# Patient Record
Sex: FEMALE | Race: WHITE | NOT HISPANIC OR LATINO | Employment: OTHER | URBAN - METROPOLITAN AREA
[De-identification: names, ages, dates, MRNs, and addresses within clinical notes are randomized per-mention and may not be internally consistent; named-entity substitution may affect disease eponyms.]

---

## 2017-01-05 ENCOUNTER — PATIENT MESSAGE (OUTPATIENT)
Dept: ENDOCRINOLOGY | Facility: CLINIC | Age: 52
End: 2017-01-05

## 2017-01-05 ENCOUNTER — PATIENT MESSAGE (OUTPATIENT)
Dept: OBSTETRICS AND GYNECOLOGY | Facility: CLINIC | Age: 52
End: 2017-01-05

## 2017-01-23 ENCOUNTER — PATIENT MESSAGE (OUTPATIENT)
Dept: BARIATRICS | Facility: CLINIC | Age: 52
End: 2017-01-23

## 2017-01-23 ENCOUNTER — TELEPHONE (OUTPATIENT)
Dept: BARIATRICS | Facility: CLINIC | Age: 52
End: 2017-01-23

## 2017-01-23 NOTE — TELEPHONE ENCOUNTER
SPOKE to pt and advised her that she will not need ekg tomorrow and it has been cx.     Pt needs repeat vit d and tsh.  She requested for pcp, who she will see this Thursday to draw her labs.

## 2017-01-23 NOTE — TELEPHONE ENCOUNTER
Called patient to begin liquid diet and discussed vitamins.    Protein shake: need  gms of protein per day, less than 4gm sugar per shake  Pt using Premier shakes x 3/day. Needs 4/day.  600-800 calories per day from protein shakes  SF Decaf, non-carbonated Fluids  NO Fruits, juices and yogurt for 2 weeks before and after surgery  No vitamins or minerals for 1 week prior to surgery  Reminded pt of pre-op and surgery date.  Pt to call back with any questions.

## 2017-01-23 NOTE — TELEPHONE ENCOUNTER
----- Message from Whit Clemons RN sent at 12/5/2016  5:13 PM CST -----  Regarding: surgery Cancellation  Ranulfo Desai,  This patient is canceling her surgery and moving it back to 2/6/17.  Her PLD will begin on 1/23/17 now.  Thanks,  Jane

## 2017-01-29 ENCOUNTER — PATIENT MESSAGE (OUTPATIENT)
Dept: ENDOCRINOLOGY | Facility: CLINIC | Age: 52
End: 2017-01-29

## 2017-01-31 ENCOUNTER — TELEPHONE (OUTPATIENT)
Dept: ENDOCRINOLOGY | Facility: CLINIC | Age: 52
End: 2017-01-31

## 2017-01-31 ENCOUNTER — OFFICE VISIT (OUTPATIENT)
Dept: BARIATRICS | Facility: CLINIC | Age: 52
End: 2017-01-31
Payer: MEDICARE

## 2017-01-31 VITALS
BODY MASS INDEX: 39.77 KG/M2 | WEIGHT: 224.44 LBS | SYSTOLIC BLOOD PRESSURE: 139 MMHG | HEART RATE: 96 BPM | DIASTOLIC BLOOD PRESSURE: 81 MMHG | HEIGHT: 63 IN

## 2017-01-31 DIAGNOSIS — I10 ESSENTIAL HYPERTENSION: Chronic | ICD-10-CM

## 2017-01-31 DIAGNOSIS — E66.01 MORBID OBESITY: ICD-10-CM

## 2017-01-31 DIAGNOSIS — E78.5 HYPERLIPIDEMIA, UNSPECIFIED HYPERLIPIDEMIA TYPE: Chronic | ICD-10-CM

## 2017-01-31 PROCEDURE — 3075F SYST BP GE 130 - 139MM HG: CPT | Mod: S$GLB,,, | Performed by: SURGERY

## 2017-01-31 PROCEDURE — 99999 PR PBB SHADOW E&M-EST. PATIENT-LVL IV: CPT | Mod: PBBFAC,,, | Performed by: SURGERY

## 2017-01-31 PROCEDURE — 3079F DIAST BP 80-89 MM HG: CPT | Mod: S$GLB,,, | Performed by: SURGERY

## 2017-01-31 PROCEDURE — 1159F MED LIST DOCD IN RCRD: CPT | Mod: S$GLB,,, | Performed by: SURGERY

## 2017-01-31 PROCEDURE — 99213 OFFICE O/P EST LOW 20 MIN: CPT | Mod: S$GLB,,, | Performed by: SURGERY

## 2017-01-31 RX ORDER — SODIUM CITRATE AND CITRIC ACID MONOHYDRATE 334; 500 MG/5ML; MG/5ML
15 SOLUTION ORAL ONCE
Status: CANCELLED | OUTPATIENT
Start: 2017-01-31 | End: 2017-01-31

## 2017-01-31 RX ORDER — FAMOTIDINE 10 MG/ML
20 INJECTION INTRAVENOUS ONCE
Status: CANCELLED | OUTPATIENT
Start: 2017-01-31 | End: 2017-01-31

## 2017-01-31 RX ORDER — OXYCODONE HYDROCHLORIDE 15 MG/1
TABLET ORAL
Refills: 0 | COMMUNITY
Start: 2016-11-17 | End: 2017-01-31 | Stop reason: ALTCHOICE

## 2017-01-31 RX ORDER — HEPARIN SODIUM 5000 [USP'U]/ML
5000 INJECTION, SOLUTION INTRAVENOUS; SUBCUTANEOUS ONCE
Status: CANCELLED | OUTPATIENT
Start: 2017-01-31 | End: 2017-01-31

## 2017-01-31 RX ORDER — PENICILLIN V POTASSIUM 500 MG/1
TABLET, FILM COATED ORAL
Refills: 0 | COMMUNITY
Start: 2016-12-21 | End: 2017-01-31 | Stop reason: ALTCHOICE

## 2017-01-31 RX ORDER — METOCLOPRAMIDE HYDROCHLORIDE 5 MG/ML
10 INJECTION INTRAMUSCULAR; INTRAVENOUS ONCE
Status: CANCELLED | OUTPATIENT
Start: 2017-01-31 | End: 2017-01-31

## 2017-01-31 NOTE — LETTER
[facility]  [address]  [place] [address]  Phone: [phone]  Fax: [phone] January 31, 2017      Rangel Floyd MD  [address]  [address]  [place] [address]    Patient: Angie Mccarthy   MR Number: [id number]   YOB: 1965   Date of Visit: 1/31/2017     Dear Dr. Swenson:    Thank you for referring Angie Mccarthy to me for evaluation. Below are the relevant portions of my assessment and plan of care.    Angie Mccarthy is a 51-year-old female who presents withMorbid obesity with failure of medical conservative therapy.     Co Morbid Conditions:   Patient Active Problem List   Diagnosis    Syringomyelia and syringobulbia    Lumbago    Spondylosis without myelopathy    Degeneration of lumbar or lumbosacral intervertebral disc    Thoracic or lumbosacral neuritis or radiculitis, unspecified    Flat back    Sagittal plane imbalance    Hardware complicating wound infection    Hypertension    Hyperlipidemia    Open wound of knee, leg (except thigh), and ankle, without mention of complication    Scoliosis    Gait abnormality    Contact dermatitis due to adhesives    Postoperative anemia due to acute blood loss    Thoracic spondylosis without myelopathy    Rash and nonspecific skin eruption    Pain in limb    Back pain with radiation    Spinal stenosis, lumbar region, without neurogenic claudication    Pseudoarthrosis    Kyphoscoliosis    Flat back syndrome    Failed back syndrome    Osteoporosis, idiopathic    Menopause    Thyroid nodule    Palpitations    Morbid obesity with BMI of 40.0-44.9, adult    GERD (gastroesophageal reflux disease)    Fatty liver disease, nonalcoholic     PLAN:  Patient wishes to undergo sleeve gastrectomy with wedge biopsy of liver. She is on narcotics by pain management and we will see whether we will be responsible for 2 weeks worth of pain medication. She is concerned about post-op sciatica  (leg shaking) as she has had it before and says it responds to Neurontin and Flexeril.      The patient was informed that risks include, but are not limited to: death, leak, obstruction, bleeding, and sepsis. Any of these could require further surgery. Other risks include DVT, PE, pneumonia, wound dehiscence, hernia, wound infection, the need for dilatations and the inability to lose appropriate weight and keep it off.      We discussed that our goal is to ameliorate her medical problems and not to obtain a specific body mass index. she understands the risks and benefits and wishes to proceed with the procedure. she has signed a consent form.     If you have questions, please do not hesitate to call me. I look forward to following Angie along with you.    Sincerely,      Deon Ha MD   Section Head - General, Laparoscopic, Bariatric  Acute Care and Oncologic Surgery   - Surgical Weight Loss Program  [facility]    ZOHAIB/beau

## 2017-01-31 NOTE — PROGRESS NOTES
History & Physical    SUBJECTIVE:     History of Present Illness:  Angie Mccarthy is a 51 y.o. female who presents for pre-operative exam for weight loss surgery.  she has completed her pre-operative evaluation.  she has failed medical treatment for obesity.  1. Morbid Obesity, Body mass index is 41.51 kg/(m^2). and inability to lose weight.  2. Co-morbidities: FERRELL, dyslipidemia, GERD, essential hypertension and osteoarthritis. She is being treated for low thyroid and low vitamin d.       Chief Complaint   Patient presents with    Pre-op Exam     Sleeve 2/6/17       Review of patient's allergies indicates:   Allergen Reactions    Adhesive tape-silicones      Other reaction(s): redness    Bactrim [sulfamethoxazole-trimethoprim] Anxiety       Current Outpatient Prescriptions   Medication Sig    alprazolam (XANAX) 0.5 MG tablet Take 0.5 mg by mouth 2 (two) times daily as needed.     ascorbic acid (VITAMIN C) 1000 MG tablet Take 1,000 mg by mouth every morning.     aspirin (ECOTRIN) 81 MG EC tablet Take 81 mg by mouth every evening.     BIOTIN ORAL Take 10,000 mg by mouth every morning.     conjugated estrogens (PREMARIN) vaginal cream Place 0.5 g vaginally twice a week.    cyclobenzaprine (FLEXERIL) 10 MG tablet Take 10 mg by mouth 3 (three) times daily as needed for Muscle spasms.    estradiol (ESTRACE) 1 MG tablet Take 2 tablets (2 mg total) by mouth once daily.    fish oil-omega-3 fatty acids 300-1,000 mg capsule Take 1,200 mg by mouth 2 (two) times daily.     fluoxetine (PROZAC) 40 MG capsule Take 40 mg by mouth once daily.    gabapentin (NEURONTIN) 800 MG tablet Take 800 mg by mouth 2 (two) times daily.     hydrochlorothiazide (HYDRODIURIL) 12.5 MG Tab Take 12.5 mg by mouth once daily.    levothyroxine (SYNTHROID) 150 MCG tablet Take 1 tablet (150 mcg total) by mouth once daily.    lisinopril (PRINIVIL,ZESTRIL) 2.5 MG tablet Take 2.5 mg by mouth once daily.    multivitamin capsule Take  "1 capsule by mouth every morning.     omeprazole (PRILOSEC) 40 MG capsule Take 40 mg by mouth daily as needed.     oxycodone (ROXICODONE) 15 MG Tab TAKE ONE Tablet BY MOUTH FOUR TIMES A DAY AS NEEDED FOR PAIN MAY CAUSE DROWSINESS THANK YOU FOR THIRTY DAYS    oxycodone-acetaminophen (PERCOCET)  mg per tablet Take 1 tablet by mouth every 4 (four) hours as needed for Pain.    penicillin v potassium (VEETID) 500 MG tablet TK 1 T PO  EVERY 6 H UNTIL COMPLETED    pravastatin (PRAVACHOL) 20 MG tablet Take 40 mg by mouth once daily.     vitamin E 400 UNIT capsule Take 400 Units by mouth once daily.    zolpidem (AMBIEN) 10 mg Tab every evening.      No current facility-administered medications for this visit.        Past Medical History   Diagnosis Date    Allergy      seasonal    Arthritis     Blood transfusion     Hyperlipidemia     Hypertension     Hypothyroidism     Joint pain     Kidney stones     Morbid obesity 12/14/2012    OCD (obsessive compulsive disorder)     SOB (shortness of breath) on exertion     Supraventricular tachycardia        Past Surgical History   Procedure Laterality Date    Spinal fusion      Back surgery      Tonsillectomy, adenoidectomy      Appendectomy      Cholecystectomy      Hernia repair      Hysterectomy         Review of Systems   Constitutional: Negative for fever.   Respiratory: Negative for cough.    Cardiovascular: Negative for chest pain.   Gastrointestinal: Positive for heartburn. Negative for abdominal pain, constipation, diarrhea, nausea and vomiting.        Heartburn controlled with ppi (takes prior to going to bed)   Genitourinary: Negative for dysuria.   Endo/Heme/Allergies: Does not bruise/bleed easily.          OBJECTIVE:     Vitals:    01/31/17 1539   Weight: 101.8 kg (224 lb 6.9 oz)   Height: 5' 2.5" (1.588 m)       Physical Exam   Constitutional: She is oriented to person, place, and time and well-developed, well-nourished, and in no distress. "   Cardiovascular: Normal rate, regular rhythm and normal heart sounds.    Pulmonary/Chest: Effort normal and breath sounds normal.   Abdominal: Soft. Bowel sounds are normal. There is no tenderness.       Neurological: She is alert and oriented to person, place, and time.   Skin: Skin is warm and dry.   Psychiatric: Mood, memory, affect and judgment normal.   Vitals reviewed.       Laboratory  CBC: Reviewed  CMP: Reviewed    Diagnostic Results:  ECG: Reviewed  X-Ray: Reviewed  US: Reviewed  EGD and Stress test: Reviewed     Dietitian: Patient has participated in pre-operative nutritional program with understanding of necessary lifelong dietary changes required with surgery.    Psych: No overt contraindications to bariatric surgery, patient has completed psychological evaluation and is able to give informed consent.    PCP: Medically cleared for surgery.     ASSESSMENT/PLAN:     Morbid obesity with failure of medical conservative therapy.    Co Morbid Conditions:   Patient Active Problem List   Diagnosis    Syringomyelia and syringobulbia    Lumbago    Spondylosis without myelopathy    Degeneration of lumbar or lumbosacral intervertebral disc    Thoracic or lumbosacral neuritis or radiculitis, unspecified    Flat back    Sagittal plane imbalance    Hardware complicating wound infection    Hypertension    Hyperlipidemia    Open wound of knee, leg (except thigh), and ankle, without mention of complication    Scoliosis    Gait abnormality    Contact dermatitis due to adhesives    Postoperative anemia due to acute blood loss    Thoracic spondylosis without myelopathy    Rash and nonspecific skin eruption    Pain in limb    Back pain with radiation    Spinal stenosis, lumbar region, without neurogenic claudication    Pseudoarthrosis    Kyphoscoliosis    Flat back syndrome    Failed back syndrome    Osteoporosis, idiopathic    Menopause    Thyroid nodule    Palpitations    Morbid obesity with  BMI of 40.0-44.9, adult    GERD (gastroesophageal reflux disease)    Fatty liver disease, nonalcoholic       Patient wishes to undergo sleeve gastrectomy with wedge biopsy of liver.  She is on narcotics by pain management and we will see whether we will be responsible for 2 weeks worth of pain medication.  She is concerned about postop sciatica (leg shaking) as she has had it before and says it responds to neuron ton and flexeril.     The patient was informed that risks include, but are not limited to: death, leak, obstruction, bleeding, and sepsis. Any of these could require further surgery. Other risks include DVT, PE, pneumonia, wound dehiscence, hernia, wound infection, the need for dilatations and the inability to lose appropriate weight and keep it off.     We discussed that our goal is to ameliorate her medical problems and not to obtain a specific body mass index. she understands the risks and benefits and wishes to proceed with the procedure.  she has signed a consent form.

## 2017-02-01 ENCOUNTER — DOCUMENTATION ONLY (OUTPATIENT)
Dept: BARIATRICS | Facility: CLINIC | Age: 52
End: 2017-02-01

## 2017-02-01 NOTE — PROGRESS NOTES
Sx consent and blood consent scanned in Media tab.   Placed in bin for [facility] to .    Bariatric dashboard updated.

## 2017-02-03 ENCOUNTER — TELEPHONE (OUTPATIENT)
Dept: BARIATRICS | Facility: CLINIC | Age: 52
End: 2017-02-03

## 2017-02-03 ENCOUNTER — ANESTHESIA EVENT (OUTPATIENT)
Dept: SURGERY | Facility: HOSPITAL | Age: 52
DRG: 621 | End: 2017-02-03
Payer: MEDICARE

## 2017-02-03 NOTE — TELEPHONE ENCOUNTER
PCP CLEARANCE AND CURRENT LABS SCANNED INTO MEDIA TAB.    MOST CURRENT LABS SCANNED INTO MEDIA TAB.    Bariatric Dashboard updated

## 2017-02-03 NOTE — PROGRESS NOTES
Spoke to pt and informed that we have not received her pcp clearance yet. She advised that she will call her pcp office and have them fax over. Fax # given. She will also have them fax over labs too.  Cbc from 1/26/17 scanned in media tab.

## 2017-02-03 NOTE — TELEPHONE ENCOUNTER
SPOKE to pt after she called her pcp office and was told her pcp is at lunch and they will need her approval to be faxed over.       -Spoke to pt & advised that pt needs to chk in on the 2nd floor, sx center by  0500   .    -Advised pt of sx time of 0700   .    -Reminded pt to drink 8 oz glass of water just before arrival chk in time.  -Also advised to pt that if after surgery pt is not feeling well/  Dehydrated/ or experiencing anything abnormal,  to call office right away to notify.  -Informed pt that one of the Dieticians will call pt w/in 1 wk to f/u on hydration status.  -Advised pt to review all ppwk received in preop clinic.    -Reminded pt to have pain med filled before leaving hospital.   -Pt verbalized understanding to all instructions and did not have any questions for RN.   -Pt confirmed that she has the clinic # if she has any questions.

## 2017-02-03 NOTE — TELEPHONE ENCOUNTER
Called pt and informed that we still do not have pcp clearance yet. She will call pcp office again now.

## 2017-02-03 NOTE — ANESTHESIA PREPROCEDURE EVALUATION
"                                                                                                             02/03/2017  Angie Mccarthy is a 51 y.o., female with a pre-operative diagnosis of BMI 40.0-44.9, adult [Z68.41]  Essential hypertension [I10]  Fatty liver disease, nonalcoholic [K76.0]  Gastroesophageal reflux disease, esophagitis presence not specified [K21.9]  Morbid obesity, unspecified obesity type [E66.01]  Hyperlipidemia, unspecified hyperlipidemia type [E78.5] who is scheduled for Procedure(s) (LRB):  GASTRECTOMY-SLEEVE-LAPAROSCOPIC - 03883 w/ intraop egd (N/A)  BIOPSY-LIVER-LAPAROSCOPIC - 18667 (N/A).     Requested anesthesia type: General  Surgeon: Deon Ha MD    PRIOR AIRWAY MANAGEMENT:    Placement Date: 02/14/13;  92.5 kg (204 lb)  (02/14/13) 41.3      Inserted by: CRNA; Airway Device: Endotracheal Tube; Airway Device Size: 7.0; Style: Cuffed; Placement Verified By: Auscultation;  Depth of Insertion: 23; Breath Sounds: Equal Bilateral; Insertion Attempts: 1; Tolerance: Well; Removal Date: 02/15/13;  Removal Time: 0800; Removal Indication & Assessment: removed per MD; Name of Person who Removed: Dr. Spence      Allergies:   Review of patient's allergies indicates:   Allergen Reactions    Adhesive tape-silicones      Other reaction(s): redness    Bactrim [sulfamethoxazole-trimethoprim] Anxiety     Vital Sign Range:    height is 5' 2" (1.575 m) and weight is 102.3 kg (225 lb 8.5 oz). Her oral temperature is 36.7 °C (98 °F). Her blood pressure is 124/93 (abnormal) and her pulse is 79. Her respiration is 16 and oxygen saturation is 96%.     Chronic Medications:   No prescriptions prior to admission.     Current Medications:   Current Facility-Administered Medications   Medication Dose Route Frequency Provider Last Rate Last Dose    0.9%  NaCl infusion   Intravenous Continuous Hebert Minaya MD 10 mL/hr at 02/06/17 0609      ceFAZolin (ANCEF) 3 gram in dextrose 5% IVPB "  3 g Intravenous On Call Procedure Deon Ha MD         Medical History:   Past Medical History   Diagnosis Date    Allergy      seasonal    Arthritis     Blood transfusion     Hyperlipidemia     Hypertension     Hypothyroidism     Joint pain     Kidney stones     Morbid obesity 12/14/2012    OCD (obsessive compulsive disorder)     SOB (shortness of breath) on exertion     Supraventricular tachycardia      .    [facility] Anesthesia Evaluation    I have reviewed the Patient Summary Reports.    I have reviewed the Nursing Notes.   I have reviewed the Medications.     Review of Systems  Anesthesia Hx:  No problems with previous Anesthesia Mild, PONV, care plan developed. Denies Family Hx of Anesthesia complications.   Denies Personal Hx of Anesthesia complications.   Social:  Non-Smoker, No Alcohol Use    Cardiovascular:   Hypertension ECG has been reviewed.    Pulmonary:   Shortness of breath    Renal/:   Chronic Renal Disease    Hepatic/GI:   GERD    Musculoskeletal:   Arthritis     Neurological:   Neuromuscular Disease,   Peripheral Neuropathy    Endocrine:   Hypothyroidism    Psych:   Psychiatric History          Physical Exam  General:  Morbid Obesity    Airway/Jaw/Neck:  Airway Findings: Mouth Opening: Normal Tongue: Normal  General Airway Assessment: Adult, Good  Mallampati: II  Improves to II with phonation.  TM Distance: Normal, at least 6 cm      Dental:  Dental Findings: In tact   Chest/Lungs:  Chest/Lungs Findings: Clear to auscultation, Normal Respiratory Rate     Heart/Vascular:  Heart Findings: Rate: Normal  Rhythm: Regular Rhythm  Sounds: Normal      Musculoskeletal:  Musculoskeletal Findings:     Mental Status:  Mental Status Findings:  Cooperative, Alert and Oriented         Anesthesia Plan  Type of Anesthesia, risks & benefits discussed:  Anesthesia Type:  general  Patient's Preference: as indicated.  Intra-op Monitoring Plan: standard ASA monitors  Intra-op Monitoring Plan  Comments:   Post Op Pain Control Plan:   Post Op Pain Control Plan Comments:   Induction:   IV  Beta Blocker:  Patient is not currently on a Beta-Blocker (No further documentation required).       Informed Consent: Patient understands risks and agrees with Anesthesia plan.  Questions answered. Anesthesia consent signed with patient.  ASA Score: 3     Day of Surgery Review of History & Physical:  There are no significant changes.  H&P update referred to the surgeon.     Anesthesia Plan Notes: Perioperative PONV plan of care developed, agrees to proceed.        Ready For Surgery From Anesthesia Perspective.

## 2017-02-06 ENCOUNTER — ANESTHESIA (OUTPATIENT)
Dept: SURGERY | Facility: HOSPITAL | Age: 52
DRG: 621 | End: 2017-02-06
Payer: MEDICARE

## 2017-02-06 ENCOUNTER — SURGERY (OUTPATIENT)
Age: 52
End: 2017-02-06

## 2017-02-06 PROBLEM — E66.01 MORBID OBESITY: Status: ACTIVE | Noted: 2017-02-06

## 2017-02-06 PROCEDURE — D9220A PRA ANESTHESIA: Mod: CRNA,,, | Performed by: NURSE ANESTHETIST, CERTIFIED REGISTERED

## 2017-02-06 PROCEDURE — 25000003 PHARM REV CODE 250: Performed by: NURSE ANESTHETIST, CERTIFIED REGISTERED

## 2017-02-06 PROCEDURE — 63600175 PHARM REV CODE 636 W HCPCS: Performed by: NURSE ANESTHETIST, CERTIFIED REGISTERED

## 2017-02-06 PROCEDURE — D9220A PRA ANESTHESIA: Mod: ANES,,, | Performed by: ANESTHESIOLOGY

## 2017-02-06 RX ORDER — DEXAMETHASONE SODIUM PHOSPHATE 4 MG/ML
INJECTION, SOLUTION INTRA-ARTICULAR; INTRALESIONAL; INTRAMUSCULAR; INTRAVENOUS; SOFT TISSUE
Status: DISCONTINUED | OUTPATIENT
Start: 2017-02-06 | End: 2017-02-06

## 2017-02-06 RX ORDER — GLYCOPYRROLATE 0.2 MG/ML
INJECTION INTRAMUSCULAR; INTRAVENOUS
Status: DISCONTINUED | OUTPATIENT
Start: 2017-02-06 | End: 2017-02-06

## 2017-02-06 RX ORDER — PROPOFOL 10 MG/ML
VIAL (ML) INTRAVENOUS
Status: DISCONTINUED | OUTPATIENT
Start: 2017-02-06 | End: 2017-02-06

## 2017-02-06 RX ORDER — FENTANYL CITRATE 50 UG/ML
INJECTION, SOLUTION INTRAMUSCULAR; INTRAVENOUS
Status: DISCONTINUED | OUTPATIENT
Start: 2017-02-06 | End: 2017-02-06

## 2017-02-06 RX ORDER — ONDANSETRON 2 MG/ML
INJECTION INTRAMUSCULAR; INTRAVENOUS
Status: DISCONTINUED | OUTPATIENT
Start: 2017-02-06 | End: 2017-02-06

## 2017-02-06 RX ORDER — ROCURONIUM BROMIDE 10 MG/ML
INJECTION, SOLUTION INTRAVENOUS
Status: DISCONTINUED | OUTPATIENT
Start: 2017-02-06 | End: 2017-02-06

## 2017-02-06 RX ORDER — MIDAZOLAM HYDROCHLORIDE 1 MG/ML
INJECTION, SOLUTION INTRAMUSCULAR; INTRAVENOUS
Status: DISCONTINUED | OUTPATIENT
Start: 2017-02-06 | End: 2017-02-06

## 2017-02-06 RX ORDER — LIDOCAINE HCL/PF 100 MG/5ML
SYRINGE (ML) INTRAVENOUS
Status: DISCONTINUED | OUTPATIENT
Start: 2017-02-06 | End: 2017-02-06

## 2017-02-06 RX ORDER — NEOSTIGMINE METHYLSULFATE 1 MG/ML
INJECTION, SOLUTION INTRAVENOUS
Status: DISCONTINUED | OUTPATIENT
Start: 2017-02-06 | End: 2017-02-06

## 2017-02-06 RX ADMIN — FENTANYL CITRATE 25 MCG: 50 INJECTION, SOLUTION INTRAMUSCULAR; INTRAVENOUS at 08:02

## 2017-02-06 RX ADMIN — SODIUM CHLORIDE, SODIUM GLUCONATE, SODIUM ACETATE, POTASSIUM CHLORIDE, MAGNESIUM CHLORIDE, SODIUM PHOSPHATE, DIBASIC, AND POTASSIUM PHOSPHATE: .53; .5; .37; .037; .03; .012; .00082 INJECTION, SOLUTION INTRAVENOUS at 07:02

## 2017-02-06 RX ADMIN — ONDANSETRON 4 MG: 2 INJECTION INTRAMUSCULAR; INTRAVENOUS at 07:02

## 2017-02-06 RX ADMIN — DEXAMETHASONE SODIUM PHOSPHATE 4 MG: 4 INJECTION, SOLUTION INTRAMUSCULAR; INTRAVENOUS at 07:02

## 2017-02-06 RX ADMIN — ROCURONIUM BROMIDE 40 MG: 10 INJECTION, SOLUTION INTRAVENOUS at 07:02

## 2017-02-06 RX ADMIN — NEOSTIGMINE METHYLSULFATE 5 MG: 1 INJECTION INTRAVENOUS at 08:02

## 2017-02-06 RX ADMIN — DEXTROSE 2 G: 50 INJECTION, SOLUTION INTRAVENOUS at 07:02

## 2017-02-06 RX ADMIN — MIDAZOLAM HYDROCHLORIDE 2 MG: 1 INJECTION, SOLUTION INTRAMUSCULAR; INTRAVENOUS at 06:02

## 2017-02-06 RX ADMIN — FENTANYL CITRATE 50 MCG: 50 INJECTION, SOLUTION INTRAMUSCULAR; INTRAVENOUS at 07:02

## 2017-02-06 RX ADMIN — GLYCOPYRROLATE 0.4 MG: 0.2 INJECTION, SOLUTION INTRAMUSCULAR; INTRAVENOUS at 08:02

## 2017-02-06 RX ADMIN — BUPIVACAINE HYDROCHLORIDE 7 ML: 2.5 INJECTION, SOLUTION EPIDURAL; INFILTRATION; INTRACAUDAL; PERINEURAL at 07:02

## 2017-02-06 RX ADMIN — PROPOFOL 200 MG: 10 INJECTION, EMULSION INTRAVENOUS at 07:02

## 2017-02-06 RX ADMIN — LIDOCAINE HYDROCHLORIDE 100 MG: 20 INJECTION, SOLUTION INTRAVENOUS at 07:02

## 2017-02-06 NOTE — ANESTHESIA POSTPROCEDURE EVALUATION
"Anesthesia Post Evaluation    Patient: Angie Mccarthy    Procedure(s) Performed: Procedure(s) (LRB):  GASTRECTOMY-SLEEVE-LAPAROSCOPIC - 71968 w/ intraop egd (N/A)  BIOPSY-LIVER-LAPAROSCOPIC - 03667 (N/A)    Final Anesthesia Type: general  Patient location during evaluation: PACU  Patient participation: Yes- Able to Participate  Level of consciousness: awake and alert and oriented  Post-procedure vital signs: reviewed and stable  Pain management: adequate  Airway patency: patent  PONV status at discharge: No PONV  Anesthetic complications: no      Cardiovascular status: stable  Respiratory status: unassisted, spontaneous ventilation and nasal cannula  Hydration status: euvolemic  Follow-up not needed.        Visit Vitals    /79    Pulse 77    Temp 35.9 °C (96.7 °F) (Axillary)    Resp 31    Ht 5' 2" (1.575 m)    Wt 102.3 kg (225 lb 8.5 oz)    SpO2 95%    Breastfeeding No    BMI 41.25 kg/m2       Pain/Venessa Score: Pain Assessment Performed: Yes (2/6/2017  8:45 AM)  Presence of Pain: complains of pain/discomfort (2/6/2017  8:45 AM)  Pain Rating Prior to Med Admin: 7 (2/6/2017  9:02 AM)  Venessa Score: 8 (2/6/2017  8:45 AM)      "

## 2017-02-06 NOTE — TRANSFER OF CARE
"Anesthesia Transfer of Care Note    Patient: Angie Mccarthy    Procedure(s) Performed: Procedure(s) (LRB):  GASTRECTOMY-SLEEVE-LAPAROSCOPIC - 89527 w/ intraop egd (N/A)  BIOPSY-LIVER-LAPAROSCOPIC - 47614 (N/A)    Patient location: PACU    Anesthesia Type: general    Transport from OR: Transported from OR on 6-10 L/min O2 by face mask with adequate spontaneous ventilation    Post pain: adequate analgesia    Post assessment: no apparent anesthetic complications and tolerated procedure well    Post vital signs: stable    Level of consciousness: sedated    Nausea/Vomiting: no nausea/vomiting    Complications: none          Last vitals:   Visit Vitals    BP (!) 140/70 (BP Location: Right arm, Patient Position: Lying, BP Method: Automatic)    Pulse 62    Temp 35.9 °C (96.7 °F) (Axillary)    Resp 13    Ht 5' 2" (1.575 m)    Wt 102.3 kg (225 lb 8.5 oz)    SpO2 100%    Breastfeeding No    BMI 41.25 kg/m2     "

## 2017-02-06 NOTE — ANESTHESIA RELEASE NOTE
"Anesthesia Release from PACU Note    Patient: Angie Mccarthy    Procedure(s) Performed: Procedure(s) (LRB):  GASTRECTOMY-SLEEVE-LAPAROSCOPIC - 43605 w/ intraop egd (N/A)  BIOPSY-LIVER-LAPAROSCOPIC - 32807 (N/A)    Anesthesia type: GEN    Post pain: Adequate analgesia reported    Post assessment: no apparent anesthetic complications, tolerated procedure well and no evidence of recall    Post vital signs:   Visit Vitals    /79    Pulse 77    Temp 35.9 °C (96.7 °F) (Axillary)    Resp 31    Ht 5' 2" (1.575 m)    Wt 102.3 kg (225 lb 8.5 oz)    SpO2 95%    Breastfeeding No    BMI 41.25 kg/m2       Level of consciousness: awake, alert and oriented    Nausea/Vomiting: no nausea/no vomiting at present, required phenergan earlier.    Complications: none    Airway Patency: patent    Respiratory: unassisted, spontaneous ventilation, nasal cannula.    Cardiovascular: stable and blood pressure at baseline    Hydration: euvolemic    "

## 2017-02-11 ENCOUNTER — NURSE TRIAGE (OUTPATIENT)
Dept: ADMINISTRATIVE | Facility: CLINIC | Age: 52
End: 2017-02-11

## 2017-02-11 ENCOUNTER — PATIENT MESSAGE (OUTPATIENT)
Dept: BARIATRICS | Facility: CLINIC | Age: 52
End: 2017-02-11

## 2017-02-11 NOTE — TELEPHONE ENCOUNTER
Spoke with Dr. Mikaela conde to advised patient to apply Neisporin or Bactricatan ointment to affected/blistered or irritated michelle (not directly yo incisions) concurred with [facility] protocol to cover sites with sterile dressing for drainage and protection. Patient instructed to arrange follow-up appointment on Monday and go to [facility] over weekend if pus develops increase redness and/or develop fever. Patient advised per [facility] protocol verbalized understanding, agreed with recommendations Patient advised to call [facility] again if symptoms persist or worsen and if home measures are ineffective; patient had no further questions at end of call.

## 2017-02-11 NOTE — TELEPHONE ENCOUNTER
"  Reason for Disposition   [1] Looks infected (spreading redness, pus) AND [2] large red area (> 2 in. or 5 cm)    Answer Assessment - Initial Assessment Questions  1. APPEARANCE of RASH: "Describe the rash."       Redden irritated    2. LOCATION: "Where is the rash located?"       2 site under breast/2sites above   3. NUMBER: "How many spots are there?"       *No Answer*  4. SIZE: "How big are the spots?" (Inches, centimeters or compare to size of a coin)       *No Answer*  5. ONSET: "When did the rash start?"       *No Answer*  6. ITCHING: "Does the rash itch?" If so, ask: "How bad is the itch?"  (Scale 1-10; or mild, moderate, severe)      *No Answer*  7. PAIN: "Does the rash hurt?" If so, ask: "How bad is the pain?"  (Scale 1-10; or mild, moderate, severe)      *No Answer*  8. OTHER SYMPTOMS: "Do you have any other symptoms?" (e.g., fever)      *No Answer*  9. PREGNANCY: "Is there any chance you are pregnant?" "When was your last menstrual period?"      *No Answer*    Protocols used: ST RASH OR REDNESS - [id number]    "

## 2017-02-13 ENCOUNTER — TELEPHONE (OUTPATIENT)
Dept: BARIATRICS | Facility: CLINIC | Age: 52
End: 2017-02-13

## 2017-02-13 ENCOUNTER — CLINICAL SUPPORT (OUTPATIENT)
Dept: INFECTIOUS DISEASES | Facility: CLINIC | Age: 52
End: 2017-02-13
Payer: MEDICARE

## 2017-02-13 ENCOUNTER — PATIENT MESSAGE (OUTPATIENT)
Dept: BARIATRICS | Facility: CLINIC | Age: 52
End: 2017-02-13

## 2017-02-13 ENCOUNTER — OFFICE VISIT (OUTPATIENT)
Dept: BARIATRICS | Facility: CLINIC | Age: 52
End: 2017-02-13
Payer: MEDICARE

## 2017-02-13 VITALS
BODY MASS INDEX: 40.65 KG/M2 | HEART RATE: 73 BPM | WEIGHT: 220.88 LBS | HEIGHT: 62 IN | SYSTOLIC BLOOD PRESSURE: 120 MMHG | DIASTOLIC BLOOD PRESSURE: 80 MMHG

## 2017-02-13 DIAGNOSIS — M48.061 SPINAL STENOSIS, LUMBAR REGION, WITHOUT NEUROGENIC CLAUDICATION: ICD-10-CM

## 2017-02-13 DIAGNOSIS — E66.01 MORBID OBESITY WITH BMI OF 40.0-44.9, ADULT: ICD-10-CM

## 2017-02-13 DIAGNOSIS — K76.0 FATTY LIVER DISEASE, NONALCOHOLIC: Primary | ICD-10-CM

## 2017-02-13 DIAGNOSIS — K21.9 GASTROESOPHAGEAL REFLUX DISEASE, ESOPHAGITIS PRESENCE NOT SPECIFIED: ICD-10-CM

## 2017-02-13 DIAGNOSIS — E78.5 HYPERLIPIDEMIA, UNSPECIFIED HYPERLIPIDEMIA TYPE: Chronic | ICD-10-CM

## 2017-02-13 DIAGNOSIS — Z98.84 S/P LAPAROSCOPIC SLEEVE GASTRECTOMY: ICD-10-CM

## 2017-02-13 DIAGNOSIS — T78.40XA ALLERGIC REACTION, INITIAL ENCOUNTER: ICD-10-CM

## 2017-02-13 DIAGNOSIS — I10 ESSENTIAL HYPERTENSION: Chronic | ICD-10-CM

## 2017-02-13 DIAGNOSIS — M47.814 THORACIC SPONDYLOSIS WITHOUT MYELOPATHY: ICD-10-CM

## 2017-02-13 PROCEDURE — 96372 THER/PROPH/DIAG INJ SC/IM: CPT | Mod: S$GLB,,, | Performed by: INTERNAL MEDICINE

## 2017-02-13 PROCEDURE — 99999 PR PBB SHADOW E&M-EST. PATIENT-LVL IV: CPT | Mod: PBBFAC,,, | Performed by: PHYSICIAN ASSISTANT

## 2017-02-13 PROCEDURE — 99024 POSTOP FOLLOW-UP VISIT: CPT | Mod: S$GLB,,, | Performed by: PHYSICIAN ASSISTANT

## 2017-02-13 RX ORDER — CLOTRIMAZOLE AND BETAMETHASONE DIPROPIONATE 10; .64 MG/G; MG/G
CREAM TOPICAL 2 TIMES DAILY
Qty: 45 G | Refills: 2 | Status: SHIPPED | OUTPATIENT
Start: 2017-02-13 | End: 2019-01-05 | Stop reason: ALTCHOICE

## 2017-02-13 RX ORDER — CYANOCOBALAMIN (VITAMIN B-12) 1000 MCG
1 TABLET, SUBLINGUAL SUBLINGUAL DAILY
COMMUNITY
End: 2019-01-05

## 2017-02-13 RX ORDER — DIPHENHYDRAMINE HYDROCHLORIDE 50 MG/ML
50 INJECTION INTRAMUSCULAR; INTRAVENOUS
Status: COMPLETED | OUTPATIENT
Start: 2017-02-13 | End: 2017-02-13

## 2017-02-13 RX ADMIN — DIPHENHYDRAMINE HYDROCHLORIDE 50 MG: 50 INJECTION INTRAMUSCULAR; INTRAVENOUS at 04:02

## 2017-02-13 NOTE — PATIENT INSTRUCTIONS
- Flinstone Complete.  Once twice a day  - Wellesse Calcium 3 times a day.  - Wellesse B complex, 2 times a day  - B12 sublingual every other day.    - Lotrisone 2-3 times a day  - cool pack  - steroid injection today

## 2017-02-13 NOTE — PROGRESS NOTES
BARIATRIC POST-OPERATIVE FOLLOW UP:    Chief Complaint   Patient presents with    Follow-up     check inciaions       HISTORY OF PRESENT ILLNESS: Angie Mccarthy is a 51 y.o. female with a Body mass index is 40.4 kg/(m^2). who presents for a 1 week follow up s/p Lap Sleeve and liver biopsy with Dr. Ha on 2/6/2017.  She is doing well and tolerating the diet without difficulty.  Liver biopsy was done, but results are still pending.   She is here 1 week early due to a severe allergic reaction to the adhesive/tape on the steri-strips.  The strips have been removed but she has redness an itching all over her abdomen.   She has lost 4 lbs, approximately 4% of their excess weight.  She has no other complaints.      Denies: nausea, vomiting, abdominal pain, changes in bowel movement pattern, fever, chills, dysphagia, chest pain, and shortness of breath.    Review of Systems   Constitutional: Negative for chills, fever and malaise/fatigue.   Eyes: Negative for blurred vision and double vision.   Respiratory: Negative for cough, hemoptysis and shortness of breath.    Cardiovascular: Negative for chest pain, palpitations and leg swelling.   Gastrointestinal: Negative for abdominal pain, blood in stool, constipation, diarrhea, heartburn, melena, nausea and vomiting.   Genitourinary: Negative for dysuria and hematuria.   Musculoskeletal: Negative for back pain, falls, joint pain, myalgias and neck pain.   Skin: Positive for itching and rash.   Neurological: Negative for dizziness, tingling, weakness and headaches.   Endo/Heme/Allergies: Negative for environmental allergies. Does not bruise/bleed easily.   Psychiatric/Behavioral: Negative.        EXERCISE & VITAMINS:  See Bariatric Assessment    MEDICATIONS/ALLERGIES:  Have been reviewed.    DIET:  Liquid Bariatric Diet.  3 20g Muscle Milk protein shakes daily, ~60 grams protein.  48+ oz H20 and Clear SF Liquids.      Vitals:    02/13/17 1551   BP: 120/80    Pulse: 73       Physical Exam   Constitutional: She is oriented to person, place, and time. She appears well-developed and well-nourished.   HENT:   Head: Normocephalic and atraumatic.   Cardiovascular: Normal rate and regular rhythm.    Pulmonary/Chest: Effort normal and breath sounds normal.   Abdominal: Soft. Bowel sounds are normal. She exhibits no distension and no mass. There is no tenderness. There is no rebound and no guarding.   Surgical portal incisions are intact, without drainage, edema, or any signs of infection.     Musculoskeletal: She exhibits no edema.   Neurological: She is alert and oriented to person, place, and time.   Skin: Skin is warm and dry. Rash noted. There is erythema. No pallor.        Maculopapular rash that surrounds incisions and has spread to cover most of abdomen and lower breasts   Psychiatric: She has a normal mood and affect.   Nursing note and vitals reviewed.      ASSESSMENT:  - Allergic reaction to tape/adhesive  - Morbid obesity, Body mass index is 40.4 kg/(m^2).,  s/p sleeve gastrectomy on 2/6/2017.  - Estimated goal weight, 178 lbs, which is 50% EWL  - Co-morbidities: HTN (normal off medications), NAFLD (stable, bx pending)  - Good Weight loss, 4 lbs, 4% EWL  - No Exercise regimen  - Good Vitamin Regimen  - Good Diet  - Not at risk for fall or abuse    PLAN:  - Steroid and Benadryl IM injection today in ID clinic.  - Lotrisone cream and Benadryl cream topically BID.  Lotrisone sent to pharmacy today.  - Continue with Muscle Milk shakes, can add 1 more shake to get higher protein.  Continue with clear and SF fluids.  - Advance to Puree diet in 1 week.  - Emphasized the importance of regular exercise and adherence to bariatric diet to achieve maximum weight loss.  - Encouraged patient to start regular exercise.  - Continue daily vitamins and medications.  Patient will switch to chewable MVI (FC x 2) and Wellesse Liquid Vitamins.  Gave a list of what she needs to buy and  directions on dosage.  - Anti-Acid medication, Omeprazole daily for 3 months.  - No lifting more than 10 lbs for 5 weeks.  - Miralax daily for constipation, no fiber.  - No NSAIDs, Tylenol for pain.  - No swallowing whole pills for 3 months.  Can swallow whole pills on 5/6/2017.  - RTC in 1 weeks or sooner if needed.  - Call the office for any issues.  - Check labs in 1 week for 2 week post-operative visit.    15 minute visit, over 50% of time spent counseling patient face to face on diet, exercise, and weight loss.

## 2017-02-13 NOTE — TELEPHONE ENCOUNTER
----- Message from Giselle Barrera sent at 2/3/2017  1:07 PM CST -----  Regardin wk po check in  Sleeve

## 2017-02-13 NOTE — TELEPHONE ENCOUNTER
Called to check in 1 week post op from bariatric surgery.  Patient seen in clinic with PA    N/V: N  Dizzy/weak: N  Fluid intake: 48 fl oz  Protein supplements: MM Light (3x/day)  Protein intake yesterday: 60gm  Vitamins: Y  Omeprazole:    Questions for nurse/MA/PA: seen in clinic for allergic reaction at incision sites    Assessment:  Doing well/as expected.     Discussion:  Continue to work on fluid and protein intake.    Confirmed date and time for 2 week po fasting labs and clinic visit

## 2017-02-13 NOTE — MR AVS SNAPSHOT
[facility] - Bariatric Surgery  [address]  [facility] [address]  Phone: [phone]  Fax: [phone]                  Angie Mccarthy   2017 3:00 PM   Office Visit    Description:  Female : 1965   Provider:  Fatoumata Sandhu PA-C   Department:  [facility] - Bariatric Surgery           Reason for Visit     Follow-up                To Do List           Future Appointments        Provider Department Dept Phone    2017 10:30 AM Emanuel Haskins MD [facility] - Endo/Diab/Metab [phone]    2017 12:40 PM LAB, APPOINTMENT [place] [facility] [phone]    2017 1:20 PM Fatoumata Sandhu PA-C [facility] - Bariatric Surgery [phone]    3/1/2017 1:00 PM [facility] END[facility]CRINOLOGY [facility]  [phone]    3/7/2017 11:00 AM Nikole Valente PA-C [facility] - Bariatric Surgery [phone]      Goals (5 Years of Data)     None       These Medications        Disp Refills Start End    clotrimazole-betamethasone 1-0.05% (LOTRISONE) cream 45 g 2 2017     Apply topically 2 (two) times daily. - Topical (Top)    Pharmacy: [facility] [address] [place] [address] [place]  AT [facility] Ph #: [phone]         [facility]     [facility] -  Assistance  Registered nurses in the [facility] provide clinical advisement, health education, appointment booking, and other advisory services.  Call for this free service at [phone].             Medications           Message regarding Medications     Verify the changes and/or additions to your medication regime listed below are the same as discussed with your clinician today.  If any of these changes or additions are incorrect, please notify your healthcare provider.        START taking these NEW medications        Refills    clotrimazole-betamethasone 1-0.05% (LOTRISONE) cream 2    Sig: Apply topically 2 (two)  times daily.    Class: Normal    Route: Topical (Top)      These medications were administered today        Dose Freq    diphenhydrAMINE injection 50 mg 50 mg Clinic/HOD 1 time    Sig: Inject 1 mL (50 mg total) into the muscle one time.    Class: Normal    Route: Intramuscular    triamcinolone acetonide injection 40 mg 40 mg Clinic/HOD 1 time    Sig: Inject 4 mLs (40 mg total) into the muscle one time.    Class: Normal    Route: Intramuscular           Verify that the below list of medications is an accurate representation of the medications you are currently taking.  If none reported, the list may be blank. If incorrect, please contact your healthcare provider. Carry this list with you in case of emergency.           Current Medications     alprazolam (XANAX) 0.5 MG tablet Take 0.5 mg by mouth 2 (two) times daily as needed for Insomnia or Anxiety.     ascorbic acid (VITAMIN C) 1000 MG tablet Take 1,000 mg by mouth every morning.     aspirin (ECOTRIN) 81 MG EC tablet Take 81 mg by mouth nightly.     BIOTIN ORAL Take 10,000 mg by mouth every morning.     clotrimazole-betamethasone 1-0.05% (LOTRISONE) cream Apply topically 2 (two) times daily.    conjugated estrogens (PREMARIN) vaginal cream Place 0.5 g vaginally twice a week.    cyanocobalamin, vitamin B-12, 500 mcg Subl Place 1 tablet under the tongue once daily.    cyclobenzaprine (FLEXERIL) 10 MG tablet Take 10 mg by mouth 3 (three) times daily as needed for Muscle spasms.    estradiol (ESTRACE) 1 MG tablet Take 2 tablets (2 mg total) by mouth once daily.    fish oil-omega-3 fatty acids 300-1,000 mg capsule Take 1,200 mg by mouth 2 (two) times daily.     fluoxetine (PROZAC) 40 MG capsule Take 40 mg by mouth every morning.     gabapentin (NEURONTIN) 800 MG tablet Take 800 mg by mouth 2 (two) times daily. Takes every night at bedtime and may take a dose during the day prn    hydrochlorothiazide (HYDRODIURIL) 12.5 MG Tab Take 12.5 mg by mouth daily as needed.      "levothyroxine (SYNTHROID) 150 MCG tablet Take 1 tablet (150 mcg total) by mouth once daily.    lisinopril (PRINIVIL,ZESTRIL) 2.5 MG tablet Take 2.5 mg by mouth every morning.     multivitamin capsule Take 1 capsule by mouth every morning.     omeprazole (PRILOSEC) 40 MG capsule Open capsule and dissolve contents in liquid, take by mouth daily. Discard capsule.    ondansetron (ZOFRAN-ODT) 4 MG TbDL Take 1 tablet (4 mg total) by mouth every 8 (eight) hours as needed.    oxycodone-acetaminophen (PERCOCET)  mg per tablet Take one to one and a half tablets by mouth every 4hours as needed for pain.    polyethylene glycol (GLYCOLAX) 17 gram/dose powder Take 17 g by mouth once daily.    pravastatin (PRAVACHOL) 40 MG tablet Take 40 mg by mouth nightly.    promethazine (PHENERGAN) 25 MG suppository Place 1 suppository (25 mg total) rectally every 6 (six) hours as needed for Nausea.    vitamin E 400 UNIT capsule Take 400 Units by mouth every morning.     zolpidem (AMBIEN) 10 mg Tab Take 10 mg by mouth nightly.            Clinical Reference Information           Your Vitals Were     BP Pulse Height Weight Last Period BMI    120/80 73 5' 2" (1.575 m) 100.2 kg (220 lb 14.4 oz) 12/06/2007 40.4 kg/m2      Blood Pressure          Most Recent Value    BP  120/80      Allergies as of 2/13/2017     Adhesive    Adhesive Tape-silicones    Bactrim [Sulfamethoxazole-trimethoprim]    Mastisol Adhesive [Gum Fpoejq-uyfrbl-vbhq-alcohol]      Immunizations Administered on Date of Encounter - 2/13/2017     None      Instructions    - Flinstone Complete.  Once twice a day  - Wellesse Calcium 3 times a day.  - Wellesse B complex, 2 times a day  - B12 sublingual every other day.    - Lotrisone 2-3 times a day  - cool pack  - steroid injection today       Language Assistance Services     ATTENTION: Language assistance services are available, free of charge. Please call [phone].      ATENCIÓN: Si jamel newell a dsouza disposición " servicios gratuitos de asistencia lingüística. Kristen almanza [phone].     MIGUEL Ý: N?u b?n nói Ti?ng Vi?t, có các d?ch v? h? tr? ngôn ng? mi?n phí dành cho b?n. G?i s? [phone].         [facility] complies with applicable Federal civil rights laws and does not discriminate on the basis of race, color, national origin, age, disability, or sex.

## 2017-02-13 NOTE — TELEPHONE ENCOUNTER
Spoke to pt who informed that the sores are dried up now.  Pt says that while she was in the hospital, she knew and was told it will dry up and heals.  Requested she send us another pic for us to see.  She v/u and states she will

## 2017-02-22 ENCOUNTER — TELEPHONE (OUTPATIENT)
Dept: BARIATRICS | Facility: CLINIC | Age: 52
End: 2017-02-22

## 2017-02-22 NOTE — TELEPHONE ENCOUNTER
Informed patient of hepatology appt and she's good with date/time. Scheduled her lab appt on 3/1 and her f/u appt with Nikole is on 3/7.

## 2017-03-01 ENCOUNTER — LAB VISIT (OUTPATIENT)
Dept: LAB | Facility: HOSPITAL | Age: 52
End: 2017-03-01
Attending: SURGERY
Payer: MEDICARE

## 2017-03-01 ENCOUNTER — HOSPITAL ENCOUNTER (OUTPATIENT)
Dept: ENDOCRINOLOGY | Facility: CLINIC | Age: 52
Discharge: HOME | End: 2017-03-01
Attending: INTERNAL MEDICINE
Payer: MEDICARE

## 2017-03-01 DIAGNOSIS — M81.8 OSTEOPOROSIS, IDIOPATHIC: ICD-10-CM

## 2017-03-01 DIAGNOSIS — K76.0 FATTY LIVER DISEASE, NONALCOHOLIC: ICD-10-CM

## 2017-03-01 DIAGNOSIS — Z98.84 STATUS POST BARIATRIC SURGERY: ICD-10-CM

## 2017-03-01 DIAGNOSIS — E66.01 MORBID OBESITY, UNSPECIFIED OBESITY TYPE: ICD-10-CM

## 2017-03-01 DIAGNOSIS — I10 ESSENTIAL HYPERTENSION: ICD-10-CM

## 2017-03-01 DIAGNOSIS — K21.9 GASTROESOPHAGEAL REFLUX DISEASE, ESOPHAGITIS PRESENCE NOT SPECIFIED: ICD-10-CM

## 2017-03-01 DIAGNOSIS — R26.9 ABNORMALITY OF GAIT: ICD-10-CM

## 2017-03-01 DIAGNOSIS — E78.5 HYPERLIPIDEMIA, UNSPECIFIED HYPERLIPIDEMIA TYPE: ICD-10-CM

## 2017-03-01 LAB
ALBUMIN SERPL BCP-MCNC: 4 G/DL
ALP SERPL-CCNC: 84 U/L
ALT SERPL W/O P-5'-P-CCNC: 30 U/L
ANION GAP SERPL CALC-SCNC: 8 MMOL/L
AST SERPL-CCNC: 32 U/L
BASOPHILS # BLD AUTO: 0.04 K/UL
BASOPHILS NFR BLD: 0.5 %
BILIRUB SERPL-MCNC: 0.6 MG/DL
BUN SERPL-MCNC: 17 MG/DL
CALCIUM SERPL-MCNC: 10 MG/DL
CHLORIDE SERPL-SCNC: 103 MMOL/L
CO2 SERPL-SCNC: 28 MMOL/L
CREAT SERPL-MCNC: 0.8 MG/DL
DIFFERENTIAL METHOD: ABNORMAL
EOSINOPHIL # BLD AUTO: 0.1 K/UL
EOSINOPHIL NFR BLD: 1.7 %
ERYTHROCYTE [DISTWIDTH] IN BLOOD BY AUTOMATED COUNT: 14 %
EST. GFR  (AFRICAN AMERICAN): >60 ML/MIN/1.73 M^2
EST. GFR  (NON AFRICAN AMERICAN): >60 ML/MIN/1.73 M^2
GLUCOSE SERPL-MCNC: 95 MG/DL
HCT VFR BLD AUTO: 41.6 %
HGB BLD-MCNC: 14.1 G/DL
LYMPHOCYTES # BLD AUTO: 2.1 K/UL
LYMPHOCYTES NFR BLD: 27.4 %
MCH RBC QN AUTO: 26.6 PG
MCHC RBC AUTO-ENTMCNC: 33.9 %
MCV RBC AUTO: 78 FL
MONOCYTES # BLD AUTO: 0.6 K/UL
MONOCYTES NFR BLD: 7.7 %
NEUTROPHILS # BLD AUTO: 4.7 K/UL
NEUTROPHILS NFR BLD: 62.4 %
PLATELET # BLD AUTO: 271 K/UL
PMV BLD AUTO: 10.9 FL
POTASSIUM SERPL-SCNC: 4.4 MMOL/L
PROT SERPL-MCNC: 7.6 G/DL
RBC # BLD AUTO: 5.31 M/UL
SODIUM SERPL-SCNC: 139 MMOL/L
VIT B12 SERPL-MCNC: 787 PG/ML
WBC # BLD AUTO: 7.55 K/UL

## 2017-03-01 PROCEDURE — 82607 VITAMIN B-12: CPT

## 2017-03-01 PROCEDURE — 36415 COLL VENOUS BLD VENIPUNCTURE: CPT

## 2017-03-01 PROCEDURE — 80053 COMPREHEN METABOLIC PANEL: CPT

## 2017-03-01 PROCEDURE — 76536 US EXAM OF HEAD AND NECK: CPT | Mod: S$GLB,,, | Performed by: INTERNAL MEDICINE

## 2017-03-01 PROCEDURE — 85025 COMPLETE CBC W/AUTO DIFF WBC: CPT

## 2017-03-01 PROCEDURE — 84425 ASSAY OF VITAMIN B-1: CPT

## 2017-03-04 LAB — VIT B1 SERPL-MCNC: 129 UG/L (ref 38–122)

## 2017-03-08 ENCOUNTER — OFFICE VISIT (OUTPATIENT)
Dept: BARIATRICS | Facility: CLINIC | Age: 52
End: 2017-03-08
Payer: MEDICARE

## 2017-03-08 ENCOUNTER — OFFICE VISIT (OUTPATIENT)
Dept: HEPATOLOGY | Facility: CLINIC | Age: 52
End: 2017-03-08
Payer: MEDICARE

## 2017-03-08 VITALS
WEIGHT: 214.75 LBS | HEART RATE: 73 BPM | SYSTOLIC BLOOD PRESSURE: 117 MMHG | BODY MASS INDEX: 35.78 KG/M2 | OXYGEN SATURATION: 96 % | TEMPERATURE: 98 F | DIASTOLIC BLOOD PRESSURE: 63 MMHG | HEIGHT: 65 IN

## 2017-03-08 VITALS — BODY MASS INDEX: 35.63 KG/M2 | HEIGHT: 65 IN | WEIGHT: 213.88 LBS

## 2017-03-08 DIAGNOSIS — Z98.84 S/P LAPAROSCOPIC SLEEVE GASTRECTOMY: ICD-10-CM

## 2017-03-08 DIAGNOSIS — B37.9 YEAST INFECTION: ICD-10-CM

## 2017-03-08 DIAGNOSIS — K21.9 GASTROESOPHAGEAL REFLUX DISEASE, ESOPHAGITIS PRESENCE NOT SPECIFIED: ICD-10-CM

## 2017-03-08 DIAGNOSIS — R74.8 ELEVATED LIVER ENZYMES: ICD-10-CM

## 2017-03-08 DIAGNOSIS — K76.0 FATTY LIVER DISEASE, NONALCOHOLIC: ICD-10-CM

## 2017-03-08 DIAGNOSIS — I10 ESSENTIAL HYPERTENSION: Chronic | ICD-10-CM

## 2017-03-08 DIAGNOSIS — R16.2 HEPATOSPLENOMEGALY: ICD-10-CM

## 2017-03-08 DIAGNOSIS — N39.0 URINARY TRACT INFECTION WITHOUT HEMATURIA, SITE UNSPECIFIED: Primary | ICD-10-CM

## 2017-03-08 DIAGNOSIS — K76.0 FATTY LIVER: Primary | ICD-10-CM

## 2017-03-08 DIAGNOSIS — E66.01 MORBID OBESITY WITH BMI OF 40.0-44.9, ADULT: ICD-10-CM

## 2017-03-08 DIAGNOSIS — E78.5 HYPERLIPIDEMIA, UNSPECIFIED HYPERLIPIDEMIA TYPE: Chronic | ICD-10-CM

## 2017-03-08 PROBLEM — T78.40XA ALLERGIC REACTION: Status: RESOLVED | Noted: 2017-02-13 | Resolved: 2017-03-08

## 2017-03-08 PROCEDURE — 1160F RVW MEDS BY RX/DR IN RCRD: CPT | Mod: S$GLB,,, | Performed by: NURSE PRACTITIONER

## 2017-03-08 PROCEDURE — 99999 PR PBB SHADOW E&M-EST. PATIENT-LVL IV: CPT | Mod: PBBFAC,,, | Performed by: PHYSICIAN ASSISTANT

## 2017-03-08 PROCEDURE — 3078F DIAST BP <80 MM HG: CPT | Mod: S$GLB,,, | Performed by: NURSE PRACTITIONER

## 2017-03-08 PROCEDURE — 99999 PR PBB SHADOW E&M-EST. PATIENT-LVL V: CPT | Mod: PBBFAC,,, | Performed by: NURSE PRACTITIONER

## 2017-03-08 PROCEDURE — 99024 POSTOP FOLLOW-UP VISIT: CPT | Mod: S$GLB,,, | Performed by: PHYSICIAN ASSISTANT

## 2017-03-08 PROCEDURE — 3074F SYST BP LT 130 MM HG: CPT | Mod: S$GLB,,, | Performed by: NURSE PRACTITIONER

## 2017-03-08 PROCEDURE — 99213 OFFICE O/P EST LOW 20 MIN: CPT | Mod: S$GLB,,, | Performed by: NURSE PRACTITIONER

## 2017-03-08 RX ORDER — FLUCONAZOLE 40 MG/ML
200 POWDER, FOR SUSPENSION ORAL
Qty: 35 ML | Refills: 0 | Status: SHIPPED | OUTPATIENT
Start: 2017-03-08 | End: 2017-03-15

## 2017-03-08 RX ORDER — CIPROFLOXACIN 500 MG/5ML
500 KIT ORAL 2 TIMES DAILY
Qty: 30 ML | Refills: 0 | Status: SHIPPED | OUTPATIENT
Start: 2017-03-08 | End: 2017-03-11

## 2017-03-08 RX ORDER — FLUCONAZOLE 40 MG/ML
POWDER, FOR SUSPENSION ORAL DAILY
COMMUNITY
End: 2017-03-08 | Stop reason: SDUPTHER

## 2017-03-08 NOTE — Clinical Note
She is doing good, had to make some changes/corrections to her diet.  I will see her again in 2 months with labs.  I will cc you on that note as well :)

## 2017-03-08 NOTE — PATIENT INSTRUCTIONS
- Anti-Acid medication, Omeprazole daily for 2 months.  - No lifting more than 10 lbs for 2 weeks.  - Miralax daily for constipation, no fiber.  - No NSAIDs, Tylenol for pain.  - No swallowing whole pills for 2 months.  Can swallow whole pills on 5/6/2017.  - RTC in 1 weeks or sooner if needed.  - Call the office for any issues.  - Check labs at 3 month post op visit.    *2 PROTEIN SHAKES (60 g protein) and 3-4 high protein mini-meal.  Aim for 100 grams of protein.      - Oikos triple zero & Yoplait greek 100    Bariatric Soft Diet           - Start Soft Diet 2 weeks after gastric banding  -   Start Soft Diet 4 weeks after gastric bypass and sleeve    As your stomach heals, your doctor will progress your diet to soft foods.  This diet usually lasts for 2-3 months, but can last longer depending on each individual. Soft foods are those which can be easily mashed with a fork.    Remember these principles:   No liquids with meals. Do no drink 30 minutes before meals and wait 30 minutes to 1 hour after meals to start drinking.   Sip on water, sugar-free beverages or non-fat milk throughout the day.  You will need to continue drinking at least 1 protein drink daily to meet protein needs.   100% fruit juice (no sugar added) is allowed, but limit to 4oz a day because it is high in calories and does not contain any protein.   Chew foods slowly; one meal should take 20-30 minutes.   Eat 3-5 meals per day, without any additional snacking.   Stop eating as soon as you feel full.   Avoid using table sugar and foods made with refined sugar, which can trigger dumping syndrome.   Marinating meats with a low sugar marinade, adding low-fat salad dressing, or adding low calorie gravy (made from powder and water) can help meats to digest easier.     Adding Vegetables and Fruits:    As long as you are consuming >80g total protein daily from combination of foods and protein drinks, you may start adding small bites of fruits and  vegetables to your meals. Cooked, tender vegetables and ripe fruits without the peel are tolerated best.    Avoid fruit canned in syrup, sugary fruit juices, and vegetables cooked with oil, butter or mijares.  Bariatric SOFT Diet    EAT THESE FOODS AVOID THESE FOODS   High in Protein: High in Fat/Sugar:   ? Canned tuna or chicken (packed in water)  ? Lean ground turkey breast or ground round  ? Turkey or chicken (no skin); cooked tender and cut in small pieces  ? Lean pork or beef (cook in crock pot until very tender; cut in small pieces  ? Scrambled, poached, or boiled eggs  ? Baked, broiled, grilled or boiled fish and seafood (not fried!)  ? Silken tofu, Edamame (soybeans)  ? Beans, hummus and lentils  ? Lean deli meats (turkey and chicken breast, ham, roast beef)  ? 1% or Skim Milk, Lactaid, or Soymilk  ? Low-fat or fat-free cottage cheese, soft cheese, mozzarella string cheese, or ricotta  ? Light yogurt, Greek yogurt, SF pudding High fat milk (whole, 2%)  Butter, margarine, oil, mayonnaise  Sour cream, cream cheese, salad dressing  Ice Cream  Cakes, cookies, pies, desserts  Candy  Luncheon meats (bologna, salami, chopped ham)  Sausage, Mijares  Gravy  Fried Foods  ___________________________________  Tough/Crunchy--------------------------------  Tough or dry meats  Corn   Granola/cereal with nuts  Shredded Coconut    May add after 3 months:  Raw veggies  Lettuce  Plain, Unsalted Nuts and Seeds  Protein bars with 0-4 grams of sugar   As long as you are getting >80g PRO: Starchy Carbohydrates. At goal weight, some may include whole grains in small amounts.   Cooked tender vegetables without peel  Ripe fruits without peel  Frozen fruits with no added sugar  Fruit canned in its own juice or in water  Fat free, sugar free, frozen yogurt White and wheat Bread, Rice, Pasta   Cereals (including grits, oatmeal)   Crackers, Pretzels, Chips, Granola  Corn, Popcorn, Peas  White Potatoes, Sweet potatoes  Flour and corn  tortillas     Fluids: Always Avoid:   Skim/1% milk, Lactaid, Soymilk  Water and Sugar-free beverages  (decaf and non-carbonated)  Decaf coffee & decaf tea  Sugary drinks  Carbonated drinks  Alcohol  Drinking through straws     Protein Content of Foods Recommended after                   Weight Loss Surgery    Food Name Portion Calories Protein (gms)   Almonds (unsalted) 1/4 cup 160 6   [place] milk, unsweetened 1 cup 30  1   Beef, Roast 1 oz 46 8   Beef, Steak, sirloin, trimmed 1 oz 55 9   Catfish, broiled or baked 1 oz 30 5   Cheese, American FF 1 oz 40 6   Cheese, Cottage 1% fat ¼ cup 41 7   Cheese, Parmesan, grated ¼ cup 128 12   Cheese, Mozzarella, part skim 1 oz 78 8   Cheese, part skim Ricotta ¼ cup 90 8   Chicken, white breast w/o skin 1 oz 46 9   Chicken, leg w/o skin 1 oz 54 7   Crab, steamed ¼ cup  40 9   Crawfish tails, boiled ¼ cup 35 8   Edamame, shelled ¼ cup 50 4   Egg 1 78 6   Ham, lean 5% 1 oz 44 7   Hamburger, lean 1 oz 56 7   Hummus ¼ cup 100 5   Lobster, steamed 1 oz 26 5   Milk, skim or 1%, soy  1 cup 90 8   Pork Tenderloin 1 oz 46 7   Pudding, SF 1 serv 60 2   Red beans ¼ cup 56 4   Refried beans, fat free ¼ cup 65 4   [place], baked 1 oz 52 7   Shrimp, steamed 1 oz 28 6   Soymilk, plain ½ cup 40 3   Tilapia, white fish, cooked 1 oz 36 8   Tofu ¼ cup 47 5   Trout 1 oz 48 7   Tuna, canned in water 1 oz 37 8   Turkey, white meat 1 oz 35 7   Veal Loin 1 oz 50 7   Yogurt, SF, frozen vanilla 3 oz 72 3.5   Yogurt, Fruit, FF, light 3 oz 40 2.5   Yogurt, Greek 3 oz 70 8     *Abbreviations: SF=sugar free, LF=low fat, FF= fat free, gms=grams  *3oz of cooked meat/protein = size of deck of cards or ladies palm   *1oz cheese = 1inch cube or 1 slice American cheese    Sample Menu for Bariatric Soft Diet  For Gastric Bypass and Sleeve            3 meals + 2 protein drinks  Remember: No drinking with meals.    Time of Day Day 1 Day 2   7am:    1 egg (or ¼ cup Egg Beaters) ¼ cup low-fat cottage cheese, 1 tbsp  berries   8am: 1 cup water/SF beverage     9am: 1 cup water/SF beverage     10am:  Protein drink  Protein drink   11am: 1 cup water/SF beverage     12pm:    1-2 oz grilled shrimp, ¼ cup green beans   1-2oz canned chicken, shredded cheese, 1 tbsp salsa   1pm: 1 cup water/SF beverage     3pm:  Protein drink   Protein drink   4pm: 1 cup water/SF beverage     6pm:  ½ cup low fat chili, 1oz low-fat cheese, ¼ cup broccoli 2 oz grilled fish,  ¼  cup lima beans   7pm: 1 cup water/SF beverage       This sample menu provides approx. 80g protein total, including about 40g protein from foods and at least 40g protein from protein drinks.  Drinking protein drinks daily helps decrease muscle loss, increase weight loss, and prevent hair loss.    ? Sip fluids continuously in between meals.    ? For fluids: 1 cup = 8 oz   ? For food: ¼ cup = 4 tablespoons = 1oz  ? No drinking from 30 minutes before meals to 30 minutes after meals.  ? 3oz meat is approx. the size of a deck of cards.    ? A food scale will help you determine portion size (Can be purchased at [employer])

## 2017-03-08 NOTE — PROGRESS NOTES
[facility] Established Patient Visit    Reason for Visit:  F/u liver biopsy results    PCP: Rangel Floyd    HPI:  This is a 51 y.o. female here for f/u of liver biopsy results. She was seen once by Caroline Song NP in 11/2016 for bariatric surgery clearance since she had elevated liver enzymes. Her serological workup was negative for Mat's, alpha-1 antitrypsin deficiency, hemochromatosis, autoimmune etiology, and viral hepatitis. Her u/s showed fatty liver. She recently had her gastric sleeve with Dr. Ha on 2/6/17. Liver biopsy was done with surgery. This revealed FERRELL with stage 1 fibrosis. She had labs recently that showed improvement of her transaminases with her weight loss so far. She has normal synthetic liver functioning. She will need hepatitis A and B vaccines and we will be arranging those. She has no findings to suggest cirrhosis on her workup to date. Biopsy results were reassuring that no significant fibrosis is present.        FINAL PATHOLOGIC DIAGNOSIS  1. LIVER, RANDOM (WEDGE BIOPSY):  Mild steatohepatitis with pericellular fibrosis (stage 1 of 4)  Mixed Steatosis, 30%  Well-formed nonnecrotizing granulomata, periportal and lobular distribution  No hepatocyte iron  GMS and AFB stains pending  Supplemental Diagnosis  This supplemental is issued report the findings of ancillary stains with appropriate controls. The diagnoses remain  the same.  AFB: No acid fast organisms  GMS: No fungal organisms        ROS:   GENERAL: Denies fever, chills, (+) weight loss, (+) fatigue  HEENT: Denies headaches, dizziness, vision/hearing changes  CARDIOVASCULAR: Denies chest pain, palpitations, or edema  RESPIRATORY: Denies dyspnea, cough  GI: Denies abdominal pain, rectal bleeding, nausea, vomiting. No change in bowel pattern or color  : Denies dysuria, hematuria   SKIN: Denies rash, itching   NEURO: Denies confusion, memory loss, or mood changes, (+) back pain  PSYCH: Denies depression  "or anxiety  HEME/LYMPH: Denies easy bruising or bleeding      PMHX:  has a past medical history of Allergy; Arthritis; Blood transfusion; Hyperlipidemia; Hypertension; Hypothyroidism; Joint pain; Kidney stones; Morbid obesity (12/14/2012); OCD (obsessive compulsive disorder); PONV (postoperative nausea and vomiting); Restless leg syndrome; Sciatica of right side associated with disorder of lumbosacral spine; SOB (shortness of breath) on exertion; and Supraventricular tachycardia.    PSHX:  has a past surgical history that includes Spinal fusion; Back surgery; TONSILLECTOMY, ADENOIDECTOMY; Appendectomy; Cholecystectomy; Hernia repair; Hysterectomy; and Gastrectomy.    The patient's social and family histories were reviewed by me and updated in the appropriate section of the electronic medical record.    Review of patient's allergies indicates:   Allergen Reactions    Adhesive Blisters    Adhesive tape-silicones Dermatitis     The longer the adhesive stays on, the worse the irritation    Bactrim [sulfamethoxazole-trimethoprim] Anxiety     Has a "nervous feeling."    Mastisol adhesive [gum notzmm-mwhbzl-smpu-alcohol] Blisters     Medications reviewed in Epic      Objective Findings:    PHYSICAL EXAM:   Friendly White female, in no acute distress; alert and oriented to person, place and time  VITALS: /63 (BP Location: Left arm, Patient Position: Sitting, BP Method: Automatic)  Pulse 73  Temp 97.6 °F (36.4 °C) (Oral)   Ht 5' 5" (1.651 m)  Wt 97.4 kg (214 lb 11.7 oz)  LMP 12/06/2007  SpO2 96%  BMI 35.73 kg/m2  HENT: Normocephalic, without obvious abnormality. Oral mucosa pink and moist. Dentition good.  EYES: Sclerae anicteric. No conjunctival pallor.   NECK: Supple. No masses or cervical adenopathy.  CARDIOVASCULAR: Regular rate and rhythm. No murmurs.  RESPIRATORY: Normal respiratory effort. BBS CTA. No wheezes or crackles.  GI: Obese, soft, non-tender, non-distended. Incisions healing " well.  EXTREMITIES:  No clubbing, cyanosis or edema.  SKIN: Warm and dry. No jaundice. No rashes noted to exposed skin. No telangectasias noted. No palmar erythema.  NEURO:  Normal gate. No asterixis.  PSYCH:  Memory intact. Thought and speech pattern appropriate. Behavior normal. No depression or anxiety noted.      Labs:  Lab Results   Component Value Date    WBC 7.55 03/01/2017    HGB 14.1 03/01/2017    HCT 41.6 03/01/2017     03/01/2017    CHOL 216 (H) 08/08/2016    TRIG 224 (H) 08/08/2016    HDL 42 08/08/2016     03/01/2017    K 4.4 03/01/2017    CREATININE 0.8 03/01/2017    ALT 30 03/01/2017    AST 32 03/01/2017    ALKPHOS 84 03/01/2017    BILITOT 0.6 03/01/2017    ALBUMIN 4.0 03/01/2017    INR 1.0 11/16/2016    AFP 1.8 11/16/2016       ASSESSMENT:  51 y.o. White female with:  1.  NAFLD  -- transaminases now improved with weight loss, were mildly elevated  -- US shows hepatosplenomegaly with steatosis  -- synthetic liver function nl  -- risk factors for fatty liver include h/o morbid obesity, BMI now 36 s/p gastric sleeve 2/6/17, HTN, HLD  -- liver biopsy - 2/6/17 - steatohepatitis with stage 1 fibrosis  2. H/o morbid obesity, s/p bariatric surgery       EDUCATION:   We discussed the manifestations of NAFLD. At this time there is no FDA approved therapy for NAFLD.   The patient and I discussed the importance of diet, exercise, and weight loss for management of NAFLD. We discussed a low fat, low carb/low sugar, high fiber diet, and a goal of 30 minutes of exercise 5 times per week.   Pt is aware that fatty liver can progress to steatohepatitis and possibly to cirrhosis, putting one at increased risk for liver cancer, liver failure, and death.      Discussed her weight loss will help to minimize her fatty liver disease and lessen chance she will develop significant live disease in the future. This is still a possibility though and she needs continued monitoring.     Reassured pt she has minimal  fibrosis at this time.    PLAN:  1. Twinrix vaccines, start today  2. Continue efforts at weight loss  3. Will monitor labs to be done with bariatrics  4. Can f/u in 6 months    Thank you for allowing me to participate in the care of Angie Ellsworth, NP-C

## 2017-03-08 NOTE — MR AVS SNAPSHOT
[facility] Hepatology  [address]  [place] [address]  Phone: [phone]  Fax: [phone]                  Angie Mccarthy   3/8/2017 3:00 PM   Office Visit    Description:  Female : 1965   Provider:  Rola Ellsworth NP   Department:  [facility]           Diagnoses this Visit        Comments    Fatty liver    -  Primary            To Do List           Future Appointments        Provider Department Dept Phone    3/8/2017 4:00 PM RACHNA Shelley [facility] - Bariatric Surgery [phone]    2017 10:00 AM LAB, APPOINTMENT [place] [facility] [phone]    2017 11:00 AM Fatoumata Sandhu PA-C [facility] Bariatric Surgery [phone]    2017 11:30 AM Giselle Barrera [facility] Bariatric Surgery [phone]      Goals (5 Years of Data)     None      [facility]     [facility] -  Assistance  Registered nurses in the [facility] provide clinical advisement, health education, appointment booking, and other advisory services.  Call for this free service at [phone].             Medications           Message regarding Medications     Verify the changes and/or additions to your medication regime listed below are the same as discussed with your clinician today.  If any of these changes or additions are incorrect, please notify your healthcare provider.             Verify that the below list of medications is an accurate representation of the medications you are currently taking.  If none reported, the list may be blank. If incorrect, please contact your healthcare provider. Carry this list with you in case of emergency.           Current Medications     alprazolam (XANAX) 0.5 MG tablet Take 0.5 mg by mouth 2 (two) times daily as needed for Insomnia or Anxiety.     ascorbic acid (VITAMIN C) 1000 MG tablet Take 1,000 mg by mouth every morning.     aspirin (ECOTRIN) 81 MG EC tablet Take  81 mg by mouth nightly.     BIOTIN ORAL Take 10,000 mg by mouth every morning.     clotrimazole-betamethasone 1-0.05% (LOTRISONE) cream Apply topically 2 (two) times daily.    conjugated estrogens (PREMARIN) vaginal cream Place 0.5 g vaginally twice a week.    cyanocobalamin, vitamin B-12, 500 mcg Subl Place 1 tablet under the tongue once daily.    cyclobenzaprine (FLEXERIL) 10 MG tablet Take 10 mg by mouth 3 (three) times daily as needed for Muscle spasms.    estradiol (ESTRACE) 1 MG tablet Take 2 tablets (2 mg total) by mouth once daily.    fish oil-omega-3 fatty acids 300-1,000 mg capsule Take 1,200 mg by mouth 2 (two) times daily.     fluoxetine (PROZAC) 40 MG capsule Take 40 mg by mouth every morning.     gabapentin (NEURONTIN) 800 MG tablet Take 800 mg by mouth 2 (two) times daily. Takes every night at bedtime and may take a dose during the day prn    hydrochlorothiazide (HYDRODIURIL) 12.5 MG Tab Take 12.5 mg by mouth daily as needed.     levothyroxine (SYNTHROID) 150 MCG tablet Take 1 tablet (150 mcg total) by mouth once daily.    lisinopril (PRINIVIL,ZESTRIL) 2.5 MG tablet Take 2.5 mg by mouth every morning.     multivitamin capsule Take 1 capsule by mouth every morning.     omeprazole (PRILOSEC) 40 MG capsule Open capsule and dissolve contents in liquid, take by mouth daily. Discard capsule.    ondansetron (ZOFRAN-ODT) 4 MG TbDL Take 1 tablet (4 mg total) by mouth every 8 (eight) hours as needed.    oxycodone-acetaminophen (PERCOCET)  mg per tablet Take one to one and a half tablets by mouth every 4hours as needed for pain.    polyethylene glycol (GLYCOLAX) 17 gram/dose powder Take 17 g by mouth once daily.    pravastatin (PRAVACHOL) 40 MG tablet Take 40 mg by mouth nightly.    vitamin E 400 UNIT capsule Take 400 Units by mouth every morning.     zolpidem (AMBIEN) 10 mg Tab Take 10 mg by mouth nightly.     promethazine (PHENERGAN) 25 MG suppository Place 1 suppository (25 mg total) rectally every 6  "(six) hours as needed for Nausea.           Clinical Reference Information           Your Vitals Were     BP Pulse Temp Height Weight Last Period    117/63 (BP Location: Left arm, Patient Position: Sitting, BP Method: Automatic) 73 97.6 °F (36.4 °C) (Oral) 5' 5" (1.651 m) 97.4 kg (214 lb 11.7 oz) 12/06/2007    SpO2 BMI             96% 35.73 kg/m2         Blood Pressure          Most Recent Value    BP  117/63      Allergies as of 3/8/2017     Adhesive    Adhesive Tape-silicones    Bactrim [Sulfamethoxazole-trimethoprim]    Mastisol Adhesive [Gum Axuckr-hrlqrp-hdfn-alcohol]      Immunizations Administered on Date of Encounter - 3/8/2017     None      Orders Placed During Today's Visit      Normal Orders This Visit    Ambulatory referral to Infectious Disease Injection Dept     Recurring Lab Work Interval Expires    Hepatitis A / Hepatitis B Combined Vaccine (IM)   3/8/2018      Language Assistance Services     ATTENTION: Language assistance services are available, free of charge. Please call [phone].      ATENCIÓN: Si elola johnamy, tiene a dsouza disposición servicios gratuitos de asistencia lingüística. Llame al [phone].     MIGUEL Ý: N?u b?n nói Ti?ng Vi?t, có các d?ch v? h? tr? ngôn ng? mi?n phí dành cho b?n. G?i s? [phone].         [facility] complies with applicable Federal civil rights laws and does not discriminate on the basis of race, color, national origin, age, disability, or sex.        "

## 2017-03-08 NOTE — MR AVS SNAPSHOT
[facility] - Bariatric Surgery  [address] [facility] [facility] [address]  Phone: [phone]  Fax: [phone]                  Angie Mccarthy   3/8/2017 4:00 PM   Office Visit    Description:  Female : 1965   Provider:  Fatoumata Sandhu PA-C   Department:  [facility] - Bariatric Surgery           Diagnoses this Visit        Comments    Urinary tract infection without hematuria, site unspecified    -  Primary     Yeast infection                To Do List           Future Appointments        Provider Department Dept Phone    4/10/2017 4:00 PM INJECTION, INFECTIOUS DISEASES [facility] ID Injection Room [phone]    2017 10:00 AM LAB, APPOINTMENT [place] [facility] [phone]    2017 11:00 AM Fatoumata Snadhu PA-C [facility] Bariatric Surgery [phone]    2017 11:30 AM Giselle Barrera [facility] Bariatric Surgery [phone]    2017 4:20 PM INJECTION, INFECTIOUS DISEASES [facility] ID Injection Room [phone]      Goals (5 Years of Data)     None       These Medications        Disp Refills Start End    ciprofloxacin (CIPRO) 500 mg/5 mL suspension 30 mL 0 3/8/2017 3/11/2017    Take 5 mLs (500 mg total) by mouth 2 (two) times daily. - Oral    Pharmacy: [facility]  [place]  [address] [place] [address] Ph #: [phone]       fluconazole 40 mg/ml (DIFLUCAN) 40 mg/mL suspension 35 mL 0 3/8/2017 3/15/2017    Take 5 mLs (200 mg total) by mouth Every 3 (three) days. - Oral    Pharmacy: [facility]  [place]  [address] [place] [address] Ph #: [phone]         [facility] On Call     [facility] On Call Nurse Care Line -  Assistance  Registered nurses in the [facility] provide clinical advisement, health education, appointment booking, and other advisory services.  Call for this free service at [phone].             Medications           Message regarding Medications     Verify the changes and/or additions to  your medication regime listed below are the same as discussed with your clinician today.  If any of these changes or additions are incorrect, please notify your healthcare provider.        START taking these NEW medications        Refills    ciprofloxacin (CIPRO) 500 mg/5 mL suspension 0    Sig: Take 5 mLs (500 mg total) by mouth 2 (two) times daily.    Class: Normal    Route: Oral    fluconazole 40 mg/ml (DIFLUCAN) 40 mg/mL suspension 0    Sig: Take 5 mLs (200 mg total) by mouth Every 3 (three) days.    Class: Normal    Route: Oral           Verify that the below list of medications is an accurate representation of the medications you are currently taking.  If none reported, the list may be blank. If incorrect, please contact your healthcare provider. Carry this list with you in case of emergency.           Current Medications     alprazolam (XANAX) 0.5 MG tablet Take 0.5 mg by mouth 2 (two) times daily as needed for Insomnia or Anxiety.     ascorbic acid (VITAMIN C) 1000 MG tablet Take 1,000 mg by mouth every morning.     aspirin (ECOTRIN) 81 MG EC tablet Take 81 mg by mouth nightly.     BIOTIN ORAL Take 10,000 mg by mouth every morning.     clotrimazole-betamethasone 1-0.05% (LOTRISONE) cream Apply topically 2 (two) times daily.    conjugated estrogens (PREMARIN) vaginal cream Place 0.5 g vaginally twice a week.    cyanocobalamin, vitamin B-12, 500 mcg Subl Place 1 tablet under the tongue once daily.    cyclobenzaprine (FLEXERIL) 10 MG tablet Take 10 mg by mouth 3 (three) times daily as needed for Muscle spasms.    estradiol (ESTRACE) 1 MG tablet Take 2 tablets (2 mg total) by mouth once daily.    fish oil-omega-3 fatty acids 300-1,000 mg capsule Take 1,200 mg by mouth 2 (two) times daily.     fluoxetine (PROZAC) 40 MG capsule Take 40 mg by mouth every morning.     gabapentin (NEURONTIN) 800 MG tablet Take 800 mg by mouth 2 (two) times daily. Takes every night at bedtime and may take a dose during the day prn     "hydrochlorothiazide (HYDRODIURIL) 12.5 MG Tab Take 12.5 mg by mouth daily as needed.     levothyroxine (SYNTHROID) 150 MCG tablet Take 1 tablet (150 mcg total) by mouth once daily.    lisinopril (PRINIVIL,ZESTRIL) 2.5 MG tablet Take 2.5 mg by mouth every morning.     multivitamin capsule Take 1 capsule by mouth every morning.     omeprazole (PRILOSEC) 40 MG capsule Open capsule and dissolve contents in liquid, take by mouth daily. Discard capsule.    ondansetron (ZOFRAN-ODT) 4 MG TbDL Take 1 tablet (4 mg total) by mouth every 8 (eight) hours as needed.    oxycodone-acetaminophen (PERCOCET)  mg per tablet Take one to one and a half tablets by mouth every 4hours as needed for pain.    polyethylene glycol (GLYCOLAX) 17 gram/dose powder Take 17 g by mouth once daily.    pravastatin (PRAVACHOL) 40 MG tablet Take 40 mg by mouth nightly.    vitamin E 400 UNIT capsule Take 400 Units by mouth every morning.     zolpidem (AMBIEN) 10 mg Tab Take 10 mg by mouth nightly.     ciprofloxacin (CIPRO) 500 mg/5 mL suspension Take 5 mLs (500 mg total) by mouth 2 (two) times daily.    fluconazole 40 mg/ml (DIFLUCAN) 40 mg/mL suspension Take 5 mLs (200 mg total) by mouth Every 3 (three) days.    promethazine (PHENERGAN) 25 MG suppository Place 1 suppository (25 mg total) rectally every 6 (six) hours as needed for Nausea.           Clinical Reference Information           Your Vitals Were     Height Weight Last Period BMI       5' 5" (1.651 m) 97 kg (213 lb 13.5 oz) 12/06/2007 35.59 kg/m2       Allergies as of 3/8/2017     Adhesive    Adhesive Tape-silicones    Bactrim [Sulfamethoxazole-trimethoprim]    Mastisol Adhesive [Gum Gvekyr-pghezi-llrd-alcohol]      Immunizations Administered on Date of Encounter - 3/8/2017     None      Instructions    - Anti-Acid medication, Omeprazole daily for 2 months.  - No lifting more than 10 lbs for 2 weeks.  - Miralax daily for constipation, no fiber.  - No NSAIDs, Tylenol for pain.  - No " swallowing whole pills for 2 months.  Can swallow whole pills on 5/6/2017.  - RTC in 1 weeks or sooner if needed.  - Call the office for any issues.  - Check labs at 3 month post op visit.    *2 PROTEIN SHAKES (60 g protein) and 3-4 high protein mini-meal.  Aim for 100 grams of protein.      - Oikos triple zero & Yoplait greek 100    Bariatric Soft Diet           - Start Soft Diet 2 weeks after gastric banding  -   Start Soft Diet 4 weeks after gastric bypass and sleeve    As your stomach heals, your doctor will progress your diet to soft foods.  This diet usually lasts for 2-3 months, but can last longer depending on each individual. Soft foods are those which can be easily mashed with a fork.    Remember these principles:   No liquids with meals. Do no drink 30 minutes before meals and wait 30 minutes to 1 hour after meals to start drinking.   Sip on water, sugar-free beverages or non-fat milk throughout the day.  You will need to continue drinking at least 1 protein drink daily to meet protein needs.   100% fruit juice (no sugar added) is allowed, but limit to 4oz a day because it is high in calories and does not contain any protein.   Chew foods slowly; one meal should take 20-30 minutes.   Eat 3-5 meals per day, without any additional snacking.   Stop eating as soon as you feel full.   Avoid using table sugar and foods made with refined sugar, which can trigger dumping syndrome.   Marinating meats with a low sugar marinade, adding low-fat salad dressing, or adding low calorie gravy (made from powder and water) can help meats to digest easier.     Adding Vegetables and Fruits:    As long as you are consuming >80g total protein daily from combination of foods and protein drinks, you may start adding small bites of fruits and vegetables to your meals. Cooked, tender vegetables and ripe fruits without the peel are tolerated best.    Avoid fruit canned in syrup, sugary fruit juices, and vegetables cooked  with oil, butter or mijares.  Bariatric SOFT Diet    EAT THESE FOODS AVOID THESE FOODS   High in Protein: High in Fat/Sugar:   ? Canned tuna or chicken (packed in water)  ? Lean ground turkey breast or ground round  ? Turkey or chicken (no skin); cooked tender and cut in small pieces  ? Lean pork or beef (cook in crock pot until very tender; cut in small pieces  ? Scrambled, poached, or boiled eggs  ? Baked, broiled, grilled or boiled fish and seafood (not fried!)  ? Silken tofu, Edamame (soybeans)  ? Beans, hummus and lentils  ? Lean deli meats (turkey and chicken breast, ham, roast beef)  ? 1% or Skim Milk, Lactaid, or Soymilk  ? Low-fat or fat-free cottage cheese, soft cheese, mozzarella string cheese, or ricotta  ? Light yogurt, Greek yogurt, SF pudding High fat milk (whole, 2%)  Butter, margarine, oil, mayonnaise  Sour cream, cream cheese, salad dressing  Ice Cream  Cakes, cookies, pies, desserts  Candy  Luncheon meats (bologna, salami, chopped ham)  Sausage, Mijares  Gravy  Fried Foods  ___________________________________  Tough/Crunchy--------------------------------  Tough or dry meats  Corn   Granola/cereal with nuts  Shredded Coconut    May add after 3 months:  Raw veggies  Lettuce  Plain, Unsalted Nuts and Seeds  Protein bars with 0-4 grams of sugar   As long as you are getting >80g PRO: Starchy Carbohydrates. At goal weight, some may include whole grains in small amounts.   Cooked tender vegetables without peel  Ripe fruits without peel  Frozen fruits with no added sugar  Fruit canned in its own juice or in water  Fat free, sugar free, frozen yogurt White and wheat Bread, Rice, Pasta   Cereals (including grits, oatmeal)   Crackers, Pretzels, Chips, Granola  Corn, Popcorn, Peas  White Potatoes, Sweet potatoes  Flour and corn tortillas     Fluids: Always Avoid:   Skim/1% milk, Lactaid, Soymilk  Water and Sugar-free beverages  (decaf and non-carbonated)  Decaf coffee & decaf tea  Sugary drinks  Carbonated  drinks  Alcohol  Drinking through straws     Protein Content of Foods Recommended after                   Weight Loss Surgery    Food Name Portion Calories Protein (gms)   Almonds (unsalted) 1/4 cup 160 6   [place] milk, unsweetened 1 cup 30  1   Beef, Roast 1 oz 46 8   Beef, Steak, sirloin, trimmed 1 oz 55 9   Catfish, broiled or baked 1 oz 30 5   Cheese, American FF 1 oz 40 6   Cheese, Cottage 1% fat ¼ cup 41 7   Cheese, Parmesan, grated ¼ cup 128 12   Cheese, Mozzarella, part skim 1 oz 78 8   Cheese, part skim Ricotta ¼ cup 90 8   Chicken, white breast w/o skin 1 oz 46 9   Chicken, leg w/o skin 1 oz 54 7   Crab, steamed ¼ cup  40 9   Crawfish tails, boiled ¼ cup 35 8   Edamame, shelled ¼ cup 50 4   Egg 1 78 6   Ham, lean 5% 1 oz 44 7   Hamburger, lean 1 oz 56 7   Hummus ¼ cup 100 5   Lobster, steamed 1 oz 26 5   Milk, skim or 1%, soy  1 cup 90 8   Pork Tenderloin 1 oz 46 7   Pudding, SF 1 serv 60 2   Red beans ¼ cup 56 4   Refried beans, fat free ¼ cup 65 4   [place], baked 1 oz 52 7   Shrimp, steamed 1 oz 28 6   Soymilk, plain ½ cup 40 3   T[place]apia, white fish, cooked 1 oz 36 8   Tofu ¼ cup 47 5   Trout 1 oz 48 7   Tuna, canned in water 1 oz 37 8   Turkey, white meat 1 oz 35 7   Veal Loin 1 oz 50 7   Yogurt, SF, frozen vanilla 3 oz 72 3.5   Yogurt, Fruit, FF, light 3 oz 40 2.5   Yogurt, Greek 3 oz 70 8     *Abbreviations: SF=sugar free, LF=low fat, FF= fat free, gms=grams  *3oz of cooked meat/protein = size of deck of cards or ladies palm   *1oz cheese = 1inch cube or 1 slice American cheese    Sample Menu for Bariatric Soft Diet  For Gastric Bypass and Sleeve            3 meals + 2 protein drinks  Remember: No drinking with meals.    Time of Day Day 1 Day 2   7am:    1 egg (or ¼ cup Egg Beaters) ¼ cup low-fat cottage cheese, 1 tbsp berries   8am: 1 cup water/SF beverage     9am: 1 cup water/SF beverage     10am:  Protein drink  Protein drink   11am: 1 cup water/SF beverage     12pm:    1-2 oz grilled shrimp, ¼ cup  green beans   1-2oz canned chicken, shredded cheese, 1 tbsp salsa   1pm: 1 cup water/SF beverage     3pm:  Protein drink   Protein drink   4pm: 1 cup water/SF beverage     6pm:  ½ cup low fat chili, 1oz low-fat cheese, ¼ cup broccoli 2 oz grilled fish,  ¼  cup lima beans   7pm: 1 cup water/SF beverage       This sample menu provides approx. 80g protein total, including about 40g protein from foods and at least 40g protein from protein drinks.  Drinking protein drinks daily helps decrease muscle loss, increase weight loss, and prevent hair loss.    ? Sip fluids continuously in between meals.    ? For fluids: 1 cup = 8 oz   ? For food: ¼ cup = 4 tablespoons = 1oz  ? No drinking from 30 minutes before meals to 30 minutes after meals.  ? 3oz meat is approx. the size of a deck of cards.    ? A food scale will help you determine portion size (Can be purchased at [employer])             Language Assistance Services     ATTENTION: Language assistance services are available, free of charge. Please call [phone].      ATENCIÓN: Si habla español, tiene a dsouza disposición servicios gratuitos de asistencia lingüística. Llame al [phone].     CHÚ Ý: N?u b?n nói Ti?ng Vi?t, có các d?ch v? h? tr? ngôn ng? mi?n phí dành cho b?n. G?i s? [phone].         [facility]ariat[facility] complies with applicable Federal civil rights laws and does not discriminate on the basis of race, color, national origin, age, disability, or sex.

## 2017-03-08 NOTE — PROGRESS NOTES
BARIATRIC POST-OPERATIVE FOLLOW UP:    Chief Complaint   Patient presents with    Follow-up     4 wks s/p Sleeve       HISTORY OF PRESENT ILLNESS: Angie Mccarthy is a 51 y.o. female with a Body mass index is 35.59 kg/(m^2). who presents for a 1 month follow up s/p Lap Sleeve and liver biopsy with Dr. Ha on 2/6/2017.  She is doing well and tolerating the diet without difficulty.  She is eating foods that she shouldn't to include protein bars high in sugar, almonds, corn, peas and eating more than 1 protein bar daily.  She is eating a lot of fruit.  Now she can see why she didn't lose as much weight as she though she should.  The rash has resolved on her trunk/abdomen, but she still has itching.  She had UTI and needs liquid cipro called in.  She has lost 11 lbs, approximately 12% of her excess weight. Her HTN is resolved and she is off medications.  She has no other complaints.      Denies: nausea, vomiting, abdominal pain, changes in bowel movement pattern, fever, chills, dysphagia, chest pain, and shortness of breath.    Review of Systems   Constitutional: Negative for chills, fever and malaise/fatigue.   Eyes: Negative for blurred vision and double vision.   Respiratory: Negative for cough, hemoptysis and shortness of breath.    Cardiovascular: Negative for chest pain, palpitations and leg swelling.   Gastrointestinal: Negative for abdominal pain, blood in stool, constipation, diarrhea, heartburn, melena, nausea and vomiting.   Genitourinary: Negative for dysuria and hematuria.   Musculoskeletal: Negative for back pain, falls, joint pain, myalgias and neck pain.   Skin: Positive for itching. Negative for rash.   Neurological: Negative for dizziness, tingling, weakness and headaches.   Endo/Heme/Allergies: Negative for environmental allergies. Does not bruise/bleed easily.   Psychiatric/Behavioral: Negative.        EXERCISE & VITAMINS:  See Bariatric Assessment    MEDICATIONS/ALLERGIES:  Have  been reviewed.    DIET:  Puree Bariatric Diet.  2 Muscle Milk Shakes (60 g), plus corn, peas, almonds, salad, protein bars.        Physical Exam   Constitutional: She is oriented to person, place, and time. She appears well-developed and well-nourished.   HENT:   Head: Normocephalic and atraumatic.   Cardiovascular: Normal rate and regular rhythm.    Pulmonary/Chest: Effort normal and breath sounds normal.   Abdominal: Soft. Bowel sounds are normal. She exhibits no distension and no mass. There is no tenderness. There is no rebound and no guarding.   whss     Musculoskeletal: She exhibits no edema.   Neurological: She is alert and oriented to person, place, and time.   Skin: Skin is warm and dry. No rash noted. No erythema. No pallor.   Psychiatric: She has a normal mood and affect.   Nursing note and vitals reviewed.      ASSESSMENT:  - UTI  - Post Antibiotic yeast infection.  - 1/4 Liver Fibrosis  - Obesity, Body mass index is 35.59 kg/(m^2).,  s/p sleeve gastrectomy on 2/6/2017.  - Estimated goal weight, 178 lbs, which is 50% EWL  - Co-morbidities: HTN (normal off medications), NAFLD (stable)  - Good Weight loss, 11 lbs, 12% EWL  - No Exercise regimen  - Good Vitamin Regimen  - Poor Diet (Advancing too early and too high in sugar and low in protein)  - Not at risk for fall or abuse    PLAN:  - Advance diet to a Soft bariatric diet.  Handouts and instructions given.  All questions answered.  She needs to cut out all protein bars, nuts, corn, peas, salad, raw vegetables and stick to soft food diet instructions.  - No Bariatric Registered Dietician Available.  All Diet education and counseling done by PA.  - Cipro 500 BID in suspension.  - Diflucan 200 mg Suspension x 1.  - Zyrtec 10 mg BID for itching and topical lotrisone.  - Emphasized the importance of regular exercise and adherence to bariatric diet to achieve maximum weight loss.  - Encouraged patient to start regular exercise.  - Continue daily vitamins and  medications.  Patient will switch to Patch Multi-Vitamin  - Anti-Acid medication, Omeprazole daily for 2 months.  - No lifting more than 10 lbs for 2 weeks.  - Miralax daily for constipation, no fiber.  - No NSAIDs, Tylenol for pain.  - No swallowing whole pills for 2 months.  Can swallow whole pills on 5/6/2017.  - RTC in 1 weeks or sooner if needed.  - Call the office for any issues.  - Check labs at 3 month post op visit.    15 minute visit, over 50% of time spent counseling patient face to face on diet, exercise, and weight loss.

## 2017-03-19 ENCOUNTER — NURSE TRIAGE (OUTPATIENT)
Dept: ADMINISTRATIVE | Facility: CLINIC | Age: 52
End: 2017-03-19

## 2017-03-19 ENCOUNTER — HOSPITAL ENCOUNTER (EMERGENCY)
Facility: HOSPITAL | Age: 52
Discharge: HOME | End: 2017-03-19
Attending: EMERGENCY MEDICINE
Payer: MEDICARE

## 2017-03-19 VITALS
SYSTOLIC BLOOD PRESSURE: 140 MMHG | HEART RATE: 82 BPM | DIASTOLIC BLOOD PRESSURE: 83 MMHG | HEIGHT: 65 IN | OXYGEN SATURATION: 98 % | WEIGHT: 209 LBS | RESPIRATION RATE: 16 BRPM | BODY MASS INDEX: 34.82 KG/M2 | TEMPERATURE: 99 F

## 2017-03-19 DIAGNOSIS — M25.562 KNEE PAIN, LEFT: ICD-10-CM

## 2017-03-19 DIAGNOSIS — S86.912A KNEE STRAIN, LEFT, INITIAL ENCOUNTER: Primary | ICD-10-CM

## 2017-03-19 DIAGNOSIS — R31.9 HEMATURIA: ICD-10-CM

## 2017-03-19 LAB
BACTERIA #/AREA URNS AUTO: ABNORMAL /HPF
BILIRUB UR QL STRIP: NEGATIVE
CAOX CRY UR QL COMP ASSIST: ABNORMAL
CLARITY UR REFRACT.AUTO: ABNORMAL
COLOR UR AUTO: ABNORMAL
GLUCOSE UR QL STRIP: NEGATIVE
HGB UR QL STRIP: ABNORMAL
HYALINE CASTS UR QL AUTO: 1 /LPF
KETONES UR QL STRIP: NEGATIVE
LEUKOCYTE ESTERASE UR QL STRIP: NEGATIVE
MICROSCOPIC COMMENT: ABNORMAL
NITRITE UR QL STRIP: NEGATIVE
PH UR STRIP: 5 [PH] (ref 5–8)
PROT UR QL STRIP: NEGATIVE
RBC #/AREA URNS AUTO: 35 /HPF (ref 0–4)
SP GR UR STRIP: 1.02 (ref 1–1.03)
SQUAMOUS #/AREA URNS AUTO: 5 /HPF
URN SPEC COLLECT METH UR: ABNORMAL
UROBILINOGEN UR STRIP-ACNC: ABNORMAL EU/DL
WBC #/AREA URNS AUTO: 4 /HPF (ref 0–5)

## 2017-03-19 PROCEDURE — 99284 EMERGENCY DEPT VISIT MOD MDM: CPT | Mod: 25

## 2017-03-19 PROCEDURE — 29505 APPLICATION LONG LEG SPLINT: CPT | Mod: LT

## 2017-03-19 PROCEDURE — 51798 US URINE CAPACITY MEASURE: CPT

## 2017-03-19 PROCEDURE — 81001 URINALYSIS AUTO W/SCOPE: CPT

## 2017-03-19 PROCEDURE — 99283 EMERGENCY DEPT VISIT LOW MDM: CPT | Mod: ,,, | Performed by: EMERGENCY MEDICINE

## 2017-03-19 RX ORDER — TRAMADOL HYDROCHLORIDE 50 MG/1
50 TABLET ORAL EVERY 6 HOURS PRN
Qty: 18 TABLET | Refills: 0 | Status: SHIPPED | OUTPATIENT
Start: 2017-03-19 | End: 2017-03-29

## 2017-03-19 NOTE — ED TRIAGE NOTES
i'm having pain to L knee , denies injury, has own crutches. Also i have a kidney stone in my bladder for 1 week now sticking me

## 2017-03-19 NOTE — ED PROVIDER NOTES
"Encounter Date: 3/19/2017    SCRIBE #1 NOTE: I, Yisel Griffin, am scribing for, and in the presence of, Dr. West .       History     Chief Complaint   Patient presents with    Knee Pain     denies injury     Review of patient's allergies indicates:   Allergen Reactions    Adhesive Blisters    Adhesive tape-silicones Dermatitis     The longer the adhesive stays on, the worse the irritation    Bactrim [sulfamethoxazole-trimethoprim] Anxiety     Has a "nervous feeling."    Mastisol adhesive [gum oohzra-nfsotj-fzmj-alcohol] Blisters     HPI Comments: Time seen by provider: 4:42 PM    This is a 51 y.o. female with history of HTN, HLD, kidney stones who presents with complaint of left knee pain and kidney stones. Pt reports that she might be overusing her knee because of the broken niall in her back. Last night, pt states she had to use both canes. Additionally, if she sits for too long, her legs give out and it burns. Furthermore, pt endorses a lifelong problem with kidney stones; she passed her last kidney stone on 2/27. She reportedly felt another kidney stone passing a week later but it has not come out. She endorses burning and pain; she states it might be too big to pass.       The history is provided by the patient.     Past Medical History:   Diagnosis Date    Allergy     seasonal    Arthritis     Blood transfusion     Hyperlipidemia     Hypertension     Hypothyroidism     Joint pain     Kidney stones     Morbid obesity 12/14/2012    NAFLD (nonalcoholic fatty liver disease)     OCD (obsessive compulsive disorder)     PONV (postoperative nausea and vomiting)     Restless leg syndrome     Sciatica of right side associated with disorder of lumbosacral spine     SOB (shortness of breath) on exertion     Supraventricular tachycardia      Past Surgical History:   Procedure Laterality Date    APPENDECTOMY      BACK SURGERY      CHOLECYSTECTOMY      GASTRECTOMY      HERNIA REPAIR      " HYSTERECTOMY      LIVER BIOPSY  02/06/2017    steatohepatitis with stage 1 fibrosis    SPINAL FUSION      TONSILLECTOMY, ADENOIDECTOMY       Family History   Problem Relation Age of Onset    Heart disease Mother     Heart disease Father     Cancer Father     COPD Father     Heart disease Maternal Grandmother     Acne Other     Eczema Other     Heart disease Maternal Aunt     Heart disease Maternal Uncle     Heart disease Paternal Aunt     Cancer Paternal Aunt     Heart disease Paternal Uncle     Cancer Paternal Uncle     Melanoma Neg Hx     Psoriasis Neg Hx     Lupus Neg Hx      Social History   Substance Use Topics    Smoking status: Never Smoker    Smokeless tobacco: None    Alcohol use Yes      Comment: socially     Review of Systems   Genitourinary: Positive for dysuria.        Possible kidney stone.   Musculoskeletal: Positive for back pain.        Left knee pain.    Skin: Negative for wound.       Physical Exam   Initial Vitals   BP Pulse Resp Temp SpO2   03/19/17 1539 03/19/17 1539 03/19/17 1539 03/19/17 1539 03/19/17 1539   140/83 82 16 98.6 °F (37 °C) 98 %     Physical Exam    Nursing note and vitals reviewed.  Cardiovascular: Normal rate, regular rhythm and normal heart sounds.   No murmur heard.  Pulmonary/Chest: Breath sounds normal. No respiratory distress. She has no wheezes. She has no rales.   Abdominal: Soft. There is no tenderness.   Musculoskeletal:   Knee: no effusion pain on ROM. No sign of infection. Normal dorsalis pedis pulse.           ED Course   Procedures  Labs Reviewed   URINALYSIS - Abnormal; Notable for the following:        Result Value    Appearance, UA Hazy (*)     Occult Blood UA 1+ (*)     Urobilinogen, UA 2.0-3.0 (*)     All other components within normal limits   URINALYSIS MICROSCOPIC - Abnormal; Notable for the following:     RBC, UA 35 (*)     Bacteria, UA Few (*)     All other components within normal limits          X-Rays:   Independently Interpreted  Readings:   Other Readings:  X-Ray Knee: No fracture.   Bladder Scan: 20 cc's. No sign of infection.     Medical Decision Making:   History:   Old Medical Records: I decided to obtain old medical records.  Initial Assessment:   Pt with knee pain. I suspect pt has been overusing knee. Doubt infection or gout. Will check X-Ray.    Pt feels she had bladder calculi. Will check UA and bladder scan. Will recommend follow up with Urologist.   Independently Interpreted Test(s):   I have ordered and independently interpreted X-rays - see summary below.       <> Summary of X-Ray Reading(s): Knee XRay = arthritis  Clinical Tests:   Lab Tests: Ordered and Reviewed  Radiological Study: Reviewed and Ordered  ED Management:  Will discharge home. Request to follow up with Ortho and Urology.             Scribe Attestation:   Scribe #1: I performed the above scribed service and the documentation accurately describes the services I performed. I attest to the accuracy of the note.    Attending Attestation:           Physician Attestation for Scribe:  Physician Attestation Statement for Scribe #1: I, Dr. West , reviewed documentation, as scribed by Yisel Griffin  in my presence, and it is both accurate and complete.                 ED Course     Clinical Impression:   The primary encounter diagnosis was Knee strain, left, initial encounter. Diagnoses of Knee pain, left and Hematuria were also pertinent to this visit.    Disposition:   Disposition: Discharged  Condition: Stable       Rudy West MD  03/20/17 0643

## 2017-03-19 NOTE — TELEPHONE ENCOUNTER
Reason for Disposition   Unable to walk    Protocols used: ST LEG PAIN-A-AH    Patient is calling stating that she is unable to bear weight on her left leg.  Patient has to walk with crutches.  Patient is also in on the right side with kidney stones.  Advised to go to ER for further evaluation.

## 2017-03-19 NOTE — ED AVS SNAPSHOT
[facility]  [address]  [facility] [address]               Angie Mccarthy   3/19/2017  4:39 PM   ED    Description:  Female : 1965   Department:  [facility]           Your Care was Coordinated By:     Provider Role From To    Rudy West MD Attending Provider 17 1640 --      Reason for Visit     Knee Pain           Diagnoses this Visit        Comments    Knee strain, left, initial encounter    -  Primary     Knee pain, left         Hematuria           ED Disposition     None           To Do List           Follow-up Information     Schedule an appointment as soon as possible for a visit with Rangel Floyd MD.    Specialty:  Internal Medicine    Contact information:    [address]  [facility] [address]  [place] [address]  [phone]          Schedule an appointment as soon as possible for a visit with your urologist.        Schedule an appointment as soon as possible for a visit with Lokesh Chuyjayme - Orthopedics.    Specialty:  Orthopedics    Contact information:    [address]  [facility] [id number] [phone]    Additional information:    [facility] - [address]th Floor       These Medications        Disp Refills Start End    tramadol (ULTRAM) 50 mg tablet 18 tablet 0 3/19/2017 3/29/2017    Take 1 tablet (50 mg total) by mouth every 6 (six) hours as needed for Pain. - Oral    Pharmacy: \[facility]\"" [facility] - [place] - [address] [place] [address] Ph #: [phone]         [facility]     [facility] -  Assistance  Registered nurses in the [facility] provide clinical advisement, health education, appointment booking, and other advisory services.  Call for this free service at [phone].             Medications           Message regarding Medications     Verify the changes and/or additions to your medication regime listed below are the same as discussed with your clinician today.   If any of these changes or additions are incorrect, please notify your healthcare provider.        START taking these NEW medications        Refills    tramadol (ULTRAM) 50 mg tablet 0    Sig: Take 1 tablet (50 mg total) by mouth every 6 (six) hours as needed for Pain.    Class: Print    Route: Oral           Verify that the below list of medications is an accurate representation of the medications you are currently taking.  If none reported, the list may be blank. If incorrect, please contact your healthcare provider. Carry this list with you in case of emergency.           Current Medications     alprazolam (XANAX) 0.5 MG tablet Take 0.5 mg by mouth 2 (two) times daily as needed for Insomnia or Anxiety.     ascorbic acid (VITAMIN C) 1000 MG tablet Take 1,000 mg by mouth every morning.     aspirin (ECOTRIN) 81 MG EC tablet Take 81 mg by mouth nightly.     BIOTIN ORAL Take 10,000 mg by mouth every morning.     clotrimazole-betamethasone 1-0.05% (LOTRISONE) cream Apply topically 2 (two) times daily.    cyanocobalamin, vitamin B-12, 500 mcg Subl Place 1 tablet under the tongue once daily.    estradiol (ESTRACE) 1 MG tablet Take 2 tablets (2 mg total) by mouth once daily.    fish oil-omega-3 fatty acids 300-1,000 mg capsule Take 1,200 mg by mouth 2 (two) times daily.     fluoxetine (PROZAC) 40 MG capsule Take 40 mg by mouth every morning.     gabapentin (NEURONTIN) 800 MG tablet Take 800 mg by mouth 2 (two) times daily. Takes every night at bedtime and may take a dose during the day prn    levothyroxine (SYNTHROID) 150 MCG tablet Take 1 tablet (150 mcg total) by mouth once daily.    lisinopril (PRINIVIL,ZESTRIL) 2.5 MG tablet Take 2.5 mg by mouth every morning.     multivitamin capsule Take 1 capsule by mouth every morning.     omeprazole (PRILOSEC) 40 MG capsule Open capsule and dissolve contents in liquid, take by mouth daily. Discard capsule.    oxycodone-acetaminophen (PERCOCET)  mg per tablet Take one to one  "and a half tablets by mouth every 4hours as needed for pain.    pravastatin (PRAVACHOL) 40 MG tablet Take 40 mg by mouth nightly.    promethazine (PHENERGAN) 25 MG suppository Place 1 suppository (25 mg total) rectally every 6 (six) hours as needed for Nausea.    vitamin E 400 UNIT capsule Take 400 Units by mouth every morning.     zolpidem (AMBIEN) 10 mg Tab Take 10 mg by mouth nightly.     conjugated estrogens (PREMARIN) vaginal cream Place 0.5 g vaginally twice a week.    cyclobenzaprine (FLEXERIL) 10 MG tablet Take 10 mg by mouth 3 (three) times daily as needed for Muscle spasms.    hydrochlorothiazide (HYDRODIURIL) 12.5 MG Tab Take 12.5 mg by mouth daily as needed.     ondansetron (ZOFRAN-ODT) 4 MG TbDL Take 1 tablet (4 mg total) by mouth every 8 (eight) hours as needed.    polyethylene glycol (GLYCOLAX) 17 gram/dose powder Take 17 g by mouth once daily.    tramadol (ULTRAM) 50 mg tablet Take 1 tablet (50 mg total) by mouth every 6 (six) hours as needed for Pain.           Clinical Reference Information           Your Vitals Were     BP Pulse Temp Resp Height Weight    140/83 (BP Location: Right arm, Patient Position: Sitting) 82 98.6 °F (37 °C) (Oral) 16 5' 5" (1.651 m) 94.8 kg (209 lb)    Last Period SpO2 BMI          12/06/2007 98% 34.78 kg/m2        Allergies as of 3/19/2017        Reactions    Adhesive Blisters    Adhesive Tape-silicones Dermatitis    The longer the adhesive stays on, the worse the irritation    Bactrim [Sulfamethoxazole-trimethoprim] Anxiety    Has a "nervous feeling."    Mastisol Adhesive [Gum Sqhukc-gspvow-tfld-alcohol] Blisters      Immunizations Administered on Date of Encounter - 3/19/2017     None      ED Micro, Lab, POCT     Start Ordered       Status Ordering Provider    03/19/17 1647 03/19/17 1647  Urinalysis  STAT      Final result     03/19/17 1647 03/19/17 1647  Urinalysis Microscopic  Once      Final result       ED Imaging Orders     Start Ordered       Status Ordering " Provider    03/19/17 1647 03/19/17 1647  X-Ray Knee 3 View Left  1 time imaging      Final result         Discharge Instructions         Arthralgia    Arthralgia is the term for pain in or around the joint. It is a symptom, not a disease. This pain may involve one or more joints. In some cases, the pain moves from joint to joint.  There are many causes for joint pain. These include:  · Injury  · Osteoarthritis (wearing out of the joint surface)  · Gout (inflammation of the joint due to crystals in the joint fluid)  · Infection inside the joint    · Bursitis (inflammation of the fluid-filled sacs around the joint)  · Autoimmune disorders such as rheumatoid arthritis or lupus  · Tendonitis (inflamation of chords that attach muscle to bone)  Home care  · Rest the involved joint(s) until your symptoms improve.   · You may be prescribed pain medication. If none is prescribed, you may use acetaminophen or ibuprofen to control pain and inflammation.  Follow up  Follow up with your healthcare provider or our staff as advised.  When to seek medical care  Contact your healthcare provider right away if any of the following occurs:  · Pain, swelling, or redness of joint increases  · Pain worsens or recurs after a period of improvement  · Pain moves to other joints  · You cannot bear weight on the affected joint   · You cannot move the affected joint  · Joint appears deformed  · New rash appears  · Fever of 101ºF (38.8ºC) or higher, or as directed by your healthcare provider  Date Last Reviewed: 4/26/2015  © 4050-6964 [employer]. [address] [place] [address], [place] [address]. All rights reserved. This information is not intended as a substitute for professional medical care. Always follow your healthcare professional's instructions.          Discharge References/Attachments     HEMATURIA (ENGLISH)      Your Scheduled Appointments     Apr 10, 2017  4:00 PM CDT   Immunization/Injection with INJECTION, INFECTIOUS  DISEASES   [facility]- ID Injection Room ([facility] )    [address]  [place] [address]   [phone]            May 01, 2017 10:00 AM CDT   Fasting Lab with LAB, APPOINTMENT [place] [facility] ([facility])    [address] [place] [address]   [phone]            May 01, 2017 11:00 AM CDT   Post OP with Fatoumata Sandhu PA-C   [facility] - Bariatric Surgery ([facility] )    [address] [place] [address]   [phone]            May 01, 2017 11:30 AM CDT   Post OP with Giselle Barrera   [facility] - Bariatric Surgery ([facility] )    [address] [place] [address]   [phone]            Sep 11, 2017  4:20 PM CDT   Immunization/Injection with INJECTION, INFECTIOUS DISEASES   [facility] ID Injection Room ([address] [address]   [phone]               [facility] complies with applicable Federal civil rights laws and does not discriminate on the basis of race, color, national origin, age, disability, or sex.        Language Assistance Services     ATTENTION: Language assistance services are available, free of charge. Please call [phone].      ATENCIÓN: Si habla español, tiene a dsouza disposición servicios gratuitos de asistencia lingüística. Llame al [phone].     CHÚ Ý: N?u b?n nói Ti?ng Vi?t, có các d?ch v? h? tr? ngôn ng? mi?n phí dành cho b?n. G?i s? [phone].

## 2017-03-19 NOTE — ED NOTES
LOC: Pt awake, alert, aware of environment, appropriate affect, oriented x3, speaking appropriately  APPEARANCE: Pt resting comfortably, no acute distress, clean, well groomed. Pt's clothing is properly fastened.  SKIN: The skin is warm and dry, intact, patient has normal skin turgor and moist mucus membranes  RESPIRATORY respirations spontaneous, even and unlabored, normal effort and rate  CARDIAC:  no peripheral edema noted, capillary refill < 3 seconds. Bilateral radial pulses +2  ABDOMEN: Soft, nontender, nondistended.    NEUROLOGIC: PERRL, facial expression is symmetrical, pt moving all extremities spontaneously, normal sensation in all extremities when touched with finger. Follows all commands appropriately.  MUSCULOSKELETAL: No obvious deformities.

## 2017-03-20 NOTE — DISCHARGE INSTRUCTIONS
Arthralgia    Arthralgia is the term for pain in or around the joint. It is a symptom, not a disease. This pain may involve one or more joints. In some cases, the pain moves from joint to joint.  There are many causes for joint pain. These include:  · Injury  · Osteoarthritis (wearing out of the joint surface)  · Gout (inflammation of the joint due to crystals in the joint fluid)  · Infection inside the joint    · Bursitis (inflammation of the fluid-filled sacs around the joint)  · Autoimmune disorders such as rheumatoid arthritis or lupus  · Tendonitis (inflamation of chords that attach muscle to bone)  Home care  · Rest the involved joint(s) until your symptoms improve.   · You may be prescribed pain medication. If none is prescribed, you may use acetaminophen or ibuprofen to control pain and inflammation.  Follow up  Follow up with your healthcare provider or our staff as advised.  When to seek medical care  Contact your healthcare provider right away if any of the following occurs:  · Pain, swelling, or redness of joint increases  · Pain worsens or recurs after a period of improvement  · Pain moves to other joints  · You cannot bear weight on the affected joint   · You cannot move the affected joint  · Joint appears deformed  · New rash appears  · Fever of 101ºF (38.8ºC) or higher, or as directed by your healthcare provider  Date Last Reviewed: 4/26/2015  © 8436-5350 [employer]. [address] [place] [address], [place] [address]. All rights reserved. This information is not intended as a substitute for professional medical care. Always follow your healthcare professional's instructions.

## 2017-04-12 ENCOUNTER — PATIENT MESSAGE (OUTPATIENT)
Dept: ORTHOPEDICS | Facility: CLINIC | Age: 52
End: 2017-04-12

## 2017-05-04 ENCOUNTER — HOSPITAL ENCOUNTER (OUTPATIENT)
Dept: RADIOLOGY | Facility: HOSPITAL | Age: 52
Discharge: HOME | End: 2017-05-04
Attending: ORTHOPAEDIC SURGERY
Payer: MEDICARE

## 2017-05-04 ENCOUNTER — OFFICE VISIT (OUTPATIENT)
Dept: ORTHOPEDICS | Facility: CLINIC | Age: 52
End: 2017-05-04
Payer: MEDICARE

## 2017-05-04 VITALS — BODY MASS INDEX: 35.47 KG/M2 | HEIGHT: 65 IN | WEIGHT: 212.88 LBS

## 2017-05-04 DIAGNOSIS — M25.562 CHRONIC PAIN OF LEFT KNEE: Primary | ICD-10-CM

## 2017-05-04 DIAGNOSIS — M25.562 CHRONIC PAIN OF LEFT KNEE: ICD-10-CM

## 2017-05-04 DIAGNOSIS — M17.12 PRIMARY OSTEOARTHRITIS OF LEFT KNEE: ICD-10-CM

## 2017-05-04 DIAGNOSIS — G89.29 CHRONIC PAIN OF LEFT KNEE: ICD-10-CM

## 2017-05-04 DIAGNOSIS — G89.29 CHRONIC PAIN OF LEFT KNEE: Primary | ICD-10-CM

## 2017-05-04 PROCEDURE — 20610 DRAIN/INJ JOINT/BURSA W/O US: CPT | Mod: LT,S$GLB,, | Performed by: ORTHOPAEDIC SURGERY

## 2017-05-04 PROCEDURE — 73565 X-RAY EXAM OF KNEES: CPT | Mod: 26,,, | Performed by: RADIOLOGY

## 2017-05-04 PROCEDURE — 1160F RVW MEDS BY RX/DR IN RCRD: CPT | Mod: S$GLB,,, | Performed by: ORTHOPAEDIC SURGERY

## 2017-05-04 PROCEDURE — 99999 PR PBB SHADOW E&M-EST. PATIENT-LVL II: CPT | Mod: 25,PBBFAC,, | Performed by: ORTHOPAEDIC SURGERY

## 2017-05-04 PROCEDURE — 99204 OFFICE O/P NEW MOD 45 MIN: CPT | Mod: 25,S$GLB,, | Performed by: ORTHOPAEDIC SURGERY

## 2017-05-04 PROCEDURE — 73565 X-RAY EXAM OF KNEES: CPT | Mod: TC

## 2017-05-04 RX ORDER — TRIAMCINOLONE ACETONIDE 40 MG/ML
60 INJECTION, SUSPENSION INTRA-ARTICULAR; INTRAMUSCULAR
Status: DISCONTINUED | OUTPATIENT
Start: 2017-05-04 | End: 2017-05-04 | Stop reason: HOSPADM

## 2017-05-04 RX ORDER — HYDROCODONE BITARTRATE AND ACETAMINOPHEN 10; 325 MG/1; MG/1
TABLET ORAL
Refills: 0 | COMMUNITY
Start: 2017-04-05 | End: 2018-09-06

## 2017-05-04 RX ADMIN — TRIAMCINOLONE ACETONIDE 60 MG: 40 INJECTION, SUSPENSION INTRA-ARTICULAR; INTRAMUSCULAR at 03:05

## 2017-05-04 NOTE — PROGRESS NOTES
HPI:    Angie Mccarthy is a 51 y.o. female who is here today for   Chief Complaint   Patient presents with    Left Knee - Pain   .  Patient's left knee blew up and became very painful 3 weeks ago.  She had had some aches and pains in that knee for a few months before but nothing severe.    Duration: 1 months  Intensity: severe  Character of pain: sharp  Location: She reports that the pain is predominately  medial    Past Medical History:   Diagnosis Date    Allergy     seasonal    Arthritis     Blood transfusion     Hyperlipidemia     Hypertension     Hypothyroidism     Joint pain     Kidney stones     Morbid obesity 12/14/2012    NAFLD (nonalcoholic fatty liver disease)     OCD (obsessive compulsive disorder)     PONV (postoperative nausea and vomiting)     Restless leg syndrome     Sciatica of right side associated with disorder of lumbosacral spine     SOB (shortness of breath) on exertion     Supraventricular tachycardia           Current Outpatient Prescriptions:     alprazolam (XANAX) 0.5 MG tablet, Take 0.5 mg by mouth 2 (two) times daily as needed for Insomnia or Anxiety. , Disp: , Rfl:     ascorbic acid (VITAMIN C) 1000 MG tablet, Take 1,000 mg by mouth every morning. , Disp: , Rfl:     aspirin (ECOTRIN) 81 MG EC tablet, Take 81 mg by mouth nightly. , Disp: , Rfl:     BIOTIN ORAL, Take 10,000 mg by mouth every morning. , Disp: , Rfl:     clotrimazole-betamethasone 1-0.05% (LOTRISONE) cream, Apply topically 2 (two) times daily., Disp: 45 g, Rfl: 2    conjugated estrogens (PREMARIN) vaginal cream, Place 0.5 g vaginally twice a week., Disp: 45 g, Rfl: 1    cyanocobalamin, vitamin B-12, 500 mcg Subl, Place 1 tablet under the tongue once daily., Disp: , Rfl:     estradiol (ESTRACE) 1 MG tablet, Take 2 tablets (2 mg total) by mouth once daily. (Patient taking differently: Take 2 mg by mouth nightly. Takes two 1 mg tablets (2 mg) every night), Disp: 180 tablet, Rfl: 3    fish  oil-omega-3 fatty acids 300-1,000 mg capsule, Take 1,200 mg by mouth 2 (two) times daily. , Disp: , Rfl:     fluoxetine (PROZAC) 40 MG capsule, Take 40 mg by mouth every morning. , Disp: , Rfl:     gabapentin (NEURONTIN) 800 MG tablet, Take 800 mg by mouth 2 (two) times daily. Takes every night at bedtime and may take a dose during the day prn, Disp: , Rfl: 1    hydrochlorothiazide (HYDRODIURIL) 12.5 MG Tab, Take 12.5 mg by mouth daily as needed. , Disp: , Rfl:     hydrocodone-acetaminophen 10-325mg (NORCO)  mg Tab, TAKE ONE Tablet BY MOUTH FOUR TIMES A DAY AS NEEDED FOR PAIN MAY CAUSE DROWSINESS THANK YOU, Disp: , Rfl: 0    levothyroxine (SYNTHROID) 150 MCG tablet, Take 1 tablet (150 mcg total) by mouth once daily. (Patient taking differently: Take 150 mcg by mouth every morning. ), Disp: 30 tablet, Rfl: 11    lisinopril (PRINIVIL,ZESTRIL) 2.5 MG tablet, Take 2.5 mg by mouth every morning. , Disp: , Rfl: 2    multivitamin capsule, Take 1 capsule by mouth every morning. , Disp: , Rfl:     omeprazole (PRILOSEC) 40 MG capsule, Open capsule and dissolve contents in liquid, take by mouth daily. Discard capsule., Disp: 30 capsule, Rfl: 0    pravastatin (PRAVACHOL) 40 MG tablet, Take 40 mg by mouth nightly., Disp: , Rfl:     promethazine (PHENERGAN) 25 MG suppository, Place 1 suppository (25 mg total) rectally every 6 (six) hours as needed for Nausea., Disp: 20 suppository, Rfl: 0    vitamin E 400 UNIT capsule, Take 400 Units by mouth every morning. , Disp: , Rfl:     zolpidem (AMBIEN) 10 mg Tab, Take 10 mg by mouth nightly. , Disp: , Rfl:     oxycodone-acetaminophen (PERCOCET)  mg per tablet, Take one to one and a half tablets by mouth every 4hours as needed for pain., Disp: 81 tablet, Rfl: 0     Review of patient's allergies indicates:   Allergen Reactions    Adhesive Blisters    Adhesive tape-silicones Dermatitis     The longer the adhesive stays on, the worse the irritation    Bactrim  "[sulfamethoxazole-trimethoprim] Anxiety     Has a "nervous feeling."    Mastisol adhesive [gum hhqist-feqlte-fqcz-alcohol] Blisters        ROS  Constitutional: Negative for fever, Negative for weight loss  HENT Negative for congestion  Cardiovascular: Negative chest pain  Respiratory: Negative Shortness of breath  Heme: Negative excessive bleeding  Skin:NegativeItching, Negative breakdown  Musculoskeletal:Positive for back pain, Positive for joint pain, Positive muscle pain, Positive muscle weakness  Neurological: Positive for numbness and paresthesias   Psychiatric/Behavioral: Negative altered mental status, Negative for depression    Physical Exam:   Ht 5' 5" (1.651 m)  Wt 96.6 kg (212 lb 13.7 oz)  LMP 12/06/2007  BMI 35.42 kg/m2  General appearance: This is a well-developed, Well nourished female No obvious acute distress.  Psychiatric: normal mood and affect;  pleasant and conversant; judgment and thought content normal  Gait is coordinated. Patient has antalgic gait to the left  Cardiovascular: There are no swelling or varicosities present.   Respiratory: normal respiratory effort   Lymphatic: no adenopathy   Neurologic: alert and oriented to person, place, and time       Coordination and Balance: Patient has poor balance.    Musculoskeletal   Neck    ROM shows normal flexion and extension and lateral rotation    Palpation: Non-tender    Stability is normal    Strength is normal    Skin is normal without masses and lesions    Sensation is intact to light touch   Back    ROM showsabnormal flexion, extension    and  rotation    Palpation shows no masses     Core strength is diminished    Skin shows no rashes or cafe au lait spots;     Sensation is decreased over the lateral aspect of both legs.    Right Knee  Swelling None  TendernessNone  Range of Motion: 120    Left Knee: Swelling Severe  TendernessMedial joint line  Range of Motion:     Radiograph   Osteoarthritis: severe  Angle: mild " varus    Assessment: Complete loss of the medial joint space, severe osteoarthritis left knee.    Plan: Aspirate and inject left knee.

## 2017-05-04 NOTE — PROCEDURES
Large Joint Aspiration/Injection  Date/Time: 5/4/2017 3:18 PM  Performed by: [facility]  Authorized by: [facility], [facility]     Consent Done?:  Yes (Verbal)  Indications:  Pain  Procedure site marked: Yes    Timeout: Prior to procedure the correct patient, procedure, and site was verified      Location:  Knee  Site:  L knee  Prep: Patient was prepped and draped in usual sterile fashion    Ultrasonic Guidance for needle placement: No  Needle size:  18 G  Approach:  Anterolateral  Medications:  60 mg triamcinolone acetonide 40 mg/mL  Aspirate amount (ml):  40  Aspirate:  Yellow  Patient tolerance:  Patient tolerated the procedure well with no immediate complications

## 2017-05-05 ENCOUNTER — TELEPHONE (OUTPATIENT)
Dept: BARIATRICS | Facility: CLINIC | Age: 52
End: 2017-05-05

## 2017-06-26 ENCOUNTER — PATIENT MESSAGE (OUTPATIENT)
Dept: ORTHOPEDICS | Facility: CLINIC | Age: 52
End: 2017-06-26

## 2017-07-06 ENCOUNTER — OFFICE VISIT (OUTPATIENT)
Dept: ORTHOPEDICS | Facility: CLINIC | Age: 52
End: 2017-07-06
Payer: MEDICARE

## 2017-07-06 VITALS — HEIGHT: 65 IN | BODY MASS INDEX: 35.33 KG/M2 | WEIGHT: 212.06 LBS

## 2017-07-06 DIAGNOSIS — M17.12 PRIMARY OSTEOARTHRITIS OF LEFT KNEE: Primary | ICD-10-CM

## 2017-07-06 PROCEDURE — 99214 OFFICE O/P EST MOD 30 MIN: CPT | Mod: S$GLB,,, | Performed by: ORTHOPAEDIC SURGERY

## 2017-07-06 PROCEDURE — 99999 PR PBB SHADOW E&M-EST. PATIENT-LVL II: CPT | Mod: PBBFAC,,, | Performed by: ORTHOPAEDIC SURGERY

## 2017-07-06 NOTE — PROGRESS NOTES
HPI:    Angie Mccarthy is a 51 y.o. female who is here today for   Chief Complaint   Patient presents with    Left Knee - Pain   .  Patient had aspiration and injection 2 months ago and only had 3 weeks of relief.  Pain and swelling have returned and are worse.    Duration: 6 months  Intensity: severe  Character of pain: sharp  Location: She reports that the pain is predominately  medial  Patient's pain increases with activity.  Pain is increased with weightbearing and interferes with activities of daily living.    She has tried conservative management including NSAIDS, injections, and activity modification without relief.    She has discussed options with her family and wishes to schedule TKA.     PMSFSH reviewed per clinic record       Past Medical History:   Diagnosis Date    Allergy     seasonal    Arthritis     Blood transfusion     Hyperlipidemia     Hypertension     Hypothyroidism     Joint pain     Kidney stones     Morbid obesity 12/14/2012    NAFLD (nonalcoholic fatty liver disease)     OCD (obsessive compulsive disorder)     PONV (postoperative nausea and vomiting)     Restless leg syndrome     Sciatica of right side associated with disorder of lumbosacral spine     SOB (shortness of breath) on exertion     Supraventricular tachycardia           Current Outpatient Prescriptions:     alprazolam (XANAX) 0.5 MG tablet, Take 0.5 mg by mouth 2 (two) times daily as needed for Insomnia or Anxiety. , Disp: , Rfl:     ascorbic acid (VITAMIN C) 1000 MG tablet, Take 1,000 mg by mouth every morning. , Disp: , Rfl:     aspirin (ECOTRIN) 81 MG EC tablet, Take 81 mg by mouth nightly. , Disp: , Rfl:     BIOTIN ORAL, Take 10,000 mg by mouth every morning. , Disp: , Rfl:     clotrimazole-betamethasone 1-0.05% (LOTRISONE) cream, Apply topically 2 (two) times daily., Disp: 45 g, Rfl: 2    conjugated estrogens (PREMARIN) vaginal cream, Place 0.5 g vaginally twice a week., Disp: 45 g, Rfl: 1     cyanocobalamin, vitamin B-12, 500 mcg Subl, Place 1 tablet under the tongue once daily., Disp: , Rfl:     estradiol (ESTRACE) 1 MG tablet, Take 2 tablets (2 mg total) by mouth once daily. (Patient taking differently: Take 2 mg by mouth nightly. Takes two 1 mg tablets (2 mg) every night), Disp: 180 tablet, Rfl: 3    fish oil-omega-3 fatty acids 300-1,000 mg capsule, Take 1,200 mg by mouth 2 (two) times daily. , Disp: , Rfl:     fluoxetine (PROZAC) 40 MG capsule, Take 40 mg by mouth every morning. , Disp: , Rfl:     gabapentin (NEURONTIN) 800 MG tablet, Take 800 mg by mouth 2 (two) times daily. Takes every night at bedtime and may take a dose during the day prn, Disp: , Rfl: 1    hydrocodone-acetaminophen 10-325mg (NORCO)  mg Tab, TAKE ONE Tablet BY MOUTH FOUR TIMES A DAY AS NEEDED FOR PAIN MAY CAUSE DROWSINESS THANK YOU, Disp: , Rfl: 0    levothyroxine (SYNTHROID) 150 MCG tablet, Take 1 tablet (150 mcg total) by mouth once daily. (Patient taking differently: Take 150 mcg by mouth every morning. ), Disp: 30 tablet, Rfl: 11    lisinopril (PRINIVIL,ZESTRIL) 2.5 MG tablet, Take 2.5 mg by mouth every morning. , Disp: , Rfl: 2    multivitamin capsule, Take 1 capsule by mouth every morning. , Disp: , Rfl:     omeprazole (PRILOSEC) 40 MG capsule, Open capsule and dissolve contents in liquid, take by mouth daily. Discard capsule., Disp: 30 capsule, Rfl: 0    pravastatin (PRAVACHOL) 40 MG tablet, Take 40 mg by mouth nightly., Disp: , Rfl:     vitamin E 400 UNIT capsule, Take 400 Units by mouth every morning. , Disp: , Rfl:     zolpidem (AMBIEN) 10 mg Tab, Take 10 mg by mouth nightly. , Disp: , Rfl:     hydrochlorothiazide (HYDRODIURIL) 12.5 MG Tab, Take 12.5 mg by mouth daily as needed. , Disp: , Rfl:     oxycodone-acetaminophen (PERCOCET)  mg per tablet, Take one to one and a half tablets by mouth every 4hours as needed for pain., Disp: 81 tablet, Rfl: 0    promethazine (PHENERGAN) 25 MG  "suppository, Place 1 suppository (25 mg total) rectally every 6 (six) hours as needed for Nausea., Disp: 20 suppository, Rfl: 0     Review of patient's allergies indicates:   Allergen Reactions    Adhesive Blisters    Adhesive tape-silicones Dermatitis     The longer the adhesive stays on, the worse the irritation    Bactrim [sulfamethoxazole-trimethoprim] Anxiety     Has a "nervous feeling."    Mastisol adhesive [gum sqrxje-ngcswv-qphd-alcohol] Blisters        ROS  Constitutional: Negative for fever, Negative for weight loss  HENT Negative for congestion  Cardiovascular: Negative chest pain  Respiratory: Negative Shortness of breath  Heme: Negative excessive bleeding  Skin:NegativeItching, Negative breakdown  Musculoskeletal:Positive for back pain, Positive for joint pain, Positive muscle pain, Positive muscle weakness  Neurological: Negative for numbness and paresthesias   Psychiatric/Behavioral: Negative altered mental status, Negative for depression    Physical Exam:   Ht 5' 5" (1.651 m)   Wt 96.2 kg (212 lb 1.3 oz)   LMP 12/06/2007   BMI 35.29 kg/m²   General appearance: This is a well-developed, Well nourished female No obvious acute distress.  Psychiatric: normal mood and affect;  pleasant and conversant; judgment and thought content normal  Gait is coordinated. Patient has antalgic gait to the left  Cardiovascular: There are no swelling or varicosities present.   Respiratory: normal respiratory effort   Lymphatic: no adenopathy   Neurologic: alert and oriented to person, place, and time   Deep Tendon Reflexes are normal;  Coordination and Balance: Normal   Musculoskeletal   Neck    ROM shows normal flexion and extension and lateral rotation    Palpation: Non-tender    Stability is normal    Strength is normal    Skin is normal without masses and lesions    Sensation is intact to light touch   Back    ROM showsabnormal flexion, extension and     rotation    Palpation shows no masses    She has " scoliosis and deformity of the lumbar spine.  Marked tenderness of the lumbar spine up into the lower thoracic area.  Almost no motion of the lumbar spine.    Right hip   Range of motion normal    Left Hip  Range of motionnormal    Right Knee  Swelling None  TendernessNone  Range of Motion: 130  Motion is painfulNo  Crepitance presentNo    Right Leg  Neurologic Intact  Pulses Intact    Left Knee: Swelling Severe  TendernessMedial joint line  Range of Motion:    Motion is painful Yes    Left Leg   Neurologic Intact  Pulses Intact    Radiograph: Show severe degenerative arthritis with subchondral sclerosis, periarticular osteophytes and narrowing of joint space.  Angle: mild varus    Physical therapy is contraindicated due to potential bone loss on this severe arthritic joint.    Assessment:  Knee arthritis left      She will need to be cleared in our PreOp center.         .    She  has a past medical history of Allergy; Arthritis; Blood transfusion; Hyperlipidemia; Hypertension; Hypothyroidism; Joint pain; Kidney stones; Morbid obesity (12/14/2012); NAFLD (nonalcoholic fatty liver disease); OCD (obsessive compulsive disorder); PONV (postoperative nausea and vomiting); Restless leg syndrome; Sciatica of right side associated with disorder of lumbosacral spine; SOB (shortness of breath) on exertion; and Supraventricular tachycardia. . We will have to take this into account. She  will be followed by the hospitalist service while in the hospital.       We have gone over the hospitalization and recovery with her as well.  This is typically around 2 weeks on a walker and transition to a cane after that.  She will have home health likely for 3-4 weeks and then transition to outpatient if necessary.  She is agreement with this plan of care and is ready to proceed.  I will see her back for clinical recheck at the 2-week postop valery.  I will see her back for clinical recheck for any other questions or problems as needed  and certainly for any other issues in the interim.    We have discussed risks of total knee replacement which include but are not limited to blood clots in the legs that can travel to the lungs (pulmonary embolism). Pulmonary embolism can cause shortness of breath, chest pain, and even shock. Other risks include urinary tract infection, nausea and vomiting (usually related to pain medication), chronic knee pain and stiffness, bleeding into the knee joint, nerve damage, blood vessel injury, and infection of the knee which can require re-operation. Furthermore, the risks of anesthesia include potential heart, lung, kidney, and liver damage.    Patient will discuss surgery with her family and see about scheduling left total knee arthroplasty.

## 2017-11-16 ENCOUNTER — TELEPHONE (OUTPATIENT)
Dept: ORTHOPEDICS | Facility: CLINIC | Age: 52
End: 2017-11-16

## 2017-11-17 ENCOUNTER — TELEPHONE (OUTPATIENT)
Dept: ORTHOPEDICS | Facility: CLINIC | Age: 52
End: 2017-11-17

## 2017-11-17 NOTE — TELEPHONE ENCOUNTER
We spoke   She picked Feb. 19 2018 for her L TKA    She will see ortho. on Feb.6 and class in January

## 2017-11-17 NOTE — TELEPHONE ENCOUNTER
----- Message from Marina Norris MA sent at 11/15/2017  4:09 PM CST -----  Contact: Self/[phone]      ----- Message -----  From: Quinten Boyd  Sent: 11/15/2017   2:56 PM  To: [facility]    Pt called in to schedule sx.  Pt can be reached at [phone].    Left a message to call me   We can schedule her total knee   Left my number

## 2018-01-08 DIAGNOSIS — N95.1 MENOPAUSAL HOT FLUSHES: ICD-10-CM

## 2018-01-09 ENCOUNTER — PATIENT MESSAGE (OUTPATIENT)
Dept: ORTHOPEDICS | Facility: CLINIC | Age: 53
End: 2018-01-09

## 2018-01-09 RX ORDER — ESTRADIOL 1 MG/1
TABLET ORAL
Qty: 180 TABLET | Refills: 3 | Status: SHIPPED | OUTPATIENT
Start: 2018-01-09 | End: 2018-02-21 | Stop reason: SDUPTHER

## 2018-01-19 DIAGNOSIS — M17.9 OSTEOARTHRITIS OF KNEE, UNSPECIFIED (CODE): Primary | ICD-10-CM

## 2018-01-24 ENCOUNTER — PATIENT MESSAGE (OUTPATIENT)
Dept: ORTHOPEDICS | Facility: CLINIC | Age: 53
End: 2018-01-24

## 2018-02-19 ENCOUNTER — LAB VISIT (OUTPATIENT)
Dept: LAB | Facility: HOSPITAL | Age: 53
End: 2018-02-19
Attending: ORTHOPAEDIC SURGERY
Payer: MEDICARE

## 2018-02-19 DIAGNOSIS — E03.9 HYPOTHYROIDISM, ADULT: Primary | ICD-10-CM

## 2018-02-19 DIAGNOSIS — I10 HYPERTENSION, ESSENTIAL: ICD-10-CM

## 2018-02-19 LAB
ALBUMIN SERPL BCP-MCNC: 3.6 G/DL
ALP SERPL-CCNC: 104 U/L
ALT SERPL W/O P-5'-P-CCNC: 28 U/L
ANION GAP SERPL CALC-SCNC: 10 MMOL/L
AST SERPL-CCNC: 35 U/L
BASOPHILS # BLD AUTO: 0.05 K/UL
BASOPHILS NFR BLD: 0.7 %
BILIRUB SERPL-MCNC: 0.5 MG/DL
BUN SERPL-MCNC: 14 MG/DL
CALCIUM SERPL-MCNC: 9.4 MG/DL
CHLORIDE SERPL-SCNC: 102 MMOL/L
CHOLEST SERPL-MCNC: 222 MG/DL
CHOLEST/HDLC SERPL: 4.7 {RATIO}
CO2 SERPL-SCNC: 27 MMOL/L
CREAT SERPL-MCNC: 0.8 MG/DL
DIFFERENTIAL METHOD: ABNORMAL
EOSINOPHIL # BLD AUTO: 0.1 K/UL
EOSINOPHIL NFR BLD: 1.4 %
ERYTHROCYTE [DISTWIDTH] IN BLOOD BY AUTOMATED COUNT: 13.2 %
EST. GFR  (AFRICAN AMERICAN): >60 ML/MIN/1.73 M^2
EST. GFR  (NON AFRICAN AMERICAN): >60 ML/MIN/1.73 M^2
GLUCOSE SERPL-MCNC: 92 MG/DL
HCT VFR BLD AUTO: 40.4 %
HDLC SERPL-MCNC: 47 MG/DL
HDLC SERPL: 21.2 %
HGB BLD-MCNC: 13.1 G/DL
IMM GRANULOCYTES # BLD AUTO: 0.03 K/UL
IMM GRANULOCYTES NFR BLD AUTO: 0.4 %
LDLC SERPL CALC-MCNC: 126.8 MG/DL
LYMPHOCYTES # BLD AUTO: 2 K/UL
LYMPHOCYTES NFR BLD: 28.1 %
MCH RBC QN AUTO: 25.9 PG
MCHC RBC AUTO-ENTMCNC: 32.4 G/DL
MCV RBC AUTO: 80 FL
MONOCYTES # BLD AUTO: 0.6 K/UL
MONOCYTES NFR BLD: 8.4 %
NEUTROPHILS # BLD AUTO: 4.4 K/UL
NEUTROPHILS NFR BLD: 61 %
NONHDLC SERPL-MCNC: 175 MG/DL
NRBC BLD-RTO: 0 /100 WBC
PLATELET # BLD AUTO: 265 K/UL
PMV BLD AUTO: 10.5 FL
POTASSIUM SERPL-SCNC: 4.7 MMOL/L
PROT SERPL-MCNC: 7.4 G/DL
RBC # BLD AUTO: 5.05 M/UL
SODIUM SERPL-SCNC: 139 MMOL/L
TRIGL SERPL-MCNC: 241 MG/DL
TSH SERPL DL<=0.005 MIU/L-ACNC: 3.16 UIU/ML
WBC # BLD AUTO: 7.27 K/UL

## 2018-02-19 PROCEDURE — 84443 ASSAY THYROID STIM HORMONE: CPT

## 2018-02-19 PROCEDURE — 85025 COMPLETE CBC W/AUTO DIFF WBC: CPT

## 2018-02-19 PROCEDURE — 80061 LIPID PANEL: CPT

## 2018-02-19 PROCEDURE — 36415 COLL VENOUS BLD VENIPUNCTURE: CPT

## 2018-02-19 PROCEDURE — 80053 COMPREHEN METABOLIC PANEL: CPT

## 2018-02-21 DIAGNOSIS — N95.1 MENOPAUSAL HOT FLUSHES: ICD-10-CM

## 2018-02-21 RX ORDER — ESTRADIOL 1 MG/1
2 TABLET ORAL DAILY
Qty: 180 TABLET | Refills: 3 | Status: SHIPPED | OUTPATIENT
Start: 2018-02-21 | End: 2019-01-05

## 2018-06-12 ENCOUNTER — LAB VISIT (OUTPATIENT)
Dept: LAB | Facility: HOSPITAL | Age: 53
End: 2018-06-12
Attending: INTERNAL MEDICINE
Payer: MEDICARE

## 2018-06-12 DIAGNOSIS — I10 HYPERTENSION: ICD-10-CM

## 2018-06-12 DIAGNOSIS — E03.9 HYPOTHYROIDISM, ADULT: Primary | ICD-10-CM

## 2018-06-12 LAB
ALBUMIN SERPL BCP-MCNC: 3.8 G/DL
ALP SERPL-CCNC: 98 U/L
ALT SERPL W/O P-5'-P-CCNC: 18 U/L
ANION GAP SERPL CALC-SCNC: 10 MMOL/L
AST SERPL-CCNC: 18 U/L
BILIRUB SERPL-MCNC: 0.4 MG/DL
BUN SERPL-MCNC: 17 MG/DL
CALCIUM SERPL-MCNC: 10.2 MG/DL
CHLORIDE SERPL-SCNC: 105 MMOL/L
CHOLEST SERPL-MCNC: 222 MG/DL
CHOLEST/HDLC SERPL: 4 {RATIO}
CO2 SERPL-SCNC: 23 MMOL/L
CREAT SERPL-MCNC: 0.8 MG/DL
EST. GFR  (AFRICAN AMERICAN): >60 ML/MIN/1.73 M^2
EST. GFR  (NON AFRICAN AMERICAN): >60 ML/MIN/1.73 M^2
GLUCOSE SERPL-MCNC: 118 MG/DL
HDLC SERPL-MCNC: 55 MG/DL
HDLC SERPL: 24.8 %
LDLC SERPL CALC-MCNC: 135.8 MG/DL
NONHDLC SERPL-MCNC: 167 MG/DL
POTASSIUM SERPL-SCNC: 4.5 MMOL/L
PROT SERPL-MCNC: 7.7 G/DL
SODIUM SERPL-SCNC: 138 MMOL/L
TRIGL SERPL-MCNC: 156 MG/DL
TSH SERPL DL<=0.005 MIU/L-ACNC: 0.62 UIU/ML

## 2018-06-12 PROCEDURE — 80061 LIPID PANEL: CPT

## 2018-06-12 PROCEDURE — 80053 COMPREHEN METABOLIC PANEL: CPT

## 2018-06-12 PROCEDURE — 84443 ASSAY THYROID STIM HORMONE: CPT

## 2018-06-12 PROCEDURE — 36415 COLL VENOUS BLD VENIPUNCTURE: CPT

## 2018-08-13 ENCOUNTER — TELEPHONE (OUTPATIENT)
Dept: BARIATRICS | Facility: CLINIC | Age: 53
End: 2018-08-13

## 2018-08-15 ENCOUNTER — TELEPHONE (OUTPATIENT)
Dept: BARIATRICS | Facility: CLINIC | Age: 53
End: 2018-08-15

## 2018-09-06 ENCOUNTER — HOSPITAL ENCOUNTER (OUTPATIENT)
Dept: RADIOLOGY | Facility: HOSPITAL | Age: 53
Discharge: HOME | End: 2018-09-06
Attending: INTERNAL MEDICINE
Payer: MEDICARE

## 2018-09-06 ENCOUNTER — OFFICE VISIT (OUTPATIENT)
Dept: INTERNAL MEDICINE | Facility: CLINIC | Age: 53
End: 2018-09-06
Payer: MEDICARE

## 2018-09-06 ENCOUNTER — TELEPHONE (OUTPATIENT)
Dept: ORTHOPEDICS | Facility: CLINIC | Age: 53
End: 2018-09-06

## 2018-09-06 VITALS
HEART RATE: 81 BPM | DIASTOLIC BLOOD PRESSURE: 70 MMHG | HEIGHT: 65 IN | BODY MASS INDEX: 36.65 KG/M2 | OXYGEN SATURATION: 97 % | WEIGHT: 220 LBS | SYSTOLIC BLOOD PRESSURE: 127 MMHG

## 2018-09-06 DIAGNOSIS — M41.9 SCOLIOSIS OF THORACOLUMBAR SPINE, UNSPECIFIED SCOLIOSIS TYPE: ICD-10-CM

## 2018-09-06 DIAGNOSIS — M54.2 CERVICAL SPINE PAIN: ICD-10-CM

## 2018-09-06 DIAGNOSIS — M54.50 ACUTE BILATERAL LOW BACK PAIN WITHOUT SCIATICA: ICD-10-CM

## 2018-09-06 DIAGNOSIS — M54.50 ACUTE BILATERAL LOW BACK PAIN WITHOUT SCIATICA: Primary | ICD-10-CM

## 2018-09-06 PROCEDURE — 3008F BODY MASS INDEX DOCD: CPT | Mod: CPTII,,, | Performed by: INTERNAL MEDICINE

## 2018-09-06 PROCEDURE — 72040 X-RAY EXAM NECK SPINE 2-3 VW: CPT | Mod: TC,FY,PO

## 2018-09-06 PROCEDURE — 99215 OFFICE O/P EST HI 40 MIN: CPT | Mod: PBBFAC,25,PO | Performed by: INTERNAL MEDICINE

## 2018-09-06 PROCEDURE — 72040 X-RAY EXAM NECK SPINE 2-3 VW: CPT | Mod: 26,59,, | Performed by: RADIOLOGY

## 2018-09-06 PROCEDURE — 72082 X-RAY EXAM ENTIRE SPI 2/3 VW: CPT | Mod: 26,,, | Performed by: RADIOLOGY

## 2018-09-06 PROCEDURE — 72082 X-RAY EXAM ENTIRE SPI 2/3 VW: CPT | Mod: TC

## 2018-09-06 PROCEDURE — 3074F SYST BP LT 130 MM HG: CPT | Mod: CPTII,,, | Performed by: INTERNAL MEDICINE

## 2018-09-06 PROCEDURE — 99214 OFFICE O/P EST MOD 30 MIN: CPT | Mod: S$PBB,,, | Performed by: INTERNAL MEDICINE

## 2018-09-06 PROCEDURE — 99999 PR PBB SHADOW E&M-EST. PATIENT-LVL V: CPT | Mod: PBBFAC,,, | Performed by: INTERNAL MEDICINE

## 2018-09-06 PROCEDURE — 3078F DIAST BP <80 MM HG: CPT | Mod: CPTII,,, | Performed by: INTERNAL MEDICINE

## 2018-09-06 NOTE — TELEPHONE ENCOUNTER
"Ortho Telephone Triage Message  [phone]  Patient C/O: spasms from R buttock to R neck s/p "pop" to R side of back. Pt states same occurred to L side of back shortly after scoliosis surgery in 2013. Pt request Ortho appt. Pt does not think it is necessary to go to ED, at this time.   HX: Spine fusion/Dr. Lenz/Neurosurgery 2/14/13  Triage Advice: Advised pt that no appts are, presently, available in [facility] or tomorrow - and advised pt schedule Same day appt with Primary Care for initial exam/xrays which will be available per EMR for Ortho or Neurosurgery referral.   Resolution: Pt states understanding.  Appt scheduled today with Dr. Pulido/Int. Med/[facility] at 4:15pm. Requested that pt arrive at 3:30pm. Pt states understanding and confirms time and location.   "

## 2018-09-06 NOTE — PROGRESS NOTES
REASON FOR VISIT:  This is a 53-year-old female.  This appointment was set up   for Orthopedics.  Primary reason is to arrange for getting radiographs of the   vertebrae.    Yesterday, she was leaning over and felt a popping sensation on her right side   as a feeling thinking that her niall broke.    She has a history of scoliosis, has a six thoracic and lumbar fusions, primary   T6-S1 fusion along with revision, removal of hardware, lumbar laminectomies.    Last surgery was in February 2013 and she stated that she had a niall fracture   about 13 months later when doing a certain movement.  I see that a CT scan of   the myelogram that was performed in June 2014 did reveal the left vertical rods   to be fractured beneath the left L4 pedicle screw and transversely sectioned.    Also, an x-ray did note torsion of the left niall at the level of L5.  Yesterday   when this happens, she felt an acute spastic pain that was traveling up her   lower lumbar region to her neck on the left side.  It is a little bit better.    She does have chronic pain involving the back, for which she is on Percocet   10/325 three times a day.    PAST MEDICAL HISTORY:    Hypertension.  Hypothyroid.  Osteoarthritis of knees.  Thyroid nodule.    MEDICATIONS:  List is reviewed per MedCard.  On that it should be noted that she   is taking gabapentin three times a day 800 mg.    PHYSICAL EXAMINATION:  VITAL SIGNS:  Per EPIC.  MUSCULOSKELETAL:  There is a curvature of the vertebrae toward the left side.    Multiple tender points while I palpate over the lumbar, thoracic and cervical   region.    IMPRESSION:  1.  Acute thoracic, lumbar pain.  2.  Acute cervical pain.  3.  Thoracic lumbar scoliosis.    PLAN:  At the request of Orthopedics, we will arrange for cervical spine x-ray   and scoliosis x-ray series.  She will be following up with Orthopedics regarding   this.  When asked about treatment modalities that have helped in acute   circumstances, the use  of tizanidine and Flexeril, but particularly tizanidine   has been effective.  She has this at home but tends to lower the blood pressure,   so she will wait until the evening start taking it.    ADDENDUM:  She also has chronic achy pain involving her distal lower extremities   and this is the reason for gabapentin.      /tico [id number] review        JAM/ERIN  dd: 09/06/2018 16:47:49 (CDT)  td: 09/07/2018 02:58:14 (CDT)  Doc ID   #[id number]  Job ID #[id number]    CC:

## 2018-09-06 NOTE — TELEPHONE ENCOUNTER
"----- Message from Kandace Morgan sent at 9/6/2018 12:48 PM CDT -----  Contact: Self  Patient states that a niall "popped" in her spine from a scoliosis surgery she had in 2013. States she felt it pop in her neck and all the way down her spine and is concerned. Requesting to be seen as soon as possible.  Patient can be reached at [phone]  "

## 2018-09-07 ENCOUNTER — PATIENT MESSAGE (OUTPATIENT)
Dept: INTERNAL MEDICINE | Facility: CLINIC | Age: 53
End: 2018-09-07

## 2018-09-08 ENCOUNTER — HOSPITAL ENCOUNTER (EMERGENCY)
Facility: HOSPITAL | Age: 53
Discharge: HOME | End: 2018-09-09
Attending: EMERGENCY MEDICINE
Payer: MEDICARE

## 2018-09-08 VITALS
WEIGHT: 220 LBS | SYSTOLIC BLOOD PRESSURE: 130 MMHG | RESPIRATION RATE: 18 BRPM | BODY MASS INDEX: 36.65 KG/M2 | TEMPERATURE: 99 F | HEART RATE: 85 BPM | DIASTOLIC BLOOD PRESSURE: 70 MMHG | HEIGHT: 65 IN | OXYGEN SATURATION: 97 %

## 2018-09-08 DIAGNOSIS — M54.31 SCIATICA OF RIGHT SIDE: Primary | ICD-10-CM

## 2018-09-08 PROCEDURE — 99283 EMERGENCY DEPT VISIT LOW MDM: CPT | Mod: ,,, | Performed by: EMERGENCY MEDICINE

## 2018-09-08 PROCEDURE — 96372 THER/PROPH/DIAG INJ SC/IM: CPT

## 2018-09-08 PROCEDURE — 99283 EMERGENCY DEPT VISIT LOW MDM: CPT | Mod: 25

## 2018-09-09 PROCEDURE — 63600175 PHARM REV CODE 636 W HCPCS: Performed by: EMERGENCY MEDICINE

## 2018-09-09 RX ORDER — TRIAMCINOLONE ACETONIDE 40 MG/ML
80 INJECTION, SUSPENSION INTRA-ARTICULAR; INTRAMUSCULAR
Status: COMPLETED | OUTPATIENT
Start: 2018-09-09 | End: 2018-09-09

## 2018-09-09 RX ORDER — METHYLPREDNISOLONE 4 MG/1
TABLET ORAL
Qty: 1 PACKAGE | Refills: 0 | Status: SHIPPED | OUTPATIENT
Start: 2018-09-09 | End: 2018-09-30

## 2018-09-09 RX ADMIN — TRIAMCINOLONE ACETONIDE 80 MG: 40 INJECTION, SUSPENSION INTRA-ARTICULAR; INTRAMUSCULAR at 12:09

## 2018-09-09 NOTE — ED PROVIDER NOTES
"Encounter Date: 9/8/2018    SCRIBE #1 NOTE: I, Sherlyn Moser, am scribing for, and in the presence of,  Dr. Tony. I have scribed the following portions of the note - Other sections scribed: HPI, ROS, MDM.       History     Chief Complaint   Patient presents with    Back Pain     Patient states that she has a history of sciatica nerve pain on right side. Patient sates that today it has worsened. States that she cannot move without severe pain.      Time patient was seen by the provider: 11:44 PM      The patient is a 53 y.o. female with co-morbidities including: HTN, HLD, hypothyroidism, kidney stones, SVT, SOB, and arthritis who presents to the ED with a complaint of back pain.  Pt reports hx of sciatic nerve pain on the right side.  Pt states her pain today has progressed and she is not able to move secondary to severity of pain.  Pt notes that she's had 6 surgeries for scoliosis and last surgery was 2013.  Pt reports a "pop" in her right back while brushing her teeth two days ago, which was similar to a complication on the left side of her back after her last surgery in 2013.  Pt called orthopedics two days ago and pt saw family doctor who ordered x-rays for her.  Pt reports they did x-rays on her, but she did not receive the results.  She states that the doctor told her the reads were not detailed and she would hear more in two days.  Localizes pain to her buttocks with radiation down the back of her right leg.  Pt took pan medication at 10:45 PM which provided her with some relief.        The history is provided by the patient and medical records.     Review of patient's allergies indicates:   Allergen Reactions    Adhesive Blisters    Adhesive tape-silicones Dermatitis     The longer the adhesive stays on, the worse the irritation    Bactrim [sulfamethoxazole-trimethoprim] Anxiety     Has a "nervous feeling."    Mastisol adhesive [gum kntmaq-krcelb-ttxi-alcohol] Blisters     Past Medical History: "   Diagnosis Date    Allergy     seasonal    Arthritis     Blood transfusion     Hyperlipidemia     Hypertension     Hypothyroidism     Joint pain     Kidney stones     Morbid obesity 12/14/2012    NAFLD (nonalcoholic fatty liver disease)     OCD (obsessive compulsive disorder)     PONV (postoperative nausea and vomiting)     Restless leg syndrome     Sciatica of right side associated with disorder of lumbosacral spine     SOB (shortness of breath) on exertion     Supraventricular tachycardia      Past Surgical History:   Procedure Laterality Date    APPENDECTOMY      BACK SURGERY      BIOPSY-LIVER-LAPAROSCOPIC - 13512 N/A 2/6/2017    Performed by [facility] at [facility] OR 2ND FLR    CHOLECYSTECTOMY      CLOSURE N/A 2/14/2013    Performed by Addison Staton [facility] at [facility] OR 2ND FLR    CREATION, FLAP, MUSCLE ROTATION N/A 2/14/2013    Performed by Addison [facility] at [facility] OR 2ND FLR    ESOPHAGOGASTRODUODENOSCOPY (EGD) N/A 8/8/2016    Performed by Eliceo Wilhelm MD at [facility] ENDO (4TH FLR)    FUSION, SPINE N/A 2/14/2013    Performed by Junior Hilton MD at [facility] OR 2ND FLR    GASTRECTOMY      GASTRECTOMY-SLEEVE-LAPAROSCOPIC - 91886 w/ intraop egd N/A 2/6/2017    Performed by Deon Ha [facility] at [facility] OR 2ND FLR    HERNIA REPAIR      HYSTERECTOMY      INJECTION, STEROID, SPINE, LUMBAR, EPIDURAL N/A 10/4/2013    Performed by [facility] at [facility] CATH LAB    INJECTION, STEROID, SPINE, LUMBAR, EPIDURAL N/A 8/28/2013    Performed by [facility] Provider at [facility] CATH LAB    LIVER BIOPSY  02/06/2017    steatohepatitis with stage 1 fibrosis    MYELOGRAM N/A 6/24/2014    Performed by Lissett Surgeon at [facility] LISSETT    SPINAL FUSION      TONSILLECTOMY, ADENOIDECTOMY       Family History   Problem Relation Age of Onset    Heart disease Mother     Heart disease Father     Cancer Father     COPD Father     Heart disease Maternal Grandmother      Acne Other     Eczema Other     Heart disease Maternal Aunt     Heart disease Maternal Uncle     Heart disease Paternal Aunt     Cancer Paternal Aunt     Heart disease Paternal Uncle     Cancer Paternal Uncle     Melanoma Neg Hx     Psoriasis Neg Hx     Lupus Neg Hx      Social History     Tobacco Use    Smoking status: Never Smoker    Smokeless tobacco: Never Used   Substance Use Topics    Alcohol use: Yes     Comment: socially    Drug use: No     Review of Systems   Constitutional: Negative for chills and fever.   HENT: Negative for ear pain and nosebleeds.    Eyes: Negative for pain and discharge.   Respiratory: Negative for shortness of breath and wheezing.    Cardiovascular: Negative for chest pain.   Gastrointestinal: Negative for abdominal pain and blood in stool.   Genitourinary: Negative for dysuria and hematuria.   Musculoskeletal: Positive for back pain (to right side). Negative for neck pain and neck stiffness.   Skin: Negative for rash.   Neurological: Negative for speech difficulty and headaches.       Physical Exam     Initial Vitals [09/08/18 2258]   BP Pulse Resp Temp SpO2   130/70 85 18 98.6 °F (37 °C) 97 %      MAP       --         Physical Exam    Nursing note and vitals reviewed.  Constitutional: She appears ill. She appears distressed.   Eyes: EOM are normal.   Cardiovascular: Normal rate and regular rhythm.   Neurological: She is alert.   Very difficult to stand but is able to do this with time   Skin: Skin is warm and dry.         ED Course   Procedures  Labs Reviewed - No data to display       Imaging Results    None          Medical Decision Making:   History:   Old Medical Records: I decided to obtain old medical records.  Initial Assessment:   Pt with complicated past medical hx because of scoliosis with orthopedic involvement.  States that she thinks she has a flare up of her sciatica.  Pt has already had imaging.  She has percocet, Neurontin.  We will add steroids to her  medical regimen.  She is to follow-up with orthopedics this week.  Pt given injection of kenalog and given prescription for medrol dose pack.  Pt is discharged.            Scribe Attestation:   Scribe #1: I performed the above scribed service and the documentation accurately describes the services I performed. I attest to the accuracy of the note.    Attending Attestation:             Attending ED Notes:   Patient presenting with a long history of back difficulties with history of scoliosis and has hardware placed to remedy this as she has had some difficulties with the rupture of this hardware she presents with multiple complaints but her more focus complaint is that she thinks that she has a flare up of her sciatica on the right side.  I reviewed the x-rays that had been done several days ago on she is scheduled to have an appointment hopefully with Orthopedics and is to be called on Monday.  I will give an injection of Kenalog and we will start her on a Medrol Dosepak in hopes of improving her complaints             Clinical Impression:   The encounter diagnosis was Sciatica of right side.      Disposition:   Disposition: Discharged  Condition: Stable                        Sunil Tony MD  09/10/18 0131

## 2018-09-09 NOTE — ED TRIAGE NOTES
Pt reports 2 days ago she picked up something from the floor and felt something popped from her back and pain in her right groin to her right back, today she picked up something again from the floor and felt the pain in her right lower back to her neck and right leg. Pt states she has hx of sciatica and kidney stones.pt took percocet before coming here and pain 4/10 at present      LOC: The patient is awake, alert, aware of environment with an appropriate affect. Oriented x4, speaking appropriately  APPEARANCE: Pt resting comfortably, in no acute distress, pt is clean and well groomed, clothing properly fastened  SKIN:The skin is warm and dry, color consistent with ethnicity, patient has normal skin turgor and moist mucus membranes  RESPIRATORY:Airway is open and patent, respirations are spontaneous, patient has a normal effort and rate, no accessory muscle use noted.  NEUROLOGIC: PERRLA, facial expression is symmetrical, patient moving all extremities spontaneously, normal sensation in all extremities when touched with a finger.  Follows all commands appropriately  MUSCULOSKELETAL: Patient moving all extremities spontaneously, no obvious swelling or deformities noted.

## 2018-09-15 ENCOUNTER — HOSPITAL ENCOUNTER (EMERGENCY)
Facility: HOSPITAL | Age: 53
Discharge: HOME | End: 2018-09-15
Attending: EMERGENCY MEDICINE
Payer: MEDICARE

## 2018-09-15 VITALS
BODY MASS INDEX: 36.65 KG/M2 | OXYGEN SATURATION: 95 % | HEART RATE: 71 BPM | TEMPERATURE: 98 F | RESPIRATION RATE: 18 BRPM | DIASTOLIC BLOOD PRESSURE: 56 MMHG | HEIGHT: 65 IN | WEIGHT: 220 LBS | SYSTOLIC BLOOD PRESSURE: 110 MMHG

## 2018-09-15 DIAGNOSIS — R53.1 WEAKNESS: ICD-10-CM

## 2018-09-15 DIAGNOSIS — N20.0 BILATERAL KIDNEY STONES: Primary | ICD-10-CM

## 2018-09-15 LAB
ALBUMIN SERPL BCP-MCNC: 3.4 G/DL
ALP SERPL-CCNC: 99 U/L
ALT SERPL W/O P-5'-P-CCNC: 19 U/L
ANION GAP SERPL CALC-SCNC: 14 MMOL/L
APTT BLDCRRT: 26.6 SEC
AST SERPL-CCNC: 20 U/L
BACTERIA #/AREA URNS AUTO: ABNORMAL /HPF
BASOPHILS # BLD AUTO: 0.03 K/UL
BASOPHILS NFR BLD: 0.2 %
BILIRUB SERPL-MCNC: 0.8 MG/DL
BILIRUB UR QL STRIP: NEGATIVE
BUN SERPL-MCNC: 16 MG/DL
CALCIUM SERPL-MCNC: 10.6 MG/DL
CHLORIDE SERPL-SCNC: 99 MMOL/L
CLARITY UR REFRACT.AUTO: CLEAR
CO2 SERPL-SCNC: 22 MMOL/L
COLOR UR AUTO: YELLOW
CREAT SERPL-MCNC: 0.9 MG/DL
DIFFERENTIAL METHOD: ABNORMAL
EOSINOPHIL # BLD AUTO: 0 K/UL
EOSINOPHIL NFR BLD: 0.1 %
ERYTHROCYTE [DISTWIDTH] IN BLOOD BY AUTOMATED COUNT: 13.8 %
EST. GFR  (AFRICAN AMERICAN): >60 ML/MIN/1.73 M^2
EST. GFR  (NON AFRICAN AMERICAN): >60 ML/MIN/1.73 M^2
GLUCOSE SERPL-MCNC: 155 MG/DL
GLUCOSE UR QL STRIP: NEGATIVE
HCT VFR BLD AUTO: 40.1 %
HGB BLD-MCNC: 13 G/DL
HGB UR QL STRIP: ABNORMAL
IMM GRANULOCYTES # BLD AUTO: 0.1 K/UL
IMM GRANULOCYTES NFR BLD AUTO: 0.8 %
INR PPP: 1
KETONES UR QL STRIP: NEGATIVE
LACTATE SERPL-SCNC: 1.8 MMOL/L
LEUKOCYTE ESTERASE UR QL STRIP: ABNORMAL
LIPASE SERPL-CCNC: 11 U/L
LYMPHOCYTES # BLD AUTO: 1.2 K/UL
LYMPHOCYTES NFR BLD: 9.6 %
MAGNESIUM SERPL-MCNC: 1.9 MG/DL
MCH RBC QN AUTO: 25.8 PG
MCHC RBC AUTO-ENTMCNC: 32.4 G/DL
MCV RBC AUTO: 80 FL
MICROSCOPIC COMMENT: ABNORMAL
MONOCYTES # BLD AUTO: 0.8 K/UL
MONOCYTES NFR BLD: 6.1 %
NEUTROPHILS # BLD AUTO: 10.2 K/UL
NEUTROPHILS NFR BLD: 83.2 %
NITRITE UR QL STRIP: NEGATIVE
NON-SQ EPI CELLS #/AREA URNS AUTO: 2 /HPF
NRBC BLD-RTO: 0 /100 WBC
PH UR STRIP: 7 [PH] (ref 5–8)
PHOSPHATE SERPL-MCNC: 2.8 MG/DL
PLATELET # BLD AUTO: 201 K/UL
PMV BLD AUTO: 10.5 FL
POTASSIUM SERPL-SCNC: 3.7 MMOL/L
PROCALCITONIN SERPL IA-MCNC: 0.86 NG/ML
PROT SERPL-MCNC: 7.8 G/DL
PROT UR QL STRIP: NEGATIVE
PROTHROMBIN TIME: 10.6 SEC
RBC # BLD AUTO: 5.04 M/UL
RBC #/AREA URNS AUTO: 8 /HPF (ref 0–4)
SODIUM SERPL-SCNC: 135 MMOL/L
SP GR UR STRIP: 1.01 (ref 1–1.03)
SQUAMOUS #/AREA URNS AUTO: 4 /HPF
URN SPEC COLLECT METH UR: ABNORMAL
UROBILINOGEN UR STRIP-ACNC: NEGATIVE EU/DL
WBC # BLD AUTO: 12.23 K/UL
WBC #/AREA URNS AUTO: 19 /HPF (ref 0–5)

## 2018-09-15 PROCEDURE — 83735 ASSAY OF MAGNESIUM: CPT

## 2018-09-15 PROCEDURE — 80053 COMPREHEN METABOLIC PANEL: CPT

## 2018-09-15 PROCEDURE — 96366 THER/PROPH/DIAG IV INF ADDON: CPT

## 2018-09-15 PROCEDURE — 83690 ASSAY OF LIPASE: CPT

## 2018-09-15 PROCEDURE — 87086 URINE CULTURE/COLONY COUNT: CPT

## 2018-09-15 PROCEDURE — 84145 PROCALCITONIN (PCT): CPT

## 2018-09-15 PROCEDURE — 81001 URINALYSIS AUTO W/SCOPE: CPT

## 2018-09-15 PROCEDURE — 99285 EMERGENCY DEPT VISIT HI MDM: CPT | Mod: 25

## 2018-09-15 PROCEDURE — 96375 TX/PRO/DX INJ NEW DRUG ADDON: CPT

## 2018-09-15 PROCEDURE — 83605 ASSAY OF LACTIC ACID: CPT

## 2018-09-15 PROCEDURE — 63600175 PHARM REV CODE 636 W HCPCS: Performed by: NURSE PRACTITIONER

## 2018-09-15 PROCEDURE — 84100 ASSAY OF PHOSPHORUS: CPT

## 2018-09-15 PROCEDURE — 85610 PROTHROMBIN TIME: CPT

## 2018-09-15 PROCEDURE — 93010 ELECTROCARDIOGRAM REPORT: CPT | Mod: ,,, | Performed by: INTERNAL MEDICINE

## 2018-09-15 PROCEDURE — 25000003 PHARM REV CODE 250: Performed by: NURSE PRACTITIONER

## 2018-09-15 PROCEDURE — 87040 BLOOD CULTURE FOR BACTERIA: CPT

## 2018-09-15 PROCEDURE — 96361 HYDRATE IV INFUSION ADD-ON: CPT

## 2018-09-15 PROCEDURE — 96365 THER/PROPH/DIAG IV INF INIT: CPT

## 2018-09-15 PROCEDURE — 85025 COMPLETE CBC W/AUTO DIFF WBC: CPT

## 2018-09-15 PROCEDURE — 99284 EMERGENCY DEPT VISIT MOD MDM: CPT | Mod: ,,, | Performed by: NURSE PRACTITIONER

## 2018-09-15 PROCEDURE — 85730 THROMBOPLASTIN TIME PARTIAL: CPT

## 2018-09-15 RX ORDER — ONDANSETRON 4 MG/1
4 TABLET, FILM COATED ORAL EVERY 6 HOURS
Qty: 12 TABLET | Refills: 0 | Status: SHIPPED | OUTPATIENT
Start: 2018-09-15 | End: 2018-09-18

## 2018-09-15 RX ORDER — ONDANSETRON 2 MG/ML
4 INJECTION INTRAMUSCULAR; INTRAVENOUS
Status: COMPLETED | OUTPATIENT
Start: 2018-09-15 | End: 2018-09-15

## 2018-09-15 RX ORDER — FLUCONAZOLE 150 MG/1
150 TABLET ORAL DAILY
Qty: 2 TABLET | Refills: 0 | Status: SHIPPED | OUTPATIENT
Start: 2018-09-15 | End: 2018-09-17

## 2018-09-15 RX ORDER — TAMSULOSIN HYDROCHLORIDE 0.4 MG/1
0.4 CAPSULE ORAL DAILY
Qty: 10 CAPSULE | Refills: 0 | Status: SHIPPED | OUTPATIENT
Start: 2018-09-15 | End: 2019-01-05

## 2018-09-15 RX ORDER — HYDROCODONE BITARTRATE AND ACETAMINOPHEN 5; 325 MG/1; MG/1
1 TABLET ORAL EVERY 4 HOURS PRN
Qty: 18 TABLET | Refills: 0 | Status: SHIPPED | OUTPATIENT
Start: 2018-09-15 | End: 2018-09-18

## 2018-09-15 RX ORDER — ACETAMINOPHEN 500 MG
1000 TABLET ORAL
Status: COMPLETED | OUTPATIENT
Start: 2018-09-15 | End: 2018-09-15

## 2018-09-15 RX ADMIN — ONDANSETRON 4 MG: 2 INJECTION INTRAMUSCULAR; INTRAVENOUS at 05:09

## 2018-09-15 RX ADMIN — CEFTRIAXONE SODIUM 2 G: 2 INJECTION, POWDER, FOR SOLUTION INTRAMUSCULAR; INTRAVENOUS at 03:09

## 2018-09-15 RX ADMIN — SODIUM CHLORIDE 2994 ML: 0.9 INJECTION, SOLUTION INTRAVENOUS at 03:09

## 2018-09-15 RX ADMIN — ACETAMINOPHEN 1000 MG: 500 TABLET, FILM COATED ORAL at 03:09

## 2018-09-15 NOTE — ED NOTES
"Patient having right sided flank pain since Wednesday of last week and reports she feels like "this stone is too big to pass." Patient reports hx of kidney stones. Patient reporting fever at home of 102 last night.  Patient reporting urgency incontinence.    "

## 2018-09-15 NOTE — ED PROVIDER NOTES
"Encounter Date: 9/15/2018       History     Chief Complaint   Patient presents with    Flank Pain     have a kidney stone, fever, headache, shakes    multiple complaints     Patient is a 53-year-old female with medical history of hypertension, hyperlipidemia, hyperthyroidism, kidney stones, arthritis presenting to the ED for fever, left flank pain, chills, generalized body aches.  Patient states she was seen last week for right-sided sciatica pain.  Patient states since that time she has had right flank pain. Patient states she has a history of kidney stones and this fever feels similar to last episode.            Review of patient's allergies indicates:   Allergen Reactions    Adhesive Blisters    Adhesive tape-silicones Dermatitis     The longer the adhesive stays on, the worse the irritation    Bactrim [sulfamethoxazole-trimethoprim] Anxiety     Has a "nervous feeling."    Mastisol adhesive [gum vwzdil-xnomiz-anbi-alcohol] Blisters     Past Medical History:   Diagnosis Date    Allergy     seasonal    Arthritis     Blood transfusion     Hyperlipidemia     Hypertension     Hypothyroidism     Joint pain     Kidney stones     Morbid obesity 12/14/2012    NAFLD (nonalcoholic fatty liver disease)     OCD (obsessive compulsive disorder)     PONV (postoperative nausea and vomiting)     Restless leg syndrome     Sciatica of right side associated with disorder of lumbosacral spine     SOB (shortness of breath) on exertion     Supraventricular tachycardia      Past Surgical History:   Procedure Laterality Date    APPENDECTOMY      BACK SURGERY      BIOPSY-LIVER-LAPAROSCOPIC - 75812 N/A 2/6/2017    Performed by Deon Ha MD at [facility] OR 2ND FLR    CHOLECYSTECTOMY      CLOSURE N/A 2/14/2013    Performed by Addison Staton MD at [facility] OR 2ND FLR    CREATION, FLAP, MUSCLE ROTATION N/A 2/14/2013    Performed by [facility]on MD at [facility] OR 2ND FLR    " ESOPHAGOGASTRODUODENOSCOPY (EGD) N/A 8/8/2016    Performed by Eliceo Wilhelm MD at [facility] ENDO (4TH FLR)    FUSION, SPINE N/A 2/14/2013    Performed by Junior Hilton MD at [facility] OR 2ND FLR    GASTRECTOMY      GASTRECTOMY-SLEEVE-LAPAROSCOPIC - 03250 w/ intraop egd N/A 2/6/2017    Performed by [facility]on Ha [facility] at [facility] OR 2ND FLR    HERNIA REPAIR      HYSTERECTOMY      INJECTION, STEROID, SPINE, LUMBAR, EPIDURAL N/A 10/4/2013    Performed by [facility] CATH LAB    INJECTION, STEROID, SPINE, LUMBAR, EPIDURAL N/A 8/28/2013    Performed by [facility] at [facility] CATH LAB    LIVER BIOPSY  02/06/2017    steatohepatitis with stage 1 fibrosis    MYELOGRAM N/A 6/24/2014    Performed by Lissett Surgeon at [facility]    SPINAL FUSION      TONSILLECTOMY, ADENOIDECTOMY       Family History   Problem Relation Age of Onset    Heart disease Mother     Heart disease Father     Cancer Father     COPD Father     Heart disease Maternal Grandmother     Acne Other     Eczema Other     Heart disease Maternal Aunt     Heart disease Maternal Uncle     Heart disease Paternal Aunt     Cancer Paternal Aunt     Heart disease Paternal Uncle     Cancer Paternal Uncle     Melanoma Neg Hx     Psoriasis Neg Hx     Lupus Neg Hx      Social History     Tobacco Use    Smoking status: Never Smoker    Smokeless tobacco: Never Used   Substance Use Topics    Alcohol use: Yes     Comment: socially    Drug use: No     Review of Systems   Constitutional: Positive for activity change, appetite change, chills, fatigue and fever.   Respiratory: Negative for cough, chest tightness and shortness of breath.    Cardiovascular: Negative for chest pain and palpitations.   Gastrointestinal: Positive for abdominal pain and nausea. Negative for constipation, diarrhea and vomiting.   Genitourinary: Positive for frequency and urgency. Negative for difficulty urinating and dysuria.   Musculoskeletal:  Positive for myalgias ( generalized). Negative for back pain.   Skin: Negative for rash.   Neurological: Negative for dizziness, syncope, weakness, numbness and headaches.   Hematological: Does not bruise/bleed easily.       Physical Exam     Initial Vitals [09/15/18 1435]   BP Pulse Resp Temp SpO2   135/72 90 18 99.6 °F (37.6 °C) 95 %      MAP       --         Physical Exam    Nursing note and vitals reviewed.  Constitutional: Vital signs are normal. She appears well-developed and well-nourished. She is diaphoretic. She is cooperative. She is easily aroused. She has a sickly appearance. She appears distressed.   HENT:   Head: Normocephalic and atraumatic.   Mouth/Throat: Uvula is midline. Mucous membranes are pale and dry. Posterior oropharyngeal erythema present.   Eyes: EOM are normal. Pupils are equal, round, and reactive to light.   Neck: Trachea normal, normal range of motion, full passive range of motion without pain and phonation normal. Neck supple. No spinous process tenderness and no muscular tenderness present. No JVD present.   Cardiovascular: Normal rate, regular rhythm, normal heart sounds and intact distal pulses.   Pulses:       Radial pulses are 2+ on the right side, and 2+ on the left side.   Pulmonary/Chest: Effort normal and breath sounds normal.   Abdominal: Soft. Normal appearance and bowel sounds are normal. She exhibits no distension. There is tenderness in the right lower quadrant, periumbilical area and suprapubic area. There is no rigidity, no rebound, no guarding and no CVA tenderness.   Musculoskeletal: Normal range of motion.   Neurological: She is alert, oriented to person, place, and time and easily aroused. She has normal strength. No cranial nerve deficit or sensory deficit. GCS eye subscore is 4. GCS verbal subscore is 5. GCS motor subscore is 6.   Skin: Skin is warm and intact. Capillary refill takes more than 3 seconds. No abrasion, no laceration and no rash noted. No cyanosis.  There is pallor. Nails show no clubbing.   Psychiatric: She has a normal mood and affect. Her speech is normal and behavior is normal. Judgment and thought content normal. Cognition and memory are normal.         ED Course   Procedures  Labs Reviewed   CBC W/ AUTO DIFFERENTIAL - Abnormal; Notable for the following components:       Result Value    MCV 80 (*)     MCH 25.8 (*)     Immature Granulocytes 0.8 (*)     Gran # (ANC) 10.2 (*)     Immature Grans (Abs) 0.10 (*)     Gran% 83.2 (*)     Lymph% 9.6 (*)     All other components within normal limits   COMPREHENSIVE METABOLIC PANEL - Abnormal; Notable for the following components:    Sodium 135 (*)     CO2 22 (*)     Glucose 155 (*)     Calcium 10.6 (*)     Albumin 3.4 (*)     All other components within normal limits   URINALYSIS, REFLEX TO URINE CULTURE - Abnormal; Notable for the following components:    Occult Blood UA 2+ (*)     Leukocytes, UA 2+ (*)     All other components within normal limits    Narrative:     Preferred Collection Type->Urine, Clean Catch   URINALYSIS MICROSCOPIC - Abnormal; Notable for the following components:    RBC, UA 8 (*)     WBC, UA 19 (*)     Bacteria, UA Few (*)     Non-Squam Epith 2 (*)     All other components within normal limits    Narrative:     Preferred Collection Type->Urine, Clean Catch   PROCALCITONIN - Abnormal; Notable for the following components:    Procalcitonin 0.86 (*)     All other components within normal limits    Narrative:     ADD ON PT, PTT AND PROCALCITONIN PER JL VEE/ORDER# [id number],   [id number] AND [id number] @ 16:31 9/15/18  MG added per Zeevi,NP-order ID [id number] 09/15/18 15:38  PHOS added per Zeevi,NP-order ID [id number] 09/15/18 15:38  LIPAS added per Zeevi,NP-order ID [id number] 09/15/18 15:38   CULTURE, BLOOD   CULTURE, BLOOD   CULTURE, URINE   LACTIC ACID, PLASMA    Narrative:     MG added per Zeevi,NP-order ID [id number] 09/15/18 15:38  PHOS added per Zeevi,NP-order ID [id number] 09/15/18  15:38  LIPAS added per Zeevi,NP-order ID [id number] 09/15/18 15:38   PROCALCITONIN   LIPASE   PROTIME-INR   APTT   PHOSPHORUS   MAGNESIUM   MAGNESIUM    Narrative:     MG added per Zeevi,NP-order ID [id number] 09/15/18 15:38  PHOS added per Zeevi,NP-order ID [id number] 09/15/18 15:38  LIPAS added per Zeevi,NP-order ID [id number] 09/15/18 15:38   PHOSPHORUS    Narrative:     MG added per Zeevi,NP-order ID [id number] 09/15/18 15:38  PHOS added per Zeevi,NP-order ID [id number] 09/15/18 15:38  LIPAS added per Zeevi,NP-order ID [id number] 09/15/18 15:38   LIPASE    Narrative:     MG added per Zeevi,NP-order ID [id number] 09/15/18 15:38  PHOS added per Zeevi,NP-order ID [id number] 09/15/18 15:38  LIPAS added per Zeevi,NP-order ID [id number] 09/15/18 15:38   PROTIME-INR    Narrative:     ADD ON PT, PTT AND PROCALCITONIN PER JL ZEEVI/ORDER# [id number],   [id number] AND [id number] @ 16:31 9/15/18  MG added per Zeevi,NP-order ID [id number] 09/15/18 15:38  PHOS added per Zeevi,NP-order ID [id number] 09/15/18 15:38  LIPAS added per Zeevi,NP-order ID [id number] 09/15/18 15:38   APTT    Narrative:     ADD ON PT, PTT AND PROCALCITONIN PER JL ZEEVI/ORDER# [id number],   [id number] AND [id number] @ 16:31 9/15/18  MG added per Zeevi,NP-order ID [id number] 09/15/18 15:38  PHOS added per Zeevi,NP-order ID [id number] 09/15/18 15:38  LIPAS added per Zeevi,NP-order ID [id number] 09/15/18 15:38          Imaging Results          CT Renal Stone Study ABD Pelvis WO (Final result)  Result time 09/15/18 17:20:35    Final result by Talib Goldstein Jr., MD (09/15/18 17:20:35)                 Impression:      1. Bilateral renal stones.  0.5 cm stone at the right UVJ.  No hydronephrosis or ureteral dilatation.  2. Hepatosplenomegaly.  3. Colonic diverticulosis without evidence of diverticulitis.  4. Small umbilical hernia containing a loop of colon.  5. Gastrectomy, cholecystectomy, extensive posterior instrumented fusion of the thoracic lumbar  spine.    Electronically signed by resident: Goran Sheffield  Date:    09/15/2018  Time:    16:41    Electronically signed by: Talib Goldstein MD  Date:    09/15/2018  Time:    17:20             Narrative:    EXAMINATION:  CT RENAL STONE STUDY ABD PELVIS WO    CLINICAL HISTORY:  Flank pain, stone disease suspected    TECHNIQUE:  Low dose axial images, sagittal and coronal reformations were obtained from the lung bases to the pubic symphysis.  Contrast was not administered.    COMPARISON:  CT 02/17/2015    FINDINGS:  Heart: Normal in size. No pericardial effusion.    Lung Bases: Mild bilateral subsegmental atelectasis.  Otherwise lung bases are well aerated, without consolidation or pleural fluid.    Liver: Liver is enlarged.  Normal in attenuation, with no focal hepatic lesions.    Gallbladder: Surgically absent.    Bile Ducts: No evidence of dilated ducts.    Pancreas: No mass or peripancreatic fat stranding.    Spleen: Splenomegaly.    Adrenals: Unremarkable.    Kidneys/ Ureters: Several right renal stones, the largest of which measures 1.4 cm.  Several left renal stones, the largest of which measures 0.6 cm.  0.5 cm stone at the right UVJ.  No hydronephrosis or ureteral dilatation.    Bladder: No evidence of wall thickening.    Reproductive organs: Unremarkable.    GI Tract/Mesentery: Postoperative changes consistent with gastrectomy.  Scattered colonic diverticula without evidence of diverticulitis.  No evidence of bowel obstruction or inflammation.    Peritoneal Space: No ascites. No free air.    Retroperitoneum: No significant adenopathy.    Abdominal wall: Small umbilical hernia containing a loop of colon.  Abdominal wall defect measures 1.6 cm.    Vasculature: No significant atherosclerosis or aneurysm.    Bones: Scoliosis of the thoracic and lumbar spine.  Extensive postoperative changes of posterior instrumented fusion of the thoracic and lumbar spine.  Advanced degenerative changes of the spine.                                X-Ray Chest AP Portable (Final result)  Result time 09/15/18 15:56:40    Final result by Zakiya Mary MD (09/15/18 15:56:40)                 Impression:      As above      Electronically signed by: Zakiya Mary MD  Date:    09/15/2018  Time:    15:56             Narrative:    EXAMINATION:  XR CHEST AP PORTABLE    CLINICAL HISTORY:  Sepsis;    TECHNIQUE:  Single frontal view of the chest was performed.    COMPARISON:  08/08/2016    FINDINGS:  The cardiac silhouette is midline.  The pulmonary vascularity appears normal.  The lungs appear clear.  The left lower lung zone is difficult to evaluate due to poor penetration of the film and AP technique, there is no large area of airspace consolidation or no large pleural effusion.  No pneumothorax.  Postsurgical changes noted of the thoracic spine.  The visualized hardware is intact.                                       APC / Resident Notes:   Emergent evaluation of a 54 yo female patient presenting to the ER with chief complaint of generalized weakness, fever, chills, right flank pain.  Pt states she was recently seen for sciatica pain but right flank pain has increased since that time.  On exam patient A&O x3. Patient is pale and diaphoretic.  Abdomen is soft and nontender.  Bowel sounds within normal limits. Patient nonfebrile on exam.  I will get labs, EKG, imaging, fluids, medicate and reassess. Differential diagnoses include but are not limited to renal colic, pyelonephritis, vertebral fracture, vertebral tumor, epidural abscess, slipped disc, early shingles or muscle spasm/strain.  I discussed the care of this patient with my Supervising Physician.      Patient's CBC unremarkable.  H&H stable at 13 and 40.1.  CMP remarkable for sodium 135 with a glucose of 155.  Calcium is high at 10.6.  Urine shows 2+ blood with 2+ leukocytes.  Lactic 1.8.  Magnesium and phosphorus within normal limits. Lipase negative at 11.  Chest x-ray shows no  acute abnormalities.  Awaiting CT.    Ct shows bilateral renal stones.  0.5 cm stone at the right UVJ.  No hydronephrosis or ureteral dilatation.  Pt reassessed.  Pt states feeling better.  Pt updated on labs and imaging.  All questions and concerns addressed.      Patient is hemodynamically stable, vital signs are normal. Discharge instructions given. Return to ED precautions discussed. Follow up as directed. Pt verbalized understanding of this plan. Pt is stable for discharge.                    Clinical Impression:   The primary encounter diagnosis was Bilateral kidney stones. A diagnosis of Weakness was also pertinent to this visit.      Disposition:   Disposition: Discharged  Condition: Stable                        Yisel Alonso NP  09/16/18 0752

## 2018-09-15 NOTE — ED NOTES
Patient identifiers verified and correct for Angie Mccarthy.    LOC: The patient is awake, alert and aware of environment with an appropriate affect, the patient is oriented x 3 and speaking appropriately.  APPEARANCE: Patient resting comfortably and in no acute distress, patient is clean and well groomed, patient's clothing is properly fastened.  SKIN: The skin is warm, pallor noted,  patient has normal skin turgor and moist mucus membranes, skin intact, no breakdown or bruising noted.  MUSCULOSKELETAL: Patient moving all extremities spontaneously, no obvious swelling or deformities noted.  RESPIRATORY: Airway is open and patent, respirations are spontaneous, patient has a normal effort and rate, no accessory muscle use noted, bilateral   CARDIAC: Patient has a normal rate and regular rhythm, no periphreal edema noted, capillary refill < 3 seconds.  ABDOMEN: Soft and non tender to palpation, no distention noted  NEUROLOGIC: eyes open spontaneously, behavior appropriate to situation, follows commands, facial expression symmetrical, bilateral hand grasp equal and even, purposeful motor response noted, normal sensation in all extremities when touched with a finger.  Pain: right flank pain

## 2018-09-16 LAB — BACTERIA UR CULT: NORMAL

## 2018-09-20 LAB
BACTERIA BLD CULT: NORMAL
BACTERIA BLD CULT: NORMAL

## 2018-11-07 DIAGNOSIS — M51.36 DEGENERATION OF LUMBAR INTERVERTEBRAL DISC: ICD-10-CM

## 2018-11-07 DIAGNOSIS — M96.1 POSTLAMINECTOMY SYNDROME, CERVICAL REGION: Primary | ICD-10-CM

## 2018-11-07 DIAGNOSIS — M43.00 ACQUIRED SPONDYLOLYSIS: ICD-10-CM

## 2018-11-09 DIAGNOSIS — M41.50 DEGENERATIVE SCOLIOSIS IN ADULT PATIENT: ICD-10-CM

## 2018-11-09 DIAGNOSIS — M43.8X9 SAGITTAL PLANE IMBALANCE: ICD-10-CM

## 2018-11-09 DIAGNOSIS — M96.0 PSEUDARTHROSIS FOLLOWING SPINAL FUSION: Primary | ICD-10-CM

## 2018-11-09 DIAGNOSIS — M51.36 DDD (DEGENERATIVE DISC DISEASE), LUMBAR: ICD-10-CM

## 2018-11-12 DIAGNOSIS — M41.50 DEGENERATIVE SCOLIOSIS IN ADULT PATIENT: ICD-10-CM

## 2018-11-12 DIAGNOSIS — M51.36 DDD (DEGENERATIVE DISC DISEASE), LUMBAR: ICD-10-CM

## 2018-11-12 DIAGNOSIS — M43.8X9 SAGITTAL PLANE IMBALANCE: ICD-10-CM

## 2018-11-12 DIAGNOSIS — M96.0 PSEUDARTHROSIS AFTER FUSION OR ARTHRODESIS: Primary | ICD-10-CM

## 2018-11-29 ENCOUNTER — HOSPITAL ENCOUNTER (OUTPATIENT)
Facility: HOSPITAL | Age: 53
Discharge: HOME | End: 2018-11-29
Attending: RADIOLOGY | Admitting: RADIOLOGY
Payer: MEDICARE

## 2018-11-29 VITALS
RESPIRATION RATE: 16 BRPM | HEIGHT: 69 IN | OXYGEN SATURATION: 98 % | WEIGHT: 220 LBS | TEMPERATURE: 99 F | HEART RATE: 82 BPM | DIASTOLIC BLOOD PRESSURE: 79 MMHG | BODY MASS INDEX: 32.58 KG/M2 | SYSTOLIC BLOOD PRESSURE: 124 MMHG

## 2018-11-29 DIAGNOSIS — M96.0 PSEUDARTHROSIS FOLLOWING SPINAL FUSION: ICD-10-CM

## 2018-11-29 DIAGNOSIS — M41.50 DEGENERATIVE SCOLIOSIS IN ADULT PATIENT: ICD-10-CM

## 2018-11-29 DIAGNOSIS — M43.8X9 SAGITTAL PLANE IMBALANCE: ICD-10-CM

## 2018-11-29 DIAGNOSIS — M54.9 BACK PAIN: ICD-10-CM

## 2018-11-29 DIAGNOSIS — M51.36 DDD (DEGENERATIVE DISC DISEASE), LUMBAR: ICD-10-CM

## 2018-11-29 PROCEDURE — 25000003 PHARM REV CODE 250: Performed by: STUDENT IN AN ORGANIZED HEALTH CARE EDUCATION/TRAINING PROGRAM

## 2018-11-29 RX ORDER — SODIUM CHLORIDE 9 MG/ML
INJECTION, SOLUTION INTRAVENOUS CONTINUOUS
Status: DISCONTINUED | OUTPATIENT
Start: 2018-11-29 | End: 2018-11-29 | Stop reason: HOSPADM

## 2018-11-29 RX ORDER — OXYCODONE AND ACETAMINOPHEN 10; 325 MG/1; MG/1
1 TABLET ORAL EVERY 4 HOURS PRN
Status: DISCONTINUED | OUTPATIENT
Start: 2018-11-29 | End: 2018-11-29 | Stop reason: HOSPADM

## 2018-11-29 RX ADMIN — SODIUM CHLORIDE 500 ML: 0.9 INJECTION, SOLUTION INTRAVENOUS at 10:11

## 2018-11-29 NOTE — H&P
Radiology History & Physical      SUBJECTIVE:     Chief Complaint: back pain    History of Present Illness:  Angie Mccarthy is a 53 y.o. female who presents for evaluation of back pain via myelogram  Past Medical History:   Diagnosis Date    Allergy     seasonal    Arthritis     Blood transfusion     Hyperlipidemia     Hypertension     Hypothyroidism     Joint pain     Kidney stones     Morbid obesity 12/14/2012    NAFLD (nonalcoholic fatty liver disease)     OCD (obsessive compulsive disorder)     PONV (postoperative nausea and vomiting)     Restless leg syndrome     Sciatica of right side associated with disorder of lumbosacral spine     SOB (shortness of breath) on exertion     Supraventricular tachycardia      Past Surgical History:   Procedure Laterality Date    APPENDECTOMY      BACK SURGERY      x 6    BIOPSY-LIVER-LAPAROSCOPIC - 67067 N/A 2/6/2017    Performed by [facility] at [facility] OR 2ND FLR    CHOLECYSTECTOMY      CLOSURE N/A 2/14/2013    Performed by Add[facility]on MD at [facility] OR 2ND FLR    CREATION, FLAP, MUSCLE ROTATION N/A 2/14/2013    Performed by [facility] MD at [facility] OR 2ND FLR    ESOPHAGOGASTRODUODENOSCOPY (EGD) N/A 8/8/2016    Performed by Eliceo Wilhelm MD at [facility] ENDO (4TH FLR)    FUSION, SPINE N/A 2/14/2013    Performed by Junior Hilton MD at [facility] OR 2ND FLR    GASTRECTOMY      GASTRECTOMY-SLEEVE-LAPAROSCOPIC - 13093 w/ intraop egd N/A 2/6/2017    Performed by [facility]on Ha MD at [facility] OR 2ND FLR    HERNIA REPAIR      HYSTERECTOMY      INJECTION, STEROID, SPINE, LUMBAR, EPIDURAL N/A 10/4/2013    Performed by [facility] at [facility] CATH LAB    INJECTION, STEROID, SPINE, LUMBAR, EPIDURAL N/A 8/28/2013    Performed by [facility] at [facility] CATH LAB    LIVER BIOPSY  02/06/2017    steatohepatitis with stage 1 fibrosis    MYELOGRAM N/A 6/24/2014    Performed by Lissett Surgeon at [facility]     SPINAL FUSION      TONSILLECTOMY, ADENOIDECTOMY         Home Meds:   Prior to Admission medications    Medication Sig Start Date End Date Taking? Authorizing Provider   alprazolam (XANAX) 0.5 MG tablet Take 0.5 mg by mouth 2 (two) times daily as needed for Insomnia or Anxiety.    Yes Historical Provider, MD   ascorbic acid (VITAMIN C) 1000 MG tablet Take 1,000 mg by mouth every morning.    Yes Historical Provider, MD   BIOTIN ORAL Take 10,000 mg by mouth every morning.    Yes Historical Provider, MD   fluoxetine (PROZAC) 40 MG capsule Take 40 mg by mouth every morning.    Yes Historical Provider, MD   gabapentin (NEURONTIN) 800 MG tablet Take 800 mg by mouth 2 (two) times daily. Takes every night at bedtime and may take a dose during the day prn 6/15/16  Yes Historical Provider, MD   lisinopril (PRINIVIL,ZESTRIL) 2.5 MG tablet Take 2.5 mg by mouth every morning.  5/31/16  Yes Historical Provider, MD   multivitamin capsule Take 1 capsule by mouth every morning.    Yes Historical Provider, MD   omeprazole (PRILOSEC) 40 MG capsule Open capsule and dissolve contents in liquid, take by mouth daily. Discard capsule. 2/6/17  Yes Ligia Meredith MD   oxycodone-acetaminophen (PERCOCET)  mg per tablet Take one to one and a half tablets by mouth every 4hours as needed for pain. 2/9/17  Yes Ligia Meredith MD   pravastatin (PRAVACHOL) 40 MG tablet Take 40 mg by mouth nightly.   Yes Historical Provider, MD   zolpidem (AMBIEN) 10 mg Tab Take 10 mg by mouth nightly.  10/23/12  Yes Historical Provider, MD   aspirin (ECOTRIN) 81 MG EC tablet Take 81 mg by mouth nightly.     Historical Provider, MD   clotrimazole-betamethasone 1-0.05% (LOTRISONE) cream Apply topically 2 (two) times daily. 2/13/17   Fatoumata Sandhu PA-C   conjugated estrogens (PREMARIN) vaginal cream Place 0.5 g vaginally twice a week. 11/17/16 11/17/17  Joelle Doran MD   cyanocobalamin, vitamin B-12, 500 mcg Subl Place 1 tablet under the tongue once  "daily.    Historical Provider, MD   estradiol (ESTRACE) 1 MG tablet Take 2 tablets (2 mg total) by mouth once daily. 2/21/18   Joelle Doran MD   fish oil-omega-3 fatty acids 300-1,000 mg capsule Take 1,200 mg by mouth 2 (two) times daily.     Historical Provider, MD   hydrochlorothiazide (HYDRODIURIL) 12.5 MG Tab Take 12.5 mg by mouth daily as needed.     Historical Provider, MD   levothyroxine (SYNTHROID) 150 MCG tablet Take 1 tablet (150 mcg total) by mouth once daily.  Patient taking differently: Take 150 mcg by mouth every morning.  9/6/16 9/15/18  Emanuel Haskins MD   promethazine (PHENERGAN) 25 MG suppository Place 1 suppository (25 mg total) rectally every 6 (six) hours as needed for Nausea. 2/6/17   Ligia Meredith MD   tamsulosin (FLOMAX) 0.4 mg Cap Take 1 capsule (0.4 mg total) by mouth once daily. for 10 days 9/15/18 9/25/18  Yisel Alonso NP   vitamin E 400 UNIT capsule Take 400 Units by mouth every morning.     Historical Provider, MD     Synthroid and SSRI held 5 days    Anticoagulants/Antiplatelets: aspirin held five days    Allergies:   Review of patient's allergies indicates:   Allergen Reactions    Adhesive Blisters    Adhesive tape-silicones Dermatitis     The longer the adhesive stays on, the worse the irritation    Bactrim [sulfamethoxazole-trimethoprim] Anxiety     Has a "nervous feeling."    Mastisol adhesive [gum dyqwxw-orsgsn-wnpl-alcohol] Blisters     Sedation History:  no adverse reactions    Review of Systems:   Hematological: no known coagulopathies  Respiratory: occasional shortness of breath  Cardiovascular: no chest pain  Gastrointestinal: no abdominal pain  Genito-Urinary: no dysuria  Musculoskeletal: extensive back pain  Neurological: no TIA or stroke symptoms         OBJECTIVE:     Vital Signs (Most Recent)       Physical Exam:  ASA: 3  Mallampati: 1    General: no acute distress  Mental Status: alert and oriented to person, place and time  HEENT: normocephalic, " atraumatic  Chest: unlabored breathing  Heart: no heaves  Abdomen: nondistended  Extremity: moves all extremities    Laboratory  Lab Results   Component Value Date    INR 1.0 09/15/2018       Lab Results   Component Value Date    WBC 12.23 09/15/2018    HGB 13.0 09/15/2018    HCT 40.1 09/15/2018    MCV 80 (L) 09/15/2018     09/15/2018      Lab Results   Component Value Date     (H) 09/15/2018     (L) 09/15/2018    K 3.7 09/15/2018    CL 99 09/15/2018    CO2 22 (L) 09/15/2018    BUN 16 09/15/2018    CREATININE 0.9 09/15/2018    CALCIUM 10.6 (H) 09/15/2018    MG 1.9 09/15/2018    ALT 19 09/15/2018    AST 20 09/15/2018    ALBUMIN 3.4 (L) 09/15/2018    BILITOT 0.8 09/15/2018    BILIDIR 0.3 08/08/2016       ASSESSMENT/PLAN:     Sedation Plan: none  Patient will undergo T and L myelogram.    Bart Esteban MD  Radiology

## 2018-11-29 NOTE — DISCHARGE SUMMARY
Radiology Discharge Summary      Hospital Course: No complications    Admit Date: 11/29/2018  Discharge Date: 11/29/2018     Instructions Given to Patient: Yes  Diet: Resume prior diet  Activity: activity as tolerated    Description of Condition on Discharge: Stable  Vital Signs (Most Recent): Temp: 98.7 °F (37.1 °C) (11/29/18 1006)  Pulse: 82 (11/29/18 1006)  Resp: 16 (11/29/18 1006)  BP: 124/79 (11/29/18 1006)  SpO2: 98 % (11/29/18 1006)    Discharge Disposition: Home    Discharge Diagnosis: s/p attempted lumbar puncture for myelogram for lower back pain.    Procedure performed: lumbar puncture    Followup: repeat exam in the IR department w/ Dr. Mason Esteban MD  Radiology

## 2018-11-29 NOTE — PROCEDURES
Radiology Post-Procedure Note    Pre Op Diagnosis: lower back pain    Post Op Diagnosis: Same    Procedure: lumbar puncture    Procedure performed by: Michael Rich MD    Written Informed Consent Obtained: Yes    Specimen Removed: 0 mL CSF    Estimated Blood Loss: Minimal    Findings: Following written informed consent and sterile prep and drape, a 22 gauge spinal needle was inserted at L4 - L5 intralaminar space under fluoroscopic surveillance.A repeat attempt was made with a 4 in 22g needle. The patient was moved into a lateral position and the needle was advanced into a appropriate position, but no CSF return was noted. There were no complications.    Patient tolerated procedure well.    Patient to be rescheduled.    Bart Esteban MD  Radiology

## 2018-12-12 ENCOUNTER — TELEPHONE (OUTPATIENT)
Dept: HEPATOLOGY | Facility: CLINIC | Age: 53
End: 2018-12-12

## 2018-12-12 DIAGNOSIS — K76.0 FATTY LIVER DISEASE, NONALCOHOLIC: Primary | ICD-10-CM

## 2018-12-12 DIAGNOSIS — K74.00 LIVER FIBROSIS: ICD-10-CM

## 2018-12-14 DIAGNOSIS — G95.9 MYELOPATHY: Primary | ICD-10-CM

## 2018-12-20 DIAGNOSIS — G95.9 MYELOPATHY: Primary | ICD-10-CM

## 2018-12-28 ENCOUNTER — PATIENT MESSAGE (OUTPATIENT)
Dept: SURGERY | Facility: CLINIC | Age: 53
End: 2018-12-28

## 2018-12-28 ENCOUNTER — PATIENT MESSAGE (OUTPATIENT)
Dept: HEPATOLOGY | Facility: CLINIC | Age: 53
End: 2018-12-28

## 2019-01-04 DIAGNOSIS — M48.061 SPINAL STENOSIS, LUMBAR REGION, WITHOUT NEUROGENIC CLAUDICATION: Primary | ICD-10-CM

## 2019-01-04 DIAGNOSIS — K76.0 FATTY LIVER DISEASE, NONALCOHOLIC: ICD-10-CM

## 2019-01-04 RX ORDER — FENTANYL CITRATE 50 UG/ML
50 INJECTION, SOLUTION INTRAMUSCULAR; INTRAVENOUS
Status: CANCELLED | OUTPATIENT
Start: 2019-01-04

## 2019-01-04 RX ORDER — ASCORBIC ACID 500 MG
500 TABLET ORAL EVERY MORNING
Status: ON HOLD | COMMUNITY
End: 2019-12-21 | Stop reason: HOSPADM

## 2019-01-04 RX ORDER — MIDAZOLAM HYDROCHLORIDE 1 MG/ML
1 INJECTION INTRAMUSCULAR; INTRAVENOUS
Status: CANCELLED | OUTPATIENT
Start: 2019-01-04

## 2019-01-05 NOTE — PRE-PROCEDURE INSTRUCTIONS
"  Spoke with Patient.  NPO, medication, and pre-op instructions reviewed.  Has persistent PONV with Anesthesia.  Has had some relief with a Scopolamine patch and IV medications.  Has motion sickness with boat rides.  Is fearful of having the procedure because a Myelogram was not able to be completed on 11-29-18 due to her pain and the MD "having trouble putting the needle in due to scar tissue."  Is scheduled with MAC Anesthesia.  Has chronic back pain.      Aspirin is on Hold.  Stated that she is also holding Prozac and Synthroid because an EGD was cancelled 8-8-16 because she had taken those medications.  When reviewing the Epic notes from 8-8-16, the procedure was delayed because she had drank 10 ounces of water.  Verbalized understanding of instructions.    "

## 2019-01-07 ENCOUNTER — HOSPITAL ENCOUNTER (OUTPATIENT)
Facility: HOSPITAL | Age: 54
End: 2019-01-07
Attending: RADIOLOGY | Admitting: RADIOLOGY
Payer: MEDICARE

## 2019-01-07 ENCOUNTER — ANESTHESIA (OUTPATIENT)
Dept: ENDOSCOPY | Facility: HOSPITAL | Age: 54
End: 2019-01-07
Payer: MEDICARE

## 2019-01-07 ENCOUNTER — HOSPITAL ENCOUNTER (OUTPATIENT)
Dept: RADIOLOGY | Facility: HOSPITAL | Age: 54
Discharge: HOME | End: 2019-01-07
Attending: RADIOLOGY | Admitting: RADIOLOGY
Payer: MEDICARE

## 2019-01-07 ENCOUNTER — ANESTHESIA EVENT (OUTPATIENT)
Dept: ENDOSCOPY | Facility: HOSPITAL | Age: 54
End: 2019-01-07
Payer: MEDICARE

## 2019-01-07 VITALS
HEIGHT: 65 IN | HEART RATE: 81 BPM | TEMPERATURE: 98 F | SYSTOLIC BLOOD PRESSURE: 144 MMHG | DIASTOLIC BLOOD PRESSURE: 69 MMHG | WEIGHT: 230 LBS | RESPIRATION RATE: 18 BRPM | BODY MASS INDEX: 38.32 KG/M2 | OXYGEN SATURATION: 98 %

## 2019-01-07 DIAGNOSIS — G95.9 MYELOPATHY: ICD-10-CM

## 2019-01-07 DIAGNOSIS — M51.36 DEGENERATION OF LUMBAR INTERVERTEBRAL DISC: ICD-10-CM

## 2019-01-07 DIAGNOSIS — M43.00 ACQUIRED SPONDYLOLYSIS: ICD-10-CM

## 2019-01-07 DIAGNOSIS — M51.9 LUMBAR DISC DISEASE: ICD-10-CM

## 2019-01-07 DIAGNOSIS — M96.1 POSTLAMINECTOMY SYNDROME, CERVICAL REGION: ICD-10-CM

## 2019-01-07 PROCEDURE — D9220A PRA ANESTHESIA: Mod: ANES,,, | Performed by: ANESTHESIOLOGY

## 2019-01-07 PROCEDURE — 37000008 HC ANESTHESIA 1ST 15 MINUTES

## 2019-01-07 PROCEDURE — 72129 CT MYELOGRAPHY THORACIC LUMBAR SPINE: ICD-10-PCS | Mod: 26,,, | Performed by: RADIOLOGY

## 2019-01-07 PROCEDURE — 63600175 PHARM REV CODE 636 W HCPCS: Performed by: NURSE ANESTHETIST, CERTIFIED REGISTERED

## 2019-01-07 PROCEDURE — 72129 CT CHEST SPINE W/DYE: CPT | Mod: TC

## 2019-01-07 PROCEDURE — D9220A PRA ANESTHESIA: Mod: CRNA,,, | Performed by: NURSE ANESTHETIST, CERTIFIED REGISTERED

## 2019-01-07 PROCEDURE — D9220A PRA ANESTHESIA: ICD-10-PCS | Mod: CRNA,,, | Performed by: NURSE ANESTHETIST, CERTIFIED REGISTERED

## 2019-01-07 PROCEDURE — 72132 CT LUMBAR SPINE W/DYE: CPT | Mod: 26,,, | Performed by: RADIOLOGY

## 2019-01-07 PROCEDURE — D9220A PRA ANESTHESIA: ICD-10-PCS | Mod: ANES,,, | Performed by: ANESTHESIOLOGY

## 2019-01-07 PROCEDURE — 72132 CT MYELOGRAPHY THORACIC LUMBAR SPINE: ICD-10-PCS | Mod: 26,,, | Performed by: RADIOLOGY

## 2019-01-07 PROCEDURE — 72129 CT CHEST SPINE W/DYE: CPT | Mod: 26,,, | Performed by: RADIOLOGY

## 2019-01-07 PROCEDURE — 25000003 PHARM REV CODE 250: Performed by: NURSE PRACTITIONER

## 2019-01-07 PROCEDURE — 37000009 HC ANESTHESIA EA ADD 15 MINS

## 2019-01-07 PROCEDURE — 25000003 PHARM REV CODE 250: Performed by: STUDENT IN AN ORGANIZED HEALTH CARE EDUCATION/TRAINING PROGRAM

## 2019-01-07 RX ORDER — MIDAZOLAM HYDROCHLORIDE 1 MG/ML
INJECTION, SOLUTION INTRAMUSCULAR; INTRAVENOUS
Status: DISCONTINUED | OUTPATIENT
Start: 2019-01-07 | End: 2019-01-07

## 2019-01-07 RX ORDER — LIDOCAINE HCL/PF 100 MG/5ML
SYRINGE (ML) INTRAVENOUS
Status: DISCONTINUED | OUTPATIENT
Start: 2019-01-07 | End: 2019-01-07

## 2019-01-07 RX ORDER — PROPOFOL 10 MG/ML
VIAL (ML) INTRAVENOUS CONTINUOUS PRN
Status: DISCONTINUED | OUTPATIENT
Start: 2019-01-07 | End: 2019-01-07

## 2019-01-07 RX ORDER — FENTANYL CITRATE 50 UG/ML
INJECTION, SOLUTION INTRAMUSCULAR; INTRAVENOUS
Status: DISCONTINUED | OUTPATIENT
Start: 2019-01-07 | End: 2019-01-07

## 2019-01-07 RX ORDER — SODIUM CHLORIDE 9 MG/ML
500 INJECTION, SOLUTION INTRAVENOUS ONCE
Status: COMPLETED | OUTPATIENT
Start: 2019-01-07 | End: 2019-01-07

## 2019-01-07 RX ORDER — HYDROCODONE BITARTRATE AND ACETAMINOPHEN 5; 325 MG/1; MG/1
1 TABLET ORAL EVERY 4 HOURS PRN
Status: DISCONTINUED | OUTPATIENT
Start: 2019-01-07 | End: 2019-01-07 | Stop reason: HOSPADM

## 2019-01-07 RX ADMIN — MIDAZOLAM HYDROCHLORIDE 1 MG: 1 INJECTION, SOLUTION INTRAMUSCULAR; INTRAVENOUS at 12:01

## 2019-01-07 RX ADMIN — LIDOCAINE HYDROCHLORIDE 40 MG: 20 INJECTION, SOLUTION INTRAVENOUS at 12:01

## 2019-01-07 RX ADMIN — SODIUM CHLORIDE: 0.9 INJECTION, SOLUTION INTRAVENOUS at 12:01

## 2019-01-07 RX ADMIN — PROPOFOL 50 MCG/KG/MIN: 10 INJECTION, EMULSION INTRAVENOUS at 12:01

## 2019-01-07 RX ADMIN — FENTANYL CITRATE 50 MCG: 50 INJECTION, SOLUTION INTRAMUSCULAR; INTRAVENOUS at 12:01

## 2019-01-07 RX ADMIN — HYDROCODONE BITARTRATE AND ACETAMINOPHEN 1 TABLET: 5; 325 TABLET ORAL at 01:01

## 2019-01-07 RX ADMIN — SODIUM CHLORIDE: 0.9 INJECTION, SOLUTION INTRAVENOUS at 09:01

## 2019-01-07 NOTE — TRANSFER OF CARE
"Anesthesia Transfer of Care Note    Patient: Angie Mccarthy    Procedure(s) Performed: Procedure(s) (LRB):  Myelogram (N/A)    Patient location: [facility]    Anesthesia Type: MAC    Transport from OR: Transported from OR on 2-3 L/min O2 by NC with adequate spontaneous ventilation    Post pain: pain needs to be addressed (C/O of headache)    Post assessment: no apparent anesthetic complications    Post vital signs: stable    Level of consciousness: awake, alert and oriented    Nausea/Vomiting: no nausea/vomiting    Complications: none    Transfer of care protocol was followed      Last vitals:   Visit Vitals  /71 (BP Location: Right arm, Patient Position: Lying)   Pulse 79   Temp 36.6 °C (97.9 °F) (Oral)   Resp 18   Ht 5' 5" (1.651 m)   Wt 104.3 kg (230 lb)   LMP 12/06/2007   SpO2 96%   Breastfeeding? No   BMI 38.27 kg/m²     "

## 2019-01-07 NOTE — ANESTHESIA PREPROCEDURE EVALUATION
01/07/2019  Angie Mccarthy is a 53 y.o., female.    Anesthesia Evaluation    I have reviewed the Patient Summary Reports.     I have reviewed the Medications.     Review of Systems  Anesthesia Hx:  History of prior surgery of interest to airway management or planning: Denies Family Hx of Anesthesia complications.  Personal Hx of Anesthesia complications, Post-Operative Nausea/Vomiting   Hematology/Oncology:  Hematology Normal   Oncology Normal     EENT/Dental:EENT/Dental Normal   Cardiovascular:   Hypertension    Pulmonary:   Shortness of breath    Renal/:   Chronic Renal Disease    Hepatic/GI:   GERD Liver Disease,    Musculoskeletal:   Arthritis     Neurological:   Neuromuscular Disease,    Endocrine:   Hypothyroidism    Psych:   Psychiatric History          Physical Exam  General:  Well nourished    Airway/Jaw/Neck:  Airway Findings: Mouth Opening: Normal Tongue: Normal  General Airway Assessment: Adult  Mallampati: III  Improves to II with phonation.  TM Distance: Normal, at least 6 cm     Eyes/Ears/Nose:  Eyes/Ears/Nose Findings:    Dental:  Dental Findings: In tact   Chest/Lungs:  Chest/Lungs Findings: Clear to auscultation     Heart/Vascular:  Heart Findings: Rate: Normal  Rhythm: Regular Rhythm     Abdomen:  Abdomen Findings:  Normal, Soft, Nontender       Mental Status:  Mental Status Findings:  Cooperative         Anesthesia Plan  Type of Anesthesia, risks & benefits discussed:  Anesthesia Type:  general  Patient's Preference:   Intra-op Monitoring Plan: standard ASA monitors  Intra-op Monitoring Plan Comments:   Post Op Pain Control Plan: multimodal analgesia  Post Op Pain Control Plan Comments:   Induction:   IV  Beta Blocker:  Patient is not currently on a Beta-Blocker (No further documentation required).       Informed Consent: Patient understands risks and agrees with Anesthesia  plan.  Questions answered. Anesthesia consent signed with patient.  ASA Score: 3     Day of Surgery Review of History & Physical:    H&P update referred to the provider.         Ready For Surgery From Anesthesia Perspective.

## 2019-01-07 NOTE — DISCHARGE SUMMARY
Radiology Discharge Summary      Hospital Course: No complications    Admit Date: 1/7/2019  Discharge Date: 01/07/2019     Instructions Given to Patient: Yes  Diet: Resume prior diet  Activity: activity as tolerated    Description of Condition on Discharge: Stable  Vital Signs (Most Recent): Temp: 97.5 °F (36.4 °C) (01/07/19 1500)  Pulse: 81 (01/07/19 1500)  Resp: 18 (01/07/19 0950)  BP: (!) 144/69 (01/07/19 1445)  SpO2: 98 % (01/07/19 1500)    Discharge Disposition: Home    Discharge Diagnosis: lumbar arachnoiditis     Follow-up: per referring provider    Jori Del Rosario  Radiology PGY-5

## 2019-01-07 NOTE — H&P
Radiology History & Physical      SUBJECTIVE:     Chief Complaint: low back pain    History of Present Illness:  Angie Mccarthy is a 53 y.o. female with history of idiopathic scoliosis s/p thoracolumbar fusion with chronnc back pain who presents for thoracic and lumbar myelogram via cervical puncture.  Past Medical History:   Diagnosis Date    Allergy     seasonal    Arthritis     Blood transfusion     Chronic back pain     GERD (gastroesophageal reflux disease)     Hyperlipidemia     Hypertension     Hypothyroidism     Thyroid nodule    Joint pain     Kidney stones     Morbid obesity 12/14/2012    NAFLD (nonalcoholic fatty liver disease)     OCD (obsessive compulsive disorder)     Osteoporosis     PONV (postoperative nausea and vomiting)     Persistent, Motion sickness with boat rides, Scopolamine helped -still had nausea    Restless leg syndrome     Sciatica of right side associated with disorder of lumbosacral spine     SOB (shortness of breath) on exertion     Spinal stenosis of lumbar region     Supraventricular tachycardia     Thoracic spondylosis      Past Surgical History:   Procedure Laterality Date    APPENDECTOMY      BACK SURGERY      x 6    BIOPSY-LIVER-LAPAROSCOPIC - 22157 N/A 2/6/2017    Performed by [facility] at [facility] OR 2ND FLR    CHOLECYSTECTOMY      CLOSURE N/A 2/14/2013    Performed by Addison Staton MD at [facility] OR 2ND FLR    CREATION, FLAP, MUSCLE ROTATION N/A 2/14/2013    Performed by Addison Staton MD at [facility] OR 2ND FLR    ESOPHAGOGASTRODUODENOSCOPY (EGD) N/A 8/8/2016    Performed by Eliceo Wilhelm MD at [facility] ENDO (4TH FLR)    FUSION, SPINE N/A 2/14/2013    Performed by Junior Hilton MD at [facility] OR 2ND FLR    GASTRECTOMY      Lap Gastric Sleeve    GASTRECTOMY-SLEEVE-LAPAROSCOPIC - 51658 w/ intraop egd N/A 2/6/2017    Performed by Deon Ha MD at [facility] OR 2ND FLR    HERNIA REPAIR      HYSTERECTOMY       INJECTION, STEROID, SPINE, LUMBAR, EPIDURAL N/A 10/4/2013    Performed by Dos [facility] Provider at [facility] CATH LAB    INJECTION, STEROID, SPINE, LUMBAR, EPIDURAL N/A 8/28/2013    [facility]formed by [facility] at [facility] CATH LAB    LIVER BIOPSY  02/06/2017    steatohepatitis with stage 1 fibrosis    MYELOGRAM N/A 11/29/2018    Performed by Dosc Diagnostic Provider at [facility] OR 2ND FLR    MYELOGRAM N/A 6/24/2014    Performed by Lissett Surgeon at [facility] LISSETT    SPINAL FUSION      TONSILLECTOMY, ADENOIDECTOMY         Home Meds:   Prior to Admission medications    Medication Sig Start Date End Date Taking? Authorizing Provider   alprazolam (XANAX) 0.5 MG tablet Take 0.5 mg by mouth 2 (two) times daily as needed for Insomnia or Anxiety.    Yes Historical Provider, MD   ascorbic acid, vitamin C, (VITAMIN C) 500 MG tablet Take 500 mg by mouth every morning.   Yes Historical Provider, MD   aspirin (ECOTRIN) 81 MG EC tablet Take 81 mg by mouth nightly.    Yes Historical Provider, MD   BIOTIN ORAL Take 10,000 mg by mouth every morning.    Yes Historical Provider, MD   fish oil-omega-3 fatty acids 300-1,000 mg capsule Take 1,200 mg by mouth 2 (two) times daily.    Yes Historical Provider, MD   fluoxetine (PROZAC) 40 MG capsule Take 40 mg by mouth every morning.    Yes Historical Provider, MD   gabapentin (NEURONTIN) 800 MG tablet Take 800 mg by mouth 2 (two) times daily. Takes 2-3 times per day 6/15/16  Yes Historical Provider, MD   levothyroxine (SYNTHROID) 150 MCG tablet Take 1 tablet (150 mcg total) by mouth once daily.  Patient taking differently: Take 150 mcg by mouth every morning.  9/6/16 1/7/19 Yes Emanuel Haskins MD   lisinopril (PRINIVIL,ZESTRIL) 2.5 MG tablet Take 2.5 mg by mouth every morning.  5/31/16  Yes Historical Provider, MD   multivitamin capsule Take 1 capsule by mouth every morning.    Yes Historical Provider, MD   oxycodone-acetaminophen (PERCOCET)  mg per tablet Take one to one and a  "half tablets by mouth every 4hours as needed for pain.  Patient taking differently: Take 1 tablet by mouth every 6 (six) hours. Take one to one and a half tablets by mouth every 4hours as needed for pain. 2/9/17  Yes Ligia Meredith MD   pravastatin (PRAVACHOL) 40 MG tablet Take 40 mg by mouth nightly.   Yes Historical Provider, MD   zolpidem (AMBIEN) 10 mg Tab Take 10 mg by mouth nightly.  10/23/12  Yes Historical Provider, MD     Anticoagulants/Antiplatelets: aspirin    Allergies:   Review of patient's allergies indicates:   Allergen Reactions    Mastisol adhesive [gum oanzgq-rsnfpx-ysym-alcohol] Itching, Rash and Blisters     "Looked like poison ivy"    Adhesive Dermatitis and Blisters    Adhesive tape-silicones Dermatitis     The longer the adhesive stays on, the worse the irritation    Bactrim [sulfamethoxazole-trimethoprim] Anxiety     Has a "nervous feeling."  "Jittery"  Has taken recently and the reaction was "less intense"     Sedation History:  no adverse reactions    Review of Systems:   Hematological: no known coagulopathies  Respiratory: no shortness of breath  Cardiovascular: no chest pain  Gastrointestinal: no abdominal pain  Genito-Urinary: no dysuria  Musculoskeletal: back pain  Neurological: no TIA or stroke symptoms         OBJECTIVE:     Vital Signs (Most Recent)  Temp: 97.9 °F (36.6 °C) (01/07/19 0950)  Pulse: 79 (01/07/19 0950)  Resp: 18 (01/07/19 0950)  BP: 126/71 (01/07/19 0950)  SpO2: 96 % (01/07/19 0950)    Physical Exam:  ASA: 2  Mallampati: 2    General: no acute distress  Mental Status: alert and oriented to person, place and time  HEENT: normocephalic, atraumatic  Chest: unlabored breathing  Heart: regular heart rate  Abdomen: nondistended  Extremity: moves all extremities    Laboratory  Lab Results   Component Value Date    INR 1.0 01/07/2019       Lab Results   Component Value Date    WBC 9.09 01/07/2019    WBC 8.99 01/07/2019    HGB 13.3 01/07/2019    HGB 13.6 01/07/2019    HCT " 42.3 01/07/2019    HCT 42.5 01/07/2019    MCV 79 (L) 01/07/2019    MCV 80 (L) 01/07/2019     01/07/2019     01/07/2019      Lab Results   Component Value Date    GLU 98 01/07/2019    GLU 98 01/07/2019     01/07/2019     01/07/2019    K 4.6 01/07/2019    K 4.2 01/07/2019     01/07/2019     01/07/2019    CO2 29 01/07/2019    CO2 29 01/07/2019    BUN 16 01/07/2019    BUN 16 01/07/2019    CREATININE 0.9 01/07/2019    CREATININE 0.8 01/07/2019    CALCIUM 10.1 01/07/2019    CALCIUM 10.0 01/07/2019    MG 1.9 09/15/2018    ALT 16 01/07/2019    ALT 16 01/07/2019    AST 16 01/07/2019    AST 16 01/07/2019    ALBUMIN 3.9 01/07/2019    ALBUMIN 3.8 01/07/2019    BILITOT 0.4 01/07/2019    BILITOT 0.4 01/07/2019    BILIDIR 0.3 08/08/2016       ASSESSMENT/PLAN:     Sedation Plan: MAC vs general  Patient will undergo thoracic and lumbar myelogram via cervical puncture.    Jori Del Rosario  Radiology PGY-5

## 2019-01-07 NOTE — ANESTHESIA POSTPROCEDURE EVALUATION
"Anesthesia Post Evaluation    Patient: Angie Mccarthy    Procedure(s) Performed: Procedure(s) (LRB):  Myelogram (N/A)    Final Anesthesia Type: MAC  Patient location during evaluation: PACU  Patient participation: Yes- Able to Participate  Level of consciousness: awake and alert and oriented  Post-procedure vital signs: reviewed and stable  Pain management: adequate  Airway patency: patent  PONV status at discharge: No PONV  Anesthetic complications: no      Cardiovascular status: blood pressure returned to baseline and hemodynamically stable  Respiratory status: unassisted, room air and spontaneous ventilation  Hydration status: euvolemic  Follow-up not needed.        Visit Vitals  BP (!) 144/69   Pulse 81   Temp 36.4 °C (97.5 °F) (Temporal)   Resp 18   Ht 5' 5" (1.651 m)   Wt 104.3 kg (230 lb)   LMP 12/06/2007   SpO2 98%   Breastfeeding? No   BMI 38.27 kg/m²       Pain/Venessa Score: Pain Rating Prior to Med Admin: 7 (1/7/2019  3:00 PM)  Pain Rating Post Med Admin: 0 (1/7/2019  3:00 PM)  Venessa Score: 10 (1/7/2019  3:00 PM)        "

## 2019-01-07 NOTE — DISCHARGE INSTRUCTIONS
Myelogram  A myelogram is a test to check problems with your spinal canal, including the spinal cord, nerve roots, and spinal lining. The canal is a tunnel-like structure in your spine that holds your spinal cord. A myelogram uses a dye injected into the spinal canal with the guidance of imaging, usually by fluoroscopy (real time X-ray), X-ray, or computed tomography (CT). Pictures are then taken of your spinal canal.  How do I get ready for a myelogram?  · Dont eat the morning of the test. But you can drink water or other clear fluids.  · If told to, stop taking medicines before the test.  · Arrange for someone to drive you home.  Tell the healthcare provider  Tell the healthcare provider if you:  · Are pregnant or think you may be  · Have any bleeding problems  · Take blood thinners (anticoagulants) or other medicines. These include aspirin, certain antipsychotic medicines, and antidepressants. You may be told to stop taking these one or more days before your test.   · Have had back surgery or low-back pain  · Have any allergies   What happens during a myelogram?  · You will change into a hospital gown.  · X-rays of your spine will be taken.  · Your lower back will be cleaned, covered with drapes, and injected with a numbing medicine.  · Your doctor will advance a thin needle under guidance, usually using fluoroscopy, into your spinal canal space.  · Your doctor will inject contrast fluid (dye) into your spinal canal. The doctor may take out a small amount of spinal fluid.  · Additional X-rays will be taken.  · If you need a CT test, it will follow the X-rays.  What happens after a myelogram?  · Take it easy for the rest of the day, as advised.  · Avoid physical activity, or bending over for 1 to 2 days after the procedure, or as directed by your healthcare provider.  · Lie down with your head raised if you get a headache, or if instructed to do so.  · Drink plenty of water.  · Your provider will discuss the  test results with you at a follow-up appointment.  What are the risks of a myelogram?  · Small risks of pain, bleeding or infection at the injection site or within or around the spinal canal  · Headache  · Injury to a nerve or the spinal cord at the injection site  · X-ray radiation exposure (generally considered to be low risk and safe)  When should I call my healthcare provider?  Call your healthcare provider right away if:  · You have a headache that lasts 2 days or more  · Fever of 100.4 °F (38°C)   · You have lasting pain in your back, or tingling in your groin or legs  · Or, whatever your healthcare provider told you to report based on your medical condition   Date Last Reviewed: 5/1/2017  © 6511-9576 [employer]. [address] [place] [address], [place] [address]. All rights reserved. This information is not intended as a substitute for professional medical care. Always follow your healthcare professional's instructions.

## 2019-01-07 NOTE — PROCEDURES
Radiology Post-Procedure Note     Pre Op Diagnosis: idiopathic scoliosis; s/p thoracolumbar fusion  Post Op Diagnosis: Same     Procedure: cervical puncture/ myelogram      Procedure performed by: Alf Cuevas MD; Jori Del Rosario (resident)     Written Informed Consent Obtained: Yes  Specimen Removed: NO  Estimated Blood Loss: Minimal     Findings:   Successful cervical puncture at C1-2 using biplane fluoro with return of  Clear CSF.  10 cc of Omnipaque 300 was injected into the intrathecal space.     Patient tolerated procedure well.     Jori Del Rosario  Radiology PGY-5

## 2019-01-14 ENCOUNTER — CLINICAL SUPPORT (OUTPATIENT)
Dept: INFECTIOUS DISEASES | Facility: CLINIC | Age: 54
End: 2019-01-14
Payer: MEDICARE

## 2019-01-14 ENCOUNTER — OFFICE VISIT (OUTPATIENT)
Dept: HEPATOLOGY | Facility: CLINIC | Age: 54
End: 2019-01-14
Payer: MEDICARE

## 2019-01-14 ENCOUNTER — HOSPITAL ENCOUNTER (OUTPATIENT)
Dept: RADIOLOGY | Facility: HOSPITAL | Age: 54
Discharge: HOME | End: 2019-01-14
Attending: NURSE PRACTITIONER
Payer: MEDICARE

## 2019-01-14 ENCOUNTER — PROCEDURE VISIT (OUTPATIENT)
Dept: HEPATOLOGY | Facility: CLINIC | Age: 54
End: 2019-01-14
Attending: NURSE PRACTITIONER
Payer: MEDICARE

## 2019-01-14 VITALS
RESPIRATION RATE: 18 BRPM | SYSTOLIC BLOOD PRESSURE: 146 MMHG | HEIGHT: 62 IN | HEART RATE: 81 BPM | BODY MASS INDEX: 43.45 KG/M2 | OXYGEN SATURATION: 94 % | DIASTOLIC BLOOD PRESSURE: 70 MMHG | WEIGHT: 236.13 LBS

## 2019-01-14 DIAGNOSIS — I10 HYPERTENSION, UNSPECIFIED TYPE: ICD-10-CM

## 2019-01-14 DIAGNOSIS — K74.00 LIVER FIBROSIS: ICD-10-CM

## 2019-01-14 DIAGNOSIS — K76.0 FATTY LIVER DISEASE, NONALCOHOLIC: ICD-10-CM

## 2019-01-14 DIAGNOSIS — Z98.84 S/P LAPAROSCOPIC SLEEVE GASTRECTOMY: ICD-10-CM

## 2019-01-14 DIAGNOSIS — K76.0 FATTY LIVER DISEASE, NONALCOHOLIC: Primary | ICD-10-CM

## 2019-01-14 DIAGNOSIS — E78.5 HYPERLIPIDEMIA, UNSPECIFIED HYPERLIPIDEMIA TYPE: Chronic | ICD-10-CM

## 2019-01-14 DIAGNOSIS — E66.01 MORBID OBESITY: ICD-10-CM

## 2019-01-14 PROCEDURE — 99999 PR PBB SHADOW E&M-EST. PATIENT-LVL V: CPT | Mod: PBBFAC,,, | Performed by: NURSE PRACTITIONER

## 2019-01-14 PROCEDURE — 76700 US EXAM ABDOM COMPLETE: CPT | Mod: 26,,, | Performed by: INTERNAL MEDICINE

## 2019-01-14 PROCEDURE — 99999 PR PBB SHADOW E&M-EST. PATIENT-LVL V: ICD-10-PCS | Mod: PBBFAC,,, | Performed by: NURSE PRACTITIONER

## 2019-01-14 PROCEDURE — 99213 PR OFFICE/OUTPT VISIT, EST, LEVL III, 20-29 MIN: ICD-10-PCS | Mod: S$GLB,,, | Performed by: NURSE PRACTITIONER

## 2019-01-14 PROCEDURE — 90471 HEPATITIS A HEPATITIS B COMBINED VACCINE IM: ICD-10-PCS | Mod: S$GLB,,, | Performed by: INTERNAL MEDICINE

## 2019-01-14 PROCEDURE — 90636 HEP A/HEP B VACC ADULT IM: CPT | Mod: S$GLB,,, | Performed by: INTERNAL MEDICINE

## 2019-01-14 PROCEDURE — 76700 US EXAM ABDOM COMPLETE: CPT | Mod: TC

## 2019-01-14 PROCEDURE — 3008F PR BODY MASS INDEX (BMI) DOCUMENTED: ICD-10-PCS | Mod: CPTII,S$GLB,, | Performed by: NURSE PRACTITIONER

## 2019-01-14 PROCEDURE — 3078F PR MOST RECENT DIASTOLIC BLOOD PRESSURE < 80 MM HG: ICD-10-PCS | Mod: CPTII,S$GLB,, | Performed by: NURSE PRACTITIONER

## 2019-01-14 PROCEDURE — 3008F BODY MASS INDEX DOCD: CPT | Mod: CPTII,S$GLB,, | Performed by: NURSE PRACTITIONER

## 2019-01-14 PROCEDURE — 90471 IMMUNIZATION ADMIN: CPT | Mod: S$GLB,,, | Performed by: INTERNAL MEDICINE

## 2019-01-14 PROCEDURE — 3078F DIAST BP <80 MM HG: CPT | Mod: CPTII,S$GLB,, | Performed by: NURSE PRACTITIONER

## 2019-01-14 PROCEDURE — 99213 OFFICE O/P EST LOW 20 MIN: CPT | Mod: S$GLB,,, | Performed by: NURSE PRACTITIONER

## 2019-01-14 PROCEDURE — 91200 PR LIVER ELASTOGRAPHY W/OUT IMAG W/INTERP & REPORT: ICD-10-PCS | Mod: S$GLB,,, | Performed by: NURSE PRACTITIONER

## 2019-01-14 PROCEDURE — 99999 PR PBB SHADOW E&M-EST. PATIENT-LVL I: CPT | Mod: PBBFAC,,,

## 2019-01-14 PROCEDURE — 90636 HEPATITIS A HEPATITIS B COMBINED VACCINE IM: ICD-10-PCS | Mod: S$GLB,,, | Performed by: INTERNAL MEDICINE

## 2019-01-14 PROCEDURE — 91200 LIVER ELASTOGRAPHY: CPT | Mod: S$GLB,,, | Performed by: NURSE PRACTITIONER

## 2019-01-14 PROCEDURE — 3077F PR MOST RECENT SYSTOLIC BLOOD PRESSURE >= 140 MM HG: ICD-10-PCS | Mod: CPTII,S$GLB,, | Performed by: NURSE PRACTITIONER

## 2019-01-14 PROCEDURE — 76700 US ABDOMEN COMPLETE: ICD-10-PCS | Mod: 26,,, | Performed by: INTERNAL MEDICINE

## 2019-01-14 PROCEDURE — 99999 PR PBB SHADOW E&M-EST. PATIENT-LVL I: ICD-10-PCS | Mod: PBBFAC,,,

## 2019-01-14 PROCEDURE — 3077F SYST BP >= 140 MM HG: CPT | Mod: CPTII,S$GLB,, | Performed by: NURSE PRACTITIONER

## 2019-01-14 NOTE — PROGRESS NOTES
[facility] Established Patient Visit    Reason for Visit:  F/u NAFLD    PCP: Rangel Floyd    HPI:  This is a 53 y.o. female here for f/u of NAFLD. She was last seen 3/2017. She had gastric sleeve with Dr. Ha on 2/6/17. Liver biopsy was done with surgery that revealed FERRELL with stage 1 fibrosis. She had elevated transaminases that improved with weight loss. Her enzymes have remained normal since last year. She has normal synthetic liver functioning. She has regained weight since her surgery. She is not following bariatric diet and not exercising. Reports she needs back surgery and cannot exercise. She is not immune to hepatitis A and B vaccines and she is starting vaccines today. She has no findings to suggest cirrhosis on her workup to date. She had u/s and Fibroscan today. Fibroscan = F2, S3. She cannot have MRI d/t hardware in her back. U/s today pending.        FINAL PATHOLOGIC DIAGNOSIS  1. LIVER, RANDOM (WEDGE BIOPSY):  Mild steatohepatitis with pericellular fibrosis (stage 1 of 4)  Mixed Steatosis, 30%  Well-formed nonnecrotizing granulomata, periportal and lobular distribution  No hepatocyte iron  GMS and AFB stains pending  Supplemental Diagnosis  This supplemental is issued report the findings of ancillary stains with appropriate controls. The diagnoses remain  the same.  AFB: No acid fast organisms  GMS: No fungal organisms        ROS:   GENERAL: Denies fever, chills, (+) weight gain, (+) fatigue  HEENT: Denies headaches, dizziness, vision/hearing changes  CARDIOVASCULAR: Denies chest pain, palpitations, or edema  RESPIRATORY: Denies dyspnea, cough  GI: Denies abdominal pain, rectal bleeding, nausea, vomiting. No change in bowel pattern or color  : Denies dysuria, hematuria   SKIN: Denies rash, itching   NEURO: Denies confusion, memory loss, or mood changes, (+) back pain  PSYCH: Denies depression or anxiety  HEME/LYMPH: Denies easy bruising or bleeding      PMHX:  has a past  "medical history of Allergy, Arthritis, Blood transfusion, Chronic back pain, GERD (gastroesophageal reflux disease), Hyperlipidemia, Hypertension, Hypothyroidism, Joint pain, Kidney stones, Morbid obesity (12/14/2012), NAFLD (nonalcoholic fatty liver disease), OCD (obsessive compulsive disorder), Osteoporosis, PONV (postoperative nausea and vomiting), Restless leg syndrome, Sciatica of right side associated with disorder of lumbosacral spine, SOB (shortness of breath) on exertion, Spinal stenosis of lumbar region, Supraventricular tachycardia, and Thoracic spondylosis.    PSHX:  has a past surgical history that includes Spinal fusion; TONSILLECTOMY, ADENOIDECTOMY; Appendectomy; Cholecystectomy; Hernia repair; Hysterectomy; Liver biopsy (02/06/2017); Back surgery; myelography (N/A, 11/29/2018); Gastrectomy; and myelography (N/A, 1/7/2019).    The patient's social and family histories were reviewed by me and updated in the appropriate section of the electronic medical record.    Review of patient's allergies indicates:   Allergen Reactions    Adhesive Blisters    Adhesive tape-silicones Dermatitis     The longer the adhesive stays on, the worse the irritation    Bactrim [sulfamethoxazole-trimethoprim] Anxiety     Has a "nervous feeling."    Mastisol adhesive [gum jkljhz-tdsczm-cllm-alcohol] Blisters     Current Outpatient Medications on File Prior to Visit   Medication Sig Dispense Refill    alprazolam (XANAX) 0.5 MG tablet Take 0.5 mg by mouth 2 (two) times daily as needed for Insomnia or Anxiety.       ascorbic acid, vitamin C, (VITAMIN C) 500 MG tablet Take 500 mg by mouth every morning.      aspirin (ECOTRIN) 81 MG EC tablet Take 81 mg by mouth nightly.       BIOTIN ORAL Take 10,000 mg by mouth every morning.       fish oil-omega-3 fatty acids 300-1,000 mg capsule Take 1,200 mg by mouth 2 (two) times daily.       fluoxetine (PROZAC) 40 MG capsule Take 40 mg by mouth every morning.       gabapentin " "(NEURONTIN) 800 MG tablet Take 800 mg by mouth 2 (two) times daily. Takes 2-3 times per day  1    levothyroxine (SYNTHROID) 150 MCG tablet Take 1 tablet (150 mcg total) by mouth once daily. (Patient taking differently: Take 150 mcg by mouth every morning. ) 30 tablet 11    lisinopril (PRINIVIL,ZESTRIL) 2.5 MG tablet Take 2.5 mg by mouth every morning.   2    multivitamin capsule Take 1 capsule by mouth every morning.       oxycodone-acetaminophen (PERCOCET)  mg per tablet Take one to one and a half tablets by mouth every 4hours as needed for pain. (Patient taking differently: Take 1 tablet by mouth every 6 (six) hours. Take one to one and a half tablets by mouth every 4hours as needed for pain.) 81 tablet 0    pravastatin (PRAVACHOL) 40 MG tablet Take 40 mg by mouth nightly.      zolpidem (AMBIEN) 10 mg Tab Take 10 mg by mouth nightly.        No current facility-administered medications on file prior to visit.           Objective Findings:    PHYSICAL EXAM:   Friendly White female, in no acute distress; alert and oriented to person, place and time  VITALS: BP (!) 146/70 (BP Location: Right arm, Patient Position: Sitting, BP Method: Medium (Automatic))   Pulse 81   Resp 18   Ht 5' 2" (1.575 m)   Wt 107.1 kg (236 lb 1.8 oz)   LMP 12/06/2007   SpO2 (!) 94%   BMI 43.19 kg/m²   HENT: Normocephalic, without obvious abnormality. Oral mucosa pink and moist. Dentition good.  EYES: Sclerae anicteric.   NECK: Supple.   CARDIOVASCULAR: Regular rate and rhythm. No murmurs.  RESPIRATORY: Normal respiratory effort. BBS CTA. No wheezes or crackles.  GI: Obese, soft, non-tender, non-distended.  EXTREMITIES:  No clubbing, cyanosis or edema.  SKIN: Warm and dry. No jaundice. No rashes noted to exposed skin. No telangectasias noted. No palmar erythema.  NEURO:  Normal gate.   PSYCH:  Memory intact. Thought and speech pattern appropriate. Behavior normal. No depression or anxiety noted.      Labs:  Lab Results "   Component Value Date    WBC 9.09 01/07/2019    WBC 8.99 01/07/2019    HGB 13.3 01/07/2019    HGB 13.6 01/07/2019    HCT 42.3 01/07/2019    HCT 42.5 01/07/2019     01/07/2019     01/07/2019    CHOL 209 (H) 01/07/2019    TRIG 209 (H) 01/07/2019    HDL 56 01/07/2019     01/07/2019     01/07/2019    K 4.6 01/07/2019    K 4.2 01/07/2019    CREATININE 0.9 01/07/2019    CREATININE 0.8 01/07/2019    ALT 16 01/07/2019    ALT 16 01/07/2019    AST 16 01/07/2019    AST 16 01/07/2019    ALKPHOS 88 01/07/2019    ALKPHOS 89 01/07/2019    BILITOT 0.4 01/07/2019    BILITOT 0.4 01/07/2019    ALBUMIN 3.9 01/07/2019    ALBUMIN 3.8 01/07/2019    INR 1.0 01/07/2019    AFP 1.8 11/16/2016       ASSESSMENT:  53 y.o. White female with:  1.  NAFLD  -- transaminases currently normal, were mildly elevated  -- US 10/2016 shows hepatosplenomegaly with steatosis  -- synthetic liver function nl  -- risk factors for fatty liver include morbid obesity, HTN, HLD  -- liver biopsy - 2/6/17 - steatohepatitis with stage 1 fibrosis  2. Morbid obesity  3. s/p bariatric surgery  -- not following bariatric diet and not exercising d/t back pain so has regained weight      EDUCATION:   We discussed the manifestations of NAFLD. At this time there is no FDA approved therapy for NAFLD.   The patient and I discussed the importance of diet, exercise, and weight loss for management of NAFLD. We discussed a low fat, low carb/low sugar, high fiber diet, and a goal of 30 minutes of exercise 5 times per week.   Pt is aware that fatty liver can progress to steatohepatitis and possibly to cirrhosis, putting one at increased risk for liver cancer, liver failure, and death.        PLAN:  1. Twinrix vaccines, start today  2. Continue efforts at weight loss  3. Await u/s results  4. Low fat, low cholesterol, low carb, high fiber, high protein diet  5. Exercise, 5 days a week, 30 min a day as tolerated  6. Recommend good control of cholesterol, blood  pressure, blood sugar levels  7. Can f/u in 2 years with u/s, labs, and Fibroscan same day    Thank you for allowing me to participate in the care of ZAIRA HenryC

## 2019-01-14 NOTE — PROCEDURES
Fibroscan Procedure     Name: Angie Mccarthy  Date of Procedure : 2019   :: Rola Ellsworth NP  Diagnosis: NAFLD    Probe: XL    Fibroscan readin.3 KPa    Fibrosis:F2     CAP readin dB/m    Steatosis: :S3

## 2019-02-12 ENCOUNTER — HOSPITAL ENCOUNTER (OUTPATIENT)
Dept: RADIOLOGY | Facility: HOSPITAL | Age: 54
Discharge: HOME | End: 2019-02-12
Attending: ORTHOPAEDIC SURGERY
Payer: MEDICARE

## 2019-02-12 ENCOUNTER — OFFICE VISIT (OUTPATIENT)
Dept: ORTHOPEDICS | Facility: CLINIC | Age: 54
End: 2019-02-12
Payer: MEDICARE

## 2019-02-12 VITALS — HEIGHT: 62 IN | WEIGHT: 240.94 LBS | BODY MASS INDEX: 44.34 KG/M2

## 2019-02-12 DIAGNOSIS — Z86.19 HX OF INFECTION: ICD-10-CM

## 2019-02-12 DIAGNOSIS — M17.9 OSTEOARTHRITIS OF KNEE, UNSPECIFIED (CODE): ICD-10-CM

## 2019-02-12 DIAGNOSIS — G89.29 CHRONIC PAIN OF BOTH KNEES: Primary | ICD-10-CM

## 2019-02-12 DIAGNOSIS — M25.562 CHRONIC PAIN OF BOTH KNEES: ICD-10-CM

## 2019-02-12 DIAGNOSIS — M25.561 CHRONIC PAIN OF BOTH KNEES: ICD-10-CM

## 2019-02-12 DIAGNOSIS — Z12.39 SCREENING BREAST EXAMINATION: Primary | ICD-10-CM

## 2019-02-12 DIAGNOSIS — M25.561 CHRONIC PAIN OF BOTH KNEES: Primary | ICD-10-CM

## 2019-02-12 DIAGNOSIS — G89.29 CHRONIC PAIN OF BOTH KNEES: ICD-10-CM

## 2019-02-12 DIAGNOSIS — M25.562 CHRONIC PAIN OF BOTH KNEES: Primary | ICD-10-CM

## 2019-02-12 PROCEDURE — 73560 XR KNEE 1 OR 2 VIEW LEFT: ICD-10-PCS | Mod: 26,LT,, | Performed by: RADIOLOGY

## 2019-02-12 PROCEDURE — 73562 X-RAY EXAM OF KNEE 3: CPT | Mod: 26,50,, | Performed by: RADIOLOGY

## 2019-02-12 PROCEDURE — 73562 X-RAY EXAM OF KNEE 3: CPT | Mod: TC,50

## 2019-02-12 PROCEDURE — 99214 PR OFFICE/OUTPT VISIT, EST, LEVL IV, 30-39 MIN: ICD-10-PCS | Mod: S$GLB,,, | Performed by: ORTHOPAEDIC SURGERY

## 2019-02-12 PROCEDURE — 73560 X-RAY EXAM OF KNEE 1 OR 2: CPT | Mod: TC,LT

## 2019-02-12 PROCEDURE — 3008F PR BODY MASS INDEX (BMI) DOCUMENTED: ICD-10-PCS | Mod: CPTII,S$GLB,, | Performed by: ORTHOPAEDIC SURGERY

## 2019-02-12 PROCEDURE — 99999 PR PBB SHADOW E&M-EST. PATIENT-LVL III: ICD-10-PCS | Mod: PBBFAC,,, | Performed by: ORTHOPAEDIC SURGERY

## 2019-02-12 PROCEDURE — 73560 X-RAY EXAM OF KNEE 1 OR 2: CPT | Mod: 26,LT,, | Performed by: RADIOLOGY

## 2019-02-12 PROCEDURE — 99214 OFFICE O/P EST MOD 30 MIN: CPT | Mod: S$GLB,,, | Performed by: ORTHOPAEDIC SURGERY

## 2019-02-12 PROCEDURE — 3008F BODY MASS INDEX DOCD: CPT | Mod: CPTII,S$GLB,, | Performed by: ORTHOPAEDIC SURGERY

## 2019-02-12 PROCEDURE — 99999 PR PBB SHADOW E&M-EST. PATIENT-LVL III: CPT | Mod: PBBFAC,,, | Performed by: ORTHOPAEDIC SURGERY

## 2019-02-12 PROCEDURE — 73562 XR KNEE ORTHO BILAT: ICD-10-PCS | Mod: 26,50,, | Performed by: RADIOLOGY

## 2019-02-12 RX ORDER — TIZANIDINE 4 MG/1
TABLET ORAL
Refills: 1 | Status: ON HOLD | COMMUNITY
Start: 2019-02-06 | End: 2019-12-21 | Stop reason: HOSPADM

## 2019-02-12 RX ORDER — FLUCONAZOLE 150 MG/1
TABLET ORAL
Refills: 0 | COMMUNITY
Start: 2019-01-29 | End: 2020-01-16

## 2019-02-12 RX ORDER — CYCLOBENZAPRINE HCL 10 MG
TABLET ORAL
Refills: 0 | COMMUNITY
Start: 2019-02-06

## 2019-02-12 NOTE — PROGRESS NOTES
HPI:    Angie Mccarthy is a 53 y.o. female who is here today for left knee pain that has been going on for over one year. It has progressively worsened. She also has a hsitory of back pain with a PSF with instrumentation. Her rods are currently broken at multiple levels and has planned revision surgery. However, she's wishing to proceed with TKA prior to the spine surgery. She is over all healthy, has  A BMI of 44.    Chief Complaint   Patient presents with    Right Knee - Pain    Left Knee - Pain   .     Duration: 12 months  Intensity: mild  Character of pain: sharp  Location: She reports that the pain is predominately  medial  Patient's pain increases with activity.  Pain is increased with weightbearing and interferes with activities of daily living.    She has tried conservative management including NSAIDS, injections, and activity modification without relief.    She has discussed options with her family and wishes to schedule TKA.     PMSFSH reviewed per clinic record       Past Medical History:   Diagnosis Date    Allergy     seasonal    Arthritis     Blood transfusion     Chronic back pain     GERD (gastroesophageal reflux disease)     Hyperlipidemia     Hypertension     Hypothyroidism     Thyroid nodule    Joint pain     Kidney stones     Morbid obesity 12/14/2012    NAFLD (nonalcoholic fatty liver disease)     OCD (obsessive compulsive disorder)     Osteoporosis     PONV (postoperative nausea and vomiting)     Persistent, Motion sickness with boat rides, Scopolamine helped -still had nausea    Restless leg syndrome     Sciatica of right side associated with disorder of lumbosacral spine     SOB (shortness of breath) on exertion     Spinal stenosis of lumbar region     Supraventricular tachycardia     Thoracic spondylosis           Current Outpatient Medications:     alprazolam (XANAX) 0.5 MG tablet, Take 0.5 mg by mouth 2 (two) times daily as needed for Insomnia or Anxiety.  ", Disp: , Rfl:     ascorbic acid, vitamin C, (VITAMIN C) 500 MG tablet, Take 500 mg by mouth every morning., Disp: , Rfl:     aspirin (ECOTRIN) 81 MG EC tablet, Take 81 mg by mouth nightly. , Disp: , Rfl:     BIOTIN ORAL, Take 10,000 mg by mouth every morning. , Disp: , Rfl:     fish oil-omega-3 fatty acids 300-1,000 mg capsule, Take 1,200 mg by mouth 2 (two) times daily. , Disp: , Rfl:     fluoxetine (PROZAC) 40 MG capsule, Take 40 mg by mouth every morning. , Disp: , Rfl:     gabapentin (NEURONTIN) 800 MG tablet, Take 800 mg by mouth 2 (two) times daily. Takes 2-3 times per day, Disp: , Rfl: 1    lisinopril (PRINIVIL,ZESTRIL) 2.5 MG tablet, Take 2.5 mg by mouth every morning. , Disp: , Rfl: 2    multivitamin capsule, Take 1 capsule by mouth every morning. , Disp: , Rfl:     oxycodone-acetaminophen (PERCOCET)  mg per tablet, Take one to one and a half tablets by mouth every 4hours as needed for pain. (Patient taking differently: Take 1 tablet by mouth every 6 (six) hours. Take one to one and a half tablets by mouth every 4hours as needed for pain.), Disp: 81 tablet, Rfl: 0    pravastatin (PRAVACHOL) 40 MG tablet, Take 40 mg by mouth nightly., Disp: , Rfl:     zolpidem (AMBIEN) 10 mg Tab, Take 10 mg by mouth nightly. , Disp: , Rfl:     cyclobenzaprine (FLEXERIL) 10 MG tablet, TK 1 T PO QD, Disp: , Rfl: 0    FLUCELVAX QUAD [id number] 60 mcg (15 mcg x 4)/0.5 mL vaccine, ADM 0.5ML IM UTD, Disp: , Rfl: 0    fluconazole (DIFLUCAN) 150 MG Tab, TK 1 T PO UTD, Disp: , Rfl: 0    levothyroxine (SYNTHROID) 150 MCG tablet, Take 1 tablet (150 mcg total) by mouth once daily. (Patient taking differently: Take 150 mcg by mouth every morning. ), Disp: 30 tablet, Rfl: 11    tiZANidine (ZANAFLEX) 4 MG tablet, TK 1 T PO QHS, Disp: , Rfl: 1     Review of patient's allergies indicates:   Allergen Reactions    Mastisol adhesive [gum umwtps-fgolqm-huno-alcohol] Itching, Rash and Blisters     "Looked like poison ivy" " "   Adhesive Dermatitis and Blisters    Adhesive tape-silicones Dermatitis     The longer the adhesive stays on, the worse the irritation    Sulfamethoxazole-trimethoprim Anxiety and Itching     Has a "nervous feeling."  "Jittery"  Has taken recently and the reaction was "less intense"        ROS  Constitutional: Negative for fever, Negative for weight loss  HENT Negative for congestion  Cardiovascular: Negative chest pain  Respiratory: Negative Shortness of breath  Heme: Negative excessive bleeding  Skin:NegativeItching, Negative breakdown  Musculoskeletal:Negative for back pain, Positive for joint pain, Negative muscle pain, Negative muscle weakness  Neurological: Negative for numbness and paresthesias   Psychiatric/Behavioral: Negative altered mental status, Negative for depression    Physical Exam:   Ht 5' 2" (1.575 m)   Wt 109.3 kg (240 lb 15.4 oz)   LMP 12/06/2007   BMI 44.07 kg/m²   General appearance: This is a well-developed, Well nourished female No obvious acute distress.  Psychiatric: normal mood and affect;  pleasant and conversant; judgment and thought content normal  Gait is coordinated. Patient has antalgic gait to the left  Cardiovascular: There are no swelling or varicosities present.   Respiratory: normal respiratory effort   Lymphatic: no adenopathy   Neurologic: alert and oriented to person, place, and time   Deep Tendon Reflexes are normal;  Coordination and Balance: Normal   Musculoskeletal   Neck    ROM shows normal flexion and extension and lateral rotation    Palpation: Non-tender    Stability is normal    Strength is normal    Skin is normal without masses and lesions    Sensation is intact to light touch   Back    ROM showsnot examined flexion, extension and     rotation    Palpation shows no masses    Stability is normal    Strength to flexion and extension well maintained    Core strength is diminished    Skin shows no rashes or cafe au lait spots;     Sensation is intact to light " touch  Right hip   Range of motion normal    Left Hip  Range of motionnormal    Right Knee  Swelling None  TendernessNone  Range of Motion: 0-90  Motion is painfulNo  Crepitance presentNo    Right Leg  Neurologic Intact  Pulses Intact    Left Knee: Swelling None  TendernessMedial joint line  Range of Motion: 0-90   Motion is painful Yes    Left Leg   Neurologic Intact  Pulses Intact    Radiograph: Show severe degenerative arthritis with subchondral sclerosis, periarticular osteophytes and narrowing of joint space.  Angle: mild varus    Physical therapy is contraindicated due to potential bone loss on this severe arthritic joint.    Assessment:  Knee arthritis left      She will need to be cleared in our PreOp center.         .    She  has a past medical history of Allergy, Arthritis, Blood transfusion, Chronic back pain, GERD (gastroesophageal reflux disease), Hyperlipidemia, Hypertension, Hypothyroidism, Joint pain, Kidney stones, Morbid obesity (12/14/2012), NAFLD (nonalcoholic fatty liver disease), OCD (obsessive compulsive disorder), Osteoporosis, PONV (postoperative nausea and vomiting), Restless leg syndrome, Sciatica of right side associated with disorder of lumbosacral spine, SOB (shortness of breath) on exertion, Spinal stenosis of lumbar region, Supraventricular tachycardia, and Thoracic spondylosis. . We will have to take this into account. She  will be followed by the hospitalist service while in the hospital.       We have gone over the hospitalization and recovery with her as well.  This is typically around 2 weeks on a walker and transition to a cane after that.  She will have home health likely for 3-4 weeks and then transition to outpatient if necessary.  She is agreement with this plan of care and is ready to proceed.  I will see her back for clinical recheck at the 2-week postop valery.  I will see her back for clinical recheck for any other questions or problems as needed and certainly for any  other issues in the interim.    We have discussed risks of total knee replacement which include but are not limited to blood clots in the legs that can travel to the lungs (pulmonary embolism). Pulmonary embolism can cause shortness of breath, chest pain, and even shock. Other risks include urinary tract infection, nausea and vomiting (usually related to pain medication), chronic knee pain and stiffness, bleeding into the knee joint, nerve damage, blood vessel injury, and infection of the knee which can require re-operation. Furthermore, the risks of anesthesia include potential heart, lung, kidney, and liver damage.    Discussed with patient surgery, plan for OR May 1, 2019 which gives her time to lose weight with aquatic therapy prior to surgery. Discussed that elevated weight increases complication risk of surgery. Also, with prior concern of spinal infection which inded was not infected, will go ahead and order CRP today.

## 2019-02-14 ENCOUNTER — CLINICAL SUPPORT (OUTPATIENT)
Dept: INFECTIOUS DISEASES | Facility: CLINIC | Age: 54
End: 2019-02-14
Payer: MEDICARE

## 2019-02-14 DIAGNOSIS — K74.00 LIVER FIBROSIS: ICD-10-CM

## 2019-02-14 DIAGNOSIS — K76.0 FATTY LIVER DISEASE, NONALCOHOLIC: ICD-10-CM

## 2019-02-14 PROCEDURE — 90636 HEPATITIS A HEPATITIS B COMBINED VACCINE IM: ICD-10-PCS | Mod: S$GLB,,, | Performed by: INTERNAL MEDICINE

## 2019-02-14 PROCEDURE — 90471 IMMUNIZATION ADMIN: CPT | Mod: S$GLB,,, | Performed by: INTERNAL MEDICINE

## 2019-02-14 PROCEDURE — 90471 HEPATITIS A HEPATITIS B COMBINED VACCINE IM: ICD-10-PCS | Mod: S$GLB,,, | Performed by: INTERNAL MEDICINE

## 2019-02-14 PROCEDURE — 99999 PR PBB SHADOW E&M-EST. PATIENT-LVL I: CPT | Mod: PBBFAC,,,

## 2019-02-14 PROCEDURE — 99999 PR PBB SHADOW E&M-EST. PATIENT-LVL I: ICD-10-PCS | Mod: PBBFAC,,,

## 2019-02-14 PROCEDURE — 90636 HEP A/HEP B VACC ADULT IM: CPT | Mod: S$GLB,,, | Performed by: INTERNAL MEDICINE

## 2019-02-20 ENCOUNTER — PATIENT MESSAGE (OUTPATIENT)
Dept: ENDOCRINOLOGY | Facility: CLINIC | Age: 54
End: 2019-02-20

## 2019-02-22 DIAGNOSIS — M17.9 OSTEOARTHRITIS OF KNEE, UNSPECIFIED (CODE): Primary | ICD-10-CM

## 2019-03-08 DIAGNOSIS — Z12.39 SCREENING BREAST EXAMINATION: Primary | ICD-10-CM

## 2019-03-21 ENCOUNTER — HOSPITAL ENCOUNTER (OUTPATIENT)
Dept: RADIOLOGY | Facility: HOSPITAL | Age: 54
Discharge: HOME | End: 2019-03-21
Attending: INTERNAL MEDICINE
Payer: MEDICARE

## 2019-03-21 DIAGNOSIS — Z12.39 SCREENING BREAST EXAMINATION: ICD-10-CM

## 2019-03-21 PROCEDURE — 77067 SCR MAMMO BI INCL CAD: CPT | Mod: 26,,, | Performed by: RADIOLOGY

## 2019-03-21 PROCEDURE — 77067 SCR MAMMO BI INCL CAD: CPT | Mod: TC

## 2019-03-21 PROCEDURE — 77067 MAMMO DIGITAL SCREENING BILAT WITH TOMOSYNTHESIS_CAD: ICD-10-PCS | Mod: 26,,, | Performed by: RADIOLOGY

## 2019-03-21 PROCEDURE — 77063 MAMMO DIGITAL SCREENING BILAT WITH TOMOSYNTHESIS_CAD: ICD-10-PCS | Mod: 26,,, | Performed by: RADIOLOGY

## 2019-03-21 PROCEDURE — 77063 BREAST TOMOSYNTHESIS BI: CPT | Mod: 26,,, | Performed by: RADIOLOGY

## 2019-04-01 ENCOUNTER — PATIENT MESSAGE (OUTPATIENT)
Dept: BARIATRICS | Facility: CLINIC | Age: 54
End: 2019-04-01

## 2019-04-01 ENCOUNTER — TELEPHONE (OUTPATIENT)
Dept: BARIATRICS | Facility: CLINIC | Age: 54
End: 2019-04-01

## 2019-04-01 NOTE — TELEPHONE ENCOUNTER
----- Message from Damon Cox sent at 4/1/2019  3:13 PM CDT -----  Contact: Pt  Needs Advice    Reason for call:Pt states that she has a question that the PA or Rn can answer and didn't want to get into the story.  The Pt states it's a quick question she has.        Communication Preference:[phone]    Additional Information:

## 2019-04-02 NOTE — PROGRESS NOTES
Cardiology Clinic Note  Reason for Visit: palpitations    HPI:     Angie Mccarthy is a 53 y.o. F with HTN, HLD, who presents for palpitations.     She reports that she is sedentary due to progressively worsening knee pain as well as back pain. Upon exertion, her heart rate increases to 120s and she feels exhausted and short of breath. Even activities such as showering wipes her out. She also reports symptoms at rest on occasion.     She is awaiting knee replacement surgery but was asked to lose weight prior to the surgery. Since that time, she has gained 10lb.     Medical: HTN, HLD, hypothyroidism, non-alcoholic fatty liver disease  Surgical: appy, back surgery x6, shahida, gastric sleeve, tonsils, adenoidectomy, oophorectomy  Family: heart disease, cancer, COPD  Social: never smoked, social alcohol use; sister of Ena and daughter of Kameron Hall    ROS:    Constitution: Negative for fever or chills.  HENT: Negative for  headaches.  Eyes: Negative for blurred vision.   Cardiovascular: See above  Pulmonary: Positive for SOB. Negative for cough.   Gastrointestinal: Negative for nausea/vomiting.   : Negative for dysuria.   Skin: Negative for rashes.  Neurological: Negative for focal weakness.  Psychological: Negative for depression.  PMH:     Past Medical History:   Diagnosis Date    Allergy     seasonal    Arthritis     Blood transfusion     Chronic back pain     GERD (gastroesophageal reflux disease)     Hyperlipidemia     Hypertension     Hypothyroidism     Thyroid nodule    Joint pain     Kidney stones     Morbid obesity 12/14/2012    NAFLD (nonalcoholic fatty liver disease)     OCD (obsessive compulsive disorder)     Osteoporosis     PONV (postoperative nausea and vomiting)     Persistent, Motion sickness with boat rides, Scopolamine helped -still had nausea    Restless leg syndrome     Sciatica of right side associated with disorder of lumbosacral spine     SOB (shortness of  "breath) on exertion     Spinal stenosis of lumbar region     Supraventricular tachycardia     Thoracic spondylosis      Past Surgical History:   Procedure Laterality Date    APPENDECTOMY      BACK SURGERY      x 6    BIOPSY-LIVER-LAPAROSCOPIC - 79828 N/A 2/6/2017    Performed by [facility] at [facility] OR 2ND FLR    CHOLECYSTECTOMY      CLOSURE N/A 2/14/2013    Performed by Addison Staton MD at [facility] OR 2ND FLR    CREATION, FLAP, MUSCLE ROTATION N/A 2/14/2013    Performed by [facility] at [facility] OR 2ND FLR    ESOPHAGOGASTRODUODENOSCOPY (EGD) N/A 8/8/2016    Performed by Eliceo Wilhelm MD at [facility] ENDO (4TH FLR)    FUSION, SPINE N/A 2/14/2013    Performed by Junior Hilton MD at [facility] OR 2ND FLR    GASTRECTOMY      Lap Gastric Sleeve    GASTRECTOMY-SLEEVE-LAPAROSCOPIC - 49088 w/ intraop egd N/A 2/6/2017    Performed by [facility]on Ha MD at [facility] OR 2ND FLR    HERNIA REPAIR      HYSTERECTOMY      INJECTION, STEROID, SPINE, LUMBAR, EPIDURAL N/A 10/4/2013    Performed by Dosc Diagnostic Provider at [facility] CATH LAB    INJECTION, STEROID, SPINE, LUMBAR, EPIDURAL N/A 8/28/2013    Performed by Dos Diagnostic Provider at [facility] CATH LAB    LIVER BIOPSY  02/06/2017    steatohepatitis with stage 1 fibrosis    Myelogram N/A 1/7/2019    Performed by Lissett Surgeon at [facility] LISSETT    MYELOGRAM N/A 11/29/2018    Performed by Dos Diagnostic Provider at [facility] OR 2ND FLR    MYELOGRAM N/A 6/24/2014    Performed by Lissett Surgeon at [facility] LIS[facility]T    OOPHORECTOMY      SPINAL FUSION      TONSILLECTOMY, ADENOIDECTOMY       Allergies:     Review of patient's allergies indicates:   Allergen Reactions    Mastisol adhesive [gum nwnrxy-ntzphj-iato-alcohol] Itching, Rash and Blisters     "Looked like poison ivy"    Adhesive Dermatitis and Blisters    Adhesive tape-silicones Dermatitis     The longer the adhesive stays on, the worse the irritation    Sulfamethoxazole-trimethoprim Anxiety " "and Itching     Has a "nervous feeling."  "Jittery"  Has taken recently and the reaction was "less intense"     Medications:     Current Outpatient Medications on File Prior to Visit   Medication Sig Dispense Refill    alprazolam (XANAX) 0.5 MG tablet Take 0.5 mg by mouth 2 (two) times daily as needed for Insomnia or Anxiety.       ascorbic acid, vitamin C, (VITAMIN C) 500 MG tablet Take 500 mg by mouth every morning.      aspirin (ECOTRIN) 81 MG EC tablet Take 81 mg by mouth nightly.       BIOTIN ORAL Take 10,000 mg by mouth every morning.       cyclobenzaprine (FLEXERIL) 10 MG tablet TK 1 T PO QD  0    fish oil-omega-3 fatty acids 300-1,000 mg capsule Take 1,200 mg by mouth 2 (two) times daily.       FLUCELVAX QUAD 2520-6222 60 mcg (15 mcg x 4)/0.5 mL vaccine ADM 0.5ML IM UTD  0    fluconazole (DIFLUCAN) 150 MG Tab TK 1 T PO UTD  0    fluoxetine (PROZAC) 40 MG capsule Take 40 mg by mouth every morning.       gabapentin (NEURONTIN) 800 MG tablet Take 800 mg by mouth 2 (two) times daily. Takes 2-3 times per day  1    levothyroxine (SYNTHROID) 150 MCG tablet Take 1 tablet (150 mcg total) by mouth once daily. (Patient taking differently: Take 150 mcg by mouth every morning. ) 30 tablet 11    lisinopril (PRINIVIL,ZESTRIL) 2.5 MG tablet Take 2.5 mg by mouth every morning.   2    multivitamin capsule Take 1 capsule by mouth every morning.       oxycodone-acetaminophen (PERCOCET)  mg per tablet Take one to one and a half tablets by mouth every 4hours as needed for pain. (Patient taking differently: Take 1 tablet by mouth every 6 (six) hours. Take one to one and a half tablets by mouth every 4hours as needed for pain.) 81 tablet 0    pravastatin (PRAVACHOL) 40 MG tablet Take 40 mg by mouth nightly.      tiZANidine (ZANAFLEX) 4 MG tablet TK 1 T PO QHS  1    zolpidem (AMBIEN) 10 mg Tab Take 10 mg by mouth nightly.        No current facility-administered medications on file prior to visit.      Social " "History:     Social History     Tobacco Use    Smoking status: Never Smoker    Smokeless tobacco: Never Used   Substance Use Topics    Alcohol use: Yes     Comment: socially     Family History:     Family History   Problem Relation Age of Onset    Heart disease Mother     Heart disease Father     Cancer Father     COPD Father     Heart disease Maternal Grandmother     Acne Other     Eczema Other     Heart disease Maternal Aunt     Heart disease Maternal Uncle     Heart disease Paternal Aunt     Breast cancer Paternal Aunt     Heart disease Paternal Uncle     Cancer Paternal Uncle     Melanoma Neg Hx     Psoriasis Neg Hx     Lupus Neg Hx      Physical Exam:   BP (!) 149/67   Pulse 109   Ht 5' 5" (1.651 m)   Wt 109.7 kg (241 lb 13.5 oz)   LMP 12/06/2007   SpO2 97%   BMI 40.25 kg/m²      Constitutional: No apparent distress, conversant  HEENT: Sclera anicteric, extraocular movements intact  Neck: No jugular venous distension, no carotid bruits  CV: Regular rate and rhythm, no murmurs rubs or gallops, normal S1/S2  Pulm: Clear to auscultation bilaterally  GI: Abdomen soft, obese, no palpable masses  Extremities: No lower extremity edema, warm with palpable pulses  Skin: No ecchymosis, erythema, or ulcers  Psych: Alert and oriented to person place location, appropriate affect  Neuro: No focal deficits    Labs:     Blood Tests:  Lab Results   Component Value Date     01/07/2019     01/07/2019    K 4.6 01/07/2019    K 4.2 01/07/2019     01/07/2019     01/07/2019    CO2 29 01/07/2019    CO2 29 01/07/2019    BUN 16 01/07/2019    BUN 16 01/07/2019    CREATININE 0.9 01/07/2019    CREATININE 0.8 01/07/2019    GLU 98 01/07/2019    GLU 98 01/07/2019    HGBA1C 5.9 08/08/2016    MG 1.9 09/15/2018    AST 16 01/07/2019    AST 16 01/07/2019    ALT 16 01/07/2019    ALT 16 01/07/2019    ALBUMIN 3.9 01/07/2019    ALBUMIN 3.8 01/07/2019    PROT 7.7 01/07/2019    PROT 7.7 01/07/2019    " BILITOT 0.4 2019    BILITOT 0.4 2019    WBC 9.09 2019    WBC 8.99 2019    HGB 13.3 2019    HGB 13.6 2019    HCT 42.3 2019    HCT 42.5 2019    HCT 28 (L) 2013    MCV 79 (L) 2019    MCV 80 (L) 2019     2019     2019    INR 1.0 2019    TSH 12.164 (H) 2019       Lab Results   Component Value Date    CHOL 209 (H) 2019    HDL 56 2019    TRIG 209 (H) 2019       Lab Results   Component Value Date    LDLCALC 111.2 2019       Urine Tests:  Lab Results   Component Value Date    COLORU Yellow 09/15/2018    APPEARANCEUA Clear 09/15/2018    PHUR 7.0 09/15/2018    SPECGRAV 1.010 09/15/2018    PROTEINUA Negative 09/15/2018    GLUCUA Negative 09/15/2018    KETONESU Negative 09/15/2018    BILIRUBINUA Negative 09/15/2018    OCCULTUA 2+ (A) 09/15/2018    NITRITE Negative 09/15/2018    UROBILINOGEN Negative 09/15/2018    LEUKOCYTESUR 2+ (A) 09/15/2018    CREATRANDUR 93 10/02/2009       Imaging:     Echocardiogram  None    Stress testing  PET 10/26/16  CONCLUSIONS: NORMAL MYOCARDIAL PERFUSION PET STRESS TEST  1. The perfusion scan is free of evidence for myocardial ischemia or injury.   2. Resting wall motion is physiologic. Stress wall motion is physiologic.   3. Visually estimated LV function is normal at rest and normal at stress.   4. The ventricular volumes are normal at rest and stress.   5. The extracardiac distribution of radioactivity is normal.   6. There was no previous study available to compare.    Cath Lab  None    Other  None    EK/15/18  Possible Left atrial enlargement  Right ventricular conduction delay  Prolonged QT  Abnormal ECG    Assessment:     1. Palpitations    2. Essential hypertension    3. Hyperlipidemia, unspecified hyperlipidemia type    4. S/P laparoscopic sleeve gastrectomy      Plan:     Palpitations, Fatigue   Most likely due to obesity and deconditioning, thyroid disease  (TSH increased from 0.6 to 12 in 6 months)  Normal PET stress in 10/2016. Not ischemic in etiology.  Continue 48h holter monitor    Essential hypertension  On lisinopril 2.5mg daily    Hyperlipidemia, unspecified hyperlipidemia type  Discussed Mediterranean diet and regular aerobic exercise of 30-45 minutes/day, 5-7x/week  Recommended pool exercises given significant ortho issues  Continue pravastatin 40mg daily, fish oil-omega 3 (discussed low TG diet)  Lipid panel 1/7/19 with ,     S/P laparoscopic sleeve gastrectomy    Signed:  Ambrosio Naranjo MD  Cardiology     4/3/2019 6:51 PM    Follow-up:     Future Appointments   Date Time Provider Department Center   4/3/2019  5:30 PM Junior Naranjo III, MD [facility] CARDIO [address]   4/23/2019  9:50 AM LAB, APPOINTMENT [facility] LAB VNP [facility]   4/23/2019 11:00 AM Dolly Castillo PA-C [facility] ORTHO [address]   5/14/2019 11:00 AM Dolly Castillo PA-C [facility] ORTHO [address]   7/15/2019  4:15 PM INJECTION, INFECTIOUS DISEASES [facility] ID INJ [address]   7/22/2019  1:30 PM Paty Sanchez MD [facility] ENDODIA [address]

## 2019-04-03 ENCOUNTER — OFFICE VISIT (OUTPATIENT)
Dept: CARDIOLOGY | Facility: CLINIC | Age: 54
End: 2019-04-03
Payer: MEDICARE

## 2019-04-03 VITALS
SYSTOLIC BLOOD PRESSURE: 149 MMHG | DIASTOLIC BLOOD PRESSURE: 67 MMHG | BODY MASS INDEX: 40.3 KG/M2 | OXYGEN SATURATION: 97 % | HEART RATE: 109 BPM | HEIGHT: 65 IN | WEIGHT: 241.88 LBS

## 2019-04-03 DIAGNOSIS — E78.5 HYPERLIPIDEMIA, UNSPECIFIED HYPERLIPIDEMIA TYPE: Chronic | ICD-10-CM

## 2019-04-03 DIAGNOSIS — I10 ESSENTIAL HYPERTENSION: Chronic | ICD-10-CM

## 2019-04-03 DIAGNOSIS — Z98.84 S/P LAPAROSCOPIC SLEEVE GASTRECTOMY: ICD-10-CM

## 2019-04-03 DIAGNOSIS — R00.2 PALPITATIONS: Primary | ICD-10-CM

## 2019-04-03 PROCEDURE — 99203 PR OFFICE/OUTPT VISIT, NEW, LEVL III, 30-44 MIN: ICD-10-PCS | Mod: S$GLB,,, | Performed by: INTERNAL MEDICINE

## 2019-04-03 PROCEDURE — 3078F PR MOST RECENT DIASTOLIC BLOOD PRESSURE < 80 MM HG: ICD-10-PCS | Mod: CPTII,S$GLB,, | Performed by: INTERNAL MEDICINE

## 2019-04-03 PROCEDURE — 3077F PR MOST RECENT SYSTOLIC BLOOD PRESSURE >= 140 MM HG: ICD-10-PCS | Mod: CPTII,S$GLB,, | Performed by: INTERNAL MEDICINE

## 2019-04-03 PROCEDURE — 3078F DIAST BP <80 MM HG: CPT | Mod: CPTII,S$GLB,, | Performed by: INTERNAL MEDICINE

## 2019-04-03 PROCEDURE — 3077F SYST BP >= 140 MM HG: CPT | Mod: CPTII,S$GLB,, | Performed by: INTERNAL MEDICINE

## 2019-04-03 PROCEDURE — 99999 PR PBB SHADOW E&M-EST. PATIENT-LVL V: ICD-10-PCS | Mod: PBBFAC,,, | Performed by: INTERNAL MEDICINE

## 2019-04-03 PROCEDURE — 3008F PR BODY MASS INDEX (BMI) DOCUMENTED: ICD-10-PCS | Mod: CPTII,S$GLB,, | Performed by: INTERNAL MEDICINE

## 2019-04-03 PROCEDURE — 99999 PR PBB SHADOW E&M-EST. PATIENT-LVL V: CPT | Mod: PBBFAC,,, | Performed by: INTERNAL MEDICINE

## 2019-04-03 PROCEDURE — 3008F BODY MASS INDEX DOCD: CPT | Mod: CPTII,S$GLB,, | Performed by: INTERNAL MEDICINE

## 2019-04-03 PROCEDURE — 99203 OFFICE O/P NEW LOW 30 MIN: CPT | Mod: S$GLB,,, | Performed by: INTERNAL MEDICINE

## 2019-04-03 RX ORDER — MELOXICAM 15 MG/1
TABLET ORAL
Refills: 1 | COMMUNITY
Start: 2019-03-21 | End: 2021-04-21

## 2019-04-03 RX ORDER — DULOXETIN HYDROCHLORIDE 60 MG/1
CAPSULE, DELAYED RELEASE ORAL
Refills: 0 | COMMUNITY
Start: 2019-02-12 | End: 2020-04-07

## 2019-04-08 ENCOUNTER — PATIENT MESSAGE (OUTPATIENT)
Dept: SURGERY | Facility: HOSPITAL | Age: 54
End: 2019-04-08

## 2019-04-09 ENCOUNTER — PATIENT MESSAGE (OUTPATIENT)
Dept: ORTHOPEDICS | Facility: CLINIC | Age: 54
End: 2019-04-09

## 2019-04-10 ENCOUNTER — CLINICAL SUPPORT (OUTPATIENT)
Dept: CARDIOLOGY | Facility: CLINIC | Age: 54
End: 2019-04-10
Attending: INTERNAL MEDICINE
Payer: MEDICARE

## 2019-04-10 ENCOUNTER — ANESTHESIA EVENT (OUTPATIENT)
Dept: SURGERY | Facility: HOSPITAL | Age: 54
End: 2019-04-10
Payer: MEDICARE

## 2019-04-10 DIAGNOSIS — R00.2 PALPITATIONS: ICD-10-CM

## 2019-04-10 PROCEDURE — 93224 HOLTER MONITOR - 48 HOUR (CUPID ONLY): ICD-10-PCS | Mod: S$GLB,,, | Performed by: INTERNAL MEDICINE

## 2019-04-10 PROCEDURE — 93224 XTRNL ECG REC UP TO 48 HRS: CPT | Mod: S$GLB,,, | Performed by: INTERNAL MEDICINE

## 2019-04-10 NOTE — ANESTHESIA PREPROCEDURE EVALUATION
"  Anesthesia Assessment: Preoperative EQUATION    Planned Procedure: Procedure(s) (LRB):  REPLACEMENT-KNEE-TOTAL (Left)  Requested Anesthesia Type:Femoral Block  Surgeon: John L. Ochsner Jr., MD  Service: Orthopedics  Known or anticipated Date of Surgery:5/1/2019    Plan:    Testing:  Hematology Profile, BMP, PT/INR and UA   Pre-anesthesia  visit       Visit focus: possible regional anesthesia and/or nerve block      Consultation:IM Perioperative Hospitalist     Angeles Jewell RN 04/10/2019                                                                                                               04/10/2019  Pre-operative evaluation for Procedure(s) (LRB):  REPLACEMENT-KNEE-TOTAL (Left)    Angie Mccarthy is a 53 y.o. female with PMH of HTN, Obesity s/p gastric sleeve 2017, PONV, and Left Knee pain who is scheduled for L TKR.     Prev airway: Prior uncomplicated intubation with 7.0 ETT and Ruff 2 blade    Patient Active Problem List   Diagnosis    Syringomyelia and syringobulbia    Hypertension    Hyperlipidemia    Thoracic spondylosis without myelopathy    Spinal stenosis, lumbar region, without neurogenic claudication    Osteoporosis, idiopathic    Thyroid nodule    Palpitations    GERD (gastroesophageal reflux disease)    Fatty liver disease, nonalcoholic    S/P laparoscopic sleeve gastrectomy    Morbid obesity    Back pain    Lumbar disc disease    Acquired hypothyroidism    Anxiety    Depression    Chronic, continuous use of opioids    Snoring    Occult blood in stools    PONV (postoperative nausea and vomiting)    History of prediabetes    Urinary hesitancy    Wheezing    Microscopic hematuria    Primary osteoarthritis of left knee       Review of patient's allergies indicates:   Allergen Reactions    Mastisol adhesive [gum tjwjtc-ubgals-hfxa-alcohol] Itching, Rash and Blisters     "Looked like poison ivy"    Adhesive Dermatitis and Blisters    Adhesive tape-silicones " "Dermatitis     The longer the adhesive stays on, the worse the irritation    Sulfamethoxazole-trimethoprim Itching and Anxiety     Has a "nervous feeling."  "Jittery"  Has taken recently and the reaction was "less intense"        No current facility-administered medications on file prior to encounter.      Current Outpatient Medications on File Prior to Encounter   Medication Sig Dispense Refill    alprazolam (XANAX) 0.5 MG tablet Take 0.5 mg by mouth daily as needed for Insomnia or Anxiety.       ascorbic acid, vitamin C, (VITAMIN C) 500 MG tablet Take 500 mg by mouth every morning.      aspirin (ECOTRIN) 81 MG EC tablet Take 81 mg by mouth nightly.       BIOTIN ORAL Take 10,000 mg by mouth every morning.       cyclobenzaprine (FLEXERIL) 10 MG tablet TK 1 T PO QD- as needed for muscle spasms  0    fish oil-omega-3 fatty acids 300-1,000 mg capsule Take 1,200 mg by mouth 2 (two) times daily.       fluconazole (DIFLUCAN) 150 MG Tab TK 1 T PO UTD as needed  0    gabapentin (NEURONTIN) 800 MG tablet Take 800 mg by mouth 3 (three) times daily. Takes 2-3 times per day  1    levothyroxine (SYNTHROID) 150 MCG tablet Take 1 tablet (150 mcg total) by mouth once daily. (Patient taking differently: Take 150 mcg by mouth every morning. ) 30 tablet 11    lisinopril (PRINIVIL,ZESTRIL) 2.5 MG tablet Take 2.5 mg by mouth every morning.   2    multivitamin capsule Take 1 capsule by mouth every morning.       oxycodone-acetaminophen (PERCOCET)  mg per tablet Take one to one and a half tablets by mouth every 4hours as needed for pain. (Patient taking differently: Take 1 tablet by mouth every 6 (six) hours. Take one to one and a half tablets by mouth every 4hours as needed for pain.) 81 tablet 0    tiZANidine (ZANAFLEX) 4 MG tablet TK 1 T PO QHS as needed for mescle spams in the back  1    zolpidem (AMBIEN) 10 mg Tab Take 10 mg by mouth nightly.          Past Surgical History:   Procedure Laterality Date    " APPENDECTOMY      BACK SURGERY      x 6    BIOPSY-LIVER-LAPAROSCOPIC - 72279 N/A 2/6/2017    Performed by [facility] at [facility] OR 2ND FLR    CHOLECYSTECTOMY      CLOSURE N/A 2/14/2013    Performed by Addison Staton MD at [facility] OR 2ND FLR    CREATION, FLAP, MUSCLE ROTATION N/A 2/14/2013    Performed by Addison Staton MD at [facility] OR 2ND FLR    CRYOTHERAPY, NERVE, PERIPHERAL, PERCUTANEOUS, USING LIQUID NITROUS OXIDE IN CLOSED NEEDLE DEVICE-iovera left knee Left 4/29/2019    Performed by Chavo Gold III, MD at [facility] CATH LAB    ESOPHAGOGASTRODUODENOSCOPY (EGD) N/A 8/8/2016    Performed by Eliceo Wilhelm MD at [facility] ENDO (4TH FLR)    FUSION, SPINE N/A 2/14/2013    Performed by Junior Hilton MD at [facility] OR 2ND FLR    GASTRECTOMY      Lap Gastric Sleeve    GASTRECTOMY-SLEEVE-LAPAROSCOPIC - 54136 w/ intraop egd N/A 2/6/2017    Performed by Deon Ha MD at [facility] OR 2ND FLR    HERNIA REPAIR      HYSTERECTOMY      INJECTION, STEROID, SPINE, LUMBAR, EPIDURAL N/A 10/4/2013    Performed by Dos Diagnostic Provider at [facility] CATH LAB    INJECTION, STEROID, SPINE, LUMBAR, EPIDURAL N/A 8/28/2013    Performed by Dos Diagnostic Provider at [facility] CATH LAB    LIVER BIOPSY  02/06/2017    steatohepatitis with stage 1 fibrosis    Myelogram N/A 1/7/2019    Performed by Lissett Surgeon at [facility] LISSETT    MYELOGRAM N/A 11/29/2018    Performed by Dos Diagnostic Provider at [facility] OR 2ND FLR    MYELOGRAM N/A 6/24/2014    Performed by Lissett Surgeon at [facility] LISSETT    OOPHORECTOMY      SPINAL FUSION      TONSILLECTOMY, ADENOIDECTOMY         Social History     Socioeconomic History    Marital status:      Spouse name: Not on file    Number of children: Not on file    Years of education: Not on file    Highest education level: Not on file   Occupational History    Occupation: disable   Social Needs    Financial resource strain: Not on file    Food insecurity:     Worry:  Not on file     Inability: Not on file    Transportation needs:     Medical: Not on file     Non-medical: Not on file   Tobacco Use    Smoking status: Never Smoker    Smokeless tobacco: Never Used    Tobacco comment: Tried a few cigarettes during teenage   Substance and Sexual Activity    Alcohol use: Yes     Comment: socially    Drug use: No    Sexual activity: Never   Lifestyle    Physical activity:     Days per week: Not on file     Minutes per session: Not on file    Stress: Not on file   Relationships    Social connections:     Talks on phone: Not on file     Gets together: Not on file     Attends [employer] service: Not on file     Active member of club or organization: Not on file     Attends meetings of clubs or organizations: Not on file     Relationship status: Not on file   Other Topics Concern    Are you pregnant or think you may be? No    Breast-feeding No   Social History Narrative    Not on file         Vital Signs Range (Last 24H):         CBC: No results for input(s): WBC, RBC, HGB, HCT, PLT, MCV, MCH, MCHC in the last 72 hours.    CMP: No results for input(s): NA, K, CL, CO2, BUN, CREATININE, GLU, MG, PHOS, CALCIUM, ALBUMIN, PROT, ALKPHOS, ALT, AST, BILITOT in the last 72 hours.    INR  No results for input(s): PT, INR, PROTIME, APTT in the last 72 hours.      EKG:  Normal sinus rhythm  Possible Left atrial enlargement  Right ventricular conduction delay  Prolonged QT  Abnormal ECG  When compared with ECG of 26-OCT-2016 13:27,  Previous ECG has undetermined rhythm, needs review  Confirmed by KEYANA MURPHY MD ([id number]) on 9/16/2018 11:30:04 PM    2D Echo: None          Anesthesia Evaluation    I have reviewed the Patient Summary Reports.    I have reviewed the Nursing Notes.   I have reviewed the Medications.   Steroids Taken In Past Year:     Review of Systems  Anesthesia Hx:  Hx of Anesthetic complications (PONV)  History of prior surgery of interest to airway management or planning:  "Denies Family Hx of Anesthesia complications.  Personal Hx of Anesthesia complications   Social:  Non-Smoker, No Alcohol Use    Hematology/Oncology:  Hematology Normal   Oncology Normal     EENT/Dental:   Eyes: READING GLASSES Ears General/Symptom(s) EARS PIERCED   Cardiovascular:   Hypertension hyperlipidemia Grocery shopping, light housework, can climb a flight of stairs.  Denies chest pain Cardiovascular Symptoms: (palpitations on occasions.  Has holter monitor 4/2019.) Denies Chest Pain     Pulmonary:  Pulmonary Symptoms: (chronic per patient.  Patient denies any changes in symptoms)  are shortness of breath with activity.  Patient states stable and not worsening. Patient with wheezing on exam by Dr. Ko.  Inhaler ordered.  Per Dr. Ko's note, patient no longer wheezing  Acute Bronchitis: (yearly) past history.  Possible Obstructive Sleep Apnea , (STOP/BANG) Symptoms S - Snoring (loud), T - Tired / daytime fatigue / somnolence, B - BMI > 35, A - Age > 50, N - Neck circumference > 40 cms and P - Pressure being treated for high BP  Hx of Lung/Chest Surgery or Procedure: (PT REPORTS HX OF "LUNG COLLAPSE " AT AGE 14 DURING BACK SX)    Renal/:   renal calculi    Hepatic/GI:   Denies PUD. GERD Liver Disease, (fattly liver)  Esophageal / Stomach Disorders Gerd Controlled by PRN antireflux medication.  Hernia, Umbilical Hernia (S/P REPAIR)   Musculoskeletal:  Joint Disease:  Arthritis BURSITIS IN HIP Bone Disorders: Osteoporosis  Spine Disorders:  Cervical Spine Disorder, Cervical Disc Disease  Lumbar Spine Disorders History of scoliosis, multiple surgeries, T6-S1 fusion,  lumbar laminectomy, and revisions    XRAY 2018:      There is postoperative change in spinal fixation rods.  There is a mild dextroconvex curvature of the T-spine, levoconvex curvature of the L-spine.  No segmentation or rib anomaly seen.  At L4-L5 on the left there may be hardware metal fatigue fracture.  There is baseline DJD and chronic " change       Neurological:   Denies CVA. Denies Seizures.  Neuro Symptoms of numbness, tingling, weakness, pain, headache, tremor Pain , onset is chronic , location of legs, knee, back, buttock , quality of aching/dull, sharp, burning , radiating to LEGS , severity is a 8 , precipitating factors are SITTING OR STANDING FOR TOO LONG , alleviating factors are MEDICATION/REST. Pain Etiology/Diagnosis  Peripheral Neuropathy (numbness in certain areas of the leg due to previous back sx), Sciatica    Endocrine:   Hypothyroidism    Dermatological:  Skin Normal    Psych:   anxiety depression Sleep Disorder and Restless Leg Disorder. Obsessive Compulsive Disorder. Sleep Disorder and Restless Leg Disorder.        Physical Exam  General:  Morbid Obesity    Airway/Jaw/Neck:  Airway Findings: Mouth Opening: Normal Tongue: Normal  General Airway Assessment: Adult  Mallampati: III  Improves to II with phonation.  TM Distance: Normal, at least 6 cm  Jaw/Neck Findings:  Neck ROM: Normal ROM  Neck Findings:  Girth Increased      Dental:  Dental Findings: Molar caps   Chest/Lungs:  Chest/Lungs Clear    Heart/Vascular:  Heart Findings: Normal       Mental Status:  Mental Status Findings:  Cooperative, Alert and Oriented         Labs reviewed and urine 3+ occult blood.  Patient states she has 6 kidney stones and always has hematuria.  Followed by Urologist outside of [facility]. Message to Dr. [facility]    Discharge plans: will stay with mother Yuko, [phone]    Please refer to , Internal Medicine, perioperative risk assessment and recommendations.     Angeles Jewell RN 04/23/2019          Anesthesia Plan  Type of Anesthesia, risks & benefits discussed:  Anesthesia Type:  spinal  Patient's Preference:   Intra-op Monitoring Plan: standard ASA monitors  Intra-op Monitoring Plan Comments:   Post Op Pain Control Plan: multimodal analgesia, peripheral nerve block, IV/PO Opioids PRN and per primary service following discharge from  PACU  Post Op Pain Control Plan Comments:   Induction:   IV  Beta Blocker:  Patient is not currently on a Beta-Blocker (No further documentation required).       Informed Consent: Patient understands risks and agrees with Anesthesia plan.  Questions answered. Anesthesia consent signed with patient.  ASA Score: 3     Day of Surgery Review of History & Physical:            Ready For Surgery From Anesthesia Perspective.

## 2019-04-10 NOTE — PRE ADMISSION SCREENING
Anesthesia Assessment: Preoperative EQUATION    Planned Procedure: Procedure(s) (LRB):  REPLACEMENT-KNEE-TOTAL (Left)  Requested Anesthesia Type:Femoral Block  Surgeon: John L. Ochsner Jr., MD  Service: Orthopedics  Known or anticipated Date of Surgery:5/1/2019    Plan:    Testing:  Hematology Profile, BMP, PT/INR and UA   Pre-anesthesia  visit       Visit focus: possible regional anesthesia and/or nerve block      Consultation:IM Perioperative Hospitalist     Angeles Jewell RN 04/10/2019

## 2019-04-15 ENCOUNTER — TELEPHONE (OUTPATIENT)
Dept: PREADMISSION TESTING | Facility: HOSPITAL | Age: 54
End: 2019-04-15

## 2019-04-15 DIAGNOSIS — I10 HYPERTENSION, UNSPECIFIED TYPE: ICD-10-CM

## 2019-04-15 DIAGNOSIS — M79.606 PAIN OF LOWER EXTREMITY, UNSPECIFIED LATERALITY: Primary | ICD-10-CM

## 2019-04-15 DIAGNOSIS — Z91.89 AT RISK OF UTI: ICD-10-CM

## 2019-04-15 NOTE — TELEPHONE ENCOUNTER
----- Message from Angeles Jewell RN sent at 4/15/2019 10:55 AM CDT -----  Labs, POC-[place], OPOC-Dr. Ko

## 2019-04-17 LAB
OHS CV EVENT MONITOR DAY: 0
OHS CV HOLTER LENGTH DECIMAL HOURS: 48
OHS CV HOLTER LENGTH HOURS: 48
OHS CV HOLTER LENGTH MINUTES: 0

## 2019-04-18 ENCOUNTER — PATIENT MESSAGE (OUTPATIENT)
Dept: CARDIOLOGY | Facility: CLINIC | Age: 54
End: 2019-04-18

## 2019-04-23 ENCOUNTER — OFFICE VISIT (OUTPATIENT)
Dept: ORTHOPEDICS | Facility: CLINIC | Age: 54
End: 2019-04-23
Payer: MEDICARE

## 2019-04-23 ENCOUNTER — HOSPITAL ENCOUNTER (OUTPATIENT)
Dept: PREADMISSION TESTING | Facility: HOSPITAL | Age: 54
Discharge: HOME | End: 2019-04-23
Attending: ANESTHESIOLOGY
Payer: MEDICARE

## 2019-04-23 ENCOUNTER — INITIAL CONSULT (OUTPATIENT)
Dept: INTERNAL MEDICINE | Facility: CLINIC | Age: 54
End: 2019-04-23
Payer: MEDICARE

## 2019-04-23 VITALS
HEIGHT: 65 IN | HEART RATE: 100 BPM | WEIGHT: 242.5 LBS | OXYGEN SATURATION: 97 % | BODY MASS INDEX: 40.4 KG/M2 | TEMPERATURE: 98 F | SYSTOLIC BLOOD PRESSURE: 150 MMHG | DIASTOLIC BLOOD PRESSURE: 90 MMHG

## 2019-04-23 VITALS — HEIGHT: 65 IN | BODY MASS INDEX: 40.33 KG/M2 | WEIGHT: 242.06 LBS

## 2019-04-23 DIAGNOSIS — Z87.898 HISTORY OF PREDIABETES: ICD-10-CM

## 2019-04-23 DIAGNOSIS — R31.29 MICROSCOPIC HEMATURIA: ICD-10-CM

## 2019-04-23 DIAGNOSIS — E78.5 HYPERLIPIDEMIA, UNSPECIFIED HYPERLIPIDEMIA TYPE: Chronic | ICD-10-CM

## 2019-04-23 DIAGNOSIS — E03.9 ACQUIRED HYPOTHYROIDISM: ICD-10-CM

## 2019-04-23 DIAGNOSIS — I10 ESSENTIAL HYPERTENSION: Chronic | ICD-10-CM

## 2019-04-23 DIAGNOSIS — R06.83 SNORING: ICD-10-CM

## 2019-04-23 DIAGNOSIS — K76.0 FATTY LIVER DISEASE, NONALCOHOLIC: ICD-10-CM

## 2019-04-23 DIAGNOSIS — M54.9 BACK PAIN, UNSPECIFIED BACK LOCATION, UNSPECIFIED BACK PAIN LATERALITY, UNSPECIFIED CHRONICITY: ICD-10-CM

## 2019-04-23 DIAGNOSIS — R11.2 PONV (POSTOPERATIVE NAUSEA AND VOMITING): ICD-10-CM

## 2019-04-23 DIAGNOSIS — F41.9 ANXIETY: ICD-10-CM

## 2019-04-23 DIAGNOSIS — R00.2 PALPITATIONS: ICD-10-CM

## 2019-04-23 DIAGNOSIS — R19.5 OCCULT BLOOD IN STOOLS: ICD-10-CM

## 2019-04-23 DIAGNOSIS — Z98.84 S/P LAPAROSCOPIC SLEEVE GASTRECTOMY: ICD-10-CM

## 2019-04-23 DIAGNOSIS — R06.2 WHEEZING: ICD-10-CM

## 2019-04-23 DIAGNOSIS — R39.11 URINARY HESITANCY: ICD-10-CM

## 2019-04-23 DIAGNOSIS — F11.90 CHRONIC, CONTINUOUS USE OF OPIOIDS: ICD-10-CM

## 2019-04-23 DIAGNOSIS — F32.A DEPRESSION, UNSPECIFIED DEPRESSION TYPE: ICD-10-CM

## 2019-04-23 DIAGNOSIS — K21.9 GASTROESOPHAGEAL REFLUX DISEASE, ESOPHAGITIS PRESENCE NOT SPECIFIED: ICD-10-CM

## 2019-04-23 DIAGNOSIS — Z98.890 PONV (POSTOPERATIVE NAUSEA AND VOMITING): ICD-10-CM

## 2019-04-23 DIAGNOSIS — E04.1 THYROID NODULE: ICD-10-CM

## 2019-04-23 DIAGNOSIS — Z01.818 PREOP EXAMINATION: Primary | ICD-10-CM

## 2019-04-23 DIAGNOSIS — M17.12 PRIMARY OSTEOARTHRITIS OF LEFT KNEE: Primary | ICD-10-CM

## 2019-04-23 PROCEDURE — 99999 PR PBB SHADOW E&M-EST. PATIENT-LVL III: ICD-10-PCS | Mod: PBBFAC,,, | Performed by: HOSPITALIST

## 2019-04-23 PROCEDURE — 3077F SYST BP >= 140 MM HG: CPT | Mod: CPTII,S$GLB,, | Performed by: HOSPITALIST

## 2019-04-23 PROCEDURE — 3080F DIAST BP >= 90 MM HG: CPT | Mod: CPTII,S$GLB,, | Performed by: HOSPITALIST

## 2019-04-23 PROCEDURE — 99499 UNLISTED E&M SERVICE: CPT | Mod: S$GLB,,, | Performed by: PHYSICIAN ASSISTANT

## 2019-04-23 PROCEDURE — 99999 PR PBB SHADOW E&M-EST. PATIENT-LVL IV: ICD-10-PCS | Mod: PBBFAC,,, | Performed by: PHYSICIAN ASSISTANT

## 2019-04-23 PROCEDURE — 99999 PR PBB SHADOW E&M-EST. PATIENT-LVL IV: CPT | Mod: PBBFAC,,, | Performed by: PHYSICIAN ASSISTANT

## 2019-04-23 PROCEDURE — 99999 PR PBB SHADOW E&M-EST. PATIENT-LVL III: CPT | Mod: PBBFAC,,, | Performed by: HOSPITALIST

## 2019-04-23 PROCEDURE — 99214 OFFICE O/P EST MOD 30 MIN: CPT | Mod: S$GLB,,, | Performed by: HOSPITALIST

## 2019-04-23 PROCEDURE — 3008F PR BODY MASS INDEX (BMI) DOCUMENTED: ICD-10-PCS | Mod: CPTII,S$GLB,, | Performed by: HOSPITALIST

## 2019-04-23 PROCEDURE — 3080F PR MOST RECENT DIASTOLIC BLOOD PRESSURE >= 90 MM HG: ICD-10-PCS | Mod: CPTII,S$GLB,, | Performed by: HOSPITALIST

## 2019-04-23 PROCEDURE — 99214 PR OFFICE/OUTPT VISIT, EST, LEVL IV, 30-39 MIN: ICD-10-PCS | Mod: S$GLB,,, | Performed by: HOSPITALIST

## 2019-04-23 PROCEDURE — 99499 NO LOS: ICD-10-PCS | Mod: S$GLB,,, | Performed by: PHYSICIAN ASSISTANT

## 2019-04-23 PROCEDURE — 3008F BODY MASS INDEX DOCD: CPT | Mod: CPTII,S$GLB,, | Performed by: HOSPITALIST

## 2019-04-23 PROCEDURE — 3077F PR MOST RECENT SYSTOLIC BLOOD PRESSURE >= 140 MM HG: ICD-10-PCS | Mod: CPTII,S$GLB,, | Performed by: HOSPITALIST

## 2019-04-23 RX ORDER — ALBUTEROL SULFATE 90 UG/1
2 AEROSOL, METERED RESPIRATORY (INHALATION) EVERY 6 HOURS PRN
Qty: 18 G | Refills: 0 | Status: SHIPPED | OUTPATIENT
Start: 2019-04-23 | End: 2024-03-21

## 2019-04-23 RX ORDER — OMEPRAZOLE 40 MG/1
40 CAPSULE, DELAYED RELEASE ORAL DAILY
COMMUNITY
End: 2020-07-27

## 2019-04-23 RX ORDER — ROSUVASTATIN CALCIUM 10 MG/1
10 TABLET, COATED ORAL NIGHTLY
COMMUNITY
End: 2020-09-02

## 2019-04-23 NOTE — ASSESSMENT & PLAN NOTE
A1c 5.9 - 2016   Hemoglobin A1c in the pre diabetic range   Weight loss with diet and exercise , if able helps lower A1c  Increased risk of becoming a diabetic   Needs follow up   Add A1c to lab   --  4/23/2019 - A1c 5.8

## 2019-04-23 NOTE — PROGRESS NOTES
[facility] - Pre Op Consult  Progress Note    Patient Name: Angie Mccarthy  MRN: [id number]  Date of Evaluation- 04/23/2019  PCP- Rangel Floyd MD    Future cases for Angie Mccarthy [[id number]]     Case ID Status Date Time Vinh Procedure Provider Location    [id number] [facility] 5/1/2019  8:00  REPLACEMENT-KNEE-TOTAL [facility] [[id number]] [facility] OR 2ND FLR      Left     HPI:  History of present illness- I had the pleasure of meeting this pleasant 53 y.o. lady in the pre op clinic prior to her elective Orthopedic surgery. The patient is new to me .     I have obtained the history by speaking to the patient and by reviewing the electronic health records.    Events leading up to surgery / History of presenting illness -    She has been troubled with moderate-severe  Left knee   pain for over 1 year  . Pain increases with activity , night time and decreases with resting.  Gets a feeling of Left knee giving out   No falls per history       Relevant health conditions of significance for the perioperative period/ History of presenting illness -    Health conditions of significance for the perioperative period - Chronic back pain , Chronic opioid use , HTN, Morbid obesity     Not known to haveDiabetes Mellitus, Lung disease         Subjective/ Objective:          Chief complaint-Preoperative evaluation, Perioperative Medical management, complication reduction plan     Active cardiac conditions- none    Revised cardiac risk index predictors- none    Functional capacity -Examples of physical activity  house work and can take a flight of stairs holding on to the railing, took stairs a flight of stairs to go pain management 2 months ago----- She can undertake all the above activities without  chest pain,chest tightness,  ,dizziness,lightheadedness making her exercise tolerance more than 4 Mets.   Winded on exertion,attributes to weight  ,stable over time     Review of Systems   Constitutional: Negative for  "chills and fever.          Weight gain from reduced activity   HENT:        STOPBANG score  / 8    Snoring   HTN  BMI> 35   Age over 50   Neck size over 40 CM       Eyes:        Needs eyes checked    Respiratory:        No cough , phlegm  No Hemoptysis   Cardiovascular:        As noted   Gastrointestinal:        No overt GI/ blood losses  Bowel movements- Regular   Occasional rectal bleeding on straining     Endocrine:        Prednisone use > 20 mg daily for 3 weeks- None   Genitourinary: Negative for dysuria.          Urinary hesitancy , Urgency    Musculoskeletal:        As above      Skin:        Redness back of the Rt Upper arm   Planning on seeing Dermatologist   No fever    Neurological: Negative for syncope.        No unilateral weakness   Hematological:          Aspirin use for preventive reasons    Psychiatric/Behavioral:          Depression, Anxiety - Controlled     No SI/HI               No , bleeding, cardiac problems with previous surgeries/procedures.  Medications and Allergies reviewed in epic.   FH- No bleeding / venous thrombosis ,  in family   Lives with boy friend , dog  Staying with mother post op    Physical Exam  Blood pressure (!) 150/90, pulse 100, temperature 97.9 °F (36.6 °C), height 5' 5" (1.651 m), weight 110 kg (242 lb 8 oz), last menstrual period 12/06/2007, SpO2 97 %.    Physical Exam  Constitutional- Vitals - Body mass index is 40.35 kg/m².,   Vitals:    04/23/19 1309   BP: (!) 150/90   Pulse: 100   Temp: 97.9 °F (36.6 °C)     General appearance-Conscious,Coherent  Eyes- No conjunctival icterus,pupils  round  and reactive to light   ENT-Oral cavity- moist  , Hearing grossly normal   Neck- No thyromegaly ,Trachea -central, No jugular venous distension,   No Carotid Bruit   Cardiovascular -Heart Sounds- Normal  and  no murmur   , No gallop rhythm   Respiratory - Normal Respiratory Effort, Normal breath sounds,  wheeze  and Forced Expiratory wheeze    Peripheral pitting pedal edema-- " none , no calf pain   Gastrointestinal -Soft abdomen, No palpable masses, Non Tender,Liver,Spleen not palpable. No-- free fluid and shifting dullness  Musculoskeletal- No finger Clubbing. Strength grossly normal   Lymphatic-No Palpable cervical, axillary,Inguinal lymphadenopathy   Psychiatric - normal effect,Orientation  Rt Dorsalis pedis pulses-palpable    Lt Dorsalis pedis pulses- palpable   Rt Posterior tibial pulses -palpable   Left posterior tibial pulses -palpable   Miscellaneous -  no asterixis,  no dupuytren's contracture,  no renal bruit,  bowel sounds positive  and  Tattoo    Investigations  Lab and Imaging have been reviewed in [facility].      Review of Medicine tests    Oct 2016     . The perfusion scan is free of evidence for myocardial ischemia or injury.   2. Resting wall motion is physiologic. Stress wall motion is physiologic.   3. Visually estimated LV function is normal at rest and normal at stress.   4. The ventricular volumes are normal at rest and stress.   5. The extracardiac distribution of radioactivity is normal.   6. There was no previous study available to compare.    EKG- I had independently reviewed the EKG from--9/15/2018   It was reported to be showing     Normal sinus rhythm  Possible Left atrial enlargement  Right ventricular conduction delay  Prolonged QT  Abnormal ECG  When compared with ECG of 26-OCT-2016 13:27,  Previous ECG has undetermined rhythm, needs review    Review of clinical lab tests:  Lab Results   Component Value Date    CREATININE 0.8 04/23/2019    HGB 13.3 04/23/2019     04/23/2019     Review of old records- Was done and information gathered regards to events leading to surgery and health conditions of significance in the perioperative period.        Preoperative cardiac risk assessment-  The patient does not have any active cardiac conditions . Revised cardiac risk index predictors- -0--.Functional capacity is more than 4 Mets. She will be undergoing a  Orthopedic procedure that carries a intermediate risk     Risk of a major Cardiac event ( Defined as death, myocardial infarction, or cardiac arrest at 30 days after noncardiac surgery), based on RCRI score     3.9%       No further cardiac work up is indicated prior to proceeding with the surgery     Orders Placed This Encounter    albuterol (VENTOLIN HFA) 90 mcg/actuation inhaler       American Society of Anesthesiologists Physical status classification ( ASA ) class: 3     Postoperative pulmonary complication risk assessment:     ARISCAT ( Canet) risk index- risk class -  Low, if duration of surgery is under 3 hours, intermediate, if duration of surgery is over 3 hours       Assessment/Plan:     Hypertension  Lisinopril  Home BP readings -114-115/ 70-80  Recent BP readings in the record-140/80's  Hypertension-  Blood pressure is acceptable . I suggest holding Lisinopril- on the morning of the surgery and can continue that  post operatively under blood pressure, electrolyte and renal function monitoring as long as they are acceptable.I suggest addressing pain control as uncontrolled pain can increased blood pressure   Pain , anxiety increasing BP    Palpitations  On occasions   Noticing for 1-2 months, since planning on Left knee and back surgery   Once in a few days - Not daily   At rest and on exertion ( attributes to her weight )   No light headedness, chest discomfort    Had Cardiology evaluation   Had Holter monitor   Palpitations, Fatigue - Felt likely from   Most likely due to obesity and deconditioning, thyroid disease         Fatty liver disease, nonalcoholic  Fibroscan- Jan 2019   Fibrosis:F2   No suggestion of  hepatic decompensation         Hyperlipidemia  HLD-I  suggest continuation of statin during the entire perioperative period.    S/P laparoscopic sleeve gastrectomy  Feb 2017   Lost 30 pounds , but gained it back plus more   Hoping to loose weight   Per her boy friend johnny but no reported   apnea   Not known to  have sleep apnea , diabetes    Encouraged weight loss  Care suggested with NSAID use   Risk of ulceration discussed     Thyroid nodule  No dysphagia   Under follow up    Acquired hypothyroidism  On Replacement   Note TSH raise since June 2019  Taking regularly on empty stomach   No overt symptoms of hypothyroidism   Suggested follow up regarding TSH     Anxiety  On Xanax   Care suggested     Depression  Cymbalta helping   I suggest monitoring the sodium as SIADH from Cymbalta   use and hypersecretion of ADH associated with surgery can reduce sodium in the perioperative period    GERD (gastroesophageal reflux disease)  Ranitidine nightly   Omeprazole AM   GERD-  I suggest continuation of the Proton pump inhibitor in the perioperative period . I suggest aspiration precautions    Back pain  Chronic back pain   Had 6 back surgeries for scoliosis   Planning on surgery in [place]- for broken hard ware     Chronic, continuous use of opioids  Back pian , down to both legs posterior   Chronic continuous opioid use- In view of the opioid use, the patient may have opioid tolerance . I suggest continuation of the maintenance scheduled opioid during the perioperative period. I suggest considering the possibility of opioid tolerance  in planning post operative pain control     Snoring    Possible sleep apnea- I suggest a sleep study and suggest caution with usage of medication that can cause respiratory suppression in the perioperative period  potential ramifications of untreated sleep apnea, which could include daytime sleepiness, hypertension, heart disease and stroke were discussed    Avoidance of  supine sleep, weight gain , alcoholic beverages , care with , sedative , CNS depressant use indicated  since all of these can worsen JENNI     Care with sedatives discussed         Occult blood in stools  Per history   Per PCP , to schedule colonoscopy   Care with NSAID suggested   Hb,HCT-N    PONV (postoperative  nausea and vomiting)  Post operative nausea, vomiting - I suggest that the perioperative team be aware of this so that appropriate preventive care can be taken     History of prediabetes  A1c 5.9 - 2016   Hemoglobin A1c in the pre diabetic range   Weight loss with diet and exercise , if able helps lower A1c  Increased risk of becoming a diabetic   Needs follow up   Add A1c to lab   --  4/23/2019 - A1c 5.8     Urinary hesitancy  Increased risk of post operative urinary retention  Plans on following with Urologist     Wheezing  Albuterol as needed ordered   Technique taught   Seasonal wheezing         Preventive perioperative care    Thromboembolic prophylaxis:  Her risk factors for thrombosis include morbid obesity, surgical procedure and age.I suggest  thromboembolic prophylaxis ( mechanical/pharmacological, weighing the risk benefits of pharmacological agent use considering cristino procedural bleeding )  during the perioperative period.I suggested being active in the post operative period. The patient is a candidate for extended DVT prophylaxis     Postoperative pulmonary complication prophylaxis-Risk factors for post operative pulmonary complications include ASA class >2- I suggest incentive spirometry use, early ambulation and end tidal carbon dioxide monitoring  , oral care , head end of bed elevation     Renal complication prophylaxis-Risk factors for renal complications include hypertension . I suggest keeping her well hydrated   She stays hydrated    Surgical site Infection Prophylaxis-I  suggest appropriate antibiotic for Prophylaxis against Surgical site infections     Delirium prophylaxis-Risk factors - opioid use and benzodiazepine use - I suggest avoidance / minimizing the use of  Benzodiazepines ( unless the patient has been taking it on a regular basis ),Anticholinergic medication,Antihistamines ( like  Benadryl).I suggest minimizing the use of opioid medication and use of IV tylenol,if it is appropriate.  I suggest using the lowest possible dose of opioids for the shortest duration possible in the perioperative period. I suggest to Keep shades/blinds open during the day, lights off and shades closed at night to encourage normal sleep/wake cycle.I encourage the presence of the family member with the patient at all times, if at all possible as mental status changes can be picked up early by the family members and they help with reorientation. I encouraged the presence of family to help with orientation in the perioperative period. Benadryl avoidance suggested      In view of LUTS the patient  is at risk of postoperative urinary retention.  I suggest avoidance / minimizing the of  Benzodiazepines,Anticholinergic medication,antihistamines ( Benadryl) , if possible in the perioperative period. I suggest using the minimum possible use of opioids for the minimum period of time in the perioperative period. Benadryl avoidance suggested    This visit was focused on Preoperative evaluation, Perioperative Medical management, complication reduction plans. I suggest that the patient follows up with primary care or relevant sub specialists for ongoing health care.    I appreciate the opportunity to be involved in this patients care. Please feel free to contact me if there were any questions about this consultation.    Patient is optimized     Patient was instructed to call and update me about any changes to health,  medication, office visits ,testing out side of the [facility] operative care center , hospitalizations between now and surgery     Sherron Ko MD  Perioperative Medicine  [facility]   Pager [phone]  ----  4/23- 17 27     UA in process   A1c 5.8   Long standing Microcytosis - likely related to sleeve gastrectomy   -----  4/25- 8 00     Urinalysis from 4/23/2019 showed occult blood   > 100 RBC  No UTI   She is S/P hysterectomy   Called to discuss Urinalysis   Will repeat   ---  4/26- 2033    Messages  from office - dated - 4/25   Pt stated she heard your msg.  She said the reason why she has blood in her urine is b/c she has 6 kidney stones currently.    Ms. Mccarthy called to clarify instructions on Cymbalta.   In the past, anesthesia cancelled her surgery because she took her Prozac the morning of surgery.  Anesthesia told patient the Prozac could cause seizures in the setting of anesthesia.  Now patient is on Cymbalta, she would like to know should she take the night before- I defer this to  Anesthesiologist     As per chart from today -  Patient states she has 6 kidney stones and always has hematuria    Called and spoke to her   Was going to have Myelogram that was postponed due to her taking Prozac   Seizure risk , small with Cymbalta    As per her has blood in the urine  - no gross hematuria   Under Urology care - plans on going to Urologist   Offered repeat UA - She wants to leave it be   Hydrated   She is asymptomatic from kidney stones , no dysuria     No longer wheezing   Albuterol helped a lot   -   No fever   No cough  Gets bronchitis easily   ---  4/30- 17 27     Called to follow up , to address any concerns with the up coming surgery or any questions on Medication instructions   Unable to speak   Left a message to call, if needed - out of hours contact number as well as office number included in the message

## 2019-04-23 NOTE — ASSESSMENT & PLAN NOTE
Possible sleep apnea- I suggest a sleep study and suggest caution with usage of medication that can cause respiratory suppression in the perioperative period  potential ramifications of untreated sleep apnea, which could include daytime sleepiness, hypertension, heart disease and stroke were discussed    Avoidance of  supine sleep, weight gain , alcoholic beverages , care with , sedative , CNS depressant use indicated  since all of these can worsen JENNI     Care with sedatives discussed

## 2019-04-23 NOTE — LETTER
April 23, 2019      Suni White MD  [address] [facility] [address]           [facility] - Pre Op Consult  [address] [address]  Phone: [phone]          Patient: Angie Mccarthy   MR Number: [id number]   YOB: 1965   Date of Visit: 4/23/2019       Dear Dr. Suni White:    Thank you for referring Angie Mccarthy to me for evaluation. Attached you will find relevant portions of my assessment and plan of care.    If you have questions, please do not hesitate to call me. I look forward to following Angie Mccarthy along with you.    Sincerely,    Sherron Ko MD    Enclosure  CC:  No Recipients    If you would like to receive this communication electronically, please contact [email or web address] or [phone] to request more information on [employer] access.    For providers and/or their staff who would like to refer a patient to [facility], please contact us through our one-stop-shop provider referral line, [facility], at [phone].    If you feel you have received this communication in error or would no longer like to receive these types of communications, please e-mail [email or web address]

## 2019-04-23 NOTE — ASSESSMENT & PLAN NOTE
Feb 2017   Lost 30 pounds , but gained it back plus more   Hoping to loose weight   Per her boy friend johnny but no reported  apnea   Not known to  have sleep apnea , diabetes    Encouraged weight loss  Care suggested with NSAID use   Risk of ulceration discussed

## 2019-04-23 NOTE — ASSESSMENT & PLAN NOTE
Lisinopril  Home BP readings -114-115/ 70-80  Recent BP readings in the record-140/80's  Hypertension-  Blood pressure is acceptable . I suggest holding Lisinopril- on the morning of the surgery and can continue that  post operatively under blood pressure, electrolyte and renal function monitoring as long as they are acceptable.I suggest addressing pain control as uncontrolled pain can increased blood pressure   Pain , anxiety increasing BP

## 2019-04-23 NOTE — DISCHARGE INSTRUCTIONS
Your surgery has been scheduled for:__________________________________________    You should report to:  ____Per [facility], located on the [place] side of the first floor of the           [facility] ([phone])  ____The [facility] Center, located on the [facility] side of the            Second floor of the [facility] ([phone])  ____3rd Floor SSCU located on the [facility] side of the [facility] [phone]  Please Note   - Tell your doctor if you take Aspirin, products containing Aspirin, herbal medications  or blood thinners, such as Coumadin, Ticlid, or Plavix.  (Consult your provider regarding holding or stopping before surgery).  - Arrange for someone to drive you home following surgery.  You will not be allowed to leave the surgical facility alone or drive yourself home following sedation and anesthesia.  Before Surgery  - Stop taking all herbal medications 14days prior to surgery  - No Motrin/Advil (Ibuprofen) 7 days before surgery  - No Aleve (Naproxen) 7 days before surgery  - Stop Taking Asprin, products containing Asprin _____days before surgery  - Stop taking blood thinners_______days before surgery  - No Goody's/BC  Powder 7 days before surgery  - Refrain from drinking alcoholic beverages for 24hours before and after surgery  - Stop or limit smoking _________days before surgery  - You may take Tylenol for pain  Night before Surgery  Stop ALL solid food, gum, candy (including vitamins) 8 hours before arrival time.  (Please note: If your surgeon gives you different eating and drinking instructions, please follow surgeon's directions.)  Stop all CLOUDY liquids: coffee with creamer, formula, tube feeds, cloudy juices, non-human milk and breast milk with additives, 6 hours prior to arrival time.  Stop plain breast milk 4 hours prior to arrival time.  The patient should be ENCOURAGED to drink carbohydrate-rich  clear liquids (sports drinks, clear juices) until 2 hours prior to arrival time.  CLEAR liquids include only water, black coffee NO creamer, clear oral rehydration drinks, clear sports drinks or clear fruit juices (no orange juice, no pulpy juices, no apple cider). Advise patients if they can read newsprint through the liquid, it qualifies as clear liquid.   IF IN DOUBT, drink water instead.   - Take a shower or bath (shower is recommended).  Bathe with Hibiclens soap or an antibacterial soap from the neck down.  If not supplied by your surgeon, hibiclens soap will need to be purchased over the counter in pharmacy.  Rinse soap off thoroughly.  - Shampoo your hair with your regular shampoo  The Day of Surgery  · NOTHING TO  DRINK 2 hours before arrival time. If you are told to take medication on the morning of surgery, it may be taken with a sip of water.   - Take another bath or shower with hibiclens or any antibacterial soap, to reduce the chance of infection.  - Take heart and blood pressure medications with a small sip of water, as advised by the perioperative team.  - Do not take fluid pills  - You may brush your teeth and rinse your mouth, but do not swall any additional water.   - Do not apply perfumes, powder, body lotions or deodorant on the day of surgery.  - Nail polish should be removed.  - Do not wear makeup or moisturizer  - Wear comfortable clothes, such as a button front shirt and loose fitting pants.  - Leave all jewelry, including body piercings, and valuables at home.    - Bring any devices you will neeed after surgery such as crutches or canes.  - If you have sleep apnea, please bring your CPAP machine  In the event that your physical condition changes including the onset of a cold or respiratory illness, or if you have to delay or cancel your surgery, please notify your surgeon.  Anesthesia: Regional Anesthesia    Youre scheduled for surgery. During surgery, youll receive medicine called  anesthesia to keep you comfortable and pain-free. Your surgeon has decided that youll receive regional anesthesia. This sheet tells you what to expect with this type of anesthesia.  What is regional anesthesia?  Regional anesthesia numbs one region of your body. The anesthesia may be given around nerves or into veins in your arms, neck, or legs (nerve block or [place] block). Or it may be sent into the spinal fluid (spinal anesthesia) or into the space just outside the spinal fluid (epidural anesthesia). You may also be given sedatives to help you relax.  Nerve block or [place] block  A small area of the body, such as an arm or leg, can be numbed using a nerve block or Chan[place] block.  · Nerve block. During a nerve block, your skin is numbed. A needle is then inserted near nerves that serve the area to be numbed. Anesthetic is sent through the needle.  · IV regional or [place] block. For this type of block, an IV line is put into a vein. The blood flow to the area to be numbed is blocked for a short time. Anesthetic is sent through the IV.  Spinal anesthesia  Spinal anesthesia numbs your body from about the waist down.  · Anesthetic is injected into the spinal fluid. This is a substance that surrounds the spinal cord in your spinal column. The anesthetic blocks pain traveling from the body to the brain.  · To receive the anesthetic, your skin is numbed at the injection site on your back.  · A needle is then inserted into the spinal space. Anesthetic is sent into the spinal fluid through the needle.  Epidural anesthesia  Epidural anesthesia is most commonly used during childbirth and may also be used after surgical procedures of the chest, belly, and legs.  · Anesthetic is injected into the epidural space. This is just outside the dural sac which contains the spinal fluid.  · To receive the anesthetic, your skin is numbed at the injection site on your back.  · A needle is then inserted into the epidural space. Anesthetic is sent  into the epidural space through the needle.  · A small flexible catheter may be attached to the needle and left in place. This allows for continuous injections or infusions of anesthetic.  Anesthesia tools and medicines that might be near you during your procedure  · Local anesthetic. This medicine is given through a needle numbs one region of your body.  · Electrocardiography leads (electrodes). These are used to record your heart rate and rhythm.  · Blood pressure cuff. A cuff is placed on your arm to keep track of your blood pressure.  · Pulse oximeter. This small clip is placed on the end of the finger. It measures your blood oxygen level.  · Sedatives. These medicines may be given through an IV. They help to relax you and keep you comfortable. You may stay awake or sleep lightly.  · Oxygen. You may be given oxygen through a facemask.  Risks and possible complications  Regional anesthesia carries some risks. These include:  · Nausea and vomiting  · Headache  · Backache  · Decreased blood pressure  · Allergic reaction to the anesthetic  · Ongoing numbness (rare)  · Irregular heartbeat (rare)  · Cardiac arrest (rare)   Date Last Reviewed: 12/1/2016  © 9247-8111 [employer]. [address] [place] [address]. All rights reserved. This information is not intended as a substitute for professional medical care. Always follow your healthcare professional's instructions.

## 2019-04-23 NOTE — ASSESSMENT & PLAN NOTE
Back pian , down to both legs posterior   Chronic continuous opioid use- In view of the opioid use, the patient may have opioid tolerance . I suggest continuation of the maintenance scheduled opioid during the perioperative period. I suggest considering the possibility of opioid tolerance  in planning post operative pain control

## 2019-04-23 NOTE — ASSESSMENT & PLAN NOTE
Chronic back pain   Had 6 back surgeries for scoliosis   Planning on surgery in [place]- for broken hard ware

## 2019-04-23 NOTE — OUTPATIENT SUBJECTIVE & OBJECTIVE
"Outpatient Subjective & Objective     Chief complaint-Preoperative evaluation, Perioperative Medical management, complication reduction plan     Active cardiac conditions- none    Revised cardiac risk index predictors- none    Functional capacity -Examples of physical activity  house work and can take a flight of stairs holding on to the railing, took stairs a flight of stairs to go pain management 2 months ago----- She can undertake all the above activities without  chest pain,chest tightness,  ,dizziness,lightheadedness making her exercise tolerance more than 4 Mets.   Winded on exertion,attributes to weight  ,stable over time     Review of Systems   Constitutional: Negative for chills and fever.          Weight gain from reduced activity   HENT:        STOPBANG score  / 8    Snoring   HTN  BMI> 35   Age over 50   Neck size over 40 CM       Eyes:        Needs eyes checked    Respiratory:        No cough , phlegm  No Hemoptysis   Cardiovascular:        As noted   Gastrointestinal:        No overt GI/ blood losses  Bowel movements- Regular   Occasional rectal bleeding on straining     Endocrine:        Prednisone use > 20 mg daily for 3 weeks- None   Genitourinary: Negative for dysuria.          Urinary hesitancy , Urgency    Musculoskeletal:        As above      Skin:        Redness back of the Rt Upper arm   Planning on seeing Dermatologist   No fever    Neurological: Negative for syncope.        No unilateral weakness   Hematological:          Aspirin use for preventive reasons    Psychiatric/Behavioral:          Depression, Anxiety - Controlled     No SI/HI               No , bleeding, cardiac problems with previous surgeries/procedures.  Medications and Allergies reviewed in epic.   FH- No bleeding / venous thrombosis ,  in family   Lives with boy friend , dog  Staying with mother post op    Physical Exam  Blood pressure (!) 150/90, pulse 100, temperature 97.9 °F (36.6 °C), height 5' 5" (1.651 m), weight 110 " kg (242 lb 8 oz), last menstrual period 12/06/2007, SpO2 97 %.    Physical Exam  Constitutional- Vitals - Body mass index is 40.35 kg/m².,   Vitals:    04/23/19 1309   BP: (!) 150/90   Pulse: 100   Temp: 97.9 °F (36.6 °C)     General appearance-Conscious,Coherent  Eyes- No conjunctival icterus,pupils  round  and reactive to light   ENT-Oral cavity- moist  , Hearing grossly normal   Neck- No thyromegaly ,Trachea -central, No jugular venous distension,   No Carotid Bruit   Cardiovascular -Heart Sounds- Normal  and  no murmur   , No gallop rhythm   Respiratory - Normal Respiratory Effort, Normal breath sounds,  wheeze  and Forced Expiratory wheeze    Peripheral pitting pedal edema-- none , no calf pain   Gastrointestinal -Soft abdomen, No palpable masses, Non Tender,Liver,Spleen not palpable. No-- free fluid and shifting dullness  Musculoskeletal- No finger Clubbing. Strength grossly normal   Lymphatic-No Palpable cervical, axillary,Inguinal lymphadenopathy   Psychiatric - normal effect,Orientation  Rt Dorsalis pedis pulses-palpable    Lt Dorsalis pedis pulses- palpable   Rt Posterior tibial pulses -palpable   Left posterior tibial pulses -palpable   Miscellaneous -  no asterixis,  no dupuytren's contracture,  no renal bruit,  bowel sounds positive  and  Tattoo    Investigations  Lab and Imaging have been reviewed in epic.      Review of Medicine tests    Oct 2016     . The perfusion scan is free of evidence for myocardial ischemia or injury.   2. Resting wall motion is physiologic. Stress wall motion is physiologic.   3. Visually estimated LV function is normal at rest and normal at stress.   4. The ventricular volumes are normal at rest and stress.   5. The extracardiac distribution of radioactivity is normal.   6. There was no previous study available to compare.    EKG- I had independently reviewed the EKG from--9/15/2018   It was reported to be showing     Normal sinus rhythm  Possible Left atrial  enlargement  Right ventricular conduction delay  Prolonged QT  Abnormal ECG  When compared with ECG of 26-OCT-2016 13:27,  Previous ECG has undetermined rhythm, needs review    Review of clinical lab tests:  Lab Results   Component Value Date    CREATININE 0.8 04/23/2019    HGB 13.3 04/23/2019     04/23/2019     Review of old records- Was done and information gathered regards to events leading to surgery and health conditions of significance in the perioperative period.    Outpatient Subjective & Objective

## 2019-04-23 NOTE — HPI
History of present illness- I had the pleasure of meeting this pleasant 53 y.o. lady in the pre op clinic prior to her elective Orthopedic surgery. The patient is new to me .     I have obtained the history by speaking to the patient and by reviewing the electronic health records.    Events leading up to surgery / History of presenting illness -    She has been troubled with moderate-severe  Left knee   pain for over 1 year  . Pain increases with activity , night time and decreases with resting.  Gets a feeling of Left knee giving out   No falls per history       Relevant health conditions of significance for the perioperative period/ History of presenting illness -    Health conditions of significance for the perioperative period - Chronic back pain , Chronic opioid use , HTN, Morbid obesity     Not known to haveDiabetes Mellitus, Lung disease

## 2019-04-23 NOTE — ASSESSMENT & PLAN NOTE
On Replacement   Note TSH raise since June 2019  Taking regularly on empty stomach   No overt symptoms of hypothyroidism   Suggested follow up regarding TSH

## 2019-04-23 NOTE — ASSESSMENT & PLAN NOTE
Ranitidine nightly   Omeprazole AM   GERD-  I suggest continuation of the Proton pump inhibitor in the perioperative period . I suggest aspiration precautions

## 2019-04-23 NOTE — ASSESSMENT & PLAN NOTE
Cymbalta helping   I suggest monitoring the sodium as SIADH from Cymbalta   use and hypersecretion of ADH associated with surgery can reduce sodium in the perioperative period

## 2019-04-23 NOTE — ASSESSMENT & PLAN NOTE
On occasions   Noticing for 1-2 months, since planning on Left knee and back surgery   Once in a few days - Not daily   At rest and on exertion ( attributes to her weight )   No light headedness, chest discomfort    Had Cardiology evaluation   Had Holter monitor   Palpitations, Fatigue - Felt likely from   Most likely due to obesity and deconditioning, thyroid disease

## 2019-04-25 PROBLEM — R31.29 MICROSCOPIC HEMATURIA: Status: ACTIVE | Noted: 2019-04-25

## 2019-04-25 RX ORDER — PREGABALIN 25 MG/1
75 CAPSULE ORAL NIGHTLY
Status: CANCELLED | OUTPATIENT
Start: 2019-04-25

## 2019-04-25 RX ORDER — SODIUM CHLORIDE 0.9 % (FLUSH) 0.9 %
10 SYRINGE (ML) INJECTION
Status: CANCELLED | OUTPATIENT
Start: 2019-04-25

## 2019-04-25 RX ORDER — FAMOTIDINE 20 MG/1
20 TABLET, FILM COATED ORAL 2 TIMES DAILY
Status: CANCELLED | OUTPATIENT
Start: 2019-04-25

## 2019-04-25 RX ORDER — ACETAMINOPHEN 500 MG
1000 TABLET ORAL EVERY 6 HOURS
Status: CANCELLED | OUTPATIENT
Start: 2019-04-25 | End: 2019-04-27

## 2019-04-25 RX ORDER — PREGABALIN 25 MG/1
75 CAPSULE ORAL
Status: CANCELLED | OUTPATIENT
Start: 2019-04-25

## 2019-04-25 RX ORDER — ACETAMINOPHEN 10 MG/ML
1000 INJECTION, SOLUTION INTRAVENOUS ONCE
Status: CANCELLED | OUTPATIENT
Start: 2019-04-25 | End: 2019-04-25

## 2019-04-25 RX ORDER — MORPHINE SULFATE 10 MG/ML
2 INJECTION, SOLUTION INTRAMUSCULAR; INTRAVENOUS
Status: CANCELLED | OUTPATIENT
Start: 2019-04-25

## 2019-04-25 RX ORDER — NALOXONE HCL 0.4 MG/ML
0.02 VIAL (ML) INJECTION
Status: CANCELLED | OUTPATIENT
Start: 2019-04-25

## 2019-04-25 RX ORDER — POLYETHYLENE GLYCOL 3350 17 G/17G
17 POWDER, FOR SOLUTION ORAL DAILY
Status: CANCELLED | OUTPATIENT
Start: 2019-04-25

## 2019-04-25 RX ORDER — ASPIRIN 81 MG/1
81 TABLET ORAL 2 TIMES DAILY
Status: CANCELLED | OUTPATIENT
Start: 2019-04-25

## 2019-04-25 RX ORDER — FENTANYL CITRATE 50 UG/ML
25 INJECTION, SOLUTION INTRAMUSCULAR; INTRAVENOUS EVERY 5 MIN PRN
Status: CANCELLED | OUTPATIENT
Start: 2019-04-25

## 2019-04-25 RX ORDER — OXYCODONE HYDROCHLORIDE 5 MG/1
15 TABLET ORAL
Status: CANCELLED | OUTPATIENT
Start: 2019-04-25

## 2019-04-25 RX ORDER — MUPIROCIN 20 MG/G
1 OINTMENT TOPICAL 2 TIMES DAILY
Status: CANCELLED | OUTPATIENT
Start: 2019-04-25 | End: 2019-04-30

## 2019-04-25 RX ORDER — MIDAZOLAM HYDROCHLORIDE 5 MG/ML
1 INJECTION INTRAMUSCULAR; INTRAVENOUS EVERY 5 MIN PRN
Status: CANCELLED | OUTPATIENT
Start: 2019-04-25

## 2019-04-25 RX ORDER — AMOXICILLIN 250 MG
1 CAPSULE ORAL 2 TIMES DAILY
Status: CANCELLED | OUTPATIENT
Start: 2019-04-25

## 2019-04-25 RX ORDER — LIDOCAINE HYDROCHLORIDE 10 MG/ML
1 INJECTION, SOLUTION EPIDURAL; INFILTRATION; INTRACAUDAL; PERINEURAL
Status: CANCELLED | OUTPATIENT
Start: 2019-04-25

## 2019-04-25 RX ORDER — MUPIROCIN 20 MG/G
1 OINTMENT TOPICAL
Status: CANCELLED | OUTPATIENT
Start: 2019-04-25

## 2019-04-25 RX ORDER — OXYCODONE HYDROCHLORIDE 5 MG/1
10 TABLET ORAL
Status: CANCELLED | OUTPATIENT
Start: 2019-04-25

## 2019-04-25 RX ORDER — ROPIVACAINE HYDROCHLORIDE 2 MG/ML
8 INJECTION, SOLUTION EPIDURAL; INFILTRATION; PERINEURAL CONTINUOUS
Status: CANCELLED | OUTPATIENT
Start: 2019-04-25

## 2019-04-25 RX ORDER — SODIUM CHLORIDE 9 MG/ML
INJECTION, SOLUTION INTRAVENOUS
Status: CANCELLED | OUTPATIENT
Start: 2019-04-25

## 2019-04-25 RX ORDER — OXYCODONE HYDROCHLORIDE 5 MG/1
5 TABLET ORAL
Status: CANCELLED | OUTPATIENT
Start: 2019-04-25

## 2019-04-25 RX ORDER — CELECOXIB 100 MG/1
200 CAPSULE ORAL DAILY
Status: CANCELLED | OUTPATIENT
Start: 2019-04-25

## 2019-04-25 RX ORDER — CELECOXIB 100 MG/1
400 CAPSULE ORAL
Status: CANCELLED | OUTPATIENT
Start: 2019-04-25

## 2019-04-25 RX ORDER — RAMELTEON 8 MG/1
8 TABLET ORAL NIGHTLY PRN
Status: CANCELLED | OUTPATIENT
Start: 2019-04-25

## 2019-04-25 RX ORDER — SODIUM CHLORIDE 9 MG/ML
INJECTION, SOLUTION INTRAVENOUS CONTINUOUS
Status: CANCELLED | OUTPATIENT
Start: 2019-04-25 | End: 2019-04-26

## 2019-04-25 RX ORDER — ONDANSETRON 2 MG/ML
4 INJECTION INTRAMUSCULAR; INTRAVENOUS EVERY 8 HOURS PRN
Status: CANCELLED | OUTPATIENT
Start: 2019-04-25

## 2019-04-25 RX ORDER — BISACODYL 10 MG
10 SUPPOSITORY, RECTAL RECTAL EVERY 12 HOURS PRN
Status: CANCELLED | OUTPATIENT
Start: 2019-04-25

## 2019-04-25 NOTE — ASSESSMENT & PLAN NOTE
Urinalysis from 4/23/2019 showed occult blood   > 100 RBC  No UTI   She is S/P hysterectomy   Called to discuss Urinalysis   Will repeat UA  Called to speak to her   Noted intermittent Hematuria   Left a message about plan of repeat UA   And to call to discuss

## 2019-04-25 NOTE — H&P
CC: Left knee pain    Angie Mccarthy is a 53 y.o. female with several year history of Left knee pain. Pain is worse with activity and weight bearing.  Patient has experienced interference of activities of daily living due to decreased range of motion and an increase in joint pain and swelling.  Patient has failed non-operative treatment including NSAIDs, corticosteroid injections, viscosupplement injections, and activity modification.  Angie Mccarthy currently ambulates independently.     Relevant medical conditions of significance in perioperative period:  Back pain: h/o multiple surgeries requiring transfusions. Currently has broken hardware. Waiting until knee is finished to have spine surgery.  HTN: on lisinopril followed by PCP  Obesity: s/p gastric sleeve 2017  Depression/anxiety/OCD: on Xanax and Cymbalta     Past Medical History:   Diagnosis Date    Allergy     seasonal    Arthritis     Blood transfusion     with back surgeries    Chronic back pain     GERD (gastroesophageal reflux disease)     Hyperlipidemia     Hypertension     Hypothyroidism     Thyroid nodule    Joint pain     Kidney stones     Morbid obesity 12/14/2012    NAFLD (nonalcoholic fatty liver disease)     OCD (obsessive compulsive disorder)     Osteoporosis     PONV (postoperative nausea and vomiting)     Persistent, Motion sickness with boat rides, Scopolamine helped -still had nausea    Restless leg syndrome     Sciatica of right side associated with disorder of lumbosacral spine     SOB (shortness of breath) on exertion     Spinal stenosis of lumbar region     Supraventricular tachycardia     Thoracic spondylosis        Past Surgical History:   Procedure Laterality Date    APPENDECTOMY      BACK SURGERY      x 6    BIOPSY-LIVER-LAPAROSCOPIC - 00679 N/A 2/6/2017    [facility] 2ND FLR    CHOLECYSTECTOMY      CLOSURE N/A 2/14/2013    Performed by Addison  "MILENA Staton MD at [facility] OR 2ND FLR    CREATION, FLAP, MUSCLE ROTATION N/A 2/14/2013    Performed by Addison Staton MD at [facility] OR 2ND FLR    ESOPHAGOGASTRODUODENOSCOPY (EGD) N/A 8/8/2016    Performed by Eliceo Wilhelm MD at [facility] ENDO (4TH FLR)    FUSION, SPINE N/A 2/14/2013    Performed by Junior Hilton MD at [facility] OR 2ND FLR    GASTRECTOMY      Lap Gastric Sleeve    GASTRECTOMY-SLEEVE-LAPAROSCOPIC - 64578 w/ intraop egd N/A 2/6/2017    Performed by Deon Ha [facility] at [facility] OR 2ND FLR    HERNIA REPAIR      HYSTERECTOMY      INJECTION, STEROID, SPINE, LUMBAR, EPIDURAL N/A 10/4/2013    Performed by Dos Diagnostic Provider at [facility] CATH LAB    INJECTION, STEROID, SPINE, LUMBAR, EPIDURAL N/A 8/28/2013    Performed by Dos Diagnostic Provider at [facility] CATH LAB    LIVER BIOPSY  02/06/2017    steatohepatitis with stage 1 fibrosis    Myelogram N/A 1/7/2019    Performed by Lissett Surgeon at [facility]T    MYELOGRAM N/A 11/29/2018    Performed by Dos Diagnostic Provider at [facility] OR 2ND FLR    MYELOGRAM N/A 6/24/2014    Performed by Lissett Surgeon at [facility]    OOPHORECTOMY      SPINAL FUSION      TONSILLECTOMY, ADENOIDECTOMY         Family History   Problem Relation Age of Onset    Heart disease Mother     Heart disease Father     Cancer Father     COPD Father     Heart disease Maternal Grandmother     Acne Other     Eczema Other     Heart disease Maternal Aunt     Heart disease Maternal Uncle     Heart disease Paternal Aunt     Breast cancer Paternal Aunt     Heart disease Paternal Uncle     Cancer Paternal Uncle     Melanoma Neg Hx     Psoriasis Neg Hx     Lupus Neg Hx        Review of patient's allergies indicates:   Allergen Reactions    Mastisol adhesive [gum wjyzuh-fudins-gvap-alcohol] Itching, Rash and Blisters     "Looked like poison ivy"    Adhesive Dermatitis and Blisters    Adhesive tape-silicones Dermatitis     The longer the adhesive stays on, the worse the " "irritation    Sulfamethoxazole-trimethoprim Itching and Anxiety     Has a "nervous feeling."  "Jittery"  Has taken recently and the reaction was "less intense"         Current Outpatient Medications:     albuterol (VENTOLIN HFA) 90 mcg/actuation inhaler, Inhale 2 puffs into the lungs every 6 (six) hours as needed for Wheezing or Shortness of Breath. Rescue, Disp: 18 g, Rfl: 0    alprazolam (XANAX) 0.5 MG tablet, Take 0.5 mg by mouth daily as needed for Insomnia or Anxiety. , Disp: , Rfl:     ascorbic acid, vitamin C, (VITAMIN C) 500 MG tablet, Take 500 mg by mouth every morning., Disp: , Rfl:     aspirin (ECOTRIN) 81 MG EC tablet, Take 81 mg by mouth nightly. , Disp: , Rfl:     BIOTIN ORAL, Take 10,000 mg by mouth every morning. , Disp: , Rfl:     C,E,zinc,copper 11/iggca6o/lut (OCUVITE ADULT 50 PLUS ORAL), Take by mouth., Disp: , Rfl:     cyclobenzaprine (FLEXERIL) 10 MG tablet, TK 1 T PO QD- as needed for muscle spasms, Disp: , Rfl: 0    DULoxetine (CYMBALTA) 60 MG capsule, TK 1 C PO ONCE at night, Disp: , Rfl: 0    fish oil-omega-3 fatty acids 300-1,000 mg capsule, Take 1,200 mg by mouth 2 (two) times daily. , Disp: , Rfl:     fluconazole (DIFLUCAN) 150 MG Tab, TK 1 T PO UTD as needed, Disp: , Rfl: 0    gabapentin (NEURONTIN) 800 MG tablet, Take 800 mg by mouth 3 (three) times daily. Takes 2-3 times per day, Disp: , Rfl: 1    levothyroxine (SYNTHROID) 150 MCG tablet, Take 1 tablet (150 mcg total) by mouth once daily. (Patient taking differently: Take 150 mcg by mouth every morning. ), Disp: 30 tablet, Rfl: 11    lisinopril (PRINIVIL,ZESTRIL) 2.5 MG tablet, Take 2.5 mg by mouth every morning. , Disp: , Rfl: 2    meloxicam (MOBIC) 15 MG tablet, TK 1 T PO QD as needed  PAIN AND INFLAMMATION, Disp: , Rfl: 1    multivitamin capsule, Take 1 capsule by mouth every morning. , Disp: , Rfl:     omeprazole (PRILOSEC) 40 MG capsule, Take 40 mg by mouth once daily., Disp: , Rfl:     oxycodone-acetaminophen " "(PERCOCET)  mg per tablet, Take one to one and a half tablets by mouth every 4hours as needed for pain. (Patient taking differently: Take 1 tablet by mouth every 6 (six) hours. Take one to one and a half tablets by mouth every 4hours as needed for pain.), Disp: 81 tablet, Rfl: 0    ranitidine (ZANTAC) 150 MG tablet, Take 150 mg by mouth nightly., Disp: , Rfl:     rosuvastatin (CRESTOR) 10 MG tablet, Take 10 mg by mouth every evening., Disp: , Rfl:     tiZANidine (ZANAFLEX) 4 MG tablet, TK 1 T PO QHS as needed for mescle spams in the back, Disp: , Rfl: 1    zolpidem (AMBIEN) 10 mg Tab, Take 10 mg by mouth nightly. , Disp: , Rfl:     Review of Systems:  Constitutional: no fever or chills  Eyes: no visual changes  ENT: no nasal congestion or sore throat  Respiratory: no cough or shortness of breath  Cardiovascular: no chest pain or palpitations  Gastrointestinal: no nausea or vomiting, tolerating diet  Genitourinary: no hematuria or dysuria  Integument/Breast: no rash or pruritis  Hematologic/Lymphatic: no easy bruising or lymphadenopathy  Musculoskeletal: positive for knee pain  Neurological: no seizures or tremors  Behavioral/Psych: no auditory or visual hallucinations  Endocrine: no heat or cold intolerance    PE:  Ht 5' 5" (1.651 m)   Wt 109.8 kg (242 lb 1 oz)   LMP 12/06/2007   BMI 40.28 kg/m²   General: Pleasant, cooperative, NAD   Gait: antalgic  HEENT: NCAT, sclera nonicteric   Lungs: Respirations clear bilaterally; equal and unlabored.   CV: S1S2; 2+ bilateral upper and lower extremity pulses.   Skin: Intact throughout with no rashes, erythema, or lesions  Extremities: No LE edema,  no erythema or warmth of the skin in either lower extremity.    Left knee exam:  Knee Range of Motion: active, pain with passive range of motion  Effusion:yes  Condition of skin:intact  Location of tenderness:Medial joint line   Strength:4 of 5 quadriceps strength and 5 of 5 hamstring strength  Stability: stable " to testing    Hip Examination:full painless range of motion, without tenderness    Radiographs: Radiographs reveal advanced degenerative changes including subchondral cyst formation, subchondral sclerosis, osteophyte formation, joint space narrowing.     Knee Alignment:  Moderate varus    Diagnosis: osteoarthritis Left knee    Plan: Left total knee arthroplasty    Due to the serious nature of total joint infection and the high prevalence of community acquired MRSA, vancomycin will be used perioperatively.

## 2019-04-25 NOTE — H&P (VIEW-ONLY)
CC: Left knee pain    Angie Mccarthy is a 53 y.o. female with several year history of Left knee pain. Pain is worse with activity and weight bearing.  Patient has experienced interference of activities of daily living due to decreased range of motion and an increase in joint pain and swelling.  Patient has failed non-operative treatment including NSAIDs, corticosteroid injections, viscosupplement injections, and activity modification.  Angie Mccarthy currently ambulates independently.     Relevant medical conditions of significance in perioperative period:  Back pain: h/o multiple surgeries requiring transfusions. Currently has broken hardware. Waiting until knee is finished to have spine surgery.  HTN: on lisinopril followed by PCP  Obesity: s/p gastric sleeve 2017  Depression/anxiety/OCD: on Xanax and Cymbalta     Past Medical History:   Diagnosis Date    Allergy     seasonal    Arthritis     Blood transfusion     with back surgeries    Chronic back pain     GERD (gastroesophageal reflux disease)     Hyperlipidemia     Hypertension     Hypothyroidism     Thyroid nodule    Joint pain     Kidney stones     Morbid obesity 12/14/2012    NAFLD (nonalcoholic fatty liver disease)     OCD (obsessive compulsive disorder)     Osteoporosis     PONV (postoperative nausea and vomiting)     Persistent, Motion sickness with boat rides, Scopolamine helped -still had nausea    Restless leg syndrome     Sciatica of right side associated with disorder of lumbosacral spine     SOB (shortness of breath) on exertion     Spinal stenosis of lumbar region     Supraventricular tachycardia     Thoracic spondylosis        Past Surgical History:   Procedure Laterality Date    APPENDECTOMY      BACK SURGERY      x 6    BIOPSY-LIVER-LAPAROSCOPIC - 19970 N/A 2/6/2017    Performed by [facility] at [facility] OR 2ND FLR    CHOLECYSTECTOMY      CLOSURE N/A 2/14/2013    Performed by Addison  "MILENA Staton MD at [facility] OR 2ND FLR    CREATION, FLAP, MUSCLE ROTATION N/A 2/14/2013    Performed by Addison [facility] at [facility] OR 2ND FLR    ESOPHAGOGASTRODUODENOSCOPY (EGD) N/A 8/8/2016    Performed by Eliceo Wilhelm MD at [facility] ENDO (4TH FLR)    FUSION, SPINE N/A 2/14/2013    Performed by Junior Hilton MD at [facility] OR 2ND FLR    GASTRECTOMY      Lap Gastric Sleeve    GASTRECTOMY-SLEEVE-LAPAROSCOPIC - 12605 w/ intraop egd N/A 2/6/2017    Performed by [facility] at [facility] OR 2ND FLR    HERNIA REPAIR      HYSTERECTOMY      INJECTION, STEROID, SPINE, LUMBAR, EPIDURAL N/A 10/4/2013    Performed by [facility] CATH LAB    INJECTION, STEROID, SPINE, LUMBAR, EPIDURAL N/A 8/28/2013    Performed by D[facility] at [facility] CATH LAB    LIVER BIOPSY  02/06/2017    steatohepatitis with stage 1 fibrosis    Myelogram N/A 1/7/2019    Performed by Lissett Surgeon at [facility] LISSETT    MYELOGRAM N/A 11/29/2018    Performed by Dos Diagnostic Provider at [facility] OR 2ND FLR    MYELOGRAM N/A 6/24/2014    Performed by Lissett Surgeon at [facility]    OOPHORECTOMY      SPINAL FUSION      TONSILLECTOMY, ADENOIDECTOMY         Family History   Problem Relation Age of Onset    Heart disease Mother     Heart disease Father     Cancer Father     COPD Father     Heart disease Maternal Grandmother     Acne Other     Eczema Other     Heart disease Maternal Aunt     Heart disease Maternal Uncle     Heart disease Paternal Aunt     Breast cancer Paternal Aunt     Heart disease Paternal Uncle     Cancer Paternal Uncle     Melanoma Neg Hx     Psoriasis Neg Hx     Lupus Neg Hx        Review of patient's allergies indicates:   Allergen Reactions    Mastisol adhesive [gum xxdioy-brzyow-ykzx-alcohol] Itching, Rash and Blisters     "Looked like poison ivy"    Adhesive Dermatitis and Blisters    Adhesive tape-silicones Dermatitis     The longer the adhesive stays on, the worse the " "irritation    Sulfamethoxazole-trimethoprim Itching and Anxiety     Has a "nervous feeling."  "Jittery"  Has taken recently and the reaction was "less intense"         Current Outpatient Medications:     albuterol (VENTOLIN HFA) 90 mcg/actuation inhaler, Inhale 2 puffs into the lungs every 6 (six) hours as needed for Wheezing or Shortness of Breath. Rescue, Disp: 18 g, Rfl: 0    alprazolam (XANAX) 0.5 MG tablet, Take 0.5 mg by mouth daily as needed for Insomnia or Anxiety. , Disp: , Rfl:     ascorbic acid, vitamin C, (VITAMIN C) 500 MG tablet, Take 500 mg by mouth every morning., Disp: , Rfl:     aspirin (ECOTRIN) 81 MG EC tablet, Take 81 mg by mouth nightly. , Disp: , Rfl:     BIOTIN ORAL, Take 10,000 mg by mouth every morning. , Disp: , Rfl:     C,E,zinc,copper 11/symia7e/lut (OCUVITE ADULT 50 PLUS ORAL), Take by mouth., Disp: , Rfl:     cyclobenzaprine (FLEXERIL) 10 MG tablet, TK 1 T PO QD- as needed for muscle spasms, Disp: , Rfl: 0    DULoxetine (CYMBALTA) 60 MG capsule, TK 1 C PO ONCE at night, Disp: , Rfl: 0    fish oil-omega-3 fatty acids 300-1,000 mg capsule, Take 1,200 mg by mouth 2 (two) times daily. , Disp: , Rfl:     fluconazole (DIFLUCAN) 150 MG Tab, TK 1 T PO UTD as needed, Disp: , Rfl: 0    gabapentin (NEURONTIN) 800 MG tablet, Take 800 mg by mouth 3 (three) times daily. Takes 2-3 times per day, Disp: , Rfl: 1    levothyroxine (SYNTHROID) 150 MCG tablet, Take 1 tablet (150 mcg total) by mouth once daily. (Patient taking differently: Take 150 mcg by mouth every morning. ), Disp: 30 tablet, Rfl: 11    lisinopril (PRINIVIL,ZESTRIL) 2.5 MG tablet, Take 2.5 mg by mouth every morning. , Disp: , Rfl: 2    meloxicam (MOBIC) 15 MG tablet, TK 1 T PO QD as needed  PAIN AND INFLAMMATION, Disp: , Rfl: 1    multivitamin capsule, Take 1 capsule by mouth every morning. , Disp: , Rfl:     omeprazole (PRILOSEC) 40 MG capsule, Take 40 mg by mouth once daily., Disp: , Rfl:     oxycodone-acetaminophen " "(PERCOCET)  mg per tablet, Take one to one and a half tablets by mouth every 4hours as needed for pain. (Patient taking differently: Take 1 tablet by mouth every 6 (six) hours. Take one to one and a half tablets by mouth every 4hours as needed for pain.), Disp: 81 tablet, Rfl: 0    ranitidine (ZANTAC) 150 MG tablet, Take 150 mg by mouth nightly., Disp: , Rfl:     rosuvastatin (CRESTOR) 10 MG tablet, Take 10 mg by mouth every evening., Disp: , Rfl:     tiZANidine (ZANAFLEX) 4 MG tablet, TK 1 T PO QHS as needed for mescle spams in the back, Disp: , Rfl: 1    zolpidem (AMBIEN) 10 mg Tab, Take 10 mg by mouth nightly. , Disp: , Rfl:     Review of Systems:  Constitutional: no fever or chills  Eyes: no visual changes  ENT: no nasal congestion or sore throat  Respiratory: no cough or shortness of breath  Cardiovascular: no chest pain or palpitations  Gastrointestinal: no nausea or vomiting, tolerating diet  Genitourinary: no hematuria or dysuria  Integument/Breast: no rash or pruritis  Hematologic/Lymphatic: no easy bruising or lymphadenopathy  Musculoskeletal: positive for knee pain  Neurological: no seizures or tremors  Behavioral/Psych: no auditory or visual hallucinations  Endocrine: no heat or cold intolerance    PE:  Ht 5' 5" (1.651 m)   Wt 109.8 kg (242 lb 1 oz)   LMP 12/06/2007   BMI 40.28 kg/m²   General: Pleasant, cooperative, NAD   Gait: antalgic  HEENT: NCAT, sclera nonicteric   Lungs: Respirations clear bilaterally; equal and unlabored.   CV: S1S2; 2+ bilateral upper and lower extremity pulses.   Skin: Intact throughout with no rashes, erythema, or lesions  Extremities: No LE edema,  no erythema or warmth of the skin in either lower extremity.    Left knee exam:  Knee Range of Motion: active, pain with passive range of motion  Effusion:yes  Condition of skin:intact  Location of tenderness:Medial joint line   Strength:4 of 5 quadriceps strength and 5 of 5 hamstring strength  Stability: stable " to testing    Hip Examination:full painless range of motion, without tenderness    Radiographs: Radiographs reveal advanced degenerative changes including subchondral cyst formation, subchondral sclerosis, osteophyte formation, joint space narrowing.     Knee Alignment:  Moderate varus    Diagnosis: osteoarthritis Left knee    Plan: Left total knee arthroplasty    Due to the serious nature of total joint infection and the high prevalence of community acquired MRSA, vancomycin will be used perioperatively.

## 2019-04-25 NOTE — PROGRESS NOTES
Angie Mccarthy is a 53 y.o. year old here today for a pre-operative visit in preparation for a Left total knee arthroplasty to be performed by  Dr. Hall on 5/1/2019.  she was last seen and treated in the clinic on 2/12/2019. she will be medically optimized by the pre op center. There has been no significant change in medical status since last visit. No fever, chills, malaise, or unexplained weight change.      Allergies, Medications, past medical and surgical history reviewed.    Focused examination performed.    Dr. Ochsner saw this patient today in clinic. All questions answered. Patient encouraged to call with questions. Contact information given.     Pre, cristino, and post operative procedures and expectations discussed. Questions were answered. Angie Mccarthy has been educated and is ready to proceed with surgery. Approximately 30 minutes was spent discussing surgical outcomes, plans, procedures pre, cristino, and post operative expections and care.  Surgical consent signed.    Angie Mcbrideblanc will contact us if there are any questions, concerns, or changes in medical status prior to surgery.

## 2019-04-25 NOTE — H&P (VIEW-ONLY)
CC: Left knee pain    Angie Mccarthy is a 53 y.o. female with several year history of Left knee pain. Pain is worse with activity and weight bearing.  Patient has experienced interference of activities of daily living due to decreased range of motion and an increase in joint pain and swelling.  Patient has failed non-operative treatment including NSAIDs, corticosteroid injections, viscosupplement injections, and activity modification.  Angie Mccarthy currently ambulates independently.     Relevant medical conditions of significance in perioperative period:  Back pain: h/o multiple surgeries requiring transfusions. Currently has broken hardware. Waiting until knee is finished to have spine surgery.  HTN: on lisinopril followed by PCP  Obesity: s/p gastric sleeve 2017  Depression/anxiety/OCD: on Xanax and Cymbalta     Past Medical History:   Diagnosis Date    Allergy     seasonal    Arthritis     Blood transfusion     with back surgeries    Chronic back pain     GERD (gastroesophageal reflux disease)     Hyperlipidemia     Hypertension     Hypothyroidism     Thyroid nodule    Joint pain     Kidney stones     Morbid obesity 12/14/2012    NAFLD (nonalcoholic fatty liver disease)     OCD (obsessive compulsive disorder)     Osteoporosis     PONV (postoperative nausea and vomiting)     Persistent, Motion sickness with boat rides, Scopolamine helped -still had nausea    Restless leg syndrome     Sciatica of right side associated with disorder of lumbosacral spine     SOB (shortness of breath) on exertion     Spinal stenosis of lumbar region     Supraventricular tachycardia     Thoracic spondylosis        Past Surgical History:   Procedure Laterality Date    APPENDECTOMY      BACK SURGERY      x 6    BIOPSY-LIVER-LAPAROSCOPIC - 56084 N/A 2/6/2017    [facility] 2ND FLR    CHOLECYSTECTOMY      CLOSURE N/A 2/14/2013    Performed by Addison  "MILENA Staton MD at [facility] OR 2ND FLR    CREATION, FLAP, MUSCLE ROTATION N/A 2/14/2013    Performed by Addison Staton MD at [facility] OR 2ND FLR    ESOPHAGOGASTRODUODENOSCOPY (EGD) N/A 8/8/2016    Performed by Eliceo Wilhelm MD at [facility] ENDO (4TH FLR)    FUSION, SPINE N/A 2/14/2013    Performed by Junior Hilton MD at [facility] OR 2ND FLR    GASTRECTOMY      Lap Gastric Sleeve    GASTRECTOMY-SLEEVE-LAPAROSCOPIC - 89929 w/ intraop egd N/A 2/6/2017    Performed by Deon Ha MD at [facility] OR 2ND FLR    HERNIA REPAIR      HYSTERECTOMY      INJECTION, STEROID, SPINE, LUMBAR, EPIDURAL N/A 10/4/2013    [facility] CATH LAB    INJECTION, STEROID, SPINE, LUMBAR, EPIDURAL N/A 8/28/2013    Performed by Dos Diagnostic Provider at [facility] CATH LAB    LIVER BIOPSY  02/06/2017    steatohepatitis with stage 1 fibrosis    Myelogram N/A 1/7/2019    Performed by Lissett Surgeon at [facility]IS[facility]T    MYELOGRAM N/A 11/29/2018    Performed by Dos Diagnostic Provider at [facility] OR 2ND FLR    MYELOGRAM N/A 6/24/2014    Performed by Lissett Surgeon at [facility] LIS[facility]T    OOPHORECTOMY      SPINAL FUSION      TONSILLECTOMY, ADENOIDECTOMY         Family History   Problem Relation Age of Onset    Heart disease Mother     Heart disease Father     Cancer Father     COPD Father     Heart disease Maternal Grandmother     Acne Other     Eczema Other     Heart disease Maternal Aunt     Heart disease Maternal Uncle     Heart disease Paternal Aunt     Breast cancer Paternal Aunt     Heart disease Paternal Uncle     Cancer Paternal Uncle     Melanoma Neg Hx     Psoriasis Neg Hx     Lupus Neg Hx        Review of patient's allergies indicates:   Allergen Reactions    Mastisol adhesive [gum zoevgu-gtqwri-xkuu-alcohol] Itching, Rash and Blisters     "Looked like poison ivy"    Adhesive Dermatitis and Blisters    Adhesive tape-silicones Dermatitis     The longer the adhesive stays on, the worse the " "irritation    Sulfamethoxazole-trimethoprim Itching and Anxiety     Has a "nervous feeling."  "Jittery"  Has taken recently and the reaction was "less intense"         Current Outpatient Medications:     albuterol (VENTOLIN HFA) 90 mcg/actuation inhaler, Inhale 2 puffs into the lungs every 6 (six) hours as needed for Wheezing or Shortness of Breath. Rescue, Disp: 18 g, Rfl: 0    alprazolam (XANAX) 0.5 MG tablet, Take 0.5 mg by mouth daily as needed for Insomnia or Anxiety. , Disp: , Rfl:     ascorbic acid, vitamin C, (VITAMIN C) 500 MG tablet, Take 500 mg by mouth every morning., Disp: , Rfl:     aspirin (ECOTRIN) 81 MG EC tablet, Take 81 mg by mouth nightly. , Disp: , Rfl:     BIOTIN ORAL, Take 10,000 mg by mouth every morning. , Disp: , Rfl:     C,E,zinc,copper 11/pfmjs6n/lut (OCUVITE ADULT 50 PLUS ORAL), Take by mouth., Disp: , Rfl:     cyclobenzaprine (FLEXERIL) 10 MG tablet, TK 1 T PO QD- as needed for muscle spasms, Disp: , Rfl: 0    DULoxetine (CYMBALTA) 60 MG capsule, TK 1 C PO ONCE at night, Disp: , Rfl: 0    fish oil-omega-3 fatty acids 300-1,000 mg capsule, Take 1,200 mg by mouth 2 (two) times daily. , Disp: , Rfl:     fluconazole (DIFLUCAN) 150 MG Tab, TK 1 T PO UTD as needed, Disp: , Rfl: 0    gabapentin (NEURONTIN) 800 MG tablet, Take 800 mg by mouth 3 (three) times daily. Takes 2-3 times per day, Disp: , Rfl: 1    levothyroxine (SYNTHROID) 150 MCG tablet, Take 1 tablet (150 mcg total) by mouth once daily. (Patient taking differently: Take 150 mcg by mouth every morning. ), Disp: 30 tablet, Rfl: 11    lisinopril (PRINIVIL,ZESTRIL) 2.5 MG tablet, Take 2.5 mg by mouth every morning. , Disp: , Rfl: 2    meloxicam (MOBIC) 15 MG tablet, TK 1 T PO QD as needed  PAIN AND INFLAMMATION, Disp: , Rfl: 1    multivitamin capsule, Take 1 capsule by mouth every morning. , Disp: , Rfl:     omeprazole (PRILOSEC) 40 MG capsule, Take 40 mg by mouth once daily., Disp: , Rfl:     oxycodone-acetaminophen " "(PERCOCET)  mg per tablet, Take one to one and a half tablets by mouth every 4hours as needed for pain. (Patient taking differently: Take 1 tablet by mouth every 6 (six) hours. Take one to one and a half tablets by mouth every 4hours as needed for pain.), Disp: 81 tablet, Rfl: 0    ranitidine (ZANTAC) 150 MG tablet, Take 150 mg by mouth nightly., Disp: , Rfl:     rosuvastatin (CRESTOR) 10 MG tablet, Take 10 mg by mouth every evening., Disp: , Rfl:     tiZANidine (ZANAFLEX) 4 MG tablet, TK 1 T PO QHS as needed for mescle spams in the back, Disp: , Rfl: 1    zolpidem (AMBIEN) 10 mg Tab, Take 10 mg by mouth nightly. , Disp: , Rfl:     Review of Systems:  Constitutional: no fever or chills  Eyes: no visual changes  ENT: no nasal congestion or sore throat  Respiratory: no cough or shortness of breath  Cardiovascular: no chest pain or palpitations  Gastrointestinal: no nausea or vomiting, tolerating diet  Genitourinary: no hematuria or dysuria  Integument/Breast: no rash or pruritis  Hematologic/Lymphatic: no easy bruising or lymphadenopathy  Musculoskeletal: positive for knee pain  Neurological: no seizures or tremors  Behavioral/Psych: no auditory or visual hallucinations  Endocrine: no heat or cold intolerance    PE:  Ht 5' 5" (1.651 m)   Wt 109.8 kg (242 lb 1 oz)   LMP 12/06/2007   BMI 40.28 kg/m²   General: Pleasant, cooperative, NAD   Gait: antalgic  HEENT: NCAT, sclera nonicteric   Lungs: Respirations clear bilaterally; equal and unlabored.   CV: S1S2; 2+ bilateral upper and lower extremity pulses.   Skin: Intact throughout with no rashes, erythema, or lesions  Extremities: No LE edema,  no erythema or warmth of the skin in either lower extremity.    Left knee exam:  Knee Range of Motion: active, pain with passive range of motion  Effusion:yes  Condition of skin:intact  Location of tenderness:Medial joint line   Strength:4 of 5 quadriceps strength and 5 of 5 hamstring strength  Stability: stable " to testing    Hip Examination:full painless range of motion, without tenderness    Radiographs: Radiographs reveal advanced degenerative changes including subchondral cyst formation, subchondral sclerosis, osteophyte formation, joint space narrowing.     Knee Alignment:  Moderate varus    Diagnosis: osteoarthritis Left knee    Plan: Left total knee arthroplasty    Due to the serious nature of total joint infection and the high prevalence of community acquired MRSA, vancomycin will be used perioperatively.

## 2019-04-26 NOTE — PRE-PROCEDURE INSTRUCTIONS
Patient called to schedule a repeat UA for Dr. Ko.  Patient states she has 6 kidney stones and always has hematuria.  UA scheduled for 4/29.  Patient also has a question regarding taking Cymbalta before sx.  In the past, a myelogram was cancelled because patient was on Zoloft.  Discussed with Dr. Ko and renaat Vargas to continue Cymbalta.

## 2019-04-29 ENCOUNTER — HOSPITAL ENCOUNTER (OUTPATIENT)
Facility: HOSPITAL | Age: 54
Discharge: HOME | End: 2019-04-29
Attending: ANESTHESIOLOGY | Admitting: ANESTHESIOLOGY
Payer: MEDICARE

## 2019-04-29 VITALS
TEMPERATURE: 97 F | RESPIRATION RATE: 18 BRPM | SYSTOLIC BLOOD PRESSURE: 152 MMHG | HEART RATE: 81 BPM | DIASTOLIC BLOOD PRESSURE: 87 MMHG | WEIGHT: 240 LBS | BODY MASS INDEX: 39.99 KG/M2 | OXYGEN SATURATION: 98 % | HEIGHT: 65 IN

## 2019-04-29 DIAGNOSIS — M17.12 PRIMARY OSTEOARTHRITIS OF LEFT KNEE: Primary | ICD-10-CM

## 2019-04-29 PROCEDURE — 64640 INJECTION TREATMENT OF NERVE: CPT | Mod: LT | Performed by: ANESTHESIOLOGY

## 2019-04-29 PROCEDURE — 64640 PR DESTRUCT BY NEURO AGENT; OTHER PERIPH NERVE: ICD-10-PCS | Mod: LT,,, | Performed by: ANESTHESIOLOGY

## 2019-04-29 PROCEDURE — 25000003 PHARM REV CODE 250: Performed by: ANESTHESIOLOGY

## 2019-04-29 PROCEDURE — C2618 PROBE/NEEDLE, CRYO: HCPCS | Performed by: ANESTHESIOLOGY

## 2019-04-29 PROCEDURE — 64640 INJECTION TREATMENT OF NERVE: CPT | Mod: LT,,, | Performed by: ANESTHESIOLOGY

## 2019-04-29 RX ORDER — LIDOCAINE HYDROCHLORIDE 10 MG/ML
INJECTION, SOLUTION EPIDURAL; INFILTRATION; INTRACAUDAL; PERINEURAL
Status: DISCONTINUED | OUTPATIENT
Start: 2019-04-29 | End: 2019-04-29 | Stop reason: HOSPADM

## 2019-04-29 NOTE — PLAN OF CARE
Problem: Adult Inpatient Plan of Care  Goal: Plan of Care Review  Outcome: Outcome(s) achieved Date Met: 04/29/19  Patient discharged per MD orders. Instructions given on medications, wound care, activity, signs of infection, when to call MD, and follow up appointments. Pt verbalized understanding.  Patient AAOx4, VSS, no c/o pain or discomfort at this time.  Patient declined wheelchair with escort and ambulated off unit.

## 2019-04-29 NOTE — OP NOTE
Procedure Note Iovera:    DATE OF PROCEDURE:  4/29/2019    PREOPERATIVE DIAGNOSIS: left knee osteoarthritis.     POSTOPERATIVE DIAGNOSIS: left knee osteoarthritis.     PROCEDURE: Iovera treatment of anterior femoral cutaneous nerve, and both branches of infrapatellar saphenous nerve using at least 3 different punctures to treat all 3 nerves. (cpt 64640 x3)    ATTENDING SURGEON: Chavo Gold III, MD    Interventional Pain Management    ASSISTANT: none    COMPLICATIONS: None.     IMPLANTS:  None    ESTIMATED BLOOD LOSS:  < 5cc    SPECIMENS REMOVED:  None    ANESTHESIA: Local lidocaine 1%    INDICATIONS FOR PROCEDURE: This is a 53 y.o. female with longstanding knee pain. They have failed non operative management including injections.I discussed a new treatment therapy called Iovera, which is cryotherapy, to provide symptomatic relief along the sensory distribution of the infrapatellar tendon branch of the saphenous nerve  and AFCN. The patient elected to move forward with the procedure. The patient was given patient information and literature to review prior to the procedure as well.  Based on this, the patient agreed to move forward with doing the procedure.      PROCEDURE:    The patient was placed supine on the exam table and the proximal medial aspect of the left tibia and anterior aspect of distal femur was prepped with Chlorhexidine.  A line was drawn extending approximately 5 cm medial to inferior pole of the patella distally to a point approximately 5 cm medial to the tibial tubercle.  A second line was drawn in a medial to lateral direction the width of the patella approximately 7 cm proximal to the patella. We then infiltrated the skin with lidocaine 1% along both lines using a 25g needle. We then introduced the Iovera device along these lines and this device penetrated the skin, creating cryotherapy to both branches of the infrapatellar saphenous nerve and a third treatment to the anterior femoral  cutaneous nerve. 7 punctures of the skin were made to treat the 2 branches of the ISN and another 7 punctures were made to treat the AFCN. There were a total of 3 nerves treated with iovera. The patient tolerated the procedure well with no problem

## 2019-04-29 NOTE — DISCHARGE SUMMARY
Discharge Note  Short Stay      SUMMARY     Admit Date: 4/29/2019    Attending Physician: Chavo Gold III      Discharge Physician: Chavo Gold III      Discharge Date: 4/29/2019 10:08 AM    Final Diagnosis:   1. Primary osteoarthritis of left knee        Disposition: Home or self care    Patient Instructions:   Current Discharge Medication List      CONTINUE these medications which have NOT CHANGED    Details   alprazolam (XANAX) 0.5 MG tablet Take 0.5 mg by mouth daily as needed for Insomnia or Anxiety.       ascorbic acid, vitamin C, (VITAMIN C) 500 MG tablet Take 500 mg by mouth every morning.      aspirin (ECOTRIN) 81 MG EC tablet Take 81 mg by mouth nightly.       DULoxetine (CYMBALTA) 60 MG capsule TK 1 C PO ONCE at night  Refills: 0      gabapentin (NEURONTIN) 800 MG tablet Take 800 mg by mouth 3 (three) times daily. Takes 2-3 times per day  Refills: 1      levothyroxine (SYNTHROID) 150 MCG tablet Take 1 tablet (150 mcg total) by mouth once daily.  Qty: 30 tablet, Refills: 11      lisinopril (PRINIVIL,ZESTRIL) 2.5 MG tablet Take 2.5 mg by mouth every morning.   Refills: 2      omeprazole (PRILOSEC) 40 MG capsule Take 40 mg by mouth once daily.      oxycodone-acetaminophen (PERCOCET)  mg per tablet Take one to one and a half tablets by mouth every 4hours as needed for pain.  Qty: 81 tablet, Refills: 0      ranitidine (ZANTAC) 150 MG tablet Take 150 mg by mouth nightly.      rosuvastatin (CRESTOR) 10 MG tablet Take 10 mg by mouth every evening.      tiZANidine (ZANAFLEX) 4 MG tablet TK 1 T PO QHS as needed for mescle spams in the back  Refills: 1      zolpidem (AMBIEN) 10 mg Tab Take 10 mg by mouth nightly.       albuterol (VENTOLIN HFA) 90 mcg/actuation inhaler Inhale 2 puffs into the lungs every 6 (six) hours as needed for Wheezing or Shortness of Breath. Rescue  Qty: 18 g, Refills: 0      BIOTIN ORAL Take 10,000 mg by mouth every morning.       C,E,zinc,copper 11/duqvq6w/lut  (OCUVITE ADULT 50 PLUS ORAL) Take by mouth.      cyclobenzaprine (FLEXERIL) 10 MG tablet TK 1 T PO QD- as needed for muscle spasms  Refills: 0      fish oil-omega-3 fatty acids 300-1,000 mg capsule Take 1,200 mg by mouth 2 (two) times daily.       fluconazole (DIFLUCAN) 150 MG Tab TK 1 T PO UTD as needed  Refills: 0      meloxicam (MOBIC) 15 MG tablet TK 1 T PO QD as needed  PAIN AND INFLAMMATION  Refills: 1      multivitamin capsule Take 1 capsule by mouth every morning.                  Discharge Diagnosis: Same as Pre and Post Procedure  Condition on Discharge: Stable.  Diet on Discharge: Same as before.  Activity: as per instruction sheet.  Discharge to: Home with a responsible adult.  Follow up: as needed

## 2019-04-29 NOTE — DISCHARGE INSTRUCTIONS
Home Care Instructions Pain Management:    1. DIET:   You may resume your normal diet today.   2. BATHING:   You may shower with luke warm water.  3. DRESSING:   You may remove your bandage today.   4. ACTIVITY LEVEL:   You may resume your normal activities today. No strenuous activity for 24 hours after your procedure.   5. MEDICATIONS:   You may resume your normal medications today.   6. SPECIAL INSTRUCTIONS:   You may bath or shower this evening.    Use ice pack to injection site for any pain or discomfort.  Apply ice packs for 20 minute intervals as needed.     PLEASE CALL YOUR DOCTOR IF:  1. Redness or swelling around the injection site.  2. Fever of 101 degrees  3. Drainage (pus) from the injection site.  4. For any continuous bleeding (some dried blood over the incision is normal.)    FOR EMERGENCIES:   If any unusual problems or difficulties occur during clinic hours, call [phone] or [phone].

## 2019-04-29 NOTE — PLAN OF CARE
Problem: Adult Inpatient Plan of Care  Goal: Plan of Care Review  Outcome: Ongoing (interventions implemented as appropriate)  Pt arrived to floor ambulatory from home. Pt oriented to room. Iv inserted. Admit assessment completed. Nurse call bell within reach. Will continue to monitor

## 2019-04-29 NOTE — PLAN OF CARE
Problem: Adult Inpatient Plan of Care  Goal: Plan of Care Review  Outcome: Ongoing (interventions implemented as appropriate)  Received report from SAM Rooney. Patient s/p left knee nerve block, AAOx4. VSS, no c/o pain or discomfort at this time, resp even and unlabored. Left knee with bandaids x 6 CDI.  No active bleeding. No hematoma noted. Post procedure protocol reviewed with patient. Understanding verbalized. Nurse call bell within reach. Will continue to monitor per post procedure protocol.

## 2019-04-30 ENCOUNTER — TELEPHONE (OUTPATIENT)
Dept: ORTHOPEDICS | Facility: CLINIC | Age: 54
End: 2019-04-30

## 2019-04-30 NOTE — TELEPHONE ENCOUNTER
I had called her twice  No answer   Left a message to come in tomorrow  To 2nd floor surgery unit for 8 am  -NPO after midnight & TO CALL for the Dr on call if she needs us before then

## 2019-05-01 ENCOUNTER — ANESTHESIA (OUTPATIENT)
Dept: SURGERY | Facility: HOSPITAL | Age: 54
End: 2019-05-01
Payer: MEDICARE

## 2019-05-01 ENCOUNTER — HOSPITAL ENCOUNTER (OUTPATIENT)
Facility: HOSPITAL | Age: 54
LOS: 1 days | Discharge: HOME | End: 2019-05-02
Attending: ORTHOPAEDIC SURGERY | Admitting: ORTHOPAEDIC SURGERY
Payer: MEDICARE

## 2019-05-01 DIAGNOSIS — M17.12 PRIMARY OSTEOARTHRITIS OF LEFT KNEE: ICD-10-CM

## 2019-05-01 LAB
ANION GAP SERPL CALC-SCNC: 10 MMOL/L (ref 8–16)
BUN SERPL-MCNC: 14 MG/DL (ref 6–20)
CALCIUM SERPL-MCNC: 9.3 MG/DL (ref 8.7–10.5)
CHLORIDE SERPL-SCNC: 102 MMOL/L (ref 95–110)
CO2 SERPL-SCNC: 27 MMOL/L (ref 23–29)
CREAT SERPL-MCNC: 0.8 MG/DL (ref 0.5–1.4)
EST. GFR  (AFRICAN AMERICAN): >60 ML/MIN/1.73 M^2
EST. GFR  (NON AFRICAN AMERICAN): >60 ML/MIN/1.73 M^2
GLUCOSE SERPL-MCNC: 131 MG/DL (ref 70–110)
POTASSIUM SERPL-SCNC: 3.8 MMOL/L (ref 3.5–5.1)
SODIUM SERPL-SCNC: 139 MMOL/L (ref 136–145)

## 2019-05-01 PROCEDURE — 27447 PR TOTAL KNEE ARTHROPLASTY: ICD-10-PCS | Mod: AS,LT,, | Performed by: PHYSICIAN ASSISTANT

## 2019-05-01 PROCEDURE — 25000003 PHARM REV CODE 250: Performed by: ORTHOPAEDIC SURGERY

## 2019-05-01 PROCEDURE — 71000033 HC RECOVERY, INTIAL HOUR: Performed by: ORTHOPAEDIC SURGERY

## 2019-05-01 PROCEDURE — 97161 PT EVAL LOW COMPLEX 20 MIN: CPT

## 2019-05-01 PROCEDURE — 63600175 PHARM REV CODE 636 W HCPCS: Performed by: PHYSICIAN ASSISTANT

## 2019-05-01 PROCEDURE — 76942 ECHO GUIDE FOR BIOPSY: CPT | Performed by: ANESTHESIOLOGY

## 2019-05-01 PROCEDURE — C1713 ANCHOR/SCREW BN/BN,TIS/BN: HCPCS | Performed by: ORTHOPAEDIC SURGERY

## 2019-05-01 PROCEDURE — 97116 GAIT TRAINING THERAPY: CPT

## 2019-05-01 PROCEDURE — 99900035 HC TECH TIME PER 15 MIN (STAT)

## 2019-05-01 PROCEDURE — 25000003 PHARM REV CODE 250: Performed by: STUDENT IN AN ORGANIZED HEALTH CARE EDUCATION/TRAINING PROGRAM

## 2019-05-01 PROCEDURE — 88305 TISSUE SPECIMEN TO PATHOLOGY - SURGERY: ICD-10-PCS | Mod: 26,,, | Performed by: PATHOLOGY

## 2019-05-01 PROCEDURE — 37000008 HC ANESTHESIA 1ST 15 MINUTES: Performed by: ORTHOPAEDIC SURGERY

## 2019-05-01 PROCEDURE — 88305 TISSUE EXAM BY PATHOLOGIST: CPT | Performed by: PATHOLOGY

## 2019-05-01 PROCEDURE — 63600175 PHARM REV CODE 636 W HCPCS: Performed by: NURSE ANESTHETIST, CERTIFIED REGISTERED

## 2019-05-01 PROCEDURE — 27447 PR TOTAL KNEE ARTHROPLASTY: ICD-10-PCS | Mod: LT,,, | Performed by: ORTHOPAEDIC SURGERY

## 2019-05-01 PROCEDURE — 76942 ECHO GUIDE FOR BIOPSY: CPT | Mod: 26,,, | Performed by: ANESTHESIOLOGY

## 2019-05-01 PROCEDURE — 94761 N-INVAS EAR/PLS OXIMETRY MLT: CPT

## 2019-05-01 PROCEDURE — 76942 IPACK SINGLE INJECTION BLOCK: ICD-10-PCS | Mod: 26,,, | Performed by: ANESTHESIOLOGY

## 2019-05-01 PROCEDURE — 36000711: Performed by: ORTHOPAEDIC SURGERY

## 2019-05-01 PROCEDURE — 27447 TOTAL KNEE ARTHROPLASTY: CPT | Mod: LT,,, | Performed by: ORTHOPAEDIC SURGERY

## 2019-05-01 PROCEDURE — 27447 TOTAL KNEE ARTHROPLASTY: CPT | Mod: AS,LT,, | Performed by: PHYSICIAN ASSISTANT

## 2019-05-01 PROCEDURE — 97530 THERAPEUTIC ACTIVITIES: CPT

## 2019-05-01 PROCEDURE — 64450 NJX AA&/STRD OTHER PN/BRANCH: CPT | Performed by: ANESTHESIOLOGY

## 2019-05-01 PROCEDURE — 88305 TISSUE EXAM BY PATHOLOGIST: CPT | Mod: 26,,, | Performed by: PATHOLOGY

## 2019-05-01 PROCEDURE — 25000003 PHARM REV CODE 250: Performed by: PHYSICIAN ASSISTANT

## 2019-05-01 PROCEDURE — 64448 ADDUCTOR CANAL CATHETER: ICD-10-PCS | Mod: 59,LT,, | Performed by: ANESTHESIOLOGY

## 2019-05-01 PROCEDURE — C1776 JOINT DEVICE (IMPLANTABLE): HCPCS | Performed by: ORTHOPAEDIC SURGERY

## 2019-05-01 PROCEDURE — 71000039 HC RECOVERY, EACH ADD'L HOUR: Performed by: ORTHOPAEDIC SURGERY

## 2019-05-01 PROCEDURE — 25000003 PHARM REV CODE 250: Performed by: NURSE ANESTHETIST, CERTIFIED REGISTERED

## 2019-05-01 PROCEDURE — 64448 NJX AA&/STRD FEM NRV NFS IMG: CPT | Mod: 59,LT,, | Performed by: ANESTHESIOLOGY

## 2019-05-01 PROCEDURE — 94799 UNLISTED PULMONARY SVC/PX: CPT

## 2019-05-01 PROCEDURE — 88311 TISSUE SPECIMEN TO PATHOLOGY - SURGERY: ICD-10-PCS | Mod: 26,,, | Performed by: PATHOLOGY

## 2019-05-01 PROCEDURE — D9220A PRA ANESTHESIA: Mod: ,,, | Performed by: ANESTHESIOLOGY

## 2019-05-01 PROCEDURE — 64450 IPACK SINGLE INJECTION BLOCK: ICD-10-PCS | Mod: 59,LT,, | Performed by: ANESTHESIOLOGY

## 2019-05-01 PROCEDURE — 27201423 OPTIME MED/SURG SUP & DEVICES STERILE SUPPLY: Performed by: ORTHOPAEDIC SURGERY

## 2019-05-01 PROCEDURE — 36000710: Performed by: ORTHOPAEDIC SURGERY

## 2019-05-01 PROCEDURE — D9220A PRA ANESTHESIA: ICD-10-PCS | Mod: ,,, | Performed by: ANESTHESIOLOGY

## 2019-05-01 PROCEDURE — 97165 OT EVAL LOW COMPLEX 30 MIN: CPT

## 2019-05-01 PROCEDURE — 88311 DECALCIFY TISSUE: CPT | Mod: 26,,, | Performed by: PATHOLOGY

## 2019-05-01 PROCEDURE — 37000009 HC ANESTHESIA EA ADD 15 MINS: Performed by: ORTHOPAEDIC SURGERY

## 2019-05-01 PROCEDURE — 63600175 PHARM REV CODE 636 W HCPCS: Performed by: ANESTHESIOLOGY

## 2019-05-01 PROCEDURE — 63600175 PHARM REV CODE 636 W HCPCS: Performed by: ORTHOPAEDIC SURGERY

## 2019-05-01 PROCEDURE — 25000003 PHARM REV CODE 250: Performed by: ANESTHESIOLOGY

## 2019-05-01 PROCEDURE — 27000221 HC OXYGEN, UP TO 24 HOURS

## 2019-05-01 PROCEDURE — 36415 COLL VENOUS BLD VENIPUNCTURE: CPT

## 2019-05-01 PROCEDURE — 80048 BASIC METABOLIC PNL TOTAL CA: CPT

## 2019-05-01 DEVICE — FEMORAL COMP CMT SZ 6 LEFT: Type: IMPLANTABLE DEVICE | Site: KNEE | Status: FUNCTIONAL

## 2019-05-01 DEVICE — IMPLANTABLE DEVICE: Type: IMPLANTABLE DEVICE | Site: KNEE | Status: FUNCTIONAL

## 2019-05-01 DEVICE — CEMENT BONE WHOLE BATCH: Type: IMPLANTABLE DEVICE | Site: KNEE | Status: FUNCTIONAL

## 2019-05-01 DEVICE — PSN TIB STM 5 DEG SZ D L: Type: IMPLANTABLE DEVICE | Site: KNEE | Status: FUNCTIONAL

## 2019-05-01 DEVICE — PLUG BONE #5: Type: IMPLANTABLE DEVICE | Site: KNEE | Status: FUNCTIONAL

## 2019-05-01 DEVICE — PATELLA PSN CEM POLY 8X29MM: Type: IMPLANTABLE DEVICE | Site: KNEE | Status: FUNCTIONAL

## 2019-05-01 RX ORDER — VANCOMYCIN HCL IN 5 % DEXTROSE 1.5G/250ML
1500 PLASTIC BAG, INJECTION (ML) INTRAVENOUS
Status: COMPLETED | OUTPATIENT
Start: 2019-05-02 | End: 2019-05-02

## 2019-05-01 RX ORDER — CEFAZOLIN SODIUM 1 G/3ML
2 INJECTION, POWDER, FOR SOLUTION INTRAMUSCULAR; INTRAVENOUS
Status: COMPLETED | OUTPATIENT
Start: 2019-05-01 | End: 2019-05-02

## 2019-05-01 RX ORDER — OXYCODONE HYDROCHLORIDE 5 MG/1
10 TABLET ORAL
Status: DISCONTINUED | OUTPATIENT
Start: 2019-05-01 | End: 2019-05-01

## 2019-05-01 RX ORDER — OXYCODONE HYDROCHLORIDE 10 MG/1
10 TABLET ORAL
Status: DISCONTINUED | OUTPATIENT
Start: 2019-05-01 | End: 2019-05-02 | Stop reason: HOSPADM

## 2019-05-01 RX ORDER — ONDANSETRON 2 MG/ML
4 INJECTION INTRAMUSCULAR; INTRAVENOUS EVERY 12 HOURS PRN
Status: DISCONTINUED | OUTPATIENT
Start: 2019-05-01 | End: 2019-05-01 | Stop reason: SDUPTHER

## 2019-05-01 RX ORDER — NALOXONE HCL 0.4 MG/ML
0.02 VIAL (ML) INJECTION
Status: DISCONTINUED | OUTPATIENT
Start: 2019-05-01 | End: 2019-05-01 | Stop reason: SDUPTHER

## 2019-05-01 RX ORDER — LISINOPRIL 2.5 MG/1
2.5 TABLET ORAL EVERY MORNING
Status: DISCONTINUED | OUTPATIENT
Start: 2019-05-02 | End: 2019-05-02 | Stop reason: HOSPADM

## 2019-05-01 RX ORDER — BISACODYL 10 MG
10 SUPPOSITORY, RECTAL RECTAL EVERY 12 HOURS PRN
Status: DISCONTINUED | OUTPATIENT
Start: 2019-05-01 | End: 2019-05-02 | Stop reason: HOSPADM

## 2019-05-01 RX ORDER — FENTANYL CITRATE 50 UG/ML
25 INJECTION, SOLUTION INTRAMUSCULAR; INTRAVENOUS EVERY 5 MIN PRN
Status: DISCONTINUED | OUTPATIENT
Start: 2019-05-01 | End: 2019-05-01 | Stop reason: HOSPADM

## 2019-05-01 RX ORDER — MUPIROCIN 20 MG/G
1 OINTMENT TOPICAL
Status: COMPLETED | OUTPATIENT
Start: 2019-05-01 | End: 2019-05-01

## 2019-05-01 RX ORDER — OXYCODONE HYDROCHLORIDE 5 MG/1
5 TABLET ORAL
Status: DISCONTINUED | OUTPATIENT
Start: 2019-05-01 | End: 2019-05-01

## 2019-05-01 RX ORDER — GABAPENTIN 300 MG/1
900 CAPSULE ORAL 3 TIMES DAILY
Status: DISCONTINUED | OUTPATIENT
Start: 2019-05-01 | End: 2019-05-02 | Stop reason: HOSPADM

## 2019-05-01 RX ORDER — ACETAMINOPHEN 500 MG
1000 TABLET ORAL EVERY 6 HOURS
Status: DISCONTINUED | OUTPATIENT
Start: 2019-05-01 | End: 2019-05-01

## 2019-05-01 RX ORDER — LIDOCAINE HCL/PF 100 MG/5ML
SYRINGE (ML) INTRAVENOUS
Status: DISCONTINUED | OUTPATIENT
Start: 2019-05-01 | End: 2019-05-01

## 2019-05-01 RX ORDER — GENTAMICIN SULFATE 40 MG/ML
INJECTION, SOLUTION INTRAMUSCULAR; INTRAVENOUS
Status: DISCONTINUED | OUTPATIENT
Start: 2019-05-01 | End: 2019-05-01 | Stop reason: HOSPADM

## 2019-05-01 RX ORDER — PROMETHAZINE HYDROCHLORIDE 25 MG/1
25 TABLET ORAL EVERY 6 HOURS PRN
Qty: 15 TABLET | Refills: 0 | Status: SHIPPED | OUTPATIENT
Start: 2019-05-01 | End: 2019-11-12

## 2019-05-01 RX ORDER — NEOSTIGMINE METHYLSULFATE 1 MG/ML
INJECTION, SOLUTION INTRAVENOUS
Status: DISCONTINUED | OUTPATIENT
Start: 2019-05-01 | End: 2019-05-01

## 2019-05-01 RX ORDER — VANCOMYCIN HYDROCHLORIDE 500 MG/10ML
INJECTION, POWDER, LYOPHILIZED, FOR SOLUTION INTRAVENOUS
Status: DISCONTINUED | OUTPATIENT
Start: 2019-05-01 | End: 2019-05-01 | Stop reason: HOSPADM

## 2019-05-01 RX ORDER — DOCUSATE SODIUM 100 MG/1
100 CAPSULE, LIQUID FILLED ORAL 2 TIMES DAILY PRN
Qty: 60 CAPSULE | Refills: 0 | Status: SHIPPED | OUTPATIENT
Start: 2019-05-01 | End: 2019-11-12

## 2019-05-01 RX ORDER — SODIUM CHLORIDE 9 MG/ML
INJECTION, SOLUTION INTRAVENOUS CONTINUOUS
Status: ACTIVE | OUTPATIENT
Start: 2019-05-01 | End: 2019-05-02

## 2019-05-01 RX ORDER — DEXAMETHASONE SODIUM PHOSPHATE 4 MG/ML
INJECTION, SOLUTION INTRA-ARTICULAR; INTRALESIONAL; INTRAMUSCULAR; INTRAVENOUS; SOFT TISSUE
Status: DISCONTINUED | OUTPATIENT
Start: 2019-05-01 | End: 2019-05-01

## 2019-05-01 RX ORDER — ACETAMINOPHEN 10 MG/ML
1000 INJECTION, SOLUTION INTRAVENOUS ONCE
Status: COMPLETED | OUTPATIENT
Start: 2019-05-01 | End: 2019-05-01

## 2019-05-01 RX ORDER — MORPHINE SULFATE 2 MG/ML
2 INJECTION, SOLUTION INTRAMUSCULAR; INTRAVENOUS
Status: DISCONTINUED | OUTPATIENT
Start: 2019-05-01 | End: 2019-05-02 | Stop reason: HOSPADM

## 2019-05-01 RX ORDER — MORPHINE SULFATE 2 MG/ML
2 INJECTION, SOLUTION INTRAMUSCULAR; INTRAVENOUS
Status: DISCONTINUED | OUTPATIENT
Start: 2019-05-01 | End: 2019-05-01

## 2019-05-01 RX ORDER — MIDAZOLAM HYDROCHLORIDE 1 MG/ML
0.5 INJECTION INTRAMUSCULAR; INTRAVENOUS
Status: DISCONTINUED | OUTPATIENT
Start: 2019-05-01 | End: 2019-05-01 | Stop reason: HOSPADM

## 2019-05-01 RX ORDER — HYDROMORPHONE HYDROCHLORIDE 1 MG/ML
0.2 INJECTION, SOLUTION INTRAMUSCULAR; INTRAVENOUS; SUBCUTANEOUS EVERY 5 MIN PRN
Status: DISCONTINUED | OUTPATIENT
Start: 2019-05-01 | End: 2019-05-01 | Stop reason: HOSPADM

## 2019-05-01 RX ORDER — GLYCOPYRROLATE 0.2 MG/ML
INJECTION INTRAMUSCULAR; INTRAVENOUS
Status: DISCONTINUED | OUTPATIENT
Start: 2019-05-01 | End: 2019-05-01

## 2019-05-01 RX ORDER — NALOXONE HCL 0.4 MG/ML
0.02 VIAL (ML) INJECTION
Status: DISCONTINUED | OUTPATIENT
Start: 2019-05-01 | End: 2019-05-02 | Stop reason: HOSPADM

## 2019-05-01 RX ORDER — RAMELTEON 8 MG/1
8 TABLET ORAL NIGHTLY PRN
Status: DISCONTINUED | OUTPATIENT
Start: 2019-05-01 | End: 2019-05-02 | Stop reason: HOSPADM

## 2019-05-01 RX ORDER — FENTANYL CITRATE 50 UG/ML
INJECTION, SOLUTION INTRAMUSCULAR; INTRAVENOUS
Status: DISCONTINUED | OUTPATIENT
Start: 2019-05-01 | End: 2019-05-01

## 2019-05-01 RX ORDER — PREGABALIN 50 MG/1
150 CAPSULE ORAL NIGHTLY
Status: DISCONTINUED | OUTPATIENT
Start: 2019-05-01 | End: 2019-05-01

## 2019-05-01 RX ORDER — ROSUVASTATIN CALCIUM 10 MG/1
10 TABLET, COATED ORAL NIGHTLY
Status: DISCONTINUED | OUTPATIENT
Start: 2019-05-02 | End: 2019-05-02 | Stop reason: HOSPADM

## 2019-05-01 RX ORDER — ASPIRIN 81 MG/1
81 TABLET ORAL 2 TIMES DAILY
Status: DISCONTINUED | OUTPATIENT
Start: 2019-05-01 | End: 2019-05-02 | Stop reason: HOSPADM

## 2019-05-01 RX ORDER — ROPIVACAINE HYDROCHLORIDE 2 MG/ML
8 INJECTION, SOLUTION EPIDURAL; INFILTRATION; PERINEURAL CONTINUOUS
Status: DISCONTINUED | OUTPATIENT
Start: 2019-05-01 | End: 2019-05-01 | Stop reason: SDUPTHER

## 2019-05-01 RX ORDER — CELECOXIB 200 MG/1
400 CAPSULE ORAL
Status: COMPLETED | OUTPATIENT
Start: 2019-05-01 | End: 2019-05-01

## 2019-05-01 RX ORDER — MUPIROCIN 20 MG/G
1 OINTMENT TOPICAL 2 TIMES DAILY
Status: DISCONTINUED | OUTPATIENT
Start: 2019-05-01 | End: 2019-05-02 | Stop reason: HOSPADM

## 2019-05-01 RX ORDER — ONDANSETRON 2 MG/ML
4 INJECTION INTRAMUSCULAR; INTRAVENOUS EVERY 8 HOURS PRN
Status: DISCONTINUED | OUTPATIENT
Start: 2019-05-01 | End: 2019-05-02 | Stop reason: HOSPADM

## 2019-05-01 RX ORDER — SODIUM CHLORIDE 9 MG/ML
INJECTION, SOLUTION INTRAVENOUS
Status: COMPLETED | OUTPATIENT
Start: 2019-05-01 | End: 2019-05-01

## 2019-05-01 RX ORDER — METHOCARBAMOL 500 MG/1
500 TABLET, FILM COATED ORAL 3 TIMES DAILY
Status: COMPLETED | OUTPATIENT
Start: 2019-05-01 | End: 2019-05-02

## 2019-05-01 RX ORDER — KETAMINE HCL IN 0.9 % NACL 50 MG/5 ML
SYRINGE (ML) INTRAVENOUS
Status: DISCONTINUED | OUTPATIENT
Start: 2019-05-01 | End: 2019-05-01

## 2019-05-01 RX ORDER — CEFAZOLIN SODIUM 1 G/3ML
2 INJECTION, POWDER, FOR SOLUTION INTRAMUSCULAR; INTRAVENOUS
Status: COMPLETED | OUTPATIENT
Start: 2019-05-01 | End: 2019-05-01

## 2019-05-01 RX ORDER — PREGABALIN 75 MG/1
75 CAPSULE ORAL
Status: COMPLETED | OUTPATIENT
Start: 2019-05-01 | End: 2019-05-01

## 2019-05-01 RX ORDER — ASPIRIN 81 MG/1
81 TABLET ORAL 2 TIMES DAILY
Qty: 60 TABLET | Refills: 0 | Status: SHIPPED | OUTPATIENT
Start: 2019-05-01 | End: 2024-03-21

## 2019-05-01 RX ORDER — LEVOTHYROXINE SODIUM 150 UG/1
150 TABLET ORAL
Status: DISCONTINUED | OUTPATIENT
Start: 2019-05-02 | End: 2019-05-02 | Stop reason: HOSPADM

## 2019-05-01 RX ORDER — POLYETHYLENE GLYCOL 3350 17 G/17G
17 POWDER, FOR SOLUTION ORAL DAILY
Status: DISCONTINUED | OUTPATIENT
Start: 2019-05-02 | End: 2019-05-02 | Stop reason: HOSPADM

## 2019-05-01 RX ORDER — OXYCODONE HYDROCHLORIDE 5 MG/1
15 TABLET ORAL
Status: DISCONTINUED | OUTPATIENT
Start: 2019-05-01 | End: 2019-05-01

## 2019-05-01 RX ORDER — LIDOCAINE HYDROCHLORIDE 10 MG/ML
1 INJECTION, SOLUTION EPIDURAL; INFILTRATION; INTRACAUDAL; PERINEURAL
Status: COMPLETED | OUTPATIENT
Start: 2019-05-01 | End: 2019-05-01

## 2019-05-01 RX ORDER — VANCOMYCIN HCL IN 5 % DEXTROSE 1.5G/250ML
1500 PLASTIC BAG, INJECTION (ML) INTRAVENOUS
Status: DISCONTINUED | OUTPATIENT
Start: 2019-05-01 | End: 2019-05-01

## 2019-05-01 RX ORDER — ROPIVACAINE HYDROCHLORIDE 2 MG/ML
8 INJECTION, SOLUTION EPIDURAL; INFILTRATION; PERINEURAL CONTINUOUS
Status: DISCONTINUED | OUTPATIENT
Start: 2019-05-01 | End: 2019-05-02 | Stop reason: HOSPADM

## 2019-05-01 RX ORDER — AMOXICILLIN 250 MG
1 CAPSULE ORAL 2 TIMES DAILY
Status: DISCONTINUED | OUTPATIENT
Start: 2019-05-01 | End: 2019-05-02 | Stop reason: HOSPADM

## 2019-05-01 RX ORDER — DULOXETIN HYDROCHLORIDE 60 MG/1
60 CAPSULE, DELAYED RELEASE ORAL NIGHTLY
Status: DISCONTINUED | OUTPATIENT
Start: 2019-05-01 | End: 2019-05-02 | Stop reason: HOSPADM

## 2019-05-01 RX ORDER — ONDANSETRON 2 MG/ML
INJECTION INTRAMUSCULAR; INTRAVENOUS
Status: DISCONTINUED | OUTPATIENT
Start: 2019-05-01 | End: 2019-05-01

## 2019-05-01 RX ORDER — ROCURONIUM BROMIDE 10 MG/ML
INJECTION, SOLUTION INTRAVENOUS
Status: DISCONTINUED | OUTPATIENT
Start: 2019-05-01 | End: 2019-05-01

## 2019-05-01 RX ORDER — OXYCODONE AND ACETAMINOPHEN 5; 325 MG/1; MG/1
1 TABLET ORAL
Qty: 50 TABLET | Refills: 0 | Status: SHIPPED | OUTPATIENT
Start: 2019-05-01 | End: 2019-11-12

## 2019-05-01 RX ORDER — ACETAMINOPHEN 500 MG
1000 TABLET ORAL EVERY 6 HOURS
Status: DISCONTINUED | OUTPATIENT
Start: 2019-05-01 | End: 2019-05-02 | Stop reason: HOSPADM

## 2019-05-01 RX ORDER — PHENYLEPHRINE HYDROCHLORIDE 10 MG/ML
INJECTION INTRAVENOUS
Status: DISCONTINUED | OUTPATIENT
Start: 2019-05-01 | End: 2019-05-01

## 2019-05-01 RX ORDER — PROPOFOL 10 MG/ML
VIAL (ML) INTRAVENOUS
Status: DISCONTINUED | OUTPATIENT
Start: 2019-05-01 | End: 2019-05-01

## 2019-05-01 RX ORDER — SODIUM CHLORIDE 0.9 % (FLUSH) 0.9 %
10 SYRINGE (ML) INJECTION
Status: DISCONTINUED | OUTPATIENT
Start: 2019-05-01 | End: 2019-05-01 | Stop reason: HOSPADM

## 2019-05-01 RX ORDER — VANCOMYCIN HCL IN 5 % DEXTROSE 1.5G/250ML
1500 PLASTIC BAG, INJECTION (ML) INTRAVENOUS
Status: COMPLETED | OUTPATIENT
Start: 2019-05-01 | End: 2019-05-01

## 2019-05-01 RX ORDER — MIDAZOLAM HYDROCHLORIDE 1 MG/ML
1 INJECTION INTRAMUSCULAR; INTRAVENOUS EVERY 5 MIN PRN
Status: DISCONTINUED | OUTPATIENT
Start: 2019-05-01 | End: 2019-05-01 | Stop reason: HOSPADM

## 2019-05-01 RX ORDER — FAMOTIDINE 20 MG/1
20 TABLET, FILM COATED ORAL 2 TIMES DAILY
Status: DISCONTINUED | OUTPATIENT
Start: 2019-05-01 | End: 2019-05-02 | Stop reason: HOSPADM

## 2019-05-01 RX ORDER — OXYCODONE HYDROCHLORIDE 5 MG/1
5 TABLET ORAL
Status: DISCONTINUED | OUTPATIENT
Start: 2019-05-01 | End: 2019-05-02 | Stop reason: HOSPADM

## 2019-05-01 RX ADMIN — CELECOXIB 400 MG: 200 CAPSULE ORAL at 10:05

## 2019-05-01 RX ADMIN — MIDAZOLAM HYDROCHLORIDE 2 MG: 1 INJECTION, SOLUTION INTRAMUSCULAR; INTRAVENOUS at 10:05

## 2019-05-01 RX ADMIN — SODIUM CHLORIDE, SODIUM GLUCONATE, SODIUM ACETATE, POTASSIUM CHLORIDE, MAGNESIUM CHLORIDE, SODIUM PHOSPHATE, DIBASIC, AND POTASSIUM PHOSPHATE: .53; .5; .37; .037; .03; .012; .00082 INJECTION, SOLUTION INTRAVENOUS at 11:05

## 2019-05-01 RX ADMIN — SODIUM CHLORIDE: 0.9 INJECTION, SOLUTION INTRAVENOUS at 02:05

## 2019-05-01 RX ADMIN — GABAPENTIN 900 MG: 300 CAPSULE ORAL at 03:05

## 2019-05-01 RX ADMIN — PROPOFOL 200 MG: 10 INJECTION, EMULSION INTRAVENOUS at 11:05

## 2019-05-01 RX ADMIN — Medication 1500 MG: at 09:05

## 2019-05-01 RX ADMIN — FENTANYL CITRATE 50 MCG: 50 INJECTION, SOLUTION INTRAMUSCULAR; INTRAVENOUS at 11:05

## 2019-05-01 RX ADMIN — GLYCOPYRROLATE 0.6 MG: 0.2 INJECTION, SOLUTION INTRAMUSCULAR; INTRAVENOUS at 01:05

## 2019-05-01 RX ADMIN — DEXAMETHASONE SODIUM PHOSPHATE 8 MG: 4 INJECTION, SOLUTION INTRAMUSCULAR; INTRAVENOUS at 11:05

## 2019-05-01 RX ADMIN — GABAPENTIN 900 MG: 300 CAPSULE ORAL at 09:05

## 2019-05-01 RX ADMIN — ROCURONIUM BROMIDE 20 MG: 10 INJECTION, SOLUTION INTRAVENOUS at 12:05

## 2019-05-01 RX ADMIN — ACETAMINOPHEN 1000 MG: 10 INJECTION, SOLUTION INTRAVENOUS at 03:05

## 2019-05-01 RX ADMIN — METHOCARBAMOL TABLETS 500 MG: 500 TABLET, COATED ORAL at 09:05

## 2019-05-01 RX ADMIN — ASPIRIN 81 MG: 81 TABLET, COATED ORAL at 09:05

## 2019-05-01 RX ADMIN — MUPIROCIN 1 G: 20 OINTMENT TOPICAL at 09:05

## 2019-05-01 RX ADMIN — DULOXETINE 60 MG: 60 CAPSULE, DELAYED RELEASE ORAL at 09:05

## 2019-05-01 RX ADMIN — SODIUM CHLORIDE: 0.9 INJECTION, SOLUTION INTRAVENOUS at 10:05

## 2019-05-01 RX ADMIN — PREGABALIN 75 MG: 75 CAPSULE ORAL at 10:05

## 2019-05-01 RX ADMIN — NEOSTIGMINE METHYLSULFATE 5 MG: 1 INJECTION INTRAVENOUS at 01:05

## 2019-05-01 RX ADMIN — Medication 30 MG: at 11:05

## 2019-05-01 RX ADMIN — FENTANYL CITRATE 100 MCG: 50 INJECTION, SOLUTION INTRAMUSCULAR; INTRAVENOUS at 11:05

## 2019-05-01 RX ADMIN — PHENYLEPHRINE HYDROCHLORIDE 100 MCG: 10 INJECTION INTRAVENOUS at 12:05

## 2019-05-01 RX ADMIN — ACETAMINOPHEN 1000 MG: 500 TABLET ORAL at 10:05

## 2019-05-01 RX ADMIN — CEFAZOLIN 2 G: 1 INJECTION, POWDER, FOR SOLUTION INTRAMUSCULAR; INTRAVENOUS at 09:05

## 2019-05-01 RX ADMIN — FAMOTIDINE 20 MG: 20 TABLET, FILM COATED ORAL at 09:05

## 2019-05-01 RX ADMIN — OXYCODONE HYDROCHLORIDE 10 MG: 10 TABLET ORAL at 04:05

## 2019-05-01 RX ADMIN — LIDOCAINE HYDROCHLORIDE 100 MG: 20 INJECTION, SOLUTION INTRAVENOUS at 11:05

## 2019-05-01 RX ADMIN — ROCURONIUM BROMIDE 10 MG: 10 INJECTION, SOLUTION INTRAVENOUS at 01:05

## 2019-05-01 RX ADMIN — ACETAMINOPHEN 1000 MG: 500 TABLET ORAL at 08:05

## 2019-05-01 RX ADMIN — METHOCARBAMOL TABLETS 500 MG: 500 TABLET, COATED ORAL at 03:05

## 2019-05-01 RX ADMIN — ROCURONIUM BROMIDE 50 MG: 10 INJECTION, SOLUTION INTRAVENOUS at 11:05

## 2019-05-01 RX ADMIN — RAMELTEON 8 MG: 8 TABLET, FILM COATED ORAL at 09:05

## 2019-05-01 RX ADMIN — ROPIVACAINE HYDROCHLORIDE 8 ML/HR: 2 INJECTION, SOLUTION EPIDURAL; INFILTRATION at 02:05

## 2019-05-01 RX ADMIN — LIDOCAINE HYDROCHLORIDE 10 MG: 10 INJECTION, SOLUTION EPIDURAL; INFILTRATION; INTRACAUDAL; PERINEURAL at 10:05

## 2019-05-01 RX ADMIN — CEFAZOLIN 2 G: 330 INJECTION, POWDER, FOR SOLUTION INTRAMUSCULAR; INTRAVENOUS at 12:05

## 2019-05-01 RX ADMIN — STANDARDIZED SENNA CONCENTRATE AND DOCUSATE SODIUM 1 TABLET: 8.6; 5 TABLET, FILM COATED ORAL at 09:05

## 2019-05-01 RX ADMIN — MUPIROCIN 1 G: 20 OINTMENT TOPICAL at 10:05

## 2019-05-01 RX ADMIN — OXYCODONE HYDROCHLORIDE 10 MG: 10 TABLET ORAL at 09:05

## 2019-05-01 RX ADMIN — Medication 1500 MG: at 12:05

## 2019-05-01 RX ADMIN — ONDANSETRON 4 MG: 2 INJECTION INTRAMUSCULAR; INTRAVENOUS at 01:05

## 2019-05-01 NOTE — PLAN OF CARE
"[facility]    HOME HEALTH ORDERS  FACE TO FACE ENCOUNTER    Patient Name: Angie Mccarthy  YOB: 1965    PCP: Rangel Floyd MD   PCP Address: [address] [place] [address] [address] [place] [address]  PCP Phone Number: [phone]  PCP Fax: [phone]    Encounter Date: 05/01/2019    Admit to Home Health    Diagnoses:  Active Hospital Problems    Diagnosis  POA    Primary osteoarthritis of left knee [M17.12]  Yes      Resolved Hospital Problems   No resolved problems to display.       Future Appointments   Date Time Provider Department Center   5/14/2019 11:00 AM Dolly Castillo PA-C [facility] ORTHO [place] [facility]   7/15/2019  4:15 PM INJECTION, INFECTIOUS DISEASES [facility] ID INJ [place] [facility]   8/27/2019  1:30 PM Darlene Brown NP [facility] ENDODIA [place] jayme     I have seen and examined this patient face to face today. My clinical findings that support the need for the home health skilled services and home bound status are the following:  Requiring assistive device to leave home due to unsteady gait caused by Joint Replacement.  Medical restrictions requiring assistance of another human to leave home due to  Unstable ambulation.    Allergies:  Review of patient's allergies indicates:   Allergen Reactions    Mastisol adhesive [gum obiqfx-dvrlfi-xadc-alcohol] Itching, Rash and Blisters     "Looked like poison ivy"    Adhesive Dermatitis and Blisters    Adhesive tape-silicones Dermatitis     The longer the adhesive stays on, the worse the irritation    Sulfamethoxazole-trimethoprim Itching and Anxiety     Has a "nervous feeling."  "Jittery"  Has taken recently and the reaction was "less intense"       Diet: regular diet    Activities: activity as tolerated    Home Health Admitting Clinician:   SN/PT to complete comprehensive assessment including routine vital signs. Instruct on disease process and s/s of complications to report to MD. Follow specific home health arthoplasty protocol. Review/verify " medication list sent home with the patient at time of discharge  and instruct patient/caregiver as needed. If coumadin ordered, coumadin clinic to manage INR with INR draws 2x per week with a goal to maintain INR between 1.8 and 2.2. Frequency may be adjusted depending on start of care date.    Notify MD if SBP > 160 or < 90; DBP > 90 or < 50; HR > 120 or < 50; Temp > 101    Home Medical Equipment:  Walker, 3-1 bedside commode, transfer tub bench    CONSULTS:    Physical Therapy may admit if patient not on coumadin, PT to perform comprehensive assessment if performing admit visit and generate therapy plan of care. Evaluate for home safety and equipment needs; Establish/upgrade home exercise program. Perform/instruct on therapeutic exercises, gait training, transfer training, and Range of Motion.    WOUND CARE ORDERS:  Assess Surgical Incision/DSRG each TX  Aquacel AG drsg applied post-op leave on 14 days post op. Call MD if any drainage reaches border to border of drsg horizontally, s/s of infection, temp >101, induration, swelling or redness.  If dressing is removed per MD order, then apply island dressing, change/teach caregiver to perform daily dressing change if island dressing present.    Medications: Review discharge medications with patient and family and provide education.      Current Discharge Medication List      START taking these medications    Details   docusate sodium (COLACE) 100 MG capsule Take 1 capsule (100 mg total) by mouth 2 (two) times daily as needed for Constipation.  Qty: 60 capsule, Refills: 0      oxyCODONE-acetaminophen (PERCOCET) 5-325 mg per tablet Take 1 tablet by mouth every 4 to 6 hours as needed for Pain.  Qty: 50 tablet, Refills: 0      promethazine (PHENERGAN) 25 MG tablet Take 1 tablet (25 mg total) by mouth every 6 (six) hours as needed for Nausea.  Qty: 15 tablet, Refills: 0         CONTINUE these medications which have CHANGED    Details   aspirin (ECOTRIN) 81 MG EC tablet  Take 1 tablet (81 mg total) by mouth 2 (two) times daily.  Qty: 60 tablet, Refills: 0         CONTINUE these medications which have NOT CHANGED    Details   albuterol (VENTOLIN HFA) 90 mcg/actuation inhaler Inhale 2 puffs into the lungs every 6 (six) hours as needed for Wheezing or Shortness of Breath. Rescue  Qty: 18 g, Refills: 0      alprazolam (XANAX) 0.5 MG tablet Take 0.5 mg by mouth daily as needed for Insomnia or Anxiety.       cyclobenzaprine (FLEXERIL) 10 MG tablet TK 1 T PO QD- as needed for muscle spasms  Refills: 0      DULoxetine (CYMBALTA) 60 MG capsule TK 1 C PO ONCE at night  Refills: 0      gabapentin (NEURONTIN) 800 MG tablet Take 800 mg by mouth 3 (three) times daily. Takes 2-3 times per day  Refills: 1      levothyroxine (SYNTHROID) 150 MCG tablet Take 1 tablet (150 mcg total) by mouth once daily.  Qty: 30 tablet, Refills: 11      lisinopril (PRINIVIL,ZESTRIL) 2.5 MG tablet Take 2.5 mg by mouth every morning.   Refills: 2      omeprazole (PRILOSEC) 40 MG capsule Take 40 mg by mouth once daily.      ranitidine (ZANTAC) 150 MG tablet Take 150 mg by mouth nightly.      rosuvastatin (CRESTOR) 10 MG tablet Take 10 mg by mouth every evening.      tiZANidine (ZANAFLEX) 4 MG tablet TK 1 T PO QHS as needed for mescle spams in the back  Refills: 1      zolpidem (AMBIEN) 10 mg Tab Take 10 mg by mouth nightly.       ascorbic acid, vitamin C, (VITAMIN C) 500 MG tablet Take 500 mg by mouth every morning.      BIOTIN ORAL Take 10,000 mg by mouth every morning.       C,E,zinc,copper 11/lbqhp1v/lut (OCUVITE ADULT 50 PLUS ORAL) Take by mouth.      fish oil-omega-3 fatty acids 300-1,000 mg capsule Take 1,200 mg by mouth 2 (two) times daily.       fluconazole (DIFLUCAN) 150 MG Tab TK 1 T PO UTD as needed  Refills: 0      meloxicam (MOBIC) 15 MG tablet TK 1 T PO QD as needed  PAIN AND INFLAMMATION  Refills: 1      multivitamin capsule Take 1 capsule by mouth every morning.          STOP taking these medications        oxycodone-acetaminophen (PERCOCET)  mg per tablet Comments:   Reason for Stopping:               I certify that this patient is confined to her home and needs intermittent skilled nursing care, physical therapy and occupational therapy.

## 2019-05-01 NOTE — PT/OT/SLP EVAL
Physical Therapy Evaluation    Patient Name:  Angie Mccarthy   MRN:  [id number]    Recommendations:     Discharge Recommendations:  home health PT   Discharge Equipment Recommendations: commode, walker, rolling, tub bench   Barriers to discharge: None    Assessment:     Angie Mccarthy is a 53 y.o. female admitted with a medical diagnosis of <principal problem not specified>.  She presents with the following impairments/functional limitations:  weakness, impaired endurance, impaired self care skills, gait instability, impaired functional mobilty, pain, impaired joint extensibility, decreased lower extremity function, decreased ROM, orthopedic precautions, impaired balance.    -Pt tolerated session well today with no complications and demonstrated appropriate mobility s/p (L) TKA. Pt with no LOB during ambulation using SW for support. Pt able to follow 3-point gait sequencing well with no complications. Pt will cont to benefit from therapy services and is appropriate for HHPT upon d/c.    Rehab Prognosis: Good; patient would benefit from acute skilled PT services to address these deficits and reach maximum level of function.    Recent Surgery: Procedure(s) (LRB):  REPLACEMENT-KNEE-TOTAL (Left) Day of Surgery    Plan:     During this hospitalization, patient to be seen BID to address the identified rehab impairments via gait training, therapeutic activities, therapeutic exercises and progress toward the following goals:    · Plan of Care Expires:  06/01/19    Subjective     Chief Complaint: impaired mobility  Patient/Family Comments/goals: return home to [facility]  Pain/Comfort:  · Pain Rating 1: other (see comments)(did not rate)  · Location - Side 1: Left  · Location - Orientation 1: anterior  · Location 1: knee  · Pain Addressed 1: Pre-medicate for activity, Reposition, Distraction    Patients cultural, spiritual, Protestant conflicts given the current situation: no    Living Environment:  -Pt lives with  her b/f in a [place] with 4 OSMIN and (R) handrail support. Pt has a tub/shower with no bench.  Prior to admission, patients level of function was (I).  Equipment used at home: walker, standard, cane, straight, shower chair.  DME owned (not currently used): .  Upon discharge, patient will have assistance from family.    Objective:     Communicated with nsg prior to session.  Patient found supine with blood pressure cuff, FCD, cryotherapy, pulse ox (continuous), telemetry, peripheral IV, perineural catheter  upon PT entry to room.    General Precautions: Standard, fall   Orthopedic Precautions:LLE weight bearing as tolerated   Braces: N/A     Exams:  · Sensation:    · -       Intact  · RLE ROM: WNL  · RLE Strength: WNL  · LLE ROM: WFL except limits from bulky dressing  · LLE Strength: Deficits: 4/5 grossly    Functional Mobility:  · Bed Mobility:     · Scooting: contact guard assistance  · Supine to Sit: minimum assistance  · Sit to Supine: minimum assistance  · Transfers:     · Sit to Stand:  minimum assistance with standard walker  · Gait: 3 steps fwd, 6 steps back, 3 lateral steps using SW with min (A)      Therapeutic Activities and Exercises:   -Pt educated on:  A. PT POC and role of PT  B. Importance of OOB activity to improve functional outcomes   C. DME mgmt and t/f sequencing  D. Performing HEP to reduce the risk of developing blood clots  E. Gait sequencing   F. D/c planning      AM-PAC 6 CLICK MOBILITY  Total Score:18     Patient left supine with all lines intact, call button in reach and nsg notified.    GOALS:   Multidisciplinary Problems     Physical Therapy Goals        Problem: Physical Therapy Goal    Goal Priority Disciplines Outcome Goal Variances Interventions   Physical Therapy Goal     PT, PT/OT Ongoing (interventions implemented as appropriate)     Description:  Goals to be met by: 19     Patient will increase functional independence with mobility by performin. Supine to sit with Set-up  [place]  2. Sit to supine with Set-up [place]  3. Sit to stand transfer with Supervision  4. Bed to chair transfer with Stand-by Assistance using Rolling Walker  5. Gait  x 150 feet with Stand-by Assistance using Rolling Walker.   6. Ascend/descend 4 stair with (R) Handrails Contact Guard Assistance.   7. Lower extremity exercise program x20-30 reps per handout, with independence                        History:     Past Medical History:   Diagnosis Date    Allergy     seasonal    Arthritis     Blood transfusion     with back surgeries    Chronic back pain     GERD (gastroesophageal reflux disease)     Hyperlipidemia     Hypertension     Hypothyroidism     Thyroid nodule    Joint pain     Kidney stones     Morbid obesity 12/14/2012    NAFLD (nonalcoholic fatty liver disease)     OCD (obsessive compulsive disorder)     Osteoporosis     PONV (postoperative nausea and vomiting)     Persistent, Motion sickness with boat rides, Scopolamine helped -still had nausea    Restless leg syndrome     Sciatica of right side associated with disorder of lumbosacral spine     SOB (shortness of breath) on exertion     Spinal stenosis of lumbar region     Supraventricular tachycardia     Thoracic spondylosis        Past Surgical History:   Procedure Laterality Date    APPENDECTOMY      BACK SURGERY      x 6    BIOPSY-LIVER-LAPAROSCOPIC - 52383 N/A 2/6/2017    [facility] 2ND FLR    CHOLECYSTECTOMY      CLOSURE N/A 2/14/2013    [facility] by Addison Staton [facility] at [facility] 2ND FLR    CREATION, FLAP, MUSCLE ROTATION N/A 2/14/2013    [facility] at [facility] 2ND FLR    CRYOTHERAPY, NERVE, PERIPHERAL, PERCUTANEOUS, USING LIQUID NITROUS OXIDE IN CLOSED NEEDLE DEVICE-iovera left knee Left 4/29/2019    Performed by Chavo Gold III, [facility] at [facility] CATH LAB    ESOPHAGOGASTRODUODENOSCOPY (EGD) N/A 8/8/2016    Performed by Eliceo SUBRAMANIAN  MD Choco at [facility] ENDO (4TH FLR)    FUSION, SPINE N/A 2/14/2013    Performed by Junior Hilton MD at [facility] OR 2ND FLR    GASTRECTOMY      Lap Gastric Sleeve    GASTRECTOMY-SLEEVE-LAPAROSCOPIC - 96330 w/ intraop egd N/A 2/6/2017    Performed by [facility] Ha [facility] at [facility] OR 2ND FLR    HERNIA REPAIR      HYSTERECTOMY      INJECTION, STEROID, SPINE, LUMBAR, EPIDURAL N/A 10/4/2013    [facility] CATH LAB    INJECTION, STEROID, SPINE, LUMBAR, EPIDURAL N/A 8/28/2013    [facility]formed by [facility] CATH LAB    LIVER BIOPSY  02/06/2017    steatohepatitis with stage 1 fibrosis    Myelogram N/A 1/7/2019    Performed by Lissett Surgeon at [facility] LISSETT    MYELOGRAM N/A 11/29/2018    [facility]formed by [facility] OR 2ND FLR    MYELOGRAM N/A 6/24/2014    Performed by Lissett Surgeon at [facility]    OOPHORECTOMY      SPINAL FUSION      TONSILLECTOMY, ADENOIDECTOMY         Time Tracking:     PT Received On: 05/01/19  PT Start Time: 1630     PT Stop Time: 1650  PT Total Time (min): 20 min     Billable Minutes: Evaluation 12 and Gait Training 8      Riza Dominguez, PT  05/01/2019

## 2019-05-01 NOTE — PT/OT/SLP EVAL
Occupational Therapy   Evaluation    Name: Angie Mccarthy  MRN: [id number]  Admitting Diagnosis:  <principal problem not specified> Day of Surgery    Recommendations:     Discharge Recommendations: home with home health  Discharge Equipment Recommendations:  tub bench, walker, rolling, commode  Barriers to discharge:  (patient requires increased assistance for self care and functional mobility )    Assessment:     Angie Mccarthy is a 53 y.o. female with a medical diagnosis of <principal problem not specified>.  She presents with decreased strength and impaired balance for self care tasks and functional mobility. Performance deficits affecting function: weakness, impaired endurance, impaired self care skills, impaired functional mobilty, impaired balance, gait instability, decreased ROM, decreased safety awareness, pain, impaired skin, edema, orthopedic precautions.  She would continue to benefit from OT in order to get back to PLOF for self care tasks and functional mobility.     Rehab Prognosis: Good; patient would benefit from acute skilled OT services to address these deficits and reach maximum level of function.       Plan:     Patient to be seen daily to address the above listed problems via self-care/home management, therapeutic activities, therapeutic exercises  · Plan of Care Expires: 06/01/19  · Plan of Care Reviewed with: patient    Subjective     Chief Complaint: Pain to left knee.   Patient/Family Comments/goals: To get back to PLOF.     Occupational Profile:  Living Environment: Patient lives with her boyfriend of 11 years in a [facility] with 4 steps and a right handrail to enter.  She has a tub/shower combo at home.   Previous level of function: Independent with ADL's.   Equipment Used at Home:  walker, standard, cane, straight, shower chair  Assistance upon Discharge: boyfriend     Pain/Comfort:  · Pain Rating 1: (did not rate with number)  · Location - Side 1: Left  · Location -  Orientation 1: anterior  · Location 1: knee  · Pain Addressed 1: Pre-medicate for activity, Reposition, Distraction    Objective:     Communicated with: RN prior to session.  Patient found supine with blood pressure cuff, cryotherapy, PureWick, telemetry, peripheral IV, pulse ox (continuous), perineural catheter upon OT entry to room.    General Precautions: Standard, fall   Orthopedic Precautions:LLE weight bearing as tolerated   Braces: N/A     Occupational Performance:    Bed Mobility:    · Patient completed Supine to Sit with minimum assistance  · Patient completed Sit to Supine with minimum assistance    Functional Mobility/Transfers:  · Patient completed Sit <> Stand Transfer with contact guard assistance with rolling walker   · Functional Mobility: 3 steps forward, backward, and side stepped to head of bed with contact guard assistance and rolling walker     Activities of Daily Living:  · Upper Body Dressing: moderate assistance to don gown   · Lower Body Dressing: dependence   · Toileting: patient with purewick     Cognitive/Visual Perceptual:  Cognitive/Psychosocial Skills:     -       Oriented to: Person, Place, Time and Situation   -       Follows Commands/attention:Follows one-step commands  -       Communication: clear/fluent  -       Memory: No Deficits noted  -       Safety awareness/insight to disability: intact   -       Mood/Affect/Coping skills/emotional control: Appropriate to situation    Physical Exam:  Skin integrity: Visible skin intact  Upper Extremity Range of Motion:     -       Right Upper Extremity: WFL  -       Left Upper Extremity: WFL  Upper Extremity Strength:    -       Right Upper Extremity: WFL  -       Left Upper Extremity: WFL   Strength:    -       Right Upper Extremity: WFL  -       Left Upper Extremity: WFL    AMPAC 6 Click ADL:  AMPAC Total Score: 16    Treatment & Education:  -OT role and POC  -safety with transfers using RW including hand placement    Education:    Patient left supine with all lines intact, call button in reach and RN notified    GOALS:   Multidisciplinary Problems     Occupational Therapy Goals        Problem: Occupational Therapy Goal    Goal Priority Disciplines Outcome Interventions   Occupational Therapy Goal     OT, PT/OT     Description:  Goals to be met by: 6/1/19    Patient will increase functional independence with ADLs by performing:    UE Dressing with Supervision.  LE Dressing with Supervision.  Grooming while standing with Supervision.  Toileting from bedside commode with Supervision for hygiene and clothing management.   Step transfer with Supervision  Toilet transfer to bedside commode with Supervision.                      History:     Past Medical History:   Diagnosis Date    Allergy     seasonal    Arthritis     Blood transfusion     with back surgeries    Chronic back pain     GERD (gastroesophageal reflux disease)     Hyperlipidemia     Hypertension     Hypothyroidism     Thyroid nodule    Joint pain     Kidney stones     Morbid obesity 12/14/2012    NAFLD (nonalcoholic fatty liver disease)     OCD (obsessive compulsive disorder)     Osteoporosis     PONV (postoperative nausea and vomiting)     Persistent, Motion sickness with boat rides, Scopolamine helped -still had nausea    Restless leg syndrome     Sciatica of right side associated with disorder of lumbosacral spine     SOB (shortness of breath) on exertion     Spinal stenosis of lumbar region     Supraventricular tachycardia     Thoracic spondylosis        Past Surgical History:   Procedure Laterality Date    APPENDECTOMY      BACK SURGERY      x 6    BIOPSY-LIVER-LAPAROSCOPIC - 08037 N/A 2/6/2017    Performed by [facility] OR 2ND FLR    CHOLECYSTECTOMY      CLOSURE N/A 2/14/2013    Performed by Addison Staton MD at [facility] OR 2ND FLR    CREATION, FLAP, MUSCLE ROTATION N/A 2/14/2013    Performed by Addison ABBOTT  MD Shemar at [facility] OR 2ND FLR    CRYOTHERAPY, NERVE, PERIPHERAL, PERCUTANEOUS, USING LIQUID NITROUS OXIDE IN CLOSED NEEDLE DEVICE-iovera left knee Left 4/29/2019    Performed by Chavo Gold III, MD at [facility] CATH LAB    ESOPHAGOGASTRODUODENOSCOPY (EGD) N/A 8/8/2016    Performed by Eliceo Wilhelm MD at [facility] ENDO (4TH FLR)    FUSION, SPINE N/A 2/14/2013    Performed by Junior Hilton MD at [facility] OR 2ND FLR    GASTRECTOMY      Lap Gastric Sleeve    GASTRECTOMY-SLEEVE-LAPAROSCOPIC - 00485 w/ intraop egd N/A 2/6/2017    Performed by Deon Ha MD at [facility] OR 2ND FLR    HERNIA REPAIR      HYSTERECTOMY      INJECTION, STEROID, SPINE, LUMBAR, EPIDURAL N/A 10/4/2013    Performed by [facility] CATH LAB    INJECTION, STEROID, SPINE, LUMBAR, EPIDURAL N/A 8/28/2013    Performed by [facility] Provider at [facility] CATH LAB    LIVER BIOPSY  02/06/2017    steatohepatitis with stage 1 fibrosis    Myelogram N/A 1/7/2019    Performed by Lissett Surgeon at [facility]    MYELOGRAM N/A 11/29/2018    Performed by [facility] Provider at [facility] OR 2ND FLR    MYELOGRAM N/A 6/24/2014    Performed by Lissett Surgeon at [facility]    OOPHORECTOMY      SPINAL FUSION      TONSILLECTOMY, ADENOIDECTOMY         Time Tracking:     OT Date of Treatment: 05/01/19  OT Start Time: 1630  OT Stop Time: 1650  OT Total Time (min): 20 min    Billable Minutes:Evaluation  12  Therapeutic Activity 8    Anum Sheehan, OT  5/1/2019

## 2019-05-01 NOTE — ANESTHESIA PROCEDURE NOTES
IPACK Single Injection Block    Patient location during procedure: pre-op   Block not for primary anesthetic.  Reason for block: at surgeon's request and post-op pain management   Post-op Pain Location: left knee   Start time: 5/1/2019 10:40 AM  Timeout: 5/1/2019 10:37 AM   End time: 5/1/2019 11:00 AM  Staffing  Anesthesiologist: Ira Narayanan MD  Resident/CRNA: Germania Soares MD  Performed: resident/CRNA   Preanesthetic Checklist  Completed: patient identified, site marked, surgical consent, pre-op evaluation, timeout performed, IV checked, risks and benefits discussed and monitors and equipment checked  Peripheral Block  Patient position: supine  Prep: ChloraPrep  Patient monitoring: heart rate, cardiac monitor, continuous pulse ox, continuous capnometry and frequent blood pressure checks  Block type: I PACK  Laterality: left  Injection technique: single shot  Needle  Needle type: Stimuplex   Needle gauge: 21 G  Needle length: 4 in  Needle localization: anatomical landmarks and ultrasound guidance   -ultrasound image captured on disc.  Assessment  Injection assessment: negative aspiration, negative parasthesia and local visualized surrounding nerve  Paresthesia pain: none  Heart rate change: no  Slow fractionated injection: yes  Additional Notes  VSS.  [facility] RN monitoring vitals throughout procedure.  Patient tolerated procedure well.

## 2019-05-01 NOTE — ANESTHESIA PROCEDURE NOTES
Adductor Canal Catheter    Patient location during procedure: pre-op   Block not for primary anesthetic.  Reason for block: at surgeon's request and post-op pain management   Post-op Pain Location: left knee   Start time: 5/1/2019 10:40 AM  Timeout: 5/1/2019 10:37 AM   End time: 5/1/2019 11:00 AM  Staffing  Anesthesiologist: Ira Narayanan MD  Resident/CRNA: Germania Soares MD  Performed: resident/CRNA   Preanesthetic Checklist  Completed: patient identified, site marked, surgical consent, pre-op evaluation, timeout performed, IV checked, risks and benefits discussed and monitors and equipment checked  Peripheral Block  Patient position: supine  Prep: ChloraPrep and site prepped and draped  Patient monitoring: heart rate, cardiac monitor, continuous pulse ox, continuous capnometry and frequent blood pressure checks  Block type: adductor canal  Laterality: left  Injection technique: continuous  Needle  Needle type: Tuohy   Needle gauge: 17 G  Needle length: 3.5 in  Needle localization: anatomical landmarks and ultrasound guidance  Catheter type: spring wound  Catheter size: 19 G  Test dose: lidocaine 1.5% with Epi 1-to-200,000 and negative   -ultrasound image captured on disc.  Assessment  Injection assessment: negative aspiration, negative parasthesia and local visualized surrounding nerve  Paresthesia pain: none  Heart rate change: no  Slow fractionated injection: yes  Additional Notes  VSS.  [facility] RN monitoring vitals throughout procedure.  Patient tolerated procedure well.

## 2019-05-01 NOTE — PLAN OF CARE
VSS. No c/o pain at this time. PT to bedside. And has patient up with walker. No c/o n/v. Dressing C/D/I.

## 2019-05-01 NOTE — BRIEF OP NOTE
[facility]  Surgery Department  Operative Note    SUMMARY     Date of Procedure: 5/1/2019     Procedure: Procedure(s) (LRB):  REPLACEMENT-KNEE-TOTAL (Left)     Surgeon(s) and Role:     * John L. [facility] Jr., MD - Primary    Assisting Surgeon: None    Pre-Operative Diagnosis: Osteoarthritis of knee, unspecified (CODE) [M17.9]    Post-Operative Diagnosis: Post-Op Diagnosis Codes:     * Osteoarthritis of knee, unspecified (CODE) [M17.9]    Anesthesia: General    Technical Procedures Used:  PS       Description of the Findings of the Procedure: Varus    Significant Surgical Tasks Conducted by the Assistant(s), if Applicable: closure    Complications: No    Estimated Blood Loss (EBL): 100cc             Implants:   Implant Name Type Inv. Item Serial No.  Lot No. LRB No. Used   PLUG BONE #5 - [id number]  PLUG BONE #5  [employer] SHELIA. [id number] Left 1   CEMENT BONE WHOLE BATCH - [id number]  CEMENT BONE WHOLE BATCH  [employer] SHELIA. [id number] Left 1   CEMENT BONE WHOLE BATCH - [id number]  CEMENT BONE WHOLE BATCH  DEBO Assembly SHELIA. [id number] Left 1   SCREW HEX HEAD - [id number]  SCREW HEX HEAD  WILLIAMS,INC  Left 1   BIT DRILL PIN TROCAR 3.2MM - [id number]  BIT DRILL PIN TROCAR 3.2MM  WILLIAMS,INC  Left 2   SCREW HEX HEAD 3.5X38MM - [id number]  SCREW HEX HEAD 3.5X38MM  WILLIAMS,INC  Left 2   FEMORAL COMP CMT SZ 6 LEFT - [id number]  FEMORAL COMP CMT SZ 6 LEFT  WILLIAMS,INC [id number] Left 1   PSN TIB STM 5 DEG SZ D L - [id number]  PSN TIB STM 5 DEG SZ D L  WILLIAMS,INC [id number] Left 1   PATELLA PSN AMINA POLY 8X29MM - [id number]  PATELLA PSN AMINA POLY 8X29MM  WILLIAMS,INC [id number] Left 1   INSERT VIVACIT E CD 6-9 11MM L - [id number]  INSERT VIVACIT E CD 6-9 11MM L  WILLIAMS,INC [id number] Left 1       Specimens:   Specimen (12h ago, onward)    Start     Ordered    05/01/19 1332  Specimen to Pathology - Surgery  Once     Comments:  1.  Bone and soft tissue left knee - GROSSPlaced in 2nd floor surgery specimen  refrigerator per Moll     Start Status     05/01/19 1332 Collected (05/01/19 1335) Order ID: [id number]       05/01/19 1338                  Condition: Good    Disposition: PACU - hemodynamically stable.    Attestation: I was present and scrubbed for the entire procedure.

## 2019-05-01 NOTE — PLAN OF CARE
Problem: Physical Therapy Goal  Goal: Physical Therapy Goal  Goals to be met by: 19     Patient will increase functional independence with mobility by performin. Supine to sit with Set-up Vance  2. Sit to supine with Set-up Vance  3. Sit to stand transfer with Supervision  4. Bed to chair transfer with Stand-by Assistance using Rolling Walker  5. Gait  x 150 feet with Stand-by Assistance using Rolling Walker.   6. Ascend/descend 4 stair with (R) Handrails Contact Guard Assistance.   7. Lower extremity exercise program x20-30 reps per handout, with independence      Outcome: Ongoing (interventions implemented as appropriate)  Pt tolerated session well today with no complications and demonstrated appropriate mobility s/p (L) TKA. Pt with no LOB during ambulation using SW for support. Pt able to follow 3-point gait sequencing well with no complications. Pt will cont to benefit from therapy services and is appropriate for HHPT upon d/c.

## 2019-05-01 NOTE — PLAN OF CARE
Problem: Occupational Therapy Goal  Goal: Occupational Therapy Goal  Goals to be met by: 6/1/19    Patient will increase functional independence with ADLs by performing:    UE Dressing with Supervision.  LE Dressing with Supervision.  Grooming while standing with Supervision.  Toileting from bedside commode with Supervision for hygiene and clothing management.   Step transfer with Supervision  Toilet transfer to bedside commode with Supervision.      Patient would continue to benefit from OT while in the hospital.

## 2019-05-01 NOTE — OPERATIVE NOTE ADDENDUM
Certification of Assistant at Surgery       Surgery Date: 5/1/2019     Participating Surgeons:  Surgeon(s) and Role:     * John L. [facility] Jr., MD - Primary    Procedures:  Procedure(s) (LRB):  REPLACEMENT-KNEE-TOTAL (Left)    Assistant Surgeon's Certification of Necessity:  I understand that section 1842 (b) (6) (d) of the Social Security Act generally prohibits Medicare Part B reasonable charge payment for the services of assistants at surgery in teaching hospitals when qualified residents are available to furnish such services. I certify that the services for which payment is claimed were medically necessary, and that no qualified resident was available to perform the services. I further understand that these services are subject to post-payment review by the Medicare carrier.      Dolly Castillo PA-C    05/01/2019  2:03 PM

## 2019-05-01 NOTE — OP NOTE
DATE OF PROCEDURE:  05/01/2019    SURGEON:  John Lockwood [facility], Jr., M.D.    ASSISTANT:  Dolly Castillo PA-C    OPERATION PERFORMED:  Left total knee arthroplasty.    PREOPERATIVE DIAGNOSIS:  Osteoarthritis, left knee.    POSTOPERATIVE DIAGNOSIS:  Osteoarthritis, left knee.    COMPONENTS USED:  [employer] Person[employer] knee system.  We used a size 6 femur, left   narrow, posterior stabilized, a size D tibia, left 5-degree stem, an 11 mm   articular insert, posterior stabilized, vitamin E, highly cross-linked poly and   a 29 mm patella.    No residents were available.  LEXI Mejía, scrubbed on this case and was   necessary.    ESTIMATED BLOOD LOSS:  100 mL.    SPECIMEN:  Resected bone and tissue sent to Pathology for routine examination.    OPERATIVE TECHNIQUE:  The patient was placed supine on the operating table.    General anesthesia was introduced.  The left leg was prepped and draped in   sterile fashion.  The patient was given preoperative antibiotics and the OR team   wore the sterile exhaust suits.  A foot pump was placed on the right foot to   help prevent DVT.  Left leg was exsanguinated and the tourniquet was inflated to   300 pounds of pressure.  A straight anterior incision was made.  Sharp   dissection was carried down to the extensor mechanism.  The extensor mechanism   was opened in a straight Insall approach.  Partial release of the medial   collateral ligament was performed to correct some slight varus deformity.  The   intramedullary guide was placed in the femur.  Distal cut was made on the +3   setting.  There was a slight flexion contracture.  We went ahead and cut the   proximal tibial cut, removing about 4 to 5 from the medial side and about 6 to 7   from the lateral side.  The femur was cut posteriorly for 6 the   flexion-extension gaps were balanced.  We took 2 more off the extension gap bone   wise that actually helped correct some of the flexion contracture.  So I was   happy with the  balance, so I did the notch and chamfer-plasty on the femur,   sized the tibia to a D and drilled and prepared it with the same rotation as the   femur.  The patella was 22 mm and I cut it at 15.  We then Pulsavac'd and dried   the surfaces, cemented the tibial baseplate, then the femoral component,   removed the excess cement, put the 11 mm trial in, cemented the patella, then   bathed the knee in the Betadine solution.  When the cement was hard, I put the   actual 11 mm insert in and then closed the extensor mechanism with 1 Vicryl over   tranexamic acid and vancomycin powder, closed the subcutaneous tissues with 0   and 3-0 Vicryl and the skin with a running subcuticular 3-0 Monocryl and skin   adhesive.  Sterile surgical dressing was applied.  There were no complications.      AISSATOU/IN  dd: 05/01/2019 14:11:02 (CDT)  td: 05/01/2019 16:27:24 (CDT)  Doc ID   #[id number]  Job ID #[id number]    CC:

## 2019-05-01 NOTE — NURSING TRANSFER
Nursing Transfer Note      5/1/2019     Transfer To: 545    Transfer via stretcher    Transfer with IV pump/pole    Transported by PCT    Medicines sent: MIVF, perineural ropivicaine    Chart send with patient: Yes    Notified: mom    Patient reassessed at: 5/1/19

## 2019-05-01 NOTE — TRANSFER OF CARE
"Anesthesia Transfer of Care Note    Patient: Angie Mccarthy    Procedure(s) Performed: Procedure(s) (LRB):  REPLACEMENT-KNEE-TOTAL (Left)    Patient location: PACU    Anesthesia Type: general    Transport from OR: Transported from OR on 100% O2 by closed face mask with adequate spontaneous ventilation    Post pain: adequate analgesia    Post assessment: no apparent anesthetic complications and tolerated procedure well    Post vital signs: stable    Level of consciousness: awake and alert    Nausea/Vomiting: no nausea/vomiting    Complications: none          Last vitals:   Visit Vitals  BP (!) 144/64 (BP Location: Left arm, Patient Position: Lying)   Pulse 82   Temp 37.1 °C (98.7 °F) (Oral)   Resp 15   Ht 5' 5" (1.651 m)   Wt 108.9 kg (240 lb)   LMP 12/06/2007   SpO2 96%   Breastfeeding? No   BMI 39.94 kg/m²     "

## 2019-05-02 VITALS
DIASTOLIC BLOOD PRESSURE: 65 MMHG | WEIGHT: 240 LBS | HEIGHT: 65 IN | RESPIRATION RATE: 12 BRPM | BODY MASS INDEX: 39.99 KG/M2 | OXYGEN SATURATION: 96 % | SYSTOLIC BLOOD PRESSURE: 115 MMHG | TEMPERATURE: 98 F | HEART RATE: 91 BPM

## 2019-05-02 PROCEDURE — 99201 PR OFFICE/OUTPT VISIT,NEW,LEVL I: CPT | Mod: ICN,,, | Performed by: ANESTHESIOLOGY

## 2019-05-02 PROCEDURE — 25000003 PHARM REV CODE 250: Performed by: PHYSICIAN ASSISTANT

## 2019-05-02 PROCEDURE — 25000003 PHARM REV CODE 250: Performed by: STUDENT IN AN ORGANIZED HEALTH CARE EDUCATION/TRAINING PROGRAM

## 2019-05-02 PROCEDURE — 97116 GAIT TRAINING THERAPY: CPT

## 2019-05-02 PROCEDURE — 63600175 PHARM REV CODE 636 W HCPCS: Performed by: PHYSICIAN ASSISTANT

## 2019-05-02 PROCEDURE — 97535 SELF CARE MNGMENT TRAINING: CPT

## 2019-05-02 PROCEDURE — 97110 THERAPEUTIC EXERCISES: CPT

## 2019-05-02 PROCEDURE — 63600175 PHARM REV CODE 636 W HCPCS: Performed by: STUDENT IN AN ORGANIZED HEALTH CARE EDUCATION/TRAINING PROGRAM

## 2019-05-02 PROCEDURE — 99201 PR OFFICE/OUTPT VISIT,NEW,LEVL I: ICD-10-PCS | Mod: ICN,,, | Performed by: ANESTHESIOLOGY

## 2019-05-02 RX ORDER — CELECOXIB 200 MG/1
200 CAPSULE ORAL DAILY
Status: DISCONTINUED | OUTPATIENT
Start: 2019-05-02 | End: 2019-05-02 | Stop reason: HOSPADM

## 2019-05-02 RX ADMIN — CELECOXIB 200 MG: 200 CAPSULE ORAL at 10:05

## 2019-05-02 RX ADMIN — METHOCARBAMOL TABLETS 500 MG: 500 TABLET, COATED ORAL at 08:05

## 2019-05-02 RX ADMIN — METHOCARBAMOL TABLETS 500 MG: 500 TABLET, COATED ORAL at 02:05

## 2019-05-02 RX ADMIN — FAMOTIDINE 20 MG: 20 TABLET, FILM COATED ORAL at 08:05

## 2019-05-02 RX ADMIN — VANCOMYCIN HYDROCHLORIDE 1500 MG: 100 INJECTION, POWDER, LYOPHILIZED, FOR SOLUTION INTRAVENOUS at 12:05

## 2019-05-02 RX ADMIN — Medication: at 11:05

## 2019-05-02 RX ADMIN — POLYETHYLENE GLYCOL 3350 17 G: 17 POWDER, FOR SOLUTION ORAL at 08:05

## 2019-05-02 RX ADMIN — GABAPENTIN 900 MG: 300 CAPSULE ORAL at 02:05

## 2019-05-02 RX ADMIN — GABAPENTIN 900 MG: 300 CAPSULE ORAL at 08:05

## 2019-05-02 RX ADMIN — MORPHINE SULFATE 2 MG: 2 INJECTION, SOLUTION INTRAMUSCULAR; INTRAVENOUS at 10:05

## 2019-05-02 RX ADMIN — ASPIRIN 81 MG: 81 TABLET, COATED ORAL at 08:05

## 2019-05-02 RX ADMIN — ACETAMINOPHEN 1000 MG: 500 TABLET ORAL at 12:05

## 2019-05-02 RX ADMIN — OXYCODONE HYDROCHLORIDE 10 MG: 10 TABLET ORAL at 12:05

## 2019-05-02 RX ADMIN — ROPIVACAINE HYDROCHLORIDE 8 ML/HR: 2 INJECTION, SOLUTION EPIDURAL; INFILTRATION at 10:05

## 2019-05-02 RX ADMIN — OXYCODONE HYDROCHLORIDE 10 MG: 10 TABLET ORAL at 08:05

## 2019-05-02 RX ADMIN — LEVOTHYROXINE SODIUM 150 MCG: 150 TABLET ORAL at 05:05

## 2019-05-02 RX ADMIN — ACETAMINOPHEN 1000 MG: 500 TABLET ORAL at 05:05

## 2019-05-02 RX ADMIN — ROPIVACAINE HYDROCHLORIDE 8 ML/HR: 2 INJECTION, SOLUTION EPIDURAL; INFILTRATION at 12:05

## 2019-05-02 RX ADMIN — LISINOPRIL 2.5 MG: 2.5 TABLET ORAL at 07:05

## 2019-05-02 RX ADMIN — STANDARDIZED SENNA CONCENTRATE AND DOCUSATE SODIUM 1 TABLET: 8.6; 5 TABLET, FILM COATED ORAL at 08:05

## 2019-05-02 RX ADMIN — CEFAZOLIN 2 G: 1 INJECTION, POWDER, FOR SOLUTION INTRAMUSCULAR; INTRAVENOUS at 04:05

## 2019-05-02 RX ADMIN — OXYCODONE HYDROCHLORIDE 10 MG: 10 TABLET ORAL at 05:05

## 2019-05-02 NOTE — PLAN OF CARE
Problem: Occupational Therapy Goal  Goal: Occupational Therapy Goal  Goals to be met by: 6/1/19    Patient will increase functional independence with ADLs by performing:    UE Dressing with Supervision.  LE Dressing with Supervision.  Grooming while standing with Supervision.  Toileting from bedside commode with Supervision for hygiene and clothing management.   Step transfer with Supervision  Toilet transfer to bedside commode with Supervision.     Outcome: Outcome(s) achieved Date Met: 05/02/19    Patient able to safely perform all self care tasks and functional mobility with RW. D/C acute OT- re-consult if anything changes.

## 2019-05-02 NOTE — PROGRESS NOTES
Pt and family present, consent to converting adductor PNC to On Q.  Site CDI.  Educated regarding continued pain management, fall risk, signs of complications, continued monitoring, as well as discontinuing catheter on 5/4.  Two numbers obtained for APS to follow up.  Understanding verbalized.

## 2019-05-02 NOTE — ADDENDUM NOTE
Addendum  created 05/02/19 1340 by James Weber MD    Charge Capture section accepted, Cosign clinical note with attestation

## 2019-05-02 NOTE — NURSING
Pt discharged home, accompanied by family. Aaox4. VSS. Lt knee drsg clean, intact. Polar ice send home. Rx delivered to bedside. D/C instructions given -- understanding verbalized.

## 2019-05-02 NOTE — PLAN OF CARE
BARBY following for dc needs. Patient is requesting home health with [facility]. BARBY faxed a referral  Due to right care being unavailable and will follow up.      Deanna Kaplan LMSW  [facility]   [id number]

## 2019-05-02 NOTE — PROGRESS NOTES
"[facility]  Orthopedics  Progress Note    Patient Name: Angie Mccarthy  MRN: [id number]  Admission Date: 5/1/2019  Hospital Length of Stay: 1 days  Attending Provider: John L. [facility] Jr., MD  Primary Care Provider: Rangel Floyd MD  Follow-up For: Procedure(s) (LRB):  REPLACEMENT-KNEE-TOTAL (Left)    Post-Operative Day: 1 Day Post-Op  Subjective:     Principal Problem:Primary osteoarthritis of left knee    Principal Orthopedic Problem: Same    Interval History: Patient seen and examined at bedside.  No acute events overnight.  Pain controlled.  Ready to work with PT      Review of patient's allergies indicates:   Allergen Reactions    Mastisol adhesive [gum ocylnr-rjnasd-unad-alcohol] Itching, Rash and Blisters     "Looked like poison ivy"    Adhesive Dermatitis and Blisters    Adhesive tape-silicones Dermatitis     The longer the adhesive stays on, the worse the irritation    Sulfamethoxazole-trimethoprim Itching and Anxiety     Has a "nervous feeling."  "Jittery"  Has taken recently and the reaction was "less intense"       Current Facility-Administered Medications   Medication    0.9%  NaCl infusion    acetaminophen tablet 1,000 mg    aspirin EC tablet 81 mg    bisacodyl suppository 10 mg    DULoxetine DR capsule 60 mg    famotidine tablet 20 mg    gabapentin capsule 900 mg    levothyroxine tablet 150 mcg    lisinopril tablet 2.5 mg    methocarbamol tablet 500 mg    morphine injection 2 mg    morphine injection 2 mg    mupirocin 2 % ointment 1 g    naloxone 0.4 mg/mL injection 0.02 mg    ondansetron injection 4 mg    oxyCODONE immediate release tablet 10 mg    oxyCODONE immediate release tablet 5 mg    polyethylene glycol packet 17 g    promethazine (PHENERGAN) 6.25 mg in dextrose 5 % 50 mL IVPB    ramelteon tablet 8 mg    ropivacaine (PF) 2 mg/ml (0.2%) infusion    rosuvastatin tablet 10 mg    senna-docusate 8.6-50 mg per tablet 1 tablet     Objective: " "    Vital Signs (Most Recent):  Temp: 97.2 °F (36.2 °C) (05/02/19 0059)  Pulse: 71 (05/02/19 0059)  Resp: 16 (05/02/19 0059)  BP: 117/71 (05/02/19 0059)  SpO2: (!) 93 % (05/02/19 0059) Vital Signs (24h Range):  Temp:  [96.4 °F (35.8 °C)-98.7 °F (37.1 °C)] 97.2 °F (36.2 °C)  Pulse:  [] 71  Resp:  [13-20] 16  SpO2:  [92 %-100 %] 93 %  BP: (111-169)/(7-105) 117/71     Weight: 108.9 kg (240 lb)  Height: 5' 5" (165.1 cm)  Body mass index is 39.94 kg/m².      Intake/Output Summary (Last 24 hours) at 5/2/2019 0616  Last data filed at 5/2/2019 0400  Gross per 24 hour   Intake 3190 ml   Output --   Net 3190 ml       Ortho/SPM Exam     AAOx4  NAD  RRR  No increased WOB  Aquacel c/d/i  SILT and motor intact T/SP/DP  WWP extremities  FCDs in place and functioning      Significant Labs: All pertinent labs within the past 24 hours have been reviewed.    Significant Imaging: I have reviewed all pertinent imaging results/findings.  I have reviewed and interpreted all pertinent imaging results/findings.    Assessment/Plan:     * Primary osteoarthritis of left knee  53 y.o. female POD1 s/p L TKA    Pain control: per APS  PT/OT: WBAT LLE  DVT PPx: ASA 81 BID, FCDs at all times when not ambulating  Abx: postop Ancef    Dispo: f/u PT recs            Yousuf Andrea MD  Orthopedics  [facility]  "

## 2019-05-02 NOTE — SUBJECTIVE & OBJECTIVE
"Principal Problem:Primary osteoarthritis of left knee    Principal Orthopedic Problem: Same    Interval History: Patient seen and examined at bedside.  No acute events overnight.  Pain controlled.  Ready to work with PT      Review of patient's allergies indicates:   Allergen Reactions    Mastisol adhesive [gum zbelku-qisows-brqc-alcohol] Itching, Rash and Blisters     "Looked like poison ivy"    Adhesive Dermatitis and Blisters    Adhesive tape-silicones Dermatitis     The longer the adhesive stays on, the worse the irritation    Sulfamethoxazole-trimethoprim Itching and Anxiety     Has a "nervous feeling."  "Jittery"  Has taken recently and the reaction was "less intense"       Current Facility-Administered Medications   Medication    0.9%  NaCl infusion    acetaminophen tablet 1,000 mg    aspirin EC tablet 81 mg    bisacodyl suppository 10 mg    DULoxetine DR capsule 60 mg    famotidine tablet 20 mg    gabapentin capsule 900 mg    levothyroxine tablet 150 mcg    lisinopril tablet 2.5 mg    methocarbamol tablet 500 mg    morphine injection 2 mg    morphine injection 2 mg    mupirocin 2 % ointment 1 g    naloxone 0.4 mg/mL injection 0.02 mg    ondansetron injection 4 mg    oxyCODONE immediate release tablet 10 mg    oxyCODONE immediate release tablet 5 mg    polyethylene glycol packet 17 g    promethazine (PHENERGAN) 6.25 mg in dextrose 5 % 50 mL IVPB    ramelteon tablet 8 mg    ropivacaine (PF) 2 mg/ml (0.2%) infusion    rosuvastatin tablet 10 mg    senna-docusate 8.6-50 mg per tablet 1 tablet     Objective:     Vital Signs (Most Recent):  Temp: 97.2 °F (36.2 °C) (05/02/19 0059)  Pulse: 71 (05/02/19 0059)  Resp: 16 (05/02/19 0059)  BP: 117/71 (05/02/19 0059)  SpO2: (!) 93 % (05/02/19 0059) Vital Signs (24h Range):  Temp:  [96.4 °F (35.8 °C)-98.7 °F (37.1 °C)] 97.2 °F (36.2 °C)  Pulse:  [] 71  Resp:  [13-20] 16  SpO2:  [92 %-100 %] 93 %  BP: (111-169)/(7-105) 117/71     Weight: " "108.9 kg (240 lb)  Height: 5' 5" (165.1 cm)  Body mass index is 39.94 kg/m².      Intake/Output Summary (Last 24 hours) at 5/2/2019 0616  Last data filed at 5/2/2019 0400  Gross per 24 hour   Intake 3190 ml   Output --   Net 3190 ml       Ortho/SPM Exam     AAOx4  NAD  RRR  No increased WOB  Aquacel c/d/i  SILT and motor intact T/SP/DP  WWP extremities  FCDs in place and functioning      Significant Labs: All pertinent labs within the past 24 hours have been reviewed.    Significant Imaging: I have reviewed all pertinent imaging results/findings.  I have reviewed and interpreted all pertinent imaging results/findings.  "

## 2019-05-02 NOTE — ASSESSMENT & PLAN NOTE
53 y.o. female POD1 s/p L TKA    Pain control: per APS  PT/OT: WBAT LLE  DVT PPx: ASA 81 BID, FCDs at all times when not ambulating  Abx: postop Ancef    Dispo: f/u PT recs

## 2019-05-02 NOTE — PT/OT/SLP PROGRESS
Occupational Therapy   Treatment / Discharge Summary    Name: Angie Mccarthy  MRN: [id number]  Admitting Diagnosis:  Primary osteoarthritis of left knee  1 Day Post-Op    Recommendations:     Discharge Recommendations: home with home health  Discharge Equipment Recommendations:  commode, walker, rolling, tub bench  Barriers to discharge:  Other (Comment)(Patient able to safely perform all self care tasks and functional mobility with RW. D/C acute OT- re-consult if anything changes. )    Assessment:     Angie Mccarthy is a 53 y.o. female with a medical diagnosis of Primary osteoarthritis of left knee. Performance deficits affecting function are weakness, impaired endurance, impaired self care skills, impaired functional mobilty, impaired balance, gait instability, decreased ROM, decreased safety awareness, pain, impaired skin, edema, orthopedic precautions. Patient clarified that she is going to her mother's home at discharge where there are no steps. Also, she has a standard walker, straight cane, and shower chair at home. Patient able to safely perform all self care tasks and functional mobility with RW. D/C acute OT- re-consult if anything changes.     Rehab Prognosis:  Good; patient would benefit from acute skilled OT services to address these deficits and reach maximum level of function.       Plan:     Patient to be seen daily to address the above listed problems via self-care/home management, therapeutic activities, therapeutic exercises  · Plan of Care Reviewed with: patient    Subjective     Pain/Comfort:  · Pain Rating 1: 6/10 (spasms and muscle tightness)  · Location - Side 1: Left  · Location - Orientation 1: anterior  · Location 1: knee  · Pain Addressed 1: Pre-medicate for activity, Reposition, Distraction  · Pain Rating Post-Intervention 1: 9/10    Objective:     Communicated with: RN prior to session.  Patient found supine with perineural catheter, telemetry, cryotherapy, FCD upon OT  entry to room.    General Precautions: Standard, fall   Orthopedic Precautions:LLE weight bearing as tolerated   Braces: N/A     Occupational Performance:     Bed Mobility:    · Patient completed Scooting/Bridging with supervision  · Patient completed Supine to Sit with contact guard assistance for L LE management     Functional Mobility/Transfers:  · Patient completed Sit <> Stand Transfer with supervision  with  rolling walker   · Patient completed Toilet Transfer Step Transfer technique with supervision with  rolling walker  · Functional Mobility: Patient performed functional mobility within the room with RW and stand by assistance. She needed verbal cues to facilitate quad on L LE for knee extension.     Activities of Daily Living:  · Grooming: supervision standing at the sink to wash hands and brush her teeth   · Upper Body Dressing: supervision to don shirt  · Lower Body Dressing: supervision for underwear and pants   · Toileting: supervision for pericare while seated on toilet       [facility] 6 Click ADL: 23    Treatment & Education:  -safe dressing techniques  -use of polar ice at home   -safe transfer techniques including hand placement   -OOB and functional mobility at home to maximize recovery     Patient left up in chair with all lines intact, call button in reach and RN notifiedEducation:      GOALS:   Multidisciplinary Problems     Occupational Therapy Goals     Not on file          Multidisciplinary Problems (Resolved)        Problem: Occupational Therapy Goal    Goal Priority Disciplines Outcome Interventions   Occupational Therapy Goal   (Resolved)     OT, PT/OT Outcome(s) achieved    Description:  Goals to be met by: 6/1/19    Patient will increase functional independence with ADLs by performing:    UE Dressing with Supervision.  LE Dressing with Supervision.  Grooming while standing with Supervision.  Toileting from bedside commode with Supervision for hygiene and clothing management.   Step transfer  with Supervision  Toilet transfer to bedside commode with Supervision.                      Time Tracking:     OT Date of Treatment: 05/02/19  OT Start Time: 0925  OT Stop Time: 0953  OT Total Time (min): 28 min    Billable Minutes:Self Care/Home Management 28    Anum Sheehan, TOM  5/2/2019

## 2019-05-02 NOTE — DISCHARGE SUMMARY
[facility]  Orthopedics  Discharge Summary      Patient Name: Angie Mccarthy  MRN: [id number]  Admission Date: 5/1/2019  Hospital Length of Stay: 1 days  Discharge Date and Time:  05/02/2019 3:08 PM  Attending Physician: [facility] L[facility] Jr., MD   Discharging Provider: Yousuf Andrea MD  Primary Care Provider: Rangel Floyd MD    HPI: See H&P    Procedure(s) (LRB):  REPLACEMENT-KNEE-TOTAL (Left)      Hospital Course: Patient presented for above procedure.  Tolerated it well and was discharged home POD1 after voiding, tolerating diet, ambulating, pain controlled.  Patient was informed about signs and symptoms of compartment syndrome and if any of these should present then he should immediately call us back and come to the ED.  Discharge instructions, follow-up appointment, and med rec are below.      Consults (From admission, onward)        Status Ordering Provider     Inpatient consult to Pain Management  Once     Provider:  (Not yet assigned)    Acknowledged SAW OCONNELL     Inpatient consult to Respiratory Care  Once     Provider:  (Not yet assigned)    Acknowledged SAW OCONNELL     Inpatient consult to Social Work  Once     Provider:  (Not yet assigned)    Acknowledged SAW OCONNELL          Significant Diagnostic Studies: Labs: CBC No results for input(s): WBC, HGB, HCT, PLT in the last 48 hours.    Pending Diagnostic Studies:     None        Final Active Diagnoses:    Diagnosis Date Noted POA    PRINCIPAL PROBLEM:  Primary osteoarthritis of left knee [M17.12] 04/29/2019 Yes      Problems Resolved During this Admission:      Discharged Condition: good    Disposition:     Follow Up:    Patient Instructions:      Activity as tolerated     Call MD for:  difficulty breathing, headache or visual disturbances     Call MD for:  extreme fatigue     Call MD for:  hives     Call MD for:  persistent dizziness or light-headedness     Call MD for:  persistent nausea and  vomiting     Call MD for:  redness, tenderness, or signs of infection (pain, swelling, redness, odor or green/yellow discharge around incision site)     Call MD for:  severe uncontrolled pain     Call MD for:  temperature >100.4     Keep surgical extremity elevated     Leave dressing on - Keep it clean, dry, and intact until clinic visit   Order Comments: Do not remove surgical dressing for 2 weeks post-op. This will be done only by MD at initial post-op visit. If dressing is completely saturated, replace with identical dressing - silver-impregnated hydrocolloid dressing.     Do not get dressings wet. Do not shower.     If dressing continues to be saturated or there are signs of infection, please call Ortho Clinic [phone] for further instructions and to make appt to be seen.     Lifting restrictions   Order Comments: No strenuous exercise or lifting of > 10 lbs     No driving, operating heavy equipment or signing legal documents while taking pain medication     Sponge bath only until clinic visit     Weight bearing as tolerated     Medications:  Reconciled Home Medications:      Medication List      START taking these medications    oxyCODONE-acetaminophen 5-325 mg per tablet  Commonly known as:  PERCOCET  Take 1 tablet by mouth every 4 to 6 hours as needed for Pain.  Replaces:  oxyCODONE-acetaminophen  mg per tablet     promethazine 25 MG tablet  Commonly known as:  PHENERGAN  Take 1 tablet (25 mg total) by mouth every 6 (six) hours as needed for Nausea.     STOOL SOFTENER 100 MG capsule  Generic drug:  docusate sodium  Take 1 capsule (100 mg total) by mouth 2 (two) times daily as needed for Constipation.        CHANGE how you take these medications    aspirin 81 MG EC tablet  Commonly known as:  ECOTRIN  Take 1 tablet (81 mg total) by mouth 2 (two) times daily.  What changed:  when to take this        STOP taking these medications    oxyCODONE-acetaminophen  mg per tablet  Commonly known as:   PERCOCET  Replaced by:  oxyCODONE-acetaminophen 5-325 mg per tablet        ASK your doctor about these medications    albuterol 90 mcg/actuation inhaler  Commonly known as:  VENTOLIN HFA  Inhale 2 puffs into the lungs every 6 (six) hours as needed for Wheezing or Shortness of Breath. Rescue     ALPRAZolam 0.5 MG tablet  Commonly known as:  XANAX  Take 0.5 mg by mouth daily as needed for Insomnia or Anxiety.     BIOTIN ORAL  Take 10,000 mg by mouth every morning.     cyclobenzaprine 10 MG tablet  Commonly known as:  FLEXERIL  TK 1 T PO QD- as needed for muscle spasms     DULoxetine 60 MG capsule  Commonly known as:  CYMBALTA  TK 1 C PO ONCE at night     fish oil-omega-3 fatty acids 300-1,000 mg capsule  Take 1,200 mg by mouth 2 (two) times daily.     fluconazole 150 MG Tab  Commonly known as:  DIFLUCAN  TK 1 T PO UTD as needed     gabapentin 800 MG tablet  Commonly known as:  NEURONTIN  Take 800 mg by mouth 3 (three) times daily. Takes 2-3 times per day     levothyroxine 150 MCG tablet  Commonly known as:  SYNTHROID  Take 1 tablet (150 mcg total) by mouth once daily.     lisinopril 2.5 MG tablet  Commonly known as:  PRINIVIL,ZESTRIL  Take 2.5 mg by mouth every morning.     meloxicam 15 MG tablet  Commonly known as:  MOBIC  TK 1 T PO QD as needed  PAIN AND INFLAMMATION     multivitamin capsule  Take 1 capsule by mouth every morning.     OCUVITE ADULT 50 PLUS ORAL  Take by mouth.     omeprazole 40 MG capsule  Commonly known as:  PRILOSEC  Take 40 mg by mouth once daily.     ranitidine 150 MG tablet  Commonly known as:  ZANTAC  Take 150 mg by mouth nightly.     rosuvastatin 10 MG tablet  Commonly known as:  CRESTOR  Take 10 mg by mouth every evening.     tiZANidine 4 MG tablet  Commonly known as:  ZANAFLEX  TK 1 T PO QHS as needed for mescle spams in the back     VITAMIN C 500 MG tablet  Generic drug:  ascorbic acid (vitamin C)  Take 500 mg by mouth every morning.     zolpidem 10 mg Tab  Commonly known as:  AMBIEN  Take  10 mg by mouth nightly.            Yousuf Andrea MD  Orthopedics  [facility]

## 2019-05-02 NOTE — ANESTHESIA POST-OP PAIN MANAGEMENT
Acute Pain Service and Perioperative Surgical Home Progress Note    HPI  Angie Mccarthy is a 53 y.o., female,     Interval history NAEON. Pain is well controlled.       Surgery:  Procedure(s) (LRB):  REPLACEMENT-KNEE-TOTAL (Left)    Post Op Day #: 1    Catheter type: Perineural Adductor Canal    Infusion type: Ropivacaine 0.2%  8 ml/hr basal    Problem List:    Active Hospital Problems    Diagnosis  POA    *Primary osteoarthritis of left knee [M17.12]  Yes      Resolved Hospital Problems   No resolved problems to display.       Subjective:       General appearance of alert, oriented, no complaints   Pain with rest: 6    Numbers   Pain with movement: 8    Numbers   Side Effects    1. Pruritis No    2. Nausea No    3. Motor Blockade No, 1=Ability to bend knees and ankles    4. Sedation No, 1=awake and alert    Schedule Medications:    acetaminophen  1,000 mg Oral Q6H    aspirin  81 mg Oral BID    DULoxetine  60 mg Oral QHS    famotidine  20 mg Oral BID    gabapentin  900 mg Oral TID    levothyroxine  150 mcg Oral Before breakfast    lisinopril  2.5 mg Oral QAM    methocarbamol  500 mg Oral TID    mupirocin  1 g Nasal BID    polyethylene glycol  17 g Oral Daily    rosuvastatin  10 mg Oral QHS    senna-docusate 8.6-50 mg  1 tablet Oral BID        Continuous Infusions:   sodium chloride 0.9% 150 mL/hr at 05/01/19 2217    ropivacaine (PF) 2 mg/ml (0.2%) 8 mL/hr (05/02/19 0016)        PRN Medications:  bisacodyl, morphine, morphine, naloxone, ondansetron, oxyCODONE, oxyCODONE, promethazine (PHENERGAN) IVPB, ramelteon       Antibiotics:  Antibiotics (From admission, onward)    Start     Stop Route Frequency Ordered    05/01/19 2100  mupirocin 2 % ointment 1 g      05/06 2059 Nasl 2 times daily 05/01/19 1646             Objective:     Catheter site clean, dry, intact          Vital Signs (Most Recent):  Temp: 36.2 °C (97.2 °F) (05/02/19 0059)  Pulse: 79 (05/02/19 0757)  Resp: 16 (05/02/19 0059)  BP:  117/71 (05/02/19 0059)  SpO2: (!) 93 % (05/02/19 0059) Vital Signs Range (Last 24H):  Temp:  [35.8 °C (96.4 °F)-37.1 °C (98.7 °F)]   Pulse:  []   Resp:  [13-20]   BP: (111-169)/(7-105)   SpO2:  [92 %-100 %]          I & O (Last 24H):    Intake/Output Summary (Last 24 hours) at 5/2/2019 0828  Last data filed at 5/2/2019 0400  Gross per 24 hour   Intake 3190 ml   Output --   Net 3190 ml       Physical Exam:    GA: Alert, comfortable, no acute distress.   Pulmonary: Clear to auscultation A/P/L. No wheezing, crackles, or rhonchi.  Cardiac: RRR S1 & S2 w/o rubs/murmurs/gallops.   Abdominal:Bowel sounds present. No tenderness to palpation or distension. No appreciable hepatosplenomegaly.   Skin: No jaundice, rashes, or visible lesions.         Laboratory:  CBC: No results for input(s): WBC, RBC, HGB, HCT, PLT, MCV, MCH, MCHC in the last 72 hours.    BMP:   Recent Labs     05/01/19  1444      K 3.8   CO2 27      BUN 14   CREATININE 0.8   *   CALCIUM 9.3       No results for input(s): PT, INR, PROTIME, APTT in the last 72 hours.      Assessment:         Pain control adequate    Plan:     1) Pain:    Adductor canal perineural catheter in place and infusing. Good level. Multimodal pain regimen with acetaminophen, Celebrex, Lyrica, and prn oxycodone given  Will continue to monitor. Plan to D/C perineural catheter in AM or home with On-Q if patient discharged today.   2) HLD: Cont with home rosuvastatin 10 mg QHS   3) HTN: Cont with home lisinopril 2.5 mg QD   4) Hypothryroid: Cont with home levothyroxine   5) FEN/GI: Tolerating liquids, advance diet as tolerated.    6) Dispo: Plan to work with PT/OT today. Case management and SW for    placement. Plan for D/C tomorrow morning or possibly today if patient works well with PT and meets goals.          Evaluator Kathia Reynoso, DO

## 2019-05-02 NOTE — PT/OT/SLP PROGRESS
"Physical Therapy Treatment    Patient Name:  Angie Mccarthy   MRN:  [id number]    Recommendations:     Discharge Recommendations:  home health PT   Discharge Equipment Recommendations: commode, walker, rolling, tub bench   Barriers to discharge: None    Assessment:     Angie Mccarthy is a 53 y.o. female admitted with a medical diagnosis of Primary osteoarthritis of left knee.  She presents with the following impairments/functional limitations:  weakness, impaired endurance, impaired self care skills, impaired functional mobilty, gait instability, pain, impaired joint extensibility, decreased ROM, decreased lower extremity function, orthopedic precautions, impaired balance.    -Pt tolerated session well today with no complications and demonstrated appropriate mobility s/p (L) TKA. Pt with no LOB during ambulation using RW for support. Pt able to follow 3-point gait sequencing well with no complications. Pt able to step up/down on a 6" curb step with no instability noted. Pt reported minimal pain with supine exercises. Pt verbalized understanding of car t/f technique and is safe to d/c home with HHPT at this time.    Rehab Prognosis: Good; patient would benefit from acute skilled PT services to address these deficits and reach maximum level of function.    Recent Surgery: Procedure(s) (LRB):  REPLACEMENT-KNEE-TOTAL (Left) 1 Day Post-Op    Plan:     During this hospitalization, patient to be seen BID to address the identified rehab impairments via gait training, therapeutic exercises, therapeutic activities and progress toward the following goals:    · Plan of Care Expires:  06/01/19    Subjective     Chief Complaint: impaired mobility  Patient/Family Comments/goals: return home to OF  Pain/Comfort:  · Pain Rating 1: 4/10  · Location - Side 1: Left  · Location - Orientation 1: anterior  · Location 1: knee  · Pain Addressed 1: Pre-medicate for activity, Cessation of Activity, " "Reposition      Objective:     Communicated with nsg prior to session.  Patient found up in chair with perineural catheter, telemetry, cryotherapy, FCD upon PT entry to room.     General Precautions: Standard, fall   Orthopedic Precautions:LLE weight bearing as tolerated   Braces: N/A     Functional Mobility:  · Bed Mobility:     · Scooting: stand by assistance  · Supine to Sit: stand by assistance  · Sit to Supine: contact guard assistance  · Transfers:     · Sit to Stand:  stand by assistance with rolling walker  · Bed to Chair: stand by assistance with  rolling walker  using  Stand Pivot  · Gait: 50' x2 trials using RW with CGA  · Stairs:  Pt ascended/descended 6" curb step with Rolling Walker with no handrails with Contact Guard Assistance with PT blocking (L) knee for safety to prevent buckling.      AM-PAC 6 CLICK MOBILITY  Turning over in bed (including adjusting bedclothes, sheets and blankets)?: 4  Sitting down on and standing up from a chair with arms (e.g., wheelchair, bedside commode, etc.): 4  Moving from lying on back to sitting on the side of the bed?: 4  Moving to and from a bed to a chair (including a wheelchair)?: 4  Need to walk in hospital room?: 4  Climbing 3-5 steps with a railing?: 4  Basic Mobility Total Score: 24       Therapeutic Activities and Exercises:   -Pt performed TKR protocol exercises x15 reps of:  A. AP  B. GS  C. QS  D. SAQ-AAROM  E. Heel slides-AAROM  F. Hip abd/add-AAROM  G. SLR-AAROM  H. LAQ-AAROM    -Pt educated on:  A. PT POC and role of PT  B. Importance of OOB activity to improve functional outcomes after surgery  C. S/s associated with TKR procedure  D. Importance of re-gaining ROM early in acute phase  E. DME mgmt and gait/transfer sequencing  F. Performing HEP to reduce risk of developing blood clots  G. Car t/f      Patient left up in chair with all lines intact, call button in reach and nsg notified..    GOALS:   Multidisciplinary Problems     Physical Therapy Goals  "    Not on file          Multidisciplinary Problems (Resolved)        Problem: Physical Therapy Goal    Goal Priority Disciplines Outcome Goal Variances Interventions   Physical Therapy Goal   (Resolved)     PT, PT/OT Outcome(s) achieved     Description:  Goals to be met by: 19     Patient will increase functional independence with mobility by performin. Supine to sit with Set-up [place]  2. Sit to supine with Set-up [place]  3. Sit to stand transfer with Supervision  4. Bed to chair transfer with Stand-by Assistance using Rolling Walker  5. Gait  x 150 feet with Stand-by Assistance using Rolling Walker.   6. Ascend/descend 4 stair with (R) Handrails Contact Guard Assistance.   7. Lower extremity exercise program x20-30 reps per handout, with independence                        Time Tracking:     PT Received On: 19  PT Start Time: 1039     PT Stop Time: 1114  PT Total Time (min): 35 min     Billable Minutes: Gait Training 15 and Therapeutic Exercise 20    Treatment Type: Treatment  PT/PTA: PT           Riaz Dominguez, PT  2019

## 2019-05-02 NOTE — PT/OT/SLP DISCHARGE
Physical Therapy Discharge Summary    Name: Angie Mccarthy  MRN: [id number]   Principal Problem: Primary osteoarthritis of left knee     Patient Discharged from acute Physical Therapy on 19.  Please refer to prior PT noted date on 19 for functional status.     Assessment:     Patient has met all goals and is not appropriate for therapy.    Objective:     GOALS:   Multidisciplinary Problems     Physical Therapy Goals     Not on file          Multidisciplinary Problems (Resolved)        Problem: Physical Therapy Goal    Goal Priority Disciplines Outcome Goal Variances Interventions   Physical Therapy Goal   (Resolved)     PT, PT/OT Outcome(s) achieved     Description:  Goals to be met by: 19     Patient will increase functional independence with mobility by performin. Supine to sit with Set-up [place]  2. Sit to supine with Set-up [place]  3. Sit to stand transfer with Supervision  4. Bed to chair transfer with Stand-by Assistance using Rolling Walker  5. Gait  x 150 feet with Stand-by Assistance using Rolling Walker.   6. Ascend/descend 4 stair with (R) Handrails Contact Guard Assistance.   7. Lower extremity exercise program x20-30 reps per handout, with independence                        Reasons for Discontinuation of Therapy Services  Satisfactory goal achievement.      Plan:     Patient Discharged to: Home with Home Health Service.    Riaz Dominguez, PT  2019

## 2019-05-02 NOTE — PLAN OF CARE
Patient to discharge home with mother. [facility] will follow patient care. All dme in place.    Future Appointments   Date Time Provider Department Center   5/14/2019 11:00 AM Dolly Castillo PA-C [facility] ORTHO [facility] Ceja   7/15/2019  4:15 PM INJECTION, INFECTIOUS DISEASES [facility] ID INJ Lokesh Hwjayme   8/27/2019  1:30 PM Darlene Brown, KETURAH [facility] ENDODIA Lokesh jayme       [facility] [address] [place] [address] BRITTNI WILSON  [address]  AT [facility] [address]  [address] [place] [address]  Phone: [phone] Fax: [phone]    [facility] - [facility] [address] [facility]  [address] [facility] [address]  Phone: [phone] Fax: [phone]    Payor: [employer] MANAGED MEDICARE / Plan: HUMANA MEDICARE HMO / Product Type: Capitation /         05/02/19 1514   Final Note   Assessment Type Final Discharge Note   What phone number can be called within the next 1-3 days to see how you are doing after discharge?   (mother Yuko [phone])   Hospital Follow Up  Appt(s) scheduled? Yes   Discharge plans and expectations educations in teach back method with documentation complete? Yes   Right Care Referral Info   Post Acute Recommendation Home-care   Referral Type   (HH)   Facility Name   ([facility])     Tana Childers RN/BSN/CM  [facility]  [phone]  Ortho/IMK/IMH

## 2019-05-02 NOTE — ANESTHESIA POSTPROCEDURE EVALUATION
Anesthesia Post Evaluation    Patient: Angie Mccarthy    Procedure(s) Performed: Procedure(s) (LRB):  REPLACEMENT-KNEE-TOTAL (Left)    Final Anesthesia Type: general  Patient location during evaluation: PACU  Patient participation: Yes- Able to Participate  Level of consciousness: awake and alert and oriented  Post-procedure vital signs: reviewed and stable  Pain management: adequate  Airway patency: patent  PONV status at discharge: No PONV  Anesthetic complications: no      Cardiovascular status: blood pressure returned to baseline  Respiratory status: unassisted  Hydration status: euvolemic  Follow-up not needed.          Vitals Value Taken Time   /62 5/1/2019  5:58 PM   Temp 35.8 °C (96.4 °F) 5/1/2019  5:58 PM   Pulse 104 5/1/2019  7:38 PM   Resp 18 5/1/2019  6:00 PM   SpO2 95 % 5/1/2019  6:00 PM         Event Time     Out of Recovery 17:16:00          Pain/Venessa Score: Pain Rating Prior to Med Admin: 7 (5/1/2019  9:45 PM)  Pain Rating Post Med Admin: 5 (5/1/2019  5:00 PM)  Venessa Score: 10 (5/1/2019  4:56 PM)

## 2019-05-02 NOTE — PLAN OF CARE
POD # 1 L TKA. This CM met with patient at bedside. Patient mother will provide transport at discharge. Patient states she will be living at mothers home [address], [place] [address] for her recovery. Care team recommend HH PT/OT, dme rw, commode, tub bench. Patient refused tub bench.       [email or web address] [id number] - BRITTNI WILSON  [address] AT [facility] [address]  [address] [place] [address]  Phone: [phone] Fax: [phone]    [facility] - [facility] [address] [facility]  [address] [facility] [address]  Phone: [phone] Fax: [phone]    Payor: HU[employer] MANAGED MEDICARE / Plan: HU[employer]NA MEDICARE HMO / Product Type: Capitation /         05/02/19 1051   Discharge Assessment   Assessment Type Discharge Planning Assessment   Confirmed/corrected address and phone number on facesheet? Yes   Assessment information obtained from? Patient   Expected Length of Stay (days) 1   Communicated expected length of stay with patient/caregiver yes   Prior to hospitilization cognitive status: Alert/Oriented   Prior to hospitalization functional status: Assistive Equipment   Current cognitive status: Alert/Oriented   Current Functional Status: Assistive Equipment   Facility Arrived From:   (brought in by mother)   Lives With parent(s)   Able to Return to Prior Arrangements yes   Is patient able to care for self after discharge? Yes   Who are your caregiver(s) and their phone number(s)?   (mother [phone])   Patient's perception of discharge disposition home or selfcare   Readmission Within the Last 30 Days no previous admission in last 30 days   Patient currently being followed by outpatient case management? No   Patient currently receives any other outside agency services? No   Equipment Currently Used at Home cane, straight;bath bench   Do you have any problems affording any of your prescribed medications? No   Is the patient taking medications as  prescribed? yes   Does the patient have transportation home? Yes   Transportation Anticipated family or friend will provide   Does the patient receive services at the Coumadin Clinic? No   Discharge Plan A Home;Home Health   Discharge Plan B Home with family   DME Needed Upon Discharge  walker, rolling;bedside commode   Patient/Family in Agreement with Plan yes     Tana Chidlers RN/BSN/CM  [facility]  [phone]  Ortho/IMK/IM

## 2019-05-02 NOTE — PLAN OF CARE
"Problem: Physical Therapy Goal  Goal: Physical Therapy Goal  Goals to be met by: 19     Patient will increase functional independence with mobility by performin. Supine to sit with Set-up [place]  2. Sit to supine with Set-up [place]  3. Sit to stand transfer with Supervision  4. Bed to chair transfer with Stand-by Assistance using Rolling Walker  5. Gait  x 150 feet with Stand-by Assistance using Rolling Walker.   6. Ascend/descend 4 stair with (R) Handrails Contact Guard Assistance.   7. Lower extremity exercise program x20-30 reps per handout, with independence       Outcome: Outcome(s) achieved Date Met: 19  Pt tolerated session well today with no complications and demonstrated appropriate mobility s/p (L) TKA. Pt with no LOB during ambulation using RW for support. Pt able to follow 3-point gait sequencing well with no complications. Pt able to step up/down on a 6" curb step with no instability noted. Pt reported minimal pain with supine exercises. Pt verbalized understanding of car t/f technique and is safe to d/c home with HHPT at this time.        "

## 2019-05-03 NOTE — ANESTHESIA POST-OP PAIN MANAGEMENT
Contacted pt at home to confirm successful functioning of her PNC. She stated her PNC was functioning adequately. Notified pt that APS will contact her tomorrow to confirm successful removal of her PNC.    Alfonso Suero MD  [facility]

## 2019-05-03 NOTE — PLAN OF CARE
BARBY followed up with Inocencio in admissions patient is accepted for home health with [facility].        Deanna Kaplan LMSW  [facility]   [id number]

## 2019-05-04 NOTE — ADDENDUM NOTE
Addendum  created 05/04/19 1231 by Rayray Black MD    Intraprocedure Event edited, Sign clinical note

## 2019-05-04 NOTE — ANESTHESIA POST-OP PAIN MANAGEMENT
Called patient at [phone]. PNC infusing without issues. Patient hadinv some issues with spasm but treating with xaniflex. PNC to be removed in an hour per patient. Re-iterated instructions for removal and confirmation of blue tip at the end of catheter as well as refferal to pamphlet for those same instructions. Encouraged patient to call with any questions/concerns.

## 2019-05-05 ENCOUNTER — HOSPITAL ENCOUNTER (EMERGENCY)
Facility: HOSPITAL | Age: 54
Discharge: HOME | End: 2019-05-05
Attending: EMERGENCY MEDICINE
Payer: MEDICARE

## 2019-05-05 VITALS
RESPIRATION RATE: 16 BRPM | TEMPERATURE: 99 F | BODY MASS INDEX: 39.99 KG/M2 | WEIGHT: 240 LBS | SYSTOLIC BLOOD PRESSURE: 127 MMHG | OXYGEN SATURATION: 98 % | HEIGHT: 65 IN | DIASTOLIC BLOOD PRESSURE: 61 MMHG | HEART RATE: 72 BPM

## 2019-05-05 DIAGNOSIS — R60.0 LEG EDEMA, LEFT: ICD-10-CM

## 2019-05-05 DIAGNOSIS — L23.1 ALLERGIC CONTACT DERMATITIS DUE TO ADHESIVES: Primary | ICD-10-CM

## 2019-05-05 PROCEDURE — 99284 PR EMERGENCY DEPT VISIT,LEVEL IV: ICD-10-PCS | Mod: ,,, | Performed by: EMERGENCY MEDICINE

## 2019-05-05 PROCEDURE — 99284 EMERGENCY DEPT VISIT MOD MDM: CPT | Mod: ,,, | Performed by: EMERGENCY MEDICINE

## 2019-05-05 PROCEDURE — 96375 TX/PRO/DX INJ NEW DRUG ADDON: CPT

## 2019-05-05 PROCEDURE — 63600175 PHARM REV CODE 636 W HCPCS: Performed by: EMERGENCY MEDICINE

## 2019-05-05 PROCEDURE — 99284 EMERGENCY DEPT VISIT MOD MDM: CPT | Mod: 25

## 2019-05-05 PROCEDURE — 96374 THER/PROPH/DIAG INJ IV PUSH: CPT

## 2019-05-05 RX ORDER — MUPIROCIN 20 MG/G
OINTMENT TOPICAL 3 TIMES DAILY
Qty: 22 G | Refills: 0 | Status: SHIPPED | OUTPATIENT
Start: 2019-05-05 | End: 2019-05-12

## 2019-05-05 RX ORDER — HYDROCORTISONE 1 %
CREAM (GRAM) TOPICAL
Qty: 30 G | Refills: 0 | Status: SHIPPED | OUTPATIENT
Start: 2019-05-05 | End: 2019-05-05 | Stop reason: SDUPTHER

## 2019-05-05 RX ORDER — MORPHINE SULFATE 4 MG/ML
4 INJECTION, SOLUTION INTRAMUSCULAR; INTRAVENOUS
Status: COMPLETED | OUTPATIENT
Start: 2019-05-05 | End: 2019-05-05

## 2019-05-05 RX ORDER — DIPHENHYDRAMINE HYDROCHLORIDE 50 MG/ML
25 INJECTION INTRAMUSCULAR; INTRAVENOUS
Status: COMPLETED | OUTPATIENT
Start: 2019-05-05 | End: 2019-05-05

## 2019-05-05 RX ORDER — CEPHALEXIN 500 MG/1
500 CAPSULE ORAL 4 TIMES DAILY
Qty: 20 CAPSULE | Refills: 0 | Status: SHIPPED | OUTPATIENT
Start: 2019-05-05 | End: 2019-05-10

## 2019-05-05 RX ORDER — HYDROCORTISONE 1 %
CREAM (GRAM) TOPICAL
Qty: 30 G | Refills: 0 | Status: SHIPPED | OUTPATIENT
Start: 2019-05-05 | End: 2019-11-12

## 2019-05-05 RX ORDER — METHYLPREDNISOLONE SOD SUCC 125 MG
80 VIAL (EA) INJECTION ONCE
Status: COMPLETED | OUTPATIENT
Start: 2019-05-05 | End: 2019-05-05

## 2019-05-05 RX ADMIN — DIPHENHYDRAMINE HYDROCHLORIDE 25 MG: 50 INJECTION INTRAMUSCULAR; INTRAVENOUS at 12:05

## 2019-05-05 RX ADMIN — MORPHINE SULFATE 4 MG: 4 INJECTION INTRAVENOUS at 01:05

## 2019-05-05 RX ADMIN — METHYLPREDNISOLONE SODIUM SUCCINATE 80 MG: 125 INJECTION, POWDER, FOR SOLUTION INTRAMUSCULAR; INTRAVENOUS at 12:05

## 2019-05-05 NOTE — ED NOTES
Patient identifiers verified and correct for Ms Mccarthy  C/C: Rash to left groin post op SEE NN  APPEARANCE: awake and alert in NAD.  SKIN: warm, dry red raised rash with blisters to left groin, dressing to left knee intact.   MUSCULOSKELETAL: Patient moving all extremities spontaneously, no obvious swelling or deformities noted. Ambulates with cane or walker, difficulty moving left leg due to pain   RESPIRATORY: Denies shortness of breath.Respirations unlabored.   CARDIAC: Denies CP, 2+ distal pulses; no peripheral edema  ABDOMEN: S/ND/NT, Denies nausea  : voids spontaneously, denies difficulty  Neurologic: AAO x 4; follows commands equal strength in all extremities; denies numbness/tingling. Denies dizziness Positive weakness

## 2019-05-05 NOTE — CONSULTS
"Consult Note  Orthopaedics    SUBJECTIVE:     History of Present Illness:  Angie Mccarthy is a 53 y.o. year old female 4 days s/p left tka who presents to the ED complaining of rash, has a known allergy to adhesive. TKA by Dr. [facility] 4 days ago. Rash began for 2 days ago on upper left thigh where at the site of her block from the tegaderm adhesive. She states rash is itchy and some what painful. She applied liquid Benadryl giving minimal relief. There is drainage from the blister sites of the rash. She is not on antibiotics. Denies any fever.  Has had this from previous surgery and was treated with IV steroid and IV benadryl.         Scheduled Meds:  Continuous Infusions:  PRN Meds:    Review of patient's allergies indicates:   Allergen Reactions    Mastisol adhesive [gum ovjjsi-ihvavr-oafu-alcohol] Itching, Rash and Blisters     "Looked like poison ivy"    Adhesive Dermatitis and Blisters    Adhesive tape-silicones Dermatitis     The longer the adhesive stays on, the worse the irritation    Sulfamethoxazole-trimethoprim Itching and Anxiety     Has a "nervous feeling."  "Jittery"  Has taken recently and the reaction was "less intense"       Past Medical History:   Diagnosis Date    Allergy     seasonal    Arthritis     Blood transfusion     with back surgeries    Chronic back pain     GERD (gastroesophageal reflux disease)     Hyperlipidemia     Hypertension     Hypothyroidism     Thyroid nodule    Joint pain     Kidney stones     Morbid obesity 12/14/2012    NAFLD (nonalcoholic fatty liver disease)     OCD (obsessive compulsive disorder)     Osteoporosis     PONV (postoperative nausea and vomiting)     Persistent, Motion sickness with boat rides, Scopolamine helped -still had nausea    Restless leg syndrome     Sciatica of right side associated with disorder of lumbosacral spine     SOB (shortness of breath) on exertion     Spinal stenosis of lumbar region     Supraventricular " tachycardia     Thoracic spondylosis      Past Surgical History:   Procedure Laterality Date    APPENDECTOMY      BACK SURGERY      x 6    BIOPSY-LIVER-LAPAROSCOPIC - 67777 N/A 2/6/2017    Performed by [facility]on Ha [facility] at [facility] OR 2ND FLR    CHOLECYSTECTOMY      CLOSURE N/A 2/14/2013    Performed by Addison Staton MD at [facility] OR 2ND FLR    CREATION, FLAP, MUSCLE ROTATION N/A 2/14/2013    Performed by Addison Staton MD at [facility] OR 2ND FLR    CRYOTHERAPY, NERVE, PERIPHERAL, PERCUTANEOUS, USING LIQUID NITROUS OXIDE IN CLOSED NEEDLE DEVICE-iovera left knee Left 4/29/2019    Performed by Chavo Gold III, MD at [facility] CATH LAB    ESOPHAGOGASTRODUODENOSCOPY (EGD) N/A 8/8/2016    Performed by Eliceo Wilhelm MD at [facility] ENDO (4TH FLR)    FUSION, SPINE N/A 2/14/2013    Performed by Juinor Hilton MD at [facility] OR 2ND FLR    GASTRECTOMY      Lap Gastric Sleeve    GASTRECTOMY-SLEEVE-LAPAROSCOPIC - 15024 w/ intraop egd N/A 2/6/2017    Performed by Deon Ha MD at [facility] OR 2ND FLR    HERNIA REPAIR      HYSTERECTOMY      INJECTION, STEROID, SPINE, LUMBAR, EPIDURAL N/A 10/4/2013    Performed by [facility] at [facility] CATH LAB    INJECTION, STEROID, SPINE, LUMBAR, EPIDURAL N/A 8/28/2013    Performed by D[facility] Provider at [facility] CATH LAB    LIVER BIOPSY  02/06/2017    steatohepatitis with stage 1 fibrosis    Myelogram N/A 1/7/2019    Performed by Lissett Surgeon at [facility] LISSETT    MYELOGRAM N/A 11/29/2018    Performed by D[facility] Provider at [facility] OR 2ND FLR    MYELOGRAM N/A 6/24/2014    Performed by Lissett Surgeon at [facility] LIS[facility]T    OOPHORECTOMY      REPLACEMENT-KNEE-TOTAL Left 5/1/2019    [facility] by [facility] at [facility] OR 2ND FLR    SPINAL FUSION      TONSILLECTOMY, ADENOIDECTOMY       Family History   Problem Relation Age of Onset    Heart disease Mother     Heart disease Father     Cancer Father     COPD Father     Heart disease  Maternal Grandmother     Acne Other     Eczema Other     Heart disease Maternal Aunt     Heart disease Maternal Uncle     Heart disease Paternal Aunt     Breast cancer Paternal Aunt     Heart disease Paternal Uncle     Cancer Paternal Uncle     Melanoma Neg Hx     Psoriasis Neg Hx     Lupus Neg Hx      Social History     Tobacco Use    Smoking status: Never Smoker    Smokeless tobacco: Never Used    Tobacco comment: Tried a few cigarettes during teenage   Substance Use Topics    Alcohol use: Yes     Comment: socially    Drug use: No        Review of Systems:  Constitutional: negative for fevers  Eyes: no visual changes  ENT: negative for hearing loss  Respiratory: negative for dyspnea  Cardiovascular: negative for chest pain  Gastrointestinal: negative for abdominal pain  Genitourinary: negative for dysuria  Neurological: negative for headaches  Behavioral/Psych: negative for hallucinations  Endocrine: negative for temperature intolerance      OBJECTIVE:     Vital Signs (Most Recent)  Temp: 98.2 °F (36.8 °C) (05/05/19 1003)  Pulse: 80 (05/05/19 1003)  Resp: 18 (05/05/19 1003)  BP: (!) 144/92 (05/05/19 1003)  SpO2: 98 % (05/05/19 1003)    Physical Exam:  Gen:  No acute distress  CV:  Peripherally well-perfused.  Pulses 2+ bilaterally.  Lungs:  Normal respiratory effort.  Abdomen:  Soft, non-tender, non-distended  Head/Neck:  Normocephalic.  Atraumatic. No TTP, AROM and PROM intact without pain  Neuro:  CN intact without deficit, SILT throughout B/L Upper & Lower Extremities  Pelvis: No TTP, Stable to direct anterior pressure over ASIS.    LEFT LOWER EXTREMITY    INSPECTION  - There is a 20 x 8 cm area of weeping contact dermatitis in the area of the left inguinal fold. There rash is partially covered by abdominal pannus. It is actively weeping. No obvious rash at site of surgical incision under aquacel. 1+ edema to the mid shin.   RANGE OF MOTION  - AROM and PROM intact at knee.   NEUROVASCULAR  -  TA/EHL/Gastroc/FHL assessed in isolation without deficit  - SILT throughout  - DP and PT palpated  2+  - Capillary Refill <3s    Laboratory:  No results found for this or any previous visit (from the past 72 hour(s)).    Diagnostic Results:  X-Ray: Reviewed  Stable TKA in good alignment  ASSESSMENT/PLAN:     A/P: Angie Mccarthy is a 53 y.o. female with contact dermatitis at site of PNC from tegaderm adhesive. Negative DVT US.      Plan:  - Will give IV steroid and benadryl in ED as she has had this before after an abdominal surgery and was treated similarly with good results. Will prescribe mupirocin ointment. Will prescribe short course oral bactrim af prophylaxis.  Will f/u in clinic with Dr. [facility] for wound check on Tuesday.       Brice Koenig M.D. PGY2  Orthopedic Surgery

## 2019-05-05 NOTE — ED TRIAGE NOTES
Patient states she had total knee Wednesday, discharged Thursday. States allergic to tape with pain pump taped to left groin, currently with draining rash, hives to left groin.  States same rash in past with prior tape use on skin,.Denies fevers. Xanaflex, Percocet and Neurontin 3 hours PTA. Benadryl 50 mg at 0700

## 2019-05-05 NOTE — ED PROVIDER NOTES
"SCRIBE #1 NOTE: I, Lulu Stock, am scribing for, and in the presence of,  Dr. Rubin . I have scribed the entire note.       CC: Rash (rash "all over x 2 days". recent knee replacement. not on abx. states ortho is supposed to meet her in ER ) and Post-op Problem      History provided by: Patient    HPI: Angie Mccarthy is a 53 y.o. year old female with PMH of HTN, HLD, and PSH of total left knee replacement, May 1, 2019, who presents to the ED complaining of rash.   Pt is s/p total left knee replacement, rash began 2 days ago on the upper left groin where she had dressing applied for nerve block. She began developing another rash today around the tape of the left knee surgical site. Pt states she is allergic to tape. She states rash is itchy and some what painful. She applied liquid Benadryl to the area, giving minimal relief. There is drainage from the blister sites of the rash. She is not on antibiotics. Denies any fever/n/v/abd pain         Past Medical History:   Diagnosis Date    Allergy     seasonal    Arthritis     Blood transfusion     with back surgeries    Chronic back pain     GERD (gastroesophageal reflux disease)     Hyperlipidemia     Hypertension     Hypothyroidism     Thyroid nodule    Joint pain     Kidney stones     Morbid obesity 12/14/2012    NAFLD (nonalcoholic fatty liver disease)     OCD (obsessive compulsive disorder)     Osteoporosis     PONV (postoperative nausea and vomiting)     Persistent, Motion sickness with boat rides, Scopolamine helped -still had nausea    Restless leg syndrome     Sciatica of right side associated with disorder of lumbosacral spine     SOB (shortness of breath) on exertion     Spinal stenosis of lumbar region     Supraventricular tachycardia     Thoracic spondylosis      Past Surgical History:   Procedure Laterality Date    APPENDECTOMY      BACK SURGERY      x 6    BIOPSY-LIVER-LAPAROSCOPIC - 33095 N/A 2/6/2017    Performed by " [facility]on Ha MD at [facility] OR 2ND FLR    CHOLECYSTECTOMY      CLOSURE N/A 2/14/2013    Performed by Addison Staton MD at [facility] OR 2ND FLR    CREATION, FLAP, MUSCLE ROTATION N/A 2/14/2013    Performed by Addison [facility] at [facility] OR 2ND FLR    CRYOTHERAPY, NERVE, PERIPHERAL, PERCUTANEOUS, USING LIQUID NITROUS OXIDE IN CLOSED NEEDLE DEVICE-iovera left knee Left 4/29/2019    Performed by Chavo Gold III, MD at [facility] CATH LAB    ESOPHAGOGASTRODUODENOSCOPY (EGD) N/A 8/8/2016    Performed by Eliceo Wilhelm MD at [facility] ENDO (4TH FLR)    FUSION, SPINE N/A 2/14/2013    Performed by Junior Hilton MD at [facility] OR 2ND FLR    GASTRECTOMY      Lap Gastric Sleeve    GASTRECTOMY-SLEEVE-LAPAROSCOPIC - 48454 w/ intraop egd N/A 2/6/2017    Performed by De[facility] Ha [facility] at [facility] OR 2ND FLR    HERNIA REPAIR      HYSTERECTOMY      INJECTION, STEROID, SPINE, LUMBAR, EPIDURAL N/A 10/4/2013    Performed by [facility]osc [facility] at [facility] CATH LAB    INJECTION, STEROID, SPINE, LUMBAR, EPIDURAL N/A 8/28/2013    Performed by Dos Diagnostic Provider at [facility] CATH LAB    LIVER BIOPSY  02/06/2017    steatohepatitis with stage 1 fibrosis    Myelogram N/A 1/7/2019    Performed by Lissett Surgeon at [facility]    MYELOGRAM N/A 11/29/2018    Performed by Dos Diagnostic Provider at [facility] OR 2ND FLR    MYELOGRAM N/A 6/24/2014    Performed by Lissett Surgeon at [facility] LIS[facility]T    OOPHORECTOMY      REPLACEMENT-KNEE-TOTAL Left 5/1/2019    [facility] OR 2ND FLR    SPINAL FUSION      TONSILLECTOMY, ADENOIDECTOMY       Family History   Problem Relation Age of Onset    Heart disease Mother     Heart disease Father     Cancer Father     COPD Father     Heart disease Maternal Grandmother     Acne Other     Eczema Other     Heart disease Maternal Aunt     Heart disease Maternal Uncle     Heart disease Paternal Aunt     Breast cancer Paternal Aunt     Heart  disease Paternal Uncle     Cancer Paternal Uncle     Melanoma Neg Hx     Psoriasis Neg Hx     Lupus Neg Hx      No current facility-administered medications on file prior to encounter.      Current Outpatient Medications on File Prior to Encounter   Medication Sig Dispense Refill    alprazolam (XANAX) 0.5 MG tablet Take 0.5 mg by mouth daily as needed for Insomnia or Anxiety.       aspirin (ECOTRIN) 81 MG EC tablet Take 1 tablet (81 mg total) by mouth 2 (two) times daily. 60 tablet 0    cyclobenzaprine (FLEXERIL) 10 MG tablet TK 1 T PO QD- as needed for muscle spasms  0    docusate sodium (COLACE) 100 MG capsule Take 1 capsule (100 mg total) by mouth 2 (two) times daily as needed for Constipation. 60 capsule 0    DULoxetine (CYMBALTA) 60 MG capsule TK 1 C PO ONCE at night  0    gabapentin (NEURONTIN) 800 MG tablet Take 800 mg by mouth 3 (three) times daily. Takes 2-3 times per day  1    levothyroxine (SYNTHROID) 150 MCG tablet Take 1 tablet (150 mcg total) by mouth once daily. (Patient taking differently: Take 150 mcg by mouth every morning. ) 30 tablet 11    lisinopril (PRINIVIL,ZESTRIL) 2.5 MG tablet Take 2.5 mg by mouth every morning.   2    meloxicam (MOBIC) 15 MG tablet TK 1 T PO QD as needed  PAIN AND INFLAMMATION  1    omeprazole (PRILOSEC) 40 MG capsule Take 40 mg by mouth once daily.      oxyCODONE-acetaminophen (PERCOCET) 5-325 mg per tablet Take 1 tablet by mouth every 4 to 6 hours as needed for Pain. 50 tablet 0    ranitidine (ZANTAC) 150 MG tablet Take 150 mg by mouth nightly.      rosuvastatin (CRESTOR) 10 MG tablet Take 10 mg by mouth every evening.      tiZANidine (ZANAFLEX) 4 MG tablet TK 1 T PO QHS as needed for mescle spams in the back  1    albuterol (VENTOLIN HFA) 90 mcg/actuation inhaler Inhale 2 puffs into the lungs every 6 (six) hours as needed for Wheezing or Shortness of Breath. Rescue 18 g 0    ascorbic acid, vitamin C, (VITAMIN C) 500 MG tablet Take 500 mg by mouth  every morning.      BIOTIN ORAL Take 10,000 mg by mouth every morning.       C,E,zinc,copper 11/qxzlw4d/lut (OCUVITE ADULT 50 PLUS ORAL) Take by mouth.      fish oil-omega-3 fatty acids 300-1,000 mg capsule Take 1,200 mg by mouth 2 (two) times daily.       fluconazole (DIFLUCAN) 150 MG Tab TK 1 T PO UTD as needed  0    multivitamin capsule Take 1 capsule by mouth every morning.       promethazine (PHENERGAN) 25 MG tablet Take 1 tablet (25 mg total) by mouth every 6 (six) hours as needed for Nausea. 15 tablet 0    zolpidem (AMBIEN) 10 mg Tab Take 10 mg by mouth nightly.        Mastisol adhesive [gum msaoyn-wauymk-zixo-alcohol]; Adhesive; Adhesive tape-silicones; and Sulfamethoxazole-trimethoprim  Social History     Socioeconomic History    Marital status:      Spouse name: Not on file    Number of children: Not on file    Years of education: Not on file    Highest education level: Not on file   Occupational History    Occupation: disable   Social Needs    Financial resource strain: Not on file    Food insecurity:     Worry: Not on file     Inability: Not on file    Transportation needs:     Medical: Not on file     Non-medical: Not on file   Tobacco Use    Smoking status: Never Smoker    Smokeless tobacco: Never Used    Tobacco comment: Tried a few cigarettes during teenage   Substance and Sexual Activity    Alcohol use: Yes     Comment: socially    Drug use: No    Sexual activity: Never   Lifestyle    Physical activity:     Days per week: Not on file     Minutes per session: Not on file    Stress: Not on file   Relationships    Social connections:     Talks on phone: Not on file     Gets together: Not on file     Attends Taoist service: Not on file     Active member of club or organization: Not on file     Attends meetings of clubs or organizations: Not on file     Relationship status: Not on file   Other Topics Concern    Are you pregnant or think you may be? No     Breast-feeding No   Social History Narrative    Not on file       ROS:     Constitutional : neg  HEENT neg  Resp neg  Cardiac  neg  GI neg   neg  Neuro neg  Heme/Immune: neg  Endo neg  Skin +Rash, redness    PHYSICAL EXAM:  Vitals:    05/05/19 1346   BP: 127/61   Pulse: 72   Resp: 16   Temp:          PHYSICAL EXAM:   general: comfortable, in no acute distress  VS: triage VS reviewed  HEENT: NC/AT, PERRL, EOMI  CV: RRR, no m/r/g  Resp: CTAB  ABD:  ND, + normal BS, NT  Renal: No CVAT  Neuro: AAO x 3, 5/5 muscle strength in upper and lower extremities, sensation grossly intact, face symmetric, speech normal  Ext: no deformity, +2 symmetrical peripheral pulses, left knee w/ post op dressing, left lower leg edema    Skin: erythema w/ superficial oozing blisters w/ clear fluid in the left inguinal/left lower abdomen and left anterior sup thigh as seen in the picture below. Small blisters 2-3 in number also medial to the left knee                DATA & INTERVENTIONS:    LABS reviewed:  Labs Reviewed - No data to display    RADIOLOGY reviewed:  Imaging Results          US Lower Extremity Veins Bilateral (Final result)  Result time 05/05/19 12:19:05    Final result by Gus Blake MD (05/05/19 12:19:05)                 Impression:      No evidence of deep venous thrombosis in either lower extremity.    Electronically signed by resident: Bart Esteban  Date:    05/05/2019  Time:    12:16    Electronically signed by: Gus Blake MD  Date:    05/05/2019  Time:    12:19             Narrative:    EXAMINATION:  US LOWER EXTREMITY VEINS BILATERAL    CLINICAL HISTORY:  Localized edema    TECHNIQUE:  Duplex and color flow Doppler and dynamic compression was performed of the bilateral lower extremity veins was performed.    COMPARISON:  None    FINDINGS:  Right thigh veins: The common femoral, femoral, popliteal, upper greater saphenous, and deep femoral veins are patent and free of thrombus. The veins are normally  compressible and have normal phasic flow and augmentation response.    Right calf veins: The visualized calf veins are patent.    Left thigh veins: The common femoral, femoral, popliteal, upper greater saphenous, and deep femoral veins are patent and free of thrombus. The veins are normally compressible and have normal phasic flow and augmentation response.    Left calf veins: The visualized calf veins are patent.    Miscellaneous: None                               X-Ray Knee 1 or 2 View Left (Final result)  Result time 05/05/19 11:03:17    Final result by Gus Blake MD (05/05/19 11:03:17)                 Impression:      1. Subcutaneous gas along the anterior-inferior aspect of the patella, presumably postoperative in nature.  2. Total knee arthroplasty with hardware in gross anatomic position.      Electronically signed by: Gus Blake MD  Date:    05/05/2019  Time:    11:03             Narrative:    EXAMINATION:  XR KNEE 1 OR 2 VIEW LEFT    CLINICAL HISTORY:  pain;    TECHNIQUE:  AP and lateral views of the left knee.    COMPARISON:  Radiograph 05/01/2019.    FINDINGS:  There are postoperative changes of left knee arthroplasty with hardware in gross anatomic position.  No periprosthetic fractures.  No suspicious lytic or blastic lesions.  No joint effusions.  No radiopaque foreign bodies.  There is gas within the subcutaneous soft tissues along the anterior aspect of the inferior patella.                                MEDICATIONS/FLUIDS:  Medications   methylPREDNISolone sodium succinate injection 80 mg (80 mg Intravenous Given 5/5/19 1233)   diphenhydrAMINE injection 25 mg (25 mg Intravenous Given 5/5/19 1234)   morphine injection 4 mg (4 mg Intravenous Given 5/5/19 1331)         MDM:  Angie Mccarthy is a 53 y.o. year old female who presents to the ED complaining of   1. Left inguinal area rash w/ blisters at the site where she had dressing. patient is allergic to adhesive.   Discussed w/  ortho: one dose of IV steroid and IV benadryl in the ED. Given 80 mg solumedrol IV and 25 mg benadryl.  Discharged w/ topical hydrocortisone, topical mupirocin, Keflex to prevent secondary infection of the rash (allergic to bactrim). Advised to take PRN benadryl or non-drowsy antihistaminics at home.    2. Left leg edema: DVT study negative, discussed elevation of the leg at rest    Chart reviewed: Pt is s/p total left knee replacement on May 1.     Case discussed with the consultant: Orthopedic surgery: one dose IV steroids and benadryl, to continue PRN benadryl, hydrocortisone cream, bactrim and mupirocin    IMPRESSION:  1.) Left inguinal area contact dermatitis  2.) Post op left leg edema    Dispo: Discharge    Critical Care Time: N/A         Thuy Rubin MD  05/06/19 4644

## 2019-05-06 ENCOUNTER — TELEPHONE (OUTPATIENT)
Dept: ORTHOPEDICS | Facility: CLINIC | Age: 54
End: 2019-05-06

## 2019-05-07 ENCOUNTER — LAB VISIT (OUTPATIENT)
Dept: LAB | Facility: HOSPITAL | Age: 54
End: 2019-05-07
Attending: ORTHOPAEDIC SURGERY
Payer: MEDICARE

## 2019-05-07 ENCOUNTER — TELEPHONE (OUTPATIENT)
Dept: ORTHOPEDICS | Facility: CLINIC | Age: 54
End: 2019-05-07

## 2019-05-07 ENCOUNTER — OFFICE VISIT (OUTPATIENT)
Dept: ORTHOPEDICS | Facility: CLINIC | Age: 54
End: 2019-05-07
Payer: MEDICARE

## 2019-05-07 ENCOUNTER — PATIENT MESSAGE (OUTPATIENT)
Dept: ORTHOPEDICS | Facility: CLINIC | Age: 54
End: 2019-05-07

## 2019-05-07 VITALS
SYSTOLIC BLOOD PRESSURE: 153 MMHG | BODY MASS INDEX: 40.62 KG/M2 | HEIGHT: 65 IN | WEIGHT: 243.81 LBS | TEMPERATURE: 98 F | HEART RATE: 86 BPM | DIASTOLIC BLOOD PRESSURE: 89 MMHG

## 2019-05-07 DIAGNOSIS — Z96.659 TOTAL KNEE REPLACEMENT STATUS, UNSPECIFIED LATERALITY: ICD-10-CM

## 2019-05-07 DIAGNOSIS — Z96.659 TOTAL KNEE REPLACEMENT STATUS, UNSPECIFIED LATERALITY: Primary | ICD-10-CM

## 2019-05-07 LAB
BASOPHILS # BLD AUTO: 0.06 K/UL (ref 0–0.2)
BASOPHILS NFR BLD: 0.6 % (ref 0–1.9)
CRP SERPL-MCNC: 20.3 MG/L (ref 0–8.2)
DIFFERENTIAL METHOD: ABNORMAL
EOSINOPHIL # BLD AUTO: 0.5 K/UL (ref 0–0.5)
EOSINOPHIL NFR BLD: 5 % (ref 0–8)
ERYTHROCYTE [DISTWIDTH] IN BLOOD BY AUTOMATED COUNT: 14.7 % (ref 11.5–14.5)
ERYTHROCYTE [SEDIMENTATION RATE] IN BLOOD BY WESTERGREN METHOD: 33 MM/HR (ref 0–36)
HCT VFR BLD AUTO: 36.9 % (ref 37–48.5)
HGB BLD-MCNC: 11.3 G/DL (ref 12–16)
IMM GRANULOCYTES # BLD AUTO: 0.09 K/UL (ref 0–0.04)
IMM GRANULOCYTES NFR BLD AUTO: 0.9 % (ref 0–0.5)
LYMPHOCYTES # BLD AUTO: 2.2 K/UL (ref 1–4.8)
LYMPHOCYTES NFR BLD: 22.8 % (ref 18–48)
MCH RBC QN AUTO: 24 PG (ref 27–31)
MCHC RBC AUTO-ENTMCNC: 30.6 G/DL (ref 32–36)
MCV RBC AUTO: 78 FL (ref 82–98)
MONOCYTES # BLD AUTO: 0.7 K/UL (ref 0.3–1)
MONOCYTES NFR BLD: 6.6 % (ref 4–15)
NEUTROPHILS # BLD AUTO: 6.3 K/UL (ref 1.8–7.7)
NEUTROPHILS NFR BLD: 64.1 % (ref 38–73)
NRBC BLD-RTO: 0 /100 WBC
PLATELET # BLD AUTO: 296 K/UL (ref 150–350)
PMV BLD AUTO: 10.1 FL (ref 9.2–12.9)
RBC # BLD AUTO: 4.71 M/UL (ref 4–5.4)
WBC # BLD AUTO: 9.83 K/UL (ref 3.9–12.7)

## 2019-05-07 PROCEDURE — 85652 RBC SED RATE AUTOMATED: CPT

## 2019-05-07 PROCEDURE — 99024 PR POST-OP FOLLOW-UP VISIT: ICD-10-PCS | Mod: S$GLB,,, | Performed by: ORTHOPAEDIC SURGERY

## 2019-05-07 PROCEDURE — 99999 PR PBB SHADOW E&M-EST. PATIENT-LVL III: ICD-10-PCS | Mod: PBBFAC,,, | Performed by: ORTHOPAEDIC SURGERY

## 2019-05-07 PROCEDURE — 99024 POSTOP FOLLOW-UP VISIT: CPT | Mod: S$GLB,,, | Performed by: ORTHOPAEDIC SURGERY

## 2019-05-07 PROCEDURE — 85025 COMPLETE CBC W/AUTO DIFF WBC: CPT

## 2019-05-07 PROCEDURE — 36415 COLL VENOUS BLD VENIPUNCTURE: CPT

## 2019-05-07 PROCEDURE — 99999 PR PBB SHADOW E&M-EST. PATIENT-LVL III: CPT | Mod: PBBFAC,,, | Performed by: ORTHOPAEDIC SURGERY

## 2019-05-07 PROCEDURE — 86140 C-REACTIVE PROTEIN: CPT

## 2019-05-07 NOTE — TELEPHONE ENCOUNTER
Mrs Mccarthy called  She wants to move her appointment to after lunch  She needs to sleep  She had a rough night last night

## 2019-05-07 NOTE — PROGRESS NOTES
Patient has severe skin reaction to the site of the nerve block.  Patient is highly allergic to any type of adhesive.    Presently on antibiotics and hydrocortisone cream.    No obvious signs of infection.    Plan baseline sed rate and CRP today.

## 2019-05-08 NOTE — ADDENDUM NOTE
Addendum  created 05/08/19 1005 by Ira Narayanan MD    Attestation recorded in Intraprocedure, Intraprocedure Attestations filed

## 2019-05-09 RX ORDER — DOXYCYCLINE 100 MG/1
100 CAPSULE ORAL 2 TIMES DAILY
Qty: 20 CAPSULE | Refills: 1 | Status: ON HOLD | OUTPATIENT
Start: 2019-05-09 | End: 2019-12-21 | Stop reason: HOSPADM

## 2019-05-13 ENCOUNTER — TELEPHONE (OUTPATIENT)
Dept: ADMINISTRATIVE | Facility: CLINIC | Age: 54
End: 2019-05-13

## 2019-05-13 NOTE — TELEPHONE ENCOUNTER
Home Health SOC 05/03/2019 - 07/01/2019 with [facility] ([place]) - Dr. [facility] Jr. Patient received SN and PT services.

## 2019-05-14 ENCOUNTER — OFFICE VISIT (OUTPATIENT)
Dept: ORTHOPEDICS | Facility: CLINIC | Age: 54
End: 2019-05-14
Payer: MEDICARE

## 2019-05-14 VITALS
SYSTOLIC BLOOD PRESSURE: 149 MMHG | TEMPERATURE: 97 F | HEART RATE: 101 BPM | HEIGHT: 65 IN | DIASTOLIC BLOOD PRESSURE: 81 MMHG | BODY MASS INDEX: 40.31 KG/M2 | WEIGHT: 241.94 LBS

## 2019-05-14 DIAGNOSIS — Z96.659 STATUS POST KNEE REPLACEMENT, UNSPECIFIED LATERALITY: Primary | ICD-10-CM

## 2019-05-14 PROCEDURE — 99999 PR PBB SHADOW E&M-EST. PATIENT-LVL III: ICD-10-PCS | Mod: PBBFAC,,, | Performed by: NURSE PRACTITIONER

## 2019-05-14 PROCEDURE — 99024 PR POST-OP FOLLOW-UP VISIT: ICD-10-PCS | Mod: S$GLB,,, | Performed by: NURSE PRACTITIONER

## 2019-05-14 PROCEDURE — 99999 PR PBB SHADOW E&M-EST. PATIENT-LVL III: CPT | Mod: PBBFAC,,, | Performed by: NURSE PRACTITIONER

## 2019-05-14 PROCEDURE — 99024 POSTOP FOLLOW-UP VISIT: CPT | Mod: S$GLB,,, | Performed by: NURSE PRACTITIONER

## 2019-05-14 NOTE — PROGRESS NOTES
"Angie Mccarthy presents for initial post-operative visit following a left total knee arthroplasty performed by Dr. [facility] on 5/1/2019. Tolerating pain medication well.      Exam:   Blood pressure (!) 149/81, pulse 101, temperature 97.3 °F (36.3 °C), height 5' 5" (1.651 m), weight 109.8 kg (241 lb 15.3 oz), last menstrual period 12/06/2007.   Ambulating well with assistive device.  Incision is clean and dry without drainage or erythema. Her rash has healed completely to her groin and leg. There is some mild erythema, but no warmth or drainage. She reports that the itching is much better. ROM:0-90    Initial post-operative radiographs reviewed today revealing a well fixed and aligned prosthesis.    A/P:  2 weeks s/p left total knee replacement  Dr. [facility] interviewed and examined patient today and agrees with plan.   - The patient was advised to keep the incision clean and dry for the next 24 hours after which she may wash the area with antibacterial soap in the shower. Will not submerge until the incision is completely healed.   - Outpatient PT: orders entered for [place] location  - Continue aspirin for 1 month from surgery.  - Pain medication: managed by pain management  - Follow up in 4 weeks with Dr. [facility]. Pt will call clinic with problems/concerns.     "

## 2019-05-27 ENCOUNTER — CLINICAL SUPPORT (OUTPATIENT)
Dept: REHABILITATION | Facility: HOSPITAL | Age: 54
End: 2019-05-27
Payer: MEDICARE

## 2019-05-27 DIAGNOSIS — R53.1 WEAKNESS: ICD-10-CM

## 2019-05-27 DIAGNOSIS — R26.9 ABNORMALITY OF GAIT: ICD-10-CM

## 2019-05-27 PROCEDURE — 97110 THERAPEUTIC EXERCISES: CPT

## 2019-05-27 PROCEDURE — G8979 MOBILITY GOAL STATUS: HCPCS | Mod: CJ

## 2019-05-27 PROCEDURE — 97162 PT EVAL MOD COMPLEX 30 MIN: CPT

## 2019-05-27 PROCEDURE — G8978 MOBILITY CURRENT STATUS: HCPCS | Mod: CK

## 2019-05-27 NOTE — PROGRESS NOTES
[facility]  Physical Therapy Initial Evaluation    Name: Angie Mccarthy  Clinic Number: [id number]    Therapy Diagnosis:   Encounter Diagnoses   Name Primary?    Abnormality of gait     Weakness      Physician: Samantha Carias NP    Physician Orders: PT Eval and Treat   Medical Diagnosis from Referral: Z96.659 (ICD-10-CM) - Status post knee replacement, unspecified laterality  Surgery Date: 5/1/2019 (pt had home health until 5/16; pt delayed OP therapy due to illness).   Evaluation Date: 5/27/2019  Authorization Period Expiration: 12/31/2019  Plan of Care Expiration: 8/23/2019  Visit # / Visits authorized: 1/ 20    Time In: 1:10  Time Out: 2:00  Total Billable Time: 50 minutes    Precautions: previous back surgery    Subjective   Date of onset: long hx leading to degenerative changes; TKA on 5/1/2019  History of current condition - Angie reports: she feels her previous back surgeries/complications with rods lead to increased wear and tear on her knee due to abnormal gait. Her last back surgery was in 2013; she's had 2 rods break since that surgery, she will have those replaced after she heals from TKE.        Past Medical History:   Diagnosis Date    Allergy     seasonal    Arthritis     Blood transfusion     with back surgeries    Chronic back pain     GERD (gastroesophageal reflux disease)     Hyperlipidemia     Hypertension     Hypothyroidism     Thyroid nodule    Joint pain     Kidney stones     Morbid obesity 12/14/2012    NAFLD (nonalcoholic fatty liver disease)     OCD (obsessive compulsive disorder)     Osteoporosis     PONV (postoperative nausea and vomiting)     Persistent, Motion sickness with boat rides, Scopolamine helped -still had nausea    Restless leg syndrome     Sciatica of right side associated with disorder of lumbosacral spine     SOB (shortness of breath) on exertion     Spinal stenosis of lumbar region     Supraventricular  "tachycardia     Thoracic spondylosis      Angie Mccarthy  has a past surgical history that includes Spinal fusion; TONSILLECTOMY, ADENOIDECTOMY; Appendectomy; Cholecystectomy; Hernia repair; Hysterectomy; Liver biopsy (02/06/2017); Back surgery; Myelography (N/A, 11/29/2018); Gastrectomy; Myelography (N/A, 1/7/2019); Oophorectomy; Percutaneous cryotherapy of peripheral nerve using liquid nitrous oxide in closed needle device (Left, 4/29/2019); Total knee arthroplasty (Left, 5/1/2019); and Joint replacement.    Angie has a current medication list which includes the following prescription(s): albuterol, alprazolam, ascorbic acid (vitamin c), aspirin, biotin, c,e,zinc,copper 11/wcims8g/lut, cyclobenzaprine, docusate sodium, doxycycline, duloxetine, fish oil-omega-3 fatty acids, fluconazole, gabapentin, hydrocortisone, levothyroxine, lisinopril, meloxicam, multivitamin, omeprazole, oxycodone-acetaminophen, promethazine, ranitidine, rosuvastatin, tizanidine, and zolpidem.    Review of patient's allergies indicates:   Allergen Reactions    Mastisol adhesive [gum nxsldd-menjfx-lrmk-alcohol] Itching, Rash and Blisters     "Looked like poison ivy"    Adhesive Dermatitis and Blisters    Adhesive tape-silicones Dermatitis     The longer the adhesive stays on, the worse the irritation    Sulfamethoxazole-trimethoprim Itching and Anxiety     Has a "nervous feeling."  "Jittery"  Has taken recently and the reaction was "less intense"        Imaging, X-ray:   FINDINGS:  There are postoperative changes of left knee arthroplasty with hardware in gross anatomic position.  No periprosthetic fractures.  No suspicious lytic or blastic lesions.  No joint effusions.  No radiopaque foreign bodies.  There is gas within the subcutaneous soft tissues along the anterior aspect of the inferior patella.      Impression       1. Subcutaneous gas along the anterior-inferior aspect of the patella, presumably postoperative in " nature.  2. Total knee arthroplasty with hardware in gross anatomic position.         Prior Therapy: Patient had home health PT until 5/16.   Social History: She lives with an adult  in a one-story home  Occupation: disabled due to the scoliosis and multiple back surgeries.   Prior Level of Function: knee locked into bent position - significant gait abnormalities  Current Level of Function: Significant decreased pain and improving gait mechanics. Pt has still not walked long distance to go shopping. Getting in/out of bath tub to take a shower is difficult due to getting leg over tub.    Pain:  Current 0/10, worst 2/10, best 0/10   * pt notes she got an injection for her knee prior to surgery that has significantly decreased pain in both her back and her knee, she mainly feels soreness and tightness.   Location: left knee - anterior aspect  Description: Aching and Sore  Aggravating Factors: lack of movement; sleeping (prolonged positioning).   Easing Factors: ice and walking/HEP    Pts goals: to be able to wear her high heels again.   Have her knee bend and straighten properly to allow her to walk better.     Objective     Observation: Pt is stated age, pleasant, no acute distress, oriented x3.     Gait: Decreased knee flexion in swing, lack of terminal knee extension in stance, decreased stance time.     Range of Motion: A(PROM):    Knee Left Right   Extension  -5(-3) degrees  0(NT) degrees   Flexion  120(127) degrees  140(NT) degrees       Strength:  Hip Left Right   Flexion 4/5 5/5     Knee Left Right   Extension 4-/5! 5/5   Flexion 4/5 5/5         Special Tests:  Not indicated as pt is post-op    Joint Mobility:   Moderate hypomobility of patella superior and inferior glide    Palpation: Moderate restriction quad, HS, and calf musculature.     Sensation: WNL      CMS Impairment/Limitation/Restriction for FOTO Knee Survey    Therapist reviewed FOTO scores for Angieaiyana Mccarthy on 5/27/2019.   LAMAR  "documents entered into EPIC - see Media section.    Limitation Score: 47%  Category: Mobility    Current : CK = at least 40% but < 60% impaired, limited or restricted  Goal: CJ = at least 20% but < 40% impaired, limited or restricted           TREATMENT   Treatment Time In: 1:45  Treatment Time Out: 2:00  Total Treatment time separate from Evaluation: 15 minutes    Angie received therapeutic exercises to develop strength for 15 minutes including:  - quad sets 5" holds, throughout day   * in addition to previous HEP  See Media for HEP2go handout      Home Exercises and Patient Education Provided    Education provided:   - Findings of evaluation, prognosis, PT plan of care, HEP    Written Home Exercises Provided: yes.  Exercises were reviewed and Angie was able to demonstrate them prior to the end of the session.  Angie demonstrated good  understanding of the education provided.     See EMR under Media for exercises provided 5/27/2019.    Assessment   Angie is a 53 y.o. female referred to outpatient Physical Therapy with a medical diagnosis of Z96.659 (ICD-10-CM) - Status post knee replacement, unspecified laterality. Pt presents with gait abnormalities, decreased AROM and strength, and joint/soft tissue mobility restrictions leading to decreased ambulation and functional activity tolerance.     Pt prognosis is Good.   Pt will benefit from skilled outpatient Physical Therapy to address the deficits stated above and in the chart below, provide pt/family education, and to maximize pt's level of independence.     Plan of care discussed with patient: Yes  Pt's spiritual, cultural and educational needs considered and patient is agreeable to the plan of care and goals as stated below:     Anticipated Barriers for therapy: Previous hx of multiple back surgeries with additional surgery pending to fix broken hardware.     Medical Necessity is demonstrated by the following  History  Co-morbidities and personal factors that " may impact the plan of care Co-morbidities:   advanced age, anxiety, depression, diabetes, high BMI, prior lumbar surgery and lumbar stenosis    Personal Factors:   coping style  lifestyle     moderate   Examination  Body Structures and Functions, activity limitations and participation restrictions that may impact the plan of care Body Regions:   lower extremities  trunk    Body Systems:    gross symmetry  ROM  strength  gross coordinated movement  balance  gait  transfers  motor control    Participation Restrictions:   Walking prolonged periods, bending/stooping    Activity limitations:   Learning and applying knowledge  no deficits    General Tasks and Commands  no deficits    Communication  no deficits    Mobility  lifting and carrying objects  walking    Self care  no deficits    Domestic Life  shopping  cooking  doing house work (cleaning house, washing dishes, laundry)  assisting others    Interactions/Relationships  no deficits    Life Areas  basic economic transactions    Community and Social Life  community life  recreation and leisure         moderate   Clinical Presentation evolving clinical presentation with changing clinical characteristics moderate   Decision Making/ Complexity Score: moderate     Goals:  Short Term Goals: 2 weeks   - Pt will demonstrate excellent knowledge and adherence to HEP to facilitate optimal recovery.  - Pt will demonstrate (L) knee PROM of 0 to 135 deg for increased mobility tolerance     Long Term Goals: 8-12 weeks   - Pt will demonstrate (L) knee AROM of 0 to 140 deg for increased mobility tolerance  - Pt will demonstrate (L) hip and knee strength of 5/5 MMT for increased stability needed for functional activity   - Pt will demonstrate equal stance time, step length, and weight shift, along with (L) knee flexion in swing WNL for improved gait mechanics needed for functional activity.   - Pt will report FOTO score of 39% limited or less indicating clinically relevant increase  in functional tolerance.      Plan   Plan of care Certification: 5/27/2019 to 8/23/2019.    Outpatient Physical Therapy 2 times weekly for 12 weeks to include the following interventions: Aquatic Therapy, Gait Training, Manual Therapy, Moist Heat/ Ice, Neuromuscular Re-ed, Patient Education, Therapeutic Activites and Therapeutic Exercise.     Martha Youssef, PT, DPT

## 2019-05-28 PROBLEM — R26.9 ABNORMALITY OF GAIT: Status: ACTIVE | Noted: 2019-05-28

## 2019-05-28 PROBLEM — R53.1 WEAKNESS: Status: ACTIVE | Noted: 2019-05-28

## 2019-06-11 ENCOUNTER — TELEPHONE (OUTPATIENT)
Dept: ORTHOPEDICS | Facility: CLINIC | Age: 54
End: 2019-06-11

## 2019-06-11 NOTE — TELEPHONE ENCOUNTER
----- Message from Debbi Robins MA sent at 6/10/2019  5:39 PM CDT -----  Contact: Self      ----- Message -----  From: Windy Summers  Sent: 6/10/2019   4:40 PM  To: [facility] Staff    Pt is calling to reschedule post op appt for tomorrow pt can be reached @[phone] done

## 2019-06-14 ENCOUNTER — CLINICAL SUPPORT (OUTPATIENT)
Dept: REHABILITATION | Facility: HOSPITAL | Age: 54
End: 2019-06-14
Payer: MEDICARE

## 2019-06-14 DIAGNOSIS — R53.1 WEAKNESS: ICD-10-CM

## 2019-06-14 DIAGNOSIS — R26.9 ABNORMALITY OF GAIT: ICD-10-CM

## 2019-06-14 PROCEDURE — 97110 THERAPEUTIC EXERCISES: CPT

## 2019-06-14 NOTE — PROGRESS NOTES
"  Physical Therapy Daily Treatment Note     Name: Angie Mccarthy  Clinic Number: [id number]    Therapy Diagnosis:   Encounter Diagnoses   Name Primary?    Abnormality of gait     Weakness      Physician: Samantha Carias NP    Visit Date: 6/14/2019    Physician Orders: PT Eval and Treat   Medical Diagnosis from Referral: Z96.659 (ICD-10-CM) - Status post knee replacement, unspecified laterality  Surgery Date: 5/1/2019 (pt had home health until 5/16; pt delayed OP therapy due to illness).   Evaluation Date: 5/27/2019  Authorization Period Expiration: 12/31/2019  Plan of Care Expiration: 8/23/2019  Visit # / Visits authorized: 2/ 20     Time In: 1:15  Time Out: 2:00  Total Billable Time: 30 minutes     Precautions: previous back surgery    Subjective     Pt reports: She's been feeling well; she was delayed in coming back since [place] due to insurance issues/nausea and kidney stone she passed yesterday. She's been doing her HEP and riding her bike at home.   She was compliant with home exercise program.  Response to previous treatment: Tolerated well  Functional change: improving ambulation tolerance/decreasing knee pain with increased mobility     Pain: 0/10  Location: left knee      Objective     Angie received therapeutic exercises to develop strength, ROM and flexibility for 30 minutes including:  - bike 10 min timed increased circulation and ROM  - long-duration, low load knee extension stretch - 5 min c 5# on distal femur; 3 min c 10# on distal femur  - quad sets 5" hold 3x10   - LAQ 2# 3" hold 3x10     Angie received cold pack for 10 minutes to (L) knee.      Home Exercises Provided and Patient Education Provided     Education provided:   - Exercise rationale    Written Home Exercises Provided: Patient instructed to cont prior HEP.  Exercises were reviewed and Angie was able to demonstrate them prior to the end of the session.  Angie demonstrated good  understanding of the education provided. "     See EMR under Patient Instructions for exercises provided prior visit.    Assessment     Pt was 15 minutes late. Initiated beginning exercise program focused on increasing quad tone and ROM. No symptom provocation with treatment; Patient reported feeling well upon departure.    Angie is progressing well towards her goals.   Pt prognosis is Good.     Pt will continue to benefit from skilled outpatient physical therapy to address the deficits listed in the problem list box on initial evaluation, provide pt/family education and to maximize pt's level of independence in the home and community environment.     Pt's spiritual, cultural and educational needs considered and pt agreeable to plan of care and goals.    Anticipated barriers to physical therapy: Previous back surgeries; delays in starting outpatient therapy.     Goals:  Short Term Goals: 2 weeks   - Pt will demonstrate excellent knowledge and adherence to HEP to facilitate optimal recovery. (ACHIEVED)   - Pt will demonstrate (L) knee PROM of 0 to 135 deg for increased mobility tolerance (PROGRESSING)      Long Term Goals: 8-12 weeks   - Pt will demonstrate (L) knee AROM of 0 to 140 deg for increased mobility tolerance (PROGRESSING)   - Pt will demonstrate (L) hip and knee strength of 5/5 MMT for increased stability needed for functional activity (PROGRESSING)   - Pt will demonstrate equal stance time, step length, and weight shift, along with (L) knee flexion in swing WNL for improved gait mechanics needed for functional activity. (PROGRESSING)   - Pt will report FOTO score of 39% limited or less indicating clinically relevant increase in functional tolerance. (PROGRESSING)     Plan     Continue with emphasis on increasing ROM and strength for increased function.     Martha Youssef, PT, DPT

## 2019-06-16 ENCOUNTER — PATIENT MESSAGE (OUTPATIENT)
Dept: ADMINISTRATIVE | Facility: OTHER | Age: 54
End: 2019-06-16

## 2019-06-20 ENCOUNTER — CLINICAL SUPPORT (OUTPATIENT)
Dept: REHABILITATION | Facility: HOSPITAL | Age: 54
End: 2019-06-20
Payer: MEDICARE

## 2019-06-20 DIAGNOSIS — R53.1 WEAKNESS: ICD-10-CM

## 2019-06-20 DIAGNOSIS — R26.9 ABNORMALITY OF GAIT: ICD-10-CM

## 2019-06-20 PROCEDURE — 97110 THERAPEUTIC EXERCISES: CPT

## 2019-06-20 NOTE — PROGRESS NOTES
"  Physical Therapy Daily Treatment Note     Name: Angie Mccarthy  Clinic Number: [id number]    Therapy Diagnosis:   Encounter Diagnoses   Name Primary?    Abnormality of gait     Weakness      Physician: Samantha Carias NP    Visit Date: 6/20/2019    Physician Orders: PT Eval and Treat   Medical Diagnosis from Referral: Z96.659 (ICD-10-CM) - Status post knee replacement, unspecified laterality  Surgery Date: 5/1/2019 (pt had home health until 5/16; pt delayed OP therapy due to illness).   Evaluation Date: 5/27/2019  Authorization Period Expiration: 12/31/2019  Plan of Care Expiration: 8/23/2019  Visit # / Visits authorized: 3/ 20     Time In: 2:00  Time Out: 3:00  Total Billable Time: 53 minutes     Precautions: previous back surgery    Subjective     Pt reports: She's passed another kidney stone, hence she cancelled her last appointment due to not feeling well. Her knee hasn't been giving her any significant issues. It's hard for her to get TKE with gait due to years of walking with a bent knee.   She was compliant with home exercise program.  Response to previous treatment: Tolerated well  Functional change: improving ambulation tolerance/decreasing knee pain with increased mobility     Pain: 0/10  Location: left knee      Objective     Angie received therapeutic exercises to develop strength, ROM and flexibility for 53 minutes including:  - bike 10 min timed increased circulation and ROM  - long-duration, low load knee extension stretch - 5 min c 5# on distal femur  - quad sets 5" hold 3x10   - LAQ 3" hold 4x10   - supine SLR 3x10   - GTB TKE 5" hold 3x10     Angie received cold pack for 0 minutes to (L) knee.    Home Exercises Provided and Patient Education Provided     Education provided:   - Exercise rationale    Written Home Exercises Provided: Patient instructed to cont prior HEP.  Exercises were reviewed and Angie was able to demonstrate them prior to the end of the session.  Angie " demonstrated good  understanding of the education provided.     See EMR under Patient Instructions for exercises provided prior visit.    Assessment     Continued to focus on increasing quad tone and achieving TKE needed for functional activity. TKE with GTB added with Mod VC required for correct form. No symptom provocation during session, mild muscle fatigue reported; slight knee extension lag noted with SLR towards end of repetitions. Patient reported feeling well upon departure.    Angie is progressing well towards her goals.   Pt prognosis is Good.     Pt will continue to benefit from skilled outpatient physical therapy to address the deficits listed in the problem list box on initial evaluation, provide pt/family education and to maximize pt's level of independence in the home and community environment.     Pt's spiritual, cultural and educational needs considered and pt agreeable to plan of care and goals.    Anticipated barriers to physical therapy: Previous back surgeries; delays in starting outpatient therapy.     Goals:  Short Term Goals: 2 weeks   - Pt will demonstrate excellent knowledge and adherence to HEP to facilitate optimal recovery. (ACHIEVED)   - Pt will demonstrate (L) knee PROM of 0 to 135 deg for increased mobility tolerance (PROGRESSING)      Long Term Goals: 8-12 weeks   - Pt will demonstrate (L) knee AROM of 0 to 140 deg for increased mobility tolerance (PROGRESSING)   - Pt will demonstrate (L) hip and knee strength of 5/5 MMT for increased stability needed for functional activity (PROGRESSING)   - Pt will demonstrate equal stance time, step length, and weight shift, along with (L) knee flexion in swing WNL for improved gait mechanics needed for functional activity. (PROGRESSING)   - Pt will report FOTO score of 39% limited or less indicating clinically relevant increase in functional tolerance. (PROGRESSING)     Plan     Continue with emphasis on increasing ROM and strength for increased  function.     Martha Youssef, PT, DPT

## 2019-06-27 ENCOUNTER — CLINICAL SUPPORT (OUTPATIENT)
Dept: REHABILITATION | Facility: HOSPITAL | Age: 54
End: 2019-06-27
Payer: MEDICARE

## 2019-06-27 ENCOUNTER — OFFICE VISIT (OUTPATIENT)
Dept: ORTHOPEDICS | Facility: CLINIC | Age: 54
End: 2019-06-27
Payer: MEDICARE

## 2019-06-27 VITALS — BODY MASS INDEX: 38 KG/M2 | HEIGHT: 65 IN | WEIGHT: 228.06 LBS

## 2019-06-27 DIAGNOSIS — R53.1 WEAKNESS: ICD-10-CM

## 2019-06-27 DIAGNOSIS — R26.9 ABNORMALITY OF GAIT: ICD-10-CM

## 2019-06-27 DIAGNOSIS — Z96.652 STATUS POST TOTAL LEFT KNEE REPLACEMENT: Primary | ICD-10-CM

## 2019-06-27 PROCEDURE — 97110 THERAPEUTIC EXERCISES: CPT

## 2019-06-27 PROCEDURE — 99024 POSTOP FOLLOW-UP VISIT: CPT | Mod: S$GLB,,, | Performed by: ORTHOPAEDIC SURGERY

## 2019-06-27 PROCEDURE — 99024 PR POST-OP FOLLOW-UP VISIT: ICD-10-PCS | Mod: S$GLB,,, | Performed by: ORTHOPAEDIC SURGERY

## 2019-06-27 PROCEDURE — 99999 PR PBB SHADOW E&M-EST. PATIENT-LVL II: CPT | Mod: PBBFAC,,, | Performed by: ORTHOPAEDIC SURGERY

## 2019-06-27 PROCEDURE — 99999 PR PBB SHADOW E&M-EST. PATIENT-LVL II: ICD-10-PCS | Mod: PBBFAC,,, | Performed by: ORTHOPAEDIC SURGERY

## 2019-06-27 NOTE — PROGRESS NOTES
Angie Mccarthy is status post JEF  5/5/19. Wound is well healed. Xrays reviewed.    Plan, Follow up in 6wks.

## 2019-06-27 NOTE — PROGRESS NOTES
"  Physical Therapy Daily Treatment Note     Name: Angie Mccarthy  Clinic Number: [id number]    Therapy Diagnosis:   Encounter Diagnoses   Name Primary?    Abnormality of gait     Weakness      Physician: Samantha Carias NP    Visit Date: 6/27/2019    Physician Orders: PT Eval and Treat   Medical Diagnosis from Referral: Z96.659 (ICD-10-CM) - Status post knee replacement, unspecified laterality  Surgery Date: 5/1/2019 (pt had home health until 5/16; pt delayed OP therapy due to illness).   Evaluation Date: 5/27/2019  Authorization Period Expiration: 12/31/2019  Plan of Care Expiration: 8/23/2019  Visit # / Visits authorized: 4/ 20     Time In: 1402  Time Out: 1500  Total Billable Time: 55 minutes     Precautions: previous back surgery    Subjective     Pt states feeling okay w/ no c/o pn in L knee but does have some pn from LB radiating down B LE.    She was compliant with home exercise program.  Response to previous treatment: Tolerated well  Functional change: improving ambulation tolerance/decreasing knee pain with increased mobility     Pain: 0/10  Location: left knee      Objective     Angie received therapeutic exercises to develop strength, ROM and flexibility for 55 minutes including:  - bike 5 min timed increased circulation and ROM  - long-duration, low load knee extension stretch - 5 min c 5# on distal femur  - quad sets 5" hold 3x10 w/ strap and over pressure   - LAQ 3" hold 4x10 2#   - GTB TKE 5" hold 30   - supine SLR 2 x 15    Gait training:  3 min   Heel tap with w/ wt shift in split stance on step 2 x 15     Angie received cold pack for 0 minutes to (L) knee.    Home Exercises Provided and Patient Education Provided     Education provided:   - Exercise rationale    Written Home Exercises Provided: Patient instructed to cont prior HEP.  Exercises were reviewed and Angie was able to demonstrate them prior to the end of the session.  Angie demonstrated good  understanding of the " education provided.     See EMR under Patient Instructions for exercises provided prior visit.    Assessment   Pt rico tx well w/ no c/o pn in L knee.  Pt displayed improved knee ext and quad strength during therex.  Pt cont to lack some ROM and strength.       Angie is progressing well towards her goals.   Pt prognosis is Good.     Pt will continue to benefit from skilled outpatient physical therapy to address the deficits listed in the problem list box on initial evaluation, provide pt/family education and to maximize pt's level of independence in the home and community environment.     Pt's spiritual, cultural and educational needs considered and pt agreeable to plan of care and goals.    Anticipated barriers to physical therapy: Previous back surgeries; delays in starting outpatient therapy.     Goals:  Short Term Goals: 2 weeks   - Pt will demonstrate excellent knowledge and adherence to HEP to facilitate optimal recovery. (ACHIEVED)   - Pt will demonstrate (L) knee PROM of 0 to 135 deg for increased mobility tolerance (PROGRESSING)      Long Term Goals: 8-12 weeks   - Pt will demonstrate (L) knee AROM of 0 to 140 deg for increased mobility tolerance (PROGRESSING)   - Pt will demonstrate (L) hip and knee strength of 5/5 MMT for increased stability needed for functional activity (PROGRESSING)   - Pt will demonstrate equal stance time, step length, and weight shift, along with (L) knee flexion in swing WNL for improved gait mechanics needed for functional activity. (PROGRESSING)   - Pt will report FOTO score of 39% limited or less indicating clinically relevant increase in functional tolerance. (PROGRESSING)     Plan     Cont to progess towards goals set by PT.   Work to increase knee ext and quad/hip strength next visit.      Butch Norton, PTA

## 2019-07-03 NOTE — TELEPHONE ENCOUNTER
----- Message from Zina Narayanan sent at 1/30/2017  3:36 PM CST -----  Contact: pt  [phone]  Jozef   -  Pt calling to speak with the nurse in regards to her ultrasound  -  Call back number [phone]  Thanks,   duoneb q 6, pulmicort .5 bid  incentive spirometry/acapella

## 2019-07-08 ENCOUNTER — PATIENT MESSAGE (OUTPATIENT)
Dept: ADMINISTRATIVE | Facility: OTHER | Age: 54
End: 2019-07-08

## 2019-07-15 ENCOUNTER — CLINICAL SUPPORT (OUTPATIENT)
Dept: INFECTIOUS DISEASES | Facility: CLINIC | Age: 54
End: 2019-07-15
Payer: MEDICARE

## 2019-07-15 DIAGNOSIS — K74.00 LIVER FIBROSIS: ICD-10-CM

## 2019-07-15 DIAGNOSIS — K76.0 FATTY LIVER DISEASE, NONALCOHOLIC: ICD-10-CM

## 2019-07-15 PROCEDURE — 90636 HEP A/HEP B VACC ADULT IM: CPT | Mod: S$GLB,,, | Performed by: INTERNAL MEDICINE

## 2019-07-15 PROCEDURE — 90636 HEPATITIS A HEPATITIS B COMBINED VACCINE IM: ICD-10-PCS | Mod: S$GLB,,, | Performed by: INTERNAL MEDICINE

## 2019-07-15 PROCEDURE — 99999 PR PBB SHADOW E&M-EST. PATIENT-LVL I: ICD-10-PCS | Mod: PBBFAC,,,

## 2019-07-15 PROCEDURE — 99999 PR PBB SHADOW E&M-EST. PATIENT-LVL I: CPT | Mod: PBBFAC,,,

## 2019-07-15 PROCEDURE — 90471 HEPATITIS A HEPATITIS B COMBINED VACCINE IM: ICD-10-PCS | Mod: S$GLB,,, | Performed by: INTERNAL MEDICINE

## 2019-07-15 PROCEDURE — 90471 IMMUNIZATION ADMIN: CPT | Mod: S$GLB,,, | Performed by: INTERNAL MEDICINE

## 2019-07-16 ENCOUNTER — CLINICAL SUPPORT (OUTPATIENT)
Dept: REHABILITATION | Facility: HOSPITAL | Age: 54
End: 2019-07-16
Payer: MEDICARE

## 2019-07-16 DIAGNOSIS — R53.1 WEAKNESS: ICD-10-CM

## 2019-07-16 DIAGNOSIS — R26.9 ABNORMALITY OF GAIT: ICD-10-CM

## 2019-07-16 PROCEDURE — 97110 THERAPEUTIC EXERCISES: CPT

## 2019-07-17 NOTE — PROGRESS NOTES
"  Physical Therapy Daily Treatment Note     Name: Angie Mccarthy  Clinic Number: [id number]    Therapy Diagnosis:   Encounter Diagnoses   Name Primary?    Abnormality of gait     Weakness      Physician: Samantha Carias NP    Visit Date: 2019    Physician Orders: PT Eval and Treat   Medical Diagnosis from Referral: Z96.659 (ICD-10-CM) - Status post knee replacement, unspecified laterality  Surgery Date: 2019 (pt had home health until ; pt delayed OP therapy due to illness).   Evaluation Date: 2019  Authorization Period Expiration: 2019  Plan of Care Expiration: 2019  Visit # / Visits authorized:      Time In: 3:15  Time Out: 4:00  Total Billable Time: 23 minutes     Precautions: previous back surgery    Subjective     Pt states she's ok, she has been ill the past week.    She was compliant with home exercise program.  Response to previous treatment: Tolerated well  Functional change: improving ambulation tolerance/decreasing knee pain with increased mobility     Pain: 0/10  Location: left knee      Objective     Update 2019:   AROM:   Knee extension: +5 (progressed to 0 following 10# stretch)   Knee flexion: 125     Strength: 5/5 extension and flexion    Angie received therapeutic exercises to develop strength, ROM and flexibility for 23 minutes including:  - bike 10 min timed increased circulation and ROM  - long-duration, low load knee extension stretch - 5 min c 10# on distal femur  - quad sets 5" hold 3x10 w/ strap and over pressure   - LAQ 3" hold 4x10 2#   - GTB TKE 5" hold 30   - supine SLR 3x10  - supine bridging 3x10    Gait trainin min   Heel tap with w/ wt shift in split stance on step 2 x 15     Angie received cold pack for 0 minutes to (L) knee.    Home Exercises Provided and Patient Education Provided     Education provided:   - Exercise rationale    Written Home Exercises Provided: Patient instructed to cont prior HEP.  Exercises were reviewed " and Angie was able to demonstrate them prior to the end of the session.  Angie demonstrated good  understanding of the education provided.     See EMR under Patient Instructions for exercises provided prior visit.    Assessment   Session abbreviated due to late arrival. Pt tolerated interventions well. Decreased knee extension noted compared to previous session, likely due to pt being ill and spending increased time in bed; 10# weight over distal femur utilized for passive extension stretch with significant improvement noted after. Patient reported feeling well upon departure.       Angie is progressing well towards her goals.   Pt prognosis is Good.     Pt will continue to benefit from skilled outpatient physical therapy to address the deficits listed in the problem list box on initial evaluation, provide pt/family education and to maximize pt's level of independence in the home and community environment.     Pt's spiritual, cultural and educational needs considered and pt agreeable to plan of care and goals.    Anticipated barriers to physical therapy: Previous back surgeries; delays in starting outpatient therapy.     Goals:  Short Term Goals: 2 weeks   - Pt will demonstrate excellent knowledge and adherence to HEP to facilitate optimal recovery. (ACHIEVED)   - Pt will demonstrate (L) knee PROM of 0 to 135 deg for increased mobility tolerance (PROGRESSING)      Long Term Goals: 8-12 weeks   - Pt will demonstrate (L) knee AROM of 0 to 140 deg for increased mobility tolerance (PROGRESSING)   - Pt will demonstrate (L) hip and knee strength of 5/5 MMT for increased stability needed for functional activity (PROGRESSING)   - Pt will demonstrate equal stance time, step length, and weight shift, along with (L) knee flexion in swing WNL for improved gait mechanics needed for functional activity. (PROGRESSING)   - Pt will report FOTO score of 39% limited or less indicating clinically relevant increase in functional  tolerance. (PROGRESSING)     Plan     Continue with emphasis on increased ROM and strength for improved mobility.      Martha Youssef, PT, DPT

## 2019-07-23 ENCOUNTER — CLINICAL SUPPORT (OUTPATIENT)
Dept: REHABILITATION | Facility: HOSPITAL | Age: 54
End: 2019-07-23
Payer: MEDICARE

## 2019-07-23 DIAGNOSIS — R26.9 ABNORMALITY OF GAIT: ICD-10-CM

## 2019-07-23 DIAGNOSIS — R53.1 WEAKNESS: ICD-10-CM

## 2019-07-23 PROCEDURE — 97110 THERAPEUTIC EXERCISES: CPT

## 2019-07-23 NOTE — PROGRESS NOTES
"  Physical Therapy Daily Treatment Note     Name: Angie Mccarthy  Clinic Number: [id number]    Therapy Diagnosis:   Encounter Diagnoses   Name Primary?    Abnormality of gait     Weakness      Physician: Samantha Carias NP    Visit Date: 2019    Physician Orders: PT Eval and Treat   Medical Diagnosis from Referral: Z96.659 (ICD-10-CM) - Status post knee replacement, unspecified laterality  Surgery Date: 2019 (pt had home health until ; pt delayed OP therapy due to illness).   Evaluation Date: 2019  Authorization Period Expiration: 2019  Plan of Care Expiration: 2019  Visit # / Visits authorized:      Time In: 1612  Time Out:1700  Total Billable Time: 25 minutes     Precautions: previous back surgery    Subjective     Pt reports w/ mild discomfort in L knee but did not give pn rating.    She was compliant with home exercise program.  Response to previous treatment: no adverse effects   Functional change: no change     Pain: 0/10  Location: left knee      Objective     FOTO: 19  38% limitation     Angie received therapeutic exercises to develop strength, ROM and flexibility for 25 minutes including:  - bike 10 min timed increased circulation and ROM  - long-duration, low load knee extension stretch - 5 min c 10# on distal femur  - LAQ 3" hold 3x10 3#   - supine SLR 2 x 15  - supine bridging 3x10 w/ VCs for TA     Not performed today:   - GTB TKE 5" hold 30   - quad sets 5" hold 3x10 w/ strap and over pressure     Gait trainin min   Heel tap with w/ wt shift in split stance on step 2 x 15     Angie received cold pack for 0 minutes to (L) knee.    Home Exercises Provided and Patient Education Provided     Education provided:   - Pt edu on proper exercise technique.      Written Home Exercises Provided: Patient instructed to cont prior HEP.  Exercises were reviewed and Angie was able to demonstrate them prior to the end of the session.  Angie demonstrated good  " understanding of the education provided.     See EMR under Patient Instructions for exercises provided prior visit.    Assessment   Pt arrived 12 min late to tx.  Pt displayed increased muscular endurance during therex.  Pt cont to lack some quad strength.  Pt rico tx well w/ no c/o pn.    Angie is progressing well towards her goals.   Pt prognosis is Good.   Pt's spiritual, cultural and educational needs considered and pt agreeable to plan of care and goals.    Anticipated barriers to physical therapy: Previous back surgeries; delays in starting outpatient therapy.     Goals:  Short Term Goals: 2 weeks   - Pt will demonstrate excellent knowledge and adherence to HEP to facilitate optimal recovery. (ACHIEVED)   - Pt will demonstrate (L) knee PROM of 0 to 135 deg for increased mobility tolerance (PROGRESSING)      Long Term Goals: 8-12 weeks   - Pt will demonstrate (L) knee AROM of 0 to 140 deg for increased mobility tolerance (PROGRESSING)   - Pt will demonstrate (L) hip and knee strength of 5/5 MMT for increased stability needed for functional activity (PROGRESSING)   - Pt will demonstrate equal stance time, step length, and weight shift, along with (L) knee flexion in swing WNL for improved gait mechanics needed for functional activity. (PROGRESSING)   - Pt will report FOTO score of 39% limited or less indicating clinically relevant increase in functional tolerance. (PROGRESSING)     Plan     Cont to progress towards goals set by PT.  Work to increase quad strength and knee ext next visit.      Butch Norton, PTA

## 2019-07-30 ENCOUNTER — TELEPHONE (OUTPATIENT)
Dept: ORTHOPEDICS | Facility: CLINIC | Age: 54
End: 2019-07-30

## 2019-07-30 NOTE — TELEPHONE ENCOUNTER
----- Message from Marlee Ovalles sent at 7/30/2019 12:47 PM CDT -----  Contact: self@[email or web address]  Needs Advice    Reason for call:patient would like to cancel post op appointment.         Communication Preference:Home#    Additional Information:     no problem  Rebooked for 8/13

## 2019-08-04 ENCOUNTER — PATIENT MESSAGE (OUTPATIENT)
Dept: ADMINISTRATIVE | Facility: OTHER | Age: 54
End: 2019-08-04

## 2019-08-13 ENCOUNTER — TELEPHONE (OUTPATIENT)
Dept: ORTHOPEDICS | Facility: CLINIC | Age: 54
End: 2019-08-13

## 2019-08-13 NOTE — TELEPHONE ENCOUNTER
----- Message from Dameon Carey sent at 8/13/2019  8:55 AM CDT -----  Contact: self  Needs Advice    Reason for call: Pt states that she need to reschedule her post op appointment to either 8/16 or 8/21 due to have another appointment        Communication Preference: Phone     Additional Information: n/a       We spoke   Moved to 9/5 @ 11:20

## 2019-08-16 ENCOUNTER — TELEPHONE (OUTPATIENT)
Dept: ORTHOPEDICS | Facility: CLINIC | Age: 54
End: 2019-08-16

## 2019-08-16 NOTE — TELEPHONE ENCOUNTER
No answer  I sent patient a notre explaining her appt had been changed to Sept 12 @ 2:20 due to a changg in scheduling I asked that she call me if not a good date

## 2019-09-04 ENCOUNTER — PATIENT MESSAGE (OUTPATIENT)
Dept: ADMINISTRATIVE | Facility: OTHER | Age: 54
End: 2019-09-04

## 2019-09-25 NOTE — PROGRESS NOTES
Subjective:      Patient ID: Angie Mccarthy is a 54 y.o. female.    Chief Complaint:  Thyroid Problem    History of Present Illness    Ms. Mccarthy is a 54 year old woman with kidney stones, severe scoliosis, vertebral compression fracture/osteoporosis and primary hypothyroidism. Has a strong family history of osteogenesis imperfecta (father and sister).  - She presents today with complaints of progressive increased skin and vaginal dryness, hot flashes and being hot and clammy all the time of the day. She also complaints of palpitations and had work up completed with cardiology with holter monitor that did not reveal any significant abnormalities. She takes PRN lasix for edema of legs.     Since her last visit with Dr. Haskins five years ago, denies any falls, fractures. Denies height loss. Has back pain and is considering seventh surgery in [place].     Osteoporosis Risk Factors:  TAHBSO fourteen years ago  Hormone therapy for 1- 2 years   - No history of DVT   Symptoms: hot flushing and vaginal dryness  Steroid injections for back pain    Last DXA five years ago, FN -1.4    Hypothyroidism on replacement, levothyroxine 150 mcg daily. Last serum TSH 12 eight months ago.   Taking appropriately.   Had MNG, cytology was benign four years ago.   Denies obstructive symptoms.      A1C of 5.8% (2019 and 2016)    Past Medical History:   Diagnosis Date    Allergy     seasonal    Arthritis     Blood transfusion     with back surgeries    Chronic back pain     GERD (gastroesophageal reflux disease)     Hyperlipidemia     Hypertension     Hypothyroidism     Thyroid nodule    Joint pain     Kidney stones     Morbid obesity 12/14/2012    NAFLD (nonalcoholic fatty liver disease)     OCD (obsessive compulsive disorder)     Osteoporosis     PONV (postoperative nausea and vomiting)     Persistent, Motion sickness with boat rides, Scopolamine helped -still had nausea    Restless leg syndrome     Sciatica of  "right side associated with disorder of lumbosacral spine     SOB (shortness of breath) on exertion     Spinal stenosis of lumbar region     Supraventricular tachycardia     Thoracic spondylosis      Past Surgical History:   Procedure Laterality Date    APPENDECTOMY      BACK SURGERY      x 6    CHOLECYSTECTOMY      GASTRECTOMY      Lap Gastric Sleeve    HERNIA REPAIR      HYSTERECTOMY      JOINT REPLACEMENT      LIVER BIOPSY  02/06/2017    steatohepatitis with stage 1 fibrosis    MYELOGRAPHY N/A 11/29/2018    Procedure: MYELOGRAM;  Surgeon: Ander Diagnostic Provider;  Location: [facility] OR [address] [place] [address];  Service: Radiology;  Laterality: N/A;    MYELOGRAPHY N/A 1/7/2019    Procedure: Myelogram;  Surgeon: Lissett Surgeon;  Location: [facility];  Service: Anesthesiology;  Laterality: N/A;    OOPHORECTOMY      PERCUTANEOUS CRYOTHERAPY OF PERIPHERAL NERVE USING LIQUID NITROUS OXIDE IN CLOSED NEEDLE DEVICE Left 4/29/2019    Procedure: CRYOTHERAPY, NERVE, PERIPHERAL, PERCUTANEOUS, USING LIQUID NITROUS OXIDE IN CLOSED NEEDLE DEVICE-iovera left knee;  Surgeon: Chavo Gold III, MD;  Location: [facility] CATH [facility];  Service: Pain Management;  Laterality: Left;    SPINAL FUSION      TONSILLECTOMY, ADENOIDECTOMY      TOTAL KNEE ARTHROPLASTY Left 5/1/2019    Procedure: REPLACEMENT-KNEE-TOTAL;  Surgeon: John L. [facility]ner Jr., MD;  Location: [facility] OR [address] [place] [address];  Service: Orthopedics;  Laterality: Left;     Review of patient's allergies indicates:   Allergen Reactions    Mastisol adhesive [gum qjsvqa-zqdwag-gcgu-alcohol] Itching, Rash and Blisters     "Looked like poison ivy"    Adhesive Dermatitis and Blisters    Adhesive tape-silicones Dermatitis     The longer the adhesive stays on, the worse the irritation    Sulfamethoxazole-trimethoprim Itching and Anxiety     Has a "nervous feeling."  "Jittery"  Has taken recently and the reaction was "less intense"     Current Outpatient Medications on File Prior to Visit "   Medication Sig Dispense Refill    albuterol (VENTOLIN HFA) 90 mcg/actuation inhaler Inhale 2 puffs into the lungs every 6 (six) hours as needed for Wheezing or Shortness of Breath. Rescue 18 g 0    alprazolam (XANAX) 0.5 MG tablet Take 0.5 mg by mouth daily as needed for Insomnia or Anxiety.       ascorbic acid, vitamin C, (VITAMIN C) 500 MG tablet Take 500 mg by mouth every morning.      BIOTIN ORAL Take 10,000 mg by mouth every morning.       C,E,zinc,copper 11/rgsxt2z/lut (OCUVITE ADULT 50 PLUS ORAL) Take by mouth.      cyclobenzaprine (FLEXERIL) 10 MG tablet TK 1 T PO QD- as needed for muscle spasms  0    doxycycline (VIBRAMYCIN) 100 MG Cap Take 1 capsule (100 mg total) by mouth 2 (two) times daily. 20 capsule 1    fish oil-omega-3 fatty acids 300-1,000 mg capsule Take 1,200 mg by mouth 2 (two) times daily.       fluconazole (DIFLUCAN) 150 MG Tab TK 1 T PO UTD as needed  0    gabapentin (NEURONTIN) 800 MG tablet Take 800 mg by mouth 3 (three) times daily. Takes 2-3 times per day  1    levothyroxine (SYNTHROID) 150 MCG tablet Take 1 tablet (150 mcg total) by mouth once daily. (Patient taking differently: Take 150 mcg by mouth every morning. ) 30 tablet 11    lisinopril (PRINIVIL,ZESTRIL) 2.5 MG tablet Take 2.5 mg by mouth every morning.   2    meloxicam (MOBIC) 15 MG tablet TK 1 T PO QD as needed  PAIN AND INFLAMMATION  1    multivitamin capsule Take 1 capsule by mouth every morning.       omeprazole (PRILOSEC) 40 MG capsule Take 40 mg by mouth once daily.      ranitidine (ZANTAC) 150 MG tablet Take 150 mg by mouth nightly.      rosuvastatin (CRESTOR) 10 MG tablet Take 10 mg by mouth every evening.      tiZANidine (ZANAFLEX) 4 MG tablet TK 1 T PO QHS as needed for mescle spams in the back  1    zolpidem (AMBIEN) 10 mg Tab Take 10 mg by mouth nightly.       aspirin (ECOTRIN) 81 MG EC tablet Take 1 tablet (81 mg total) by mouth 2 (two) times daily. 60 tablet 0    docusate sodium (COLACE) 100  "MG capsule Take 1 capsule (100 mg total) by mouth 2 (two) times daily as needed for Constipation. 60 capsule 0    DULoxetine (CYMBALTA) 60 MG capsule TK 1 C PO ONCE at night  0    hydrocortisone 1 % cream Apply to affected area 2 times daily 30 g 0    oxyCODONE-acetaminophen (PERCOCET) 5-325 mg per tablet Take 1 tablet by mouth every 4 to 6 hours as needed for Pain. 50 tablet 0    promethazine (PHENERGAN) 25 MG tablet Take 1 tablet (25 mg total) by mouth every 6 (six) hours as needed for Nausea. 15 tablet 0     No current facility-administered medications on file prior to visit.      Social History     Tobacco Use    Smoking status: Never Smoker    Smokeless tobacco: Never Used    Tobacco comment: Tried a few cigarettes during teenage   Substance Use Topics    Alcohol use: Yes     Comment: socially     Review of Systems   Constitutional: Negative for chills and fever.   HENT: Negative for trouble swallowing.    Respiratory: Negative for cough and shortness of breath.    Cardiovascular: Negative for chest pain, palpitations and leg swelling.   Gastrointestinal: Negative for abdominal distention, abdominal pain, blood in stool, constipation and diarrhea.   Endocrine: Positive for heat intolerance. Negative for polyuria.        Hot flashes, vaginal dryness and skin dryness   Genitourinary: Negative for dysuria and hematuria.   Musculoskeletal: Positive for arthralgias.   Hematological: Negative for adenopathy. Does not bruise/bleed easily.     Objective:    /78   Pulse 94   Ht 5' 5" (1.651 m)   Wt 108.1 kg (238 lb 5.1 oz)   LMP 12/06/2007   BMI 39.66 kg/m²      Physical Exam   HENT:   Mouth/Throat: Oropharynx is clear and moist. No oropharyngeal exudate.   Eyes: No scleral icterus.   Neck: No JVD present. No tracheal deviation present. Thyromegaly (no nodules palpable) present.   Cardiovascular: Normal rate, regular rhythm, normal heart sounds and intact distal pulses. Exam reveals no gallop and no " "friction rub.   No murmur heard.  Pulmonary/Chest: Effort normal and breath sounds normal. No stridor. No respiratory distress. She has no wheezes. She has no rales.   Abdominal: Soft. Bowel sounds are normal. She exhibits no distension and no mass. There is no tenderness. There is no guarding.   Musculoskeletal: Normal range of motion. She exhibits no edema or tenderness.   Lymphadenopathy:     She has no cervical adenopathy.   Skin: Skin is warm. Capillary refill takes less than 2 seconds. No erythema.     Vitals:    09/26/19 0836   BP: 122/78   Pulse: 94   Weight: 108.1 kg (238 lb 5.1 oz)   Height: 5' 5" (1.651 m)       BP Readings from Last 3 Encounters:   09/26/19 122/78   05/14/19 (!) 149/81   05/07/19 (!) 153/89     Wt Readings from Last 1 Encounters:   09/26/19 0836 108.1 kg (238 lb 5.1 oz)     Body mass index is 39.66 kg/m².    Lab Review:   Lab Results   Component Value Date    HGBA1C 5.8 (H) 04/23/2019     Lab Results   Component Value Date    CHOL 209 (H) 01/07/2019    HDL 56 01/07/2019    LDLCALC 111.2 01/07/2019    TRIG 209 (H) 01/07/2019    CHOLHDL 26.8 01/07/2019     Lab Results   Component Value Date     05/01/2019    K 3.8 05/01/2019     05/01/2019    CO2 27 05/01/2019     (H) 05/01/2019    BUN 14 05/01/2019    CREATININE 0.8 05/01/2019    CALCIUM 9.3 05/01/2019    PROT 7.7 01/07/2019    PROT 7.7 01/07/2019    ALBUMIN 3.9 01/07/2019    ALBUMIN 3.8 01/07/2019    BILITOT 0.4 01/07/2019    BILITOT 0.4 01/07/2019    ALKPHOS 88 01/07/2019    ALKPHOS 89 01/07/2019    AST 16 01/07/2019    AST 16 01/07/2019    ALT 16 01/07/2019    ALT 16 01/07/2019    ANIONGAP 10 05/01/2019    ESTGFRAFRICA >60.0 05/01/2019    EGFRNONAA >60.0 05/01/2019    TSH 12.164 (H) 01/07/2019     US 2017  1.84 x 1.28 x 1.54 cm solid heterogeneous, predominately hypoechoic nodule seen in the right mid pole that does not meet criteria for FNA biopsy    3.) 0.84 x 0.75 x 0.84 solid isoechoic nodule seen in the left " superior pole that does not meet criteria for FNA biopsy    3/2015 FINAL PATHOLOGIC DIAGNOSIS  THYROID, RIGHT (ASPIRATION):  Cylinder System Thyroid Cytology Category: Benign    Assessment and Plan         Thyroid nodule  - seen on the ultrasound in 2017; no palpable thyroid on physical exam, mild thyromegaly  - will get a repeat thyroid ultrasound to evaluate nay changes in thyroid tissue    Vitamin D deficiency  Patient had left knee replacement few months ago and the incision is well healed.  - we discussed use of forteo (given patient has an upcoming  Back surgery in [place] and has history of osteoporosis; however given history of nephrolithiasis would consider treatment for shorter period of time. Will get back to use when she has finalized procedure date. Although nephrolithiasis is not a contraindication, it may precipitate stone formation and lead to nephrocalcinosis. Has urologist   Needs PTH, and CMP prior to iniiating this medication. Previous alk phos normal, denies radiation exposure to bones.   Plan:   -     Vitamin D; Future  -     Comprehensive metabolic panel; Future  -     PTH, intact; Future    Menopausal flushing  - Patient symptoms are likely due to menopausal flushing as she is on maximal therapy with synthroid.   -     Ambulatory Referral to Obstetrics / Gynecology for question of consideration of transdermal estrogen to help alleviate her symptoms     Primary hypothyroidism  -     TSH; Future  -     T4, free; Future  -     US Soft Tissue Head Neck Thyroid; Future    Hyperlipidemia, unspecified hyperlipidemia type  -     Lipid panel; Future    Endometriosis  -     Ambulatory Referral to Obstetrics / Gynecology    Will follow up with the patient regarding initiation of Forteo and vitamin D high dose supplements once lab results are available.     Patient seen and discussed with Dr. Sands. Further recommendations per the attending addendum.     Vielka Killian MD   Internal Medicine PGY -  3  Endocrinology  [facility]    I have personally taken the history and examined this patient and agree with the resident's note as stated above.

## 2019-09-26 ENCOUNTER — LAB VISIT (OUTPATIENT)
Dept: LAB | Facility: HOSPITAL | Age: 54
End: 2019-09-26
Payer: MEDICARE

## 2019-09-26 ENCOUNTER — OFFICE VISIT (OUTPATIENT)
Dept: ENDOCRINOLOGY | Facility: CLINIC | Age: 54
End: 2019-09-26
Payer: MEDICARE

## 2019-09-26 VITALS
BODY MASS INDEX: 39.71 KG/M2 | HEART RATE: 94 BPM | DIASTOLIC BLOOD PRESSURE: 78 MMHG | WEIGHT: 238.31 LBS | SYSTOLIC BLOOD PRESSURE: 122 MMHG | HEIGHT: 65 IN

## 2019-09-26 DIAGNOSIS — E78.5 HYPERLIPIDEMIA, UNSPECIFIED HYPERLIPIDEMIA TYPE: ICD-10-CM

## 2019-09-26 DIAGNOSIS — E03.9 PRIMARY HYPOTHYROIDISM: ICD-10-CM

## 2019-09-26 DIAGNOSIS — E55.9 VITAMIN D DEFICIENCY: ICD-10-CM

## 2019-09-26 DIAGNOSIS — E03.8 SUBCLINICAL HYPOTHYROIDISM: ICD-10-CM

## 2019-09-26 DIAGNOSIS — N80.9 ENDOMETRIOSIS: ICD-10-CM

## 2019-09-26 DIAGNOSIS — N95.1 MENOPAUSAL FLUSHING: ICD-10-CM

## 2019-09-26 DIAGNOSIS — E04.1 THYROID NODULE: Primary | ICD-10-CM

## 2019-09-26 LAB
25(OH)D3+25(OH)D2 SERPL-MCNC: 29 NG/ML (ref 30–96)
ALBUMIN SERPL BCP-MCNC: 3.9 G/DL (ref 3.5–5.2)
ALP SERPL-CCNC: 126 U/L (ref 55–135)
ALT SERPL W/O P-5'-P-CCNC: 42 U/L (ref 10–44)
ANION GAP SERPL CALC-SCNC: 8 MMOL/L (ref 8–16)
AST SERPL-CCNC: 53 U/L (ref 10–40)
BILIRUB SERPL-MCNC: 0.4 MG/DL (ref 0.1–1)
BUN SERPL-MCNC: 17 MG/DL (ref 6–20)
CALCIUM SERPL-MCNC: 10 MG/DL (ref 8.7–10.5)
CHLORIDE SERPL-SCNC: 105 MMOL/L (ref 95–110)
CHOLEST SERPL-MCNC: 160 MG/DL (ref 120–199)
CHOLEST/HDLC SERPL: 3.7 {RATIO} (ref 2–5)
CO2 SERPL-SCNC: 30 MMOL/L (ref 23–29)
CREAT SERPL-MCNC: 0.8 MG/DL (ref 0.5–1.4)
EST. GFR  (AFRICAN AMERICAN): >60 ML/MIN/1.73 M^2
EST. GFR  (NON AFRICAN AMERICAN): >60 ML/MIN/1.73 M^2
GLUCOSE SERPL-MCNC: 112 MG/DL (ref 70–110)
HDLC SERPL-MCNC: 43 MG/DL (ref 40–75)
HDLC SERPL: 26.9 % (ref 20–50)
LDLC SERPL CALC-MCNC: 75 MG/DL (ref 63–159)
NONHDLC SERPL-MCNC: 117 MG/DL
POTASSIUM SERPL-SCNC: 4.4 MMOL/L (ref 3.5–5.1)
PROT SERPL-MCNC: 7.3 G/DL (ref 6–8.4)
PTH-INTACT SERPL-MCNC: 72 PG/ML (ref 9–77)
SODIUM SERPL-SCNC: 143 MMOL/L (ref 136–145)
T4 FREE SERPL-MCNC: 0.99 NG/DL (ref 0.71–1.51)
TRIGL SERPL-MCNC: 210 MG/DL (ref 30–150)
TSH SERPL DL<=0.005 MIU/L-ACNC: 2.97 UIU/ML (ref 0.4–4)

## 2019-09-26 PROCEDURE — 84439 ASSAY OF FREE THYROXINE: CPT

## 2019-09-26 PROCEDURE — 36415 COLL VENOUS BLD VENIPUNCTURE: CPT

## 2019-09-26 PROCEDURE — 99204 PR OFFICE/OUTPT VISIT, NEW, LEVL IV, 45-59 MIN: ICD-10-PCS | Mod: S$GLB,,, | Performed by: INTERNAL MEDICINE

## 2019-09-26 PROCEDURE — 3074F PR MOST RECENT SYSTOLIC BLOOD PRESSURE < 130 MM HG: ICD-10-PCS | Mod: CPTII,S$GLB,, | Performed by: INTERNAL MEDICINE

## 2019-09-26 PROCEDURE — 99999 PR PBB SHADOW E&M-EST. PATIENT-LVL V: CPT | Mod: PBBFAC,,, | Performed by: INTERNAL MEDICINE

## 2019-09-26 PROCEDURE — 99999 PR PBB SHADOW E&M-EST. PATIENT-LVL V: ICD-10-PCS | Mod: PBBFAC,,, | Performed by: INTERNAL MEDICINE

## 2019-09-26 PROCEDURE — 3074F SYST BP LT 130 MM HG: CPT | Mod: CPTII,S$GLB,, | Performed by: INTERNAL MEDICINE

## 2019-09-26 PROCEDURE — 3008F PR BODY MASS INDEX (BMI) DOCUMENTED: ICD-10-PCS | Mod: CPTII,S$GLB,, | Performed by: INTERNAL MEDICINE

## 2019-09-26 PROCEDURE — 3078F PR MOST RECENT DIASTOLIC BLOOD PRESSURE < 80 MM HG: ICD-10-PCS | Mod: CPTII,S$GLB,, | Performed by: INTERNAL MEDICINE

## 2019-09-26 PROCEDURE — 3008F BODY MASS INDEX DOCD: CPT | Mod: CPTII,S$GLB,, | Performed by: INTERNAL MEDICINE

## 2019-09-26 PROCEDURE — 83970 ASSAY OF PARATHORMONE: CPT

## 2019-09-26 PROCEDURE — 80061 LIPID PANEL: CPT

## 2019-09-26 PROCEDURE — 82306 VITAMIN D 25 HYDROXY: CPT

## 2019-09-26 PROCEDURE — 99204 OFFICE O/P NEW MOD 45 MIN: CPT | Mod: S$GLB,,, | Performed by: INTERNAL MEDICINE

## 2019-09-26 PROCEDURE — 80053 COMPREHEN METABOLIC PANEL: CPT

## 2019-09-26 PROCEDURE — 3078F DIAST BP <80 MM HG: CPT | Mod: CPTII,S$GLB,, | Performed by: INTERNAL MEDICINE

## 2019-09-26 PROCEDURE — 84443 ASSAY THYROID STIM HORMONE: CPT

## 2019-10-17 ENCOUNTER — TELEPHONE (OUTPATIENT)
Dept: ORTHOPEDICS | Facility: CLINIC | Age: 54
End: 2019-10-17

## 2019-10-17 NOTE — TELEPHONE ENCOUNTER
----- Message from Dameon SHAWN Carey sent at 10/17/2019 10:02 AM CDT -----  Contact: self  Pt states that when she was walking into the kitchen at home on 10/9, she turn to the left and felt something pull in her right knee. Pt states that she cannot bare weight on it have been using her crutches and now cane to get around. Pain level is between 5 - 7 Pt is schedule to see Dr [facility]ner on 10/29 post op appointment however want to be seen sooner Pt ask for a call      Contact info  [phone]    Offered sooner with a PA   She wants to wait for Dr [facility]ner

## 2019-10-29 ENCOUNTER — OFFICE VISIT (OUTPATIENT)
Dept: ORTHOPEDICS | Facility: CLINIC | Age: 54
End: 2019-10-29
Payer: MEDICARE

## 2019-10-29 VITALS — WEIGHT: 230.63 LBS | BODY MASS INDEX: 38.42 KG/M2 | HEIGHT: 65 IN

## 2019-10-29 DIAGNOSIS — M17.11 PRIMARY OSTEOARTHRITIS OF RIGHT KNEE: Primary | ICD-10-CM

## 2019-10-29 PROCEDURE — 99999 PR PBB SHADOW E&M-EST. PATIENT-LVL III: CPT | Mod: PBBFAC,,, | Performed by: ORTHOPAEDIC SURGERY

## 2019-10-29 PROCEDURE — 3008F BODY MASS INDEX DOCD: CPT | Mod: CPTII,S$GLB,, | Performed by: ORTHOPAEDIC SURGERY

## 2019-10-29 PROCEDURE — 20610 LARGE JOINT ASPIRATION/INJECTION: R KNEE: ICD-10-PCS | Mod: RT,S$GLB,, | Performed by: ORTHOPAEDIC SURGERY

## 2019-10-29 PROCEDURE — 20610 DRAIN/INJ JOINT/BURSA W/O US: CPT | Mod: RT,S$GLB,, | Performed by: ORTHOPAEDIC SURGERY

## 2019-10-29 PROCEDURE — 3008F PR BODY MASS INDEX (BMI) DOCUMENTED: ICD-10-PCS | Mod: CPTII,S$GLB,, | Performed by: ORTHOPAEDIC SURGERY

## 2019-10-29 PROCEDURE — 99214 PR OFFICE/OUTPT VISIT, EST, LEVL IV, 30-39 MIN: ICD-10-PCS | Mod: 25,S$GLB,, | Performed by: ORTHOPAEDIC SURGERY

## 2019-10-29 PROCEDURE — 99999 PR PBB SHADOW E&M-EST. PATIENT-LVL III: ICD-10-PCS | Mod: PBBFAC,,, | Performed by: ORTHOPAEDIC SURGERY

## 2019-10-29 PROCEDURE — 99214 OFFICE O/P EST MOD 30 MIN: CPT | Mod: 25,S$GLB,, | Performed by: ORTHOPAEDIC SURGERY

## 2019-10-29 RX ORDER — TRIAMCINOLONE ACETONIDE 40 MG/ML
60 INJECTION, SUSPENSION INTRA-ARTICULAR; INTRAMUSCULAR
Status: DISCONTINUED | OUTPATIENT
Start: 2019-10-29 | End: 2019-10-29 | Stop reason: HOSPADM

## 2019-10-29 RX ADMIN — TRIAMCINOLONE ACETONIDE 60 MG: 40 INJECTION, SUSPENSION INTRA-ARTICULAR; INTRAMUSCULAR at 02:10

## 2019-10-29 NOTE — PROCEDURES
Large Joint Aspiration/Injection: R knee  Date/Time: 10/29/2019 2:20 PM  Performed by: John L. Ochsner Jr., MD  Authorized by: John L. Ochsner Jr., MD     Consent Done?:  Yes (Verbal)  Indications:  Pain  Procedure site marked: Yes    Timeout: Prior to procedure the correct patient, procedure, and site was verified      Location:  Knee  Site:  R knee  Prep: Patient was prepped and draped in usual sterile fashion    Needle size:  18 G  Ultrasonic Guidance for needle placement: No  Approach:  Anterolateral  Medications:  60 mg triamcinolone acetonide 40 mg/mL  Patient tolerance:  Patient tolerated the procedure well with no immediate complications

## 2019-10-29 NOTE — PROGRESS NOTES
HPI:    Angie Mccarthy is a 54 y.o. female who is here today for   Chief Complaint   Patient presents with    Post-op Evaluation     6 months p/o L tk 5/1/19    .  Patient also has osteoarthritis and pain and swelling in the right knee.  The below is the right knee.    Duration: 1 months  Intensity: severe  Character of pain: sharp  Location: She reports that the pain is predominately  medial    Past Medical History:   Diagnosis Date    Allergy     seasonal    Arthritis     Blood transfusion     with back surgeries    Chronic back pain     GERD (gastroesophageal reflux disease)     Hyperlipidemia     Hypertension     Hypothyroidism     Thyroid nodule    Joint pain     Kidney stones     Morbid obesity 12/14/2012    NAFLD (nonalcoholic fatty liver disease)     OCD (obsessive compulsive disorder)     Osteoporosis     PONV (postoperative nausea and vomiting)     Persistent, Motion sickness with boat rides, Scopolamine helped -still had nausea    Restless leg syndrome     Sciatica of right side associated with disorder of lumbosacral spine     SOB (shortness of breath) on exertion     Spinal stenosis of lumbar region     Supraventricular tachycardia     Thoracic spondylosis           Current Outpatient Medications:     albuterol (VENTOLIN HFA) 90 mcg/actuation inhaler, Inhale 2 puffs into the lungs every 6 (six) hours as needed for Wheezing or Shortness of Breath. Rescue, Disp: 18 g, Rfl: 0    alprazolam (XANAX) 0.5 MG tablet, Take 0.5 mg by mouth daily as needed for Insomnia or Anxiety. , Disp: , Rfl:     ascorbic acid, vitamin C, (VITAMIN C) 500 MG tablet, Take 500 mg by mouth every morning., Disp: , Rfl:     BIOTIN ORAL, Take 10,000 mg by mouth every morning. , Disp: , Rfl:     C,E,zinc,copper 11/qkrnf7v/lut (OCUVITE ADULT 50 PLUS ORAL), Take by mouth., Disp: , Rfl:     cyclobenzaprine (FLEXERIL) 10 MG tablet, TK 1 T PO QD- as needed for muscle spasms, Disp: , Rfl: 0     DULoxetine (CYMBALTA) 60 MG capsule, TK 1 C PO ONCE at night, Disp: , Rfl: 0    fish oil-omega-3 fatty acids 300-1,000 mg capsule, Take 1,200 mg by mouth 2 (two) times daily. , Disp: , Rfl:     fluconazole (DIFLUCAN) 150 MG Tab, TK 1 T PO UTD as needed, Disp: , Rfl: 0    gabapentin (NEURONTIN) 800 MG tablet, Take 800 mg by mouth 3 (three) times daily. Takes 2-3 times per day, Disp: , Rfl: 1    lisinopril (PRINIVIL,ZESTRIL) 2.5 MG tablet, Take 2.5 mg by mouth every morning. , Disp: , Rfl: 2    meloxicam (MOBIC) 15 MG tablet, TK 1 T PO QD as needed  PAIN AND INFLAMMATION, Disp: , Rfl: 1    multivitamin capsule, Take 1 capsule by mouth every morning. , Disp: , Rfl:     omeprazole (PRILOSEC) 40 MG capsule, Take 40 mg by mouth once daily., Disp: , Rfl:     ranitidine (ZANTAC) 150 MG tablet, Take 150 mg by mouth nightly., Disp: , Rfl:     rosuvastatin (CRESTOR) 10 MG tablet, Take 10 mg by mouth every evening., Disp: , Rfl:     tiZANidine (ZANAFLEX) 4 MG tablet, TK 1 T PO QHS as needed for mescle spams in the back, Disp: , Rfl: 1    zolpidem (AMBIEN) 10 mg Tab, Take 10 mg by mouth nightly. , Disp: , Rfl:     aspirin (ECOTRIN) 81 MG EC tablet, Take 1 tablet (81 mg total) by mouth 2 (two) times daily., Disp: 60 tablet, Rfl: 0    docusate sodium (COLACE) 100 MG capsule, Take 1 capsule (100 mg total) by mouth 2 (two) times daily as needed for Constipation., Disp: 60 capsule, Rfl: 0    doxycycline (VIBRAMYCIN) 100 MG Cap, Take 1 capsule (100 mg total) by mouth 2 (two) times daily., Disp: 20 capsule, Rfl: 1    hydrocortisone 1 % cream, Apply to affected area 2 times daily, Disp: 30 g, Rfl: 0    levothyroxine (SYNTHROID) 150 MCG tablet, Take 1 tablet (150 mcg total) by mouth once daily. (Patient taking differently: Take 150 mcg by mouth every morning. ), Disp: 30 tablet, Rfl: 11    oxyCODONE-acetaminophen (PERCOCET) 5-325 mg per tablet, Take 1 tablet by mouth every 4 to 6 hours as needed for Pain., Disp: 50  "tablet, Rfl: 0    promethazine (PHENERGAN) 25 MG tablet, Take 1 tablet (25 mg total) by mouth every 6 (six) hours as needed for Nausea., Disp: 15 tablet, Rfl: 0     Review of patient's allergies indicates:   Allergen Reactions    Mastisol adhesive [gum jpgkxo-vthjnd-nkso-alcohol] Itching, Rash and Blisters     "Looked like poison ivy"    Adhesive Dermatitis and Blisters    Adhesive tape-silicones Dermatitis     The longer the adhesive stays on, the worse the irritation    Sulfamethoxazole-trimethoprim Itching and Anxiety     Has a "nervous feeling."  "Jittery"  Has taken recently and the reaction was "less intense"        ROS  Constitutional: Negative for fever, Negative for weight loss  HENT Negative for congestion  Cardiovascular: Negative chest pain  Respiratory: Negative Shortness of breath  Heme: Negative excessive bleeding  Skin:NegativeItching, Negative breakdown  Musculoskeletal:Positive for back pain, Positive for joint pain, Positive muscle pain, Positive muscle weakness  Neurological: Negative for numbness and paresthesias   Psychiatric/Behavioral: Negative altered mental status, Negative for depression    Physical Exam:   Ht 5' 5" (1.651 m)   Wt 104.6 kg (230 lb 9.6 oz)   LMP 12/06/2007   BMI 38.37 kg/m²   General appearance: This is a well-developed, Well nourished female No obvious acute distress.  Psychiatric: normal mood and affect;  pleasant and conversant; judgment and thought content normal  Gait is coordinated. Patient has antalgic gait to the right  Cardiovascular: There are no swelling or varicosities present.   Respiratory: normal respiratory effort   Lymphatic: no adenopathy   Neurologic: alert and oriented to person, place, and time   Deep Tendon Reflexes are normal;  Coordination and Balance: Normal   Musculoskeletal   Neck    ROM shows normal flexion and extension and lateral rotation    Palpation: Non-tender    Stability is normal    Strength is normal    Skin is normal without " masses and lesions    Sensation is intact to light touch   Back    ROM showsabnormal flexion, extension    and  rotation    Palpation shows no masses    Stability is normal    Strength to flexion and extension well maintained    Core strength is diminished    Skin shows no rashes or cafe au lait spots;     Sensation is intact to light touch    Right Knee  Swelling Severe  Tenderness diffusely about the knee.  Range of Motion:     Left Knee: Swelling None  TendernessNone  Range of Motion: 5-115    Radiograph   Osteoarthritis: moderate  Angle: mild varus    Assessment:  Osteoarthritis and effusion right knee.    Plan:  Aspirate right knee.

## 2019-11-11 ENCOUNTER — HOSPITAL ENCOUNTER (OUTPATIENT)
Dept: RADIOLOGY | Facility: HOSPITAL | Age: 54
Discharge: HOME | End: 2019-11-11
Attending: STUDENT IN AN ORGANIZED HEALTH CARE EDUCATION/TRAINING PROGRAM
Payer: MEDICARE

## 2019-11-11 DIAGNOSIS — E03.9 PRIMARY HYPOTHYROIDISM: ICD-10-CM

## 2019-11-11 PROCEDURE — 76536 US SOFT TISSUE HEAD NECK THYROID: ICD-10-PCS | Mod: 26,,, | Performed by: RADIOLOGY

## 2019-11-11 PROCEDURE — 76536 US EXAM OF HEAD AND NECK: CPT | Mod: 26,,, | Performed by: RADIOLOGY

## 2019-11-11 PROCEDURE — 76536 US EXAM OF HEAD AND NECK: CPT | Mod: TC

## 2019-11-12 ENCOUNTER — OFFICE VISIT (OUTPATIENT)
Dept: OBSTETRICS AND GYNECOLOGY | Facility: CLINIC | Age: 54
End: 2019-11-12
Payer: MEDICARE

## 2019-11-12 ENCOUNTER — PATIENT MESSAGE (OUTPATIENT)
Dept: ENDOCRINOLOGY | Facility: CLINIC | Age: 54
End: 2019-11-12

## 2019-11-12 VITALS — WEIGHT: 239.19 LBS | HEIGHT: 65 IN | BODY MASS INDEX: 39.85 KG/M2

## 2019-11-12 DIAGNOSIS — N95.2 VAGINAL ATROPHY: Primary | ICD-10-CM

## 2019-11-12 DIAGNOSIS — N95.1 MENOPAUSAL SYMPTOMS: ICD-10-CM

## 2019-11-12 DIAGNOSIS — E04.1 THYROID NODULE: Primary | ICD-10-CM

## 2019-11-12 PROCEDURE — 3008F PR BODY MASS INDEX (BMI) DOCUMENTED: ICD-10-PCS | Mod: CPTII,S$GLB,, | Performed by: OBSTETRICS & GYNECOLOGY

## 2019-11-12 PROCEDURE — 99214 PR OFFICE/OUTPT VISIT, EST, LEVL IV, 30-39 MIN: ICD-10-PCS | Mod: S$GLB,,, | Performed by: OBSTETRICS & GYNECOLOGY

## 2019-11-12 PROCEDURE — 99214 OFFICE O/P EST MOD 30 MIN: CPT | Mod: S$GLB,,, | Performed by: OBSTETRICS & GYNECOLOGY

## 2019-11-12 PROCEDURE — 99999 PR PBB SHADOW E&M-EST. PATIENT-LVL III: CPT | Mod: PBBFAC,,, | Performed by: OBSTETRICS & GYNECOLOGY

## 2019-11-12 PROCEDURE — 99999 PR PBB SHADOW E&M-EST. PATIENT-LVL III: ICD-10-PCS | Mod: PBBFAC,,, | Performed by: OBSTETRICS & GYNECOLOGY

## 2019-11-12 PROCEDURE — 3008F BODY MASS INDEX DOCD: CPT | Mod: CPTII,S$GLB,, | Performed by: OBSTETRICS & GYNECOLOGY

## 2019-11-12 RX ORDER — ESTRADIOL 0.1 MG/D
1 FILM, EXTENDED RELEASE TRANSDERMAL
Qty: 8 PATCH | Refills: 11 | Status: SHIPPED | OUTPATIENT
Start: 2019-11-14 | End: 2021-04-21

## 2019-11-12 RX ORDER — HYDROCHLOROTHIAZIDE 12.5 MG/1
CAPSULE ORAL
Refills: 3 | COMMUNITY
Start: 2019-08-30 | End: 2020-01-16

## 2019-11-12 RX ORDER — AMITRIPTYLINE HYDROCHLORIDE 25 MG/1
TABLET, FILM COATED ORAL
COMMUNITY
Start: 2019-07-24 | End: 2020-01-16

## 2019-11-12 NOTE — LETTER
November 12, 2019      Vielka Killian MD  [address]  [facility] [address]           [facility]  [address] [place] [address]  [place] [address]  Phone: [phone]  Fax: [phone]          Patient: Angie Mccarthy   MR Number: [id number]   YOB: 1965   Date of Visit: 11/12/2019       Dear Dr. Vielka Killian:    Thank you for referring Angie Mccarthy to me for evaluation. Attached you will find relevant portions of my assessment and plan of care.    If you have questions, please do not hesitate to call me. I look forward to following Angie Mccarthy along with you.    Sincerely,    Beatriz Metzger MD    Enclosure  CC:  No Recipients    If you would like to receive this communication electronically, please contact [email or web address] or [phone] to request more information on [employer] Link access.    For providers and/or their staff who would like to refer a patient to [facility], please contact us through our one-stop-shop provider referral line, [facility], at [phone].    If you feel you have received this communication in error or would no longer like to receive these types of communications, please e-mail [email or web address]

## 2019-11-12 NOTE — PROGRESS NOTES
CC: menopausal symptoms    Angie Mccarthy is a 54 y.o. female G)  presents for  Menopausal sx.  S/P HYST BSO in 2007.   She was not put on HRT.   She was put on estrace 2 mg daily.  She stopped because it did not help.  Some irritability.   She on anxiety  She has significant hot flashes and cannot function.   Having vaginal dryness, painful intercourse.    Urinary incontinence. GELACIO after urinating and cannot completely empty her bladder.  She had multiple back surgeries and has also gained weight  H/O of kidney stones    Past Medical History:   Diagnosis Date    Allergy     seasonal    Arthritis     Blood transfusion     with back surgeries    Chronic back pain     GERD (gastroesophageal reflux disease)     Hyperlipidemia     Hypertension     Hypothyroidism     Thyroid nodule    Joint pain     Kidney stones     Morbid obesity 12/14/2012    NAFLD (nonalcoholic fatty liver disease)     OCD (obsessive compulsive disorder)     Osteoporosis     PONV (postoperative nausea and vomiting)     Persistent, Motion sickness with boat rides, Scopolamine helped -still had nausea    Restless leg syndrome     Sciatica of right side associated with disorder of lumbosacral spine     SOB (shortness of breath) on exertion     Spinal stenosis of lumbar region     Supraventricular tachycardia     Thoracic spondylosis      Past Surgical History:   Procedure Laterality Date    APPENDECTOMY      BACK SURGERY      x 6    CHOLECYSTECTOMY      GASTRECTOMY      Lap Gastric Sleeve    HERNIA REPAIR      HYSTERECTOMY      JOINT REPLACEMENT      LIVER BIOPSY  02/06/2017    steatohepatitis with stage 1 fibrosis    MYELOGRAPHY N/A 11/29/2018    Procedure: MYELOGRAM;  Surgeon: Ander Diagnostic Provider;  Location: [facility] OR [address] [place] [address];  Service: Radiology;  Laterality: N/A;    MYELOGRAPHY N/A 1/7/2019    Procedure: Myelogram;  Surgeon: Lissett Surgeon;  Location: [facility];  Service: Anesthesiology;  Laterality: N/A;  "   OOPHORECTOMY      PERCUTANEOUS CRYOTHERAPY OF PERIPHERAL NERVE USING LIQUID NITROUS OXIDE IN CLOSED NEEDLE DEVICE Left 2019    Procedure: CRYOTHERAPY, NERVE, PERIPHERAL, PERCUTANEOUS, USING LIQUID NITROUS OXIDE IN CLOSED NEEDLE DEVICE-iovera left knee;  Surgeon: Chavo Gold III, MD;  Location: [facility] CATH LAB;  Service: Pain Management;  Laterality: Left;    SPINAL FUSION      TONSILLECTOMY, ADENOIDECTOMY      TOTAL KNEE ARTHROPLASTY Left 2019    Procedure: REPLACEMENT-KNEE-TOTAL;  Surgeon: [facility] Jr., MD;  Location: [facility] OR [address] [place] [address];  Service: Orthopedics;  Laterality: Left;     Family History   Problem Relation Age of Onset    Heart disease Mother     Heart disease Father     Cancer Father     COPD Father     Heart disease Maternal Grandmother     Acne Other     Eczema Other     Heart disease Maternal Aunt     Heart disease Maternal Uncle     Heart disease Paternal Aunt     Breast cancer Paternal Aunt     Heart disease Paternal Uncle     Cancer Paternal Uncle     Melanoma Neg Hx     Psoriasis Neg Hx     Lupus Neg Hx      Social History     Tobacco Use    Smoking status: Never Smoker    Smokeless tobacco: Never Used    Tobacco comment: Tried a few cigarettes during teenage   Substance Use Topics    Alcohol use: Yes     Comment: socially    Drug use: No     OB History             Para        Term   0            AB        Living           SAB        TAB        Ectopic        Multiple        Live Births                     Ht 5' 5" (1.651 m)   Wt 108.5 kg (239 lb 3.2 oz)   LMP 2007   BMI 39.80 kg/m²     ROS:  GENERAL: Denies weight gain or weight loss. Feeling well overall.   SKIN: Denies rash or lesions.   HEAD: Denies head injury or headache.   NODES: Denies enlarged lymph nodes.   CHEST: Denies chest pain or shortness of breath.   CARDIOVASCULAR: Denies palpitations or left sided chest pain.   ABDOMEN: No abdominal pain, constipation, " diarrhea, nausea, vomiting or rectal bleeding.   URINARY: No frequency, dysuria, hematuria, or burning on urination.  REPRODUCTIVE: See HPI.   BREASTS: The patient performs breast self-examination and denies pain, lumps, or nipple discharge.   HEMATOLOGIC: No easy bruisability or excessive bleeding.   MUSCULOSKELETAL: Denies joint pain or swelling.   NEUROLOGIC: Denies syncope or weakness.   PSYCHIATRIC: Denies depression, anxiety or mood swings.    PE:   APPEARANCE: Well nourished, well developed, in no acute distress.  AFFECT: WNL, alert and oriented x 3.  SKIN: No acne or hirsutism.  NECK: Neck symmetric without masses or thyromegaly.        ICD-10-CM ICD-9-CM    1. Vaginal atrophy N95.2 627.3 prasterone, dhea, (INTRAROSA) 6.5 mg Inst   2. Menopausal symptoms N95.1 627.2 estradiol (VIVELLE-DOT) 0.1 mg/24 hr PTSW       A full discussion of the benefit-risk ratio of hormonal replacement therapy was carried out. Improvement in vasomotor and other climacteric symptoms is discussed, including possible improvements in sleep and mood. Reduction of risk for osteoporosis was explained. We discussed the study data showing increased risk of thrombo-embolic events such as myocardial infarction, stroke and also possibly breast cancer with estrogen replacement, and how this might affect her. The range of side effects such as breast tenderness, weight gain and including possible increases in lifetime risk of breast cancer and possible thrombotic complications was discussed. We also discussed ACOG's recommendation to use hormone replacement therapy for the relief of hot flashes alone and to be on the lowest dose possible for the shortest amount of time.  Alternative such as herbal and soy-based products were reviewed. All of her questions about this therapy were answered.        Patient was counseled today on weight loss, exericise  Recommend urology follow up  RTC for WWE

## 2019-12-09 ENCOUNTER — PATIENT MESSAGE (OUTPATIENT)
Dept: ORTHOPEDICS | Facility: CLINIC | Age: 54
End: 2019-12-09

## 2019-12-10 ENCOUNTER — PATIENT MESSAGE (OUTPATIENT)
Dept: ORTHOPEDICS | Facility: CLINIC | Age: 54
End: 2019-12-10

## 2019-12-12 ENCOUNTER — PATIENT MESSAGE (OUTPATIENT)
Dept: ORTHOPEDICS | Facility: CLINIC | Age: 54
End: 2019-12-12

## 2019-12-20 ENCOUNTER — HOSPITAL ENCOUNTER (OUTPATIENT)
Facility: HOSPITAL | Age: 54
Discharge: HOME | End: 2019-12-21
Attending: EMERGENCY MEDICINE
Payer: MEDICARE

## 2019-12-20 DIAGNOSIS — R31.9 URINARY TRACT INFECTION WITH HEMATURIA, SITE UNSPECIFIED: ICD-10-CM
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
## 2019-12-20 DIAGNOSIS — R00.0 TACHYCARDIA: ICD-10-CM

## 2019-12-20 DIAGNOSIS — N12 PYELONEPHRITIS: Primary | ICD-10-CM

## 2019-12-20 DIAGNOSIS — N39.0 URINARY TRACT INFECTION WITH HEMATURIA, SITE UNSPECIFIED: ICD-10-CM

## 2019-12-20 DIAGNOSIS — R07.9 CHEST PAIN: ICD-10-CM

## 2019-12-20 LAB
ALBUMIN SERPL BCP-MCNC: 3.5 G/DL (ref 3.5–5.2)
ALP SERPL-CCNC: 101 U/L (ref 55–135)
ALT SERPL W/O P-5'-P-CCNC: 23 U/L (ref 10–44)
AMPHET+METHAMPHET UR QL: NEGATIVE
ANION GAP SERPL CALC-SCNC: 10 MMOL/L (ref 8–16)
AST SERPL-CCNC: 28 U/L (ref 10–40)
B-HCG UR QL: NEGATIVE
BACTERIA #/AREA URNS AUTO: ABNORMAL /HPF
BARBITURATES UR QL SCN>200 NG/ML: NEGATIVE
BASOPHILS # BLD AUTO: 0.02 K/UL (ref 0–0.2)
BASOPHILS NFR BLD: 0.2 % (ref 0–1.9)
BENZODIAZ UR QL SCN>200 NG/ML: NEGATIVE
BILIRUB SERPL-MCNC: 0.8 MG/DL (ref 0.1–1)
BILIRUB UR QL STRIP: NEGATIVE
BUN SERPL-MCNC: 13 MG/DL (ref 6–20)
BZE UR QL SCN: NEGATIVE
CALCIUM SERPL-MCNC: 10 MG/DL (ref 8.7–10.5)
CANNABINOIDS UR QL SCN: NEGATIVE
CHLORIDE SERPL-SCNC: 98 MMOL/L (ref 95–110)
CLARITY UR REFRACT.AUTO: ABNORMAL
CO2 SERPL-SCNC: 28 MMOL/L (ref 23–29)
COLOR UR AUTO: YELLOW
CREAT SERPL-MCNC: 0.8 MG/DL (ref 0.5–1.4)
CREAT UR-MCNC: 125 MG/DL (ref 15–325)
CRP SERPL-MCNC: 171.7 MG/L (ref 0–8.2)
CTP QC/QA: YES
DIFFERENTIAL METHOD: ABNORMAL
EOSINOPHIL # BLD AUTO: 0 K/UL (ref 0–0.5)
EOSINOPHIL NFR BLD: 0 % (ref 0–8)
ERYTHROCYTE [DISTWIDTH] IN BLOOD BY AUTOMATED COUNT: 15 % (ref 11.5–14.5)
ERYTHROCYTE [SEDIMENTATION RATE] IN BLOOD BY WESTERGREN METHOD: 21 MM/HR (ref 0–36)
EST. GFR  (AFRICAN AMERICAN): >60 ML/MIN/1.73 M^2
EST. GFR  (NON AFRICAN AMERICAN): >60 ML/MIN/1.73 M^2
GLUCOSE SERPL-MCNC: 133 MG/DL (ref 70–110)
GLUCOSE UR QL STRIP: NEGATIVE
HCT VFR BLD AUTO: 40.9 % (ref 37–48.5)
HGB BLD-MCNC: 12.6 G/DL (ref 12–16)
HGB UR QL STRIP: ABNORMAL
HYALINE CASTS UR QL AUTO: 0 /LPF
IMM GRANULOCYTES # BLD AUTO: 0.06 K/UL (ref 0–0.04)
IMM GRANULOCYTES NFR BLD AUTO: 0.6 % (ref 0–0.5)
KETONES UR QL STRIP: ABNORMAL
LEUKOCYTE ESTERASE UR QL STRIP: ABNORMAL
LYMPHOCYTES # BLD AUTO: 1.1 K/UL (ref 1–4.8)
LYMPHOCYTES NFR BLD: 11.8 % (ref 18–48)
MAGNESIUM SERPL-MCNC: 1.5 MG/DL (ref 1.6–2.6)
MCH RBC QN AUTO: 24.2 PG (ref 27–31)
MCHC RBC AUTO-ENTMCNC: 30.8 G/DL (ref 32–36)
MCV RBC AUTO: 79 FL (ref 82–98)
METHADONE UR QL SCN>300 NG/ML: NEGATIVE
MICROSCOPIC COMMENT: ABNORMAL
MONOCYTES # BLD AUTO: 0.9 K/UL (ref 0.3–1)
MONOCYTES NFR BLD: 9 % (ref 4–15)
NEUTROPHILS # BLD AUTO: 7.5 K/UL (ref 1.8–7.7)
NEUTROPHILS NFR BLD: 78.4 % (ref 38–73)
NITRITE UR QL STRIP: NEGATIVE
NON-SQ EPI CELLS #/AREA URNS AUTO: <1 /HPF
NRBC BLD-RTO: 0 /100 WBC
OPIATES UR QL SCN: NORMAL
PCP UR QL SCN>25 NG/ML: NEGATIVE
PH UR STRIP: 6 [PH] (ref 5–8)
PHOSPHATE SERPL-MCNC: 2.2 MG/DL (ref 2.7–4.5)
PLATELET # BLD AUTO: 174 K/UL (ref 150–350)
PMV BLD AUTO: 11.6 FL (ref 9.2–12.9)
POCT GLUCOSE: 131 MG/DL (ref 70–110)
POTASSIUM SERPL-SCNC: 3.8 MMOL/L (ref 3.5–5.1)
PROT SERPL-MCNC: 7.2 G/DL (ref 6–8.4)
PROT UR QL STRIP: ABNORMAL
RBC # BLD AUTO: 5.2 M/UL (ref 4–5.4)
RBC #/AREA URNS AUTO: 21 /HPF (ref 0–4)
SODIUM SERPL-SCNC: 136 MMOL/L (ref 136–145)
SP GR UR STRIP: 1.01 (ref 1–1.03)
SQUAMOUS #/AREA URNS AUTO: 3 /HPF
T4 FREE SERPL-MCNC: 1.07 NG/DL (ref 0.71–1.51)
TOXICOLOGY INFORMATION: NORMAL
TSH SERPL DL<=0.005 MIU/L-ACNC: 0.26 UIU/ML (ref 0.4–4)
URN SPEC COLLECT METH UR: ABNORMAL
WBC # BLD AUTO: 9.53 K/UL (ref 3.9–12.7)
WBC #/AREA URNS AUTO: 55 /HPF (ref 0–5)

## 2019-12-20 PROCEDURE — 85652 RBC SED RATE AUTOMATED: CPT

## 2019-12-20 PROCEDURE — 99285 EMERGENCY DEPT VISIT HI MDM: CPT | Mod: 25

## 2019-12-20 PROCEDURE — 83690 ASSAY OF LIPASE: CPT

## 2019-12-20 PROCEDURE — 96375 TX/PRO/DX INJ NEW DRUG ADDON: CPT

## 2019-12-20 PROCEDURE — 84443 ASSAY THYROID STIM HORMONE: CPT

## 2019-12-20 PROCEDURE — 87086 URINE CULTURE/COLONY COUNT: CPT

## 2019-12-20 PROCEDURE — 96361 HYDRATE IV INFUSION ADD-ON: CPT

## 2019-12-20 PROCEDURE — 93010 ELECTROCARDIOGRAM REPORT: CPT | Mod: ,,, | Performed by: INTERNAL MEDICINE

## 2019-12-20 PROCEDURE — 81001 URINALYSIS AUTO W/SCOPE: CPT

## 2019-12-20 PROCEDURE — 99285 PR EMERGENCY DEPT VISIT,LEVEL V: ICD-10-PCS | Mod: ,,, | Performed by: EMERGENCY MEDICINE

## 2019-12-20 PROCEDURE — 83735 ASSAY OF MAGNESIUM: CPT

## 2019-12-20 PROCEDURE — 80307 DRUG TEST PRSMV CHEM ANLYZR: CPT

## 2019-12-20 PROCEDURE — 85025 COMPLETE CBC W/AUTO DIFF WBC: CPT

## 2019-12-20 PROCEDURE — 80053 COMPREHEN METABOLIC PANEL: CPT

## 2019-12-20 PROCEDURE — 93005 ELECTROCARDIOGRAM TRACING: CPT

## 2019-12-20 PROCEDURE — 51798 US URINE CAPACITY MEASURE: CPT

## 2019-12-20 PROCEDURE — 86140 C-REACTIVE PROTEIN: CPT

## 2019-12-20 PROCEDURE — 99285 EMERGENCY DEPT VISIT HI MDM: CPT | Mod: ,,, | Performed by: EMERGENCY MEDICINE

## 2019-12-20 PROCEDURE — 63600175 PHARM REV CODE 636 W HCPCS: Performed by: STUDENT IN AN ORGANIZED HEALTH CARE EDUCATION/TRAINING PROGRAM

## 2019-12-20 PROCEDURE — 93010 EKG 12-LEAD: ICD-10-PCS | Mod: ,,, | Performed by: INTERNAL MEDICINE

## 2019-12-20 PROCEDURE — 84439 ASSAY OF FREE THYROXINE: CPT

## 2019-12-20 PROCEDURE — 82962 GLUCOSE BLOOD TEST: CPT

## 2019-12-20 PROCEDURE — 84100 ASSAY OF PHOSPHORUS: CPT

## 2019-12-20 RX ORDER — MORPHINE SULFATE 2 MG/ML
2 INJECTION, SOLUTION INTRAMUSCULAR; INTRAVENOUS
Status: COMPLETED | OUTPATIENT
Start: 2019-12-20 | End: 2019-12-20

## 2019-12-20 RX ORDER — PROCHLORPERAZINE EDISYLATE 5 MG/ML
10 INJECTION INTRAMUSCULAR; INTRAVENOUS ONCE
Status: COMPLETED | OUTPATIENT
Start: 2019-12-20 | End: 2019-12-20

## 2019-12-20 RX ADMIN — PROCHLORPERAZINE EDISYLATE 10 MG: 5 INJECTION INTRAMUSCULAR; INTRAVENOUS at 10:12

## 2019-12-20 RX ADMIN — SODIUM CHLORIDE 1000 ML: 0.9 INJECTION, SOLUTION INTRAVENOUS at 10:12

## 2019-12-20 RX ADMIN — MORPHINE SULFATE 2 MG: 2 INJECTION, SOLUTION INTRAMUSCULAR; INTRAVENOUS at 10:12

## 2019-12-20 RX ADMIN — SODIUM CHLORIDE 1000 ML: 0.9 INJECTION, SOLUTION INTRAVENOUS at 11:12

## 2019-12-21 VITALS
HEIGHT: 65 IN | SYSTOLIC BLOOD PRESSURE: 125 MMHG | HEART RATE: 105 BPM | DIASTOLIC BLOOD PRESSURE: 59 MMHG | RESPIRATION RATE: 20 BRPM | OXYGEN SATURATION: 93 % | WEIGHT: 241.63 LBS | BODY MASS INDEX: 40.26 KG/M2 | TEMPERATURE: 98 F

## 2019-12-21 PROBLEM — E83.42 HYPOMAGNESEMIA: Status: ACTIVE | Noted: 2019-12-21

## 2019-12-21 PROBLEM — N39.0 UTI (URINARY TRACT INFECTION): Status: ACTIVE | Noted: 2019-12-21

## 2019-12-21 PROBLEM — N12 PYELONEPHRITIS: Status: ACTIVE | Noted: 2019-12-21

## 2019-12-21 PROBLEM — R00.0 TACHYCARDIA: Status: ACTIVE | Noted: 2019-12-21

## 2019-12-21 PROBLEM — R53.81 DEBILITY: Status: ACTIVE | Noted: 2019-05-28

## 2019-12-21 PROBLEM — E83.39 HYPOPHOSPHATEMIA: Status: ACTIVE | Noted: 2019-12-21

## 2019-12-21 LAB
ALBUMIN SERPL BCP-MCNC: 3 G/DL (ref 3.5–5.2)
ALP SERPL-CCNC: 91 U/L (ref 55–135)
ALT SERPL W/O P-5'-P-CCNC: 21 U/L (ref 10–44)
ANION GAP SERPL CALC-SCNC: 10 MMOL/L (ref 8–16)
AST SERPL-CCNC: 25 U/L (ref 10–40)
BASOPHILS # BLD AUTO: 0.03 K/UL (ref 0–0.2)
BASOPHILS NFR BLD: 0.3 % (ref 0–1.9)
BILIRUB SERPL-MCNC: 0.5 MG/DL (ref 0.1–1)
BUN SERPL-MCNC: 10 MG/DL (ref 6–20)
CALCIUM SERPL-MCNC: 9.6 MG/DL (ref 8.7–10.5)
CHLORIDE SERPL-SCNC: 102 MMOL/L (ref 95–110)
CO2 SERPL-SCNC: 28 MMOL/L (ref 23–29)
CREAT SERPL-MCNC: 0.8 MG/DL (ref 0.5–1.4)
DIFFERENTIAL METHOD: ABNORMAL
EOSINOPHIL # BLD AUTO: 0 K/UL (ref 0–0.5)
EOSINOPHIL NFR BLD: 0 % (ref 0–8)
ERYTHROCYTE [DISTWIDTH] IN BLOOD BY AUTOMATED COUNT: 14.9 % (ref 11.5–14.5)
EST. GFR  (AFRICAN AMERICAN): >60 ML/MIN/1.73 M^2
EST. GFR  (NON AFRICAN AMERICAN): >60 ML/MIN/1.73 M^2
ESTIMATED AVG GLUCOSE: 126 MG/DL (ref 68–131)
GLUCOSE SERPL-MCNC: 137 MG/DL (ref 70–110)
HBA1C MFR BLD HPLC: 6 % (ref 4–5.6)
HCT VFR BLD AUTO: 38.6 % (ref 37–48.5)
HGB BLD-MCNC: 11.8 G/DL (ref 12–16)
IMM GRANULOCYTES # BLD AUTO: 0.04 K/UL (ref 0–0.04)
IMM GRANULOCYTES NFR BLD AUTO: 0.4 % (ref 0–0.5)
LACTATE SERPL-SCNC: 0.8 MMOL/L (ref 0.5–2.2)
LIPASE SERPL-CCNC: 6 U/L (ref 4–60)
LYMPHOCYTES # BLD AUTO: 1.4 K/UL (ref 1–4.8)
LYMPHOCYTES NFR BLD: 14.4 % (ref 18–48)
MAGNESIUM SERPL-MCNC: 1.7 MG/DL (ref 1.6–2.6)
MCH RBC QN AUTO: 24.2 PG (ref 27–31)
MCHC RBC AUTO-ENTMCNC: 30.6 G/DL (ref 32–36)
MCV RBC AUTO: 79 FL (ref 82–98)
MONOCYTES # BLD AUTO: 0.9 K/UL (ref 0.3–1)
MONOCYTES NFR BLD: 9.7 % (ref 4–15)
NEUTROPHILS # BLD AUTO: 7.3 K/UL (ref 1.8–7.7)
NEUTROPHILS NFR BLD: 75.2 % (ref 38–73)
NRBC BLD-RTO: 0 /100 WBC
PHOSPHATE SERPL-MCNC: 3.1 MG/DL (ref 2.7–4.5)
PLATELET # BLD AUTO: 163 K/UL (ref 150–350)
PMV BLD AUTO: 11.3 FL (ref 9.2–12.9)
POCT GLUCOSE: 125 MG/DL (ref 70–110)
POCT GLUCOSE: 142 MG/DL (ref 70–110)
POTASSIUM SERPL-SCNC: 4.4 MMOL/L (ref 3.5–5.1)
PROCALCITONIN SERPL IA-MCNC: 0.12 NG/ML
PROT SERPL-MCNC: 6.6 G/DL (ref 6–8.4)
RBC # BLD AUTO: 4.87 M/UL (ref 4–5.4)
SODIUM SERPL-SCNC: 140 MMOL/L (ref 136–145)
TROPONIN I SERPL DL<=0.01 NG/ML-MCNC: 0.02 NG/ML (ref 0–0.03)
WBC # BLD AUTO: 9.71 K/UL (ref 3.9–12.7)

## 2019-12-21 PROCEDURE — 84484 ASSAY OF TROPONIN QUANT: CPT

## 2019-12-21 PROCEDURE — 99220 PR INITIAL OBSERVATION CARE,LEVL III: CPT | Mod: ,,, | Performed by: INTERNAL MEDICINE

## 2019-12-21 PROCEDURE — 63600175 PHARM REV CODE 636 W HCPCS: Performed by: STUDENT IN AN ORGANIZED HEALTH CARE EDUCATION/TRAINING PROGRAM

## 2019-12-21 PROCEDURE — 83605 ASSAY OF LACTIC ACID: CPT

## 2019-12-21 PROCEDURE — 83036 HEMOGLOBIN GLYCOSYLATED A1C: CPT

## 2019-12-21 PROCEDURE — 83735 ASSAY OF MAGNESIUM: CPT

## 2019-12-21 PROCEDURE — 25000003 PHARM REV CODE 250: Performed by: STUDENT IN AN ORGANIZED HEALTH CARE EDUCATION/TRAINING PROGRAM

## 2019-12-21 PROCEDURE — 96376 TX/PRO/DX INJ SAME DRUG ADON: CPT

## 2019-12-21 PROCEDURE — 96366 THER/PROPH/DIAG IV INF ADDON: CPT

## 2019-12-21 PROCEDURE — 25500020 PHARM REV CODE 255: Performed by: EMERGENCY MEDICINE

## 2019-12-21 PROCEDURE — 99220 PR INITIAL OBSERVATION CARE,LEVL III: ICD-10-PCS | Mod: ,,, | Performed by: INTERNAL MEDICINE

## 2019-12-21 PROCEDURE — G0378 HOSPITAL OBSERVATION PER HR: HCPCS

## 2019-12-21 PROCEDURE — 84145 PROCALCITONIN (PCT): CPT

## 2019-12-21 PROCEDURE — 80053 COMPREHEN METABOLIC PANEL: CPT

## 2019-12-21 PROCEDURE — 85025 COMPLETE CBC W/AUTO DIFF WBC: CPT

## 2019-12-21 PROCEDURE — 84100 ASSAY OF PHOSPHORUS: CPT

## 2019-12-21 PROCEDURE — 36415 COLL VENOUS BLD VENIPUNCTURE: CPT

## 2019-12-21 PROCEDURE — 87040 BLOOD CULTURE FOR BACTERIA: CPT

## 2019-12-21 PROCEDURE — 96365 THER/PROPH/DIAG IV INF INIT: CPT

## 2019-12-21 RX ORDER — LANOLIN ALCOHOL/MO/W.PET/CERES
800 CREAM (GRAM) TOPICAL EVERY 4 HOURS
Status: COMPLETED | OUTPATIENT
Start: 2019-12-21 | End: 2019-12-21

## 2019-12-21 RX ORDER — SODIUM,POTASSIUM PHOSPHATES 280-250MG
2 POWDER IN PACKET (EA) ORAL EVERY 4 HOURS
Status: COMPLETED | OUTPATIENT
Start: 2019-12-21 | End: 2019-12-21

## 2019-12-21 RX ORDER — DEXTROSE MONOHYDRATE 100 MG/ML
12.5 INJECTION, SOLUTION INTRAVENOUS
Status: DISCONTINUED | OUTPATIENT
Start: 2019-12-21 | End: 2019-12-21 | Stop reason: HOSPADM

## 2019-12-21 RX ORDER — ALPRAZOLAM 0.5 MG/1
0.5 TABLET ORAL 3 TIMES DAILY PRN
Status: DISCONTINUED | OUTPATIENT
Start: 2019-12-21 | End: 2019-12-21

## 2019-12-21 RX ORDER — DEXTROSE MONOHYDRATE 100 MG/ML
25 INJECTION, SOLUTION INTRAVENOUS
Status: DISCONTINUED | OUTPATIENT
Start: 2019-12-21 | End: 2019-12-21 | Stop reason: HOSPADM

## 2019-12-21 RX ORDER — GLUCAGON 1 MG
1 KIT INJECTION
Status: DISCONTINUED | OUTPATIENT
Start: 2019-12-21 | End: 2019-12-21 | Stop reason: HOSPADM

## 2019-12-21 RX ORDER — LEVOTHYROXINE SODIUM 75 UG/1
150 TABLET ORAL EVERY MORNING
Status: DISCONTINUED | OUTPATIENT
Start: 2019-12-21 | End: 2019-12-21 | Stop reason: HOSPADM

## 2019-12-21 RX ORDER — ZOLPIDEM TARTRATE 5 MG/1
5 TABLET ORAL NIGHTLY PRN
Status: DISCONTINUED | OUTPATIENT
Start: 2019-12-21 | End: 2019-12-21 | Stop reason: HOSPADM

## 2019-12-21 RX ORDER — CIPROFLOXACIN 500 MG/1
500 TABLET ORAL EVERY 12 HOURS
Qty: 14 TABLET | Refills: 0 | Status: SHIPPED | OUTPATIENT
Start: 2019-12-22 | End: 2019-12-29

## 2019-12-21 RX ORDER — PROMETHAZINE HYDROCHLORIDE 25 MG/1
25 TABLET ORAL EVERY 6 HOURS PRN
Status: DISCONTINUED | OUTPATIENT
Start: 2019-12-21 | End: 2019-12-21 | Stop reason: HOSPADM

## 2019-12-21 RX ORDER — SODIUM CHLORIDE 0.9 % (FLUSH) 0.9 %
10 SYRINGE (ML) INJECTION
Status: DISCONTINUED | OUTPATIENT
Start: 2019-12-21 | End: 2019-12-21 | Stop reason: HOSPADM

## 2019-12-21 RX ORDER — OXYCODONE AND ACETAMINOPHEN 5; 325 MG/1; MG/1
1 TABLET ORAL
Status: COMPLETED | OUTPATIENT
Start: 2019-12-21 | End: 2019-12-21

## 2019-12-21 RX ORDER — IBUPROFEN 200 MG
16 TABLET ORAL
Status: DISCONTINUED | OUTPATIENT
Start: 2019-12-21 | End: 2019-12-21 | Stop reason: HOSPADM

## 2019-12-21 RX ORDER — ASPIRIN 81 MG/1
81 TABLET ORAL DAILY
Status: DISCONTINUED | OUTPATIENT
Start: 2019-12-21 | End: 2019-12-21 | Stop reason: HOSPADM

## 2019-12-21 RX ORDER — OXYCODONE AND ACETAMINOPHEN 5; 325 MG/1; MG/1
1 TABLET ORAL EVERY 8 HOURS PRN
Status: DISCONTINUED | OUTPATIENT
Start: 2019-12-21 | End: 2019-12-21

## 2019-12-21 RX ORDER — ACETAMINOPHEN 325 MG/1
650 TABLET ORAL EVERY 8 HOURS PRN
Status: DISCONTINUED | OUTPATIENT
Start: 2019-12-21 | End: 2019-12-21 | Stop reason: HOSPADM

## 2019-12-21 RX ORDER — ONDANSETRON 2 MG/ML
4 INJECTION INTRAMUSCULAR; INTRAVENOUS EVERY 8 HOURS PRN
Status: DISCONTINUED | OUTPATIENT
Start: 2019-12-21 | End: 2019-12-21 | Stop reason: HOSPADM

## 2019-12-21 RX ORDER — ALPRAZOLAM 0.5 MG/1
0.5 TABLET ORAL DAILY PRN
Status: DISCONTINUED | OUTPATIENT
Start: 2019-12-21 | End: 2019-12-21 | Stop reason: HOSPADM

## 2019-12-21 RX ORDER — ROSUVASTATIN CALCIUM 10 MG/1
10 TABLET, COATED ORAL NIGHTLY
Status: DISCONTINUED | OUTPATIENT
Start: 2019-12-21 | End: 2019-12-21 | Stop reason: HOSPADM

## 2019-12-21 RX ORDER — ASPIRIN 81 MG/1
81 TABLET ORAL DAILY
Status: DISCONTINUED | OUTPATIENT
Start: 2019-12-21 | End: 2019-12-21

## 2019-12-21 RX ORDER — OXYCODONE AND ACETAMINOPHEN 5; 325 MG/1; MG/1
2 TABLET ORAL EVERY 8 HOURS PRN
Status: DISCONTINUED | OUTPATIENT
Start: 2019-12-21 | End: 2019-12-21 | Stop reason: HOSPADM

## 2019-12-21 RX ORDER — ASCORBIC ACID 500 MG
500 TABLET ORAL EVERY MORNING
Status: DISCONTINUED | OUTPATIENT
Start: 2019-12-21 | End: 2019-12-21 | Stop reason: HOSPADM

## 2019-12-21 RX ORDER — PANTOPRAZOLE SODIUM 40 MG/1
40 TABLET, DELAYED RELEASE ORAL DAILY
Status: DISCONTINUED | OUTPATIENT
Start: 2019-12-21 | End: 2019-12-21 | Stop reason: HOSPADM

## 2019-12-21 RX ORDER — CIPROFLOXACIN 500 MG/1
500 TABLET ORAL EVERY 12 HOURS
Status: DISCONTINUED | OUTPATIENT
Start: 2019-12-22 | End: 2019-12-21 | Stop reason: HOSPADM

## 2019-12-21 RX ORDER — IPRATROPIUM BROMIDE AND ALBUTEROL SULFATE 2.5; .5 MG/3ML; MG/3ML
3 SOLUTION RESPIRATORY (INHALATION) EVERY 4 HOURS PRN
Status: DISCONTINUED | OUTPATIENT
Start: 2019-12-21 | End: 2019-12-21 | Stop reason: HOSPADM

## 2019-12-21 RX ORDER — CEFTRIAXONE 1 G/1
1 INJECTION, POWDER, FOR SOLUTION INTRAMUSCULAR; INTRAVENOUS
Status: DISCONTINUED | OUTPATIENT
Start: 2019-12-21 | End: 2019-12-21

## 2019-12-21 RX ORDER — IBUPROFEN 200 MG
24 TABLET ORAL
Status: DISCONTINUED | OUTPATIENT
Start: 2019-12-21 | End: 2019-12-21 | Stop reason: HOSPADM

## 2019-12-21 RX ADMIN — Medication 800 MG: at 07:12

## 2019-12-21 RX ADMIN — OXYCODONE HYDROCHLORIDE AND ACETAMINOPHEN 500 MG: 500 TABLET ORAL at 05:12

## 2019-12-21 RX ADMIN — POTASSIUM & SODIUM PHOSPHATES POWDER PACK 280-160-250 MG 2 PACKET: 280-160-250 PACK at 01:12

## 2019-12-21 RX ADMIN — ROSUVASTATIN CALCIUM 10 MG: 10 TABLET, FILM COATED ORAL at 02:12

## 2019-12-21 RX ADMIN — Medication 800 MG: at 10:12

## 2019-12-21 RX ADMIN — IOHEXOL 100 ML: 350 INJECTION, SOLUTION INTRAVENOUS at 12:12

## 2019-12-21 RX ADMIN — SODIUM CHLORIDE 1000 ML: 0.9 INJECTION, SOLUTION INTRAVENOUS at 09:12

## 2019-12-21 RX ADMIN — OXYCODONE HYDROCHLORIDE AND ACETAMINOPHEN 1 TABLET: 5; 325 TABLET ORAL at 01:12

## 2019-12-21 RX ADMIN — CEFTRIAXONE SODIUM 1 G: 1 INJECTION, POWDER, FOR SOLUTION INTRAMUSCULAR; INTRAVENOUS at 05:12

## 2019-12-21 RX ADMIN — OXYCODONE HYDROCHLORIDE AND ACETAMINOPHEN 1 TABLET: 5; 325 TABLET ORAL at 05:12

## 2019-12-21 RX ADMIN — POTASSIUM & SODIUM PHOSPHATES POWDER PACK 280-160-250 MG 2 PACKET: 280-160-250 PACK at 10:12

## 2019-12-21 RX ADMIN — ASPIRIN 81 MG: 81 TABLET, COATED ORAL at 09:12

## 2019-12-21 RX ADMIN — LEVOTHYROXINE SODIUM 75 MCG: 75 TABLET ORAL at 06:12

## 2019-12-21 RX ADMIN — CEFTRIAXONE 2 G: 2 INJECTION, SOLUTION INTRAVENOUS at 01:12

## 2019-12-21 RX ADMIN — OXYCODONE HYDROCHLORIDE AND ACETAMINOPHEN 2 TABLET: 5; 325 TABLET ORAL at 01:12

## 2019-12-21 RX ADMIN — ACETAMINOPHEN 650 MG: 325 TABLET ORAL at 11:12

## 2019-12-21 RX ADMIN — POTASSIUM & SODIUM PHOSPHATES POWDER PACK 280-160-250 MG 2 PACKET: 280-160-250 PACK at 07:12

## 2019-12-21 RX ADMIN — PANTOPRAZOLE SODIUM 40 MG: 40 TABLET, DELAYED RELEASE ORAL at 09:12
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
## 2019-12-21 NOTE — SUBJECTIVE & OBJECTIVE
Past Medical History:   Diagnosis Date    Allergy     seasonal    Arthritis     Blood transfusion     with back surgeries    Chronic back pain     GERD (gastroesophageal reflux disease)     Hyperlipidemia     Hypertension     Hypothyroidism     Thyroid nodule    Joint pain     Kidney stones     Morbid obesity 12/14/2012    NAFLD (nonalcoholic fatty liver disease)     OCD (obsessive compulsive disorder)     Osteoporosis     PONV (postoperative nausea and vomiting)     Persistent, Motion sickness with boat rides, Scopolamine helped -still had nausea    Restless leg syndrome     Sciatica of right side associated with disorder of lumbosacral spine     SOB (shortness of breath) on exertion     Spinal stenosis of lumbar region     Supraventricular tachycardia     Thoracic spondylosis        Past Surgical History:   Procedure Laterality Date    APPENDECTOMY      BACK SURGERY      x 6    CHOLECYSTECTOMY      GASTRECTOMY      Lap Gastric Sleeve    HERNIA REPAIR      HYSTERECTOMY      JOINT REPLACEMENT      LIVER BIOPSY  02/06/2017    steatohepatitis with stage 1 fibrosis    MYELOGRAPHY N/A 11/29/2018    Procedure: MYELOGRAM;  Surgeon: Ander Diagnostic Provider;  Location: [facility] OR [address] [place] [address];  Service: Radiology;  Laterality: N/A;    MYELOGRAPHY N/A 1/7/2019    Procedure: Myelogram;  Surgeon: Lissett Surgeon;  Location: [facility];  Service: Anesthesiology;  Laterality: N/A;    OOPHORECTOMY      PERCUTANEOUS CRYOTHERAPY OF PERIPHERAL NERVE USING LIQUID NITROUS OXIDE IN CLOSED NEEDLE DEVICE Left 4/29/2019    Procedure: CRYOTHERAPY, NERVE, PERIPHERAL, PERCUTANEOUS, USING LIQUID NITROUS OXIDE IN CLOSED NEEDLE DEVICE-iovera left knee;  Surgeon: Chavo Gold III, MD;  Location: [facility] CATH LAB;  Service: Pain Management;  Laterality: Left;    SPINAL FUSION      TONSILLECTOMY, ADENOIDECTOMY      TOTAL KNEE ARTHROPLASTY Left 5/1/2019    Procedure: REPLACEMENT-KNEE-TOTAL;  Surgeon: [facility]  "MD Marcella;  Location: [facility] OR [address] [place] [address];  Service: Orthopedics;  Laterality: Left;       Review of patient's allergies indicates:   Allergen Reactions    Mastisol adhesive [gum jsycaw-psboyw-fsjb-alcohol] Itching, Rash and Blisters     "Looked like poison ivy"    Adhesive Dermatitis and Blisters    Adhesive tape-silicones Dermatitis     The longer the adhesive stays on, the worse the irritation    Sulfamethoxazole-trimethoprim Itching and Anxiety     Has a "nervous feeling."  "Jittery"  Has taken recently and the reaction was "less intense"       No current facility-administered medications on file prior to encounter.      Current Outpatient Medications on File Prior to Encounter   Medication Sig    albuterol (VENTOLIN HFA) 90 mcg/actuation inhaler Inhale 2 puffs into the lungs every 6 (six) hours as needed for Wheezing or Shortness of Breath. Rescue    ALPRAZolam (XANAX) 0.5 MG tablet TAKE ONE TABLET BY MOUTH DAILY AS NEEDED ANXIETY    amitriptyline (ELAVIL) 25 MG tablet     ascorbic acid, vitamin C, (VITAMIN C) 500 MG tablet Take 500 mg by mouth every morning.    aspirin (ECOTRIN) 81 MG EC tablet Take 1 tablet (81 mg total) by mouth 2 (two) times daily.    BIOTIN ORAL Take 10,000 mg by mouth every morning.     C,E,zinc,copper 11/dkgjx2t/lut (OCUVITE ADULT 50 PLUS ORAL) Take by mouth.    cyclobenzaprine (FLEXERIL) 10 MG tablet TK 1 T PO QD- as needed for muscle spasms    doxycycline (VIBRAMYCIN) 100 MG Cap Take 1 capsule (100 mg total) by mouth 2 (two) times daily.    DULoxetine (CYMBALTA) 60 MG capsule TK 1 C PO ONCE at night    estradiol (VIVELLE-DOT) 0.1 mg/24 hr PTSW Place 1 patch onto the skin twice a week.    fish oil-omega-3 fatty acids 300-1,000 mg capsule Take 1,200 mg by mouth 2 (two) times daily.     fluconazole (DIFLUCAN) 150 MG Tab TK 1 T PO UTD as needed    gabapentin (NEURONTIN) 800 MG tablet Take 800 mg by mouth 3 (three) times daily. Takes 2-3 times per day    hydroCHLOROthiazide " (MICROZIDE) 12.5 mg capsule TK 1 C PO D    levothyroxine (SYNTHROID) 150 MCG tablet Take 1 tablet (150 mcg total) by mouth once daily. (Patient taking differently: Take 150 mcg by mouth every morning. )    lisinopril (PRINIVIL,ZESTRIL) 2.5 MG tablet Take 2.5 mg by mouth every morning.     meloxicam (MOBIC) 15 MG tablet TK 1 T PO QD as needed  PAIN AND INFLAMMATION    multivitamin capsule Take 1 capsule by mouth every morning.     omeprazole (PRILOSEC) 40 MG capsule Take 40 mg by mouth once daily.    ranitidine (ZANTAC) 150 MG tablet Take 150 mg by mouth nightly.    rosuvastatin (CRESTOR) 10 MG tablet Take 10 mg by mouth every evening.    tiZANidine (ZANAFLEX) 4 MG tablet TK 1 T PO QHS as needed for mescle spams in the back    zolpidem (AMBIEN) 10 mg Tab Take 10 mg by mouth nightly.      Family History     Problem Relation (Age of Onset)    Acne Other    Breast cancer Paternal Aunt    COPD Father    Cancer Father, Paternal Uncle    Eczema Other    Heart disease Mother, Father, Maternal Grandmother, Maternal Aunt, Maternal Uncle, Paternal Aunt, Paternal Uncle        Tobacco Use    Smoking status: Never Smoker    Smokeless tobacco: Never Used    Tobacco comment: Tried a few cigarettes during teenage   Substance and Sexual Activity    Alcohol use: Yes     Comment: socially    Drug use: No    Sexual activity: Never     Review of Systems   Constitutional: Positive for chills and fatigue. Negative for fever and unexpected weight change.   HENT: Negative for drooling and ear discharge.    Eyes: Negative for photophobia, pain, discharge, redness, itching and visual disturbance.   Respiratory: Negative for cough, shortness of breath and wheezing.    Cardiovascular: Negative for chest pain and leg swelling.   Gastrointestinal: Positive for nausea and vomiting. Negative for abdominal pain, constipation and diarrhea.   Genitourinary: Positive for decreased urine volume and frequency.   Musculoskeletal: Negative  for back pain.   Neurological: Positive for tremors and weakness. Negative for dizziness, seizures, syncope and light-headedness.     Objective:     Vital Signs (Most Recent):  Temp: (P) 96.7 °F (35.9 °C) (12/21/19 0443)  Pulse: 94 (12/21/19 0202)  Resp: (P) 16 (12/21/19 0443)  BP: (P) 122/66 (12/21/19 0443)  SpO2: (P) 97 % (12/21/19 0443) Vital Signs (24h Range):  Temp:  [96.7 °F (35.9 °C)-99.3 °F (37.4 °C)] (P) 96.7 °F (35.9 °C)  Pulse:  [] 94  Resp:  [16-24] (P) 16  SpO2:  [90 %-99 %] (P) 97 %  BP: (102-144)/(49-65) (P) 122/66     Weight: 106.6 kg (235 lb)  Body mass index is 39.11 kg/m² (pended).    Physical Exam   Constitutional: She is oriented to person, place, and time. She appears well-developed and well-nourished. No distress.   HENT:   Head: Normocephalic and atraumatic.   Neck: Neck supple. No JVD present.   Cardiovascular: Normal rate and regular rhythm. Exam reveals no gallop and no friction rub.   No murmur heard.  Pulmonary/Chest: Effort normal and breath sounds normal. No stridor. No respiratory distress. She has no wheezes. She has no rales.   Abdominal: Soft. Bowel sounds are normal. There is tenderness (Suprapubic).   Musculoskeletal: She exhibits no edema.        Cervical back: She exhibits no tenderness.        Thoracic back: She exhibits no tenderness.        Lumbar back: She exhibits no tenderness.   Neurological: She is alert and oriented to person, place, and time. She is not disoriented. She displays no atrophy. She displays no seizure activity. GCS eye subscore is 4. GCS verbal subscore is 5. GCS motor subscore is 6.   Intact upper and lower extremities power   Skin: She is not diaphoretic.   Psychiatric: She has a normal mood and affect. Her behavior is normal.   Nursing note and vitals reviewed.          Significant Labs:   A1C:   Recent Labs   Lab 12/21/19  7536   HGBA1C 6.0*     ABGs: No results for input(s): PH, PCO2, HCO3, POCSATURATED, BE, TOTALHB, COHB, METHB, O2HB, POCFIO2  in the last 48 hours.  Bilirubin:   Recent Labs   Lab 12/20/19 2152   BILITOT 0.8     Blood Culture: No results for input(s): LABBLOO in the last 48 hours.  BMP:   Recent Labs   Lab 12/20/19 2152   *      K 3.8   CL 98   CO2 28   BUN 13   CREATININE 0.8   CALCIUM 10.0   MG 1.5*     CBC:   Recent Labs   Lab 12/20/19 2152   WBC 9.53   HGB 12.6   HCT 40.9        CMP:   Recent Labs   Lab 12/20/19 2152      K 3.8   CL 98   CO2 28   *   BUN 13   CREATININE 0.8   CALCIUM 10.0   PROT 7.2   ALBUMIN 3.5   BILITOT 0.8   ALKPHOS 101   AST 28   ALT 23   ANIONGAP 10   EGFRNONAA >60.0     Cardiac Markers: No results for input(s): CKMB, MYOGLOBIN, BNP, TROPISTAT in the last 48 hours.  Coagulation: No results for input(s): PT, INR, APTT in the last 48 hours.  Lactic Acid:   Recent Labs   Lab 12/21/19 0228   LACTATE 0.8     Lipase:   Recent Labs   Lab 12/20/19 2152   LIPASE 6     Lipid Panel: No results for input(s): CHOL, HDL, LDLCALC, TRIG, CHOLHDL in the last 48 hours.  Magnesium:   Recent Labs   Lab 12/20/19 2152   MG 1.5*     Pathology Results  (Last 10 years)               08/16/12 1500  Tissue Specimen to Pathology Final result        POCT Glucose:   Recent Labs   Lab 12/20/19 2154   POCTGLUCOSE 131*     Prealbumin: No results for input(s): PREALBUMIN in the last 48 hours.  Respiratory Culture: No results for input(s): GSRESP, RESPIRATORYC in the last 48 hours.  Troponin:   Recent Labs   Lab 12/21/19 0228   TROPONINI 0.018     TSH:   Recent Labs   Lab 12/20/19 2152   TSH 0.260*     Urine Culture: No results for input(s): LABURIN in the last 48 hours.  Urine Studies:   Recent Labs   Lab 12/20/19 2229   COLORU Yellow   APPEARANCEUA Hazy*   PHUR 6.0   SPECGRAV 1.015   PROTEINUA 1+*   GLUCUA Negative   KETONESU Trace*   BILIRUBINUA Negative   OCCULTUA 2+*   NITRITE Negative   LEUKOCYTESUR 3+*   RBCUA 21*   WBCUA 55*   BACTERIA Few*   SQUAMEPITHEL 3   HYALINECASTS 0     All pertinent labs  within the past 24 hours have been reviewed.    Significant Imaging: I have reviewed all pertinent imaging results/findings within the past 24 hours.  I have reviewed and interpreted all pertinent imaging results/findings within the past 24 hours.

## 2019-12-21 NOTE — ED NOTES
Pt urinated before bladder scan. Post void residual less than 29 ml of urine. Notified Dr. Kincaid. No new orders given. Will continue to monitor.

## 2019-12-21 NOTE — ED NOTES
Notified Dr. Valencia that pt was unable to lay still for MRI. No new orders given. Will continue to monitor.

## 2019-12-21 NOTE — ASSESSMENT & PLAN NOTE
Patient reports having urinary incontinence that resolved. She denies lower extremity weakness, saddle anaesthesia, constipation and stool incontinence. Back exam: No tenderness. Upper and lower limbs are neurologically intact. Low suspicion for cord compression/abscess or cauda equina syndrome. Will continue monitoring

## 2019-12-21 NOTE — ED NOTES
Patient says she pasted a kidney stone 2 - 3 days ago and has been urinating on herself since, c/o weakness and has been having falls

## 2019-12-21 NOTE — NURSING
"Patient arrived to room 6043 via stretcher awake alert iv hep lock intact. Ambulated to bathroom without difficulty mother at bedside" patient states she came to the hospital due weakness in both legs." states due to multiple back surgery and rods placement in back that  Is no longer intact.MD visited with patient.  "

## 2019-12-21 NOTE — PROGRESS NOTES
Patient received discharge instructions and verbalized understanding. Medications delivered to bedside by pharmacy. IV discontinued with catheter tip intact. Patient in room with mother waiting for wheelchair transport. Will continue to monitor.
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
## 2019-12-21 NOTE — ASSESSMENT & PLAN NOTE
Patient presents with foul smell urine, frquency and incontinence with systemic manifestion of N/V, positive UA and CT A/P findings of pyelonehritis. Patient received ceftriaxon and 2 L IV NS bolus in ED.    Plan:  - Urine culture and blood cultures X 2  - Lactic acid level  - Continue ceftriaxone 1 g IV OD (Previous cultures and sensitivities reviewed)  - 1 L IV NS bolus

## 2019-12-21 NOTE — HOSPITAL COURSE
54 year old female patient with medical issues of HTN, HLD, GERD, spinal stenosis and kidney stones presents to the ED due to nausea and vomiting for 3 days after passing a nephrolith. Patient is to bring in stone to PCP for analysis as she has a history renal stones. After receiving 1L of IVF and 1 dose of CTX that patient reports that her symptoms has abated. She denies suprapubic tenderness, dysuria, hematuria, CVA tenderness, dark urine, fever, chills, malaise. Patient reports feeling at her baseline and was asking to be discharged. She is hemodynamically stable and tolerating a diet. CT A/P with contrast showed changes suggestive of pyelonephritis however on repeat imaging with renal US was non-suggestive of pyelonephritis with bilateral calcified nephroliths present and mild hydronephrosis on the left. Urine cultures are pending at this time. Patient was discharged in stable condition to home with Ciprofloxacin 500 mg BID for 7 days. Patient should follow up with PCP in one week.

## 2019-12-21 NOTE — ED NOTES
Patient identifiers for Angie Mccarthy checked and correct.  LOC: The patient is awake, alert and aware of environment with an appropriate affect, the patient is oriented x 3 and speaking appropriately.  APPEARANCE: Patient slightly distress, patient is clean and well groomed, patient's clothing are properly fastened.  SKIN: The skin is warm and dry, patient has age appropriate skin turgor and moist mucus membranes, skin intact, no breakdown or brusing noted.  MUSKULOSKELETAL: Patient moving all extremities well, no obvious swelling or deformities noted.  RESPIRATORY: Airway is open and patent, respirations are spontaneous, patient has a normal effort and rate, no accessory muscle use noted.  Clear breath sounds bilaterally.  CARDIAC: Patient has a normal rate and rhythm, no periphreal edema noted, capillary refill < 3 seconds.  ABDOMEN: Soft and non tender to palpation, no distention noted.  Normoactive bowel sounds x4.  NEUROLOGIC: PERRL, facial expression is symmetrical, bilateral hand grasp equal and even, normal sensation in all extremities when touched with a finger.
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
## 2019-12-22 LAB
BACTERIA UR CULT: NORMAL
BACTERIA UR CULT: NORMAL

## 2019-12-26 LAB
BACTERIA BLD CULT: NORMAL
BACTERIA BLD CULT: NORMAL

## 2020-01-09 ENCOUNTER — PATIENT MESSAGE (OUTPATIENT)
Dept: ORTHOPEDICS | Facility: CLINIC | Age: 55
End: 2020-01-09

## 2020-02-06 ENCOUNTER — PATIENT MESSAGE (OUTPATIENT)
Dept: ORTHOPEDICS | Facility: CLINIC | Age: 55
End: 2020-02-06

## 2020-02-11 ENCOUNTER — HOSPITAL ENCOUNTER (OUTPATIENT)
Dept: RADIOLOGY | Facility: HOSPITAL | Age: 55
Discharge: HOME | End: 2020-02-11
Attending: ORTHOPAEDIC SURGERY
Payer: MEDICARE

## 2020-02-11 ENCOUNTER — OFFICE VISIT (OUTPATIENT)
Dept: ORTHOPEDICS | Facility: CLINIC | Age: 55
End: 2020-02-11
Payer: MEDICARE

## 2020-02-11 VITALS — BODY MASS INDEX: 40.22 KG/M2 | WEIGHT: 241.38 LBS | HEIGHT: 65 IN

## 2020-02-11 DIAGNOSIS — M25.561 CHRONIC PAIN OF BOTH KNEES: Primary | ICD-10-CM

## 2020-02-11 DIAGNOSIS — M25.562 CHRONIC PAIN OF BOTH KNEES: ICD-10-CM

## 2020-02-11 DIAGNOSIS — M25.562 CHRONIC PAIN OF BOTH KNEES: Primary | ICD-10-CM

## 2020-02-11 DIAGNOSIS — G89.29 CHRONIC PAIN OF BOTH KNEES: ICD-10-CM

## 2020-02-11 DIAGNOSIS — G89.29 CHRONIC PAIN OF BOTH KNEES: Primary | ICD-10-CM

## 2020-02-11 DIAGNOSIS — M17.11 PRIMARY OSTEOARTHRITIS OF RIGHT KNEE: ICD-10-CM

## 2020-02-11 DIAGNOSIS — M25.561 CHRONIC PAIN OF BOTH KNEES: ICD-10-CM

## 2020-02-11 PROCEDURE — 99999 PR PBB SHADOW E&M-EST. PATIENT-LVL III: CPT | Mod: PBBFAC,,, | Performed by: ORTHOPAEDIC SURGERY

## 2020-02-11 PROCEDURE — 73560 X-RAY EXAM OF KNEE 1 OR 2: CPT | Mod: 26,50,, | Performed by: RADIOLOGY

## 2020-02-11 PROCEDURE — 3008F BODY MASS INDEX DOCD: CPT | Mod: CPTII,S$GLB,, | Performed by: ORTHOPAEDIC SURGERY

## 2020-02-11 PROCEDURE — 73560 X-RAY EXAM OF KNEE 1 OR 2: CPT | Mod: TC,50

## 2020-02-11 PROCEDURE — 3008F PR BODY MASS INDEX (BMI) DOCUMENTED: ICD-10-PCS | Mod: CPTII,S$GLB,, | Performed by: ORTHOPAEDIC SURGERY

## 2020-02-11 PROCEDURE — 73560 XR KNEE 1 OR 2 VIEW BILATERAL: ICD-10-PCS | Mod: 26,50,, | Performed by: RADIOLOGY

## 2020-02-11 PROCEDURE — 99214 PR OFFICE/OUTPT VISIT, EST, LEVL IV, 30-39 MIN: ICD-10-PCS | Mod: S$GLB,,, | Performed by: ORTHOPAEDIC SURGERY

## 2020-02-11 PROCEDURE — 99214 OFFICE O/P EST MOD 30 MIN: CPT | Mod: S$GLB,,, | Performed by: ORTHOPAEDIC SURGERY

## 2020-02-11 PROCEDURE — 99999 PR PBB SHADOW E&M-EST. PATIENT-LVL III: ICD-10-PCS | Mod: PBBFAC,,, | Performed by: ORTHOPAEDIC SURGERY

## 2020-02-11 NOTE — PROGRESS NOTES
HPI:    Angie Mccarthy is a 54 y.o. female who is here today for   Chief Complaint   Patient presents with    Knee Pain     estephania knee pain   .  Patient is now complaining of right knee pain.  Status post left total knee done 05/01/2019.  Has a slight flexion contracture in the left total knee.    Duration: 6 months  Intensity: severe  Character of pain: sharp  Location: She reports that the pain is predominately  medial  Patient's pain increases with activity.  Pain is increased with weightbearing and interferes with activities of daily living.    She has tried conservative management including NSAIDS, injections, and activity modification without relief.    She has discussed options with her family and wishes to schedule TKA.     PMSFSH reviewed per clinic record       Past Medical History:   Diagnosis Date    Allergy     seasonal    Arthritis     Blood transfusion     with back surgeries    Chronic back pain     GERD (gastroesophageal reflux disease)     Hyperlipidemia     Hypertension     Hypothyroidism     Thyroid nodule    Joint pain     Kidney stones     Morbid obesity 12/14/2012    NAFLD (nonalcoholic fatty liver disease)     OCD (obsessive compulsive disorder)     Osteoporosis     PONV (postoperative nausea and vomiting)     Persistent, Motion sickness with boat rides, Scopolamine helped -still had nausea    Restless leg syndrome     Sciatica of right side associated with disorder of lumbosacral spine     SOB (shortness of breath) on exertion     Spinal stenosis of lumbar region     Supraventricular tachycardia     Thoracic spondylosis           Current Outpatient Medications:     ALPRAZolam (XANAX) 0.5 MG tablet, TAKE ONE TABLET BY MOUTH DAILY AS NEEDED ANXIETY, Disp: 30 tablet, Rfl: 2    BIOTIN ORAL, Take 10,000 mg by mouth every morning. , Disp: , Rfl:     C,E,zinc,copper 11/ytgcl7b/lut (OCUVITE ADULT 50 PLUS ORAL), Take by mouth., Disp: , Rfl:     cyclobenzaprine  (FLEXERIL) 10 MG tablet, TK 1 T PO QD- as needed for muscle spasms, Disp: , Rfl: 0    DULoxetine (CYMBALTA) 60 MG capsule, TK 1 C PO ONCE at night, Disp: , Rfl: 0    estradiol (VIVELLE-DOT) 0.1 mg/24 hr PTSW, Place 1 patch onto the skin twice a week., Disp: 8 patch, Rfl: 11    fish oil-omega-3 fatty acids 300-1,000 mg capsule, Take 1,200 mg by mouth 2 (two) times daily. , Disp: , Rfl:     fluticasone propionate (FLONASE) 50 mcg/actuation nasal spray, 2 sprays by Each Nostril route once daily., Disp: , Rfl:     gabapentin (NEURONTIN) 800 MG tablet, Take 800 mg by mouth 3 (three) times daily. Takes 2-3 times per day, Disp: , Rfl: 1    lisinopril (PRINIVIL,ZESTRIL) 2.5 MG tablet, Take 2.5 mg by mouth every morning. , Disp: , Rfl: 2    meloxicam (MOBIC) 15 MG tablet, TK 1 T PO QD as needed  PAIN AND INFLAMMATION, Disp: , Rfl: 1    multivitamin capsule, Take 1 capsule by mouth every morning. , Disp: , Rfl:     omeprazole (PRILOSEC) 40 MG capsule, Take 40 mg by mouth once daily., Disp: , Rfl:     oxyCODONE-acetaminophen (PERCOCET)  mg per tablet, Take 1 tablet by mouth 4 (four) times daily as needed., Disp: , Rfl:     ranitidine (ZANTAC) 150 MG tablet, Take 150 mg by mouth nightly., Disp: , Rfl:     rosuvastatin (CRESTOR) 10 MG tablet, Take 10 mg by mouth every evening., Disp: , Rfl:     zolpidem (AMBIEN) 10 mg Tab, TAKE 1 TABLET BY MOUTH EVERY NIGHT AT BEDTIME AS NEEDED FOR INSOMNIA, Disp: 30 tablet, Rfl: 0    albuterol (VENTOLIN HFA) 90 mcg/actuation inhaler, Inhale 2 puffs into the lungs every 6 (six) hours as needed for Wheezing or Shortness of Breath. Rescue, Disp: 18 g, Rfl: 0    aspirin (ECOTRIN) 81 MG EC tablet, Take 1 tablet (81 mg total) by mouth 2 (two) times daily., Disp: 60 tablet, Rfl: 0    levothyroxine (SYNTHROID) 150 MCG tablet, Take 1 tablet (150 mcg total) by mouth once daily. (Patient taking differently: Take 150 mcg by mouth every morning. ), Disp: 30 tablet, Rfl: 11     Review  "of patient's allergies indicates:   Allergen Reactions    Mastisol adhesive [gum ljaqre-hdfzab-gsnf-alcohol] Itching, Rash and Blisters     "Looked like poison ivy"    Adhesive Dermatitis and Blisters    Adhesive tape-silicones Dermatitis     The longer the adhesive stays on, the worse the irritation    Sulfamethoxazole-trimethoprim Itching and Anxiety     Has a "nervous feeling."  "Jittery"  Has taken recently and the reaction was "less intense"        ROS  Constitutional: Negative for fever, Negative for weight loss  HENT Negative for congestion  Cardiovascular: Negative chest pain  Respiratory: Negative Shortness of breath  Heme: Negative excessive bleeding  Skin:PositiveItching, Positive breakdown  Musculoskeletal  positive for back pain, Positive for joint pain, Negative muscle pain, Negative muscle weakness  Neurological: Positive for numbness and paresthesias   Psychiatric/Behavioral: Negative altered mental status, Negative for depression    Physical Exam:   Ht 5' 5" (1.651 m)   Wt 109.5 kg (241 lb 6.5 oz)   LMP 12/06/2007   BMI 40.17 kg/m²   General appearance: This is a well-developed, Well nourished female No obvious acute distress.  Psychiatric: normal mood and affect;  pleasant and conversant; judgment and thought content normal  Gait is coordinated. Patient has antalgic gait to the right  Cardiovascular: There are no swelling or varicosities present.   Respiratory: normal respiratory effort   Lymphatic: no adenopathy   Neurologic: alert and oriented to person, place, and time   Deep Tendon Reflexes are normal;  Coordination and Balance: Normal   Musculoskeletal   Neck    ROM shows normal flexion and extension and lateral rotation    Palpation: Non-tender    Stability is normal    Strength is normal    Skin is normal without masses and lesions    Sensation is intact to light touch   Back    ROM showsabnormal flexion, extension and     rotation    Palpation shows no masses    Stability is " normal    Strength to flexion and extension well maintained    Core strength is diminished    Skin shows no rashes or cafe au lait spots;     Sensation is intact to light touch  Right hip   Range of motion normal    Left Hip  Range of motionnormal    Right Knee  Swelling 2+  Tenderness medial  Range of Motion:   Motion is painfulYes  Crepitance presentYes    Right Leg  Neurologic Intact  Pulses Intact    Left Knee: Swelling Mild  TendernessMedial joint line  Range of Motion:    Motion is painful No    Left Leg   Neurologic Intact   Pulses Intact    Radiograph: Show severe degenerative arthritis with subchondral sclerosis, periarticular osteophytes and narrowing of joint space.  Angle: mild varus    Physical therapy is contraindicated due to potential bone loss on this severe arthritic joint.    Assessment:  Knee arthritis right      She will need to be cleared in our PreOp center.         .    She  has a past medical history of Allergy, Arthritis, Blood transfusion, Chronic back pain, GERD (gastroesophageal reflux disease), Hyperlipidemia, Hypertension, Hypothyroidism, Joint pain, Kidney stones, Morbid obesity (12/14/2012), NAFLD (nonalcoholic fatty liver disease), OCD (obsessive compulsive disorder), Osteoporosis, PONV (postoperative nausea and vomiting), Restless leg syndrome, Sciatica of right side associated with disorder of lumbosacral spine, SOB (shortness of breath) on exertion, Spinal stenosis of lumbar region, Supraventricular tachycardia, and Thoracic spondylosis. . We will have to take this into account. She  will be followed by the hospitalist service while in the hospital.       We have gone over the hospitalization and recovery with her as well.  This is typically around 2 weeks on a walker and transition to a cane after that.  She will have home health likely for 3-4 weeks and then transition to outpatient if necessary.  She is agreement with this plan of care and is ready to proceed.  I  will see her back for clinical recheck at the 2-week postop valery.  I will see her back for clinical recheck for any other questions or problems as needed and certainly for any other issues in the interim.    We have discussed risks of total knee replacement which include but are not limited to blood clots in the legs that can travel to the lungs (pulmonary embolism). Pulmonary embolism can cause shortness of breath, chest pain, and even shock. Other risks include urinary tract infection, nausea and vomiting (usually related to pain medication), chronic knee pain and stiffness, bleeding into the knee joint, nerve damage, blood vessel injury, and infection of the knee which can require re-operation. Furthermore, the risks of anesthesia include potential heart, lung, kidney, and liver damage.

## 2020-07-13 ENCOUNTER — OFFICE VISIT (OUTPATIENT)
Dept: DERMATOLOGY | Facility: CLINIC | Age: 55
End: 2020-07-13
Payer: MEDICARE

## 2020-07-13 ENCOUNTER — TELEPHONE (OUTPATIENT)
Dept: DERMATOLOGY | Facility: CLINIC | Age: 55
End: 2020-07-13

## 2020-07-13 DIAGNOSIS — L91.0 HYPERTROPHIC SCAR: Primary | ICD-10-CM

## 2020-07-13 DIAGNOSIS — D23.9 DERMATOFIBROMA: ICD-10-CM

## 2020-07-13 DIAGNOSIS — D22.9 MULTIPLE BENIGN NEVI: ICD-10-CM

## 2020-07-13 DIAGNOSIS — L82.1 SK (SEBORRHEIC KERATOSIS): ICD-10-CM

## 2020-07-13 DIAGNOSIS — L81.4 LENTIGO: ICD-10-CM

## 2020-07-13 DIAGNOSIS — L71.9 ROSACEA: ICD-10-CM

## 2020-07-13 PROCEDURE — 99203 OFFICE O/P NEW LOW 30 MIN: CPT | Mod: 25,S$GLB,, | Performed by: DERMATOLOGY

## 2020-07-13 PROCEDURE — 99203 PR OFFICE/OUTPT VISIT, NEW, LEVL III, 30-44 MIN: ICD-10-PCS | Mod: 25,S$GLB,, | Performed by: DERMATOLOGY

## 2020-07-13 PROCEDURE — 11900 PR INJECTION INTO SKIN LESIONS, UP TO 7: ICD-10-PCS | Mod: S$GLB,,, | Performed by: DERMATOLOGY

## 2020-07-13 PROCEDURE — 99999 PR PBB SHADOW E&M-EST. PATIENT-LVL III: ICD-10-PCS | Mod: PBBFAC,,, | Performed by: DERMATOLOGY

## 2020-07-13 PROCEDURE — 99999 PR PBB SHADOW E&M-EST. PATIENT-LVL III: CPT | Mod: PBBFAC,,, | Performed by: DERMATOLOGY

## 2020-07-13 PROCEDURE — 11900 INJECT SKIN LESIONS </W 7: CPT | Mod: S$GLB,,, | Performed by: DERMATOLOGY

## 2020-07-13 RX ORDER — TRIAMCINOLONE ACETONIDE 40 MG/ML
40 INJECTION, SUSPENSION INTRA-ARTICULAR; INTRAMUSCULAR
Status: SHIPPED | OUTPATIENT
Start: 2020-07-13

## 2020-07-13 NOTE — PROGRESS NOTES
Subjective:       Patient ID:  Angie Mccarthy is a 54 y.o. female who presents for   Chief Complaint   Patient presents with    Skin Check     Tbse    Rosacea     face     Wants TBSE    No h/o nmsc or mm    Rosacea - Initial  Affected locations: face  Duration: years.  Signs and Symptoms: flares intermittently with heat.  Aggravated by: heat  Relieving factors/Treatments tried: nothing        Review of Systems   HENT: Negative for headaches.    Eyes: Negative for itching, eye watering, eye irritation and eyelid inflammation.   Gastrointestinal: Positive for diarrhea (intermittent). Negative for nausea, vomiting and Sensitivity to oral antibiotics.        ++ Vascular instability - occ. Right side of face and neck will flush (no precip factors)     Skin: Positive for activity-related sunscreen use. Negative for daily sunscreen use and recent sunburn.   Neurological: Negative for headaches.   Hematologic/Lymphatic: Does not bruise/bleed easily.        Objective:    Physical Exam   Constitutional: She appears well-developed and well-nourished. She is obese.  No distress.   Neurological: She is alert and oriented to person, place, and time. She is not disoriented.   Psychiatric: She has a normal mood and affect.   Skin:   Areas Examined (abnormalities noted in diagram):   Scalp / Hair Palpated and Inspected  Head / Face Inspection Performed  Neck Inspection Performed  Chest / Axilla Inspection Performed  Abdomen Inspection Performed  Genitals / Buttocks / Groin Inspection Performed  Back Inspection Performed  RUE Inspected  LUE Inspection Performed  RLE Inspected  LLE Inspection Performed  Nails and Digits Inspection Performed                   Diagram Legend     Erythematous scaling macule/papule c/w actinic keratosis       Vascular papule c/w angioma      Pigmented verrucoid papule/plaque c/w seborrheic keratosis      Yellow umbilicated papule c/w sebaceous hyperplasia      Irregularly shaped tan macule  c/w lentigo     1-2 mm smooth white papules consistent with Milia      Movable subcutaneous cyst with punctum c/w epidermal inclusion cyst      Subcutaneous movable cyst c/w pilar cyst      Firm pink to brown papule c/w dermatofibroma      Pedunculated fleshy papule(s) c/w skin tag(s)      Evenly pigmented macule c/w junctional nevus     Mildly variegated pigmented, slightly irregular-bordered macule c/w mildly atypical nevus      Flesh colored to evenly pigmented papule c/w intradermal nevus       Pink pearly papule/plaque c/w basal cell carcinoma      Erythematous hyperkeratotic cursted plaque c/w SCC      Surgical scar with no sign of skin cancer recurrence      Open and closed comedones      Inflammatory papules and pustules      Verrucoid papule consistent consistent with wart     Erythematous eczematous patches and plaques     Dystrophic onycholytic nail with subungual debris c/w onychomycosis     Umbilicated papule    Erythematous-base heme-crusted tan verrucoid plaque consistent with inflamed seborrheic keratosis     Erythematous Silvery Scaling Plaque c/w Psoriasis     See annotation      Assessment / Plan:        Hypertrophic scar  -     triamcinolone acetonide injection 40 mg  Intralesional Kenalog 20mg/cc (0.5 cc total) injected into 1 lesions on the upper left abdomen today after obtaining verbal consent including risk of surrounding hypopigmentation. Patient tolerated procedure well.    Units:2  NDC for Kenalog 40mg/cc:  8822-2261-47              Dermatofibroma  This is a benign scar-like lesion secondary to minor trauma. No treatment required.       Lentigo  This is a benign hyperpigmented sun induced lesion. Daily sun protection will reduce the number of new lesions. Treatment of these benign lesions are considered cosmetic.      SK (seborrheic keratosis)  These are benign inherited growths without a malignant potential. Reassurance given to patient. No treatment is necessary.       Multiple benign  nevi  total body skin examination performed today including at least 12 points as noted in physical examination. No lesions suspicious for malignancy noted.  Reassurance provided.  Instructed patient to observe lesion(s) for changes and follow up in clinic if changes are noted. Discussed ABCDE's of moles and brochure provided.    Rosacea - ET Type  Discussed therapeutic option of laser treatment.                No follow-ups on file.

## 2020-07-13 NOTE — TELEPHONE ENCOUNTER
----- Message from Teri Manuel MD sent at 7/13/2020  3:32 PM CDT -----  Potentially interested in facial telangiectatic laser. Wants price

## 2020-11-07 ENCOUNTER — OFFICE VISIT (OUTPATIENT)
Dept: URGENT CARE | Facility: CLINIC | Age: 55
End: 2020-11-07
Payer: MEDICARE

## 2020-11-07 VITALS
BODY MASS INDEX: 38.32 KG/M2 | SYSTOLIC BLOOD PRESSURE: 147 MMHG | OXYGEN SATURATION: 95 % | DIASTOLIC BLOOD PRESSURE: 86 MMHG | HEIGHT: 65 IN | HEART RATE: 81 BPM | WEIGHT: 230 LBS

## 2020-11-07 DIAGNOSIS — N39.0 ACUTE UTI: Primary | ICD-10-CM

## 2020-11-07 LAB
BILIRUB UR QL STRIP: POSITIVE
GLUCOSE UR QL STRIP: NEGATIVE
KETONES UR QL STRIP: NEGATIVE
LEUKOCYTE ESTERASE UR QL STRIP: POSITIVE
PH, POC UA: 5.5 (ref 5–8)
POC BLOOD, URINE: POSITIVE
POC NITRATES, URINE: POSITIVE
PROT UR QL STRIP: POSITIVE
SP GR UR STRIP: 1.02 (ref 1–1.03)
UROBILINOGEN UR STRIP-ACNC: POSITIVE (ref 0.1–1.1)

## 2020-11-07 PROCEDURE — 81003 POCT URINALYSIS, DIPSTICK, AUTOMATED, W/O SCOPE: ICD-10-PCS | Mod: QW,S$GLB,, | Performed by: INTERNAL MEDICINE

## 2020-11-07 PROCEDURE — 99214 OFFICE O/P EST MOD 30 MIN: CPT | Mod: 25,S$GLB,, | Performed by: INTERNAL MEDICINE

## 2020-11-07 PROCEDURE — 87086 URINE CULTURE/COLONY COUNT: CPT

## 2020-11-07 PROCEDURE — 87088 URINE BACTERIA CULTURE: CPT

## 2020-11-07 PROCEDURE — 87077 CULTURE AEROBIC IDENTIFY: CPT

## 2020-11-07 PROCEDURE — 87186 SC STD MICRODIL/AGAR DIL: CPT

## 2020-11-07 PROCEDURE — 81003 URINALYSIS AUTO W/O SCOPE: CPT | Mod: QW,S$GLB,, | Performed by: INTERNAL MEDICINE

## 2020-11-07 PROCEDURE — 99214 PR OFFICE/OUTPT VISIT, EST, LEVL IV, 30-39 MIN: ICD-10-PCS | Mod: 25,S$GLB,, | Performed by: INTERNAL MEDICINE

## 2020-11-07 RX ORDER — NITROFURANTOIN 25; 75 MG/1; MG/1
100 CAPSULE ORAL 2 TIMES DAILY
Qty: 14 CAPSULE | Refills: 0 | Status: SHIPPED | OUTPATIENT
Start: 2020-11-07 | End: 2020-11-07 | Stop reason: CLARIF

## 2020-11-07 RX ORDER — PHENAZOPYRIDINE HYDROCHLORIDE 100 MG/1
100 TABLET, FILM COATED ORAL 3 TIMES DAILY PRN
Qty: 9 TABLET | Refills: 0 | Status: SHIPPED | OUTPATIENT
Start: 2020-11-07 | End: 2020-11-10

## 2020-11-07 RX ORDER — CIPROFLOXACIN 500 MG/1
500 TABLET ORAL 2 TIMES DAILY
Qty: 14 TABLET | Refills: 0 | Status: SHIPPED | OUTPATIENT
Start: 2020-11-07 | End: 2020-11-14

## 2020-11-08 NOTE — PROGRESS NOTES
"Subjective:       Patient ID: Angie Mccarthy is a 55 y.o. female.    Vitals:  height is 5' 5" (1.651 m) and weight is 104.3 kg (230 lb). Her blood pressure is 147/86 (abnormal) and her pulse is 81. Her oxygen saturation is 95%.     Chief Complaint: Dysuria    Dysuria   This is a new problem. The current episode started 1 to 4 weeks ago (1 wk ago). The problem occurs every urination. The problem has been gradually worsening. The quality of the pain is described as burning and shooting. The pain is at a severity of 7/10. The pain is moderate. She is sexually active. There is a history of pyelonephritis. Associated symptoms include flank pain, frequency, nausea and urgency. Pertinent negatives include no behavior changes, chills, discharge, hematuria, hesitancy, possible pregnancy, sweats, vomiting, weight loss, bubble bath use, constipation, rash or withholding. She has tried NSAIDs (Aspirin ) for the symptoms. Her past medical history is significant for kidney stones and recurrent UTIs. There is no history of catheterization, diabetes insipidus, diabetes mellitus, genitourinary reflux, hypertension, a single kidney, STD, urinary stasis or a urological procedure.       Constitution: Negative for chills and fever.   Neck: Negative for painful lymph nodes.   Gastrointestinal: Positive for nausea. Negative for abdominal pain, vomiting and constipation.   Genitourinary: Positive for dysuria, frequency, urgency, flank pain and history of kidney stones. Negative for urine decreased, hematuria, painful menstruation, irregular menstruation, missed menses, heavy menstrual bleeding, ovarian cysts, genital trauma, vaginal pain, vaginal discharge, vaginal bleeding, vaginal odor, painful intercourse, genital sore, painful ejaculation and pelvic pain.   Musculoskeletal: Negative for back pain.   Skin: Negative for rash and lesion.   Hematologic/Lymphatic: Negative for swollen lymph nodes.       Objective:      Physical " Exam   Constitutional: normal  HENT:   Head: Normocephalic and atraumatic.   Mouth/Throat: Mucous membranes are moist. Oropharynx is clear.   Eyes: Pupils are equal, round, and reactive to light. Conjunctivae are normal. extraocular movement intact  Abdominal: Normal appearance.      Comments: Suprapubic discomfort   Neurological: She is alert.   Nursing note and vitals reviewed.        Assessment:       1. Acute UTI        Plan:         Acute UTI  -     Discontinue: nitrofurantoin, macrocrystal-monohydrate, (MACROBID) 100 MG capsule; Take 1 capsule (100 mg total) by mouth 2 (two) times daily. for 7 days  Dispense: 14 capsule; Refill: 0  -     phenazopyridine (PYRIDIUM) 100 MG tablet; Take 1 tablet (100 mg total) by mouth 3 (three) times daily as needed for Pain.  Dispense: 9 tablet; Refill: 0  -     Culture, Urine  -     ciprofloxacin HCl (CIPRO) 500 MG tablet; Take 1 tablet (500 mg total) by mouth 2 (two) times daily. for 7 days  Dispense: 14 tablet; Refill: 0

## 2020-11-11 ENCOUNTER — TELEPHONE (OUTPATIENT)
Dept: URGENT CARE | Facility: CLINIC | Age: 55
End: 2020-11-11

## 2020-11-11 LAB — BACTERIA UR CULT: ABNORMAL

## 2020-11-11 RX ORDER — SULFAMETHOXAZOLE AND TRIMETHOPRIM 800; 160 MG/1; MG/1
1 TABLET ORAL 2 TIMES DAILY
Qty: 14 TABLET | Refills: 0 | Status: SHIPPED | OUTPATIENT
Start: 2020-11-11 | End: 2021-04-21

## 2020-11-11 RX ORDER — PHENAZOPYRIDINE HYDROCHLORIDE 100 MG/1
100 TABLET, FILM COATED ORAL 3 TIMES DAILY PRN
Qty: 9 TABLET | Refills: 0 | Status: SHIPPED | OUTPATIENT
Start: 2020-11-11 | End: 2020-11-14

## 2020-11-14 ENCOUNTER — TELEPHONE (OUTPATIENT)
Dept: URGENT CARE | Facility: CLINIC | Age: 55
End: 2020-11-14

## 2020-11-14 NOTE — TELEPHONE ENCOUNTER
Pt on 4 days of cipro and stopped b/c ineffective and Dr Martel placed on Bactrim. Pt feels she is having SE from Bactrim x 3 days. Instructed to stop taking.    Her urinary symptoms have resolved.    Ask patient to return here if side effects, including perceived palpitations and elevated heart rate, return or worsen.  Also asked to return if any new onset of urinary symptoms.  Patient acknowledged understanding and agrees to plan.

## 2020-12-16 ENCOUNTER — TELEPHONE (OUTPATIENT)
Dept: HEPATOLOGY | Facility: CLINIC | Age: 55
End: 2020-12-16

## 2020-12-16 DIAGNOSIS — K74.01 EARLY HEPATIC FIBROSIS: ICD-10-CM

## 2020-12-16 DIAGNOSIS — K76.0 FATTY LIVER DISEASE, NONALCOHOLIC: Primary | ICD-10-CM

## 2020-12-30 ENCOUNTER — PATIENT MESSAGE (OUTPATIENT)
Dept: HEPATOLOGY | Facility: CLINIC | Age: 55
End: 2020-12-30

## 2021-01-06 ENCOUNTER — PATIENT MESSAGE (OUTPATIENT)
Dept: HEPATOLOGY | Facility: CLINIC | Age: 56
End: 2021-01-06

## 2021-01-27 ENCOUNTER — TELEPHONE (OUTPATIENT)
Dept: HEPATOLOGY | Facility: CLINIC | Age: 56
End: 2021-01-27

## 2021-03-10 DIAGNOSIS — R52 PAIN: Primary | ICD-10-CM

## 2021-03-12 ENCOUNTER — TELEPHONE (OUTPATIENT)
Dept: ORTHOPEDICS | Facility: CLINIC | Age: 56
End: 2021-03-12

## 2021-03-16 ENCOUNTER — HOSPITAL ENCOUNTER (OUTPATIENT)
Dept: RADIOLOGY | Facility: OTHER | Age: 56
Discharge: HOME | End: 2021-03-16
Attending: ORTHOPAEDIC SURGERY
Payer: MEDICARE

## 2021-03-16 ENCOUNTER — OFFICE VISIT (OUTPATIENT)
Dept: ORTHOPEDICS | Facility: CLINIC | Age: 56
End: 2021-03-16
Payer: MEDICARE

## 2021-03-16 VITALS
BODY MASS INDEX: 38.32 KG/M2 | DIASTOLIC BLOOD PRESSURE: 87 MMHG | HEART RATE: 108 BPM | SYSTOLIC BLOOD PRESSURE: 152 MMHG | HEIGHT: 65 IN | WEIGHT: 230 LBS

## 2021-03-16 DIAGNOSIS — R52 PAIN: ICD-10-CM

## 2021-03-16 DIAGNOSIS — M65.4 DE QUERVAIN'S TENOSYNOVITIS, LEFT: Primary | ICD-10-CM

## 2021-03-16 DIAGNOSIS — G56.02 LEFT CARPAL TUNNEL SYNDROME: ICD-10-CM

## 2021-03-16 PROCEDURE — 99999 PR PBB SHADOW E&M-EST. PATIENT-LVL IV: ICD-10-PCS | Mod: PBBFAC,,, | Performed by: ORTHOPAEDIC SURGERY

## 2021-03-16 PROCEDURE — 3077F SYST BP >= 140 MM HG: CPT | Mod: CPTII,S$GLB,, | Performed by: ORTHOPAEDIC SURGERY

## 2021-03-16 PROCEDURE — 73130 XR HAND COMPLETE 3 VIEW LEFT: ICD-10-PCS | Mod: 26,LT,, | Performed by: RADIOLOGY

## 2021-03-16 PROCEDURE — 73090 XR FOREARM LEFT: ICD-10-PCS | Mod: 26,LT,, | Performed by: RADIOLOGY

## 2021-03-16 PROCEDURE — 3079F PR MOST RECENT DIASTOLIC BLOOD PRESSURE 80-89 MM HG: ICD-10-PCS | Mod: CPTII,S$GLB,, | Performed by: ORTHOPAEDIC SURGERY

## 2021-03-16 PROCEDURE — 99204 OFFICE O/P NEW MOD 45 MIN: CPT | Mod: 25,S$GLB,, | Performed by: ORTHOPAEDIC SURGERY

## 2021-03-16 PROCEDURE — 3077F PR MOST RECENT SYSTOLIC BLOOD PRESSURE >= 140 MM HG: ICD-10-PCS | Mod: CPTII,S$GLB,, | Performed by: ORTHOPAEDIC SURGERY

## 2021-03-16 PROCEDURE — 20550 NJX 1 TENDON SHEATH/LIGAMENT: CPT | Mod: LT,S$GLB,, | Performed by: ORTHOPAEDIC SURGERY

## 2021-03-16 PROCEDURE — 73130 X-RAY EXAM OF HAND: CPT | Mod: 26,LT,, | Performed by: RADIOLOGY

## 2021-03-16 PROCEDURE — 1126F AMNT PAIN NOTED NONE PRSNT: CPT | Mod: S$GLB,,, | Performed by: ORTHOPAEDIC SURGERY

## 2021-03-16 PROCEDURE — 99204 PR OFFICE/OUTPT VISIT, NEW, LEVL IV, 45-59 MIN: ICD-10-PCS | Mod: 25,S$GLB,, | Performed by: ORTHOPAEDIC SURGERY

## 2021-03-16 PROCEDURE — 20550 TENDON SHEATH: ICD-10-PCS | Mod: LT,S$GLB,, | Performed by: ORTHOPAEDIC SURGERY

## 2021-03-16 PROCEDURE — 3008F BODY MASS INDEX DOCD: CPT | Mod: CPTII,S$GLB,, | Performed by: ORTHOPAEDIC SURGERY

## 2021-03-16 PROCEDURE — 73090 X-RAY EXAM OF FOREARM: CPT | Mod: 26,LT,, | Performed by: RADIOLOGY

## 2021-03-16 PROCEDURE — 73130 X-RAY EXAM OF HAND: CPT | Mod: TC,FY,LT

## 2021-03-16 PROCEDURE — 1126F PR PAIN SEVERITY QUANTIFIED, NO PAIN PRESENT: ICD-10-PCS | Mod: S$GLB,,, | Performed by: ORTHOPAEDIC SURGERY

## 2021-03-16 PROCEDURE — 3008F PR BODY MASS INDEX (BMI) DOCUMENTED: ICD-10-PCS | Mod: CPTII,S$GLB,, | Performed by: ORTHOPAEDIC SURGERY

## 2021-03-16 PROCEDURE — 99999 PR PBB SHADOW E&M-EST. PATIENT-LVL IV: CPT | Mod: PBBFAC,,, | Performed by: ORTHOPAEDIC SURGERY

## 2021-03-16 PROCEDURE — 3079F DIAST BP 80-89 MM HG: CPT | Mod: CPTII,S$GLB,, | Performed by: ORTHOPAEDIC SURGERY

## 2021-03-16 PROCEDURE — 73090 X-RAY EXAM OF FOREARM: CPT | Mod: TC,FY,LT

## 2021-03-16 RX ADMIN — DEXAMETHASONE SODIUM PHOSPHATE 4 MG: 4 INJECTION, SOLUTION INTRA-ARTICULAR; INTRALESIONAL; INTRAMUSCULAR; INTRAVENOUS; SOFT TISSUE at 02:03

## 2021-03-17 RX ORDER — DEXAMETHASONE SODIUM PHOSPHATE 4 MG/ML
4 INJECTION, SOLUTION INTRA-ARTICULAR; INTRALESIONAL; INTRAMUSCULAR; INTRAVENOUS; SOFT TISSUE
Status: DISCONTINUED | OUTPATIENT
Start: 2021-03-16 | End: 2021-03-17 | Stop reason: HOSPADM

## 2021-04-12 ENCOUNTER — TELEPHONE (OUTPATIENT)
Dept: NEUROLOGY | Facility: CLINIC | Age: 56
End: 2021-04-12

## 2021-04-13 ENCOUNTER — PATIENT MESSAGE (OUTPATIENT)
Dept: ORTHOPEDICS | Facility: CLINIC | Age: 56
End: 2021-04-13

## 2021-04-21 ENCOUNTER — OFFICE VISIT (OUTPATIENT)
Dept: UROLOGY | Facility: CLINIC | Age: 56
End: 2021-04-21
Payer: MEDICARE

## 2021-04-21 ENCOUNTER — LAB VISIT (OUTPATIENT)
Dept: LAB | Facility: HOSPITAL | Age: 56
End: 2021-04-21
Attending: UROLOGY
Payer: MEDICARE

## 2021-04-21 VITALS
BODY MASS INDEX: 39.37 KG/M2 | DIASTOLIC BLOOD PRESSURE: 83 MMHG | HEART RATE: 96 BPM | SYSTOLIC BLOOD PRESSURE: 129 MMHG | HEIGHT: 65 IN | WEIGHT: 236.31 LBS

## 2021-04-21 DIAGNOSIS — N20.0 BILATERAL NEPHROLITHIASIS: Primary | ICD-10-CM

## 2021-04-21 DIAGNOSIS — N20.0 BILATERAL NEPHROLITHIASIS: ICD-10-CM

## 2021-04-21 DIAGNOSIS — R10.9 ABDOMINAL PAIN, UNSPECIFIED ABDOMINAL LOCATION: ICD-10-CM

## 2021-04-21 LAB
ALBUMIN SERPL BCP-MCNC: 4 G/DL (ref 3.5–5.2)
ALP SERPL-CCNC: 128 U/L (ref 55–135)
ALT SERPL W/O P-5'-P-CCNC: 53 U/L (ref 10–44)
ANION GAP SERPL CALC-SCNC: 12 MMOL/L (ref 8–16)
AST SERPL-CCNC: 79 U/L (ref 10–40)
BILIRUB SERPL-MCNC: 0.4 MG/DL (ref 0.1–1)
BUN SERPL-MCNC: 13 MG/DL (ref 6–20)
CALCIUM SERPL-MCNC: 9.6 MG/DL (ref 8.7–10.5)
CHLORIDE SERPL-SCNC: 103 MMOL/L (ref 95–110)
CO2 SERPL-SCNC: 27 MMOL/L (ref 23–29)
CREAT SERPL-MCNC: 0.9 MG/DL (ref 0.5–1.4)
ERYTHROCYTE [DISTWIDTH] IN BLOOD BY AUTOMATED COUNT: 14.9 % (ref 11.5–14.5)
EST. GFR  (AFRICAN AMERICAN): >60 ML/MIN/1.73 M^2
EST. GFR  (NON AFRICAN AMERICAN): >60 ML/MIN/1.73 M^2
GLUCOSE SERPL-MCNC: 102 MG/DL (ref 70–110)
HCT VFR BLD AUTO: 39.7 % (ref 37–48.5)
HGB BLD-MCNC: 12.5 G/DL (ref 12–16)
MCH RBC QN AUTO: 23.6 PG (ref 27–31)
MCHC RBC AUTO-ENTMCNC: 31.5 G/DL (ref 32–36)
MCV RBC AUTO: 75 FL (ref 82–98)
PLATELET # BLD AUTO: 295 K/UL (ref 150–450)
PMV BLD AUTO: 11.2 FL (ref 9.2–12.9)
POTASSIUM SERPL-SCNC: 4 MMOL/L (ref 3.5–5.1)
PROT SERPL-MCNC: 7.5 G/DL (ref 6–8.4)
PTH-INTACT SERPL-MCNC: 91 PG/ML (ref 9–77)
RBC # BLD AUTO: 5.29 M/UL (ref 4–5.4)
SODIUM SERPL-SCNC: 142 MMOL/L (ref 136–145)
URATE SERPL-MCNC: 6.6 MG/DL (ref 2.4–5.7)
WBC # BLD AUTO: 10.28 K/UL (ref 3.9–12.7)

## 2021-04-21 PROCEDURE — 99204 PR OFFICE/OUTPT VISIT, NEW, LEVL IV, 45-59 MIN: ICD-10-PCS | Mod: S$GLB,,, | Performed by: UROLOGY

## 2021-04-21 PROCEDURE — 99999 PR PBB SHADOW E&M-EST. PATIENT-LVL IV: ICD-10-PCS | Mod: PBBFAC,,, | Performed by: UROLOGY

## 2021-04-21 PROCEDURE — 3008F PR BODY MASS INDEX (BMI) DOCUMENTED: ICD-10-PCS | Mod: CPTII,S$GLB,, | Performed by: UROLOGY

## 2021-04-21 PROCEDURE — 84550 ASSAY OF BLOOD/URIC ACID: CPT | Performed by: UROLOGY

## 2021-04-21 PROCEDURE — 1126F AMNT PAIN NOTED NONE PRSNT: CPT | Mod: S$GLB,,, | Performed by: UROLOGY

## 2021-04-21 PROCEDURE — 3008F BODY MASS INDEX DOCD: CPT | Mod: CPTII,S$GLB,, | Performed by: UROLOGY

## 2021-04-21 PROCEDURE — 82365 CALCULUS SPECTROSCOPY: CPT | Performed by: UROLOGY

## 2021-04-21 PROCEDURE — 36415 COLL VENOUS BLD VENIPUNCTURE: CPT | Performed by: UROLOGY

## 2021-04-21 PROCEDURE — 99999 PR PBB SHADOW E&M-EST. PATIENT-LVL IV: CPT | Mod: PBBFAC,,, | Performed by: UROLOGY

## 2021-04-21 PROCEDURE — 99204 OFFICE O/P NEW MOD 45 MIN: CPT | Mod: S$GLB,,, | Performed by: UROLOGY

## 2021-04-21 PROCEDURE — 83970 ASSAY OF PARATHORMONE: CPT | Performed by: UROLOGY

## 2021-04-21 PROCEDURE — 80053 COMPREHEN METABOLIC PANEL: CPT | Performed by: UROLOGY

## 2021-04-21 PROCEDURE — 85027 COMPLETE CBC AUTOMATED: CPT | Performed by: UROLOGY

## 2021-04-21 PROCEDURE — 1126F PR PAIN SEVERITY QUANTIFIED, NO PAIN PRESENT: ICD-10-PCS | Mod: S$GLB,,, | Performed by: UROLOGY

## 2021-04-21 RX ORDER — ASCORBIC ACID 500 MG
TABLET ORAL
COMMUNITY
Start: 2010-04-10

## 2021-04-21 RX ORDER — PROMETHAZINE HYDROCHLORIDE 25 MG/1
25 TABLET ORAL EVERY 4 HOURS
Qty: 10 TABLET | Refills: 0 | Status: SHIPPED | OUTPATIENT
Start: 2021-04-21 | End: 2023-09-21

## 2021-04-21 RX ORDER — TAMSULOSIN HYDROCHLORIDE 0.4 MG/1
0.4 CAPSULE ORAL
Qty: 30 CAPSULE | Refills: 12 | Status: SHIPPED | OUTPATIENT
Start: 2021-04-21 | End: 2022-04-26

## 2021-04-23 LAB
COMPN STONE: NORMAL
SPECIMEN SOURCE: NORMAL
STONE ANALYSIS IR-IMP: NORMAL

## 2021-05-01 ENCOUNTER — PATIENT MESSAGE (OUTPATIENT)
Dept: UROLOGY | Facility: CLINIC | Age: 56
End: 2021-05-01

## 2021-05-01 DIAGNOSIS — R79.89 ELEVATED LFTS: Primary | ICD-10-CM

## 2021-05-03 ENCOUNTER — PATIENT MESSAGE (OUTPATIENT)
Dept: ORTHOPEDICS | Facility: CLINIC | Age: 56
End: 2021-05-03

## 2021-05-06 ENCOUNTER — TELEPHONE (OUTPATIENT)
Dept: ORTHOPEDICS | Facility: CLINIC | Age: 56
End: 2021-05-06

## 2021-05-07 ENCOUNTER — OFFICE VISIT (OUTPATIENT)
Dept: ORTHOPEDICS | Facility: CLINIC | Age: 56
End: 2021-05-07
Payer: MEDICARE

## 2021-05-07 ENCOUNTER — HOSPITAL ENCOUNTER (OUTPATIENT)
Dept: RADIOLOGY | Facility: HOSPITAL | Age: 56
Discharge: HOME | End: 2021-05-07
Attending: ORTHOPAEDIC SURGERY
Payer: MEDICARE

## 2021-05-07 VITALS — WEIGHT: 235 LBS | HEIGHT: 65 IN | BODY MASS INDEX: 39.15 KG/M2

## 2021-05-07 DIAGNOSIS — Z96.652 STATUS POST TOTAL LEFT KNEE REPLACEMENT: ICD-10-CM

## 2021-05-07 DIAGNOSIS — M25.562 PAIN IN BOTH KNEES, UNSPECIFIED CHRONICITY: Primary | ICD-10-CM

## 2021-05-07 DIAGNOSIS — M17.11 PRIMARY OSTEOARTHRITIS OF RIGHT KNEE: ICD-10-CM

## 2021-05-07 DIAGNOSIS — Z01.818 PREOP TESTING: Primary | ICD-10-CM

## 2021-05-07 DIAGNOSIS — T84.59XA INFECTION OF TOTAL KNEE REPLACEMENT, INITIAL ENCOUNTER: Primary | ICD-10-CM

## 2021-05-07 DIAGNOSIS — M25.561 PAIN IN BOTH KNEES, UNSPECIFIED CHRONICITY: Primary | ICD-10-CM

## 2021-05-07 DIAGNOSIS — M25.562 PAIN IN BOTH KNEES, UNSPECIFIED CHRONICITY: ICD-10-CM

## 2021-05-07 DIAGNOSIS — Z96.659 INFECTION OF TOTAL KNEE REPLACEMENT, INITIAL ENCOUNTER: Primary | ICD-10-CM

## 2021-05-07 DIAGNOSIS — M25.561 PAIN IN BOTH KNEES, UNSPECIFIED CHRONICITY: ICD-10-CM

## 2021-05-07 PROCEDURE — 99999 PR PBB SHADOW E&M-EST. PATIENT-LVL III: CPT | Mod: PBBFAC,,, | Performed by: ORTHOPAEDIC SURGERY

## 2021-05-07 PROCEDURE — 99213 OFFICE O/P EST LOW 20 MIN: CPT | Mod: S$GLB,,, | Performed by: ORTHOPAEDIC SURGERY

## 2021-05-07 PROCEDURE — 73562 X-RAY EXAM OF KNEE 3: CPT | Mod: TC,50

## 2021-05-07 PROCEDURE — 1125F AMNT PAIN NOTED PAIN PRSNT: CPT | Mod: S$GLB,,, | Performed by: ORTHOPAEDIC SURGERY

## 2021-05-07 PROCEDURE — 99999 PR PBB SHADOW E&M-EST. PATIENT-LVL III: ICD-10-PCS | Mod: PBBFAC,,, | Performed by: ORTHOPAEDIC SURGERY

## 2021-05-07 PROCEDURE — 1125F PR PAIN SEVERITY QUANTIFIED, PAIN PRESENT: ICD-10-PCS | Mod: S$GLB,,, | Performed by: ORTHOPAEDIC SURGERY

## 2021-05-07 PROCEDURE — 3008F PR BODY MASS INDEX (BMI) DOCUMENTED: ICD-10-PCS | Mod: CPTII,S$GLB,, | Performed by: ORTHOPAEDIC SURGERY

## 2021-05-07 PROCEDURE — 3008F BODY MASS INDEX DOCD: CPT | Mod: CPTII,S$GLB,, | Performed by: ORTHOPAEDIC SURGERY

## 2021-05-07 PROCEDURE — 73562 X-RAY EXAM OF KNEE 3: CPT | Mod: 26,50,, | Performed by: RADIOLOGY

## 2021-05-07 PROCEDURE — 73562 XR KNEE ORTHO BILAT: ICD-10-PCS | Mod: 26,50,, | Performed by: RADIOLOGY

## 2021-05-07 PROCEDURE — 99213 PR OFFICE/OUTPT VISIT, EST, LEVL III, 20-29 MIN: ICD-10-PCS | Mod: S$GLB,,, | Performed by: ORTHOPAEDIC SURGERY

## 2021-05-08 PROBLEM — M17.12 PRIMARY OSTEOARTHRITIS OF LEFT KNEE: Status: RESOLVED | Noted: 2019-04-29 | Resolved: 2021-05-08

## 2021-05-08 PROBLEM — Z96.652 STATUS POST TOTAL LEFT KNEE REPLACEMENT: Status: ACTIVE | Noted: 2021-05-08

## 2021-05-12 DIAGNOSIS — M17.11 PRIMARY OSTEOARTHRITIS OF RIGHT KNEE: Primary | ICD-10-CM

## 2021-05-13 ENCOUNTER — TELEPHONE (OUTPATIENT)
Dept: ADMINISTRATIVE | Facility: OTHER | Age: 56
End: 2021-05-13

## 2021-05-24 ENCOUNTER — PATIENT MESSAGE (OUTPATIENT)
Dept: ADMINISTRATIVE | Facility: OTHER | Age: 56
End: 2021-05-24

## 2021-05-24 ENCOUNTER — TELEPHONE (OUTPATIENT)
Dept: SURGERY | Facility: CLINIC | Age: 56
End: 2021-05-24

## 2021-05-24 ENCOUNTER — TELEPHONE (OUTPATIENT)
Dept: HEPATOLOGY | Facility: CLINIC | Age: 56
End: 2021-05-24

## 2021-05-24 ENCOUNTER — PATIENT MESSAGE (OUTPATIENT)
Dept: ENDOCRINOLOGY | Facility: CLINIC | Age: 56
End: 2021-05-24

## 2021-05-24 DIAGNOSIS — K76.0 FATTY LIVER: Primary | ICD-10-CM

## 2021-05-24 DIAGNOSIS — E04.1 THYROID NODULE: Primary | ICD-10-CM

## 2021-05-24 DIAGNOSIS — R74.8 ELEVATED LIVER ENZYMES: ICD-10-CM

## 2021-06-01 ENCOUNTER — TELEPHONE (OUTPATIENT)
Dept: ORTHOPEDICS | Facility: CLINIC | Age: 56
End: 2021-06-01

## 2021-06-02 ENCOUNTER — TELEPHONE (OUTPATIENT)
Dept: ORTHOPEDICS | Facility: CLINIC | Age: 56
End: 2021-06-02

## 2021-06-03 ENCOUNTER — PATIENT MESSAGE (OUTPATIENT)
Dept: ADMINISTRATIVE | Facility: OTHER | Age: 56
End: 2021-06-03

## 2021-06-04 ENCOUNTER — HOSPITAL ENCOUNTER (OUTPATIENT)
Dept: RADIOLOGY | Facility: HOSPITAL | Age: 56
Discharge: HOME | End: 2021-06-04
Attending: NURSE PRACTITIONER
Payer: MEDICARE

## 2021-06-04 DIAGNOSIS — K76.0 FATTY LIVER: ICD-10-CM

## 2021-06-04 PROCEDURE — 76700 US EXAM ABDOM COMPLETE: CPT | Mod: 26,,, | Performed by: INTERNAL MEDICINE

## 2021-06-04 PROCEDURE — 76700 US ABDOMEN COMPLETE: ICD-10-PCS | Mod: 26,,, | Performed by: INTERNAL MEDICINE

## 2021-06-04 PROCEDURE — 76700 US EXAM ABDOM COMPLETE: CPT | Mod: TC

## 2021-06-07 ENCOUNTER — TELEPHONE (OUTPATIENT)
Dept: ADMINISTRATIVE | Facility: OTHER | Age: 56
End: 2021-06-07

## 2021-06-09 ENCOUNTER — OFFICE VISIT (OUTPATIENT)
Dept: HEPATOLOGY | Facility: CLINIC | Age: 56
End: 2021-06-09
Payer: MEDICARE

## 2021-06-09 ENCOUNTER — PATIENT MESSAGE (OUTPATIENT)
Dept: HEPATOLOGY | Facility: CLINIC | Age: 56
End: 2021-06-09

## 2021-06-09 ENCOUNTER — PROCEDURE VISIT (OUTPATIENT)
Dept: HEPATOLOGY | Facility: CLINIC | Age: 56
End: 2021-06-09
Payer: MEDICARE

## 2021-06-09 VITALS
HEART RATE: 86 BPM | DIASTOLIC BLOOD PRESSURE: 77 MMHG | BODY MASS INDEX: 39.63 KG/M2 | OXYGEN SATURATION: 95 % | SYSTOLIC BLOOD PRESSURE: 139 MMHG | HEIGHT: 65 IN | RESPIRATION RATE: 18 BRPM | WEIGHT: 237.88 LBS | TEMPERATURE: 99 F

## 2021-06-09 DIAGNOSIS — K74.01 EARLY HEPATIC FIBROSIS: ICD-10-CM

## 2021-06-09 DIAGNOSIS — K75.81 NASH (NONALCOHOLIC STEATOHEPATITIS): Primary | ICD-10-CM

## 2021-06-09 DIAGNOSIS — R73.03 PRE-DIABETES: ICD-10-CM

## 2021-06-09 DIAGNOSIS — R79.89 ELEVATED LFTS: ICD-10-CM

## 2021-06-09 DIAGNOSIS — K76.0 FATTY LIVER DISEASE, NONALCOHOLIC: ICD-10-CM

## 2021-06-09 DIAGNOSIS — K74.02 HEPATIC FIBROSIS, STAGE 3: ICD-10-CM

## 2021-06-09 PROCEDURE — 99215 OFFICE O/P EST HI 40 MIN: CPT | Mod: S$GLB,,, | Performed by: NURSE PRACTITIONER

## 2021-06-09 PROCEDURE — 1125F PR PAIN SEVERITY QUANTIFIED, PAIN PRESENT: ICD-10-PCS | Mod: S$GLB,,, | Performed by: NURSE PRACTITIONER

## 2021-06-09 PROCEDURE — 99215 PR OFFICE/OUTPT VISIT, EST, LEVL V, 40-54 MIN: ICD-10-PCS | Mod: S$GLB,,, | Performed by: NURSE PRACTITIONER

## 2021-06-09 PROCEDURE — 99999 PR PBB SHADOW E&M-EST. PATIENT-LVL IV: ICD-10-PCS | Mod: PBBFAC,,, | Performed by: NURSE PRACTITIONER

## 2021-06-09 PROCEDURE — 3008F PR BODY MASS INDEX (BMI) DOCUMENTED: ICD-10-PCS | Mod: CPTII,S$GLB,, | Performed by: NURSE PRACTITIONER

## 2021-06-09 PROCEDURE — 91200 LIVER ELASTOGRAPHY: CPT | Mod: S$GLB,,, | Performed by: NURSE PRACTITIONER

## 2021-06-09 PROCEDURE — 1125F AMNT PAIN NOTED PAIN PRSNT: CPT | Mod: S$GLB,,, | Performed by: NURSE PRACTITIONER

## 2021-06-09 PROCEDURE — 3008F BODY MASS INDEX DOCD: CPT | Mod: CPTII,S$GLB,, | Performed by: NURSE PRACTITIONER

## 2021-06-09 PROCEDURE — 91200 FIBROSCAN (VIBRATION CONTROLLED TRANSIENT ELASTOGRAPHY): ICD-10-PCS | Mod: S$GLB,,, | Performed by: NURSE PRACTITIONER

## 2021-06-09 PROCEDURE — 99999 PR PBB SHADOW E&M-EST. PATIENT-LVL IV: CPT | Mod: PBBFAC,,, | Performed by: NURSE PRACTITIONER

## 2021-06-14 ENCOUNTER — TELEPHONE (OUTPATIENT)
Dept: HEPATOLOGY | Facility: CLINIC | Age: 56
End: 2021-06-14

## 2021-06-16 ENCOUNTER — PROCEDURE VISIT (OUTPATIENT)
Dept: PHYSICAL MEDICINE AND REHAB | Facility: CLINIC | Age: 56
End: 2021-06-16
Payer: MEDICARE

## 2021-06-16 ENCOUNTER — HOSPITAL ENCOUNTER (OUTPATIENT)
Dept: RADIOLOGY | Facility: HOSPITAL | Age: 56
Discharge: HOME | End: 2021-06-16
Attending: INTERNAL MEDICINE
Payer: MEDICARE

## 2021-06-16 DIAGNOSIS — G56.02 LEFT CARPAL TUNNEL SYNDROME: ICD-10-CM

## 2021-06-16 DIAGNOSIS — E04.1 THYROID NODULE: ICD-10-CM

## 2021-06-16 PROCEDURE — 95911 NRV CNDJ TEST 9-10 STUDIES: CPT | Mod: S$GLB,,, | Performed by: PHYSICAL MEDICINE & REHABILITATION

## 2021-06-16 PROCEDURE — 95886 PR EMG COMPLETE, W/ NERVE CONDUCTION STUDIES, 5+ MUSCLES: ICD-10-PCS | Mod: S$GLB,,, | Performed by: PHYSICAL MEDICINE & REHABILITATION

## 2021-06-16 PROCEDURE — 95886 MUSC TEST DONE W/N TEST COMP: CPT | Mod: S$GLB,,, | Performed by: PHYSICAL MEDICINE & REHABILITATION

## 2021-06-16 PROCEDURE — 76536 US EXAM OF HEAD AND NECK: CPT | Mod: TC

## 2021-06-16 PROCEDURE — 76536 US SOFT TISSUE HEAD NECK THYROID: ICD-10-PCS | Mod: 26,,, | Performed by: RADIOLOGY

## 2021-06-16 PROCEDURE — 95911 PR NERVE CONDUCTION STUDY; 9-10 STUDIES: ICD-10-PCS | Mod: S$GLB,,, | Performed by: PHYSICAL MEDICINE & REHABILITATION

## 2021-06-16 PROCEDURE — 76536 US EXAM OF HEAD AND NECK: CPT | Mod: 26,,, | Performed by: RADIOLOGY

## 2021-06-17 ENCOUNTER — HOSPITAL ENCOUNTER (OUTPATIENT)
Dept: RADIOLOGY | Facility: HOSPITAL | Age: 56
Discharge: HOME | End: 2021-06-17
Attending: UROLOGY
Payer: MEDICARE

## 2021-06-17 DIAGNOSIS — R10.9 ABDOMINAL PAIN, UNSPECIFIED ABDOMINAL LOCATION: ICD-10-CM

## 2021-06-17 PROCEDURE — 74176 CT RENAL STONE STUDY ABD PELVIS WO: ICD-10-PCS | Mod: 26,,, | Performed by: RADIOLOGY

## 2021-06-17 PROCEDURE — 74176 CT ABD & PELVIS W/O CONTRAST: CPT | Mod: 26,,, | Performed by: RADIOLOGY

## 2021-06-17 PROCEDURE — 74176 CT ABD & PELVIS W/O CONTRAST: CPT | Mod: TC

## 2021-06-21 ENCOUNTER — TELEPHONE (OUTPATIENT)
Dept: ORTHOPEDICS | Facility: CLINIC | Age: 56
End: 2021-06-21

## 2021-06-22 ENCOUNTER — DOCUMENTATION ONLY (OUTPATIENT)
Dept: ORTHOPEDICS | Facility: CLINIC | Age: 56
End: 2021-06-22

## 2021-06-30 ENCOUNTER — TELEPHONE (OUTPATIENT)
Dept: BARIATRICS | Facility: CLINIC | Age: 56
End: 2021-06-30

## 2021-07-02 ENCOUNTER — PATIENT MESSAGE (OUTPATIENT)
Dept: ADMINISTRATIVE | Facility: OTHER | Age: 56
End: 2021-07-02

## 2021-07-07 ENCOUNTER — OFFICE VISIT (OUTPATIENT)
Dept: ENDOCRINOLOGY | Facility: CLINIC | Age: 56
End: 2021-07-07
Payer: MEDICARE

## 2021-07-07 DIAGNOSIS — E03.9 PRIMARY HYPOTHYROIDISM: ICD-10-CM

## 2021-07-07 DIAGNOSIS — E04.1 THYROID NODULE: ICD-10-CM

## 2021-07-07 DIAGNOSIS — Z01.818 PRE-OP TESTING: Primary | ICD-10-CM

## 2021-07-07 DIAGNOSIS — E55.9 VITAMIN D DEFICIENCY: ICD-10-CM

## 2021-07-07 DIAGNOSIS — M81.8 OSTEOPOROSIS, IDIOPATHIC: Primary | ICD-10-CM

## 2021-07-07 PROCEDURE — 99214 PR OFFICE/OUTPT VISIT, EST, LEVL IV, 30-39 MIN: ICD-10-PCS | Mod: 95,,, | Performed by: INTERNAL MEDICINE

## 2021-07-07 PROCEDURE — 99214 OFFICE O/P EST MOD 30 MIN: CPT | Mod: 95,,, | Performed by: INTERNAL MEDICINE

## 2021-07-09 ENCOUNTER — TELEPHONE (OUTPATIENT)
Dept: BARIATRICS | Facility: CLINIC | Age: 56
End: 2021-07-09

## 2021-07-09 ENCOUNTER — TELEPHONE (OUTPATIENT)
Dept: PAIN MEDICINE | Facility: CLINIC | Age: 56
End: 2021-07-09

## 2021-07-09 ENCOUNTER — PATIENT MESSAGE (OUTPATIENT)
Dept: ADMINISTRATIVE | Facility: OTHER | Age: 56
End: 2021-07-09

## 2021-07-09 DIAGNOSIS — I10 ESSENTIAL HYPERTENSION: ICD-10-CM

## 2021-07-09 DIAGNOSIS — Z79.899 ENCOUNTER FOR LONG-TERM (CURRENT) USE OF OTHER MEDICATIONS: ICD-10-CM

## 2021-07-09 DIAGNOSIS — M81.8 OSTEOPOROSIS, IDIOPATHIC: ICD-10-CM

## 2021-07-09 DIAGNOSIS — R26.9 ABNORMALITY OF GAIT: ICD-10-CM

## 2021-07-09 DIAGNOSIS — Z98.84 S/P LAPAROSCOPIC SLEEVE GASTRECTOMY: Primary | ICD-10-CM

## 2021-07-12 ENCOUNTER — PATIENT MESSAGE (OUTPATIENT)
Dept: ADMINISTRATIVE | Facility: OTHER | Age: 56
End: 2021-07-12

## 2021-07-12 DIAGNOSIS — N20.0 KIDNEY STONES: Primary | ICD-10-CM

## 2021-07-13 ENCOUNTER — PATIENT MESSAGE (OUTPATIENT)
Dept: ADMINISTRATIVE | Facility: OTHER | Age: 56
End: 2021-07-13

## 2021-07-13 ENCOUNTER — OFFICE VISIT (OUTPATIENT)
Dept: ORTHOPEDICS | Facility: CLINIC | Age: 56
End: 2021-07-13
Payer: MEDICARE

## 2021-07-13 ENCOUNTER — HOSPITAL ENCOUNTER (OUTPATIENT)
Dept: RADIOLOGY | Facility: HOSPITAL | Age: 56
Discharge: HOME | End: 2021-07-13
Attending: INTERNAL MEDICINE
Payer: MEDICARE

## 2021-07-13 VITALS — WEIGHT: 230 LBS | BODY MASS INDEX: 38.32 KG/M2 | HEIGHT: 65 IN

## 2021-07-13 VITALS — BODY MASS INDEX: 39.3 KG/M2 | HEIGHT: 65 IN | WEIGHT: 235.88 LBS

## 2021-07-13 DIAGNOSIS — Z12.31 BREAST CANCER SCREENING BY MAMMOGRAM: ICD-10-CM

## 2021-07-13 DIAGNOSIS — M17.11 PRIMARY OSTEOARTHRITIS OF RIGHT KNEE: Primary | ICD-10-CM

## 2021-07-13 PROCEDURE — 77063 MAMMO DIGITAL SCREENING BILAT WITH TOMO: ICD-10-PCS | Mod: 26,,, | Performed by: RADIOLOGY

## 2021-07-13 PROCEDURE — 77067 MAMMO DIGITAL SCREENING BILAT WITH TOMO: ICD-10-PCS | Mod: 26,,, | Performed by: RADIOLOGY

## 2021-07-13 PROCEDURE — 99499 NO LOS: ICD-10-PCS | Mod: S$GLB,,, | Performed by: NURSE PRACTITIONER

## 2021-07-13 PROCEDURE — 99999 PR PBB SHADOW E&M-EST. PATIENT-LVL IV: ICD-10-PCS | Mod: PBBFAC,,, | Performed by: NURSE PRACTITIONER

## 2021-07-13 PROCEDURE — 1125F PR PAIN SEVERITY QUANTIFIED, PAIN PRESENT: ICD-10-PCS | Mod: S$GLB,,, | Performed by: NURSE PRACTITIONER

## 2021-07-13 PROCEDURE — 3008F BODY MASS INDEX DOCD: CPT | Mod: CPTII,S$GLB,, | Performed by: NURSE PRACTITIONER

## 2021-07-13 PROCEDURE — 99999 PR PBB SHADOW E&M-EST. PATIENT-LVL IV: CPT | Mod: PBBFAC,,, | Performed by: NURSE PRACTITIONER

## 2021-07-13 PROCEDURE — 77063 BREAST TOMOSYNTHESIS BI: CPT | Mod: 26,,, | Performed by: RADIOLOGY

## 2021-07-13 PROCEDURE — 99499 UNLISTED E&M SERVICE: CPT | Mod: S$GLB,,, | Performed by: NURSE PRACTITIONER

## 2021-07-13 PROCEDURE — 1125F AMNT PAIN NOTED PAIN PRSNT: CPT | Mod: S$GLB,,, | Performed by: NURSE PRACTITIONER

## 2021-07-13 PROCEDURE — 77067 SCR MAMMO BI INCL CAD: CPT | Mod: TC

## 2021-07-13 PROCEDURE — 3008F PR BODY MASS INDEX (BMI) DOCUMENTED: ICD-10-PCS | Mod: CPTII,S$GLB,, | Performed by: NURSE PRACTITIONER

## 2021-07-13 PROCEDURE — 77067 SCR MAMMO BI INCL CAD: CPT | Mod: 26,,, | Performed by: RADIOLOGY

## 2021-07-13 RX ORDER — ONDANSETRON 2 MG/ML
4 INJECTION INTRAMUSCULAR; INTRAVENOUS EVERY 8 HOURS PRN
Status: CANCELLED | OUTPATIENT
Start: 2021-07-13

## 2021-07-13 RX ORDER — CEFAZOLIN SODIUM 2 G/50ML
2 SOLUTION INTRAVENOUS
Status: CANCELLED | OUTPATIENT
Start: 2021-07-13 | End: 2021-07-14

## 2021-07-13 RX ORDER — MUPIROCIN 20 MG/G
1 OINTMENT TOPICAL
Status: CANCELLED | OUTPATIENT
Start: 2021-07-13

## 2021-07-13 RX ORDER — MUPIROCIN 20 MG/G
1 OINTMENT TOPICAL 2 TIMES DAILY
Status: CANCELLED | OUTPATIENT
Start: 2021-07-13 | End: 2021-07-18

## 2021-07-13 RX ORDER — TALC
6 POWDER (GRAM) TOPICAL NIGHTLY PRN
Status: CANCELLED | OUTPATIENT
Start: 2021-07-13

## 2021-07-13 RX ORDER — MORPHINE SULFATE 2 MG/ML
2 INJECTION, SOLUTION INTRAMUSCULAR; INTRAVENOUS
Status: CANCELLED | OUTPATIENT
Start: 2021-07-13

## 2021-07-13 RX ORDER — PREGABALIN 75 MG/1
75 CAPSULE ORAL NIGHTLY
Status: CANCELLED | OUTPATIENT
Start: 2021-07-13

## 2021-07-13 RX ORDER — ACETAMINOPHEN 500 MG
1000 TABLET ORAL EVERY 6 HOURS
Status: CANCELLED | OUTPATIENT
Start: 2021-07-13 | End: 2021-07-15

## 2021-07-13 RX ORDER — SODIUM CHLORIDE 9 MG/ML
INJECTION, SOLUTION INTRAVENOUS CONTINUOUS
Status: CANCELLED | OUTPATIENT
Start: 2021-07-13 | End: 2021-07-14

## 2021-07-13 RX ORDER — LIDOCAINE HYDROCHLORIDE 10 MG/ML
1 INJECTION, SOLUTION EPIDURAL; INFILTRATION; INTRACAUDAL; PERINEURAL
Status: CANCELLED | OUTPATIENT
Start: 2021-07-13

## 2021-07-13 RX ORDER — OXYCODONE HYDROCHLORIDE 5 MG/1
5 TABLET ORAL
Status: CANCELLED | OUTPATIENT
Start: 2021-07-13

## 2021-07-13 RX ORDER — PROCHLORPERAZINE EDISYLATE 5 MG/ML
5 INJECTION INTRAMUSCULAR; INTRAVENOUS EVERY 6 HOURS PRN
Status: CANCELLED | OUTPATIENT
Start: 2021-07-13

## 2021-07-13 RX ORDER — FENTANYL CITRATE 50 UG/ML
25 INJECTION, SOLUTION INTRAMUSCULAR; INTRAVENOUS EVERY 5 MIN PRN
Status: CANCELLED | OUTPATIENT
Start: 2021-07-13

## 2021-07-13 RX ORDER — BISACODYL 10 MG
10 SUPPOSITORY, RECTAL RECTAL EVERY 12 HOURS PRN
Status: CANCELLED | OUTPATIENT
Start: 2021-07-13

## 2021-07-13 RX ORDER — PREGABALIN 75 MG/1
75 CAPSULE ORAL
Status: CANCELLED | OUTPATIENT
Start: 2021-07-13

## 2021-07-13 RX ORDER — FAMOTIDINE 20 MG/1
20 TABLET, FILM COATED ORAL 2 TIMES DAILY
Status: CANCELLED | OUTPATIENT
Start: 2021-07-13

## 2021-07-13 RX ORDER — NALOXONE HCL 0.4 MG/ML
0.02 VIAL (ML) INJECTION
Status: CANCELLED | OUTPATIENT
Start: 2021-07-13

## 2021-07-13 RX ORDER — ACETAMINOPHEN 500 MG
1000 TABLET ORAL
Status: CANCELLED | OUTPATIENT
Start: 2021-07-13

## 2021-07-13 RX ORDER — CEFAZOLIN SODIUM 2 G/50ML
2 SOLUTION INTRAVENOUS
Status: CANCELLED | OUTPATIENT
Start: 2021-07-13

## 2021-07-13 RX ORDER — ROPIVACAINE HYDROCHLORIDE 2 MG/ML
8 INJECTION, SOLUTION EPIDURAL; INFILTRATION; PERINEURAL CONTINUOUS
Status: CANCELLED | OUTPATIENT
Start: 2021-07-13

## 2021-07-13 RX ORDER — POLYETHYLENE GLYCOL 3350 17 G/17G
17 POWDER, FOR SOLUTION ORAL DAILY
Status: CANCELLED | OUTPATIENT
Start: 2021-07-14

## 2021-07-13 RX ORDER — MIDAZOLAM HYDROCHLORIDE 1 MG/ML
1 INJECTION INTRAMUSCULAR; INTRAVENOUS EVERY 5 MIN PRN
Status: CANCELLED | OUTPATIENT
Start: 2021-07-13

## 2021-07-13 RX ORDER — ASPIRIN 81 MG/1
81 TABLET ORAL 2 TIMES DAILY
Status: CANCELLED | OUTPATIENT
Start: 2021-07-13

## 2021-07-13 RX ORDER — SODIUM CHLORIDE 0.9 % (FLUSH) 0.9 %
10 SYRINGE (ML) INJECTION
Status: CANCELLED | OUTPATIENT
Start: 2021-07-13

## 2021-07-13 RX ORDER — SODIUM CHLORIDE 9 MG/ML
INJECTION, SOLUTION INTRAVENOUS
Status: CANCELLED | OUTPATIENT
Start: 2021-07-13

## 2021-07-13 RX ORDER — AMOXICILLIN 250 MG
1 CAPSULE ORAL 2 TIMES DAILY
Status: CANCELLED | OUTPATIENT
Start: 2021-07-13

## 2021-07-13 RX ORDER — OXYCODONE HYDROCHLORIDE 5 MG/1
10 TABLET ORAL
Status: CANCELLED | OUTPATIENT
Start: 2021-07-13

## 2021-07-14 ENCOUNTER — OFFICE VISIT (OUTPATIENT)
Dept: UROLOGY | Facility: CLINIC | Age: 56
End: 2021-07-14
Payer: MEDICARE

## 2021-07-14 VITALS
DIASTOLIC BLOOD PRESSURE: 79 MMHG | SYSTOLIC BLOOD PRESSURE: 138 MMHG | BODY MASS INDEX: 38.98 KG/M2 | HEART RATE: 83 BPM | HEIGHT: 65 IN | WEIGHT: 233.94 LBS

## 2021-07-14 DIAGNOSIS — R11.2 PONV (POSTOPERATIVE NAUSEA AND VOMITING): ICD-10-CM

## 2021-07-14 DIAGNOSIS — E78.5 HYPERLIPIDEMIA, UNSPECIFIED HYPERLIPIDEMIA TYPE: Chronic | ICD-10-CM

## 2021-07-14 DIAGNOSIS — E66.01 MORBID OBESITY: ICD-10-CM

## 2021-07-14 DIAGNOSIS — N20.0 BILATERAL NEPHROLITHIASIS: Primary | ICD-10-CM

## 2021-07-14 DIAGNOSIS — I10 ESSENTIAL HYPERTENSION: Chronic | ICD-10-CM

## 2021-07-14 DIAGNOSIS — G95.0 SYRINGOMYELIA AND SYRINGOBULBIA: ICD-10-CM

## 2021-07-14 DIAGNOSIS — Z98.84 S/P LAPAROSCOPIC SLEEVE GASTRECTOMY: ICD-10-CM

## 2021-07-14 DIAGNOSIS — Z98.890 PONV (POSTOPERATIVE NAUSEA AND VOMITING): ICD-10-CM

## 2021-07-14 DIAGNOSIS — K75.81 NASH (NONALCOHOLIC STEATOHEPATITIS): ICD-10-CM

## 2021-07-14 PROCEDURE — 3078F DIAST BP <80 MM HG: CPT | Mod: CPTII,S$GLB,, | Performed by: UROLOGY

## 2021-07-14 PROCEDURE — 3008F PR BODY MASS INDEX (BMI) DOCUMENTED: ICD-10-PCS | Mod: CPTII,S$GLB,, | Performed by: UROLOGY

## 2021-07-14 PROCEDURE — 1126F AMNT PAIN NOTED NONE PRSNT: CPT | Mod: S$GLB,,, | Performed by: UROLOGY

## 2021-07-14 PROCEDURE — 1126F PR PAIN SEVERITY QUANTIFIED, NO PAIN PRESENT: ICD-10-PCS | Mod: S$GLB,,, | Performed by: UROLOGY

## 2021-07-14 PROCEDURE — 3008F BODY MASS INDEX DOCD: CPT | Mod: CPTII,S$GLB,, | Performed by: UROLOGY

## 2021-07-14 PROCEDURE — 99999 PR PBB SHADOW E&M-EST. PATIENT-LVL IV: CPT | Mod: PBBFAC,,, | Performed by: UROLOGY

## 2021-07-14 PROCEDURE — 3075F SYST BP GE 130 - 139MM HG: CPT | Mod: CPTII,S$GLB,, | Performed by: UROLOGY

## 2021-07-14 PROCEDURE — 99214 OFFICE O/P EST MOD 30 MIN: CPT | Mod: S$GLB,,, | Performed by: UROLOGY

## 2021-07-14 PROCEDURE — 87086 URINE CULTURE/COLONY COUNT: CPT | Performed by: UROLOGY

## 2021-07-14 PROCEDURE — 99214 PR OFFICE/OUTPT VISIT, EST, LEVL IV, 30-39 MIN: ICD-10-PCS | Mod: S$GLB,,, | Performed by: UROLOGY

## 2021-07-14 PROCEDURE — 3075F PR MOST RECENT SYSTOLIC BLOOD PRESS GE 130-139MM HG: ICD-10-PCS | Mod: CPTII,S$GLB,, | Performed by: UROLOGY

## 2021-07-14 PROCEDURE — 99999 PR PBB SHADOW E&M-EST. PATIENT-LVL IV: ICD-10-PCS | Mod: PBBFAC,,, | Performed by: UROLOGY

## 2021-07-14 PROCEDURE — 3078F PR MOST RECENT DIASTOLIC BLOOD PRESSURE < 80 MM HG: ICD-10-PCS | Mod: CPTII,S$GLB,, | Performed by: UROLOGY

## 2021-07-15 LAB — BACTERIA UR CULT: NO GROWTH

## 2021-07-16 ENCOUNTER — TELEPHONE (OUTPATIENT)
Dept: ORTHOPEDICS | Facility: CLINIC | Age: 56
End: 2021-07-16

## 2021-07-19 ENCOUNTER — PATIENT MESSAGE (OUTPATIENT)
Dept: ANESTHESIOLOGY | Facility: HOSPITAL | Age: 56
End: 2021-07-19

## 2021-07-20 ENCOUNTER — TELEPHONE (OUTPATIENT)
Dept: NEPHROLOGY | Facility: CLINIC | Age: 56
End: 2021-07-20

## 2021-07-22 ENCOUNTER — TELEPHONE (OUTPATIENT)
Dept: UROLOGY | Facility: CLINIC | Age: 56
End: 2021-07-22

## 2021-07-22 DIAGNOSIS — N20.0 STAGHORN RENAL CALCULUS: Primary | ICD-10-CM

## 2021-07-30 ENCOUNTER — TELEPHONE (OUTPATIENT)
Dept: PREADMISSION TESTING | Facility: HOSPITAL | Age: 56
End: 2021-07-30

## 2021-07-30 ENCOUNTER — TELEPHONE (OUTPATIENT)
Dept: UROLOGY | Facility: CLINIC | Age: 56
End: 2021-07-30

## 2021-08-02 ENCOUNTER — HOSPITAL ENCOUNTER (OUTPATIENT)
Facility: HOSPITAL | Age: 56
Discharge: HOME | End: 2021-08-02
Attending: ORTHOPAEDIC SURGERY | Admitting: NURSE PRACTITIONER
Payer: MEDICARE

## 2021-08-02 VITALS
HEART RATE: 80 BPM | HEIGHT: 65 IN | OXYGEN SATURATION: 93 % | TEMPERATURE: 98 F | WEIGHT: 230 LBS | SYSTOLIC BLOOD PRESSURE: 139 MMHG | DIASTOLIC BLOOD PRESSURE: 67 MMHG | BODY MASS INDEX: 38.32 KG/M2 | RESPIRATION RATE: 18 BRPM

## 2021-08-02 DIAGNOSIS — M17.11 PRIMARY OSTEOARTHRITIS OF RIGHT KNEE: Primary | ICD-10-CM

## 2021-08-02 DIAGNOSIS — M17.11 OSTEOARTHRITIS OF RIGHT KNEE: ICD-10-CM

## 2021-08-02 PROCEDURE — 64640 INJECTION TREATMENT OF NERVE: CPT | Mod: RT,,, | Performed by: NURSE PRACTITIONER

## 2021-08-02 PROCEDURE — 64640 PR DESTRUCT BY NEURO AGENT; OTHER PERIPH NERVE: ICD-10-PCS | Mod: RT,,, | Performed by: NURSE PRACTITIONER

## 2021-08-02 PROCEDURE — 64640 INJECTION TREATMENT OF NERVE: CPT | Performed by: NURSE PRACTITIONER

## 2021-08-02 PROCEDURE — 25000003 PHARM REV CODE 250: Performed by: NURSE PRACTITIONER

## 2021-08-02 PROCEDURE — 27201689 HC IOVERA SMART TIP/CARTRIDGE: Performed by: NURSE PRACTITIONER

## 2021-08-02 RX ORDER — LIDOCAINE HYDROCHLORIDE 20 MG/ML
10 INJECTION, SOLUTION EPIDURAL; INFILTRATION; INTRACAUDAL; PERINEURAL ONCE
Status: COMPLETED | OUTPATIENT
Start: 2021-08-02 | End: 2021-08-02

## 2021-08-13 ENCOUNTER — TELEPHONE (OUTPATIENT)
Dept: ORTHOPEDICS | Facility: CLINIC | Age: 56
End: 2021-08-13

## 2021-08-16 PROBLEM — N20.0 STAGHORN CALCULUS: Status: ACTIVE | Noted: 2021-08-16

## 2021-08-18 ENCOUNTER — OFFICE VISIT (OUTPATIENT)
Dept: NEPHROLOGY | Facility: CLINIC | Age: 56
End: 2021-08-18
Payer: MEDICARE

## 2021-08-18 ENCOUNTER — HOSPITAL ENCOUNTER (OUTPATIENT)
Dept: RADIOLOGY | Facility: HOSPITAL | Age: 56
Discharge: HOME | End: 2021-08-18
Attending: INTERNAL MEDICINE
Payer: MEDICARE

## 2021-08-18 VITALS
BODY MASS INDEX: 39.99 KG/M2 | SYSTOLIC BLOOD PRESSURE: 138 MMHG | HEART RATE: 105 BPM | DIASTOLIC BLOOD PRESSURE: 76 MMHG | OXYGEN SATURATION: 97 % | WEIGHT: 240.31 LBS

## 2021-08-18 DIAGNOSIS — I10 ESSENTIAL HYPERTENSION: Primary | ICD-10-CM

## 2021-08-18 DIAGNOSIS — N20.0 BILATERAL NEPHROLITHIASIS: ICD-10-CM

## 2021-08-18 DIAGNOSIS — E55.9 VITAMIN D DEFICIENCY: ICD-10-CM

## 2021-08-18 DIAGNOSIS — D50.0 IRON DEFICIENCY ANEMIA DUE TO CHRONIC BLOOD LOSS: ICD-10-CM

## 2021-08-18 PROCEDURE — 76770 US EXAM ABDO BACK WALL COMP: CPT | Mod: TC

## 2021-08-18 PROCEDURE — 3075F PR MOST RECENT SYSTOLIC BLOOD PRESS GE 130-139MM HG: ICD-10-PCS | Mod: CPTII,S$GLB,, | Performed by: INTERNAL MEDICINE

## 2021-08-18 PROCEDURE — 3078F DIAST BP <80 MM HG: CPT | Mod: CPTII,S$GLB,, | Performed by: INTERNAL MEDICINE

## 2021-08-18 PROCEDURE — 1159F MED LIST DOCD IN RCRD: CPT | Mod: CPTII,S$GLB,, | Performed by: INTERNAL MEDICINE

## 2021-08-18 PROCEDURE — 76770 US RETROPERITONEAL COMPLETE: ICD-10-PCS | Mod: 26,,, | Performed by: RADIOLOGY

## 2021-08-18 PROCEDURE — 3008F BODY MASS INDEX DOCD: CPT | Mod: CPTII,S$GLB,, | Performed by: INTERNAL MEDICINE

## 2021-08-18 PROCEDURE — 3075F SYST BP GE 130 - 139MM HG: CPT | Mod: CPTII,S$GLB,, | Performed by: INTERNAL MEDICINE

## 2021-08-18 PROCEDURE — 99204 PR OFFICE/OUTPT VISIT, NEW, LEVL IV, 45-59 MIN: ICD-10-PCS | Mod: S$GLB,,, | Performed by: INTERNAL MEDICINE

## 2021-08-18 PROCEDURE — 1159F PR MEDICATION LIST DOCUMENTED IN MEDICAL RECORD: ICD-10-PCS | Mod: CPTII,S$GLB,, | Performed by: INTERNAL MEDICINE

## 2021-08-18 PROCEDURE — 99204 OFFICE O/P NEW MOD 45 MIN: CPT | Mod: S$GLB,,, | Performed by: INTERNAL MEDICINE

## 2021-08-18 PROCEDURE — 3078F PR MOST RECENT DIASTOLIC BLOOD PRESSURE < 80 MM HG: ICD-10-PCS | Mod: CPTII,S$GLB,, | Performed by: INTERNAL MEDICINE

## 2021-08-18 PROCEDURE — 99999 PR PBB SHADOW E&M-EST. PATIENT-LVL V: ICD-10-PCS | Mod: PBBFAC,,, | Performed by: INTERNAL MEDICINE

## 2021-08-18 PROCEDURE — 3008F PR BODY MASS INDEX (BMI) DOCUMENTED: ICD-10-PCS | Mod: CPTII,S$GLB,, | Performed by: INTERNAL MEDICINE

## 2021-08-18 PROCEDURE — 76770 US EXAM ABDO BACK WALL COMP: CPT | Mod: 26,,, | Performed by: RADIOLOGY

## 2021-08-18 PROCEDURE — 99999 PR PBB SHADOW E&M-EST. PATIENT-LVL V: CPT | Mod: PBBFAC,,, | Performed by: INTERNAL MEDICINE

## 2021-08-20 DIAGNOSIS — N39.0 UTI (URINARY TRACT INFECTION), UNCOMPLICATED: Primary | ICD-10-CM

## 2021-09-09 ENCOUNTER — TELEPHONE (OUTPATIENT)
Dept: ORTHOPEDICS | Facility: CLINIC | Age: 56
End: 2021-09-09

## 2021-10-07 ENCOUNTER — PATIENT MESSAGE (OUTPATIENT)
Dept: UROLOGY | Facility: CLINIC | Age: 56
End: 2021-10-07

## 2021-10-07 DIAGNOSIS — R10.9 ABDOMINAL PAIN, UNSPECIFIED ABDOMINAL LOCATION: ICD-10-CM

## 2021-10-07 DIAGNOSIS — R10.9 FLANK PAIN: Primary | ICD-10-CM

## 2021-10-07 DIAGNOSIS — N20.0 KIDNEY STONES: ICD-10-CM

## 2021-10-22 ENCOUNTER — TELEPHONE (OUTPATIENT)
Dept: NEPHROLOGY | Facility: CLINIC | Age: 56
End: 2021-10-22

## 2021-10-22 ENCOUNTER — PATIENT MESSAGE (OUTPATIENT)
Dept: NEPHROLOGY | Facility: CLINIC | Age: 56
End: 2021-10-22
Payer: MEDICARE

## 2021-12-15 ENCOUNTER — OFFICE VISIT (OUTPATIENT)
Dept: CARDIOLOGY | Facility: CLINIC | Age: 56
End: 2021-12-15
Payer: MEDICARE

## 2021-12-15 VITALS
HEIGHT: 65 IN | SYSTOLIC BLOOD PRESSURE: 183 MMHG | BODY MASS INDEX: 39.49 KG/M2 | DIASTOLIC BLOOD PRESSURE: 89 MMHG | OXYGEN SATURATION: 96 % | WEIGHT: 237 LBS | HEART RATE: 107 BPM

## 2021-12-15 DIAGNOSIS — R01.1 UNDIAGNOSED CARDIAC MURMURS: ICD-10-CM

## 2021-12-15 DIAGNOSIS — E78.5 HYPERLIPIDEMIA, UNSPECIFIED HYPERLIPIDEMIA TYPE: Chronic | ICD-10-CM

## 2021-12-15 DIAGNOSIS — R00.2 PALPITATIONS: ICD-10-CM

## 2021-12-15 DIAGNOSIS — I10 ESSENTIAL HYPERTENSION: Primary | Chronic | ICD-10-CM

## 2021-12-15 DIAGNOSIS — E66.01 SEVERE OBESITY: ICD-10-CM

## 2021-12-15 PROBLEM — R00.0 TACHYCARDIA: Status: RESOLVED | Noted: 2019-12-21 | Resolved: 2021-12-15

## 2021-12-15 PROCEDURE — 4010F PR ACE/ARB THEARPY RXD/TAKEN: ICD-10-PCS | Mod: CPTII,S$GLB,, | Performed by: INTERNAL MEDICINE

## 2021-12-15 PROCEDURE — 3066F NEPHROPATHY DOC TX: CPT | Mod: CPTII,S$GLB,, | Performed by: INTERNAL MEDICINE

## 2021-12-15 PROCEDURE — 99214 OFFICE O/P EST MOD 30 MIN: CPT | Mod: S$GLB,,, | Performed by: INTERNAL MEDICINE

## 2021-12-15 PROCEDURE — 99214 PR OFFICE/OUTPT VISIT, EST, LEVL IV, 30-39 MIN: ICD-10-PCS | Mod: S$GLB,,, | Performed by: INTERNAL MEDICINE

## 2021-12-15 PROCEDURE — 3066F PR DOCUMENTATION OF TREATMENT FOR NEPHROPATHY: ICD-10-PCS | Mod: CPTII,S$GLB,, | Performed by: INTERNAL MEDICINE

## 2021-12-15 PROCEDURE — 99999 PR PBB SHADOW E&M-EST. PATIENT-LVL V: ICD-10-PCS | Mod: PBBFAC,,, | Performed by: INTERNAL MEDICINE

## 2021-12-15 PROCEDURE — 4010F ACE/ARB THERAPY RXD/TAKEN: CPT | Mod: CPTII,S$GLB,, | Performed by: INTERNAL MEDICINE

## 2021-12-15 PROCEDURE — 99999 PR PBB SHADOW E&M-EST. PATIENT-LVL V: CPT | Mod: PBBFAC,,, | Performed by: INTERNAL MEDICINE

## 2021-12-15 RX ORDER — ZINC SULFATE 50(220)MG
CAPSULE ORAL
COMMUNITY
Start: 2021-11-03

## 2021-12-16 ENCOUNTER — TELEPHONE (OUTPATIENT)
Dept: ORTHOPEDICS | Facility: CLINIC | Age: 56
End: 2021-12-16
Payer: MEDICARE

## 2021-12-27 ENCOUNTER — PATIENT MESSAGE (OUTPATIENT)
Dept: HEPATOLOGY | Facility: CLINIC | Age: 56
End: 2021-12-27
Payer: MEDICARE

## 2022-02-01 ENCOUNTER — TELEPHONE (OUTPATIENT)
Dept: ORTHOPEDICS | Facility: CLINIC | Age: 57
End: 2022-02-01
Payer: MEDICARE

## 2022-02-16 ENCOUNTER — PATIENT MESSAGE (OUTPATIENT)
Dept: NEPHROLOGY | Facility: CLINIC | Age: 57
End: 2022-02-16
Payer: MEDICARE

## 2022-03-14 ENCOUNTER — PATIENT MESSAGE (OUTPATIENT)
Dept: NEPHROLOGY | Facility: CLINIC | Age: 57
End: 2022-03-14
Payer: MEDICARE

## 2022-04-14 ENCOUNTER — TELEPHONE (OUTPATIENT)
Dept: OPHTHALMOLOGY | Facility: CLINIC | Age: 57
End: 2022-04-14
Payer: MEDICARE

## 2022-04-14 NOTE — TELEPHONE ENCOUNTER
----- Message from Emelia Massey sent at 4/14/2022  3:29 PM CDT -----  Contact: Angie @[phone]  Pt states she possibly has an infection in her left eye because it is painful and have a lot of mucus drainage coming from it

## 2022-04-25 ENCOUNTER — TELEPHONE (OUTPATIENT)
Dept: NEPHROLOGY | Facility: CLINIC | Age: 57
End: 2022-04-25
Payer: MEDICARE

## 2022-04-25 ENCOUNTER — OFFICE VISIT (OUTPATIENT)
Dept: OPTOMETRY | Facility: CLINIC | Age: 57
End: 2022-04-25
Payer: MEDICARE

## 2022-04-25 ENCOUNTER — LAB VISIT (OUTPATIENT)
Dept: LAB | Facility: HOSPITAL | Age: 57
End: 2022-04-25
Payer: MEDICARE

## 2022-04-25 ENCOUNTER — PATIENT MESSAGE (OUTPATIENT)
Dept: NEPHROLOGY | Facility: CLINIC | Age: 57
End: 2022-04-25
Payer: MEDICARE

## 2022-04-25 DIAGNOSIS — K75.81 NASH (NONALCOHOLIC STEATOHEPATITIS): ICD-10-CM

## 2022-04-25 DIAGNOSIS — R26.9 ABNORMALITY OF GAIT: ICD-10-CM

## 2022-04-25 DIAGNOSIS — L71.8 OCULAR ROSACEA: Primary | ICD-10-CM

## 2022-04-25 DIAGNOSIS — M81.8 OSTEOPOROSIS, IDIOPATHIC: ICD-10-CM

## 2022-04-25 DIAGNOSIS — Z01.818 PREOPERATIVE TESTING: ICD-10-CM

## 2022-04-25 DIAGNOSIS — Z79.899 ENCOUNTER FOR LONG-TERM (CURRENT) USE OF OTHER MEDICATIONS: ICD-10-CM

## 2022-04-25 DIAGNOSIS — K74.02 HEPATIC FIBROSIS, STAGE 3: ICD-10-CM

## 2022-04-25 DIAGNOSIS — E03.9 ACQUIRED HYPOTHYROIDISM: ICD-10-CM

## 2022-04-25 DIAGNOSIS — I10 ESSENTIAL HYPERTENSION: ICD-10-CM

## 2022-04-25 DIAGNOSIS — E55.9 VITAMIN D DEFICIENCY: ICD-10-CM

## 2022-04-25 DIAGNOSIS — N20.0 BILATERAL NEPHROLITHIASIS: ICD-10-CM

## 2022-04-25 DIAGNOSIS — Z98.84 S/P LAPAROSCOPIC SLEEVE GASTRECTOMY: ICD-10-CM

## 2022-04-25 DIAGNOSIS — E78.5 HYPERLIPIDEMIA, UNSPECIFIED HYPERLIPIDEMIA TYPE: ICD-10-CM

## 2022-04-25 LAB
25(OH)D3+25(OH)D2 SERPL-MCNC: 42 NG/ML (ref 30–96)
ABO + RH BLD: NORMAL
AFP SERPL-MCNC: 2.5 NG/ML (ref 0–8.4)
ALBUMIN SERPL BCP-MCNC: 4.1 G/DL (ref 3.5–5.2)
ALP SERPL-CCNC: 126 U/L (ref 55–135)
ALT SERPL W/O P-5'-P-CCNC: 86 U/L (ref 10–44)
ANION GAP SERPL CALC-SCNC: 13 MMOL/L (ref 8–16)
AST SERPL-CCNC: 157 U/L (ref 10–40)
BASOPHILS # BLD AUTO: 0.08 K/UL (ref 0–0.2)
BASOPHILS NFR BLD: 0.9 % (ref 0–1.9)
BILIRUB SERPL-MCNC: 0.6 MG/DL (ref 0.1–1)
BLD GP AB SCN CELLS X3 SERPL QL: NORMAL
BLOOD GROUP ANTIBODIES SERPL: NORMAL
BUN SERPL-MCNC: 12 MG/DL (ref 6–20)
CALCIUM SERPL-MCNC: 10.5 MG/DL (ref 8.7–10.5)
CHLORIDE SERPL-SCNC: 100 MMOL/L (ref 95–110)
CHOLEST SERPL-MCNC: 156 MG/DL (ref 120–199)
CHOLEST/HDLC SERPL: 4 {RATIO} (ref 2–5)
CO2 SERPL-SCNC: 27 MMOL/L (ref 23–29)
CREAT SERPL-MCNC: 0.8 MG/DL (ref 0.5–1.4)
DIFFERENTIAL METHOD: ABNORMAL
EOSINOPHIL # BLD AUTO: 0.1 K/UL (ref 0–0.5)
EOSINOPHIL NFR BLD: 1.3 % (ref 0–8)
ERYTHROCYTE [DISTWIDTH] IN BLOOD BY AUTOMATED COUNT: 13.7 % (ref 11.5–14.5)
ERYTHROCYTE [DISTWIDTH] IN BLOOD BY AUTOMATED COUNT: 13.7 % (ref 11.5–14.5)
EST. GFR  (AFRICAN AMERICAN): >60 ML/MIN/1.73 M^2
EST. GFR  (NON AFRICAN AMERICAN): >60 ML/MIN/1.73 M^2
GLUCOSE SERPL-MCNC: 152 MG/DL (ref 70–110)
HCT VFR BLD AUTO: 44.1 % (ref 37–48.5)
HCT VFR BLD AUTO: 44.1 % (ref 37–48.5)
HDLC SERPL-MCNC: 39 MG/DL (ref 40–75)
HDLC SERPL: 25 % (ref 20–50)
HGB BLD-MCNC: 13.5 G/DL (ref 12–16)
HGB BLD-MCNC: 13.5 G/DL (ref 12–16)
IMM GRANULOCYTES # BLD AUTO: 0.04 K/UL (ref 0–0.04)
IMM GRANULOCYTES NFR BLD AUTO: 0.4 % (ref 0–0.5)
INR PPP: 1 (ref 0.8–1.2)
LDLC SERPL CALC-MCNC: 69 MG/DL (ref 63–159)
LYMPHOCYTES # BLD AUTO: 2.3 K/UL (ref 1–4.8)
LYMPHOCYTES NFR BLD: 25.3 % (ref 18–48)
MCH RBC QN AUTO: 23.4 PG (ref 27–31)
MCH RBC QN AUTO: 23.4 PG (ref 27–31)
MCHC RBC AUTO-ENTMCNC: 30.6 G/DL (ref 32–36)
MCHC RBC AUTO-ENTMCNC: 30.6 G/DL (ref 32–36)
MCV RBC AUTO: 76 FL (ref 82–98)
MCV RBC AUTO: 76 FL (ref 82–98)
MONOCYTES # BLD AUTO: 0.6 K/UL (ref 0.3–1)
MONOCYTES NFR BLD: 6.4 % (ref 4–15)
NEUTROPHILS # BLD AUTO: 6 K/UL (ref 1.8–7.7)
NEUTROPHILS NFR BLD: 65.7 % (ref 38–73)
NONHDLC SERPL-MCNC: 117 MG/DL
NRBC BLD-RTO: 0 /100 WBC
PLATELET # BLD AUTO: 340 K/UL (ref 150–450)
PLATELET # BLD AUTO: 340 K/UL (ref 150–450)
PMV BLD AUTO: 10.9 FL (ref 9.2–12.9)
PMV BLD AUTO: 10.9 FL (ref 9.2–12.9)
POTASSIUM SERPL-SCNC: 4.1 MMOL/L (ref 3.5–5.1)
PROT SERPL-MCNC: 8 G/DL (ref 6–8.4)
PROTHROMBIN TIME: 10.6 SEC (ref 9–12.5)
RBC # BLD AUTO: 5.78 M/UL (ref 4–5.4)
RBC # BLD AUTO: 5.78 M/UL (ref 4–5.4)
SODIUM SERPL-SCNC: 140 MMOL/L (ref 136–145)
TRIGL SERPL-MCNC: 240 MG/DL (ref 30–150)
TSH SERPL DL<=0.005 MIU/L-ACNC: 2.12 UIU/ML (ref 0.4–4)
VIT B12 SERPL-MCNC: 476 PG/ML (ref 210–950)
WBC # BLD AUTO: 9.2 K/UL (ref 3.9–12.7)
WBC # BLD AUTO: 9.2 K/UL (ref 3.9–12.7)

## 2022-04-25 PROCEDURE — 86850 RBC ANTIBODY SCREEN: CPT | Performed by: ANESTHESIOLOGY

## 2022-04-25 PROCEDURE — 84443 ASSAY THYROID STIM HORMONE: CPT | Performed by: INTERNAL MEDICINE

## 2022-04-25 PROCEDURE — 1160F PR REVIEW ALL MEDS BY PRESCRIBER/CLIN PHARMACIST DOCUMENTED: ICD-10-PCS | Mod: CPTII,S$GLB,, | Performed by: OPTOMETRIST

## 2022-04-25 PROCEDURE — 99999 PR PBB SHADOW E&M-EST. PATIENT-LVL IV: ICD-10-PCS | Mod: PBBFAC,,, | Performed by: OPTOMETRIST

## 2022-04-25 PROCEDURE — 80061 LIPID PANEL: CPT | Performed by: INTERNAL MEDICINE

## 2022-04-25 PROCEDURE — 82105 ALPHA-FETOPROTEIN SERUM: CPT | Performed by: NURSE PRACTITIONER

## 2022-04-25 PROCEDURE — 82306 VITAMIN D 25 HYDROXY: CPT | Performed by: INTERNAL MEDICINE

## 2022-04-25 PROCEDURE — 82607 VITAMIN B-12: CPT | Performed by: SURGERY

## 2022-04-25 PROCEDURE — 99999 PR PBB SHADOW E&M-EST. PATIENT-LVL IV: CPT | Mod: PBBFAC,,, | Performed by: OPTOMETRIST

## 2022-04-25 PROCEDURE — 85025 COMPLETE CBC W/AUTO DIFF WBC: CPT | Performed by: INTERNAL MEDICINE

## 2022-04-25 PROCEDURE — 1159F MED LIST DOCD IN RCRD: CPT | Mod: CPTII,S$GLB,, | Performed by: OPTOMETRIST

## 2022-04-25 PROCEDURE — 92004 PR EYE EXAM, NEW PATIENT,COMPREHESV: ICD-10-PCS | Mod: S$GLB,,, | Performed by: OPTOMETRIST

## 2022-04-25 PROCEDURE — 1159F PR MEDICATION LIST DOCUMENTED IN MEDICAL RECORD: ICD-10-PCS | Mod: CPTII,S$GLB,, | Performed by: OPTOMETRIST

## 2022-04-25 PROCEDURE — 1160F RVW MEDS BY RX/DR IN RCRD: CPT | Mod: CPTII,S$GLB,, | Performed by: OPTOMETRIST

## 2022-04-25 PROCEDURE — 92004 COMPRE OPH EXAM NEW PT 1/>: CPT | Mod: S$GLB,,, | Performed by: OPTOMETRIST

## 2022-04-25 PROCEDURE — 86870 RBC ANTIBODY IDENTIFICATION: CPT | Performed by: ANESTHESIOLOGY

## 2022-04-25 PROCEDURE — 80053 COMPREHEN METABOLIC PANEL: CPT | Performed by: NURSE PRACTITIONER

## 2022-04-25 PROCEDURE — 84425 ASSAY OF VITAMIN B-1: CPT | Performed by: SURGERY

## 2022-04-25 PROCEDURE — 4010F ACE/ARB THERAPY RXD/TAKEN: CPT | Mod: CPTII,S$GLB,, | Performed by: OPTOMETRIST

## 2022-04-25 PROCEDURE — 4010F PR ACE/ARB THEARPY RXD/TAKEN: ICD-10-PCS | Mod: CPTII,S$GLB,, | Performed by: OPTOMETRIST

## 2022-04-25 PROCEDURE — 85610 PROTHROMBIN TIME: CPT | Performed by: NURSE PRACTITIONER

## 2022-04-25 NOTE — PROGRESS NOTES
HPI     Presents today for UCARE. Pt reports itching, tearing and discharge OU.   Pt reports knot in the corner of DANA--been there for months.Pt reports   blurry vision OU. Pt reports puffy eyelids OU--mainly OS. Pt reports on   and off she would wake up in the middle of the night with extreme eye   pain--that last for several days.     Fam Hx of Mac Deg (dad)    Last edited by Armand Costello, OD on 4/25/2022  2:16 PM. (History)        ROS     Negative for: Constitutional, Gastrointestinal, Neurological, Skin,   Genitourinary, Musculoskeletal, HENT, Endocrine, Cardiovascular, Eyes,   Respiratory, Psychiatric, Allergic/Imm, Heme/Lymph    Last edited by Armand Costello, OD on 4/25/2022  2:15 PM. (History)        Assessment /Plan     For exam results, see Encounter Report.    Ocular rosacea      1. CHELY/oc rosacea (pt has roseacea hx)  2. Retinal nevi OU--flat.  Pt reports noted by previous docs years ago  3. Dad w ARMD--pt to cont w sunglasses/vitamins.  Pt non-smoker    PLAN:    1. SOOTHE XP Ats QID  2. DEBBIE HEAT MASK 10 min daily  3. OCUSOFT lid scrubs daily  4. Pt was on oral DOXY a few years ago, but stopped--will discuss w her dermatologist  5. Discussed chronic nature of condition  6. Pt will call if eye sx persist w tx--otherwise will rtc when eyes feel better for refraction--just wearing otc readers now, but she feels her distance VA changing

## 2022-04-29 LAB — VIT B1 BLD-MCNC: 112 UG/L (ref 38–122)

## 2022-05-06 ENCOUNTER — PATIENT MESSAGE (OUTPATIENT)
Dept: DERMATOLOGY | Facility: CLINIC | Age: 57
End: 2022-05-06
Payer: MEDICARE

## 2022-05-10 ENCOUNTER — PATIENT MESSAGE (OUTPATIENT)
Dept: DERMATOLOGY | Facility: CLINIC | Age: 57
End: 2022-05-10
Payer: MEDICARE

## 2022-05-21 ENCOUNTER — PATIENT MESSAGE (OUTPATIENT)
Dept: DERMATOLOGY | Facility: CLINIC | Age: 57
End: 2022-05-21
Payer: MEDICARE

## 2022-05-25 ENCOUNTER — PATIENT MESSAGE (OUTPATIENT)
Dept: ORTHOPEDICS | Facility: CLINIC | Age: 57
End: 2022-05-25
Payer: MEDICARE

## 2022-05-30 ENCOUNTER — TELEPHONE (OUTPATIENT)
Dept: DERMATOLOGY | Facility: CLINIC | Age: 57
End: 2022-05-30
Payer: MEDICARE

## 2022-05-30 ENCOUNTER — PATIENT MESSAGE (OUTPATIENT)
Dept: DERMATOLOGY | Facility: CLINIC | Age: 57
End: 2022-05-30

## 2022-06-17 ENCOUNTER — HOSPITAL ENCOUNTER (OUTPATIENT)
Dept: RADIOLOGY | Facility: HOSPITAL | Age: 57
Discharge: HOME | End: 2022-06-17
Attending: NURSE PRACTITIONER
Payer: MEDICARE

## 2022-06-17 DIAGNOSIS — K75.81 NASH (NONALCOHOLIC STEATOHEPATITIS): ICD-10-CM

## 2022-06-17 DIAGNOSIS — K74.02 HEPATIC FIBROSIS, STAGE 3: ICD-10-CM

## 2022-06-17 PROCEDURE — 76705 ECHO EXAM OF ABDOMEN: CPT | Mod: TC

## 2022-06-17 PROCEDURE — 76705 US ABDOMEN LIMITED: ICD-10-PCS | Mod: 26,,, | Performed by: INTERNAL MEDICINE

## 2022-06-17 PROCEDURE — 76705 ECHO EXAM OF ABDOMEN: CPT | Mod: 26,,, | Performed by: INTERNAL MEDICINE

## 2022-06-20 ENCOUNTER — OFFICE VISIT (OUTPATIENT)
Dept: HEPATOLOGY | Facility: CLINIC | Age: 57
End: 2022-06-20
Payer: MEDICARE

## 2022-06-20 ENCOUNTER — LAB VISIT (OUTPATIENT)
Dept: LAB | Facility: HOSPITAL | Age: 57
End: 2022-06-20
Payer: MEDICARE

## 2022-06-20 VITALS
DIASTOLIC BLOOD PRESSURE: 81 MMHG | BODY MASS INDEX: 40.62 KG/M2 | HEART RATE: 92 BPM | TEMPERATURE: 94 F | HEIGHT: 65 IN | RESPIRATION RATE: 18 BRPM | SYSTOLIC BLOOD PRESSURE: 186 MMHG | OXYGEN SATURATION: 94 % | WEIGHT: 243.81 LBS

## 2022-06-20 DIAGNOSIS — K76.0 FATTY LIVER DISEASE, NONALCOHOLIC: ICD-10-CM

## 2022-06-20 DIAGNOSIS — K74.02 HEPATIC FIBROSIS, STAGE 3: ICD-10-CM

## 2022-06-20 DIAGNOSIS — R73.03 PRE-DIABETES: ICD-10-CM

## 2022-06-20 DIAGNOSIS — K75.81 NASH (NONALCOHOLIC STEATOHEPATITIS): ICD-10-CM

## 2022-06-20 DIAGNOSIS — R93.5 ABNORMAL FINDINGS ON DIAGNOSTIC IMAGING OF OTHER ABDOMINAL REGIONS, INCLUDING RETROPERITONEUM: ICD-10-CM

## 2022-06-20 DIAGNOSIS — K76.0 FATTY LIVER DISEASE, NONALCOHOLIC: Primary | ICD-10-CM

## 2022-06-20 DIAGNOSIS — K83.8 DILATION OF BILIARY TRACT: ICD-10-CM

## 2022-06-20 LAB
ALBUMIN SERPL BCP-MCNC: 4.1 G/DL (ref 3.5–5.2)
ALP SERPL-CCNC: 126 U/L (ref 55–135)
ALT SERPL W/O P-5'-P-CCNC: 90 U/L (ref 10–44)
AST SERPL-CCNC: 153 U/L (ref 10–40)
BILIRUB DIRECT SERPL-MCNC: 0.2 MG/DL (ref 0.1–0.3)
BILIRUB SERPL-MCNC: 0.5 MG/DL (ref 0.1–1)
ESTIMATED AVG GLUCOSE: 171 MG/DL (ref 68–131)
HBA1C MFR BLD: 7.6 % (ref 4–5.6)
PROT SERPL-MCNC: 7.8 G/DL (ref 6–8.4)

## 2022-06-20 PROCEDURE — 99999 PR PBB SHADOW E&M-EST. PATIENT-LVL V: ICD-10-PCS | Mod: PBBFAC,,, | Performed by: NURSE PRACTITIONER

## 2022-06-20 PROCEDURE — 36415 COLL VENOUS BLD VENIPUNCTURE: CPT | Performed by: NURSE PRACTITIONER

## 2022-06-20 PROCEDURE — 1159F PR MEDICATION LIST DOCUMENTED IN MEDICAL RECORD: ICD-10-PCS | Mod: CPTII,S$GLB,, | Performed by: NURSE PRACTITIONER

## 2022-06-20 PROCEDURE — 3051F HG A1C>EQUAL 7.0%<8.0%: CPT | Mod: CPTII,S$GLB,, | Performed by: NURSE PRACTITIONER

## 2022-06-20 PROCEDURE — 3079F DIAST BP 80-89 MM HG: CPT | Mod: CPTII,S$GLB,, | Performed by: NURSE PRACTITIONER

## 2022-06-20 PROCEDURE — 3008F PR BODY MASS INDEX (BMI) DOCUMENTED: ICD-10-PCS | Mod: CPTII,S$GLB,, | Performed by: NURSE PRACTITIONER

## 2022-06-20 PROCEDURE — 80076 HEPATIC FUNCTION PANEL: CPT | Performed by: NURSE PRACTITIONER

## 2022-06-20 PROCEDURE — 3077F PR MOST RECENT SYSTOLIC BLOOD PRESSURE >= 140 MM HG: ICD-10-PCS | Mod: CPTII,S$GLB,, | Performed by: NURSE PRACTITIONER

## 2022-06-20 PROCEDURE — 1159F MED LIST DOCD IN RCRD: CPT | Mod: CPTII,S$GLB,, | Performed by: NURSE PRACTITIONER

## 2022-06-20 PROCEDURE — 4010F PR ACE/ARB THEARPY RXD/TAKEN: ICD-10-PCS | Mod: CPTII,S$GLB,, | Performed by: NURSE PRACTITIONER

## 2022-06-20 PROCEDURE — 3079F PR MOST RECENT DIASTOLIC BLOOD PRESSURE 80-89 MM HG: ICD-10-PCS | Mod: CPTII,S$GLB,, | Performed by: NURSE PRACTITIONER

## 2022-06-20 PROCEDURE — 3008F BODY MASS INDEX DOCD: CPT | Mod: CPTII,S$GLB,, | Performed by: NURSE PRACTITIONER

## 2022-06-20 PROCEDURE — 4010F ACE/ARB THERAPY RXD/TAKEN: CPT | Mod: CPTII,S$GLB,, | Performed by: NURSE PRACTITIONER

## 2022-06-20 PROCEDURE — 3077F SYST BP >= 140 MM HG: CPT | Mod: CPTII,S$GLB,, | Performed by: NURSE PRACTITIONER

## 2022-06-20 PROCEDURE — 83036 HEMOGLOBIN GLYCOSYLATED A1C: CPT | Performed by: NURSE PRACTITIONER

## 2022-06-20 PROCEDURE — 99215 PR OFFICE/OUTPT VISIT, EST, LEVL V, 40-54 MIN: ICD-10-PCS | Mod: S$GLB,,, | Performed by: NURSE PRACTITIONER

## 2022-06-20 PROCEDURE — 99999 PR PBB SHADOW E&M-EST. PATIENT-LVL V: CPT | Mod: PBBFAC,,, | Performed by: NURSE PRACTITIONER

## 2022-06-20 PROCEDURE — 99215 OFFICE O/P EST HI 40 MIN: CPT | Mod: S$GLB,,, | Performed by: NURSE PRACTITIONER

## 2022-06-20 PROCEDURE — 3051F PR MOST RECENT HEMOGLOBIN A1C LEVEL 7.0 - < 8.0%: ICD-10-PCS | Mod: CPTII,S$GLB,, | Performed by: NURSE PRACTITIONER

## 2022-06-20 NOTE — PROGRESS NOTES
[facility] - Established Patient    Last Clinic Visit: 6/9/21    Chief Complaint: Follow-up for fatty liver        HISTORY     This is a 56 y.o. female with PMH noted below, here for follow-up of FERRELL with hepatic fibrosis.     Fatty liver first noted on abd US in our system 10/2016.  Her US this month still shows hepatosplenomegaly and hepatic steatosis (spleen 13.2 cm); no liver masses.    Transaminases have been intermittently elevated since 2009. Higher in 2011 due to hospitalization/infection. Serologic workup has been negative for other etiologies of liver disease.     Fibrosis staging:  Liver biopsy done during gastric sleeve in 2017 -- FERRELL with stage 1 fibrosis   Fibroscan 1/2019 = F2 (kPa 9.3), S3  Fibroscan 6/9/21 = F3 (kPa 10.9), S3    Interval history:  Her transaminases have increased,  / ALT 86.    US with CBD dilation, 8 mm; she is post shahida and ALP / TBili are WNL. She is having epigastric pains that travel upward. Sub-cm panc cyst noted.    No s/s hepatic decompensation.     Updates on risk factors for fatty liver:  · Weight -- Body mass index is 40.58 kg/m². Initially lost weight after her gastric sleeve surgery though has re-gained weight over the years. Weight is currently up, ~13 lb from last year                       · Dyslipidemia -- TG increased                              · Insulin resistance / diabetes -- HgbA1c 6.0 in 2019         · Hypertension -- well controlled  · Alcohol use -- very rare, PETH negative        Past medical history, surgical history, problem list, family history, social history, allergies: Reviewed and updated in the appropriate section of the electronic medical record.      Current Outpatient Medications   Medication Sig Dispense Refill    albuterol (VENTOLIN HFA) 90 mcg/actuation inhaler Inhale 2 puffs into the lungs every 6 (six) hours as needed for Wheezing or Shortness of Breath. Rescue 18 g 0    ALPRAZolam (XANAX) 0.5 MG tablet TAKE 1  TABLET BY MOUTH DAILY AS NEEDED FOR ANXIETY. 30 tablet 0    ascorbic acid, vitamin C, (VITAMIN C) 500 MG tablet       aspirin (ECOTRIN) 81 MG EC tablet Take 1 tablet (81 mg total) by mouth 2 (two) times daily. 60 tablet 0    BIOTIN ORAL Take 10,000 mg by mouth every morning.       cyclobenzaprine (FLEXERIL) 10 MG tablet TK 1 T PO QD- as needed for muscle spasms  0    DULoxetine (CYMBALTA) 60 MG capsule TAKE 1 CAPSULE EVERY DAY 90 capsule 3    fish oil-omega-3 fatty acids 300-1,000 mg capsule Take 1,200 mg by mouth 2 (two) times daily.       fluticasone propionate (FLONASE) 50 mcg/actuation nasal spray USE 2 SPRAYS NASALLY ONE TIME DAILY 48 g 3    gabapentin (NEURONTIN) 800 MG tablet Take 800 mg by mouth 3 (three) times daily. Takes 2-3 times per day  1    levothyroxine (SYNTHROID) 150 MCG tablet TAKE 1 TABLET EVERY MORNING 90 tablet 0    lisinopriL (PRINIVIL,ZESTRIL) 2.5 MG tablet TAKE 1 TABLET EVERY DAY 90 tablet 3    multivitamin capsule Take 1 capsule by mouth every morning.      omeprazole (PRILOSEC) 40 MG capsule TAKE 1 CAPSULE EVERY DAY 90 capsule 3    oxyCODONE-acetaminophen (PERCOCET)  mg per tablet Take 1 tablet by mouth 4 (four) times daily as needed.      promethazine (PHENERGAN) 25 MG tablet Take 1 tablet (25 mg total) by mouth every 4 (four) hours. 10 tablet 0    rosuvastatin (CRESTOR) 10 MG tablet TAKE 1 TABLET EVERY NIGHT 90 tablet 0    tamsulosin (FLOMAX) 0.4 mg Cap TAKE 1 CAPSULE(0.4 MG) BY MOUTH AFTER DINNER 30 capsule 1    tiZANidine (ZANAFLEX) 4 MG tablet Take 4 mg by mouth nightly as needed.      zinc sulfate (ZINCATE) 50 mg zinc (220 mg) capsule       zolpidem (AMBIEN) 10 mg Tab TAKE 1 TABLET(10 MG) BY MOUTH EVERY NIGHT AS NEEDED FOR INSOMNIA 90 tablet 0     Current Facility-Administered Medications   Medication Dose Route Frequency Provider Last Rate Last Admin    triamcinolone acetonide injection 40 mg  40 mg Intradermal 1 time in Clinic/HOD Teri Manuel MD      "    Medication list reviewed and updated.      Review of Systems   Constitutional: Negative for fatigue or unexpected weight change.   Respiratory: Negative for shortness of breath.    Cardiovascular: Negative for leg swelling.  Gastrointestinal: Negative for abdominal distention, +epigastric pain. Negative for diarrhea, constipation, nausea, and vomiting. Negative for blood in stool, melena, or hematemesis.  Musculoskeletal: Negative for myalgias.    Skin: Negative for itching.  Neurological: Negative for dizziness or tremors. Negative for confusion, slowed mentation, or memory loss.   Hematological: Does not bruise/bleed easily.   Psychiatric: Negative for mood changes or sleep disturbance.      Physical Exam   Constitutional: Well-nourished. No distress. Alert and oriented to person, place, and time.  Eyes: No scleral icterus.   Pulmonary/Chest: Respiratory effort normal, no respiratory distress.   Abdominal: No distension, no ascites appreciated.   Extremities: No edema.   Neurological: No tremor. Gait normal. No asterixis.    Skin: No jaundice. + faint spider angiomas to chest and palmar erythema  Psychiatric: Normal mood and affect. Speech, behavior, and thought content normal. No depression or anxiety noted.     Vitals reviewed.  BP (!) 186/81 (BP Location: Right arm, Patient Position: Sitting, BP Method: Medium (Automatic))   Pulse 92   Temp (!) 93.7 °F (34.3 °C) (Oral)   Resp 18   Ht 5' 5" (1.651 m)   Wt 110.6 kg (243 lb 13.3 oz)   LMP 12/06/2007   SpO2 (!) 94%   BMI 40.58 kg/m²       LABS & DIAGNOSTIC STUDIES     I have personally reviewed pertinent laboratory findings:    Lab Results   Component Value Date    ALT 90 (H) 06/20/2022     (H) 06/20/2022    ALKPHOS 126 06/20/2022    BILITOT 0.5 06/20/2022    ALBUMIN 4.1 06/20/2022    INR 1.0 04/25/2022       Lab Results   Component Value Date    WBC 9.20 04/25/2022    WBC 9.20 04/25/2022    HGB 13.5 04/25/2022    HGB 13.5 04/25/2022    HCT 44.1 " 04/25/2022    HCT 44.1 04/25/2022    MCV 76 (L) 04/25/2022    MCV 76 (L) 04/25/2022     04/25/2022     04/25/2022       Lab Results   Component Value Date     04/25/2022    K 4.1 04/25/2022    BUN 12 04/25/2022    CREATININE 0.8 04/25/2022    ESTGFRAFRICA >60.0 04/25/2022    EGFRNONAA >60.0 04/25/2022       Lab Results   Component Value Date    SMOOTHMUSCAB Negative 1:40 11/16/2016    AMAIFA Negative 1:40 11/16/2016    IGGSERUM 912 11/16/2016    ANASCREEN Negative <1:160 11/16/2016    FERRITIN 58 08/18/2021    FESATURATED 11 (L) 08/18/2021    PETH Negative 06/04/2021    CERULOPLSM 33.0 11/16/2016    HEPBSAG Negative 11/16/2016    HEPBCAB Negative 11/16/2016    HEPCAB Negative 11/16/2016       Lab Results   Component Value Date    AFP 2.5 04/25/2022       I have personally reviewed the following result reports:  Abdominal US - 6/4/22  Liver biopsy - 2/6/17      ASSESSMENT & PLAN     56 y.o. female with:    1. FERRELL with possible advanced fibrosis (F3 on Fibroscan)   -- BMI ~40 though she does have splenomegaly, AST>ALT, and some clinical findings which may suggest advanced fibrosis (faint telangiectasias and palmar erythema).  -- We previously discussed option for repeat liver biopsy though will plan to continue monitoring for presumed advanced fibrosis. Discussed that fibrosis may have progressed over the last 5 years.   -- HCC screening with US + AFP every 6 months, next due 12/2022  -- Unable to participate in FERRELL clinical trials at this time     2. Fatty liver/FERRELL, Body mass index is 40.58 kg/m²., HTN, HLD, pre-diabetes  -- Transaminases are trending up with weight gain and higher TG. Will update HgbA1c.  -- Ongoing weight loss efforts encouraged for treatment of fatty liver.  -- Interested in medical weight loss, referral placed to bariatric medicine   -- Referral to [facility] dietician     3. Biliary dilation  -- May be due to h/o cholecystectomy  -- Alk phos and TBili are WNL  --  Will plan for MRCP as she is having abdominal pain. This will also further evaluate pancreatic cyst.        Orders Placed This Encounter   Procedures    MRI MRCP Without Contrast    US Abdomen Limited    Hepatic Function Panel    Hemoglobin A1C    CBC Without Differential    Comprehensive Metabolic Panel    Protime-INR    AFP Tumor Marker    Ambulatory referral/consult to Bariatric Medicine         *See AVS for patient education and instructions.      Return to clinic in 6 months with labs/US prior.      Thank you for allowing me to participate in the care of Angie Isabel Chino, FNP-C  Hepatology        Duration of encounter: 45 min  This includes face to face time and non-face to face time preparing to see the patient (eg, review of tests), obtaining and/or reviewing separately obtained history, documenting clinical information in the electronic or other health record, independently interpreting results and communicating results to the patient/family/caregiver, or care coordination.

## 2022-06-20 NOTE — PATIENT INSTRUCTIONS
Labs in the next month to recheck liver enzymes. Will repeat diabetes screening test too.    Contact your insurance about dietician options    Call to schedule consult with bariatric medicine: [phone]    4. Will plan for MRCP to further evaluate bile ducts    5. Follow-up in 6 months with labs and ultrasound           Fatty liver    There is no FDA approved therapy for non-alcoholic fatty liver disease (NAFLD); therefore, lifestyle changes are important:  1. Weight loss goal of 24 lbs  2. Low carb/sugar, high protein diet.   3. Exercise, 5 days per week, 30 minutes per day, as tolerated  4. Recommend good control of cholesterol, blood pressure, blood sugar levels (as these are risk factors for fatty liver). Cholesterol lowering medications including statins are typically safe and beneficial (even if liver enzymes are elevated).      In some people, fatty liver can progress to steatohepatitis (inflamatory fatty liver) and possibly to cirrhosis, putting one at increased risk for liver cancer and liver failure. Lifestyle changes can help to decrease this risk.     Ask about our fatty liver/FERRELL clinical trials if you have fibrosis / scar tissue related to fatty liver.        Additional Resources:    Websites with information about fatty liver and inflammation related to fatty liver (FERRELL): www.[email or web address].[employer] and www.nashactually.com   [facility]: Non-Alcoholic Fatty Liver Disease (NAFLD)    Facebook support group with tips and recipes:   Non-Alcoholic Fatty Liver Disease (NAFLD) Diet & Nutrition Support     Can download MyFitness Pal or Lose It kwabena to add up your carbohydrates throughout the day.   Tip -- Avoid beverages with calories or carbohydrates.   Try [email or web address] for recipes.    If interested in seeing a dietician to create a weight loss plan, contact the dietician team at [facility]: [email or web address].  You can also call to schedule a consult with a dietician: [phone].  *Virtual  visits are available with one of our dieticians.     If you have diabetes or high blood pressure, you can meet with a Health  through [facility]. Let us know if you are interested in a referral.     Let us know if you are interested in a referral to [facility].         Tips for low/moderate carbohydrate diet:  3 meals a day made up of the following:  -- Unlimited green vegetables, tomatoes, mushrooms, spaghetti squash, cauliflower, meat, poultry, seafood, eggs and hard cheeses.   -- Milk and plain yogurt  -- Dressings, seasonings, condiments, etc should have less than 2 g sugars.   -- Beans (1-1.5 cups) or nuts (1/4 cup): can have 1 x a day.   -- 1-2 servings of citrus fruits, berries, pineapple or melon a day (1/2 cup)  -- Avoid fried foods    *Limit grains, rice, pasta, potatoes, bread, corn, peas, oatmeal, grits, tortillas, crackers, chips    *No soda, sweet tea, juices or lemonade        Snacks: (100-200 calories; >5g protein)     Dairy combos:  - 2 wedges Laughing Cow - Light Herb & Garlic Cheese with sliced cucumber or green bell pepper  - 1/2 cup low-fat cottage cheese with ¼ cup fruit or ¼ cup salsa  - 1/2 cup low fat cottage cheese with 10-15 cherry tomatoes  - 1 low-fat cheese stick with 8 cherry tomatoes or 1 serving fresh fruit    Meat and other proteins:  - 2 slices of turkey mijares  - Boiled eggs (can buy at [employer] already boiled w/ shell removed)  - Fransisco Gaviria's Beef Steak - 14g protein! (similar to beef jerky but very lean)  - 4 thin slices fat-free turkey breast and 1 slice low-fat cheese  - 4 thin slices fat-free honey ham with wedge of melon  - For convenience,  [place]uddy read, snack, go (deli meat and cheese rolls)  - P3 packets (Protein packs w/ cheese, nuts, lean deli meat)  - 1/4 cup unsalted nuts with ½ cup fruit  - 6-8 edamame pods (equivalent to about 1/4 cup edamame without pods)    Yogurt, puddings:  - MHP Fit and Lean Protein  "Pudding (find at [employer] - per 1 cup serving = 100 calories, 15 g protein, 0 g sugar)  - 6-oz container Dannon Light n' Fit vanilla yogurt, topped with 1oz unsalted nuts         - 2 Tbsp PB2 mixed in light or greek yogurt or sugar-free pudding    Fruits and vegetable combos:  - Apple, celery or baby carrots spread with 2 Tbsp PB2  - Apple slices with 1 oz slice low-fat cheese  - Apple slices dipped in 2 Tbsp of PB2  - Celery, cucumber, bell pepper or baby carrots dipped in ¼ cup hummus bean spread   - Celery, cucumber, baby carrots dipped in high protein greek yogurt (Mix 16 oz plain greek yogurt + 1 packet of hidden valley ranch dip mix)    Drinks:  - 8 oz glass of FAIRLIFE fat free milk (13 g protein)  - 8 oz glass of FAIRLIFE fat free milk + 1 packet of sugar-free hot cocoa  - Add Atkins advantage Cafe Caramel shake to decaf coffee. Serve hot or blend with ice for "frappaccino" like drink    PROTEIN:  - Protein Shakes: Atkins, Low Carb Slim Fast, EAS light, Muscle Milk Light, Iconic etc.  - Protein Powder: GNC Soy95, Isopure, Nectar, UNJURY, Whey Gourmet, etc. Mix 1 scoop powder with 8oz skim/1% milk or light soymilk.  - Protein Bars: Atkins, EAS, Pure Protein,  Quest, Think Thin, Detour, etc. Must have 0-4 grams sugar - Read the label.      "

## 2022-06-21 ENCOUNTER — PATIENT MESSAGE (OUTPATIENT)
Dept: HEPATOLOGY | Facility: CLINIC | Age: 57
End: 2022-06-21
Payer: MEDICARE

## 2022-06-21 DIAGNOSIS — E11.9 TYPE 2 DIABETES MELLITUS WITHOUT COMPLICATION, WITHOUT LONG-TERM CURRENT USE OF INSULIN: ICD-10-CM

## 2022-06-21 DIAGNOSIS — K76.0 FATTY LIVER DISEASE, NONALCOHOLIC: ICD-10-CM

## 2022-06-21 NOTE — TELEPHONE ENCOUNTER
HgbA1c now suggests DM which likely explains higher liver enzymes. Anticipate improvement in enzymes with weight loss and better blood sugar control. Advised f/u with PCP for blood sugar management.     Discussed in clinic, will refer to [facility] dietician for consult.

## 2022-07-05 ENCOUNTER — OFFICE VISIT (OUTPATIENT)
Dept: DERMATOLOGY | Facility: CLINIC | Age: 57
End: 2022-07-05
Payer: MEDICARE

## 2022-07-05 DIAGNOSIS — L71.9 ROSACEA: Primary | ICD-10-CM

## 2022-07-05 PROCEDURE — 1160F RVW MEDS BY RX/DR IN RCRD: CPT | Mod: CPTII,95,, | Performed by: DERMATOLOGY

## 2022-07-05 PROCEDURE — 1160F PR REVIEW ALL MEDS BY PRESCRIBER/CLIN PHARMACIST DOCUMENTED: ICD-10-PCS | Mod: CPTII,95,, | Performed by: DERMATOLOGY

## 2022-07-05 PROCEDURE — 4010F ACE/ARB THERAPY RXD/TAKEN: CPT | Mod: CPTII,95,, | Performed by: DERMATOLOGY

## 2022-07-05 PROCEDURE — 1159F PR MEDICATION LIST DOCUMENTED IN MEDICAL RECORD: ICD-10-PCS | Mod: CPTII,95,, | Performed by: DERMATOLOGY

## 2022-07-05 PROCEDURE — 99214 PR OFFICE/OUTPT VISIT, EST, LEVL IV, 30-39 MIN: ICD-10-PCS | Mod: 95,,, | Performed by: DERMATOLOGY

## 2022-07-05 PROCEDURE — 3051F PR MOST RECENT HEMOGLOBIN A1C LEVEL 7.0 - < 8.0%: ICD-10-PCS | Mod: CPTII,95,, | Performed by: DERMATOLOGY

## 2022-07-05 PROCEDURE — 99214 OFFICE O/P EST MOD 30 MIN: CPT | Mod: 95,,, | Performed by: DERMATOLOGY

## 2022-07-05 PROCEDURE — 4010F PR ACE/ARB THEARPY RXD/TAKEN: ICD-10-PCS | Mod: CPTII,95,, | Performed by: DERMATOLOGY

## 2022-07-05 PROCEDURE — 1159F MED LIST DOCD IN RCRD: CPT | Mod: CPTII,95,, | Performed by: DERMATOLOGY

## 2022-07-05 PROCEDURE — 3051F HG A1C>EQUAL 7.0%<8.0%: CPT | Mod: CPTII,95,, | Performed by: DERMATOLOGY

## 2022-07-05 RX ORDER — DOXYCYCLINE HYCLATE 100 MG
TABLET ORAL
Qty: 30 TABLET | Refills: 2 | Status: SHIPPED | OUTPATIENT
Start: 2022-07-05 | End: 2022-10-06 | Stop reason: SDUPTHER

## 2022-07-05 NOTE — PROGRESS NOTES
Patient Information  Name: Angie Mccarthy  : 1965  MRN: [id number]     Referring Physician:  Dr. Andrews ref. provider found   Primary Care Physician:  Dr. Rangel Floyd MD   Date of Visit: 2022      Subjective:       Angie Mccarthy is a 56 y.o. female who presents for No chief complaint on file.      Pt initially seen  for rosacea (with papulopustules) treated with doxy 100mg qday x few months.   Seen  for ET rosacea. No papulopustules. Recommended laser which pt did not get    States that redness has gotten worse (more diffuse and flares more frequently - with spicy foods, heat and stress). Sometimes gets pimples    Seen by ophtho who dx pt with eye rosacea and recommended she see derm for doxy.     Lab Results       Component                Value               Date                       HGBA1C                   7.6 (H)             2022                      Patient was last seen in Dermatology: 2020.    Prior notes by myself reviewed.   Clinical documentation obtained by nursing staff reviewed.    Review of Systems   HENT: Negative for headaches.    Eyes: Positive for eye watering and eye irritation ( sand). Negative for itching and eyelid inflammation.   Gastrointestinal: Negative for nausea, vomiting, diarrhea and Sensitivity to oral antibiotics.   Skin: Positive for activity-related sunscreen use. Negative for daily sunscreen use (in makeup) and recent sunburn.   Neurological: Negative for headaches.        Objective:    Physical Exam   Constitutional: She appears well-developed and well-nourished. She is obese.  No distress.   Neurological: She is alert and oriented to person, place, and time. She is not disoriented.   Psychiatric: She has a normal mood and affect.   Skin:   Areas Examined (abnormalities noted in diagram):   Head / Face Inspection Performed              Diagram Legend     Erythematous scaling macule/papule c/w actinic keratosis       Vascular  "papule c/w angioma      Pigmented verrucoid papule/plaque c/w seborrheic keratosis      Yellow umbilicated papule c/w sebaceous hyperplasia      Irregularly shaped tan macule c/w lentigo     1-2 mm smooth white papules consistent with Milia      Movable subcutaneous cyst with punctum c/w epidermal inclusion cyst      Subcutaneous movable cyst c/w pilar cyst      Firm pink to brown papule c/w dermatofibroma      Pedunculated fleshy papule(s) c/w skin tag(s)      Evenly pigmented macule c/w junctional nevus     Mildly variegated pigmented, slightly irregular-bordered macule c/w mildly atypical nevus      Flesh colored to evenly pigmented papule c/w intradermal nevus       Pink pearly papule/plaque c/w basal cell carcinoma      Erythematous hyperkeratotic cursted plaque c/w SCC      Surgical scar with no sign of skin cancer recurrence      Open and closed comedones      Inflammatory papules and pustules      Verrucoid papule consistent consistent with wart     Erythematous eczematous patches and plaques     Dystrophic onycholytic nail with subungual debris c/w onychomycosis     Umbilicated papule    Erythematous-base heme-crusted tan verrucoid plaque consistent with inflamed seborrheic keratosis     Erythematous Silvery Scaling Plaque c/w Psoriasis     See annotation          [] Data reviewed    [] Prior external notes reviewed    [] Independent review of test    [] Management discussed with another provider    [] Independent historian    Assessment / Plan:        Rosacea - mostly ET (although pt states she sometimes "breaks out" too. Mostly c/o eye symptoms (sand, tearing, injected conjunctiva) - using several eye drops and washes per ophtho  Discussed with pt that conjunctival injection and eye pain are not generally considered sx of rosacea  -     doxycycline (VIBRA-TABS) 100 MG tablet; Take 1 po qday with food and not within 1 hour prior to lying down x 2 months then decrease to 1/2 tablet qday with food and not " within 1 hour prior to lying down  Dispense: 30 tablet; Refill: 2    Take doxycyline 100mg  - 1 whole tablet every day with food and not within 1 hour prior to lying down x 2 months then decrease to 1/2 tablet every day with food and not within 1 hour prior to lying down           The patient location is: [facility]  The chief complaint leading to consultation is: rosacea    Visit type: audiovisual    Face to Face time with patient: 14 minutes  14 minutes of total time spent on the encounter, which includes face to face time and non-face to face time preparing to see the patient (eg, review of tests), Obtaining and/or reviewing separately obtained history, Documenting clinical information in the electronic or other health record, Independently interpreting results (not separately reported) and communicating results to the patient/family/caregiver, or Care coordination (not separately reported).         Each patient to whom he or she provides medical services by telemedicine is:  (1) informed of the relationship between the physician and patient and the respective role of any other health care provider with respect to management of the patient; and (2) notified that he or she may decline to receive medical services by telemedicine and may withdraw from such care at any time.          LOS NUMBER AND COMPLEXITY OF PROBLEMS    COMPLEXITY OF DATA RISK TOTAL TIME (m)   60802  28602 [] 1 self-limited or minor problem [] Minimal to none [] No treatment recommended or patient to monitor. Reassurance.  15-29  10-19   28859  87002 Low  [] 2 or more self limited or minor problems  [] 1 stable chronic illness  [] 1 acute, uncomplicated illness or injury Limited (2)  [] Prior external notes from each unique source  [] Review result of each unique test  [] Order each unique test  OR [] Independent historian Low  []  OTC medications   []  Discussed/Decision for minor skin surgery (no risk factors) 30-44 20-29   70030  46676 Moderate  []   1 or more chronic unstable illness (not at goal or progression or exacerbation) or SE of treatment  []  2 or more stable chronic illnesses  []  1 acute illness with systemic symptoms  []  1 acute complicated injury  []  1 undiagnosed new problem with uncertain prognosis Moderate (1/3 below)  []  3 or more data items        *Now includes independent historian  []  Independent interpretation of a test  []  Discuss management/test with another provider Moderate  []  Prescription drug mgmt  []  Discussed/Decision for Minor surgery with risk factors  []  Mgmt limited by social determinates 45-59  30-39   94912  13105 High  []  1 or more chronic illness with severe exacerbation, progression or SE of treatment  []  1 acute or chronic illness/injury that poses a threat to life or bodily function Extensive (2/3 below)  []  3 or more data items        *Now includes independent historian.  []  Independent interpretation of a test  []  Discuss management/test with another provider High  []  Major surgery with risk discussed  []  Drug therapy requiring intensive monitoring for toxicity  []  Hospitalization  []  Decision for DNR 60-74  40-54

## 2022-07-05 NOTE — PATIENT INSTRUCTIONS
Take doxycyline 100mg  - 1 whole tablet every day with food and not within 1 hour prior to lying down x 2 months then decrease to 1/2 tablet every day

## 2022-08-17 ENCOUNTER — LAB VISIT (OUTPATIENT)
Dept: LAB | Facility: HOSPITAL | Age: 57
End: 2022-08-17
Payer: MEDICARE

## 2022-08-17 DIAGNOSIS — E11.9 DIABETES MELLITUS WITHOUT COMPLICATION: ICD-10-CM

## 2022-08-17 DIAGNOSIS — N20.0 BILATERAL NEPHROLITHIASIS: ICD-10-CM

## 2022-08-17 DIAGNOSIS — I10 ESSENTIAL HYPERTENSION: ICD-10-CM

## 2022-08-17 PROCEDURE — 83986 ASSAY PH BODY FLUID NOS: CPT | Performed by: INTERNAL MEDICINE

## 2022-08-22 LAB
AMMONIA 24H UR-SRATE: 40 MMOL/24 H (ref 15–56)
BSA: ABNORMAL M2
CA H2 PHOS DIHYD 24H SATFR UR: -1.6 DG
CALCIUM 24H UR-MRATE: 222 MG/24 H
CAOX 24H ENGDIFF UR: 2.82 DG
CHLORIDE 24H UR-SRATE: 136 MMOL/24 H
CITRATE 24H UR-MRATE: 211 MG/24 H (ref 406–1191)
CLINICAL BIOCHEMIST REVIEW: ABNORMAL
COLLECT DURATION TIME UR: 24 H
CREAT 24H UR-MRATE: 1748 MG/24 H (ref 603–1783)
HYDROXYAPATITE 24H ENGDIFF UR: 1.26 DG
MAGNESIUM 24H UR-MRATE: 56 MG/24 H (ref 51–269)
NA URATE 24H SATFR UR: 0.09 DG
OSMOLALITY 24H UR: 307 MOSM/KG (ref 150–1150)
OXALATE 24H UR-MRATE: 68.7 MG/24 H (ref 9.7–40.5)
OXALATE 24H UR-SRATE: 0.78 MMOL/24 H (ref 0.11–0.46)
PH 24H UR: 5.4 [PH] (ref 4.5–8)
PHOSPHATE 24H UR-MRATE: 1138 MG/24 H (ref 226–1797)
POTASSIUM 24H UR-SRATE: 53 MMOL/24 H (ref 16–105)
PROTEIN CATABOLIC RATE, URINE: 86 G/24 H (ref 56–125)
SODIUM 24H UR-SRATE: 153 MMOL/24 H (ref 22–328)
SPECIMEN VOL 24H UR: 2775 ML
SULFATE 24H UR-SRATE: 13 MMOL/24 H (ref 7–47)
URATE 24H SATFR UR: 2.89 DG
URATE 24H UR-MRATE: 805 MG/24 H (ref 250–750)
UUN 24H UR-MRATE: 9.7 G/24 H (ref 7–42)

## 2022-08-31 ENCOUNTER — TELEPHONE (OUTPATIENT)
Dept: BARIATRICS | Facility: CLINIC | Age: 57
End: 2022-08-31
Payer: MEDICARE

## 2022-09-01 ENCOUNTER — NUTRITION (OUTPATIENT)
Dept: NUTRITION | Facility: CLINIC | Age: 57
End: 2022-09-01
Payer: MEDICARE

## 2022-09-01 DIAGNOSIS — K76.0 FATTY LIVER DISEASE, NONALCOHOLIC: ICD-10-CM

## 2022-09-01 DIAGNOSIS — E11.9 TYPE 2 DIABETES MELLITUS WITHOUT COMPLICATION, WITHOUT LONG-TERM CURRENT USE OF INSULIN: ICD-10-CM

## 2022-09-01 PROCEDURE — 97802 MEDICAL NUTRITION INDIV IN: CPT | Mod: S$GLB,,, | Performed by: NUTRITIONIST

## 2022-09-01 PROCEDURE — 99999 PR PBB SHADOW E&M-EST. PATIENT-LVL I: ICD-10-PCS | Mod: PBBFAC,,, | Performed by: NUTRITIONIST

## 2022-09-01 PROCEDURE — 99999 PR PBB SHADOW E&M-EST. PATIENT-LVL I: CPT | Mod: PBBFAC,,, | Performed by: NUTRITIONIST

## 2022-09-01 PROCEDURE — 97802 PR MED NUTR THER, 1ST, INDIV, EA 15 MIN: ICD-10-PCS | Mod: S$GLB,,, | Performed by: NUTRITIONIST

## 2022-09-01 NOTE — PROGRESS NOTES
"Nutrition Assessment for Initial Medical Nutrition Therapy      Reason for MNT visit: Pt in for education and nutrition counseling regarding obesity, fatty liver and type 2 DM      ASSESSMENT    Age: 57 y.o.  Wt: 237 lbs  Wt Readings from Last 1 Encounters:   06/28/22 109.8 kg (242 lb)     Ht: 5'5"  Ht Readings from Last 1 Encounters:   06/28/22 5' 5" (1.651 m)     BMI: 39.8  BMI Readings from Last 1 Encounters:   06/28/22 40.27 kg/m²       Clinical Signs/Symptoms: patient stated she has chronic pain. She has had 6 back surgeries    Medical History:   Past Medical History:   Diagnosis Date    Allergy     seasonal    Arthritis     Blood transfusion     with back surgeries    Chronic back pain     GERD (gastroesophageal reflux disease)     Hyperlipidemia     Hypertension     Hypothyroidism     Thyroid nodule    Joint pain     Kidney stones     Morbid obesity 12/14/2012    NAFLD (nonalcoholic fatty liver disease)     OCD (obsessive compulsive disorder)     Osteoporosis     PONV (postoperative nausea and vomiting)     Persistent, Motion sickness with boat rides, Scopolamine helped -still had nausea    Restless leg syndrome     Sciatica of right side associated with disorder of lumbosacral spine     SOB (shortness of breath) on exertion     Spinal stenosis of lumbar region     Supraventricular tachycardia     Thoracic spondylosis      Problem List             Resolved    Syringomyelia and syringobulbia         Chronic low back pain         Essential hypertension (Chronic)         Hyperlipidemia (Chronic)         Thoracic spondylosis without myelopathy         Spinal stenosis, lumbar region, without neurogenic claudication         Osteoporosis, idiopathic         Thyroid nodule         Palpitations         GERD (gastroesophageal reflux disease)         Fatty liver disease, nonalcoholic         S/P laparoscopic sleeve gastrectomy         Severe obesity         Back pain         Lumbar disc disease         Acquired " hypothyroidism         Anxiety         Depression         Chronic, continuous use of opioids         Snoring         Occult blood in stools         PONV (postoperative nausea and vomiting)         History of prediabetes         Urinary hesitancy         Wheezing         Microscopic hematuria         Abnormality of gait         Menopausal flushing         Primary hypothyroidism         Pyelonephritis         Hypophosphatemia         Hypomagnesemia         Status post total left knee replacement         Hepatic fibrosis, stage 3         FERRELL (nonalcoholic steatohepatitis)         Osteoarthritis of right knee         Staghorn calculus            Medications:   Current Outpatient Medications:     albuterol (VENTOLIN HFA) 90 mcg/actuation inhaler, Inhale 2 puffs into the lungs every 6 (six) hours as needed for Wheezing or Shortness of Breath. Rescue, Disp: 18 g, Rfl: 0    alcohol swabs PadM, Apply 1 each topically once daily., Disp: 100 each, Rfl: 3    ALPRAZolam (XANAX) 0.5 MG tablet, TAKE 1 TABLET BY MOUTH DAILY AS NEEDED FOR ANXIETY., Disp: 30 tablet, Rfl: 0    ascorbic acid, vitamin C, (VITAMIN C) 500 MG tablet, , Disp: , Rfl:     aspirin (ECOTRIN) 81 MG EC tablet, Take 1 tablet (81 mg total) by mouth 2 (two) times daily., Disp: 60 tablet, Rfl: 0    BIOTIN ORAL, Take 10,000 mg by mouth every morning. , Disp: , Rfl:     blood glucose control, low (TRUE METRIX LEVEL 1) Soln, 1 Bottle by Misc.(Non-Drug; Combo Route) route daily as needed., Disp: 3 each, Rfl: 3    blood sugar diagnostic (TRUE METRIX GLUCOSE TEST STRIP) Strp, 1 each by Misc.(Non-Drug; Combo Route) route once daily., Disp: 100 each, Rfl: 3    blood-glucose meter (TRUE METRIX GLUCOSE METER) Misc, 1 each by Misc.(Non-Drug; Combo Route) route once daily., Disp: 1 each, Rfl: 0    cyclobenzaprine (FLEXERIL) 10 MG tablet, TK 1 T PO QD- as needed for muscle spasms, Disp: , Rfl: 0    doxycycline (VIBRA-TABS) 100 MG tablet, Take 1 po qday with food and not within 1  hour prior to lying down x 2 months then decrease to 1/2 tablet qday with food and not within 1 hour prior to lying down, Disp: 30 tablet, Rfl: 2    DULoxetine (CYMBALTA) 60 MG capsule, TAKE 1 CAPSULE EVERY DAY, Disp: 90 capsule, Rfl: 3    fish oil-omega-3 fatty acids 300-1,000 mg capsule, Take 1,200 mg by mouth 2 (two) times daily. , Disp: , Rfl:     fluticasone propionate (FLONASE) 50 mcg/actuation nasal spray, USE 2 SPRAYS NASALLY ONE TIME DAILY, Disp: 48 g, Rfl: 3    gabapentin (NEURONTIN) 800 MG tablet, Take 800 mg by mouth 3 (three) times daily. Takes 2-3 times per day, Disp: , Rfl: 1    lancets (TRUEPLUS LANCETS) 33 gauge Misc, 1 lancet by Misc.(Non-Drug; Combo Route) route once daily., Disp: 100 each, Rfl: 3    levothyroxine (SYNTHROID) 150 MCG tablet, TAKE 1 TABLET EVERY MORNING, Disp: 90 tablet, Rfl: 0    lisinopriL (PRINIVIL,ZESTRIL) 2.5 MG tablet, TAKE 1 TABLET EVERY DAY, Disp: 90 tablet, Rfl: 3    metFORMIN (GLUCOPHAGE) 500 MG tablet, Take 1 tablet (500 mg total) by mouth 2 (two) times daily with meals., Disp: 180 tablet, Rfl: 3    multivitamin capsule, Take 1 capsule by mouth every morning., Disp: , Rfl:     omeprazole (PRILOSEC) 40 MG capsule, TAKE 1 CAPSULE EVERY DAY, Disp: 90 capsule, Rfl: 3    oxyCODONE-acetaminophen (PERCOCET)  mg per tablet, Take 1 tablet by mouth 4 (four) times daily as needed., Disp: , Rfl:     promethazine (PHENERGAN) 25 MG tablet, Take 1 tablet (25 mg total) by mouth every 4 (four) hours., Disp: 10 tablet, Rfl: 0    rosuvastatin (CRESTOR) 10 MG tablet, TAKE 1 TABLET EVERY NIGHT, Disp: 90 tablet, Rfl: 0    tamsulosin (FLOMAX) 0.4 mg Cap, TAKE 1 CAPSULE(0.4 MG) BY MOUTH AFTER DINNER, Disp: 30 capsule, Rfl: 1    tiZANidine (ZANAFLEX) 4 MG tablet, Take 4 mg by mouth nightly as needed., Disp: , Rfl:     zinc sulfate (ZINCATE) 50 mg zinc (220 mg) capsule, , Disp: , Rfl:     zolpidem (AMBIEN) 10 mg Tab, TAKE 1 TABLET(10 MG) BY MOUTH EVERY NIGHT AS NEEDED FOR INSOMNIA, Disp: 90  tablet, Rfl: 0    Current Facility-Administered Medications:     triamcinolone acetonide injection 40 mg, 40 mg, Intradermal, 1 time in Clinic/HOD, Teri Manuel MD    Food allergies  Intolerances: NKFA    Labs:  Reviewed and noted  Hemoglobin A1C   Date Value Ref Range Status   06/20/2022 7.6 (H) 4.0 - 5.6 % Final     Comment:     ADA Screening Guidelines:  5.7-6.4%  Consistent with prediabetes  >or=6.5%  Consistent with diabetes    High levels of fetal hemoglobin interfere with the HbA1C  assay. Heterozygous hemoglobin variants (HbS, HgC, etc)do  not significantly interfere with this assay.   However, presence of multiple variants may affect accuracy.     12/21/2019 6.0 (H) 4.0 - 5.6 % Final     Comment:     ADA Screening Guidelines:  5.7-6.4%  Consistent with prediabetes  >or=6.5%  Consistent with diabetes  High levels of fetal hemoglobin interfere with the HbA1C  assay. Heterozygous hemoglobin variants (HbS, HgC, etc)do  not significantly interfere with this assay.   However, presence of multiple variants may affect accuracy.     04/23/2019 5.8 (H) 4.0 - 5.6 % Final     Comment:     ADA Screening Guidelines:  5.7-6.4%  Consistent with prediabetes  >or=6.5%  Consistent with diabetes  High levels of fetal hemoglobin interfere with the HbA1C  assay. Heterozygous hemoglobin variants (HbS, HgC, etc)do  not significantly interfere with this assay.   However, presence of multiple variants may affect accuracy.       Cholesterol   Date Value Ref Range Status   04/25/2022 156 120 - 199 mg/dL Final     Comment:     The National Cholesterol Education Program (NCEP) has set the  following guidelines (reference ranges) for Cholesterol:  Optimal.....................<200 mg/dL  Borderline High.............200-239 mg/dL  High........................> or = 240 mg/dL     06/04/2021 136 120 - 199 mg/dL Final     Comment:     The National Cholesterol Education Program (NCEP) has set the  following guidelines (reference ranges)  for Cholesterol:  Optimal.....................<200 mg/dL  Borderline High.............200-239 mg/dL  High........................> or = 240 mg/dL     09/26/2019 160 120 - 199 mg/dL Final     Comment:     The National Cholesterol Education Program (NCEP) has set the  following guidelines (reference ranges) for Cholesterol:  Optimal.....................<200 mg/dL  Borderline High.............200-239 mg/dL  High........................> or = 240 mg/dL       Triglycerides   Date Value Ref Range Status   04/25/2022 240 (H) 30 - 150 mg/dL Final     Comment:     The National Cholesterol Education Program (NCEP) has set the  following guidelines (reference values) for triglycerides:  Normal......................<150 mg/dL  Borderline High.............150-199 mg/dL  High........................200-499 mg/dL     06/04/2021 135 30 - 150 mg/dL Final     Comment:     The National Cholesterol Education Program (NCEP) has set the  following guidelines (reference values) for triglycerides:  Normal......................<150 mg/dL  Borderline High.............150-199 mg/dL  High........................200-499 mg/dL     09/26/2019 210 (H) 30 - 150 mg/dL Final     Comment:     The National Cholesterol Education Program (NCEP) has set the  following guidelines (reference values) for triglycerides:  Normal......................<150 mg/dL  Borderline High.............150-199 mg/dL  High........................200-499 mg/dL       HDL   Date Value Ref Range Status   04/25/2022 39 (L) 40 - 75 mg/dL Final     Comment:     The National Cholesterol Education Program (NCEP) has set the  following guidelines (reference values) for HDL Cholesterol:  Low...............<40 mg/dL  Optimal...........>60 mg/dL     06/04/2021 34 (L) 40 - 75 mg/dL Final     Comment:     The National Cholesterol Education Program (NCEP) has set the  following guidelines (reference values) for HDL Cholesterol:  Low...............<40 mg/dL  Optimal...........>60 mg/dL      09/26/2019 43 40 - 75 mg/dL Final     Comment:     The National Cholesterol Education Program (NCEP) has set the  following guidelines (reference values) for HDL Cholesterol:  Low...............<40 mg/dL  Optimal...........>60 mg/dL       LDL Cholesterol   Date Value Ref Range Status   04/25/2022 69.0 63.0 - 159.0 mg/dL Final     Comment:     The National Cholesterol Education Program (NCEP) has set the  following guidelines (reference values) for LDL Cholesterol:  Optimal.......................<130 mg/dL  Borderline High...............130-159 mg/dL  High..........................160-189 mg/dL  Very High.....................>190 mg/dL     06/04/2021 75.0 63.0 - 159.0 mg/dL Final     Comment:     The National Cholesterol Education Program (NCEP) has set the  following guidelines (reference values) for LDL Cholesterol:  Optimal.......................<130 mg/dL  Borderline High...............130-159 mg/dL  High..........................160-189 mg/dL  Very High.....................>190 mg/dL     09/26/2019 75.0 63.0 - 159.0 mg/dL Final     Comment:     The National Cholesterol Education Program (NCEP) has set the  following guidelines (reference values) for LDL Cholesterol:  Optimal.......................<130 mg/dL  Borderline High...............130-159 mg/dL  High..........................160-189 mg/dL  Very High.....................>190 mg/dL         Typical day recall: Reviewed and noted during consult     Beverages: water: patient stated she drinks water all day long    Dining out: Frequent fast food, 4-6 times per week    Alcohol: nonsmoker, no alcohol    Lifestyle Influences  Support System: her boyfriend, who she has been living with for the past 14 years    Meal preparation/shopping: self, boyfriend    Current Activity Level: currently none due to physical restrictions    Patient motivation, anticipated barriers, expected compliance: Patient is motivated and has verbalized understanding and intent to comply      DIAGNOSIS   Problem: Excessive Energy Intake  Etiology: RT eating too many calories  Signs/Symptoms: AEB 24 hour recall and BMI    INTERVENTION  Nutrition prescription: estimated energy requirements:   Calories: [id number]  Protein: 150-170 grams  Carbohydrates: 140-160 grams  Fats: choose plant-based heart healthy fats  Total fluid: 119 oz + sweat loss  Limit added sugar to 20 grams or less per day (Note: 1 teaspoon of sugar = ~5 gram      Recommendations & Goals:  Patient goals and recommendations are tailored to the specific patient's needs, readiness to change, lifestyle, culture, skills, resources, & abilities. Strategies to help achieve these nutrition-related goals were discussed which can include but are not limited to SMART goal setting & mindful eating.     Aim for a minimum of 7 hours sleep   Exercise 60 minutes most days  Eat breakfast within 1-2 hours of waking up  Try not to skip any meals or snacks, not going more than 3-4 hours without eating.   At each meal and snack, try to include a source of fiber + lean protein + healthy fat.       Written Materials Provided  These resources are intended to assist the patient in making it easier to choose recommended options when eating out & to identify better-for-you brands at the grocery store:    Meal Planning Guide with recommendations discussed along with portion sizes and a meal plan   Fueling Well On The Go Guide  Eat Fit Grocery Product list   RD contact information- for patient to contact regarding any questions, needs, and/or concerns that may arise     MONITORING & EVALUATION    Communicated with healthcare provider    Documented plan for referral to appropriate agency/healthcare provider as needed    Comprehension: fair     Motivation to change: moderate    Follow-up: 6 weeks    Counseling time: 2 Hours

## 2022-09-01 NOTE — PATIENT INSTRUCTIONS
Name: Angie Hall  Date: 9.1.2022    Daily Energy Requirements:  Calories: 9754-1617  Protein: 150-170 grams  Carbohydrates: 140-160 grams  Fats: choose plant-based heart healthy fats  Total fluid: 119 oz + sweat loss  Limit added sugar to 20 grams or less per day (Note: 1 teaspoon of sugar = ~5 gram)    Goals:   Eat every 3-4 hours  Know your carbohydrate servings for each meal and snack. The rest of your plate will equal lean protein and non-starchy veggies.  Low carb at dinner  Re-train the body. Set your alarm every 3-4 hours to eat something. A bite of something is better than nothing    Breakfast: 2 servings of carbs = 30-40 grams + at least 20 grams lean protein/heart healthy fat  Frozen breakfast sandwich (see notes for brand options)  Banana + 2 tablespoons peanut butter  Smoothie: See notes on how to make a healthy smoothie. Your portions are there as well  Ready to drink protein shake (see notes for brand examples) + ¼ cup nuts    Snack: A mid-morning snack may be needed if going >3-4 hours between meals      Lunch: 5 oz lean protein + unlimited non-starchy veggies + 2 servings of carbs = 30-40 grams  Examples of 2 servings of carbohydrates = 30-40 grams:  1 cup cooked 100% whole wheat pasta -or- 1.5 cups cooked lentil pasta (see notes brand examples); 2/3 cup cooked brown rice; 1 medium potato or sweet potato (5-6 oz in weight); 1 medium size piece of fruit + 2 slices Jaskaran's Killer thin sliced bread; 1 serving of whole grain chips (see notes) + 2 slices thin sliced whole grain bread; 2 slices 100-calorie bread; 1 serving of Beanito chips + ½ cup cooked whole grain starch; 1 cup cooked beans; ½ cup cooked beans + 1/3 cup cooked brown rice, etc  The rest of your plate will consist of 5 oz lean protein + unlimited non-starchy veggies  Meal Examples:  Salad: Unlimited non-starchy veggies + 5 oz lean protein + 2-3 salad add-ins (see notes below) + 1 cup fruit  Manzanola: 2 slices Jaskaran's  Killer 21 whole grains and seeds regular sliced bread + 5 oz turkey + 2 tablespoons regular borjas + 1 slice cheese + non-starchy veggies of your choice  Note: If you want a piece of fruit or 1 serving of whole grain chips with your sandwich, then do 2 slices Jaskaran's Killer thin-sliced bread  Spaghetti & meat sauce: 1.5 cups cooked lentil pasta + 5 oz 93/7 lean ground beef + side of non-starchy veggies  5 oz lean protein + non-starchy veggies + 1 medium potato (5-6 oz in weight) to make Airfried French fries  ½ cup cooked beans + 1/3 cup cooked brown rice + additional 4 oz lean protein + side of non-starchy veggies  2 corn tortillas + 5 oz lean protein + sautéed onions and bell peppers  Restaurants: See tips in notes, particularly the Pick 1 out of the 4 rule  Fast Food: See FUELING WELL on-the-go guide     Snack: ~1 serving of carbs = 15-25 grams + at least 20 grams lean protein/heart healthy fat + unlimited amounts of non-starchy vegetables  Flavored Greek yogurt (See notes for brands) + string cheese + 1 serving of your popcorn  5 oz full fat plain Greek yogurt + optional Truvia packet for sweetness + ½ cup fruit of your choice + optional 2 tablespoons low sugar granola (see notes for brands)  [place]ento balance break + piece of fruit  1/3 cup nuts + piece of fruit  Piece of fruit + 2 tablespoons regular nut butter  Note: If banana, then do ½ a medium/large  2 serving of Beanito chips + 1, 100-calorie wholly guacamole packet  2 serving of Beanito chips + ¼ cup shredded cheese melted on top (aka better-for-you pat's)  Flapjacked protein mighty muffin (found on baking aisle of grocery stores--the chocolate is delicious)  Granola/protein bar (see notes for brands) + string cheese  Avocado toast: ½ small Kolb avocado on 1 slice 100% whole grain toast or rice cake  Quest protein chips + string cheese + piece of fruit -or- 1 cup chopped fruit  Any breakfast can be a snack      Dinner: Lower carb  Note: If you're craving  carbohydrates, then have 1 serving of carbs = 15-20 grams = ½ cup cooked whole grain starch   Focus on 5-6 oz lean protein + 1 cup or more non-starchy veggies + small amount of heart healthy fat  Carb swaps:  Hearts of palm-based 'linguini'  Brand example: Palmini   Lasagna made with Palmini lasagna sheets à this one comes in a can  'Riced cauliflower' à Can make from scratch + available in convenient frozen Steamables as well as shelf stable pouches  Cauliflower mash to mimic mashed potatoes  'Riced broccoli'   Zoodles (I don't recommend buying these frozen---they get watery---I recommend spiralizing yourself)  Spiralized beets  Spaghetti squash   Outer Aisle Plantpower à makes pre-made frozen cauliflower pizza crust & wraps as well as frozen sandwich thins  Great for making low carb pizzas, burgers and sandwiches  Hermilo'flour frozen flatbreads and pizza crustsà any flavor   Great for making low carb pizzas, burgers, and sandwiches    Think outside the box for low carb dishes:  Kabobs, stir blanc with riced cauliflower or riced broccoli, stuffed bell peppers with no rice, lettuce wraps, eggplant lasagna, shrimp etouffee made with riced cauliflower, request a sushi roll that contains raw fish to be made without rice, etc  Quest pizza à low carb and high protein frozen pizza. Add additional side of non-starchy veggies      Optional post-dinner sweet: Anything up to 200 calories  'Fun size'                    Building A Better Smoothie  Choose your protein. Pick 1 from the followin oz 2% Plain Greek yogurt  1.5 scoops Plant-based protein powder  Orgain  Sunwarrior  [place]  Garden Carma Life RAW  1.5 scoops 100% whey protein powder (Gladiator)  3 scoops Collagen Peptides (Vital Proteins is a brand favorite)  Make it sweet:  Choose 1 cup frozen or fresh fruit of your choice to hailey up your smoothie  Make sure you are choosing frozen fruit with no sugar added; be sure to look at the ingredient list  Add your  veggies:  Instead of ice, choose frozen cauliflower florets. Cauliflower helps with the detoxification process in our bodies, and it's virtually tasteless!  Greens: spinach, kale, or other leafy greens  Beets are a great addition to smoothies, especially paired with strawberries and lemon  Cucumbers and celery are refreshing ways to enhance nutrition and hydration within your smoothie  Add in a plant-based fat:   Nut Butter (1 teaspoon - 1 tablespoon)  Natural peanut butter  [place] butter  Cashew butter  Nuts: ~2 tablespoons   Avocado (¼)  Avocado oil  EVOO  Add a liquid to reach your desired consistency:  Unsweetened/No sugar added plant-based milk   [place] milk, Hemp milk, Cashew milk, Coconut milk, Rice milk, Soy milk  Milk: 1% or 2%  Healthy add-ins for an extra nutritional boost:  1 tablespoon flaxseeds or hawa seeds        Restaurant Tips      Pick 1 out of the 4 Rule: Instead of eating bread/tortilla chips, an appetizer, alcoholic drink and dessert, choose just one to have with your entrée  Focus on lean proteins: Refer to lean meat/meat substitutes page in meal planning guidebook. Select items grilled, baked, broiled, braised, poached or roasted      For your heart health, avoid crispy, crunchy, breaded, paneed or stuffed items and items that are cream based, au gratin or buttered      Order sauces, dressings, and gravies on the side. This way you can add 1-2 tablespoons yourself. This helps with portion control     Request extra non-starchy vegetables instead of a starchy side dish. If the starchy side is something you love, consider splitting it with someone else at the table     Beverages: Order water with lemon, sparkling water, or unsweetened tea. Avoid sugary soft drinks, juices and mixers    Dining Out     [facility]  Designed to take the guesswork out of dining out healthfully, [facility] makes the healthy choice the easy choice  Visit    Order the Eat Fit Cookbook to create  restaurant quality dishes at home  Download the [facility] kwabena for free on your smartphone  All Eat Fit restaurants & dishes by location   Nutrition facts for every Eat Fit dish  200 + Eat Fit approved recipes  Grocery shopping guides + community wellness resources    - Ordering A Better-For-You Salad:  Look for lean protein, unlimited non-starchy vegetables, and plant-based fats  Order dressings on the side to better control portion sizes. Add 1-2 tablespoons yourself to lightly coat  Pick 2-3 salad add-ins, each being ~2 tablespoons:  Dressing  Cheese  Nuts  Fuentes  Croutons  Dried Fruit  Avocado/Guacamole  Eggs        Additional Nutrition Tips    -Rule of thumb for Aisle products: (Food products located in the center aisles of grocery stores):   Daniel #7 Rule  Look for products that have 7 grams or less added sugar, and at least 7 grams or more protein    -Frozen Meal Guidelines:   Aim for meals that contain 45 grams or less of carbohydrates and 20 grams or more protein  Note: If you find one you like that doesn't have 20 grams protein, you can add leftover lean protein to the frozen meal  See notes below for several brand examples  Refer to the Eat Fit Shopping Guide for additional brand specific recommendations    -Choose 100% whole wheat/whole grain products   -Note: 'wheat' bread and 'wheat' flour are white bread/white flour    -Unsweetened frozen fruits and veggies contain the same nutritional value as fresh fruit and veggies. I recommend keeping these as staples in your freezer     -Taking the skin off fried chicken is basically the same as eating grilled chicken  Compromise: If the skin is your favorite part, then eat your first piece of fried chicken without skin, and your last piece with skin  Note: Fried foods shouldn't be consumed more than twice a month    -Training/retraining the body is key, and may take some time:   Whatever you are currently doing for your meals/snacks and exercise routine,  your body is used to it  When changing a habit(s), it's normal for it to be a little hard at first. Your body gets used to new habits over time with consistency  Exercise/Movement: Aim for at least 60 minutes of moderate exercise 5 days a week.   Note: Start off with small goals if having a hard time getting back on an exercise routine. Even a daily 15-minute walk adds up initially. Increase the length of time you exercise gradually until you're able to do 60 minutes most days of the week    -Water is not only important for hydration, but also for feeling alert and more energized. If our body is even slightly dehydrated, that could make us feel more fatigued throughout the day  Aim for all daily fluids being half your body weight in oz + sweat loss (see under Energy Requirements at the top of page 1 for your daily total fluid intake)    -I recommend measuring everything in cooked portions to see what each recommended portion looks like on your plate and bowls; Then eyeball after you feel comfortable with recommended portions    -What potentially increases inflammation/flare-ups in our body?  White/refined carbohydrates  Sugar  Alcohol  Fried foods    -If you're having trouble finding a specific food product, try looking on the brands website. Most companies have a 'store /find a store' on their website        Favorite Food Brands    - Whole Grain Breads:  Jaskaran's Killer Bread - 21 Whole Grains and Seeds (green label), Good Seed (yellow label), Power Seed (red label)   Thin sliced -or- regular slice  Imer Bread - all varieties, in freezer section  Base Culture Bread - 7 Nut + Seed and Original Paleo Break (GF and found in freezer section)  Per slice: 110 calories and 4 grams net carbs  Great to use for dinner since low carb    -Whole Grain Tortillas  Wraps:  Corn   Coconut wrap - (Typically found in refrigerated section by cheese)  YANIRA THOMAS - 100% whole wheat   Glide - Whole wheat tortillas + whole wheat  carb balance    -Whole Grain Pancakes  Waffles:  Barton Cake 100% whole grain pancake/waffle mix (my favorite)  Flapjacked- Protein pancake & baking mix   Flapjacked - Mighty Muffin (typically found on baking aisle)  Swerve Sweetener- Pancake & Waffle mix    -Whole Grain Frozen Waffles:  Kashi GO Protein Waffles  Barton Cakes - Power Waffles + Pancakes (plain)  Van's Power Grains Original    -Frozen Meals:  Healthy Choice MAX (this brand will have the most protein)  Healthy Choice POWER Bowls  Happi Foodi Carb Wise (Keto meal)  'Life Cuisine' Keto pizza  Eating Well  Quest Low Carb Frozen Pizza    -Frozen Breakfast Sandwiches  Bowls:  Dewayne GODWIN-Light on English muffin  GoodFood (egg white yoel breakfast sandwich)  SweetEarth (comes on a whole grain flatbread breakfast sandwich)  Realgood - Breakfast Sandwiches on Cauliflower Cheesy Bread  EVOL morning bowls    EVOL lean and fit options        -Better-For-You Pasta:  Banza chickpea pasta - all varieties   Red Lentil or Yellow Lentil pasta  Black bean pasta (aka squid ink pasta)  Edamame-based pasta  An[place] gluten free quinoa pasta (found on gluten free aisle. This brand tastes like white pasta, but is a whole grain)    - Red Sauce in a Jar:  Sly & Kimberly's Heart Smart Sauce (available flavors: Roasted Garlic, Kite or Original)  Elsie's Finest Gourmet Foods Pasta Sauce  Classico Original  Engine 2 Plant-Strong      -Whole Grain Cereals:  Special K PROTEIN  Premier Protein (20-gram protein cereal à available at your typical grocery store)  Kashi Go Grain Free  Dark Cocoa + Cinnamon Vanilla  Ayleen Crunch Keto-Friendly Cereals  Three Wishes  Magic Spoon Grain-Free Cereals (online only)    -100% Whole Grain Chips:  SunChips  Beanito's  Beanfields Bean Chips  Popcorners- FLEX Protein Crisps (10 grams protein per serving)  Garden of Eatin - Blue Corn Chips    -High Protein Chips: (Note: All Are Gluten Free)  Iwon - Found at [facility], Vitamin Shoppe, Whole  Foods or Amazon  Radha's Natural's Protein Chips + Pretzels (amazon)  Quest  Protes    -100% Whole Grain Crackers:  Triscuits - Regular + thin crisps  Wheat Thins  Blue Diana almond nut thins  Juana's Gone Cracker  Whisps Parmesan Cheese Crisps (contains only 1 gram carbohydrate per serving)  Crunchmaster protein sea salt cracker    -Better-For-You Ice Cream  Ice Cream Bars:  Halo Top high protein ice cream pints - regular and keto series  Artic Zero Fit Frozen Dessert  Halo Top Pops  Realgood ice cream  Enlightened ice cream bar  Enlightened 'Light' ice cream  Yasso Frozen Greek yogurt bar    -Protein  Granola Bars that Meet the Daniel #7 Rule:  Nature Valley Protein Chewy (GF)  Kashi Grain Free Coconut [place]  Kashi Go Protein Bar (GF)  KIND Protein + KIND Bars (with 7 grams sugar or less) (GF)  Rx Bar (GF)  Rx Layers Bars (GF)  Oatmega Bars    -Nut [place] & Jellies:  Watson's Nut [place]  Nuts 'N More Nut [place]  MaraNatha Nut [place]  Wild Friends Protein Nut [place]  SunButter, unsweetened  Smart Balance- Natural PB  Jelly: Polaner's All Fruit NSA (no sugar added)    -BBQ Sauce:   SONIA all natural   Primal Kitchen Classic BBQ sauce    -Salad Dressings:  David's Salad Dressing: Sensation, Balsamic, Strawberry, Avocado, Caesar, Creole Ranch, Sweet Creole Mustard, Garlic & Red Wine   Primal Kitchen- all varieties    Collado's Own - Balsamic Vinaigrette, Classic Oil & Vinegar     -Ready-to-Drink Protein Drinks:  Iconic Grass-Fed Protein Drink  Orgain Clean- grass-fed + plant based  OWYN Plant-Based Protein Shake  Fairlife 30-gram Protein Drink  Fairlife CORE POWER Protein Drink    -Cookies:   Partake (store finder located on website )  HighKey mini cookies (found online or at [employer]Hearing)    -Granola: [Note: granola should be used as a topper for a crunch, and not as a main meal]  ProGranola by Mauricio Efficas  Engine 2 Plant Strong  Good Granoly Health Nut    -Milk:  Favorite brands, which are also lactose free,  that contain half the sugar & double the protein + lasts ~2 months in your fridge compared to regular milk:  Fairlife milk (skim, 1% or 2%)  Organic Valley ULTRA reduced fat milk     -Already Flavored Greek Yogurt:  Chobani Less Sugar  Chobani Zero Sugar  Oikos Triple Zero  Two Good - All varieties     -Plant-Based High Protein Yogurt:  Yovanny Carr - unsweetened Greek style yogurt, plain (Dairy free)  Yovanny Hill- almond milk yogurt, vanilla or plain, unsweetened (Dairy free)  Siggi's Coconut Blend Yogurt      -Brown Rice: Any brand that makes a '10-minute boil in a bag' brown rice  Brand examples: Success  Uncle Bens    -Vital Proteins Collage Peptides, unflavored: Great to have as a staple in your pantry. You can add to soups, hummus, coffee, etc to make higher protein    -Flavors to add to water:  SweetLeaf water drops  TruLemon    -If you need to reschedule a follow-up appointment, please call [facility] at [phone]

## 2022-09-14 ENCOUNTER — TELEPHONE (OUTPATIENT)
Dept: BARIATRICS | Facility: CLINIC | Age: 57
End: 2022-09-14
Payer: MEDICARE

## 2022-09-15 ENCOUNTER — TELEPHONE (OUTPATIENT)
Dept: BARIATRICS | Facility: CLINIC | Age: 57
End: 2022-09-15
Payer: MEDICARE

## 2022-10-06 DIAGNOSIS — L71.9 ROSACEA: ICD-10-CM

## 2022-10-06 RX ORDER — DOXYCYCLINE HYCLATE 100 MG
TABLET ORAL
Qty: 30 TABLET | Refills: 2 | Status: SHIPPED | OUTPATIENT
Start: 2022-10-06 | End: 2023-09-21

## 2022-11-01 ENCOUNTER — OFFICE VISIT (OUTPATIENT)
Dept: ENDOCRINOLOGY | Facility: CLINIC | Age: 57
End: 2022-11-01
Payer: MEDICARE

## 2022-11-01 ENCOUNTER — TELEPHONE (OUTPATIENT)
Dept: ENDOCRINOLOGY | Facility: CLINIC | Age: 57
End: 2022-11-01
Payer: MEDICARE

## 2022-11-01 DIAGNOSIS — M81.8 OSTEOPOROSIS, IDIOPATHIC: ICD-10-CM

## 2022-11-01 DIAGNOSIS — E11.65 UNCONTROLLED TYPE 2 DIABETES MELLITUS WITH HYPERGLYCEMIA: ICD-10-CM

## 2022-11-01 DIAGNOSIS — E04.1 THYROID NODULE: ICD-10-CM

## 2022-11-01 PROCEDURE — 3051F HG A1C>EQUAL 7.0%<8.0%: CPT | Mod: CPTII,95,, | Performed by: INTERNAL MEDICINE

## 2022-11-01 PROCEDURE — 3051F PR MOST RECENT HEMOGLOBIN A1C LEVEL 7.0 - < 8.0%: ICD-10-PCS | Mod: CPTII,95,, | Performed by: INTERNAL MEDICINE

## 2022-11-01 PROCEDURE — 99214 OFFICE O/P EST MOD 30 MIN: CPT | Mod: 95,,, | Performed by: INTERNAL MEDICINE

## 2022-11-01 PROCEDURE — 4010F ACE/ARB THERAPY RXD/TAKEN: CPT | Mod: CPTII,95,, | Performed by: INTERNAL MEDICINE

## 2022-11-01 PROCEDURE — 99214 PR OFFICE/OUTPT VISIT, EST, LEVL IV, 30-39 MIN: ICD-10-PCS | Mod: 95,,, | Performed by: INTERNAL MEDICINE

## 2022-11-01 PROCEDURE — 4010F PR ACE/ARB THEARPY RXD/TAKEN: ICD-10-PCS | Mod: CPTII,95,, | Performed by: INTERNAL MEDICINE

## 2022-11-01 RX ORDER — TIRZEPATIDE 2.5 MG/.5ML
2.5 INJECTION, SOLUTION SUBCUTANEOUS
Qty: 4 PEN | Refills: 1 | Status: SHIPPED | OUTPATIENT
Start: 2022-11-01 | End: 2022-12-01

## 2022-11-01 RX ORDER — TIRZEPATIDE 2.5 MG/.5ML
2.5 INJECTION, SOLUTION SUBCUTANEOUS
Qty: 4 PEN | Refills: 1 | Status: SHIPPED | OUTPATIENT
Start: 2022-11-01 | End: 2022-11-01 | Stop reason: SDUPTHER

## 2022-11-01 NOTE — ASSESSMENT & PLAN NOTE
Stable symptoms   Needs updated US, to schedule in 12/2022  Clinically and biochemically euthyroid

## 2022-11-01 NOTE — ASSESSMENT & PLAN NOTE
Plan to start mounjaro for weight loss   Titrate to 15 mg if tolerated   A1C as per PCP     Continue diet, low sugar, lower carbs

## 2022-11-01 NOTE — TELEPHONE ENCOUNTER
----- Message from Pau Myles sent at 11/1/2022 11:01 AM CDT -----  Contact: pt  Pt is requesting a call back about possibly rescheduling her appt for today. She wants to know if she cancelled today, what would be the next available appt she would be able to have. Her dog is very sick and she has to take him to the vet, and is unsure if she would be able to make the appt today.     Confirmed contact below:  Contact Name:Angie Mccarthy  Phone Number: [phone]

## 2022-11-01 NOTE — PATIENT INSTRUCTIONS
Mounjaro start  We will start Mounjaro at 2.5 mg weekly. After 4 weeks if you tolerate it well, we typically increase to 5 mg weekly for improved blood sugar control as well as added weight loss benefit or we can continue 2.5 mg longer to help adjust to the medication. Please send me a message when you take the 4th dose to let me know if we should increase or keep dose the same for the following month. We will increase dose as we are able until we reach the maximum dose of 15 mg or the highest dose that you tolerate. We can increase the dose as often as every month or increase more slowly if needed    The pen is good out of the refrigerator for up to 21 days. Please remove the pen from the refrigerator for 10 minutes before taking injection    Potential Side Effects of Mounjaro:   One of the ways this medication works is by decreasing how fast your stomach empties, which makes you feel full faster after you eat and should help you lose weight. The main side-effects are related to GI upset, such as nausea, bloating and abdominal cramps. You can usually avoid this by eating slowly (take half of your normal portion and eat it over 30 minutes). If you do experience nausea, it does tend to get better after the first few weeks. The most severe reaction is acute pancreatitis which can cause severe abdominal pain radiating to your back. If you experience this, stop the medication and go to the ER. Fortunately this is very rare.

## 2022-11-01 NOTE — ASSESSMENT & PLAN NOTE
Osteoporosis  Needs updated DXA scan   Plan to do later this month  Not on treatment   No falls or fractures    Restart vitamin D

## 2022-11-01 NOTE — TELEPHONE ENCOUNTER
Spoke with patient informed patient that she can switch her appointment to virtual or next available is March 1st. Patient decides tos [facility] appointment to virtual.

## 2022-11-01 NOTE — PROGRESS NOTES
Subjective:      Patient ID: Angie Mccarthy is a 57 y.o. female.    Chief Complaint:  No chief complaint on file.      History of Present Illness  Ms. Mccarthy is a 57 year old woman who is here for evaluation of hypothyroidism and osteoporosis. Also has a personal and family history of kidney stones.    Hypothyroidism on replacement, levothyroxine 150 mcg daily.   Taking appropriately.   Last TSH was normal 4/2022  Had MNG, cytology was benign six years ago. Last US done in 3/2021.   Feels a globus sensation with liquids, no choking. Has shortness of breath due to weight but this is unchanged.      Has scoliosis with six back surgeries in the past   Father and sister with OI. She does not have OI.   Denies falls or fractures.  Has back pain and will need surgery in the future.    TKR in 7/2021    Osteoporosis Risk Factors:  TAHBSO fourteen years ago  Hormone therapy for 1- 2 years   - No history of DVT   Fall two months ago. Tripped over a fan that was positioned on the floor. Denies fractures.   Symptoms: hot flushing and vaginal dryness  Steroid injections for back pain     Last DXA five years ago, FN -1.4     Supplements:  Calcium:  no  MVI yes   D3 1000 IUs daily --> stopped a few months ago.      Dietary calcium: cheese, yogurt, occ broccoli, regular salads but no spinach     Type 2 diabetes, most recent A1C 7.6%  Started metformin 500 mg one tablet twice a day  Lost 15 lbs.   Interested in losing more weight.     Family history of pancreatic cancer in first-degree relative: denies   Personal history of pancreatitis: denies  Family history of MTC/MEN/endocrine tumors: denies      Review of Systems  Denies recent illness     Objective:   Physical Exam  There were no vitals filed for this visit.    BP Readings from Last 3 Encounters:   06/28/22 (!) 142/70   06/20/22 (!) 186/81   03/29/22 136/79     Wt Readings from Last 1 Encounters:   06/28/22 1519 109.8 kg (242 lb)         There is no height or weight  on file to calculate BMI.    Lab Review:   Lab Results   Component Value Date    HGBA1C 7.6 (H) 06/20/2022     Lab Results   Component Value Date    CHOL 156 04/25/2022    HDL 39 (L) 04/25/2022    LDLCALC 69.0 04/25/2022    TRIG 240 (H) 04/25/2022    CHOLHDL 25.0 04/25/2022     Lab Results   Component Value Date     04/25/2022    K 4.1 04/25/2022     04/25/2022    CO2 27 04/25/2022     (H) 04/25/2022    BUN 12 04/25/2022    CREATININE 0.8 04/25/2022    CALCIUM 10.5 04/25/2022    PROT 7.8 06/20/2022    ALBUMIN 4.1 06/20/2022    BILITOT 0.5 06/20/2022    ALKPHOS 126 06/20/2022     (H) 06/20/2022    ALT 90 (H) 06/20/2022    ANIONGAP 13 04/25/2022    ESTGFRAFRICA >60.0 04/25/2022    EGFRNONAA >60.0 04/25/2022    TSH 2.123 04/25/2022     FINDINGS:  The thyroid is normal in size.  Right lobe of the thyroid measures 3.8 x 1.5 x 2.0 cm.  Left lobe of the thyroid measures 3.6 x 1.6 x 1.4 cm.  Thyroid isthmus measures 0.3 cm in AP dimension.  Normal thyroid parenchyma.     There is a solid hypoechoic nodule at the midpole on the right measuring 1.8 x 1.3 x 1.6 cm which has previously undergone biopsy and determined to be benign.     There is a stable solid hyperechoic nodule the superior aspect of the left thyroid lobe measuring 1.1 x 0.8 x 0.9 cm.  This nodule is unchanged in size when compared to a previous examination.  This nodule demonstrates peripheral calcification and is again classified as a TI RADS category 4 nodule.  No new nodules identified.     No abnormal cervical lymph nodes identified.    Assessment and Plan     Uncontrolled type 2 diabetes mellitus with hyperglycemia  Plan to start mounjaro for weight loss   Titrate to 15 mg if tolerated   A1C as per PCP     Continue diet, low sugar, lower carbs    Thyroid nodule  Stable symptoms   Needs updated US, to schedule in 12/2022  Clinically and biochemically euthyroid     Osteoporosis, idiopathic  Osteoporosis  Needs updated DXA scan    Plan to do later this month  Not on treatment   No falls or fractures    Restart vitamin D       The patient location is: home/[place]  The chief complaint leading to consultation is: osteop/type 2 DM    Visit type: audiovisual    Face to Face time with patient: 23 minutes of total time spent on the encounter, which includes face to face time and non-face to face time preparing to see the patient (eg, review of tests), Obtaining and/or reviewing separately obtained history, Documenting clinical information in the electronic or other health record, Independently interpreting results (not separately reported) and communicating results to the patient/family/caregiver, or Care coordination (not separately reported).         Each patient to whom he or she provides medical services by telemedicine is:  (1) informed of the relationship between the physician and patient and the respective role of any other health care provider with respect to management of the patient; and (2) notified that he or she may decline to receive medical services by telemedicine and may withdraw from such care at any time.    Notes:

## 2022-11-29 ENCOUNTER — PATIENT MESSAGE (OUTPATIENT)
Dept: ENDOCRINOLOGY | Facility: CLINIC | Age: 57
End: 2022-11-29
Payer: MEDICARE

## 2022-12-01 RX ORDER — TIRZEPATIDE 5 MG/.5ML
5 INJECTION, SOLUTION SUBCUTANEOUS
Qty: 4 PEN | Refills: 1 | Status: SHIPPED | OUTPATIENT
Start: 2022-12-01 | End: 2023-01-03

## 2022-12-06 ENCOUNTER — TELEPHONE (OUTPATIENT)
Dept: GASTROENTEROLOGY | Facility: CLINIC | Age: 57
End: 2022-12-06
Payer: MEDICARE

## 2022-12-06 NOTE — TELEPHONE ENCOUNTER
----- Message from Ignacio Myers sent at 12/6/2022  4:26 PM CST -----  Regarding: appt  Contact: PT @ [phone]  Pt is calling to speak to someone in the office to schedule. Pt has a referral in Epic for: R19.5 (ICD-10-CM) - Positive colorectal cancer screening using Cologuard test. Please call. Thanks.

## 2022-12-06 NOTE — TELEPHONE ENCOUNTER
D/C 8/28/17  Called patient to see how she is doing, has fuv on 9/6/17.  No questions on medications, reports she is taking the macrobid per urology, advised if weakness increases to call our office.   Patient verbalized understanding of hospital d/c appt with PCP on 9/6/17.   Returned call, no answer, let message.   Garima

## 2022-12-07 DIAGNOSIS — N20.0 BILATERAL NEPHROLITHIASIS: Primary | ICD-10-CM

## 2022-12-07 DIAGNOSIS — D50.0 IRON DEFICIENCY ANEMIA DUE TO CHRONIC BLOOD LOSS: ICD-10-CM

## 2022-12-07 DIAGNOSIS — E55.9 VITAMIN D DEFICIENCY: Primary | ICD-10-CM

## 2022-12-16 ENCOUNTER — TELEPHONE (OUTPATIENT)
Dept: ORTHOPEDICS | Facility: CLINIC | Age: 57
End: 2022-12-16
Payer: MEDICARE

## 2022-12-16 DIAGNOSIS — M79.642 LEFT HAND PAIN: Primary | ICD-10-CM

## 2022-12-16 NOTE — TELEPHONE ENCOUNTER
Spoke c pt. Confirmed 2:45  XR & 3:30 appt c Dr. Ray. Advised pt that XR is located on 1st floor of [facility]. Pt expressed understanding & was thankful.

## 2022-12-27 ENCOUNTER — PATIENT MESSAGE (OUTPATIENT)
Dept: ENDOCRINOLOGY | Facility: CLINIC | Age: 57
End: 2022-12-27
Payer: MEDICARE

## 2023-01-02 ENCOUNTER — NURSE TRIAGE (OUTPATIENT)
Dept: ADMINISTRATIVE | Facility: CLINIC | Age: 58
End: 2023-01-02
Payer: MEDICARE

## 2023-01-03 ENCOUNTER — PATIENT MESSAGE (OUTPATIENT)
Dept: ENDOCRINOLOGY | Facility: CLINIC | Age: 58
End: 2023-01-03
Payer: MEDICARE

## 2023-01-03 RX ORDER — TIRZEPATIDE 7.5 MG/.5ML
7.5 INJECTION, SOLUTION SUBCUTANEOUS
Qty: 4 PEN | Refills: 1 | Status: SHIPPED | OUTPATIENT
Start: 2023-01-03 | End: 2023-01-26

## 2023-01-03 NOTE — TELEPHONE ENCOUNTER
"Pt reports discomfort in eye w/ intermittent blurriness, not present currently. Concerned of a red spot that has gotten larger, moderate discomfort. Advised, per protocol and verbalizes understanding. Would like follow up in this regard.     Reason for Disposition   MODERATE eye pain or discomfort (e.g., interferes with normal activities or awakens from sleep; more than mild)    Additional Information   Negative: SEVERE eye pain   Negative: Complete loss of vision in one or both eyes   Negative: [1] Eyelids are very swollen (shut or almost) AND [2] fever   Negative: [1] Eyelid (outer) is very red AND [2] fever   Negative: [1] Foreign body sensation ("feels like something is in there") AND [2] irrigation didn't help   Negative: Vomiting   Negative: Ulcer or sore seen on the cornea (clear center part of the eye)   Negative: [1] Recent eye surgery AND [2] increasing eye pain   Negative: [1] Blurred vision AND [2] new or worsening   Negative: Patient sounds very sick or weak to the triager    Protocols used: Eye Pain and Other Symptoms-A-AH    "

## 2023-01-06 ENCOUNTER — PATIENT MESSAGE (OUTPATIENT)
Dept: BARIATRICS | Facility: CLINIC | Age: 58
End: 2023-01-06
Payer: MEDICARE

## 2023-01-06 ENCOUNTER — TELEPHONE (OUTPATIENT)
Dept: BARIATRICS | Facility: CLINIC | Age: 58
End: 2023-01-06
Payer: MEDICARE

## 2023-01-06 NOTE — TELEPHONE ENCOUNTER
----- Message from Deneen Bolton sent at 1/6/2023  8:49 AM CST -----  Contact: pt  Pt requesting a callback to get appt rescheduled pt dont feel good she cannot make it today             Confirmed contact below:  Contact Name:Angie Mccarthy  Phone Number: [phone]

## 2023-01-16 ENCOUNTER — PATIENT MESSAGE (OUTPATIENT)
Dept: UROLOGY | Facility: CLINIC | Age: 58
End: 2023-01-16
Payer: MEDICARE

## 2023-01-18 ENCOUNTER — PATIENT MESSAGE (OUTPATIENT)
Dept: UROLOGY | Facility: CLINIC | Age: 58
End: 2023-01-18
Payer: MEDICARE

## 2023-01-18 ENCOUNTER — TELEPHONE (OUTPATIENT)
Dept: UROLOGY | Facility: CLINIC | Age: 58
End: 2023-01-18
Payer: MEDICARE

## 2023-01-18 NOTE — TELEPHONE ENCOUNTER
I spoke with patient, who stated she has a uti and kidney stones and just passed one the other day , patient requesting antibiotics, I advised her that she needs to in for appt for possible cath urine sample. Patient agreed to appt tomorrow with yudy busch np

## 2023-01-20 ENCOUNTER — TELEPHONE (OUTPATIENT)
Dept: ENDOSCOPY | Facility: HOSPITAL | Age: 58
End: 2023-01-20
Payer: MEDICARE

## 2023-01-21 NOTE — TELEPHONE ENCOUNTER
----- Message from Yoan Morley sent at 1/20/2023  4:26 PM CST -----  Contact: @[phone]  Pt is calling in to schedule a colonoscopy, please call to discuss further.

## 2023-01-23 ENCOUNTER — PATIENT MESSAGE (OUTPATIENT)
Dept: ENDOSCOPY | Facility: HOSPITAL | Age: 58
End: 2023-01-23
Payer: MEDICARE

## 2023-01-23 DIAGNOSIS — Z12.11 SPECIAL SCREENING FOR MALIGNANT NEOPLASMS, COLON: Primary | ICD-10-CM

## 2023-01-23 RX ORDER — POLYETHYLENE GLYCOL 3350, SODIUM SULFATE ANHYDROUS, SODIUM BICARBONATE, SODIUM CHLORIDE, POTASSIUM CHLORIDE 236; 22.74; 6.74; 5.86; 2.97 G/4L; G/4L; G/4L; G/4L; G/4L
4 POWDER, FOR SOLUTION ORAL ONCE
Qty: 4000 ML | Refills: 0 | Status: SHIPPED | OUTPATIENT
Start: 2023-01-23 | End: 2023-01-23

## 2023-01-24 ENCOUNTER — PATIENT MESSAGE (OUTPATIENT)
Dept: ENDOCRINOLOGY | Facility: CLINIC | Age: 58
End: 2023-01-24
Payer: MEDICARE

## 2023-01-25 ENCOUNTER — TELEPHONE (OUTPATIENT)
Dept: ENDOSCOPY | Facility: HOSPITAL | Age: 58
End: 2023-01-25
Payer: MEDICARE

## 2023-01-25 ENCOUNTER — TELEPHONE (OUTPATIENT)
Dept: GASTROENTEROLOGY | Facility: CLINIC | Age: 58
End: 2023-01-25
Payer: MEDICARE

## 2023-01-25 DIAGNOSIS — Z12.11 COLON CANCER SCREENING: Primary | ICD-10-CM

## 2023-01-25 NOTE — TELEPHONE ENCOUNTER
Skylar Phipps MA routed conversation to [facility] Endo Schedulers 2 hours ago (7:47 AM)     Skylar Phipps MA 2 hours ago (7:47 AM)     SHARYN  ----- Message from Dahlia Millard sent at 1/24/2023  7:51 PM CST -----  Type:  Patient Returning Call     Who Called:pt   Who Left Message for Patient: pt   Does the patient know what this is regarding?: pt need to cancel her ENDOSCOPY  on 01/25/2023 she have a kidney stone    Would the patient rather a call back or a response via [facility]?  Call   Best Call Back Number:[phone]  Additional Information:  call back for a new apppt

## 2023-01-25 NOTE — TELEPHONE ENCOUNTER
Called patient. No answer. Left message for patient to call Endoscopy Scheduling to reschedule or visit your My [facility] portal to schedule for a Phone PAT appointment. Phone number provided.

## 2023-01-25 NOTE — TELEPHONE ENCOUNTER
----- Message from Dahlia Millard sent at 1/24/2023  7:51 PM CST -----  Type:  Patient Returning Call    Who Called:pt   Who Left Message for Patient: pt   Does the patient know what this is regarding?: pt need to cancel her ENDOSCOPY  on 01/25/2023 she have a kidney stone    Would the patient rather a call back or a response via [facility]?  Call   Best Call Back Number:[phone]  Additional Information:  call back for a new apppt

## 2023-01-26 RX ORDER — TIRZEPATIDE 10 MG/.5ML
10 INJECTION, SOLUTION SUBCUTANEOUS
Qty: 4 PEN | Refills: 1 | Status: SHIPPED | OUTPATIENT
Start: 2023-01-26 | End: 2023-03-03

## 2023-02-07 ENCOUNTER — TELEPHONE (OUTPATIENT)
Dept: VASCULAR SURGERY | Facility: CLINIC | Age: 58
End: 2023-02-07
Payer: MEDICARE

## 2023-02-07 NOTE — TELEPHONE ENCOUNTER
Attempted to contact the pt to inform her of appt scheduled incorrectly but no answer.Left a voice message with a call back number [phone].

## 2023-02-17 DIAGNOSIS — R01.1 UNDIAGNOSED CARDIAC MURMURS: Primary | ICD-10-CM

## 2023-03-03 ENCOUNTER — PATIENT MESSAGE (OUTPATIENT)
Dept: ENDOCRINOLOGY | Facility: CLINIC | Age: 58
End: 2023-03-03
Payer: MEDICARE

## 2023-03-03 RX ORDER — SEMAGLUTIDE 2.68 MG/ML
2 INJECTION, SOLUTION SUBCUTANEOUS
Qty: 1 PEN | Refills: 11 | Status: SHIPPED | OUTPATIENT
Start: 2023-03-03 | End: 2023-04-04

## 2023-03-07 DIAGNOSIS — M81.8 OSTEOPOROSIS, IDIOPATHIC: Primary | ICD-10-CM

## 2023-04-04 ENCOUNTER — PATIENT MESSAGE (OUTPATIENT)
Dept: ENDOCRINOLOGY | Facility: CLINIC | Age: 58
End: 2023-04-04
Payer: MEDICARE

## 2023-04-04 RX ORDER — TIRZEPATIDE 12.5 MG/.5ML
12.5 INJECTION, SOLUTION SUBCUTANEOUS
Qty: 4 PEN | Refills: 1 | Status: SHIPPED | OUTPATIENT
Start: 2023-04-04 | End: 2023-05-02

## 2023-04-26 ENCOUNTER — TELEPHONE (OUTPATIENT)
Dept: ORTHOPEDICS | Facility: CLINIC | Age: 58
End: 2023-04-26
Payer: MEDICARE

## 2023-04-26 DIAGNOSIS — M79.642 LEFT HAND PAIN: Primary | ICD-10-CM

## 2023-05-02 ENCOUNTER — PATIENT MESSAGE (OUTPATIENT)
Dept: ENDOCRINOLOGY | Facility: CLINIC | Age: 58
End: 2023-05-02
Payer: MEDICARE

## 2023-05-02 DIAGNOSIS — I10 HYPERTENSION, UNSPECIFIED TYPE: Primary | ICD-10-CM

## 2023-05-02 RX ORDER — TIRZEPATIDE 15 MG/.5ML
15 INJECTION, SOLUTION SUBCUTANEOUS
Qty: 4 PEN | Refills: 11 | Status: SHIPPED | OUTPATIENT
Start: 2023-05-02 | End: 2023-07-24

## 2023-05-03 ENCOUNTER — TELEPHONE (OUTPATIENT)
Dept: ENDOSCOPY | Facility: HOSPITAL | Age: 58
End: 2023-05-03
Payer: MEDICARE

## 2023-05-03 VITALS — BODY MASS INDEX: 37.54 KG/M2 | WEIGHT: 204 LBS | HEIGHT: 62 IN

## 2023-05-09 ENCOUNTER — PATIENT MESSAGE (OUTPATIENT)
Dept: NEPHROLOGY | Facility: CLINIC | Age: 58
End: 2023-05-09
Payer: MEDICARE

## 2023-05-24 ENCOUNTER — PATIENT MESSAGE (OUTPATIENT)
Dept: NEPHROLOGY | Facility: CLINIC | Age: 58
End: 2023-05-24
Payer: MEDICARE

## 2023-05-29 ENCOUNTER — HOSPITAL ENCOUNTER (OUTPATIENT)
Dept: CARDIOLOGY | Facility: HOSPITAL | Age: 58
Discharge: HOME | End: 2023-05-29
Attending: INTERNAL MEDICINE
Payer: MEDICARE

## 2023-05-29 VITALS
BODY MASS INDEX: 37.54 KG/M2 | HEART RATE: 70 BPM | WEIGHT: 204 LBS | HEIGHT: 62 IN | SYSTOLIC BLOOD PRESSURE: 130 MMHG | DIASTOLIC BLOOD PRESSURE: 70 MMHG

## 2023-05-29 DIAGNOSIS — R01.1 UNDIAGNOSED CARDIAC MURMURS: ICD-10-CM

## 2023-05-29 LAB
ASCENDING AORTA: 3.52 CM
AV INDEX (PROSTH): 1.03
AV MEAN GRADIENT: 5 MMHG
AV PEAK GRADIENT: 11 MMHG
AV VALVE AREA: 5.17 CM2
AV VELOCITY RATIO: 0.99
BSA FOR ECHO PROCEDURE: 2.01 M2
CV ECHO LV RWT: 0.42 CM
DOP CALC AO PEAK VEL: 1.68 M/S
DOP CALC AO VTI: 30.8 CM
DOP CALC LVOT AREA: 5 CM2
DOP CALC LVOT DIAMETER: 2.53 CM
DOP CALC LVOT PEAK VEL: 1.67 M/S
DOP CALC LVOT STROKE VOLUME: 159.08 CM3
DOP CALCLVOT PEAK VEL VTI: 31.66 CM
E WAVE DECELERATION TIME: 158.89 MSEC
E/A RATIO: 0.93
E/E' RATIO: 10.14 M/S
ECHO LV POSTERIOR WALL: 0.93 CM (ref 0.6–1.1)
EJECTION FRACTION: 65 %
FRACTIONAL SHORTENING: 44 % (ref 28–44)
INTERVENTRICULAR SEPTUM: 0.87 CM (ref 0.6–1.1)
IVRT: 77.07 MSEC
LA MAJOR: 4.98 CM
LA MINOR: 5.28 CM
LA WIDTH: 3.39 CM
LEFT ATRIUM SIZE: 3.14 CM
LEFT ATRIUM VOLUME INDEX MOD: 23.9 ML/M2
LEFT ATRIUM VOLUME INDEX: 24 ML/M2
LEFT ATRIUM VOLUME MOD: 46.14 CM3
LEFT ATRIUM VOLUME: 46.38 CM3
LEFT INTERNAL DIMENSION IN SYSTOLE: 2.48 CM (ref 2.1–4)
LEFT VENTRICLE DIASTOLIC VOLUME INDEX: 45.52 ML/M2
LEFT VENTRICLE DIASTOLIC VOLUME: 87.86 ML
LEFT VENTRICLE MASS INDEX: 66 G/M2
LEFT VENTRICLE SYSTOLIC VOLUME INDEX: 11.3 ML/M2
LEFT VENTRICLE SYSTOLIC VOLUME: 21.87 ML
LEFT VENTRICULAR INTERNAL DIMENSION IN DIASTOLE: 4.4 CM (ref 3.5–6)
LEFT VENTRICULAR MASS: 128.02 G
LV LATERAL E/E' RATIO: 8.88 M/S
LV SEPTAL E/E' RATIO: 11.83 M/S
MV PEAK A VEL: 0.76 M/S
MV PEAK E VEL: 0.71 M/S
MV STENOSIS PRESSURE HALF TIME: 46.08 MS
MV VALVE AREA P 1/2 METHOD: 4.77 CM2
PISA TR MAX VEL: 2.6 M/S
RA MAJOR: 4.39 CM
RA PRESSURE: 3 MMHG
RA WIDTH: 3.7 CM
RIGHT VENTRICULAR END-DIASTOLIC DIMENSION: 3.59 CM
SINUS: 3.6 CM
STJ: 3.39 CM
TDI LATERAL: 0.08 M/S
TDI SEPTAL: 0.06 M/S
TDI: 0.07 M/S
TR MAX PG: 27 MMHG
TRICUSPID ANNULAR PLANE SYSTOLIC EXCURSION: 2.57 CM
TV REST PULMONARY ARTERY PRESSURE: 30 MMHG

## 2023-05-29 PROCEDURE — 93306 TTE W/DOPPLER COMPLETE: CPT

## 2023-05-29 PROCEDURE — 93306 ECHO (CUPID ONLY): ICD-10-PCS | Mod: 26,,, | Performed by: INTERNAL MEDICINE

## 2023-05-29 PROCEDURE — 93306 TTE W/DOPPLER COMPLETE: CPT | Mod: 26,,, | Performed by: INTERNAL MEDICINE

## 2023-05-30 ENCOUNTER — PATIENT MESSAGE (OUTPATIENT)
Dept: CARDIOLOGY | Facility: CLINIC | Age: 58
End: 2023-05-30
Payer: MEDICARE

## 2023-06-23 ENCOUNTER — TELEPHONE (OUTPATIENT)
Dept: ENDOSCOPY | Facility: HOSPITAL | Age: 58
End: 2023-06-23

## 2023-06-23 ENCOUNTER — CLINICAL SUPPORT (OUTPATIENT)
Dept: ENDOSCOPY | Facility: HOSPITAL | Age: 58
End: 2023-06-23
Payer: MEDICARE

## 2023-06-23 DIAGNOSIS — Z12.11 COLON CANCER SCREENING: ICD-10-CM

## 2023-06-23 NOTE — TELEPHONE ENCOUNTER
Spoke to patient to schedule procedure(s) Colonoscopy       Physician to perform procedure(s) Dr. JOE Dang  Date of Procedure (s) 8/10/23  Arrival Time 6:30 AM  Time of Procedure(s) 7:30 AM   Location of Procedure(s) [facility] 4th Floor  Type of Rx Prep sent to patient: PEG  Instructions provided to patient via [facility]    Patient was informed on the following information and verbalized understanding. Screening questionnaire reviewed with patient and complete. If procedure requires anesthesia, a responsible adult needs to be present to accompany the patient home, patient cannot drive after receiving anesthesia. Appointment details are tentative, especially check-in time. Patient will receive a prep-op call 4 days prior to confirm check-in time for procedure. If applicable the patient should contact their pharmacy to verify Rx for procedure prep is ready for pick-up. Patient was advised to call the scheduling department at [phone] if pharmacy states no Rx is available. Patient was advised to call the endoscopy scheduling department if any questions or concerns arise.      [facility] Scheduling Department

## 2023-07-13 ENCOUNTER — TELEPHONE (OUTPATIENT)
Dept: ALLERGY | Facility: CLINIC | Age: 58
End: 2023-07-13
Payer: MEDICARE

## 2023-07-13 ENCOUNTER — PATIENT MESSAGE (OUTPATIENT)
Dept: NEPHROLOGY | Facility: CLINIC | Age: 58
End: 2023-07-13
Payer: MEDICARE

## 2023-07-13 NOTE — TELEPHONE ENCOUNTER
Change appt date  (Newest Message First)  Angie Valle Staff (Lokesh Ceja) (supporting Leonard Haddad MD) 26 minutes ago (9:54 AM)       Ranulfo Moore,   I am having the hardest time trying to get my sciatic nerve under control. Last night I had to bend over to  something and I saw stars!!! I am going to try bed rest and meds for a dew days to see if it will help. A few weeks ago I did a Med dose pack which usually does the trick but no relief this time. I am going to see my doctor to get further help.   Can you reschedule my test and appointment for Dr. Haddad for some time in August? I have to see him so I can get on medication for these stones.  Thank you Teresa for all of your help.  Angie Mccarty        Responsible Party    Pool - Lukitsch Ivo Staff (Lokesh Ceja)

## 2023-07-21 NOTE — PROGRESS NOTES
The patient location is: [place]  The chief complaint leading to consultation is: echo f/u    Visit type: audiovisual    Face to Face time with patient: 28 minutes of total time spent on the encounter, which includes face to face time and non-face to face time preparing to see the patient (eg, review of tests), Obtaining and/or reviewing separately obtained history, Documenting clinical information in the electronic or other health record, Independently interpreting results (not separately reported) and communicating results to the patient/family/caregiver, or Care coordination (not separately reported).     Each patient to whom he or she provides medical services by telemedicine is:  (1) informed of the relationship between the physician and patient and the respective role of any other health care provider with respect to management of the patient; and (2) notified that he or she may decline to receive medical services by telemedicine and may withdraw from such care at any time.    Notes:            Cardiology Clinic Note  Reason for Visit: echo f/u    HPI:     Angie Mccarthy is a 57 y.o. F, who presents for f/u echo, murmur.    She feels occasional heart racing. Can occur while sitting to watch TV.  Her home BP cuff shows pulse ~100s.  This was happening nearly daily, now less frequent.   She is short of breath.    BP has been 110-120s.  She drinks of water. Stays hydrated.  Awaiting blood work prior to Aug PCP visit.    She is awaiting a colonoscopy due to blood in her stool.     Medical: HTN, HLD, NAFLD, sciatica, scoliosis, DM2  Surgical: Reviewed, as below.  Family: Reviewed, as below. Mother with heart disease. Father with heart disease. Maternal grandmother with heart disease.  Social: Reviewed, as below. Never smoked.    ROS:    As above.  PMH:     Past Medical History:   Diagnosis Date    Allergy     seasonal    Arthritis     Blood transfusion     with back surgeries    Chronic back pain     GERD  (gastroesophageal reflux disease)     Hyperlipidemia     Hypothyroidism     Thyroid nodule    Joint pain     Kidney stones     Morbid obesity 12/14/2012    NAFLD (nonalcoholic fatty liver disease)     OCD (obsessive compulsive disorder)     Osteoporosis     PONV (postoperative nausea and vomiting)     Persistent, Motion sickness with boat rides, Scopolamine helped -still had nausea    Restless leg syndrome     Sciatica of right side associated with disorder of lumbosacral spine     Spinal stenosis of lumbar region     Thoracic spondylosis      Past Surgical History:   Procedure Laterality Date    APPENDECTOMY      BACK SURGERY      x 6    CHOLECYSTECTOMY      GASTRECTOMY      Lap Gastric Sleeve    HERNIA REPAIR      HYSTERECTOMY      JOINT REPLACEMENT      LIVER BIOPSY  02/06/2017    steatohepatitis with stage 1 fibrosis    MYELOGRAPHY N/A 11/29/2018    Procedure: MYELOGRAM;  Surgeon: Ander Diagnostic Provider;  Location: [facility] OR [address] [place] [address];  Service: Radiology;  Laterality: N/A;    MYELOGRAPHY N/A 1/7/2019    Procedure: Myelogram;  Surgeon: Lissett Surgeon;  Location: [facility];  Service: Anesthesiology;  Laterality: N/A;    OOPHORECTOMY      PERCUTANEOUS CRYOTHERAPY OF PERIPHERAL NERVE USING LIQUID NITROUS OXIDE IN CLOSED NEEDLE DEVICE Left 4/29/2019    Procedure: CRYOTHERAPY, NERVE, PERIPHERAL, PERCUTANEOUS, USING LIQUID NITROUS OXIDE IN CLOSED NEEDLE DEVICE-iovera left knee;  Surgeon: Chavo Gold III, MD;  Location: [facility] CATH LAB;  Service: Pain Management;  Laterality: Left;    PERCUTANEOUS CRYOTHERAPY OF PERIPHERAL NERVE USING LIQUID NITROUS OXIDE IN CLOSED NEEDLE DEVICE Right 8/2/2021    Procedure: CRYOTHERAPY, NERVE, PERIPHERAL, PERCUTANEOUS, USING LIQUID NITROUS OXIDE IN CLOSED NEEDLE DEVICE;  Surgeon: Saritha Proctor NP;  Location: [facility];  Service: Pain Management;  Laterality: Right;  RIGHT KNEE IOVERA    SPINAL FUSION      TONSILLECTOMY, ADENOIDECTOMY      TOTAL KNEE ARTHROPLASTY Left 5/1/2019     "Procedure: REPLACEMENT-KNEE-TOTAL;  Surgeon: John L. [facility] Jr., MD;  Location: [facility] OR [address] [place] [address];  Service: Orthopedics;  Laterality: Left;     Allergies:     Review of patient's allergies indicates:   Allergen Reactions    Adhesive Dermatitis and Blisters    Adhesive tape-silicones Dermatitis     The longer the adhesive stays on, the worse the irritation    Mastisol adhesive [gum jydewl-msmctt-ahup-alcohol] Itching, Rash and Blisters     "Looked like poison ivy"     Medications:     Current Outpatient Medications on File Prior to Visit   Medication Sig Dispense Refill    albuterol (VENTOLIN HFA) 90 mcg/actuation inhaler Inhale 2 puffs into the lungs every 6 (six) hours as needed for Wheezing or Shortness of Breath. Rescue 18 g 0    alcohol swabs PadM Apply 1 each topically once daily. 100 each 3    ALPRAZolam (XANAX) 0.5 MG tablet TAKE 1 TABLET BY MOUTH DAILY AS NEEDED FOR ANXIETY. 30 tablet 0    ascorbic acid, vitamin C, (VITAMIN C) 500 MG tablet       aspirin (ECOTRIN) 81 MG EC tablet Take 1 tablet (81 mg total) by mouth 2 (two) times daily. 60 tablet 0    BIOTIN ORAL Take 10,000 mg by mouth every morning.       blood glucose control, low (TRUE METRIX LEVEL 1) Soln 1 Bottle by Misc.(Non-Drug; Combo Route) route daily as needed. 3 each 3    blood sugar diagnostic (TRUE METRIX GLUCOSE TEST STRIP) Strp TEST BLOOD SUGAR EVERY  strip 3    blood-glucose meter (TRUE METRIX GLUCOSE METER) Misc 1 each by Misc.(Non-Drug; Combo Route) route once daily. 1 each 0    ciprofloxacin HCl (CIPRO) 500 MG tablet Take 1 tablet (500 mg total) by mouth every 12 (twelve) hours. 10 tablet 0    cyclobenzaprine (FLEXERIL) 10 MG tablet TK 1 T PO QD- as needed for muscle spasms  0    doxycycline (VIBRA-TABS) 100 MG tablet Take 1/2 tablet qday with food and not within 1 hour prior to lying down. May increase to 1 tablet po qday x 1 - 2 weeks prn flare 30 tablet 2    DULoxetine (CYMBALTA) 60 MG capsule TAKE 1 CAPSULE EVERY DAY 90 " capsule 3    fish oil-omega-3 fatty acids 300-1,000 mg capsule Take 1,200 mg by mouth 2 (two) times daily.       fluticasone propionate (FLONASE) 50 mcg/actuation nasal spray USE 2 SPRAYS NASALLY ONE TIME DAILY 48 g 3    gabapentin (NEURONTIN) 800 MG tablet Take 800 mg by mouth 3 (three) times daily. Takes 2-3 times per day  1    levothyroxine (SYNTHROID) 150 MCG tablet TAKE 1 TABLET EVERY MORNING 90 tablet 0    lisinopriL (PRINIVIL,ZESTRIL) 2.5 MG tablet TAKE 1 TABLET EVERY DAY 90 tablet 3    metFORMIN (GLUCOPHAGE) 500 MG tablet TAKE 1 TABLET (500 MG TOTAL) BY MOUTH 2 (TWO) TIMES DAILY WITH MEALS. 180 tablet 3    multivitamin capsule Take 1 capsule by mouth every morning.      omeprazole (PRILOSEC) 40 MG capsule TAKE 1 CAPSULE EVERY DAY 90 capsule 3    oxyCODONE-acetaminophen (PERCOCET)  mg per tablet Take 1 tablet by mouth 4 (four) times daily as needed.      promethazine (PHENERGAN) 25 MG tablet Take 1 tablet (25 mg total) by mouth every 4 (four) hours. 10 tablet 0    rosuvastatin (CRESTOR) 10 MG tablet TAKE 1 TABLET EVERY NIGHT 90 tablet 0    tamsulosin (FLOMAX) 0.4 mg Cap TAKE 1 CAPSULE(0.4 MG) BY MOUTH AFTER DINNER 30 capsule 1    tirzepatide (MOUNJARO) 15 mg/0.5 mL PnIj Inject 15 mg into the skin every 7 days. 4 pen 11    tiZANidine (ZANAFLEX) 4 MG tablet Take 4 mg by mouth nightly as needed.      TRUEPLUS LANCETS 33 gauge Misc TEST BLOOD SUGAR EVERY  each 3    zinc sulfate (ZINCATE) 50 mg zinc (220 mg) capsule       zolpidem (AMBIEN) 10 mg Tab TAKE 1 TABLET(10 MG) BY MOUTH EVERY NIGHT AS NEEDED FOR INSOMNIA Strength: 10 mg 90 tablet 0     Current Facility-Administered Medications on File Prior to Visit   Medication Dose Route Frequency Provider Last Rate Last Admin    triamcinolone acetonide injection 40 mg  40 mg Intradermal 1 time in Clinic/HOD Teri Manuel MD         Social History:     Social History     Tobacco Use    Smoking status: Never    Smokeless tobacco: Never    Tobacco  comments:     Tried a few cigarettes during teenage   Substance Use Topics    Alcohol use: Yes     Comment: socially     Family History:     Family History   Problem Relation Age of Onset    Heart disease Mother     Heart disease Father     Cancer Father     COPD Father     Heart disease Maternal Grandmother     Acne Other     Eczema Other     Heart disease Maternal Aunt     Heart disease Maternal Uncle     Heart disease Paternal Aunt     Breast cancer Paternal Aunt     Heart disease Paternal Uncle     Cancer Paternal Uncle     Melanoma Neg Hx     Psoriasis Neg Hx     Lupus Neg Hx      Physical Exam:   LMP 12/06/2007      Physical not exam was not performed due this encounter being a virtual visit.    Labs:     Blood Tests:  Lab Results   Component Value Date     04/25/2022    K 4.1 04/25/2022     04/25/2022    CO2 27 04/25/2022    BUN 12 04/25/2022    CREATININE 0.8 04/25/2022     (H) 04/25/2022    HGBA1C 7.6 (H) 06/20/2022    MG 2.1 08/18/2021     (H) 06/20/2022    ALT 90 (H) 06/20/2022    ALBUMIN 4.1 06/20/2022    PROT 7.8 06/20/2022    BILITOT 0.5 06/20/2022    WBC 9.20 04/25/2022    WBC 9.20 04/25/2022    HGB 13.5 04/25/2022    HGB 13.5 04/25/2022    HCT 44.1 04/25/2022    HCT 44.1 04/25/2022    HCT 28 (L) 02/14/2013    MCV 76 (L) 04/25/2022    MCV 76 (L) 04/25/2022     04/25/2022     04/25/2022    INR 1.0 04/25/2022    TSH 2.123 04/25/2022       Lab Results   Component Value Date    CHOL 156 04/25/2022    HDL 39 (L) 04/25/2022    TRIG 240 (H) 04/25/2022       Lab Results   Component Value Date    LDLCALC 69.0 04/25/2022       Urine Tests:  Lab Results   Component Value Date    COLORU Nikole 06/20/2022    APPEARANCEUA Cloudy (A) 06/20/2022    PHUR 5.0 06/20/2022    SPECGRAV 1.020 06/20/2022    PROTEINUA 2+ (A) 06/20/2022    GLUCUA Negative 06/20/2022    KETONESU Negative 06/20/2022    BILIRUBINUA Negative 06/20/2022    OCCULTUA 2+ (A) 06/20/2022    NITRITE Negative  06/20/2022    UROBILINOGEN Negative 09/15/2018    LEUKOCYTESUR 3+ (A) 06/20/2022    PROTEINURINE 148 (H) 08/18/2021    CREATRANDUR 121.0 08/18/2021    UTPCR 1.22 (H) 08/18/2021       Imaging:     Echocardiogram  TTE 5/29/23  Technically challenging study.  The left ventricle is normal in size with hyperdynamic systolic function.  The estimated ejection fraction is 65-70%.  Normal left ventricular diastolic function.  The LVOT velocity is elevated, as per text.  Normal right ventricular size with normal right ventricular systolic function.  The estimated PA systolic pressure is 30 mmHg.  Normal central venous pressure (3 mmHg).    Stress testing  PET 10/26/16  CONCLUSIONS: NORMAL MYOCARDIAL PERFUSION PET STRESS TEST   1. The perfusion scan is free of evidence for myocardial ischemia or injury.   2. Resting wall motion is physiologic. Stress wall motion is physiologic.   3. Visually estimated LV function is normal at rest and normal at stress.   4. The ventricular volumes are normal at rest and stress.   5. The extracardiac distribution of radioactivity is normal.   6. There was no previous study available to compare.     Cath Lab  None    Other  CT abd/pelv 6/17/21  Vasculature: No aneurysm.    Holter 4/10/19  Diary  The diary was properly completed. The tape was adequate (0 days , 48 hours, 0 minutes).   There was an episode of palpitations reported. The corresponding rhythm strips revealed the following: the rhythm was sinus tachycardia at 100 bpm.   There was an episode of chest pain reported. The corresponding rhythm strips revealed the following: the rhythm was sinus tachycardia at 117 bpm.   There was an episode of palpitations reported. The corresponding rhythm strips revealed the following: the rhythm was sinus rhythm at 95 bpm.   There was an episode of pain left chest/shoulder reported. The corresponding rhythm strips revealed the following: the rhythm was sinus tachycardia at 105 bpm.   There was an  episode of palpitations reported. The corresponding rhythm strips revealed the following: the rhythm was sinus rhythm at 98 bpm.     Rhythm  Predominant Rhythm  Sinus rhythm with heart rates varying between 55 and 129 bpm with an average of 85 bpm.   Maximum heart rate recorded at: 18:06 on day 1.   Minimum heart rate recorded at 07:04 on day 2.     PVC  Ventricular Arrhythmias  There were very rare PVCs totalling 6 and averaging 0.13 per hour.     PAC  Supraventricular Arrhythmias  There were very rare PACs totalling 74 and averaging 1.54 per hour.     Circadian  The patient slept from 22:30 until 08:00.     Circadian (2)  The patient slept from 22:30 until 07:30.     EK19 - sinus tach, LAFB (personally reviewed)    Assessment:     1. Palpitations    2. Severe obesity    3. Essential hypertension    4. Hyperlipidemia, unspecified hyperlipidemia type    5. Type 2 diabetes mellitus with hyperglycemia, without long-term current use of insulin    6. FERRELL (nonalcoholic steatohepatitis)        Plan:     Snoring  Palpitations, fatigue   Most likely due to deconditioning +/- JENNI +/- scoliosis  Normal holter, PET stress previously    Obtain basic labs to evaluate for medical causes of tachycardia (CBC, CMP, TSH)  Refer to sleep clinic  Discussed Mikel mobile for heart rhythm monitoring    Systolic murmur  Due to increased LVOT velocities in setting of hyperdynamic systolic function    Essential hypertension  BP at goal on home log  Continue meds      Hyperlipidemia, unspecified hyperlipidemia type  LDL <70  Continue statin  Annual lipid check    Diabetes  A1c above goal, 7.6%     S/P laparoscopic sleeve gastrectomy  BMI >40    Signed:  Ambrosio Naranjo MD  Cardiology     Follow-up:     Future Appointments   Date Time Provider Department Center   2023  4:00 PM Junior Naranjo III, MD [facility] CARDIO [facility]   2023  2:00 PM LAB, APPOINTMENT [facility] LAB [facility]   2023  2:05 PM SPECIMEN,  MAIN [place] [facility] SPECLAB [facility]   8/1/2023  4:15 PM [facility] OIC-US1 [facility] ULTR [facility]   8/2/2023  2:30 PM Leonard Haddad MD [facility] NEPHRO [facility]   8/3/2023  2:00 PM Rangel Floyd MD OCC [id number]   8/4/2023  1:15 PM NOM OIC-MAMMO1 [facility] MAMMOIC [facility]   8/4/2023  2:00 PM [facility] OIC-US1 [facility]STER [facility] ULTR [facility]   8/8/2023 11:00 AM [facility] OIC-US1 [facility]STER [facility] ULTR [facility]   8/11/2023  3:30 PM Mami Chino NP [facility] HEPAT [facility]   8/15/2023  9:45 AM BAP XROP  [facility] XRAY OP [facility]   8/15/2023 10:30 AM Ira Back MD [facility] HAND [facility]   8/15/2023  2:30 PM Suni Patel MD [facility] UROLOGY [facility]   8/16/2023  2:00 PM Felisha Sands MD [facility] ENDODIA [facility]

## 2023-07-24 ENCOUNTER — OFFICE VISIT (OUTPATIENT)
Dept: CARDIOLOGY | Facility: CLINIC | Age: 58
End: 2023-07-24
Payer: MEDICARE

## 2023-07-24 ENCOUNTER — PATIENT MESSAGE (OUTPATIENT)
Dept: ENDOCRINOLOGY | Facility: CLINIC | Age: 58
End: 2023-07-24
Payer: MEDICARE

## 2023-07-24 DIAGNOSIS — E78.5 HYPERLIPIDEMIA, UNSPECIFIED HYPERLIPIDEMIA TYPE: Chronic | ICD-10-CM

## 2023-07-24 DIAGNOSIS — K75.81 NASH (NONALCOHOLIC STEATOHEPATITIS): ICD-10-CM

## 2023-07-24 DIAGNOSIS — E66.01 SEVERE OBESITY: ICD-10-CM

## 2023-07-24 DIAGNOSIS — R00.2 PALPITATIONS: Primary | ICD-10-CM

## 2023-07-24 DIAGNOSIS — I10 ESSENTIAL HYPERTENSION: Chronic | ICD-10-CM

## 2023-07-24 DIAGNOSIS — E11.65 UNCONTROLLED TYPE 2 DIABETES MELLITUS WITH HYPERGLYCEMIA: Primary | ICD-10-CM

## 2023-07-24 DIAGNOSIS — E11.65 TYPE 2 DIABETES MELLITUS WITH HYPERGLYCEMIA, WITHOUT LONG-TERM CURRENT USE OF INSULIN: ICD-10-CM

## 2023-07-24 PROCEDURE — 99214 OFFICE O/P EST MOD 30 MIN: CPT | Mod: 95,,, | Performed by: INTERNAL MEDICINE

## 2023-07-24 PROCEDURE — 4010F ACE/ARB THERAPY RXD/TAKEN: CPT | Mod: CPTII,95,, | Performed by: INTERNAL MEDICINE

## 2023-07-24 PROCEDURE — 4010F PR ACE/ARB THEARPY RXD/TAKEN: ICD-10-PCS | Mod: CPTII,95,, | Performed by: INTERNAL MEDICINE

## 2023-07-24 PROCEDURE — 99214 PR OFFICE/OUTPT VISIT, EST, LEVL IV, 30-39 MIN: ICD-10-PCS | Mod: 95,,, | Performed by: INTERNAL MEDICINE

## 2023-07-24 RX ORDER — TIRZEPATIDE 12.5 MG/.5ML
12.5 INJECTION, SOLUTION SUBCUTANEOUS
Qty: 4 PEN | Refills: 3 | Status: SHIPPED | OUTPATIENT
Start: 2023-07-24 | End: 2023-09-21

## 2023-08-01 ENCOUNTER — TELEPHONE (OUTPATIENT)
Dept: NEPHROLOGY | Facility: CLINIC | Age: 58
End: 2023-08-01
Payer: MEDICARE

## 2023-08-01 ENCOUNTER — PATIENT MESSAGE (OUTPATIENT)
Dept: NEPHROLOGY | Facility: CLINIC | Age: 58
End: 2023-08-01
Payer: MEDICARE

## 2023-08-01 NOTE — TELEPHONE ENCOUNTER
Reschedule appt  (Newest Message First)  View All Conversations on this Encounter  You  Angie Mccarthy Just now (1:16 PM)     CC  Ranulfo Adams,  Am so sorry about your Mother. Hope she recooperates quickly.  I will cancel your appointments but he has no schedule for September yet.  Teresa    This Patient Portal message has not been read.     Angie Haddad Ivo Staff (Lokesh Ceja) (supporting Leonard Haddad MD) 47 minutes ago (12:28 PM)       Ranulfo Moore, I won't be able to keep my appointments today for test and blood work or my appointment tomorrow with Dr. Haddad. My 81 y.o. mother fell and busted her face, broke her nose and badly bruised her knee. I am having to stay with her to help out. Please push my appointments out for at least 2 weeks. I will be available after that. Just pick a date and time. I will check my [facility] tomorrow or the next day for the new appointment.   Thank you for you help and I am sorry for the trouble.       Responsible Party    Message handled by Teresa Plasencia RN on 8/1/2023  1:16 PM. Action: Message Reply

## 2023-08-08 ENCOUNTER — TELEPHONE (OUTPATIENT)
Dept: ENDOSCOPY | Facility: HOSPITAL | Age: 58
End: 2023-08-08
Payer: MEDICARE

## 2023-08-08 DIAGNOSIS — Z12.11 SPECIAL SCREENING FOR MALIGNANT NEOPLASMS, COLON: Primary | ICD-10-CM

## 2023-08-08 RX ORDER — SODIUM, POTASSIUM,MAG SULFATES 17.5-3.13G
1 SOLUTION, RECONSTITUTED, ORAL ORAL DAILY
Qty: 1 KIT | Refills: 0 | Status: SHIPPED | OUTPATIENT
Start: 2023-08-08 | End: 2023-08-10

## 2023-08-08 NOTE — TELEPHONE ENCOUNTER
Colonoscopy Procedure Prep Instructions      Date of procedure: 10/27/23 Arrive at: 12:30PM    Location of Department:   [facility] [address] [place] [address]  Take the [facility] Elevators to 4th Floor Endoscopy Lab    As soon as possible:   your prep from pharmacy and over the counter DULCOLAX LAXATIVE TABLETS     On the day before your procedure   What You CAN do:   You may have clear liquids ONLY -see below for list.     Liquids That Are OK to Drink:   Water  Sports drinks (Gatorade, Power-Aid)  Coffee or tea (no cream or nondairy creamer)  Clear juices without pulp (apple, white grape)  Gelatin desserts (no fruit or toppings)  Clear soda (sprite, coke, ginger ale)  Chicken broth (until 12 midnight the night before procedure)      What You CANNOT do:   Do not EAT solid food, drink milk or anything   colored red.  Do not drink alcohol.  Do not take oral medications within 1 hour of starting   each dose of SUPREP.  No gum chewing or candy morning of procedure.      Note:   (Please disregard the insert instructions from pharmacy).  SUPREP Bowel Prep Kit is indicated for cleansing of the colon as a preparation for colonoscopy in adults.   Be sure to tell your doctor about all the medicines you take, including prescription and non-prescription medicines, vitamins, and herbal supplements. SUPREP Bowel Prep Kit may affect how other medicines work.  Medication taken by mouth may not be absorbed properly when taken within 1 hour before the start of each dose of SUPREP Bowel Prep Kit.    It is not uncommon to experience some abdominal cramping, nausea and/or vomiting when taking the prep. If you have nausea and/or vomiting while taking the prep, stop drinking for 20 to 30 minutes then continue.    How to take prep:    SUPREP Bowel Prep Kit is a (2-day) prep.   Both 6-ounce bottles are required for a complete preparation for colonoscopy. Dilute the solution concentrate as directed prior  to use. You must drink water with each dose of SUPREP, and additional water after each dose.    DOSE 1--Day Before Colonoscopy 10/26/23    Drink at least 6 to 8 glasses of clear liquids from time you wake up until you begin your prep and then continue until bedtime to avoid dehydration.     12:00 pm (NOON) Take four (4) Dulcolax (Bisacodyl) tablets with at least 8 ounces or more of clear liquids.      6:00 pm:    You must complete Steps 1 through 4 using one (1) 6-ounce bottle before going to bed as shown below:    Step 1-Pour ONE (1) 6-ounce bottle of SUPREP liquid into the mixing container.  Step 2-Add cool drinking water to the 16-ounce line on the container and mix.  Step 3-Drink ALL the liquid in the container.  Step 4-You must drink two (2) more 16-ounce containers of water over the next 1 hour.  IMPORTANT: If you experience preparation-related symptoms (for example, nausea, bloating, or cramping), stop, or slow the rate of drinking the additional water until your symptoms decrease.    DOSE 2--Day of the Colonoscopy 10/27/23 at 7-8 AM    For this dose, repeat Steps 1 through 4 shown above using the other 6-ounce bottle.   You may continue drinking water/clear liquids until   4 hours before your colonoscopy or as directed by the scheduling nurse  9:30AM.    For more information about your procedure, please watch this informational video. It is important to watch this animated consent video prior to your arrival. If you haven't watched the video prior to arriving, you are required to watch it during admission which can cause delays.     Options for viewing:  Using a keyboard:  press and hold the control tab (Ctrl) and left mouse click to follow link          Colonoscopy Instructional Video                                                         OR    Type link address into your web browser's address bar:  [email or web address]    Using a mobile phone: tap on web address/link.      Comments:       Are you ready for your Colonoscopy?      __ If you take blood thinners, have you stopped taking them according to your doctor's instructions before your procedures?  __ Have you stopped eating solid foods and followed a clear liquid diet a full day before your procedure or followed the diet       guidelines indicated in your instructions? REMINDER: NO BROTH AFTER MIDNIGHT THE DAY BEFORE PROCEDURE.   __ Have you completed all your prep solution? PLEASE DO NOT FOLLOW the insert/or box instructions from pharmacy)  __ Have you taken your blood pressure, heart, seizure, or other essential medications the morning of your procedure?  __ Have you planned for a ride to and from procedure?  (Medical Transportation, Uber, Lyft, Taxi, etc. may ONLY be used if a responsible adult is present to accompany you home). The responsible adult CANNOT be the  of the service.       Questions or Concerns?  Please call us!  [phone] (M-F) [phone] (Nights and weekends)    Listed below are some helpful tips:    Please bring protective cases for eyewear and hearing aids-wear comfortable clothing/shoes.  Follow prep instructions closely so you don't have to do it twice.  Bowel prep is prescription used to clean out the colon before a colonoscopy. The prep increases movement of your colon by causing you to have diarrhea (loose stools). Cleaning out stool from the colon helps your doctor to see in your colon clearly during this procedure.   It is important to stay hydrated before, during and after bowel prep to prevent loss of fluid (dehydration). You can have water and your choice of clear liquids. Reminder: these liquids you will drink in addition to the bowel prep.     Preparing the mixture:    First, mix the prep with water.  Make the taste better by adding a sugar free drink mix (Crystal Light) can improve the taste of your prep.   Use a large bore (opening) straw. Place towards the back of mouth (throat)  as tolerated.  Prepare a prep mixture that is lightly chilled, but not ice-cold. Drinking a large amount of ice-cold liquid can make you feel very ill.  Avoid drinking any RED beverages or eating popsicles with this color for the 24 hours leading up to your procedure. This color can look like blood in the colon.    Consuming the prep:    Take your time. If you feel ill, take a 15-minute break from drinking the prep mixture  Combat hunger and dehydration with clear liquids. Options like JELL-O, popsicles, or chicken broth will help.  Settle in with good reading material. The goal is to clean out 6 feet of colon, so you can plan on spending a good deal of time in the bathroom. Have some good reading material on-hand or an iPad ready to keep yourself entertained!  Keep yourself comfortable. We recommend applying personal hygiene wipes, Tucks pads and a soothing ointment, like A&D ointment, Desitin, or Vaseline to your bottom before starting and as needed to protect your skin.         If you are taking any injectable medication (s) for weight loss and/or diabetes  weekly, please hold for 7 days prior to your scheduled procedure on 10/20/23.

## 2023-08-13 DIAGNOSIS — M81.8 OSTEOPOROSIS, IDIOPATHIC: Primary | ICD-10-CM

## 2023-08-16 ENCOUNTER — PATIENT MESSAGE (OUTPATIENT)
Dept: ENDOCRINOLOGY | Facility: CLINIC | Age: 58
End: 2023-08-16
Payer: MEDICARE

## 2023-09-20 ENCOUNTER — LAB VISIT (OUTPATIENT)
Dept: LAB | Facility: OTHER | Age: 58
End: 2023-09-20
Attending: INTERNAL MEDICINE
Payer: MEDICARE

## 2023-09-20 DIAGNOSIS — E11.65 TYPE 2 DIABETES MELLITUS WITH HYPERGLYCEMIA, WITHOUT LONG-TERM CURRENT USE OF INSULIN: ICD-10-CM

## 2023-09-20 DIAGNOSIS — E66.01 SEVERE OBESITY: ICD-10-CM

## 2023-09-20 DIAGNOSIS — K74.02 HEPATIC FIBROSIS, STAGE 3: ICD-10-CM

## 2023-09-20 DIAGNOSIS — K75.81 NASH (NONALCOHOLIC STEATOHEPATITIS): ICD-10-CM

## 2023-09-20 DIAGNOSIS — K76.0 FATTY LIVER DISEASE, NONALCOHOLIC: ICD-10-CM

## 2023-09-20 DIAGNOSIS — R00.2 PALPITATIONS: ICD-10-CM

## 2023-09-20 DIAGNOSIS — I10 ESSENTIAL HYPERTENSION: Chronic | ICD-10-CM

## 2023-09-20 DIAGNOSIS — E78.5 HYPERLIPIDEMIA, UNSPECIFIED HYPERLIPIDEMIA TYPE: Chronic | ICD-10-CM

## 2023-09-20 LAB
AFP SERPL-MCNC: <2 NG/ML (ref 0–8.4)
ALBUMIN SERPL BCP-MCNC: 3.9 G/DL (ref 3.5–5.2)
ALP SERPL-CCNC: 80 U/L (ref 55–135)
ALT SERPL W/O P-5'-P-CCNC: 19 U/L (ref 10–44)
ANION GAP SERPL CALC-SCNC: 10 MMOL/L (ref 8–16)
AST SERPL-CCNC: 22 U/L (ref 10–40)
BILIRUB SERPL-MCNC: 0.4 MG/DL (ref 0.1–1)
BUN SERPL-MCNC: 13 MG/DL (ref 6–20)
CALCIUM SERPL-MCNC: 9.8 MG/DL (ref 8.7–10.5)
CHLORIDE SERPL-SCNC: 105 MMOL/L (ref 95–110)
CHOLEST SERPL-MCNC: 124 MG/DL (ref 120–199)
CHOLEST/HDLC SERPL: 2.8 {RATIO} (ref 2–5)
CO2 SERPL-SCNC: 26 MMOL/L (ref 23–29)
CREAT SERPL-MCNC: 0.9 MG/DL (ref 0.5–1.4)
ERYTHROCYTE [DISTWIDTH] IN BLOOD BY AUTOMATED COUNT: 14.6 % (ref 11.5–14.5)
EST. GFR  (NO RACE VARIABLE): >60 ML/MIN/1.73 M^2
ESTIMATED AVG GLUCOSE: 117 MG/DL (ref 68–131)
GLUCOSE SERPL-MCNC: 100 MG/DL (ref 70–110)
HBA1C MFR BLD: 5.7 % (ref 4–5.6)
HCT VFR BLD AUTO: 40.4 % (ref 37–48.5)
HDLC SERPL-MCNC: 44 MG/DL (ref 40–75)
HDLC SERPL: 35.5 % (ref 20–50)
HGB BLD-MCNC: 12.3 G/DL (ref 12–16)
INR PPP: 0.9 (ref 0.8–1.2)
LDLC SERPL CALC-MCNC: 52.8 MG/DL (ref 63–159)
MCH RBC QN AUTO: 23.2 PG (ref 27–31)
MCHC RBC AUTO-ENTMCNC: 30.4 G/DL (ref 32–36)
MCV RBC AUTO: 76 FL (ref 82–98)
NONHDLC SERPL-MCNC: 80 MG/DL
PLATELET # BLD AUTO: 275 K/UL (ref 150–450)
PMV BLD AUTO: 11.1 FL (ref 9.2–12.9)
POTASSIUM SERPL-SCNC: 4.5 MMOL/L (ref 3.5–5.1)
PROT SERPL-MCNC: 6.8 G/DL (ref 6–8.4)
PROTHROMBIN TIME: 10.4 SEC (ref 9–12.5)
RBC # BLD AUTO: 5.3 M/UL (ref 4–5.4)
SODIUM SERPL-SCNC: 141 MMOL/L (ref 136–145)
T4 FREE SERPL-MCNC: 1.33 NG/DL (ref 0.71–1.51)
TRIGL SERPL-MCNC: 136 MG/DL (ref 30–150)
TSH SERPL DL<=0.005 MIU/L-ACNC: 0.02 UIU/ML (ref 0.4–4)
WBC # BLD AUTO: 8.13 K/UL (ref 3.9–12.7)

## 2023-09-20 PROCEDURE — 85027 COMPLETE CBC AUTOMATED: CPT | Performed by: NURSE PRACTITIONER

## 2023-09-20 PROCEDURE — 83036 HEMOGLOBIN GLYCOSYLATED A1C: CPT | Performed by: INTERNAL MEDICINE

## 2023-09-20 PROCEDURE — 36415 COLL VENOUS BLD VENIPUNCTURE: CPT | Performed by: NURSE PRACTITIONER

## 2023-09-20 PROCEDURE — 84439 ASSAY OF FREE THYROXINE: CPT | Performed by: NURSE PRACTITIONER

## 2023-09-20 PROCEDURE — 80053 COMPREHEN METABOLIC PANEL: CPT | Performed by: NURSE PRACTITIONER

## 2023-09-20 PROCEDURE — 85610 PROTHROMBIN TIME: CPT | Performed by: NURSE PRACTITIONER

## 2023-09-20 PROCEDURE — 82105 ALPHA-FETOPROTEIN SERUM: CPT | Performed by: NURSE PRACTITIONER

## 2023-09-20 PROCEDURE — 84443 ASSAY THYROID STIM HORMONE: CPT | Performed by: INTERNAL MEDICINE

## 2023-09-20 PROCEDURE — 80061 LIPID PANEL: CPT | Performed by: INTERNAL MEDICINE

## 2023-10-16 ENCOUNTER — HOSPITAL ENCOUNTER (OUTPATIENT)
Dept: RADIOLOGY | Facility: HOSPITAL | Age: 58
Discharge: HOME | End: 2023-10-16
Attending: INTERNAL MEDICINE
Payer: MEDICARE

## 2023-10-16 DIAGNOSIS — N20.0 BILATERAL NEPHROLITHIASIS: ICD-10-CM

## 2023-10-16 PROCEDURE — 76770 US EXAM ABDO BACK WALL COMP: CPT | Mod: TC

## 2023-10-16 PROCEDURE — 76770 US RETROPERITONEAL COMPLETE: ICD-10-PCS | Mod: 26,,, | Performed by: RADIOLOGY

## 2023-10-16 PROCEDURE — 76770 US EXAM ABDO BACK WALL COMP: CPT | Mod: 26,,, | Performed by: RADIOLOGY

## 2023-10-17 ENCOUNTER — TELEPHONE (OUTPATIENT)
Dept: NEPHROLOGY | Facility: CLINIC | Age: 58
End: 2023-10-17
Payer: MEDICARE

## 2023-10-17 NOTE — TELEPHONE ENCOUNTER
Leonard Haddad MD Cottier, Cheryl, RN  Patient with massive kidney stones in her kidney ultrasound. I only saw her once more than 2 yrs ago and wanted to see her back in 3 month. Most have lost follow up. Please call the patient and let her know to also see Dr. Patel, her urologist. She will need to tackle the stones.

## 2023-10-24 ENCOUNTER — TELEPHONE (OUTPATIENT)
Dept: ORTHOPEDICS | Facility: CLINIC | Age: 58
End: 2023-10-24
Payer: MEDICARE

## 2023-10-24 DIAGNOSIS — M79.641 BILATERAL HAND PAIN: Primary | ICD-10-CM

## 2023-10-24 DIAGNOSIS — M79.642 BILATERAL HAND PAIN: Primary | ICD-10-CM

## 2023-10-24 NOTE — TELEPHONE ENCOUNTER
Spoke c pt. Confirmed appt location & time c Dr. Ray 11/02/23. Pt expressed understanding & was thankful.

## 2023-10-25 ENCOUNTER — HOSPITAL ENCOUNTER (OUTPATIENT)
Dept: RADIOLOGY | Facility: HOSPITAL | Age: 58
Discharge: HOME | End: 2023-10-25
Attending: ORTHOPAEDIC SURGERY
Payer: MEDICARE

## 2023-10-25 DIAGNOSIS — M79.642 BILATERAL HAND PAIN: ICD-10-CM

## 2023-10-25 DIAGNOSIS — M79.641 BILATERAL HAND PAIN: ICD-10-CM

## 2023-10-25 PROCEDURE — 73130 XR HAND COMPLETE 3 VIEWS BILATERAL: ICD-10-PCS | Mod: 26,50,, | Performed by: STUDENT IN AN ORGANIZED HEALTH CARE EDUCATION/TRAINING PROGRAM

## 2023-10-25 PROCEDURE — 73130 X-RAY EXAM OF HAND: CPT | Mod: 26,50,, | Performed by: STUDENT IN AN ORGANIZED HEALTH CARE EDUCATION/TRAINING PROGRAM

## 2023-10-25 PROCEDURE — 73130 X-RAY EXAM OF HAND: CPT | Mod: TC,50

## 2023-10-26 ENCOUNTER — TELEPHONE (OUTPATIENT)
Dept: ENDOSCOPY | Facility: HOSPITAL | Age: 58
End: 2023-10-26
Payer: MEDICARE

## 2023-10-26 NOTE — TELEPHONE ENCOUNTER
----- Message from Aviva Kirkpatrick sent at 10/26/2023  3:38 PM CDT -----  Regarding: apt  Contact: [phone]  Pt calling in to reschedule apt  pt is schedule  for 10/27/23 please call to discuss further

## 2023-10-27 ENCOUNTER — TELEPHONE (OUTPATIENT)
Dept: ENDOSCOPY | Facility: HOSPITAL | Age: 58
End: 2023-10-27
Payer: MEDICARE

## 2023-10-27 ENCOUNTER — PATIENT MESSAGE (OUTPATIENT)
Dept: UROLOGY | Facility: CLINIC | Age: 58
End: 2023-10-27
Payer: MEDICARE

## 2023-10-27 ENCOUNTER — OFFICE VISIT (OUTPATIENT)
Dept: NEPHROLOGY | Facility: CLINIC | Age: 58
End: 2023-10-27
Payer: MEDICARE

## 2023-10-27 ENCOUNTER — HOSPITAL ENCOUNTER (OUTPATIENT)
Dept: RADIOLOGY | Facility: HOSPITAL | Age: 58
Discharge: HOME | End: 2023-10-27
Attending: INTERNAL MEDICINE
Payer: MEDICARE

## 2023-10-27 DIAGNOSIS — Z12.11 SCREEN FOR COLON CANCER: Primary | ICD-10-CM

## 2023-10-27 DIAGNOSIS — R82.994 HYPERCALCIURIA: ICD-10-CM

## 2023-10-27 DIAGNOSIS — N22 CALCULUS OF URINARY TRACT IN DISEASES CLASSIFIED ELSEWHERE: ICD-10-CM

## 2023-10-27 DIAGNOSIS — N39.0 UTI (URINARY TRACT INFECTION), UNCOMPLICATED: ICD-10-CM

## 2023-10-27 DIAGNOSIS — N20.0 STAGHORN CALCULUS: Primary | ICD-10-CM

## 2023-10-27 DIAGNOSIS — N20.0 NEPHROLITHIASIS: ICD-10-CM

## 2023-10-27 DIAGNOSIS — E21.3 HYPERPARATHYROIDISM, UNSPECIFIED: ICD-10-CM

## 2023-10-27 PROCEDURE — 3044F PR MOST RECENT HEMOGLOBIN A1C LEVEL <7.0%: ICD-10-PCS | Mod: CPTII,95,, | Performed by: INTERNAL MEDICINE

## 2023-10-27 PROCEDURE — 74176 CT ABD & PELVIS W/O CONTRAST: CPT | Mod: 26,,, | Performed by: RADIOLOGY

## 2023-10-27 PROCEDURE — 74176 CT ABD & PELVIS W/O CONTRAST: CPT | Mod: TC

## 2023-10-27 PROCEDURE — 99214 OFFICE O/P EST MOD 30 MIN: CPT | Mod: 95,,, | Performed by: INTERNAL MEDICINE

## 2023-10-27 PROCEDURE — 3044F HG A1C LEVEL LT 7.0%: CPT | Mod: CPTII,95,, | Performed by: INTERNAL MEDICINE

## 2023-10-27 PROCEDURE — 4010F PR ACE/ARB THEARPY RXD/TAKEN: ICD-10-PCS | Mod: CPTII,95,, | Performed by: INTERNAL MEDICINE

## 2023-10-27 PROCEDURE — 4010F ACE/ARB THERAPY RXD/TAKEN: CPT | Mod: CPTII,95,, | Performed by: INTERNAL MEDICINE

## 2023-10-27 PROCEDURE — 99214 PR OFFICE/OUTPT VISIT, EST, LEVL IV, 30-39 MIN: ICD-10-PCS | Mod: 95,,, | Performed by: INTERNAL MEDICINE

## 2023-10-27 PROCEDURE — 74176 CT RENAL STONE STUDY ABD PELVIS WO: ICD-10-PCS | Mod: 26,,, | Performed by: RADIOLOGY

## 2023-10-27 PROCEDURE — 3066F PR DOCUMENTATION OF TREATMENT FOR NEPHROPATHY: ICD-10-PCS | Mod: CPTII,95,, | Performed by: INTERNAL MEDICINE

## 2023-10-27 PROCEDURE — 3066F NEPHROPATHY DOC TX: CPT | Mod: CPTII,95,, | Performed by: INTERNAL MEDICINE

## 2023-10-27 RX ORDER — SULFAMETHOXAZOLE AND TRIMETHOPRIM 800; 160 MG/1; MG/1
1 TABLET ORAL 2 TIMES DAILY
Qty: 28 TABLET | Refills: 0 | Status: SHIPPED | OUTPATIENT
Start: 2023-10-27 | End: 2023-11-10

## 2023-10-27 RX ORDER — POTASSIUM CITRATE 5 MEQ/1
10 TABLET, EXTENDED RELEASE ORAL
Qty: 180 TABLET | Refills: 11 | Status: SHIPPED | OUTPATIENT
Start: 2023-10-27 | End: 2024-10-26

## 2023-10-27 RX ORDER — SODIUM, POTASSIUM,MAG SULFATES 17.5-3.13G
1 SOLUTION, RECONSTITUTED, ORAL ORAL DAILY
Qty: 1 KIT | Refills: 0 | Status: SHIPPED | OUTPATIENT
Start: 2023-10-27 | End: 2023-10-29

## 2023-10-27 NOTE — PROGRESS NOTES
Subjective:       Patient ID: Angie Mccarthy is a 58 y.o. White female who presents for a follow up for nephrolithiasis  The patient location is: home  The chief complaint leading to consultation is: \kidney stones    Visit type: audiovisual    Face to Face time with patient: 30  40 minutes of total time spent on the encounter, which includes face to face time and non-face to face time preparing to see the patient (eg, review of tests), Obtaining and/or reviewing separately obtained history, Documenting clinical information in the electronic or other health record, Independently interpreting results (not separately reported) and communicating results to the patient/family/caregiver, or Care coordination (not separately reported).         Each patient to whom he or she provides medical services by telemedicine is:  (1) informed of the relationship between the physician and patient and the respective role of any other health care provider with respect to management of the patient; and (2) notified that he or she may decline to receive medical services by telemedicine and may withdraw from such care at any time.    Notes:     The patient has a history of kidney stones, has had them since her 20s, calcium phosphate stones. She has had a sleeve gastrectomy in 2017 (initially lose 30lbs but gained it back). Hx of HTN x 10 yrs, hepatosteatosis, seen [place] [facility], thyroid issues (nodules), scoliosis.  The patient has no family history of kidney disease. Dad had a few kidney stones as has her sister. Multiple cousins from her fathers site. The patient does not freuqently use NSAIDS or herbal supplements. Took Aleve rarely for her backpain.  Patient has had a hx of urinary tract infections (a few a year). She has a history of sepsis after ureteroscopy in [facility] approximately 10 years ago. Her urologist in  is Dr. Labadie done many ureteroscopies on her.She is currently on no medical therapy for stone prevention.    She has been on UroCitK in the past but lost follow up. She has a high stone burden, including a staghorn calculus in her right kidney, was supposed to get surgery by Dr. Patel but had so many private issues with her mother that she did not follow up with your either.   She drinks a lot of water per day (close to a gallon per day). Not on any medication to prevent stones at this point.   Complains about some dysuria and mild flank pain.      Last stone:  60% Calcium phosphate (apatite)   40% Calcium oxalate dihydrate      HPI  Review of Systems   Constitutional:  Negative for activity change, appetite change, chills, fatigue, fever and unexpected weight change.   HENT:  Positive for postnasal drip. Negative for nosebleeds.    Eyes:  Positive for pain.        Swollen left lid, no pain   Respiratory:  Positive for shortness of breath (on exertion). Negative for cough and chest tightness.    Cardiovascular:  Positive for palpitations. Negative for chest pain and leg swelling.   Gastrointestinal:  Negative for abdominal pain, anal bleeding, diarrhea, nausea and vomiting.   Genitourinary:  Negative for difficulty urinating, dysuria, flank pain, frequency, hematuria and urgency.   Musculoskeletal:  Positive for arthralgias and back pain. Negative for joint swelling and myalgias.   Skin: Negative.  Negative for rash.   Neurological: Negative.    Psychiatric/Behavioral: Negative.     All other systems reviewed and are negative.      Objective:      Physical Exam    Assessment:       1. Staghorn calculus    2. Hypercalciuria    3. Hyperparathyroidism, unspecified    4. Nephrolithiasis          Plan:       1. Nephrolithiasis: calcium phosphate stones in the past. Had elevated calcium excretion (220) and oxalate excretion as well as low citrate in her 24 hour stone profile in 8/2022.   Now with evidence of urinary tract infection (mild symptoms). Last urine culture (2 yrs ago though) was resistant to cipro.   - will try  to get urine culture and then treat her empirically with bactrim  for 14 days (complicated uti)   - urgent re-evaluation for urological treatment by Dr. Patel    - start potassium citrate after UTI treated   - will need endocrine evaluation for possible primary hyperpara      - Sufficient fluid intake distributed throughout the day to produce at least 2 liters of urine per day, including drinking at night   - Avoiding excessive animal protein in the diet.   - Limiting dietary sodium to 100 meq/day.    - Increasing dietary potassium intake   - adding citrate to water in the form of karen, lemon juice, or crystal light  - Limiting dietary sucrose and fructose.  - Limiting dietary oxalate (limit iced tea and wang as well as foods high in oxalate)  - limit vitamin C supplements.       2. HTN: on Lisinopril. Per patient well controlled at home, not here in the office though.       continue lisinopril    See back in 2-3 month with labs and stone profile (after her stone procedure)

## 2023-10-27 NOTE — TELEPHONE ENCOUNTER
Spoke to pt to schedule procedure(s) Colonoscopy       Physician to perform procedure(s) Dr. JESSICA Avila  Date of Procedure (s) 02/06/24  Arrival Time 12:30 PM  Time of Procedure(s) 1:30 PM   Location of Procedure(s) [place] [address]  Type of Rx Prep sent to patient: Suprep  Instructions provided to patient via [facility]    Patient was informed on the following information and verbalized understanding. Screening questionnaire reviewed with patient and complete. If procedure requires anesthesia, a responsible adult needs to be present to accompany the patient home, patient cannot drive after receiving anesthesia. Appointment details are tentative, especially check-in time. Patient will receive a prep-op call 4 days prior to confirm check-in time for procedure. If applicable the patient should contact their pharmacy to verify Rx for procedure prep is ready for pick-up. Patient was advised to call the scheduling department at [phone] if pharmacy states no Rx is available. Patient was advised to call the endoscopy scheduling department if any questions or concerns arise.      [facility] Scheduling Department

## 2023-10-27 NOTE — TELEPHONE ENCOUNTER
Returned patient call that was left on voicemail patient stated she was able to speak with nurse to reschedule procedure

## 2023-10-31 ENCOUNTER — OFFICE VISIT (OUTPATIENT)
Dept: UROLOGY | Facility: CLINIC | Age: 58
End: 2023-10-31
Payer: MEDICARE

## 2023-10-31 ENCOUNTER — HOSPITAL ENCOUNTER (OUTPATIENT)
Dept: RADIOLOGY | Facility: HOSPITAL | Age: 58
Discharge: HOME | End: 2023-10-31
Attending: INTERNAL MEDICINE
Payer: MEDICARE

## 2023-10-31 VITALS
BODY MASS INDEX: 39.07 KG/M2 | WEIGHT: 212.31 LBS | HEIGHT: 62 IN | HEART RATE: 86 BPM | DIASTOLIC BLOOD PRESSURE: 70 MMHG | SYSTOLIC BLOOD PRESSURE: 128 MMHG

## 2023-10-31 DIAGNOSIS — M81.8 OSTEOPOROSIS, IDIOPATHIC: ICD-10-CM

## 2023-10-31 DIAGNOSIS — E11.65 TYPE 2 DIABETES MELLITUS WITH HYPERGLYCEMIA, WITHOUT LONG-TERM CURRENT USE OF INSULIN: ICD-10-CM

## 2023-10-31 DIAGNOSIS — R82.994 HYPERCALCIURIA: ICD-10-CM

## 2023-10-31 DIAGNOSIS — N20.0 NEPHROLITHIASIS: ICD-10-CM

## 2023-10-31 DIAGNOSIS — E83.39 HYPOPHOSPHATEMIA: ICD-10-CM

## 2023-10-31 DIAGNOSIS — E04.1 THYROID NODULE: ICD-10-CM

## 2023-10-31 DIAGNOSIS — N39.0 RECURRENT UTI: Primary | ICD-10-CM

## 2023-10-31 DIAGNOSIS — E21.3 HYPERPARATHYROIDISM, UNSPECIFIED: ICD-10-CM

## 2023-10-31 DIAGNOSIS — N20.0 STAGHORN CALCULUS: ICD-10-CM

## 2023-10-31 DIAGNOSIS — E66.01 SEVERE OBESITY: ICD-10-CM

## 2023-10-31 DIAGNOSIS — I10 ESSENTIAL HYPERTENSION: Chronic | ICD-10-CM

## 2023-10-31 DIAGNOSIS — K75.81 NASH (NONALCOHOLIC STEATOHEPATITIS): ICD-10-CM

## 2023-10-31 PROCEDURE — 3074F PR MOST RECENT SYSTOLIC BLOOD PRESSURE < 130 MM HG: ICD-10-PCS | Mod: CPTII,S$GLB,, | Performed by: UROLOGY

## 2023-10-31 PROCEDURE — 4010F ACE/ARB THERAPY RXD/TAKEN: CPT | Mod: CPTII,S$GLB,, | Performed by: UROLOGY

## 2023-10-31 PROCEDURE — 1159F MED LIST DOCD IN RCRD: CPT | Mod: CPTII,S$GLB,, | Performed by: UROLOGY

## 2023-10-31 PROCEDURE — 76536 US EXAM OF HEAD AND NECK: CPT | Mod: 26,,, | Performed by: INTERNAL MEDICINE

## 2023-10-31 PROCEDURE — 3074F SYST BP LT 130 MM HG: CPT | Mod: CPTII,S$GLB,, | Performed by: UROLOGY

## 2023-10-31 PROCEDURE — 3066F NEPHROPATHY DOC TX: CPT | Mod: CPTII,S$GLB,, | Performed by: UROLOGY

## 2023-10-31 PROCEDURE — 3044F HG A1C LEVEL LT 7.0%: CPT | Mod: CPTII,S$GLB,, | Performed by: UROLOGY

## 2023-10-31 PROCEDURE — 76536 US EXAM OF HEAD AND NECK: CPT | Mod: TC

## 2023-10-31 PROCEDURE — 3066F PR DOCUMENTATION OF TREATMENT FOR NEPHROPATHY: ICD-10-PCS | Mod: CPTII,S$GLB,, | Performed by: UROLOGY

## 2023-10-31 PROCEDURE — 99214 PR OFFICE/OUTPT VISIT, EST, LEVL IV, 30-39 MIN: ICD-10-PCS | Mod: S$GLB,,, | Performed by: UROLOGY

## 2023-10-31 PROCEDURE — 77080 DXA BONE DENSITY AXIAL SKELETON 1 OR MORE SITES: ICD-10-PCS | Mod: 26,,, | Performed by: INTERNAL MEDICINE

## 2023-10-31 PROCEDURE — 3078F DIAST BP <80 MM HG: CPT | Mod: CPTII,S$GLB,, | Performed by: UROLOGY

## 2023-10-31 PROCEDURE — 3008F PR BODY MASS INDEX (BMI) DOCUMENTED: ICD-10-PCS | Mod: CPTII,S$GLB,, | Performed by: UROLOGY

## 2023-10-31 PROCEDURE — 3078F PR MOST RECENT DIASTOLIC BLOOD PRESSURE < 80 MM HG: ICD-10-PCS | Mod: CPTII,S$GLB,, | Performed by: UROLOGY

## 2023-10-31 PROCEDURE — 1159F PR MEDICATION LIST DOCUMENTED IN MEDICAL RECORD: ICD-10-PCS | Mod: CPTII,S$GLB,, | Performed by: UROLOGY

## 2023-10-31 PROCEDURE — 77080 DXA BONE DENSITY AXIAL: CPT | Mod: TC

## 2023-10-31 PROCEDURE — 4010F PR ACE/ARB THEARPY RXD/TAKEN: ICD-10-PCS | Mod: CPTII,S$GLB,, | Performed by: UROLOGY

## 2023-10-31 PROCEDURE — 99999 PR PBB SHADOW E&M-EST. PATIENT-LVL IV: CPT | Mod: PBBFAC,,, | Performed by: UROLOGY

## 2023-10-31 PROCEDURE — 76536 US SOFT TISSUE HEAD NECK THYROID: ICD-10-PCS | Mod: 26,,, | Performed by: INTERNAL MEDICINE

## 2023-10-31 PROCEDURE — 77080 DXA BONE DENSITY AXIAL: CPT | Mod: 26,,, | Performed by: INTERNAL MEDICINE

## 2023-10-31 PROCEDURE — 99999 PR PBB SHADOW E&M-EST. PATIENT-LVL IV: ICD-10-PCS | Mod: PBBFAC,,, | Performed by: UROLOGY

## 2023-10-31 PROCEDURE — 3044F PR MOST RECENT HEMOGLOBIN A1C LEVEL <7.0%: ICD-10-PCS | Mod: CPTII,S$GLB,, | Performed by: UROLOGY

## 2023-10-31 PROCEDURE — 3008F BODY MASS INDEX DOCD: CPT | Mod: CPTII,S$GLB,, | Performed by: UROLOGY

## 2023-10-31 PROCEDURE — 99214 OFFICE O/P EST MOD 30 MIN: CPT | Mod: S$GLB,,, | Performed by: UROLOGY

## 2023-10-31 NOTE — PROGRESS NOTES
"Urology - [facility]  Clinic Note    SUBJECTIVE:     Chief Complaint: staghorn kidney stones    History of Present Illness:  Angie Mccarthy is a 58 y.o. female who presents to clinic for staghorn kidney stones. She is new to our clinic referred by I would like to thank No ref. provider found for referral of this patient. to our clinic. Referral from No ref. provider found     10/27/2023 E.coli pan sens.     Anticoagulation:  No    OBJECTIVE:     Estimated body mass index is 38.83 kg/m² as calculated from the following:    Height as of this encounter: 5' 2" (1.575 m).    Weight as of this encounter: 96.3 kg (212 lb 4.9 oz).    Vital Signs (Most Recent)  Pulse: 86 (10/31/23 0849)  BP: 128/70 (10/31/23 0849)    Physical Exam  Vitals reviewed.   Pulmonary:      Effort: Pulmonary effort is normal.   Neurological:      Mental Status: She is alert.         Lab Results   Component Value Date    BUN 11 10/25/2023    CREATININE 0.8 10/25/2023    WBC 7.95 10/25/2023    HGB 12.5 10/25/2023    HCT 40.9 10/25/2023     10/25/2023    AST 22 09/20/2023    ALT 19 09/20/2023    ALKPHOS 80 09/20/2023    ALBUMIN 3.8 10/25/2023    HGBA1C 5.7 (H) 09/20/2023            Imaging:  Personally reviewed.  10/2023 CT  1. Bilateral large branching kidney stones in the collecting system of the lower poles.  No hydronephrosis.  2. There is a suggestion of a small amount of air within the proximal left ureter (image 60 4-70, series 2).  There is equivocal bubble of air within the urinary bladder (image 125, series 2 and image 117, series 602).  Urinary tract infection with gas-forming organism should be considered.  3. Small bowel-containing midline ventral abdominal wall hernia without evidence of bowel obstruction.  4. Additional findings as detailed in the body of the report.  This report was flagged in Epic as abnormal.      ASSESSMENT     1. Recurrent UTI    2. Nephrolithiasis    3. Hyperparathyroidism, unspecified  "   4. Hypercalciuria    5. Type 2 diabetes mellitus with hyperglycemia, without long-term current use of insulin    6. Staghorn calculus    7. FERRELL (nonalcoholic steatohepatitis)    8. Hypophosphatemia    9. Osteoporosis, idiopathic    10. Severe obesity    11. Essential hypertension      PLAN:   Angie was seen today for nephrolithiasis.    Diagnoses and all orders for this visit:    Recurrent UTI  -     Urine culture; Future    Nephrolithiasis    Hyperparathyroidism, unspecified    Hypercalciuria    Type 2 diabetes mellitus with hyperglycemia, without long-term current use of insulin    Staghorn calculus    FERRELL (nonalcoholic steatohepatitis)    Hypophosphatemia    Osteoporosis, idiopathic    Severe obesity    Essential hypertension    Will discuss with IR as access looks difficult due to body habitus and curvature of spine vs. Staged ureteroscopy.   Will need medical clearance.  Will have to be done on antibiotics.   Will need negative urine culture preop.         Suni Patel MD

## 2023-11-02 ENCOUNTER — PATIENT MESSAGE (OUTPATIENT)
Dept: ORTHOPEDICS | Facility: CLINIC | Age: 58
End: 2023-11-02
Payer: MEDICARE

## 2023-11-13 ENCOUNTER — PATIENT MESSAGE (OUTPATIENT)
Dept: UROLOGY | Facility: CLINIC | Age: 58
End: 2023-11-13
Payer: MEDICARE

## 2023-11-14 ENCOUNTER — PATIENT MESSAGE (OUTPATIENT)
Dept: UROLOGY | Facility: CLINIC | Age: 58
End: 2023-11-14
Payer: MEDICARE

## 2023-11-14 ENCOUNTER — TELEPHONE (OUTPATIENT)
Dept: UROLOGY | Facility: CLINIC | Age: 58
End: 2023-11-14
Payer: MEDICARE

## 2023-11-14 DIAGNOSIS — N20.0 STAGHORN CALCULUS: Primary | ICD-10-CM

## 2023-11-14 NOTE — TELEPHONE ENCOUNTER
Called and spoke to the pt to offer surgery date of 11/27, pt accepted. Informed the pt I will call the day before with arrival time and pre-op instructions. Pt verbalized understanding.

## 2023-11-15 ENCOUNTER — TELEPHONE (OUTPATIENT)
Dept: CARDIOLOGY | Facility: CLINIC | Age: 58
End: 2023-11-15
Payer: MEDICARE

## 2023-11-15 ENCOUNTER — TELEPHONE (OUTPATIENT)
Dept: PREADMISSION TESTING | Facility: HOSPITAL | Age: 58
End: 2023-11-15
Payer: MEDICARE

## 2023-11-15 RX ORDER — SULFAMETHOXAZOLE AND TRIMETHOPRIM 800; 160 MG/1; MG/1
TABLET ORAL
Qty: 20 TABLET | Refills: 0 | Status: ON HOLD | OUTPATIENT
Start: 2023-11-15 | End: 2024-02-19 | Stop reason: CLARIF

## 2023-11-15 NOTE — ANESTHESIA PAT ROS NOTE
11/15/2023  Angie Mccarthy is a 58 y.o., female.      Pre-op Assessment          Review of Systems           Anesthesia Assessment: Preoperative EQUATION    Planned Procedure: Procedure(s) (LRB):  CYSTOURETEROSCOPY, WITH HOLMIUM LASER LITHOTRIPSY OF URETERAL CALCULUS AND STENT INSERTION (Bilateral)  CYSTOSCOPY, WITH RETROGRADE PYELOGRAM (N/A)  Requested Anesthesia Type:General  Surgeon: Suni Patel MD  Service: Urology  Known or anticipated Date of Surgery:11/27/2023    Surgeon notes: reviewed    Previous anesthesia records:GETA and Hx PONV  5/1/2019 Total knee replacement  Airway/Jaw/Neck:  Airway Findings: Mouth Opening: Normal Tongue: Normal  General Airway Assessment: Adult  Mallampati: III  Improves to II with phonation.  TM Distance: Normal, at least 6 cm  Jaw/Neck Findings:  Neck ROM: Normal ROM  Neck Findings:  Girth Increased    Last PCP note: within 3 months , within [facility]   Subspecialty notes: Cardiology: General, Nephrology, Ortho, Urology    Other important co-morbidities: DM2, GERD, HLD, HTN, Hypothyroid, and Chronic pain, Arthritis, RLS, NAFLD       Tests already available:  Available tests,  within 3 months , within [facility] .  10/25/2023 Renal Function Panel, CBC, PTH  9/20/2023 Lipid Panel, A1c (5.7), TSH, PT/INR, CMP, CBC, T4  5/29/2023 TTE (EF65-70%)            Optimization:  Anesthesia Preop Clinic Assessment  Indicated.    Medical Opinion Indicated.           Plan:    Testing:  EKG and PTT   Pre-anesthesia  visit       Visit focus: concerns in complex and/or prolonged anesthesia, position other than supine, post-operative pain control for chronic pain patient     Consultation:Patient's PCP for a statement of optimization and Cards Dr. Naranjo     Patient  has previously scheduled Medical Appointment: Not at this time prior to surgery    Navigation: Tests Scheduled.               Consults scheduled.             Results will be tracked by Preop Clinic.    11/16/2023  Per Dr. Naranjo with Cards:  No prior cardiac issues.   She can proceed without further cardiac workup.     11/27/2023 Per Dr. Multani, PCP:  No complications with anesthesia/ surgery in past. No h/o PE/ DVT. No bleeding or bruising problems. Seen cardiology. Had EKG and CXR and cleared for surgery.   Patient cleared for anesthesia and surgery     Lab and EKG results noted and reviewed.

## 2023-11-15 NOTE — TELEPHONE ENCOUNTER
----- Message from Ely Wilder sent at 11/15/2023  1:16 PM CST -----  Regarding: surg clr  Pt of Dr. Naranjo  LOV 7/24/23    Pls call pt at [phone].  She needs to get a clr for surg to have kidney stones removed.    Thank you

## 2023-11-16 ENCOUNTER — TELEPHONE (OUTPATIENT)
Dept: PREADMISSION TESTING | Facility: HOSPITAL | Age: 58
End: 2023-11-16
Payer: MEDICARE

## 2023-11-16 NOTE — TELEPHONE ENCOUNTER
----- Message from Angeles Jewell RN sent at 11/15/2023  4:40 PM CST -----  Regarding: FW: surg clr    ----- Message -----  From: Junior Naranjo III, MD  Sent: 11/15/2023   4:26 PM CST  To: Angeles Jewell RN  Subject: RE: surg clr                                     No prior cardiac issues.   She can proceed without further cardiac workup.    Ambrosio Naranjo    ----- Message -----  From: Angeles Jewell RN  Sent: 11/15/2023   3:23 PM CST  To: Junior Naranjo III, MD  Subject: FW: surg clr                                     Patient last office visit 7/24/2023.  Pt needs a cardiac optimization.  She is scheduled for a cystoscopy, with Lithotripsy and estephania stent placed w/RPG with Dr. Patel 11/17 under general anesthesia.  She is scheduled for an EKG 11/20.    Thanks,  Angeles    ----- Message -----  From: Ely Wilder  Sent: 11/15/2023   1:17 PM CST  To: Angeles Jewell RN  Subject: surg clr                                         Pt of Dr. Naranjo  LOV 7/24/23    Pls call pt at [phone].  She needs to get a clr for surg to have kidney stones removed.    Thank you

## 2023-11-17 ENCOUNTER — PATIENT MESSAGE (OUTPATIENT)
Dept: SURGERY | Facility: HOSPITAL | Age: 58
End: 2023-11-17
Payer: MEDICARE

## 2023-11-17 NOTE — TELEPHONE ENCOUNTER
----- Message from Gibson Woods sent at 11/17/2023  1:53 PM CST -----  Regarding: Call back requested  Contact: [phone]  Hi, pt called to request a call back from the office. Pt says her xray is scheduled 2 days after her surgery. Pls call the pt at  [phone] to k with the pt.

## 2023-11-20 ENCOUNTER — HOSPITAL ENCOUNTER (OUTPATIENT)
Dept: CARDIOLOGY | Facility: CLINIC | Age: 58
Discharge: HOME | End: 2023-11-20
Payer: MEDICARE

## 2023-11-20 ENCOUNTER — TELEPHONE (OUTPATIENT)
Dept: CARDIOLOGY | Facility: CLINIC | Age: 58
End: 2023-11-20
Payer: MEDICARE

## 2023-11-20 ENCOUNTER — HOSPITAL ENCOUNTER (OUTPATIENT)
Dept: PREADMISSION TESTING | Facility: HOSPITAL | Age: 58
Discharge: HOME | End: 2023-11-20
Attending: UROLOGY
Payer: MEDICARE

## 2023-11-20 ENCOUNTER — HOSPITAL ENCOUNTER (OUTPATIENT)
Dept: RADIOLOGY | Facility: HOSPITAL | Age: 58
Discharge: HOME | End: 2023-11-20
Attending: UROLOGY
Payer: MEDICARE

## 2023-11-20 ENCOUNTER — ANESTHESIA EVENT (OUTPATIENT)
Dept: SURGERY | Facility: HOSPITAL | Age: 58
End: 2023-11-20
Payer: MEDICARE

## 2023-11-20 VITALS
BODY MASS INDEX: 39.71 KG/M2 | DIASTOLIC BLOOD PRESSURE: 76 MMHG | WEIGHT: 215.81 LBS | RESPIRATION RATE: 16 BRPM | HEART RATE: 93 BPM | SYSTOLIC BLOOD PRESSURE: 150 MMHG | TEMPERATURE: 98 F | HEIGHT: 62 IN | OXYGEN SATURATION: 96 %

## 2023-11-20 DIAGNOSIS — Z01.818 PREOPERATIVE TESTING: ICD-10-CM

## 2023-11-20 DIAGNOSIS — N20.0 STAGHORN CALCULUS: ICD-10-CM

## 2023-11-20 PROCEDURE — 93005 EKG 12-LEAD: ICD-10-PCS | Mod: S$GLB,,, | Performed by: ANESTHESIOLOGY

## 2023-11-20 PROCEDURE — 71046 XR CHEST PA AND LATERAL: ICD-10-PCS | Mod: 26,,, | Performed by: RADIOLOGY

## 2023-11-20 PROCEDURE — 93010 ELECTROCARDIOGRAM REPORT: CPT | Mod: S$GLB,,, | Performed by: INTERNAL MEDICINE

## 2023-11-20 PROCEDURE — 71046 X-RAY EXAM CHEST 2 VIEWS: CPT | Mod: 26,,, | Performed by: RADIOLOGY

## 2023-11-20 PROCEDURE — 93010 EKG 12-LEAD: ICD-10-PCS | Mod: S$GLB,,, | Performed by: INTERNAL MEDICINE

## 2023-11-20 PROCEDURE — 71046 X-RAY EXAM CHEST 2 VIEWS: CPT | Mod: TC

## 2023-11-20 PROCEDURE — 93005 ELECTROCARDIOGRAM TRACING: CPT | Mod: S$GLB,,, | Performed by: ANESTHESIOLOGY

## 2023-11-20 NOTE — TELEPHONE ENCOUNTER
----- Message from Junior Naranjo III, MD sent at 11/15/2023  4:26 PM CST -----  Regarding: RE: surg clr  No prior cardiac issues.   She can proceed without further cardiac workup.    Ambrosio Naranjo    ----- Message -----  From: Angeles Jewell RN  Sent: 11/15/2023   3:23 PM CST  To: Junior Naranjo III, MD  Subject: FW: surg clr                                     Patient last office visit 7/24/2023.  Pt needs a cardiac optimization.  She is scheduled for a cystoscopy, with Lithotripsy and estephania stent placed w/RPG with Dr. Patel 11/17 under general anesthesia.  She is scheduled for an EKG 11/20.    Angeles Ahmadi    ----- Message -----  From: Ely Wilder  Sent: 11/15/2023   1:17 PM CST  To: Angeles Jewell RN  Subject: surg clr                                         Pt of Dr. Naranjo  LOV 7/24/23    Pls call pt at [phone].  She needs to get a clr for surg to have kidney stones removed.    Thank you

## 2023-11-20 NOTE — ANESTHESIA PREPROCEDURE EVALUATION
[facility]  Anesthesia Pre-Operative Evaluation         Patient Name: Angie Mccarthy  YOB: 1965  MRN: [id number]    SUBJECTIVE:     Pre-operative evaluation for Procedure(s) (LRB):  CYSTOURETEROSCOPY, WITH HOLMIUM LASER LITHOTRIPSY OF URETERAL CALCULUS AND STENT INSERTION (Bilateral)  CYSTOSCOPY, WITH RETROGRADE PYELOGRAM (N/A)     11/20/2023    Angie Mccarthy is a 58 y.o. female w/ a significant PMHx of HTN, T2DM (A1c 5.7) last used GLP1 agonists several months ago, hyperparathyroidism, hypothyroid, chronic SOB with rescue inhaler, obesity (BMI 39) s/p gastric sleeve (2017), FERRELL, GERD, h/o TLIF (2012), and recurrent UTI's with nephrolithiasis. Takes percocet prn for back pain.    H/o PONV after spine surgeries.    Patient now presents for the above procedure(s).    TTE (5/29/23):  · Technically challenging study.  · The left ventricle is normal in size with hyperdynamic systolic function.  · The estimated ejection fraction is 65-70%.  · Normal left ventricular diastolic function.  · The LVOT velocity is elevated, as per text.  · Normal right ventricular size with normal right ventricular systolic function.  · The estimated PA systolic pressure is 30 mmHg.  · Normal central venous pressure (3 mmHg).    Prev airway (05/01/19):   Method of Intubation: Hernadez; Airway Device: Endotracheal Tube; Mask Ventilation: Easy; Intubated: Postinduction; Blade:  (Hernadez); Airway Device Size: 7.0; Style: Cuffed; Cuff Inflation: Minimal occlusive pressure; Inflation Amount: 7; Placement Verified By: Auscultation, Capnometry, ETT Condensation; Grade: Grade I; Complicating Factors: Small mouth, Poor neck/head extension, Obesity, Short neck, Anterior larynx; Intubation Findings: Positive EtCO2, Bilateral breath sounds, Atraumatic/Condition of teeth unchanged;  Depth of  "Insertion: 21; Securment: Lips; Complications: None     Patient Active Problem List   Diagnosis    Syringomyelia and syringobulbia    Chronic low back pain    Essential hypertension    Hyperlipidemia    Thoracic spondylosis without myelopathy    Spinal stenosis, lumbar region, without neurogenic claudication    Osteoporosis, idiopathic    Thyroid nodule    Palpitations    GERD (gastroesophageal reflux disease)    Fatty liver disease, nonalcoholic    S/P laparoscopic sleeve gastrectomy    Severe obesity    Back pain    Lumbar disc disease    Acquired hypothyroidism    Anxiety    Depression    Chronic, continuous use of opioids    Snoring    Occult blood in stools    PONV (postoperative nausea and vomiting)    History of prediabetes    Urinary hesitancy    Wheezing    Microscopic hematuria    Abnormality of gait    Menopausal flushing    Primary hypothyroidism    Pyelonephritis    Hypophosphatemia    Hypomagnesemia    Status post total left knee replacement    Hepatic fibrosis, stage 3    FERRELL (nonalcoholic steatohepatitis)    Osteoarthritis of right knee    Staghorn calculus    Type 2 diabetes mellitus with hyperglycemia    Hypercalciuria    Hyperparathyroidism, unspecified    Nephrolithiasis       Review of patient's allergies indicates:   Allergen Reactions    Adhesive Dermatitis and Blisters    Adhesive tape-silicones Dermatitis     The longer the adhesive stays on, the worse the irritation    Mastisol adhesive [gum ggaffi-cbexbh-rpox-alcohol] Itching, Rash and Blisters     "Looked like poison ivy"       Current Inpatient Medications:   triamcinolone acetonide  40 mg Intradermal 1 time in Clinic/HOD       Current Outpatient Medications on File Prior to Encounter   Medication Sig Dispense Refill    albuterol (VENTOLIN HFA) 90 mcg/actuation inhaler Inhale 2 puffs into the lungs every 6 (six) hours as needed for Wheezing or Shortness of Breath. Rescue 18 g 0    alcohol swabs PadM Apply 1 each topically once daily. " 100 each 3    ascorbic acid, vitamin C, (VITAMIN C) 500 MG tablet       aspirin (ECOTRIN) 81 MG EC tablet Take 1 tablet (81 mg total) by mouth 2 (two) times daily. 60 tablet 0    BIOTIN ORAL Take 10,000 mg by mouth every morning.       blood glucose control, low (TRUE METRIX LEVEL 1) Soln USE AS DIRECTED WITH GLUCOSE METER 1 each 0    blood sugar diagnostic (TRUE METRIX GLUCOSE TEST STRIP) Strp TEST BLOOD SUGAR EVERY  strip 3    blood-glucose meter (TRUE METRIX GLUCOSE METER) Misc 1 each by Misc.(Non-Drug; Combo Route) route once daily. 1 each 0    cyclobenzaprine (FLEXERIL) 10 MG tablet TK 1 T PO QD- as needed for muscle spasms  0    DULoxetine (CYMBALTA) 60 MG capsule TAKE 1 CAPSULE EVERY DAY 90 capsule 3    fish oil-omega-3 fatty acids 300-1,000 mg capsule Take 1,200 mg by mouth 2 (two) times daily.       fluticasone propionate (FLONASE) 50 mcg/actuation nasal spray USE 2 SPRAYS NASALLY ONE TIME DAILY 48 g 3    gabapentin (NEURONTIN) 800 MG tablet Take 800 mg by mouth 3 (three) times daily. Takes 2-3 times per day  1    HYDROcodone-acetaminophen (NORCO)  mg per tablet TAKE 1 TABLET BY MOUTH FOUR TIMES DAILY AS NEEDED FOR PAIN FOR 30 DAYS      levothyroxine (SYNTHROID) 125 MCG tablet Take 1 tablet (125 mcg total) by mouth before breakfast. 90 tablet 3    lisinopriL (PRINIVIL,ZESTRIL) 2.5 MG tablet TAKE 1 TABLET EVERY DAY 90 tablet 3    metFORMIN (GLUCOPHAGE) 500 MG tablet TAKE 1 TABLET (500 MG TOTAL) BY MOUTH 2 (TWO) TIMES DAILY WITH MEALS. 180 tablet 3    multivitamin capsule Take 1 capsule by mouth every morning.      omeprazole (PRILOSEC) 40 MG capsule TAKE 1 CAPSULE EVERY DAY 90 capsule 3    potassium citrate (UROCIT-K) 5 mEq (540 mg) TbSR Take 2 tablets (10 mEq total) by mouth 3 (three) times daily with meals. 180 tablet 11    rosuvastatin (CRESTOR) 10 MG tablet TAKE 1 TABLET EVERY NIGHT 90 tablet 0    sulfamethoxazole-trimethoprim 800-160mg (BACTRIM DS) 800-160 mg Tab One pill twice a day by  mouth, begin bactrim 7 days before procedure, you will continue it after the procedure also. 20 tablet 0    tamsulosin (FLOMAX) 0.4 mg Cap TAKE 1 CAPSULE(0.4 MG) BY MOUTH AFTER DINNER 30 capsule 1    TRUEPLUS LANCETS 33 gauge Misc TEST BLOOD SUGAR EVERY  each 3    zinc sulfate (ZINCATE) 50 mg zinc (220 mg) capsule       zolpidem (AMBIEN) 10 mg Tab TAKE 1 TABLET(10 MG) BY MOUTH EVERY NIGHT AS NEEDED FOR INSOMNIA Strength: 10 mg 90 tablet 0     Current Facility-Administered Medications on File Prior to Encounter   Medication Dose Route Frequency Provider Last Rate Last Admin    triamcinolone acetonide injection 40 mg  40 mg Intradermal 1 time in Clinic/HOD Teri Manuel MD           Past Surgical History:   Procedure Laterality Date    APPENDECTOMY      BACK SURGERY      x 6    CHOLECYSTECTOMY      GASTRECTOMY      Lap Gastric Sleeve    HERNIA REPAIR      HYSTERECTOMY      JOINT REPLACEMENT      LIVER BIOPSY  02/06/2017    steatohepatitis with stage 1 fibrosis    MYELOGRAPHY N/A 11/29/2018    Procedure: MYELOGRAM;  Surgeon: Ander Diagnostic Provider;  Location: [address];  Service: Radiology;  Laterality: N/A;    MYELOGRAPHY N/A 1/7/2019    Procedure: Myelogram;  Surgeon: Lissett Surgeon;  Location: [facility];  Service: Anesthesiology;  Laterality: N/A;    OOPHORECTOMY      PERCUTANEOUS CRYOTHERAPY OF PERIPHERAL NERVE USING LIQUID NITROUS OXIDE IN CLOSED NEEDLE DEVICE Left 4/29/2019    Procedure: CRYOTHERAPY, NERVE, PERIPHERAL, PERCUTANEOUS, USING LIQUID NITROUS OXIDE IN CLOSED NEEDLE DEVICE-iovera left knee;  Surgeon: Chavo Gold III, MD;  Location: [facility] CATH LAB;  Service: Pain Management;  Laterality: Left;    PERCUTANEOUS CRYOTHERAPY OF PERIPHERAL NERVE USING LIQUID NITROUS OXIDE IN CLOSED NEEDLE DEVICE Right 8/2/2021    Procedure: CRYOTHERAPY, NERVE, PERIPHERAL, PERCUTANEOUS, USING LIQUID NITROUS OXIDE IN CLOSED NEEDLE DEVICE;  Surgeon: Saritha Proctor NP;  Location: [facility];  Service:  Pain Management;  Laterality: Right;  RIGHT KNEE IOVERA    SPINAL FUSION      TONSILLECTOMY, ADENOIDECTOMY      TOTAL KNEE ARTHROPLASTY Left 5/1/2019    Procedure: REPLACEMENT-KNEE-TOTAL;  Surgeon: John L. O[facility]ner Jr., MD;  Location: [facility] OR [address] [place] [address];  Service: Orthopedics;  Laterality: Left;       Social History     Socioeconomic History    Marital status:    Occupational History    Occupation: disable   Tobacco Use    Smoking status: Never    Smokeless tobacco: Never    Tobacco comments:     Tried a few cigarettes during teenage   Substance and Sexual Activity    Alcohol use: Yes     Comment: socially    Drug use: No    Sexual activity: Never   Other Topics Concern    Are you pregnant or think you may be? No    Breast-feeding No     Social Determinants of Health     Financial Resource Strain: High Risk (11/15/2023)    Overall Financial Resource Strain (CARDIA)     Difficulty of Paying Living Expenses: Hard   Food Insecurity: No Food Insecurity (11/15/2023)    Hunger Vital Sign     Worried About Running Out of Food in the Last Year: Never true     Ran Out of Food in the Last Year: Never true   Transportation Needs: Unmet Transportation Needs (11/15/2023)    PRAPARE - Transportation     Lack of Transportation (Medical): Yes     Lack of Transportation (Non-Medical): No   Physical Activity: Inactive (11/15/2023)    Exercise Vital Sign     Days of Exercise per Week: 0 days     Minutes of Exercise per Session: 0 min   Stress: Stress Concern Present (11/15/2023)    [employer] [place] [employer] - Occupational Stress Questionnaire     Feeling of Stress : To some extent   Social Connections: Unknown (11/15/2023)    Social Connection and Isolation Panel [NHANES]     Frequency of Communication with Friends and Family: More than three times a week     Frequency of Social Gatherings with Friends and Family: Twice a week     Active Member of Clubs or Organizations: Yes     Attends Club or Organization  Meetings: More than 4 times per year     Marital Status: Living with partner   Housing Stability: Low Risk  (11/15/2023)    Housing Stability Vital Sign     Unable to Pay for Housing in the Last Year: No     Number of Places Lived in the Last Year: 1     Unstable Housing in the Last Year: No       OBJECTIVE:     Vital Signs Range (Last 24H):         Significant Labs:  Lab Results   Component Value Date    WBC 7.95 10/25/2023    HGB 12.5 10/25/2023    HCT 40.9 10/25/2023     10/25/2023    CHOL 124 09/20/2023    TRIG 136 09/20/2023    HDL 44 09/20/2023    ALT 19 09/20/2023    AST 22 09/20/2023     10/25/2023    K 4.7 10/25/2023     10/25/2023    CREATININE 0.8 10/25/2023    BUN 11 10/25/2023    CO2 26 10/25/2023    TSH 0.021 (L) 09/20/2023    INR 0.9 09/20/2023    HGBA1C 5.7 (H) 09/20/2023       Diagnostic Studies: No relevant studies.    EKG:   No results found. However, due to the size of the patient record, not all encounters were searched. Please check Results Review for a complete set of results.    2D ECHO:  TTE:  Results for orders placed or performed during the hospital encounter of 05/29/23   Echo   Result Value Ref Range    Ascending aorta 3.52 cm    STJ 3.39 cm    AV mean gradient 5 mmHg    Ao peak timothy 1.68 m/s    Ao VTI 30.80 cm    IVRT 77.07 msec    IVS 0.87 0.6 - 1.1 cm    LA size 3.14 cm    Left Atrium Major Axis 4.98 cm    Left Atrium Minor Axis 5.28 cm    LVIDd 4.40 3.5 - 6.0 cm    LVIDs 2.48 2.1 - 4.0 cm    LVOT diameter 2.53 cm    LVOT peak VTI 31.66 cm    Posterior Wall 0.93 0.6 - 1.1 cm    MV Peak A Timothy 0.76 m/s    E wave deceleration time 158.89 msec    MV Peak E Timothy 0.71 m/s    RA Major Axis 4.39 cm    RA Width 3.70 cm    RVDD 3.59 cm    Sinus 3.60 cm    TAPSE 2.57 cm    TR Max Timothy 2.60 m/s    LA WIDTH 3.39 cm    MV stenosis pressure 1/2 time 46.08 ms    LV Diastolic Volume 87.86 mL    LV Systolic Volume 21.87 mL    LVOT peak timothy 1.67 m/s    TDI LATERAL 0.08 m/s    TDI SEPTAL  0.06 m/s    LA volume (mod) 46.14 cm3    LV LATERAL E/E' RATIO 8.88 m/s    LV SEPTAL E/E' RATIO 11.83 m/s    FS 44 %    LA volume 46.38 cm3    LV mass 128.02 g    Left Ventricle Relative Wall Thickness 0.42 cm    AV valve area 5.17 cm2    AV Velocity Ratio 0.99     AV index (prosthetic) 1.03     MV valve area p 1/2 method 4.77 cm2    E/A ratio 0.93     Mean e' 0.07 m/s    LVOT area 5.0 cm2    LVOT stroke volume 159.08 cm3    AV peak gradient 11 mmHg    E/E' ratio 10.14 m/s    Triscuspid Valve Regurgitation Peak Gradient 27 mmHg    BSA 2.01 m2    LV Systolic Volume Index 11.3 mL/m2    LV Diastolic Volume Index 45.52 mL/m2    LA Volume Index 24.0 mL/m2    LV Mass Index 66 g/m2    LA Volume Index (Mod) 23.9 mL/m2    Right Atrial Pressure (from IVC) 3 mmHg    EF 65 %    TV resting pulmonary artery pressure 30 mmHg    Narrative    · Technically challenging study.  · The left ventricle is normal in size with hyperdynamic systolic   function.  · The estimated ejection fraction is 65-70%.  · Normal left ventricular diastolic function.  · The LVOT velocity is elevated, as per text.  · Normal right ventricular size with normal right ventricular systolic   function.  · The estimated PA systolic pressure is 30 mmHg.  · Normal central venous pressure (3 mmHg).          RAINER:  No results found. However, due to the size of the patient record, not all encounters were searched. Please check Results Review for a complete set of results.    ASSESSMENT/PLAN:           Pre-op Assessment    I have reviewed the Patient Summary Reports.     I have reviewed the Nursing Notes.    I have reviewed the Medications.     Review of Systems  Anesthesia Hx:  No problems with previous Anesthesia             Denies Family Hx of Anesthesia complications.     Social:  Non-Smoker, No Alcohol Use       Hematology/Oncology:       -- Denies Anemia:                                  Cardiovascular:  Exercise tolerance: poor   Hypertension    Denies CAD.         Denies CHF.    hyperlipidemia   ECG has been reviewed.                          Pulmonary:    Denies COPD.  Denies Asthma.  Shortness of breath   Uses inhaler prn for SOB/wheezing.               Renal/:   Denies Chronic Renal Disease. renal calculi               Hepatic/GI:     GERD Liver Disease, (FERRELL) Hepatitis (FERRELL)           Musculoskeletal:  Denies Arthritis.          Spine Disorders: thoracic and lumbar Degenerative disease and Disc disease           Neurological:    Denies CVA.    Denies Seizures.                                Endocrine:  Diabetes, type 2 Hypothyroidism  Hyperparathyroidism      Obesity / BMI > 30  Psych:  Denies Psychiatric History. anxiety depression                Physical Exam    Dental:        Anesthesia Plan  Type of Anesthesia, risks & benefits discussed:    Anesthesia Plan Notes: E-consent obtained at preop clinic.    .

## 2023-11-21 ENCOUNTER — TELEPHONE (OUTPATIENT)
Dept: UROLOGY | Facility: CLINIC | Age: 58
End: 2023-11-21
Payer: MEDICARE

## 2023-11-21 NOTE — TELEPHONE ENCOUNTER
----- Message from Suni Patel MD sent at 11/21/2023  2:16 PM CST -----  Please tell patient she does have bacteria in the urine. She should start on the bactrim now and finish through surgery. I'll send a few more days once I have final sensitivities.

## 2023-11-21 NOTE — TELEPHONE ENCOUNTER
Called and spoke to the pt to confirm per Dr. Patel she is to start antobiotics on today and get a repeat urine culture done on Saturday. Pt informed me she started the antibiotics on yesterday 11/20 and will go to the primary care building on Saturday for culture due to the lab in the hospital being closed on Saturday.

## 2023-11-22 ENCOUNTER — TELEPHONE (OUTPATIENT)
Dept: UROLOGY | Facility: CLINIC | Age: 58
End: 2023-11-22
Payer: MEDICARE

## 2023-11-22 NOTE — TELEPHONE ENCOUNTER
Spoke to the patient. She verbally understood. Patient started the medication on yesterday.    KELI Arredondo    ----- Message from Suni Patel MD sent at 11/21/2023  2:16 PM CST -----  Please tell patient she does have bacteria in the urine. She should start on the bactrim now and finish through surgery. I'll send a few more days once I have final sensitivities.

## 2023-11-24 ENCOUNTER — NURSE TRIAGE (OUTPATIENT)
Dept: ADMINISTRATIVE | Facility: CLINIC | Age: 58
End: 2023-11-24
Payer: MEDICARE

## 2023-11-24 RX ORDER — CIPROFLOXACIN 500 MG/1
500 TABLET ORAL EVERY 12 HOURS
Qty: 14 TABLET | Refills: 0 | Status: SHIPPED | OUTPATIENT
Start: 2023-11-24 | End: 2023-11-29 | Stop reason: SDUPTHER

## 2023-11-25 ENCOUNTER — MEDICATION MANAGEMENT (OUTPATIENT)
Dept: UROLOGY | Facility: HOSPITAL | Age: 58
End: 2023-11-25
Payer: MEDICARE

## 2023-11-25 ENCOUNTER — LAB VISIT (OUTPATIENT)
Dept: LAB | Facility: HOSPITAL | Age: 58
End: 2023-11-25
Attending: UROLOGY
Payer: MEDICARE

## 2023-11-25 ENCOUNTER — TELEPHONE (OUTPATIENT)
Dept: UROLOGY | Facility: HOSPITAL | Age: 58
End: 2023-11-25
Payer: MEDICARE

## 2023-11-25 DIAGNOSIS — N39.0 RECURRENT UTI: ICD-10-CM

## 2023-11-25 PROCEDURE — 87086 URINE CULTURE/COLONY COUNT: CPT | Performed by: UROLOGY

## 2023-11-25 NOTE — TELEPHONE ENCOUNTER
Being tx with Bactrim for a UTI. Reports surgery on Wednesday to remove kidney stones.   C/O Burning tongue and irritated mouth which she believes is from the Bactrim. Patient no longer wants to take the Bactrim. I have offered to call the provider on call in this regard. Dr[facility] Finn [facility] [place]. Patient updated.    Reason for Disposition   All other mouth symptoms (Exceptions: dry mouth from not drinking enough liquids, chapped lips)    Additional Information   Negative: SEVERE difficulty breathing (e.g., struggling for each breath, speaks in single words, stridor)   Negative: Sounds like a life-threatening emergency to the triager   Negative: [1] Drooling or spitting out saliva (because can't swallow) AND [2] new-onset   Negative: [1] Bleeding from mouth AND [2] won't stop after 10 minutes of direct pressure   Negative: Electrical burn of mouth   Negative: Chemical burn of mouth   Negative: [1] Difficulty breathing AND [2] not severe   Negative: Patient sounds very sick or weak to the triager   Negative: [1] Bloody crusts on lips or sores in mouth AND [2] rash anywhere elese on body (back, chest, face, palms, soles)   Negative: [1] Gum bleeding AND [2] taking Coumadin (warfarin) or other strong blood thinner, or known bleeding disorder (e.g., thrombocytopenia)   Negative: [1] Dry mouth AND [2] urinating more frequently than usual (i.e., frequency)   Negative: [1] Dry mouth AND [2] drinking more liquids than usual (thirsty) AND [3] > 1 day (24 hours)   Negative: [1] White patches that stick to tongue or inner cheek AND [2] can be wiped off   Negative: [1] Dry mouth AND [2] new-onset AND [3] unexplained (Exceptions: chronic symptom or dry mouth from mild dehydration)   Negative: Weak immune system (e.g., HIV positive, cancer chemo, splenectomy, organ transplant, chronic steroids)   Negative: Receiving chemotherapy or radiation therapy   Negative: Dentures  rub and cause pain   Negative: Painless lump in mouth   Negative: Red patch in mouth or on tongue   Negative: White patch in mouth or on tongue    Protocols used: Mouth Symptoms-A-AH

## 2023-11-25 NOTE — PROGRESS NOTES
Discussed symptoms of tongue and mouth swelling with patient. She denies any shortness of breath or throat swelling. She states symptoms have resolved since stopping bactrim yesterday. I sent in a prescription for cipro for her to finish treatment of her UTI.     Advised to contact PCP regarding lisinopril and tongue/mouth swelling.     MORIS Finn MD  Urology PGY-2

## 2023-11-27 LAB
BACTERIA UR CULT: NORMAL
BACTERIA UR CULT: NORMAL

## 2023-11-28 ENCOUNTER — TELEPHONE (OUTPATIENT)
Dept: UROLOGY | Facility: CLINIC | Age: 58
End: 2023-11-28
Payer: MEDICARE

## 2023-11-28 NOTE — TELEPHONE ENCOUNTER
Called pt to confirm arrival time of 5:45am for procedure on 11/29/23. Gave pt NPO instructions and gave pt opportunity to ask questions. Pt verbalized understanding.

## 2023-11-29 ENCOUNTER — ANESTHESIA (OUTPATIENT)
Dept: SURGERY | Facility: HOSPITAL | Age: 58
End: 2023-11-29
Payer: MEDICARE

## 2023-11-29 ENCOUNTER — HOSPITAL ENCOUNTER (OUTPATIENT)
Facility: HOSPITAL | Age: 58
Discharge: HOME | End: 2023-11-29
Attending: UROLOGY | Admitting: UROLOGY
Payer: MEDICARE

## 2023-11-29 VITALS
DIASTOLIC BLOOD PRESSURE: 74 MMHG | BODY MASS INDEX: 39.2 KG/M2 | WEIGHT: 213 LBS | HEART RATE: 93 BPM | RESPIRATION RATE: 20 BRPM | TEMPERATURE: 98 F | HEIGHT: 62 IN | OXYGEN SATURATION: 95 % | SYSTOLIC BLOOD PRESSURE: 123 MMHG

## 2023-11-29 DIAGNOSIS — K75.81 NASH (NONALCOHOLIC STEATOHEPATITIS): ICD-10-CM

## 2023-11-29 DIAGNOSIS — N20.0 STONE IN KIDNEY: ICD-10-CM

## 2023-11-29 DIAGNOSIS — Z01.818 PREOPERATIVE TESTING: Primary | ICD-10-CM

## 2023-11-29 LAB
ABO + RH BLD: ABNORMAL
BLD GP AB SCN CELLS X3 SERPL QL: ABNORMAL
BLOOD GROUP ANTIBODIES SERPL: NORMAL
POCT GLUCOSE: 121 MG/DL (ref 70–110)
SPECIMEN OUTDATE: ABNORMAL

## 2023-11-29 PROCEDURE — 87186 SC STD MICRODIL/AGAR DIL: CPT

## 2023-11-29 PROCEDURE — 99499 NO LOS: ICD-10-PCS | Mod: ,,, | Performed by: UROLOGY

## 2023-11-29 PROCEDURE — 86870 RBC ANTIBODY IDENTIFICATION: CPT

## 2023-11-29 PROCEDURE — 87088 URINE BACTERIA CULTURE: CPT

## 2023-11-29 PROCEDURE — 82962 GLUCOSE BLOOD TEST: CPT | Performed by: UROLOGY

## 2023-11-29 PROCEDURE — 99499 UNLISTED E&M SERVICE: CPT | Mod: ,,, | Performed by: UROLOGY

## 2023-11-29 PROCEDURE — 87086 URINE CULTURE/COLONY COUNT: CPT

## 2023-11-29 PROCEDURE — 86901 BLOOD TYPING SEROLOGIC RH(D): CPT

## 2023-11-29 PROCEDURE — 87077 CULTURE AEROBIC IDENTIFY: CPT

## 2023-11-29 RX ORDER — MEPERIDINE HYDROCHLORIDE 50 MG/ML
12.5 INJECTION INTRAMUSCULAR; INTRAVENOUS; SUBCUTANEOUS ONCE AS NEEDED
Status: CANCELLED | OUTPATIENT
Start: 2023-11-29 | End: 2023-11-30

## 2023-11-29 RX ORDER — LORAZEPAM 2 MG/ML
0.25 INJECTION INTRAMUSCULAR ONCE AS NEEDED
Status: CANCELLED | OUTPATIENT
Start: 2023-11-29 | End: 2035-04-27

## 2023-11-29 RX ORDER — CIPROFLOXACIN 500 MG/1
500 TABLET ORAL EVERY 12 HOURS
Qty: 28 TABLET | Refills: 0 | Status: SHIPPED | OUTPATIENT
Start: 2023-11-29 | End: 2024-01-17

## 2023-11-29 RX ORDER — CIPROFLOXACIN 500 MG/1
500 TABLET ORAL EVERY 12 HOURS
Qty: 28 TABLET | Refills: 0 | Status: SHIPPED | OUTPATIENT
Start: 2023-11-29 | End: 2023-12-13

## 2023-11-29 RX ORDER — HYDROMORPHONE HYDROCHLORIDE 1 MG/ML
0.2 INJECTION, SOLUTION INTRAMUSCULAR; INTRAVENOUS; SUBCUTANEOUS EVERY 5 MIN PRN
Status: CANCELLED | OUTPATIENT
Start: 2023-11-29

## 2023-11-29 RX ORDER — SODIUM CHLORIDE 9 MG/ML
INJECTION, SOLUTION INTRAVENOUS CONTINUOUS
Status: CANCELLED | OUTPATIENT
Start: 2023-11-29

## 2023-11-29 RX ORDER — ONDANSETRON 2 MG/ML
4 INJECTION INTRAMUSCULAR; INTRAVENOUS DAILY PRN
Status: CANCELLED | OUTPATIENT
Start: 2023-11-29

## 2023-11-29 NOTE — INTERVAL H&P NOTE
Patient presents today for bilateral staged ureteroscopy.   Cath UA leuk positive and nitrite positive despite 2 weeks of bactrim (missed one dose due to concern for lip swelling).  Patient was offered bilateral stent placement today but refuses 2/2 concern for stent pain.  She wishes to have a PCNL performed instead.  I explained the risks of sepsis, kidney failure and death 2/2 urinary obstruction.  She voiced her understanding.      - will cancel procedure today   - will send cath UA for culture   - will discharge with 2 weeks of cipro

## 2023-12-01 LAB — BACTERIA UR CULT: ABNORMAL

## 2023-12-04 ENCOUNTER — TELEPHONE (OUTPATIENT)
Dept: INTERVENTIONAL RADIOLOGY/VASCULAR | Facility: CLINIC | Age: 58
End: 2023-12-04
Payer: MEDICARE

## 2023-12-04 DIAGNOSIS — N20.0 STAGHORN CALCULUS: Primary | ICD-10-CM

## 2023-12-04 NOTE — TELEPHONE ENCOUNTER
Spoke to pt on phone,  Pt is scheduled on 12/26/2023 with arrival time of 9am 2nd floor labs for IR procedure at  location.  Preop instructions given (NPO after midnight, MUST have a ride home, Nurse will call 1-2  days before to go over instructions and medications), instructed pt to stay off ASA and Fishoil for 5 days and last dose of Ozempic on 12/17/2023, pt verbally understood. Pt aware and confirmed, Thanks

## 2023-12-06 ENCOUNTER — TELEPHONE (OUTPATIENT)
Dept: UROLOGY | Facility: CLINIC | Age: 58
End: 2023-12-06
Payer: MEDICARE

## 2023-12-06 DIAGNOSIS — N20.0 STAGHORN CALCULUS: Primary | ICD-10-CM

## 2023-12-12 ENCOUNTER — E-CONSULT (OUTPATIENT)
Dept: CARDIOLOGY | Facility: CLINIC | Age: 58
End: 2023-12-12

## 2023-12-12 ENCOUNTER — PATIENT MESSAGE (OUTPATIENT)
Dept: CARDIOLOGY | Facility: CLINIC | Age: 58
End: 2023-12-12

## 2023-12-12 DIAGNOSIS — Z01.810 PREOP CARDIOVASCULAR EXAM: Primary | ICD-10-CM

## 2023-12-12 DIAGNOSIS — I10 ESSENTIAL HYPERTENSION: ICD-10-CM

## 2023-12-12 DIAGNOSIS — Z01.810 PRE-OPERATIVE CARDIOVASCULAR EXAMINATION: Primary | ICD-10-CM

## 2023-12-12 DIAGNOSIS — E78.5 HYPERLIPIDEMIA, UNSPECIFIED HYPERLIPIDEMIA TYPE: Chronic | ICD-10-CM

## 2023-12-12 DIAGNOSIS — I10 ESSENTIAL HYPERTENSION: Chronic | ICD-10-CM

## 2023-12-12 DIAGNOSIS — E11.65 TYPE 2 DIABETES MELLITUS WITH HYPERGLYCEMIA, WITHOUT LONG-TERM CURRENT USE OF INSULIN: ICD-10-CM

## 2023-12-12 DIAGNOSIS — R00.2 PALPITATIONS: ICD-10-CM

## 2023-12-12 DIAGNOSIS — E66.01 SEVERE OBESITY: ICD-10-CM

## 2023-12-12 PROCEDURE — 99452 E-CONSULT TO CARDIOLOGY: ICD-10-PCS | Mod: S$GLB,,, | Performed by: UROLOGY

## 2023-12-12 PROCEDURE — 99452 NTRPROF PH1/NTRNET/EHR RFRL: CPT | Mod: S$GLB,,, | Performed by: UROLOGY

## 2023-12-12 PROCEDURE — 99451 PR INTERPROF, PHONE/INTERNET/EHR, CONSULT, >= 5MINS: ICD-10-PCS | Mod: S$GLB,,, | Performed by: INTERNAL MEDICINE

## 2023-12-12 PROCEDURE — 99451 NTRPROF PH1/NTRNET/EHR 5/>: CPT | Mod: S$GLB,,, | Performed by: INTERNAL MEDICINE

## 2023-12-12 NOTE — TELEPHONE ENCOUNTER
E-consult placed under cardiologist, not referring physician.   Will cancel this order and place new order for E-consult.

## 2023-12-12 NOTE — CONSULTS
[facility] - Cardiology - [facility]  Response for E-Consult     Patient Name: Angie Mccarthy  MRN: [id number]  Primary Care Provider: Rangel Swenson MD   Requesting Provider: Suni Patel,*  E-Consult to Cardiology  Consult performed by: Junior Naranjo III, MD  Consult ordered by: Suni Patel MD  Reason for consult: pre-op          Recommendation:     -RCRI score of 0 is consistent with 3.9 % cristino-operative risk of major adverse cardiac event (cardiac death, nonfatal MI, nonfatal cardiac arrest).  -Functional capacity >4 METs.  -No symptoms of unstable cardiac disease.  -No further medication adjustment or cardiac testing will reduce surgical risk prior to proceeding with planned surgery.  -No history of ASCVD. Does not require use of aspirin from cardiac perspective. OK to hold for 5 days prior to procedure.    Contingency: None    Total time of Consultation: 5 minute    I did not speak to the requesting provider verbally about this.     *This eConsult is based on the clinical data available to me and is furnished without benefit of a physical examination. The eConsult will need to be interpreted in light of any clinical issues or changes in patient status not available to me at the time of filing this eConsults. Significant changes in patient condition or level of acuity should result in immediate formal consultation and reevaluation. Please alert me if you have further questions.    Thank you for this eConsult referral.     MD [facility] - Cardiology [address]

## 2023-12-18 ENCOUNTER — TELEPHONE (OUTPATIENT)
Dept: PREADMISSION TESTING | Facility: HOSPITAL | Age: 58
End: 2023-12-18

## 2023-12-18 NOTE — TELEPHONE ENCOUNTER
----- Message from Nils West RN sent at 12/8/2023  8:18 AM CST -----  01/02/24 surgery [place]    Please schedule for [facility]/POC or NP and lab. Thank you!

## 2023-12-19 ENCOUNTER — PATIENT MESSAGE (OUTPATIENT)
Dept: UROLOGY | Facility: CLINIC | Age: 58
End: 2023-12-19
Payer: MEDICARE

## 2023-12-19 ENCOUNTER — TELEPHONE (OUTPATIENT)
Dept: INTERNAL MEDICINE | Facility: CLINIC | Age: 58
End: 2023-12-19
Payer: MEDICARE

## 2023-12-20 ENCOUNTER — TELEPHONE (OUTPATIENT)
Dept: UROLOGY | Facility: CLINIC | Age: 58
End: 2023-12-20
Payer: MEDICARE

## 2023-12-20 NOTE — TELEPHONE ENCOUNTER
Called the pt to confirm I did recieve her message about pushing her surgery back but also wanted to inform we do not have any availablilties until February to do her surgery if she still wishes to reschedule. Informed via voicemail to call the office direct number [phone] to confirm the reschedule or not.

## 2024-01-02 ENCOUNTER — PATIENT MESSAGE (OUTPATIENT)
Dept: UROLOGY | Facility: CLINIC | Age: 59
End: 2024-01-02
Payer: MEDICARE

## 2024-01-03 ENCOUNTER — OFFICE VISIT (OUTPATIENT)
Dept: ENDOCRINOLOGY | Facility: CLINIC | Age: 59
End: 2024-01-03
Payer: MEDICARE

## 2024-01-03 VITALS
HEIGHT: 62 IN | DIASTOLIC BLOOD PRESSURE: 70 MMHG | SYSTOLIC BLOOD PRESSURE: 122 MMHG | WEIGHT: 214.38 LBS | BODY MASS INDEX: 39.45 KG/M2

## 2024-01-03 DIAGNOSIS — E11.65 TYPE 2 DIABETES MELLITUS WITH HYPERGLYCEMIA, WITHOUT LONG-TERM CURRENT USE OF INSULIN: ICD-10-CM

## 2024-01-03 DIAGNOSIS — E03.9 PRIMARY HYPOTHYROIDISM: Primary | ICD-10-CM

## 2024-01-03 DIAGNOSIS — E66.01 SEVERE OBESITY: ICD-10-CM

## 2024-01-03 DIAGNOSIS — E21.0 PRIMARY HYPERPARATHYROIDISM: ICD-10-CM

## 2024-01-03 DIAGNOSIS — M81.8 OSTEOPOROSIS, IDIOPATHIC: ICD-10-CM

## 2024-01-03 PROCEDURE — 99214 OFFICE O/P EST MOD 30 MIN: CPT | Mod: S$GLB,,, | Performed by: INTERNAL MEDICINE

## 2024-01-03 PROCEDURE — 3078F DIAST BP <80 MM HG: CPT | Mod: CPTII,S$GLB,, | Performed by: INTERNAL MEDICINE

## 2024-01-03 PROCEDURE — 1159F MED LIST DOCD IN RCRD: CPT | Mod: CPTII,S$GLB,, | Performed by: INTERNAL MEDICINE

## 2024-01-03 PROCEDURE — 3008F BODY MASS INDEX DOCD: CPT | Mod: CPTII,S$GLB,, | Performed by: INTERNAL MEDICINE

## 2024-01-03 PROCEDURE — 3074F SYST BP LT 130 MM HG: CPT | Mod: CPTII,S$GLB,, | Performed by: INTERNAL MEDICINE

## 2024-01-03 PROCEDURE — 99999 PR PBB SHADOW E&M-EST. PATIENT-LVL III: CPT | Mod: PBBFAC,,, | Performed by: INTERNAL MEDICINE

## 2024-01-03 NOTE — ASSESSMENT & PLAN NOTE
Recommend good hydration  Avoid medications that can elevate serum calcium levels including HCTZ   Avoid excessive calcium supplementation   Ideally calcium should come from dietary sources   Continue daily vitamin D 1000 - 2000 UIs daily, as vitamin D deficiency can stimulate parathyroid levels     Extent of disease:  Osteoporosis on recent DXA scan, FN -2.6  24 hr urine for ca/creat, previous collection 222 mg/specimen in 8/2022 --> plan to repeat  + staghorn kidney stones     Surgical indications for parathyroidectomy   1) serum calcium > 11 mg/dl   2) hypercalciuria > 250 mg/specimen   3) fracture or T score -2.5  4) relative youthfulness   5) eGFR of < 60 ml/min     Referral to Dr. Raya

## 2024-01-03 NOTE — PROGRESS NOTES
Subjective:      Patient ID: Angie Mccarthy is a 58 y.o. female.    Chief Complaint:  Diabetes and Hypothyroidism      History of Present Illness  Ms. Mccarthy is a 58 year old woman who is here for evaluation of hypothyroidism and osteoporosis. Also has a personal and family history of kidney stones.     Hypothyroidism on replacement, levothyroxine 125 mcg daily.   Taking appropriately.   Had MNG, cytology was benign six years ago. Last US done in 10/2023. No changes to right thyroid nodule.   Denies obstructive symptoms. But reports occasional globus sensation.     Has scoliosis with six back surgeries in the past   Father and sister with OI. She does not have OI.   Denies falls or fractures.  Has back pain and will need surgery in the future.    TKR in 7/2021     Also has primary hyperparathyroidism:  PTH levels above reference range or inappropriately normal with serum calcium levels 9.2 mg/dl - 10.7 mg/dl.  Vitamin D 47    Osteoporosis Risk Factors:  Recent DXA scan demonstrates osteoporosis at the FN, decline at the hip (4%) and 1/3 distal radius (8%). TAHBSO 16 years ago  Hormone therapy for 1- 2 years   Denies fractures.     Last DXA five years ago, FN -1.4     Supplements:  Calcium:  no  MVI yes   D3 1000 IUs daily   Last vitamin D 47     Dietary calcium: cheese, yogurt, occ broccoli, regular salads but no spinach     Type 2 diabetes, most recent A1C 5.7%  Started metformin 500 mg one tablet twice a day  Lost 45 lbs total has regained about 13 lbs.   Started Mounjaro, unable to pay for Mounjaro due to insurance issues.      Family history of pancreatic cancer in first-degree relative: denies   Personal history of pancreatitis: denies  Family history of MTC/MEN/endocrine tumors: denies     Review of Systems    Objective:   Physical Exam  Vitals:    01/03/24 1355   BP: 122/70   BP Location: Left arm   Patient Position: Sitting   BP Method: Large (Manual)   Weight: 97.3 kg (214 lb 6.4 oz)   Height: 5'  "2" (1.575 m)       BP Readings from Last 3 Encounters:   01/03/24 122/70   11/29/23 123/74   11/22/23 139/89     Wt Readings from Last 1 Encounters:   01/03/24 1355 97.3 kg (214 lb 6.4 oz)         Body mass index is 39.21 kg/m².    Lab Review:   Lab Results   Component Value Date    HGBA1C 5.7 (H) 09/20/2023     Lab Results   Component Value Date    CHOL 124 09/20/2023    HDL 44 09/20/2023    LDLCALC 52.8 (L) 09/20/2023    TRIG 136 09/20/2023    CHOLHDL 35.5 09/20/2023     Lab Results   Component Value Date     10/25/2023    K 4.7 10/25/2023     10/25/2023    CO2 26 10/25/2023    GLU 99 10/25/2023    BUN 11 10/25/2023    CREATININE 0.8 10/25/2023    CALCIUM 10.2 10/25/2023    PROT 6.8 09/20/2023    ALBUMIN 3.8 10/25/2023    BILITOT 0.4 09/20/2023    ALKPHOS 80 09/20/2023    AST 22 09/20/2023    ALT 19 09/20/2023    ANIONGAP 12 10/25/2023    ESTGFRAFRICA >60.0 04/25/2022    EGFRNONAA >60.0 04/25/2022    TSH 0.021 (L) 09/20/2023      Latest Reference Range & Units Most Recent 08/18/21 16:55 04/25/22 15:17 09/20/23 10:52 10/25/23 15:53   Calcium 8.7 - 10.5 mg/dL 10.2  10/25/23 15:53 10.7 (H) 10.5 9.8 10.2   Calcium Level Ionized 1.06 - 1.42 mmol/L 1.26  8/18/12 10:25       PTH 9.0 - 77.0 pg/mL 66.4  10/25/23 15:53 128.5 (H)   66.4   (H): Data is abnormally high      Assessment and Plan     Primary hypothyroidism  Biochemical thyrotoxicosis   Reduced dose to 125 mcg daily (from 150 mcg daily)  Repeat labs due soon    Primary hyperparathyroidism  Recommend good hydration  Avoid medications that can elevate serum calcium levels including HCTZ   Avoid excessive calcium supplementation   Ideally calcium should come from dietary sources   Continue daily vitamin D 1000 - 2000 UIs daily, as vitamin D deficiency can stimulate parathyroid levels     Extent of disease:  Osteoporosis on recent DXA scan, FN -2.6  24 hr urine for ca/creat, previous collection 222 mg/specimen in 8/2022 --> plan to repeat  + staghorn " kidney stones     Surgical indications for parathyroidectomy   1) serum calcium > 11 mg/dl   2) hypercalciuria > 250 mg/specimen   3) fracture or T score -2.5  4) relative youthfulness   5) eGFR of < 60 ml/min     Referral to Dr. Raya       Osteoporosis, idiopathic  Risk factors: postsurgical menopausal status, , family history (mother) and history of vitamin D deficiency  Primary hyperparathyroidism -- needs 24 hr urine, and repeat labs day drops off urine studies  Iatrogenic thyrotoxicosis - dose of LT4 reduced     No previous fragility fractures   FRAX not elevated     Due for DXA scan in 2025, discussed importance of scheduling in the same location to allow for comparison      Continue calcium and vitamin D  Continue to stay active, exercise and focus on strength and core    Treatment options and potential side effects discussed for PO bisphosphonates, Reclast, Prolia.   Parathyroidectomy may improve bone density   Can consider BP therapy      Type 2 diabetes mellitus with hyperglycemia  Doing well   Previously on metformin 500 mg one tablet twice a day   Restart mounjaro 7.5 mg weekly

## 2024-01-03 NOTE — ASSESSMENT & PLAN NOTE
Biochemical thyrotoxicosis   Reduced dose to 125 mcg daily (from 150 mcg daily)  Repeat labs due soon

## 2024-01-03 NOTE — PATIENT INSTRUCTIONS
HOW TO COLLECT AN ACCURATE   24 HOUR URINE SPECIMEN     I want you to collect EVERY drop of your urine during a 24 hour period. I do not care what the volume of the urine is as long as it represents every drop that you pass during that 24 hour period. If you need to have a bowel movement, you may separate the urine but I want every drop.     Begin the collection on a morning which is convenient for you. At that time, pass your urine, flush it down the toilet and note the exact time. Now you have an empty bladder and empty bottle. That starts the collection. Collect every drop all during the day and night until exactly the same time the next morning, when you should pass your urine and add it to the bottle.     Bring the closed container back to the same laboratory that issued the empty container.

## 2024-01-03 NOTE — ASSESSMENT & PLAN NOTE
Doing well   Previously on metformin 500 mg one tablet twice a day   Restart mounjaro 7.5 mg weekly

## 2024-01-03 NOTE — ASSESSMENT & PLAN NOTE
Risk factors: postsurgical menopausal status, , family history (mother) and history of vitamin D deficiency  Primary hyperparathyroidism -- needs 24 hr urine, and repeat labs day drops off urine studies  Iatrogenic thyrotoxicosis - dose of LT4 reduced     No previous fragility fractures   FRAX not elevated     Due for DXA scan in 2025, discussed importance of scheduling in the same location to allow for comparison      Continue calcium and vitamin D  Continue to stay active, exercise and focus on strength and core    Treatment options and potential side effects discussed for PO bisphosphonates, Reclast, Prolia.   Parathyroidectomy may improve bone density   Can consider BP therapy

## 2024-01-09 ENCOUNTER — LAB VISIT (OUTPATIENT)
Dept: LAB | Facility: HOSPITAL | Age: 59
End: 2024-01-09
Attending: INTERNAL MEDICINE
Payer: MEDICARE

## 2024-01-09 DIAGNOSIS — E21.0 PRIMARY HYPERPARATHYROIDISM: ICD-10-CM

## 2024-01-09 LAB
CALCIUM 24H UR-MRATE: 6 MG/HR (ref 4–12)
CALCIUM UR-MCNC: 10.5 MG/DL (ref 0–15)
CALCIUM URINE (MG/SPEC): 152 MG/SPEC
CREAT 24H UR-MRATE: 48.3 MG/HR (ref 40–75)
CREAT UR-MCNC: 80 MG/DL (ref 15–325)
CREATININE, URINE (MG/SPEC): 1160 MG/SPEC
URINE COLLECTION DURATION: 24 HR
URINE COLLECTION DURATION: 24 HR
URINE VOLUME: 1450 ML
URINE VOLUME: 1450 ML

## 2024-01-09 PROCEDURE — 82340 ASSAY OF CALCIUM IN URINE: CPT | Performed by: INTERNAL MEDICINE

## 2024-01-09 PROCEDURE — 82570 ASSAY OF URINE CREATININE: CPT | Performed by: INTERNAL MEDICINE

## 2024-01-11 ENCOUNTER — PATIENT MESSAGE (OUTPATIENT)
Dept: ENDOCRINOLOGY | Facility: CLINIC | Age: 59
End: 2024-01-11
Payer: MEDICARE

## 2024-01-24 ENCOUNTER — TELEPHONE (OUTPATIENT)
Dept: UROLOGY | Facility: CLINIC | Age: 59
End: 2024-01-24
Payer: MEDICARE

## 2024-01-24 DIAGNOSIS — N20.0 NEPHROLITHIASIS: ICD-10-CM

## 2024-01-24 DIAGNOSIS — R10.9 ABDOMINAL PAIN, UNSPECIFIED ABDOMINAL LOCATION: Primary | ICD-10-CM

## 2024-01-24 DIAGNOSIS — R31.0 GROSS HEMATURIA: ICD-10-CM

## 2024-01-24 NOTE — TELEPHONE ENCOUNTER
Keep pcnl on for 2/19. But she wants to do the right side first instead of the left. That means we have to do the PCNL in the main OR.   She wants to keep the access date the week prior as scheduled.   She will drop off urine culture, having symptoms.   Cbc, bmp and repeat CT RSS    Need medical clearance, if not done already

## 2024-01-25 ENCOUNTER — TELEPHONE (OUTPATIENT)
Dept: UROLOGY | Facility: CLINIC | Age: 59
End: 2024-01-25
Payer: MEDICARE

## 2024-01-25 ENCOUNTER — TELEPHONE (OUTPATIENT)
Dept: PREADMISSION TESTING | Facility: HOSPITAL | Age: 59
End: 2024-01-25
Payer: MEDICARE

## 2024-01-25 ENCOUNTER — LAB VISIT (OUTPATIENT)
Dept: LAB | Facility: HOSPITAL | Age: 59
End: 2024-01-25
Attending: INTERNAL MEDICINE
Payer: MEDICARE

## 2024-01-25 DIAGNOSIS — N20.0 STAGHORN CALCULUS: Primary | ICD-10-CM

## 2024-01-25 DIAGNOSIS — E21.0 PRIMARY HYPERPARATHYROIDISM: ICD-10-CM

## 2024-01-25 DIAGNOSIS — N20.0 NEPHROLITHIASIS: ICD-10-CM

## 2024-01-25 DIAGNOSIS — E03.9 PRIMARY HYPOTHYROIDISM: ICD-10-CM

## 2024-01-25 DIAGNOSIS — R31.0 GROSS HEMATURIA: ICD-10-CM

## 2024-01-25 DIAGNOSIS — E11.65 TYPE 2 DIABETES MELLITUS WITH HYPERGLYCEMIA, WITHOUT LONG-TERM CURRENT USE OF INSULIN: ICD-10-CM

## 2024-01-25 LAB
ALBUMIN SERPL BCP-MCNC: 4.1 G/DL (ref 3.5–5.2)
ANION GAP SERPL CALC-SCNC: 7 MMOL/L (ref 8–16)
ANION GAP SERPL CALC-SCNC: 7 MMOL/L (ref 8–16)
BUN SERPL-MCNC: 10 MG/DL (ref 6–20)
BUN SERPL-MCNC: 10 MG/DL (ref 6–20)
CALCIUM SERPL-MCNC: 10 MG/DL (ref 8.7–10.5)
CALCIUM SERPL-MCNC: 10 MG/DL (ref 8.7–10.5)
CHLORIDE SERPL-SCNC: 106 MMOL/L (ref 95–110)
CHLORIDE SERPL-SCNC: 106 MMOL/L (ref 95–110)
CO2 SERPL-SCNC: 27 MMOL/L (ref 23–29)
CO2 SERPL-SCNC: 27 MMOL/L (ref 23–29)
CREAT SERPL-MCNC: 0.9 MG/DL (ref 0.5–1.4)
CREAT SERPL-MCNC: 0.9 MG/DL (ref 0.5–1.4)
ERYTHROCYTE [DISTWIDTH] IN BLOOD BY AUTOMATED COUNT: 15.5 % (ref 11.5–14.5)
EST. GFR  (NO RACE VARIABLE): >60 ML/MIN/1.73 M^2
EST. GFR  (NO RACE VARIABLE): >60 ML/MIN/1.73 M^2
ESTIMATED AVG GLUCOSE: 114 MG/DL (ref 68–131)
GLUCOSE SERPL-MCNC: 97 MG/DL (ref 70–110)
GLUCOSE SERPL-MCNC: 97 MG/DL (ref 70–110)
HBA1C MFR BLD: 5.6 % (ref 4–5.6)
HCT VFR BLD AUTO: 41.8 % (ref 37–48.5)
HGB BLD-MCNC: 12.8 G/DL (ref 12–16)
MCH RBC QN AUTO: 22.9 PG (ref 27–31)
MCHC RBC AUTO-ENTMCNC: 30.6 G/DL (ref 32–36)
MCV RBC AUTO: 75 FL (ref 82–98)
PHOSPHATE SERPL-MCNC: 3.2 MG/DL (ref 2.7–4.5)
PLATELET # BLD AUTO: 265 K/UL (ref 150–450)
PMV BLD AUTO: 11.2 FL (ref 9.2–12.9)
POTASSIUM SERPL-SCNC: 4.2 MMOL/L (ref 3.5–5.1)
POTASSIUM SERPL-SCNC: 4.2 MMOL/L (ref 3.5–5.1)
PTH-INTACT SERPL-MCNC: 121.1 PG/ML (ref 9–77)
RBC # BLD AUTO: 5.6 M/UL (ref 4–5.4)
SODIUM SERPL-SCNC: 140 MMOL/L (ref 136–145)
SODIUM SERPL-SCNC: 140 MMOL/L (ref 136–145)
T4 FREE SERPL-MCNC: 0.86 NG/DL (ref 0.71–1.51)
TSH SERPL DL<=0.005 MIU/L-ACNC: 11.11 UIU/ML (ref 0.4–4)
WBC # BLD AUTO: 8.34 K/UL (ref 3.9–12.7)

## 2024-01-25 PROCEDURE — 83036 HEMOGLOBIN GLYCOSYLATED A1C: CPT | Performed by: INTERNAL MEDICINE

## 2024-01-25 PROCEDURE — 80069 RENAL FUNCTION PANEL: CPT | Performed by: INTERNAL MEDICINE

## 2024-01-25 PROCEDURE — 84439 ASSAY OF FREE THYROXINE: CPT | Performed by: INTERNAL MEDICINE

## 2024-01-25 PROCEDURE — 85027 COMPLETE CBC AUTOMATED: CPT | Performed by: UROLOGY

## 2024-01-25 PROCEDURE — 84443 ASSAY THYROID STIM HORMONE: CPT | Performed by: INTERNAL MEDICINE

## 2024-01-25 PROCEDURE — 36415 COLL VENOUS BLD VENIPUNCTURE: CPT | Performed by: INTERNAL MEDICINE

## 2024-01-25 PROCEDURE — 83970 ASSAY OF PARATHORMONE: CPT | Performed by: INTERNAL MEDICINE

## 2024-01-25 NOTE — ANESTHESIA PAT ROS NOTE
01/25/2024  Angie Mccarthy is a 58 y.o., female.      Pre-op Assessment          Review of Systems           Anesthesia Assessment: Preoperative EQUATION    Planned Procedure: Procedure(s) (LRB):  NEPHROLITHOTOMY, PERCUTANEOUS (Right)  CYSTOURETEROSCOPY, WITH RETROGRADE PYELOGRAM AND URETERAL STENT INSERTION (Right)  Nephrostogram (Right)  Requested Anesthesia Type:General  Surgeon: Suni Patel MD  Service: Urology  Known or anticipated Date of Surgery:2/19/2024    Surgeon notes: reviewed    Electronic QUestionnaire Assessment completed via nurse interview with patient.        Triage considerations:         Previous anesthesia records:GETA, No problems, and Hx PONV  11/29/23 cystoureteroscopy with holmium laser lithotripsy of ureteral calculus and stent insertion, cystoscopy with retrograde pyelogram, gen anes, ASA 3      Last PCP note: 3-6 months ago , within [facility]   Subspecialty notes: Cardiology: General, Endocrinology, Urology    Other important co-morbidities: DM2, GERD, HLD, HTN, Hypothyroid, Obesity, and NAFLD, FERRELL, OA, osteoporosis, depression, anxiety, hyperparathyroidism, s/p gastric sleeve, lumbar stenosis, staghorn calculus       Tests already available:  Available tests,  within 1 month , within 3 months , 6-12 months ago , within [facility] .   01/25/24 BMP, CBC, TSH, A1C (5.6)  11/20/23 EKG  05/29/23 TTE (EF 65-70%)            Instructions     Optimization:  Anesthesia Preop Clinic Assessment  Indicated    Medical Opinion Indicated       Sub-specialist consult indicated: cards      Plan:    Testing:  PT/INR, PTT, and T&S   Pre-anesthesia  visit       Visit focus: concerns in complex and/or prolonged anesthesia, past history of problem with anesthesia, to be determined     Consultation:IM Perioperative Hospitalist     Patient  has previously scheduled Medical  Appointment:    Navigation: Tests Scheduled.              Consults scheduled.             Results will be tracked by Preop Clinic.         01/25/24 Cards clearance by Dr. Naranjo e-consult in December still good per Dr. Naranjo. Same with inst to hold ASA 81mg 5 days.

## 2024-01-26 ENCOUNTER — TELEPHONE (OUTPATIENT)
Dept: PREADMISSION TESTING | Facility: HOSPITAL | Age: 59
End: 2024-01-26
Payer: MEDICARE

## 2024-01-30 ENCOUNTER — TELEPHONE (OUTPATIENT)
Dept: ENDOSCOPY | Facility: HOSPITAL | Age: 59
End: 2024-01-30
Payer: MEDICARE

## 2024-01-30 NOTE — TELEPHONE ENCOUNTER
Spoke to pt to reschedule procedure(s) Colonoscopy       Physician to perform procedure(s) Dr. MILENA Matos  Date of Procedure (s) 5/14/24  Arrival Time 9:15 AM  Time of Procedure(s) 10:15 AM   Location of Procedure(s) [facility]  Type of Rx Prep sent to patient: Suprep  Instructions provided to patient via [facility]    Patient was informed on the following information and verbalized understanding. Screening questionnaire reviewed with patient and complete. If procedure requires anesthesia, a responsible adult needs to be present to accompany the patient home, patient cannot drive after receiving anesthesia. Appointment details are tentative, especially check-in time. Patient will receive a prep-op call 7 days prior to confirm check-in time for procedure. If applicable the patient should contact their pharmacy to verify Rx for procedure prep is ready for pick-up. Patient was advised to call the scheduling department at [phone] if pharmacy states no Rx is available. Patient was advised to call the endoscopy scheduling department if any questions or concerns arise.      [facility] Scheduling Department

## 2024-02-04 ENCOUNTER — PATIENT MESSAGE (OUTPATIENT)
Dept: ENDOCRINOLOGY | Facility: CLINIC | Age: 59
End: 2024-02-04
Payer: MEDICARE

## 2024-02-04 DIAGNOSIS — E11.65 TYPE 2 DIABETES MELLITUS WITH HYPERGLYCEMIA, WITHOUT LONG-TERM CURRENT USE OF INSULIN: ICD-10-CM

## 2024-02-04 DIAGNOSIS — E03.9 PRIMARY HYPOTHYROIDISM: Primary | ICD-10-CM

## 2024-02-04 RX ORDER — LEVOTHYROXINE SODIUM 137 UG/1
137 TABLET ORAL
Qty: 90 TABLET | Refills: 3 | Status: SHIPPED | OUTPATIENT
Start: 2024-02-04 | End: 2025-02-03

## 2024-02-07 ENCOUNTER — PATIENT MESSAGE (OUTPATIENT)
Dept: INTERVENTIONAL RADIOLOGY/VASCULAR | Facility: HOSPITAL | Age: 59
End: 2024-02-07
Payer: MEDICARE

## 2024-02-08 ENCOUNTER — DOCUMENTATION ONLY (OUTPATIENT)
Dept: PREADMISSION TESTING | Facility: HOSPITAL | Age: 59
End: 2024-02-08
Payer: MEDICARE

## 2024-02-08 RX ORDER — HYDROCODONE BITARTRATE AND ACETAMINOPHEN 10; 325 MG/1; MG/1
1 TABLET ORAL 4 TIMES DAILY PRN
COMMUNITY
End: 2024-06-10

## 2024-02-08 NOTE — PRE-PROCEDURE INSTRUCTIONS
PRE-OP INSTRUCTIONS:  Instructed patient to have no food,milk or milk products after midnight 2/11/2024  It is ok to take AM medications with a few sips of water   Medication instructions for pm prior to and am of surgery reviewed.  Instructed patient to avoid taking vitamins,supplements,aspirin or ibuprofen the am of surgery.  Shower instructions provided    Patient denies any side effects or issues with anesthesia or sedation other than PONV

## 2024-02-12 ENCOUNTER — ANESTHESIA (OUTPATIENT)
Dept: INTERVENTIONAL RADIOLOGY/VASCULAR | Facility: HOSPITAL | Age: 59
End: 2024-02-12
Payer: MEDICARE

## 2024-02-12 ENCOUNTER — HOSPITAL ENCOUNTER (OUTPATIENT)
Dept: INTERVENTIONAL RADIOLOGY/VASCULAR | Facility: HOSPITAL | Age: 59
Discharge: HOME | End: 2024-02-12
Attending: FAMILY MEDICINE
Payer: MEDICARE

## 2024-02-12 VITALS
SYSTOLIC BLOOD PRESSURE: 138 MMHG | RESPIRATION RATE: 12 BRPM | DIASTOLIC BLOOD PRESSURE: 61 MMHG | HEART RATE: 88 BPM | TEMPERATURE: 98 F | OXYGEN SATURATION: 95 %

## 2024-02-12 DIAGNOSIS — N20.0 STAGHORN CALCULUS: ICD-10-CM

## 2024-02-12 LAB
GRAM STN SPEC: NORMAL
INR PPP: 1 (ref 0.8–1.2)
POCT GLUCOSE: 99 MG/DL (ref 70–110)
PROTHROMBIN TIME: 10.4 SEC (ref 9–12.5)

## 2024-02-12 PROCEDURE — 50433 PLMT NEPHROURETERAL CATHETER: CPT | Mod: RT | Performed by: RADIOLOGY

## 2024-02-12 PROCEDURE — C1894 INTRO/SHEATH, NON-LASER: HCPCS

## 2024-02-12 PROCEDURE — 63600175 PHARM REV CODE 636 W HCPCS: Performed by: ANESTHESIOLOGY

## 2024-02-12 PROCEDURE — 87070 CULTURE OTHR SPECIMN AEROBIC: CPT | Performed by: UROLOGY

## 2024-02-12 PROCEDURE — 37000008 HC ANESTHESIA 1ST 15 MINUTES

## 2024-02-12 PROCEDURE — 25000003 PHARM REV CODE 250: Performed by: STUDENT IN AN ORGANIZED HEALTH CARE EDUCATION/TRAINING PROGRAM

## 2024-02-12 PROCEDURE — 87076 CULTURE ANAEROBE IDENT EACH: CPT | Performed by: UROLOGY

## 2024-02-12 PROCEDURE — 85610 PROTHROMBIN TIME: CPT | Performed by: STUDENT IN AN ORGANIZED HEALTH CARE EDUCATION/TRAINING PROGRAM

## 2024-02-12 PROCEDURE — 25000003 PHARM REV CODE 250

## 2024-02-12 PROCEDURE — 87186 SC STD MICRODIL/AGAR DIL: CPT | Performed by: UROLOGY

## 2024-02-12 PROCEDURE — 94761 N-INVAS EAR/PLS OXIMETRY MLT: CPT

## 2024-02-12 PROCEDURE — 87075 CULTR BACTERIA EXCEPT BLOOD: CPT | Performed by: UROLOGY

## 2024-02-12 PROCEDURE — 87077 CULTURE AEROBIC IDENTIFY: CPT | Performed by: UROLOGY

## 2024-02-12 PROCEDURE — 37000009 HC ANESTHESIA EA ADD 15 MINS

## 2024-02-12 PROCEDURE — D9220A PRA ANESTHESIA: Mod: ANES,,, | Performed by: ANESTHESIOLOGY

## 2024-02-12 PROCEDURE — D9220A PRA ANESTHESIA: Mod: CRNA,,, | Performed by: STUDENT IN AN ORGANIZED HEALTH CARE EDUCATION/TRAINING PROGRAM

## 2024-02-12 PROCEDURE — 63600175 PHARM REV CODE 636 W HCPCS: Performed by: STUDENT IN AN ORGANIZED HEALTH CARE EDUCATION/TRAINING PROGRAM

## 2024-02-12 PROCEDURE — 87205 SMEAR GRAM STAIN: CPT | Performed by: UROLOGY

## 2024-02-12 RX ORDER — HYDROMORPHONE HYDROCHLORIDE 1 MG/ML
0.2 INJECTION, SOLUTION INTRAMUSCULAR; INTRAVENOUS; SUBCUTANEOUS EVERY 5 MIN PRN
Status: DISCONTINUED | OUTPATIENT
Start: 2024-02-12 | End: 2024-02-13 | Stop reason: HOSPADM

## 2024-02-12 RX ORDER — ONDANSETRON HYDROCHLORIDE 2 MG/ML
INJECTION, SOLUTION INTRAVENOUS
Status: DISCONTINUED | OUTPATIENT
Start: 2024-02-12 | End: 2024-02-12

## 2024-02-12 RX ORDER — EPINEPHRINE 1 MG/ML
INJECTION, SOLUTION, CONCENTRATE INTRAVENOUS
Status: DISCONTINUED | OUTPATIENT
Start: 2024-02-12 | End: 2024-02-12

## 2024-02-12 RX ORDER — AMOXICILLIN AND CLAVULANATE POTASSIUM 875; 125 MG/1; MG/1
1 TABLET, FILM COATED ORAL EVERY 12 HOURS
Qty: 14 TABLET | Refills: 0 | Status: SHIPPED | OUTPATIENT
Start: 2024-02-12 | End: 2024-02-19

## 2024-02-12 RX ORDER — ROCURONIUM BROMIDE 10 MG/ML
INJECTION, SOLUTION INTRAVENOUS
Status: DISCONTINUED | OUTPATIENT
Start: 2024-02-12 | End: 2024-02-12

## 2024-02-12 RX ORDER — ONDANSETRON HYDROCHLORIDE 2 MG/ML
4 INJECTION, SOLUTION INTRAVENOUS ONCE AS NEEDED
Status: DISCONTINUED | OUTPATIENT
Start: 2024-02-12 | End: 2024-02-13 | Stop reason: HOSPADM

## 2024-02-12 RX ORDER — PROPOFOL 10 MG/ML
VIAL (ML) INTRAVENOUS
Status: DISCONTINUED | OUTPATIENT
Start: 2024-02-12 | End: 2024-02-12

## 2024-02-12 RX ORDER — PHENYLEPHRINE HYDROCHLORIDE 10 MG/ML
INJECTION INTRAVENOUS
Status: DISCONTINUED | OUTPATIENT
Start: 2024-02-12 | End: 2024-02-12

## 2024-02-12 RX ORDER — DROPERIDOL 2.5 MG/ML
0.62 INJECTION, SOLUTION INTRAMUSCULAR; INTRAVENOUS ONCE AS NEEDED
Status: DISCONTINUED | OUTPATIENT
Start: 2024-02-12 | End: 2024-02-13 | Stop reason: HOSPADM

## 2024-02-12 RX ORDER — PROPOFOL 10 MG/ML
VIAL (ML) INTRAVENOUS CONTINUOUS PRN
Status: DISCONTINUED | OUTPATIENT
Start: 2024-02-12 | End: 2024-02-12

## 2024-02-12 RX ORDER — SODIUM CHLORIDE 9 MG/ML
INJECTION, SOLUTION INTRAVENOUS CONTINUOUS
Status: DISCONTINUED | OUTPATIENT
Start: 2024-02-12 | End: 2024-02-13 | Stop reason: HOSPADM

## 2024-02-12 RX ORDER — DEXMEDETOMIDINE HYDROCHLORIDE 100 UG/ML
INJECTION, SOLUTION INTRAVENOUS
Status: DISCONTINUED | OUTPATIENT
Start: 2024-02-12 | End: 2024-02-12

## 2024-02-12 RX ORDER — DEXAMETHASONE SODIUM PHOSPHATE 4 MG/ML
INJECTION, SOLUTION INTRA-ARTICULAR; INTRALESIONAL; INTRAMUSCULAR; INTRAVENOUS; SOFT TISSUE
Status: DISCONTINUED | OUTPATIENT
Start: 2024-02-12 | End: 2024-02-12

## 2024-02-12 RX ORDER — ONDANSETRON HYDROCHLORIDE 2 MG/ML
4 INJECTION, SOLUTION INTRAVENOUS EVERY 6 HOURS PRN
Status: DISCONTINUED | OUTPATIENT
Start: 2024-02-12 | End: 2024-02-13 | Stop reason: HOSPADM

## 2024-02-12 RX ORDER — LIDOCAINE HYDROCHLORIDE 20 MG/ML
INJECTION INTRAVENOUS
Status: DISCONTINUED | OUTPATIENT
Start: 2024-02-12 | End: 2024-02-12

## 2024-02-12 RX ORDER — MIDAZOLAM HYDROCHLORIDE 1 MG/ML
INJECTION INTRAMUSCULAR; INTRAVENOUS
Status: DISCONTINUED | OUTPATIENT
Start: 2024-02-12 | End: 2024-02-12

## 2024-02-12 RX ORDER — FENTANYL CITRATE 50 UG/ML
INJECTION, SOLUTION INTRAMUSCULAR; INTRAVENOUS
Status: DISCONTINUED | OUTPATIENT
Start: 2024-02-12 | End: 2024-02-12

## 2024-02-12 RX ORDER — LIDOCAINE HYDROCHLORIDE 10 MG/ML
1 INJECTION, SOLUTION EPIDURAL; INFILTRATION; INTRACAUDAL; PERINEURAL ONCE
Status: DISCONTINUED | OUTPATIENT
Start: 2024-02-12 | End: 2024-02-13 | Stop reason: HOSPADM

## 2024-02-12 RX ORDER — AMOXICILLIN AND CLAVULANATE POTASSIUM 875; 125 MG/1; MG/1
1 TABLET, FILM COATED ORAL EVERY 12 HOURS
Qty: 14 TABLET | Refills: 0 | Status: SHIPPED | OUTPATIENT
Start: 2024-02-12 | End: 2024-02-12 | Stop reason: SDUPTHER

## 2024-02-12 RX ORDER — DIPHENHYDRAMINE HYDROCHLORIDE 50 MG/ML
INJECTION INTRAMUSCULAR; INTRAVENOUS
Status: DISCONTINUED | OUTPATIENT
Start: 2024-02-12 | End: 2024-02-12

## 2024-02-12 RX ADMIN — PROPOFOL 140 MG: 10 INJECTION, EMULSION INTRAVENOUS at 07:02

## 2024-02-12 RX ADMIN — DEXMEDETOMIDINE 20 MCG: 100 INJECTION, SOLUTION, CONCENTRATE INTRAVENOUS at 07:02

## 2024-02-12 RX ADMIN — CEFTRIAXONE 2 G: 1 INJECTION, POWDER, FOR SOLUTION INTRAMUSCULAR; INTRAVENOUS at 08:02

## 2024-02-12 RX ADMIN — PROPOFOL 100 MCG/KG/MIN: 10 INJECTION, EMULSION INTRAVENOUS at 08:02

## 2024-02-12 RX ADMIN — EPINEPHRINE 10 MCG: 1 INJECTION, SOLUTION, CONCENTRATE INTRAVENOUS at 08:02

## 2024-02-12 RX ADMIN — HYDROMORPHONE HYDROCHLORIDE 0.2 MG: 1 INJECTION, SOLUTION INTRAMUSCULAR; INTRAVENOUS; SUBCUTANEOUS at 12:02

## 2024-02-12 RX ADMIN — PHENYLEPHRINE HYDROCHLORIDE 100 MCG: 10 INJECTION INTRAVENOUS at 08:02

## 2024-02-12 RX ADMIN — DEXAMETHASONE SODIUM PHOSPHATE 8 MG: 4 INJECTION, SOLUTION INTRAMUSCULAR; INTRAVENOUS at 08:02

## 2024-02-12 RX ADMIN — MIDAZOLAM HYDROCHLORIDE 2 MG: 2 INJECTION, SOLUTION INTRAMUSCULAR; INTRAVENOUS at 08:02

## 2024-02-12 RX ADMIN — HYDROMORPHONE HYDROCHLORIDE 0.2 MG: 1 INJECTION, SOLUTION INTRAMUSCULAR; INTRAVENOUS; SUBCUTANEOUS at 11:02

## 2024-02-12 RX ADMIN — HYDROMORPHONE HYDROCHLORIDE 0.2 MG: 1 INJECTION, SOLUTION INTRAMUSCULAR; INTRAVENOUS; SUBCUTANEOUS at 10:02

## 2024-02-12 RX ADMIN — FENTANYL CITRATE 100 MCG: 50 INJECTION, SOLUTION INTRAMUSCULAR; INTRAVENOUS at 07:02

## 2024-02-12 RX ADMIN — DIPHENHYDRAMINE HYDROCHLORIDE 12.5 MG: 50 INJECTION, SOLUTION INTRAMUSCULAR; INTRAVENOUS at 08:02

## 2024-02-12 RX ADMIN — EPINEPHRINE 20 MCG: 1 INJECTION, SOLUTION, CONCENTRATE INTRAVENOUS at 08:02

## 2024-02-12 RX ADMIN — GLYCOPYRROLATE 0.2 MG: 0.2 INJECTION, SOLUTION INTRAMUSCULAR; INTRAVENOUS at 08:02

## 2024-02-12 RX ADMIN — SUGAMMADEX 200 MG: 100 INJECTION, SOLUTION INTRAVENOUS at 09:02

## 2024-02-12 RX ADMIN — ONDANSETRON 4 MG: 2 INJECTION INTRAMUSCULAR; INTRAVENOUS at 08:02

## 2024-02-12 RX ADMIN — SODIUM CHLORIDE: 0.9 INJECTION, SOLUTION INTRAVENOUS at 07:02

## 2024-02-12 RX ADMIN — ROCURONIUM BROMIDE 50 MG: 10 INJECTION, SOLUTION INTRAVENOUS at 07:02

## 2024-02-12 RX ADMIN — LIDOCAINE HYDROCHLORIDE 80 MG: 20 INJECTION INTRAVENOUS at 07:02

## 2024-02-12 NOTE — PROGRESS NOTES
Neph tube unclamped and open per IR, yellow urine return noted into bag. Discharge instructions reviewed w/pt , verbalized understanding. Pt in NADN. Pain tolerable at this time. Tolerated liquids w/ no issues. To be d/c'd home w/ fiance.

## 2024-02-12 NOTE — NURSING
Right nephrostomy tube insertion complete. Pt tolerated well. VSS. No signs or symptoms of distress noted per CRNA. Pt will be transferred to PACU bed after extubation/stabilization escorted by RN and CRNA who will assume care. Specimen to lab

## 2024-02-12 NOTE — NURSING
Pt arrived to [facility] for bilateral nephrostomy tubes with anesthesia, escorted to room by RN and CRNA who will assume care. Pt oriented to unit and staff. Plan of care reviewed with patient, patient verbalizes understanding. Comfort measures utilized. Pt safely transferred from stretcher to procedural table after intubation.  fall risk interventions maintained with direct observation/attendance. Safety strap applied, positioner pillows utilized to minimize pressure points. Blankets applied. Pt prepped and draped utilizing standard sterile technique. Patient placed on continuous monitoring, as required by sedation policy. Timeouts completed utilizing standard universal time-out, per department and facility policy. RN to remain at bedside, continuous monitoring maintained. Pt resting comfortably. Denies pain/discomfort. Will continue to monitor. See flow sheets for monitoring, medication administration, and updates.

## 2024-02-12 NOTE — H&P
Radiology History & Physical      SUBJECTIVE:     Chief Complaint: Renal calculi    History of Present Illness:  Angie Mccarthy is a 58 y.o. female who presents for percutaneous nephrostomy tube placement.    Past Medical History:   Diagnosis Date    Allergy     seasonal    Arthritis     Blood transfusion     with back surgeries    Chronic back pain     GERD (gastroesophageal reflux disease)     Hyperlipidemia     Hypothyroidism     Thyroid nodule    Joint pain     Kidney stones     Morbid obesity 12/14/2012    NAFLD (nonalcoholic fatty liver disease)     OCD (obsessive compulsive disorder)     Osteoporosis     PONV (postoperative nausea and vomiting)     Persistent, Motion sickness with boat rides, Scopolamine helped -still had nausea    Restless leg syndrome     Sciatica of right side associated with disorder of lumbosacral spine     Spinal stenosis of lumbar region     Thoracic spondylosis      Past Surgical History:   Procedure Laterality Date    APPENDECTOMY      BACK SURGERY      x 6    CHOLECYSTECTOMY      GASTRECTOMY      Lap Gastric Sleeve    HERNIA REPAIR      HYSTERECTOMY      JOINT REPLACEMENT      LIVER BIOPSY  02/06/2017    steatohepatitis with stage 1 fibrosis    MYELOGRAPHY N/A 11/29/2018    Procedure: MYELOGRAM;  Surgeon: Ander Diagnostic Provider;  Location: [facility] OR [address] [place] [address];  Service: Radiology;  Laterality: N/A;    MYELOGRAPHY N/A 1/7/2019    Procedure: Myelogram;  Surgeon: Lissett Surgeon;  Location: [facility];  Service: Anesthesiology;  Laterality: N/A;    OOPHORECTOMY      PERCUTANEOUS CRYOTHERAPY OF PERIPHERAL NERVE USING LIQUID NITROUS OXIDE IN CLOSED NEEDLE DEVICE Left 4/29/2019    Procedure: CRYOTHERAPY, NERVE, PERIPHERAL, PERCUTANEOUS, USING LIQUID NITROUS OXIDE IN CLOSED NEEDLE DEVICE-iovera left knee;  Surgeon: Chavo Gold III, MD;  Location: [facility] CATH LAB;  Service: Pain Management;  Laterality: Left;    PERCUTANEOUS CRYOTHERAPY OF PERIPHERAL NERVE USING LIQUID NITROUS  OXIDE IN CLOSED NEEDLE DEVICE Right 8/2/2021    Procedure: CRYOTHERAPY, NERVE, PERIPHERAL, PERCUTANEOUS, USING LIQUID NITROUS OXIDE IN CLOSED NEEDLE DEVICE;  Surgeon: Saritha Proctor NP;  Location: [facility];  Service: Pain Management;  Laterality: Right;  RIGHT KNEE IOVERA    SPINAL FUSION      TONSILLECTOMY, ADENOIDECTOMY      TOTAL KNEE ARTHROPLASTY Left 5/1/2019    Procedure: REPLACEMENT-KNEE-TOTAL;  Surgeon: [facility]., MD;  Location: [address];  Service: Orthopedics;  Laterality: Left;       Home Meds:   Prior to Admission medications    Medication Sig Start Date End Date Taking? Authorizing Provider   alcohol swabs PadM Apply 1 each topically once daily. 6/28/22  Yes Rangel Swenson MD   ALPRAZolam (XANAX) 0.25 MG tablet Take 0.5 tablets (0.125 mg total) by mouth nightly as needed for Anxiety. 11/22/23 2/12/24 Yes Rangel Swenson MD   blood glucose control, low (TRUE METRIX LEVEL 1) Soln USE AS DIRECTED WITH GLUCOSE METER 8/23/23  Yes Rangel Swenson MD   blood sugar diagnostic (TRUE METRIX GLUCOSE TEST STRIP) Strp TEST BLOOD SUGAR EVERY DAY 5/23/23  Yes Rangel Swenson MD   blood-glucose meter (TRUE METRIX GLUCOSE METER) Misc 1 each by Misc.(Non-Drug; Combo Route) route once daily. 6/28/22 2/12/24 Yes Rangel Swenson MD   cyclobenzaprine (FLEXERIL) 10 MG tablet TK 1 T PO QD- as needed for muscle spasms 2/6/19  Yes Provider, Historical   DULoxetine (CYMBALTA) 60 MG capsule TAKE 1 CAPSULE EVERY DAY  Patient taking differently: Take by mouth every evening. 8/23/23  Yes Rangel Swenson MD   fluticasone propionate (FLONASE) 50 mcg/actuation nasal spray USE 2 SPRAYS NASALLY ONE TIME DAILY  Patient taking differently: daily as needed. 9/13/21  Yes Rangel Swenson MD   gabapentin (NEURONTIN) 800 MG tablet Take 800 mg by mouth 3 (three) times daily. Takes 2-3 times per day 6/15/16  Yes Provider, Historical   levothyroxine (SYNTHROID) 137 MCG Tab tablet Take 1 tablet (137 mcg total) by mouth  before breakfast. 2/4/24 2/3/25 Yes Felisha Sands MD   lisinopriL (PRINIVIL,ZESTRIL) 2.5 MG tablet TAKE 1 TABLET EVERY DAY 8/23/23  Yes Rangel Swenson MD   omeprazole (PRILOSEC) 40 MG capsule TAKE 1 CAPSULE EVERY DAY  Patient taking differently: Take by mouth every morning. 5/23/23  Yes Rangel Swenson MD   potassium citrate (UROCIT-K) 5 mEq (540 mg) TbSR Take 2 tablets (10 mEq total) by mouth 3 (three) times daily with meals. 10/27/23 10/26/24 Yes Leonard Haddad MD   rosuvastatin (CRESTOR) 10 MG tablet TAKE 1 TABLET EVERY NIGHT 1/25/24  Yes Rangel Swenson MD   TRUEPLUS LANCETS 33 gauge Misc TEST BLOOD SUGAR EVERY DAY 5/23/23  Yes Rangel Swenson MD   zolpidem (AMBIEN) 10 mg Tab TAKE 1 TABLET BY MOUTH EVERY NIGHT AT BEDTIME AS NEEDED FOR INSOMNIA 12/20/23  Yes Rangel Swenson MD   albuterol (VENTOLIN HFA) 90 mcg/actuation inhaler Inhale 2 puffs into the lungs every 6 (six) hours as needed for Wheezing or Shortness of Breath. Rescue 4/23/19 6/20/22  Sherron Ko MD   ascorbic acid, vitamin C, (VITAMIN C) 500 MG tablet  4/10/10   Provider, Historical   aspirin (ECOTRIN) 81 MG EC tablet Take 1 tablet (81 mg total) by mouth 2 (two) times daily. 5/1/19 2/8/24  Dolly Castillo PA-C   BIOTIN ORAL Take 10,000 mg by mouth every morning.     Provider, Historical   fish oil-omega-3 fatty acids 300-1,000 mg capsule Take 1,200 mg by mouth 2 (two) times daily.     Provider, Historical   HYDROcodone-acetaminophen (NORCO)  mg per tablet Take 1 tablet by mouth 4 (four) times daily as needed.    Provider, Historical   metFORMIN (GLUCOPHAGE) 500 MG tablet TAKE 1 TABLET (500 MG TOTAL) BY MOUTH 2 (TWO) TIMES DAILY WITH MEALS. 5/8/23 5/7/24  Rangel Swenson MD   multivitamin capsule Take 1 capsule by mouth every morning.    Provider, Historical   oxyCODONE-acetaminophen (PERCOCET)  mg per tablet Take 1 tablet by mouth 4 (four) times daily as needed. 10/20/23   Provider, Historical  "  sulfamethoxazole-trimethoprim 800-160mg (BACTRIM DS) 800-160 mg Tab One pill twice a day by mouth, begin bactrim 7 days before procedure, you will continue it after the procedure also.  Patient not taking: Reported on 1/3/2024 11/15/23   Suni Patel MD   tamsulosin (FLOMAX) 0.4 mg Cap TAKE 1 CAPSULE(0.4 MG) BY MOUTH AFTER DINNER  Patient taking differently: daily as needed. 4/26/22   Suni Patel MD   tirzepatide 7.5 mg/0.5 mL PnIj Inject 7.5 mg into the skin every 7 days. 2/6/24   Felihsa Sands MD   zinc sulfate (ZINCATE) 50 mg zinc (220 mg) capsule  11/3/21   Provider, Historical     Anticoagulants/Antiplatelets:  Aspirin held for 5 days    Allergies:   Review of patient's allergies indicates:   Allergen Reactions    Adhesive Dermatitis and Blisters    Adhesive tape-silicones Dermatitis     The longer the adhesive stays on, the worse the irritation    Mastisol adhesive [gum bxdocr-rpqouk-uodg-alcohol] Itching, Rash and Blisters     "Looked like poison ivy"     Sedation History:  no adverse reactions    Review of Systems:   Hematological: no known coagulopathies  Respiratory: no shortness of breath  Cardiovascular: no chest pain  Gastrointestinal: no abdominal pain  Genito-Urinary: no dysuria  Musculoskeletal: negative  Neurological: no TIA or stroke symptoms         OBJECTIVE:     Vital Signs (Most Recent)  BP: (!) 145/74 (02/12/24 0615)    Physical Exam:  ASA: per anesthesia  Mallampati: per anesthesia    General: no acute distress  Mental Status: alert and oriented to person, place and time  HEENT: normocephalic, atraumatic  Chest: unlabored breathing  Heart: regular heart rate  Abdomen: nondistended  Extremity: moves all extremities    Laboratory  Lab Results   Component Value Date    INR 1.0 02/12/2024       Lab Results   Component Value Date    WBC 8.34 01/25/2024    HGB 12.8 01/25/2024    HCT 41.8 01/25/2024    MCV 75 (L) 01/25/2024     01/25/2024      Lab Results "   Component Value Date    GLU 97 01/25/2024    GLU 97 01/25/2024     01/25/2024     01/25/2024    K 4.2 01/25/2024    K 4.2 01/25/2024     01/25/2024     01/25/2024    CO2 27 01/25/2024    CO2 27 01/25/2024    BUN 10 01/25/2024    BUN 10 01/25/2024    CREATININE 0.9 01/25/2024    CREATININE 0.9 01/25/2024    CALCIUM 10.0 01/25/2024    CALCIUM 10.0 01/25/2024    MG 2.1 08/18/2021    ALT 19 09/20/2023    AST 22 09/20/2023    ALBUMIN 4.1 01/25/2024    BILITOT 0.4 09/20/2023    BILIDIR 0.2 06/20/2022       ASSESSMENT/PLAN:     Sedation Plan: per anesthesia  Patient will undergo percutaneous nephrostomy tube placement.    Jose Hurd MD PGYIV  Interventional Radiology  [facility]

## 2024-02-12 NOTE — PLAN OF CARE
Patient ambulated on unit by herself.  Pt states that her boyfriend will be here later to pick her up. Denies pain or SOB.  Oriented to unit and call bell provided.  Oriented to room and call bell provided.  Will continue plan of care.

## 2024-02-12 NOTE — ANESTHESIA PROCEDURE NOTES
Intubation    Date/Time: 2/12/2024 8:00 AM    Performed by: Nahum Garcia CRNA  Authorized by: Junior Alanis MD    Intubation:     Induction:  Intravenous    Intubated:  Postinduction    Mask Ventilation:  Easy with oral airway    Attempts:  1    Attempted By:  CRNA    Method of Intubation:  Video laryngoscopy    Blade:  Hernadez 3    Laryngeal View Grade: Grade I - full view of cords      Difficult Airway Encountered?: No      Complications:  None    Airway Device:  Oral endotracheal tube    Airway Device Size:  7.5    Style/Cuff Inflation:  Cuffed (inflated to minimal occlusive pressure)    Tube secured:  21    Secured at:  The lips    Placement Verified By:  Capnometry    Complicating Factors:  None    Findings Post-Intubation:  BS equal bilateral

## 2024-02-12 NOTE — ANESTHESIA PREPROCEDURE EVALUATION
"                                                                                                             02/12/2024  Angie Mccarthy is a 58 y.o., female.      Pre-op Assessment          Review of Systems  Anesthesia Hx:          Ponv                    Cardiovascular:     Hypertension    Denies CAD.                     LVOT pressure elevation on echo, "murmur" per patient.                         Pulmonary:    Asthma    Sleep Apnea                Renal/:   Denies Chronic Renal Disease.                Hepatic/GI:   Denies PUD.  GERD Liver Disease,            Neurological:    Denies CVA.    Denies Seizures.                                Endocrine:  Diabetes Denies Hypothyroidism.              Physical Exam  General: Alert    Airway:  Mallampati: I   Mouth Opening: Normal  TM Distance: Normal  Tongue: Normal  Neck ROM: Normal ROM    Dental:  Intact        Anesthesia Plan  Type of Anesthesia, risks & benefits discussed:    Anesthesia Type: Gen ETT  Intra-op Monitoring Plan: Standard ASA Monitors  Post Op Pain Control Plan: multimodal analgesia and IV/PO Opioids PRN  Induction:  IV  Airway Plan: Direct  Informed Consent: Informed consent signed with the Patient and all parties understand the risks and agree with anesthesia plan.  All questions answered.   ASA Score: 3    Ready For Surgery From Anesthesia Perspective.     .      "

## 2024-02-12 NOTE — DISCHARGE INSTRUCTIONS
For scheduling: Call Jayna at [phone]    For questions or concerns call: RAMSEY MON-FRI 8 AM- 5PM [phone]. Radiology resident on call [phone].    For immediate concerns that are not emergent, you may call our radiology clinic at: [phone]

## 2024-02-12 NOTE — NURSING TRANSFER
Nursing Transfer Note      2/12/2024   11:33 AM    Nurse giving handoff:Serene LUNA RN  Nurse receiving handoff:Lori VALLE    Reason patient is being transferred: meets criteria    Transfer To: [facility]    Transfer via stretcher    Transfer with none    Transported by RN    Transfer Vital Signs:  Blood Pressure:93/50  Heart Rate:66  O2:92  Temperature:98  Respirations:14    Telemetry: none  Order for Tele Monitor? No    Additional Lines: nephrostomy tube    4eyes on Skin: yes    Medicines sent: none    Any special needs or follow-up needed: discharge    Patient belongings transferred with patient: Yes    Chart send with patient: Yes    Notified: family    Patient reassessed at: 2/12

## 2024-02-12 NOTE — TRANSFER OF CARE
Anesthesia Transfer of Care Note    Patient: Angie Mccarthy    Procedure(s) Performed: * No procedures listed *    Patient location: PACU    Anesthesia Type: general    Transport from OR: Transported from OR on 6-10 L/min O2 by face mask with adequate spontaneous ventilation    Post pain: adequate analgesia    Post vital signs: stable    Level of consciousness: lethargic    Nausea/Vomiting: no nausea/vomiting    Complications: none    Transfer of care protocol was followed      Last vitals: Visit Vitals  BP (!) 106/59   Pulse 89   Temp 36.5 °C (97.7 °F) (Temporal)   Resp 12   LMP 12/06/2007   SpO2 100%   Breastfeeding No

## 2024-02-12 NOTE — PROGRESS NOTES
Pt IV flushed. Pt reports pain relief following administration of medication. R nephrostomy tube CDI

## 2024-02-14 LAB
BACTERIA SPEC AEROBE CULT: ABNORMAL
BACTERIA SPEC AEROBE CULT: ABNORMAL

## 2024-02-14 RX ORDER — TIRZEPATIDE 12.5 MG/.5ML
12.5 INJECTION, SOLUTION SUBCUTANEOUS
Qty: 4 PEN | Refills: 3 | Status: SHIPPED | OUTPATIENT
Start: 2024-02-14 | End: 2024-06-14

## 2024-02-14 NOTE — ANESTHESIA POSTPROCEDURE EVALUATION
Anesthesia Post Evaluation    Patient: Angie Mccarthy    Procedure(s) Performed: * No procedures listed *    Final Anesthesia Type: general      Patient location during evaluation: PACU  Patient participation: Yes- Able to Participate  Level of consciousness: awake  Post-procedure vital signs: reviewed and stable  Pain management: adequate  Airway patency: patent    PONV status at discharge: No PONV  Anesthetic complications: no      Cardiovascular status: blood pressure returned to baseline  Respiratory status: unassisted  Hydration status: euvolemic  Follow-up not needed.              Vitals Value Taken Time   /61 02/12/24 1315   Temp 36.5 °C (97.7 °F) 02/12/24 1315   Pulse 88 02/12/24 1315   Resp 12 02/12/24 1315   SpO2 95 % 02/12/24 1315         Event Time   Out of Recovery 11:32:29         Pain/Venessa Score: No data recorded

## 2024-02-15 ENCOUNTER — OFFICE VISIT (OUTPATIENT)
Dept: INTERNAL MEDICINE | Facility: CLINIC | Age: 59
End: 2024-02-15
Payer: MEDICARE

## 2024-02-15 ENCOUNTER — LAB VISIT (OUTPATIENT)
Dept: LAB | Facility: HOSPITAL | Age: 59
End: 2024-02-15
Attending: ANESTHESIOLOGY
Payer: MEDICARE

## 2024-02-15 VITALS
OXYGEN SATURATION: 95 % | WEIGHT: 211.63 LBS | HEIGHT: 61 IN | HEART RATE: 100 BPM | TEMPERATURE: 99 F | BODY MASS INDEX: 39.95 KG/M2 | DIASTOLIC BLOOD PRESSURE: 74 MMHG | SYSTOLIC BLOOD PRESSURE: 153 MMHG

## 2024-02-15 DIAGNOSIS — E03.9 PRIMARY HYPOTHYROIDISM: ICD-10-CM

## 2024-02-15 DIAGNOSIS — F41.9 ANXIETY: ICD-10-CM

## 2024-02-15 DIAGNOSIS — I10 ESSENTIAL HYPERTENSION: Chronic | ICD-10-CM

## 2024-02-15 DIAGNOSIS — N20.0 STAGHORN CALCULUS: ICD-10-CM

## 2024-02-15 DIAGNOSIS — E21.0 PRIMARY HYPERPARATHYROIDISM: ICD-10-CM

## 2024-02-15 DIAGNOSIS — R00.2 PALPITATIONS: ICD-10-CM

## 2024-02-15 DIAGNOSIS — E66.01 CLASS 2 SEVERE OBESITY WITH SERIOUS COMORBIDITY AND BODY MASS INDEX (BMI) OF 39.0 TO 39.9 IN ADULT, UNSPECIFIED OBESITY TYPE: ICD-10-CM

## 2024-02-15 DIAGNOSIS — K75.81 NASH (NONALCOHOLIC STEATOHEPATITIS): ICD-10-CM

## 2024-02-15 DIAGNOSIS — M51.9 LUMBAR DISC DISEASE: ICD-10-CM

## 2024-02-15 DIAGNOSIS — E78.5 HYPERLIPIDEMIA, UNSPECIFIED HYPERLIPIDEMIA TYPE: Chronic | ICD-10-CM

## 2024-02-15 DIAGNOSIS — Z01.818 PREOPERATIVE EXAMINATION: Primary | ICD-10-CM

## 2024-02-15 DIAGNOSIS — F11.90 CHRONIC, CONTINUOUS USE OF OPIOIDS: ICD-10-CM

## 2024-02-15 DIAGNOSIS — Z01.818 PREOPERATIVE TESTING: ICD-10-CM

## 2024-02-15 DIAGNOSIS — E11.65 TYPE 2 DIABETES MELLITUS WITH HYPERGLYCEMIA, WITHOUT LONG-TERM CURRENT USE OF INSULIN: ICD-10-CM

## 2024-02-15 DIAGNOSIS — R06.83 SNORING: ICD-10-CM

## 2024-02-15 DIAGNOSIS — K21.9 GASTROESOPHAGEAL REFLUX DISEASE, UNSPECIFIED WHETHER ESOPHAGITIS PRESENT: ICD-10-CM

## 2024-02-15 DIAGNOSIS — E04.1 THYROID NODULE: ICD-10-CM

## 2024-02-15 DIAGNOSIS — F32.A DEPRESSION, UNSPECIFIED DEPRESSION TYPE: ICD-10-CM

## 2024-02-15 PROBLEM — E66.812 CLASS 2 SEVERE OBESITY WITH SERIOUS COMORBIDITY AND BODY MASS INDEX (BMI) OF 39.0 TO 39.9 IN ADULT: Status: ACTIVE | Noted: 2017-03-08

## 2024-02-15 LAB
ABO + RH BLD: ABNORMAL
ALBUMIN SERPL BCP-MCNC: 3.7 G/DL (ref 3.5–5.2)
ALP SERPL-CCNC: 89 U/L (ref 55–135)
ALT SERPL W/O P-5'-P-CCNC: 14 U/L (ref 10–44)
ANION GAP SERPL CALC-SCNC: 9 MMOL/L (ref 8–16)
APTT PPP: 24.2 SEC (ref 21–32)
AST SERPL-CCNC: 16 U/L (ref 10–40)
BILIRUB SERPL-MCNC: 0.5 MG/DL (ref 0.1–1)
BLD GP AB SCN CELLS X3 SERPL QL: ABNORMAL
BLOOD GROUP ANTIBODIES SERPL: NORMAL
BUN SERPL-MCNC: 11 MG/DL (ref 6–20)
CALCIUM SERPL-MCNC: 10.1 MG/DL (ref 8.7–10.5)
CHLORIDE SERPL-SCNC: 103 MMOL/L (ref 95–110)
CO2 SERPL-SCNC: 31 MMOL/L (ref 23–29)
CREAT SERPL-MCNC: 0.8 MG/DL (ref 0.5–1.4)
ERYTHROCYTE [DISTWIDTH] IN BLOOD BY AUTOMATED COUNT: 16.1 % (ref 11.5–14.5)
EST. GFR  (NO RACE VARIABLE): >60 ML/MIN/1.73 M^2
GLUCOSE SERPL-MCNC: 99 MG/DL (ref 70–110)
HCT VFR BLD AUTO: 42.3 % (ref 37–48.5)
HGB BLD-MCNC: 12.6 G/DL (ref 12–16)
MCH RBC QN AUTO: 22.9 PG (ref 27–31)
MCHC RBC AUTO-ENTMCNC: 29.8 G/DL (ref 32–36)
MCV RBC AUTO: 77 FL (ref 82–98)
PLATELET # BLD AUTO: 250 K/UL (ref 150–450)
PMV BLD AUTO: 11.5 FL (ref 9.2–12.9)
POTASSIUM SERPL-SCNC: 4.8 MMOL/L (ref 3.5–5.1)
PROT SERPL-MCNC: 7.2 G/DL (ref 6–8.4)
RBC # BLD AUTO: 5.5 M/UL (ref 4–5.4)
SODIUM SERPL-SCNC: 143 MMOL/L (ref 136–145)
SPECIMEN OUTDATE: ABNORMAL
WBC # BLD AUTO: 9.68 K/UL (ref 3.9–12.7)

## 2024-02-15 PROCEDURE — 3077F SYST BP >= 140 MM HG: CPT | Mod: CPTII,S$GLB,, | Performed by: NURSE PRACTITIONER

## 2024-02-15 PROCEDURE — 80053 COMPREHEN METABOLIC PANEL: CPT | Performed by: INTERNAL MEDICINE

## 2024-02-15 PROCEDURE — 85027 COMPLETE CBC AUTOMATED: CPT | Performed by: INTERNAL MEDICINE

## 2024-02-15 PROCEDURE — 85730 THROMBOPLASTIN TIME PARTIAL: CPT | Performed by: ANESTHESIOLOGY

## 2024-02-15 PROCEDURE — 3078F DIAST BP <80 MM HG: CPT | Mod: CPTII,S$GLB,, | Performed by: NURSE PRACTITIONER

## 2024-02-15 PROCEDURE — 99417 PROLNG OP E/M EACH 15 MIN: CPT | Mod: S$GLB,,, | Performed by: NURSE PRACTITIONER

## 2024-02-15 PROCEDURE — 3044F HG A1C LEVEL LT 7.0%: CPT | Mod: CPTII,S$GLB,, | Performed by: NURSE PRACTITIONER

## 2024-02-15 PROCEDURE — 36415 COLL VENOUS BLD VENIPUNCTURE: CPT | Performed by: ANESTHESIOLOGY

## 2024-02-15 PROCEDURE — 99215 OFFICE O/P EST HI 40 MIN: CPT | Mod: S$GLB,,, | Performed by: NURSE PRACTITIONER

## 2024-02-15 PROCEDURE — 1159F MED LIST DOCD IN RCRD: CPT | Mod: CPTII,S$GLB,, | Performed by: NURSE PRACTITIONER

## 2024-02-15 PROCEDURE — 99999 PR PBB SHADOW E&M-EST. PATIENT-LVL V: CPT | Mod: PBBFAC,,, | Performed by: NURSE PRACTITIONER

## 2024-02-15 PROCEDURE — 3008F BODY MASS INDEX DOCD: CPT | Mod: CPTII,S$GLB,, | Performed by: NURSE PRACTITIONER

## 2024-02-15 PROCEDURE — 86870 RBC ANTIBODY IDENTIFICATION: CPT | Performed by: ANESTHESIOLOGY

## 2024-02-15 PROCEDURE — 86901 BLOOD TYPING SEROLOGIC RH(D): CPT | Performed by: ANESTHESIOLOGY

## 2024-02-15 NOTE — ASSESSMENT & PLAN NOTE
Patient would benefit from weight loss and has not set goals to achieve success.   Lifestyle changes should be made by eating healthy, exercising at least 150 minutes weekly, and avoiding sedentary behavior.

## 2024-02-15 NOTE — ASSESSMENT & PLAN NOTE
Recommend weight loss, healthy diet (DASH/Mediterranean) and exercise.   Patient should exercise 30 minutes at least five times weekly once recovered from surgery. Limit alcohol.  Treated with: Crestor

## 2024-02-15 NOTE — ASSESSMENT & PLAN NOTE
Currently being treated with Omeprazole; controlled.   Encouraged to elevate HOB and avoid laying down for 2-3 hours after meals.   Weight loss recommended as well as dietary changes such as reduction or avoidance of fatty foods, caffeine, spicy foods, and chocolate.

## 2024-02-15 NOTE — DISCHARGE INSTRUCTIONS
Your surgery has been scheduled for:_______2/19/24___________________________________    You should report to:  ____Per [facility], located on the [place] side of the first floor of the           [facility] ([phone])  _X___[facility], located on the [facility] side of the            Second floor of the [facility] ([phone])  ____3rd Floor [facility] located on the [facility] side of the [facility] [phone]  ____[facility]: located at [address] [place] [address]. [address]     Please Note   Tell your doctor if you take Aspirin, products containing Aspirin, herbal medications  or blood thinners, such as Coumadin, Ticlid, or Plavix.  (Consult your provider regarding holding or stopping before surgery).  Arrange for someone to drive you home following surgery.  You will not be allowed to leave the surgical facility alone or drive yourself home following sedation and anesthesia.    Before Surgery  Stop taking all herbal medications, vitamins, and supplements 7 days prior to surgery  No Motrin/Advil (Ibuprofen) 7 days before surgery  No Aleve (Naproxen) 7 days before surgery  Stop Taking Asprin, products containing Aspirin __7___days before surgery   No Goody's/BC Powder 7 days before surgery  Refrain from drinking alcoholic beverages for 24 hours before and after surgery  Stop or limit smoking at least 24 hours prior to surgery  You may take Tylenol for pain    Night before Surgery  Do not eat or drink after midnight  Take a shower or bath (shower is recommended).  Bathe with Hibiclens soap or an antibacterial soap from the neck down.  If not supplied by your surgeon, hibiclens soap will need to be purchased over the counter in pharmacy.  Rinse soap off thoroughly.  Shampoo your hair with your regular shampoo    The Day of Surgery  Take another bath or shower with hibiclens  or any antibacterial soap, to reduce the chance of infection.  Take heart and blood pressure medications with a small sip of water, as advised by the perioperative team.  Do not take fluid pills  You may brush your teeth and rinse your mouth, but do not swallow any additional water.   Do not apply perfumes, powder, body lotions or deodorant on the day of surgery.  Nail polish should be removed.  Do not wear makeup or moisturizer  Wear comfortable clothes, such as a button front shirt and loose fitting pants.  Leave all jewelry, including body piercings, and valuables at home.    Bring any devices you will neeed after surgery such as crutches or canes.  If you have sleep apnea, please bring your CPAP machine  In the event that your physical condition changes including the onset of a cold or respiratory illness, or if you have to delay or cancel your surgery, please notify your surgeon.

## 2024-02-15 NOTE — ASSESSMENT & PLAN NOTE
"Denies JENNI. Possible sleep apnea: recommend caution with sedating medication in the perioperative period.   Not interested in Sleep Study at this time: " too much going on".  "

## 2024-02-15 NOTE — PROGRESS NOTES
[facility]  Progress Note    Patient Name: Angie Mccarthy  MRN: [id number]  Date of Evaluation- 02/15/2024  PCP- Rangel Swenson MD    Future cases for Angie Mccarthy [[id number]]       Case ID Status Date Time Vinh Provider Location    [id number] [facility] 2/19/2024 11:00  Suni Patel MD [[id number]] NOM OR 2ND FLR            HPI:  This is a 58 y.o. female  who presents today for a preoperative evaluation in preparation for percutaneous nephrolithotomy and ureteral stent insertion.   Surgery is indicated for staghorn calculus.   Patient is new to me.  The history has been obtained by speaking with the patient and reviewing the electronic medical record and/or outside health information. Significant health conditions for the perioperative period are discussed below in assessment and plan.   Patient reports current health status to be Good.  Denies any new symptoms before surgery.       Subjective/ Objective:     Chief Complaint: Preoperative evaulation, perioperative medical management, and complication reduction plan.     Functional Capacity: can walk dog 2 neighborhood blocks without CP/SOB.       Anesthesia issues: PONV- with longer surgeries    Difficulty mouth opening: No    Steroid use in the last 12 months:  No    Dental Issues: left upper broken tooth    Family anesthesia difficulty: None     Family Hx of Thrombosis: None    Past Medical History:   Diagnosis Date    Allergy     seasonal    Arthritis     Blood transfusion     with back surgeries    Chronic back pain     GERD (gastroesophageal reflux disease)     Hyperlipidemia     Hypothyroidism     Thyroid nodule    Joint pain     Kidney stones     Morbid obesity 12/14/2012    NAFLD (nonalcoholic fatty liver disease)     OCD (obsessive compulsive disorder)     Osteoporosis     PONV (postoperative nausea and vomiting)     Persistent, Motion sickness with boat rides, Scopolamine helped -still had nausea     Restless leg syndrome     Sciatica of right side associated with disorder of lumbosacral spine     Spinal stenosis of lumbar region     Thoracic spondylosis          Past Medical History Pertinent Negatives:   Diagnosis Date Noted    Anticoagulant long-term use 02/20/2013    Clotting disorder 08/02/2016    COPD (chronic obstructive pulmonary disease) 02/20/2013    Deep vein thrombosis 08/02/2016    Diabetes mellitus 02/20/2013    Difficult intubation 02/20/2013    General anesthetics causing adverse effect in therapeutic use 02/20/2013    Hypotension, iatrogenic 02/20/2013    Malignant hyperthermia 02/20/2013    PAF (paroxysmal atrial fibrillation) 05/22/2014    remote history of post-op a-fib    Pulmonary embolism 08/02/2016    Respiratory distress 02/20/2013    Seizures 02/20/2013    Transfusion reaction 02/20/2013         Past Surgical History:   Procedure Laterality Date    APPENDECTOMY      BACK SURGERY      x 6    CHOLECYSTECTOMY      GASTRECTOMY      Lap Gastric Sleeve    HERNIA REPAIR      HYSTERECTOMY      JOINT REPLACEMENT      LIVER BIOPSY  02/06/2017    steatohepatitis with stage 1 fibrosis    MYELOGRAPHY N/A 11/29/2018    Procedure: MYELOGRAM;  Surgeon: Ander Diagnostic Provider;  Location: [facility] OR [address] [place] [address];  Service: Radiology;  Laterality: N/A;    MYELOGRAPHY N/A 1/7/2019    Procedure: Myelogram;  Surgeon: Lissett Surgeon;  Location: [facility];  Service: Anesthesiology;  Laterality: N/A;    OOPHORECTOMY      PERCUTANEOUS CRYOTHERAPY OF PERIPHERAL NERVE USING LIQUID NITROUS OXIDE IN CLOSED NEEDLE DEVICE Left 4/29/2019    Procedure: CRYOTHERAPY, NERVE, PERIPHERAL, PERCUTANEOUS, USING LIQUID NITROUS OXIDE IN CLOSED NEEDLE DEVICE-iovera left knee;  Surgeon: Chavo Gold III, MD;  Location: [facility] CATH LAB;  Service: Pain Management;  Laterality: Left;    PERCUTANEOUS CRYOTHERAPY OF PERIPHERAL NERVE USING LIQUID NITROUS OXIDE IN CLOSED NEEDLE DEVICE Right 8/2/2021    Procedure: CRYOTHERAPY, NERVE,  "PERIPHERAL, PERCUTANEOUS, USING LIQUID NITROUS OXIDE IN CLOSED NEEDLE DEVICE;  Surgeon: Saritha Proctor NP;  Location: [facility];  Service: Pain Management;  Laterality: Right;  RIGHT KNEE IOVERA    SPINAL FUSION      TONSILLECTOMY, ADENOIDECTOMY      TOTAL KNEE ARTHROPLASTY Left 5/1/2019    Procedure: REPLACEMENT-KNEE-TOTAL;  Surgeon: John L. Ochsner Jr., MD;  Location: [address];  Service: Orthopedics;  Laterality: Left;       Review of Systems   Constitutional:  Negative for chills, fatigue, fever and unexpected weight change.   HENT:  Negative for congestion, hearing loss, rhinorrhea, sore throat, tinnitus and trouble swallowing.    Eyes:  Negative for visual disturbance.   Respiratory:  Positive for wheezing (occasional). Negative for cough, chest tightness and shortness of breath.         STOP BANG risk factors:  Snoring  HTN  BMI > 35   Cardiovascular:  Negative for chest pain, palpitations and leg swelling.   Gastrointestinal:  Positive for blood in stool (attributes to opioid use; Colonoscopy pending) and constipation. Negative for diarrhea.   Genitourinary:  Positive for frequency and urgency. Negative for decreased urine volume, difficulty urinating, dysuria and hematuria.   Musculoskeletal:  Positive for back pain and gait problem (uses cane). Negative for arthralgias, neck pain and neck stiffness.   Skin:  Negative for rash and wound.   Allergic/Immunologic: Positive for environmental allergies.   Neurological:  Positive for dizziness (reports vertigo- occasional; positional dizziness) and numbness. Negative for syncope, weakness and headaches.   Hematological:  Does not bruise/bleed easily.   Psychiatric/Behavioral:  Negative for sleep disturbance and suicidal ideas.               VITALS  Visit Vitals  BP (!) 153/74 (BP Location: Left arm, Patient Position: Sitting)   Pulse 100   Temp 98.6 °F (37 °C) (Oral)   Ht 5' 1" (1.549 m)   Wt 96 kg (211 lb 10.3 oz)   LMP 12/06/2007   SpO2 95%   BMI 39.99 " kg/m²          Physical Exam  Vitals reviewed.   Constitutional:       General: She is not in acute distress.     Appearance: She is well-developed. She is obese.   HENT:      Head: Normocephalic.      Nose: Nose normal.      Mouth/Throat:      Pharynx: No oropharyngeal exudate.   Eyes:      General:         Right eye: No discharge.         Left eye: No discharge.      Conjunctiva/sclera: Conjunctivae normal.      Pupils: Pupils are equal, round, and reactive to light.   Neck:      Thyroid: No thyromegaly.      Vascular: No carotid bruit or JVD.      Trachea: No tracheal deviation.   Cardiovascular:      Rate and Rhythm: Normal rate and regular rhythm.      Pulses:           Carotid pulses are 2+ on the right side and 2+ on the left side.       Dorsalis pedis pulses are 2+ on the right side and 2+ on the left side.        Posterior tibial pulses are 2+ on the right side and 2+ on the left side.      Heart sounds: Murmur (soft- heard best to second left intercostal space) heard.      Systolic murmur is present with a grade of 1/6.   Pulmonary:      Effort: Pulmonary effort is normal. No respiratory distress.      Breath sounds: Normal breath sounds. No stridor. No wheezing, rhonchi or rales.   Abdominal:      General: Bowel sounds are normal. There is no distension.      Palpations: Abdomen is soft.      Tenderness: There is abdominal tenderness in the periumbilical area. There is no guarding.      Hernia: A hernia is present. Hernia is present in the umbilical area.   Musculoskeletal:      Cervical back: Normal range of motion.        Back:       Right lower leg: Edema (trace) present.      Left lower leg: No edema.   Lymphadenopathy:      Cervical: No cervical adenopathy.   Skin:     General: Skin is warm and dry.      Capillary Refill: Capillary refill takes less than 2 seconds.      Findings: No erythema or rash.   Neurological:      Mental Status: She is alert and oriented to person, place, and time.    Psychiatric:         Behavior: Behavior normal.          Significant Labs:  Lab Results   Component Value Date    WBC 9.68 02/15/2024    HGB 12.6 02/15/2024    HCT 42.3 02/15/2024     02/15/2024    CHOL 124 09/20/2023    TRIG 136 09/20/2023    HDL 44 09/20/2023    ALT 14 02/15/2024    AST 16 02/15/2024     02/15/2024    K 4.8 02/15/2024     02/15/2024    CREATININE 0.8 02/15/2024    BUN 11 02/15/2024    CO2 31 (H) 02/15/2024    TSH 11.112 (H) 01/25/2024    INR 1.0 02/12/2024    HGBA1C 5.6 01/25/2024       Diagnostic Studies: No relevant studies.    EKG:   Results for orders placed or performed during the hospital encounter of 11/20/23   EKG 12-lead    Collection Time: 11/20/23  3:27 PM    Narrative    Test Reason : Z01.818,    Vent. Rate : 085 BPM     Atrial Rate : 085 BPM     P-R Int : 158 ms          QRS Dur : 088 ms      QT Int : 366 ms       P-R-T Axes : 066 -44 074 degrees     QTc Int : 435 ms    Normal sinus rhythm  Left anterior fascicular block  Right ventricular conduction delay  Abnormal ECG  When compared with ECG of 20-DEC-2019 21:46,  No significant change was found  Confirmed by DEISY COHN MD ([id number]) on 11/20/2023 4:36:28 PM    Referred By: REBEKAH DURAN           Confirmed By:DEISY COHN MD       2D ECHO:  TTE:  Results for orders placed or performed during the hospital encounter of 05/29/23   Echo   Result Value Ref Range    Ascending aorta 3.52 cm    STJ 3.39 cm    AV mean gradient 5 mmHg    Ao peak timothy 1.68 m/s    Ao VTI 30.80 cm    IVRT 77.07 msec    IVS 0.87 0.6 - 1.1 cm    LA size 3.14 cm    Left Atrium Major Axis 4.98 cm    Left Atrium Minor Axis 5.28 cm    LVIDd 4.40 3.5 - 6.0 cm    LVIDs 2.48 2.1 - 4.0 cm    LVOT diameter 2.53 cm    LVOT peak VTI 31.66 cm    Posterior Wall 0.93 0.6 - 1.1 cm    MV Peak A Timothy 0.76 m/s    E wave deceleration time 158.89 msec    MV Peak E Timothy 0.71 m/s    RA Major Axis 4.39 cm    RA Width 3.70 cm    RVDD 3.59 cm    Sinus 3.60 cm     TAPSE 2.57 cm    TR Max Timothy 2.60 m/s    LA WIDTH 3.39 cm    MV stenosis pressure 1/2 time 46.08 ms    LV Diastolic Volume 87.86 mL    LV Systolic Volume 21.87 mL    LVOT peak timothy 1.67 m/s    TDI LATERAL 0.08 m/s    TDI SEPTAL 0.06 m/s    LA volume (mod) 46.14 cm3    LV LATERAL E/E' RATIO 8.88 m/s    LV SEPTAL E/E' RATIO 11.83 m/s    FS 44 %    LA volume 46.38 cm3    LV mass 128.02 g    Left Ventricle Relative Wall Thickness 0.42 cm    AV valve area 5.17 cm2    AV Velocity Ratio 0.99     AV index (prosthetic) 1.03     MV valve area p 1/2 method 4.77 cm2    E/A ratio 0.93     Mean e' 0.07 m/s    LVOT area 5.0 cm2    LVOT stroke volume 159.08 cm3    AV peak gradient 11 mmHg    E/E' ratio 10.14 m/s    Triscuspid Valve Regurgitation Peak Gradient 27 mmHg    BSA 2.01 m2    LV Systolic Volume Index 11.3 mL/m2    LV Diastolic Volume Index 45.52 mL/m2    LA Volume Index 24.0 mL/m2    LV Mass Index 66 g/m2    LA Volume Index (Mod) 23.9 mL/m2    Right Atrial Pressure (from IVC) 3 mmHg    EF 65 %    TV resting pulmonary artery pressure 30 mmHg    Narrative    · Technically challenging study.  · The left ventricle is normal in size with hyperdynamic systolic   function.  · The estimated ejection fraction is 65-70%.  · Normal left ventricular diastolic function.  · The LVOT velocity is elevated, as per text.  · Normal right ventricular size with normal right ventricular systolic   function.  · The estimated PA systolic pressure is 30 mmHg.  · Normal central venous pressure (3 mmHg).          PET Stress 2016  CONCLUSIONS: NORMAL MYOCARDIAL PERFUSION PET STRESS TEST   1. The perfusion scan is free of evidence for myocardial ischemia or injury.   2. Resting wall motion is physiologic. Stress wall motion is physiologic.   3. Visually estimated LV function is normal at rest and normal at stress.   4. The ventricular volumes are normal at rest and stress.   5. The extracardiac distribution of radioactivity is normal.   6. There was no  previous study available to compare.            Active Cardiac Conditions: None      Revised Cardiac Risk Index   High -Risk Surgery  Intraperitoneal; Intrathoracic; suprainguinal vascular Yes- + 1 No- 0   History of Ischemic Heart Disease   (Hx of MI/positive exercise test/current chest pain due to ischemia/use of nitrate therapy/EKG with pathological Q waves) Yes- + 1 No- 0   History of CHF  (Pulmonary edema/bilateral rales or S3 gallop/PND/CXR showing pulmonary vascular redistribution) Yes- + 1 No- 0   History of CVA   (Prior stroke or TIA) Yes- + 1 No- 0   Pre-operative treatment with insulin Yes- + 1 No- 0   Pre-operative creatinine > 2mg/dl Yes- + 1 No- 0   Total: 0      Risk Status:  Estimated risk of cardiac complications after non-cardiac surgery using the Revised Cardiac Risk Index for Preoperative risk is 3.9 %      ARISCAT (Canet) risk index: Intermediate: 13.3% risk of post-op pulmonary complications.    American Society of Anesthesiologists Physical Status classification (ASA): 3           No further cardiac workup needed prior to surgery.                   Assessment/Plan:     Staghorn calculus  Scheduled with Dr. Patel on 2/19/24 for PCNL.    Lumbar disc disease  Reports scoliosis   No loss of bladder or bowel control    Denies N/T to buttocks, perineum and inner surfaces of the thighs (saddle anesthesia)      Followed per Pain Management: Dr. Call- [facility]     Recent imaging: CT Myelography T-L Spine 2019  IMPRESSION:    1. Stable thoracolumbar scoliosis status post posterior instrumented fusion from T6 to the iliac wings. Stable fracture of the left vertical niall below the left L4 pedicle screw. No additional hardware fracture.    2. 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    3. Stable remote compression deformity of the L2 vertebral body.      Anxiety  Treated with: alprazolam  prn  ; controlled symptoms.   Denies suicidal/ homicidal ideations.        Depression  Treated with: Cymbalta; controlled.   Followed per PCP.      Chronic, continuous use of opioids  Patient may have opioid tolerance due to chronic continuous opioid use. I recommend monitoring for opioid tolerance during the postoperative period and plan pain control that will assist the patient in the hospital as well as through the discharge process.   Usually takes Oxycodone- there is a shortage; now taking hydrocodone for chronic back pain.   Managed per outside pain management    Essential hypertension  Current BP  not at goal today- not feeling well with diarrhea and recent nephrostomy tube placement.   Taking: lisinopril  Patient reports home BP readings of: 113-120/70s-80s      Lifestyle changes to reduce systolic BP:   exercise 30 minutes per day,  5 days per week or 150 minutes weekly; sodium reduction and avoidance of high salt foods such as processed meats, frozen meals and  fast foods.   Keeping a healthy weight/BMI can help with better BP control    BP acceptable for surgery. I recommend monitoring BP during perioperative period as uncontrolled pain can elevate blood pressure.         Hyperlipidemia  Recommend weight loss, healthy diet (DASH/Mediterranean) and exercise.   Patient should exercise 30 minutes at least five times weekly once recovered from surgery. Limit alcohol.  Treated with: Crestor    Thyroid nodule  Compressive symptoms: No  Biopsy in 2015 benign    US Head and Neck 10/31/23  Impression:     Stable right thyroid nodule, previously biopsied with benign pathology, and stable left thyroid TI-RADS 3 nodule.  These are stable in size since 2017.  Further imaging surveillance as clinically warranted.     No other nodules that meet criteria for imaging follow-up or FNA.       Primary hypothyroidism  Currently treated with Levothyroxine 137 mcg  TSH:  elevated but T4 is normal  Followed by Endocrinology    Type 2 diabetes mellitus with hyperglycemia  Currently takes:   "metformin/Mounjaro   Most recent A1c is 5.6.      Reports peripheral neuropathy- bilateral hand.   Denies visual changes. Up to date with eye exams.  Micro changes: Denies- Retinopathy, Nephropathy   Macro changes: Denies-  Carotid, Coronary , Peripheral disease       Primary hyperparathyroidism  Labs:   Phos: normal 1/2024  PTH: 121.0  Ca+: 10.1  Vitamin D- normal 10/2023  History of stones  Appt. pending with Dr. Raya with Endocrinology 2/29/24      GERD (gastroesophageal reflux disease)  Currently being treated with Omeprazole; controlled.   Encouraged to elevate HOB and avoid laying down for 2-3 hours after meals.   Weight loss recommended as well as dietary changes such as reduction or avoidance of fatty foods, caffeine, spicy foods, and chocolate.        FERRELL (nonalcoholic steatohepatitis)  Does not have significant coagulopathy.    Recent INR:  1.0  Platelet count: 250,000  LFTs normal  Hepatology follow-up in May 2024    No evidence of hepatic decompensation     Fibroscan 6/9/21  Findings  Median liver stiffness score:  10.9  CAP Reading: dB/m:  359     IQR/med %:  20  Interpretation  Fibrosis interpretation is based on medial liver stiffness - Kilopascal (kPa).     Fibrosis Stage:  F3  Steatosis interpretation is based on controlled attenuation parameter - (dB/m).     Steatosis Grade:  S3    Snoring  Denies JENNI. Possible sleep apnea: recommend caution with sedating medication in the perioperative period.   Not interested in Sleep Study at this time: " too much going on".    Class 2 severe obesity with serious comorbidity and body mass index (BMI) of 39.0 to 39.9 in adult  Patient would benefit from weight loss and has not set goals to achieve success.   Lifestyle changes should be made by eating healthy, exercising at least 150 minutes weekly, and avoiding sedentary behavior.       Discussion/Management of Perioperative Care    Thromboembolic prophylaxis (VTE) Care: Risk factors for thrombosis include: " obesity and surgical procedure.  I recommend prophylaxis of thromboembolism with the use of compression stockings/pneumatic devices, and/or pharmacologic agents. The benefits should outweigh the risks for pharmacologic prophylaxis in the perioperative period. I also encourage early ambulation if not contraindicated during the post-operative period.    Risk factors for post-operative pulmonary complications include:diabetes mellitus, HTN, and surgery > 3 hours. To reduce the risk of pulmonary complications, prophylactic recommendations include: incentive spirometry use/deep breathing, early ambulation, and pain control.    I recommend monitoring sodium during the perioperative period. Pt. is currently on an SNRI which can be associated with SIADH. Surgical procedures are associated with hypersecretion of ADH as well.    Risk factors for renal complications include: diabetes mellitus and HTN. To reduce the risk of postoperative renal complications, I recommend the patient maintain adequate fluid volume status by drinking 2 liters of water daily.  Avoid/reduce NSAIDS and SMALLS-2 inhibitors use as well as IV contrast for renal protection.    I recommend the use of appropriate prophylactic antibiotics to reduce the risk of surgical site infections.    Delirium risk factors include opioid use. I recommend to avoid/reduce use of benzodiazepine use (not for patients who take on a regular basis), anticholinergics, Benadryl,  and agents that may cause postoperative serotonin syndrome.  Controlled pain can decrease the risk for postop delirium and since opioids are used for postoperative pain control, I suggest using the lowest dose for the shortest amount of time necessary for pain management.     The patient is at an increased risk for urinary retention due to : urological procedure. I recommend to avoid/decrease the use of benzodiazepines, anticholinergics, and Benadryl in the perioperative period. I also recommend using  opioids for the shortest period of time if possible.          This visit was focused on Preoperative evaluation, Perioperative Medical management, complication reduction plans. I suggest that the patient follows up with primary care or relevant sub specialists for ongoing health care.    I appreciate the opportunity to be involved in this patients care. Please feel free to contact me if there were any questions about this consultation.        I spent a total of 106 minutes on the day of the visit.This includes face to face time and non-face to face time preparing to see the patient (e.g., review of tests), obtaining and/or reviewing separately obtained history, documenting clinical information in the electronic or other health record, independently interpreting results and communicating results to the patient/family/caregiver, or care coordinator.       Patient is optimized for surgery.  Elevated bicarbonate likely due to ace inhibitor use and snoring.      Sal Rivero NP  Perioperative Medicine  [facility]

## 2024-02-15 NOTE — ASSESSMENT & PLAN NOTE
Compressive symptoms: No  Biopsy in 2015 benign    US Head and Neck 10/31/23  Impression:     Stable right thyroid nodule, previously biopsied with benign pathology, and stable left thyroid TI-RADS 3 nodule.  These are stable in size since 2017.  Further imaging surveillance as clinically warranted.     No other nodules that meet criteria for imaging follow-up or FNA.

## 2024-02-15 NOTE — ASSESSMENT & PLAN NOTE
Currently treated with Levothyroxine 137 mcg  TSH:  elevated but T4 is normal  Followed by Endocrinology

## 2024-02-15 NOTE — ASSESSMENT & PLAN NOTE
Does not have significant coagulopathy.    Recent INR:  1.0  Platelet count: 250,000  LFTs normal  Hepatology follow-up in May 2024    No evidence of hepatic decompensation     Fibroscan 6/9/21  Findings  Median liver stiffness score:  10.9  CAP Reading: dB/m:  359     IQR/med %:  20  Interpretation  Fibrosis interpretation is based on medial liver stiffness - Kilopascal (kPa).     Fibrosis Stage:  F3  Steatosis interpretation is based on controlled attenuation parameter - (dB/m).     Steatosis Grade:  S3

## 2024-02-15 NOTE — ASSESSMENT & PLAN NOTE
Currently takes:  metformin/Mounjaro   Most recent A1c is 5.6.      Reports peripheral neuropathy- bilateral hand.   Denies visual changes. Up to date with eye exams.  Micro changes: Denies- Retinopathy, Nephropathy   Macro changes: Denies-  Carotid, Coronary , Peripheral disease

## 2024-02-15 NOTE — ASSESSMENT & PLAN NOTE
Labs:   Phos: normal 1/2024  PTH: 121.0  Ca+: 10.1  Vitamin D- normal 10/2023  History of stones  Appt. pending with Dr. Raya with Endocrinology 2/29/24

## 2024-02-15 NOTE — ASSESSMENT & PLAN NOTE
Patient may have opioid tolerance due to chronic continuous opioid use. I recommend monitoring for opioid tolerance during the postoperative period and plan pain control that will assist the patient in the hospital as well as through the discharge process.   Usually takes Oxycodone- there is a shortage; now taking hydrocodone for chronic back pain.   Managed per outside pain management

## 2024-02-15 NOTE — ASSESSMENT & PLAN NOTE
Reports scoliosis   No loss of bladder or bowel control    Denies N/T to buttocks, perineum and inner surfaces of the thighs (saddle anesthesia)      Followed per Pain Management: Dr. Call- [facility]     Recent imaging: CT Myelography T-L Spine 2019  IMPRESSION:    1. Stable thoracolumbar scoliosis status post posterior instrumented fusion from T6 to the iliac wings. Stable fracture of the left vertical niall below the left L4 pedicle screw. No additional hardware fracture.    2. 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    3. Stable remote compression deformity of the L2 vertebral body.

## 2024-02-15 NOTE — HPI
This is a 58 y.o. female  who presents today for a preoperative evaluation in preparation for percutaneous nephrolithotomy and ureteral stent insertion.   Surgery is indicated for staghorn calculus.   Patient is new to me.  The history has been obtained by speaking with the patient and reviewing the electronic medical record and/or outside health information. Significant health conditions for the perioperative period are discussed below in assessment and plan.   Patient reports current health status to be Good.  Denies any new symptoms before surgery.

## 2024-02-15 NOTE — OUTPATIENT SUBJECTIVE & OBJECTIVE
Outpatient Subjective & Objective      Chief Complaint: Preoperative evaulation, perioperative medical management, and complication reduction plan.     Functional Capacity: can walk dog 2 neighborhood blocks without CP/SOB.       Anesthesia issues: PONV- with longer surgeries    Difficulty mouth opening: No    Steroid use in the last 12 months:  No    Dental Issues: left upper broken tooth    Family anesthesia difficulty: None     Family Hx of Thrombosis: None    Past Medical History:   Diagnosis Date    Allergy     seasonal    Arthritis     Blood transfusion     with back surgeries    Chronic back pain     GERD (gastroesophageal reflux disease)     Hyperlipidemia     Hypothyroidism     Thyroid nodule    Joint pain     Kidney stones     Morbid obesity 12/14/2012    NAFLD (nonalcoholic fatty liver disease)     OCD (obsessive compulsive disorder)     Osteoporosis     PONV (postoperative nausea and vomiting)     Persistent, Motion sickness with boat rides, Scopolamine helped -still had nausea    Restless leg syndrome     Sciatica of right side associated with disorder of lumbosacral spine     Spinal stenosis of lumbar region     Thoracic spondylosis          Past Medical History Pertinent Negatives:   Diagnosis Date Noted    Anticoagulant long-term use 02/20/2013    Clotting disorder 08/02/2016    COPD (chronic obstructive pulmonary disease) 02/20/2013    Deep vein thrombosis 08/02/2016    Diabetes mellitus 02/20/2013    Difficult intubation 02/20/2013    General anesthetics causing adverse effect in therapeutic use 02/20/2013    Hypotension, iatrogenic 02/20/2013    Malignant hyperthermia 02/20/2013    PAF (paroxysmal atrial fibrillation) 05/22/2014    remote history of post-op a-fib    Pulmonary embolism 08/02/2016    Respiratory distress 02/20/2013    Seizures 02/20/2013    Transfusion reaction 02/20/2013         Past Surgical History:   Procedure Laterality Date    APPENDECTOMY      BACK SURGERY      x 6     CHOLECYSTECTOMY      GASTRECTOMY      Lap Gastric Sleeve    HERNIA REPAIR      HYSTERECTOMY      JOINT REPLACEMENT      LIVER BIOPSY  02/06/2017    steatohepatitis with stage 1 fibrosis    MYELOGRAPHY N/A 11/29/2018    Procedure: MYELOGRAM;  Surgeon: Ander Diagnostic Provider;  Location: [address];  Service: Radiology;  Laterality: N/A;    MYELOGRAPHY N/A 1/7/2019    Procedure: Myelogram;  Surgeon: Lissett Surgeon;  Location: [facility];  Service: Anesthesiology;  Laterality: N/A;    OOPHORECTOMY      PERCUTANEOUS CRYOTHERAPY OF PERIPHERAL NERVE USING LIQUID NITROUS OXIDE IN CLOSED NEEDLE DEVICE Left 4/29/2019    Procedure: CRYOTHERAPY, NERVE, PERIPHERAL, PERCUTANEOUS, USING LIQUID NITROUS OXIDE IN CLOSED NEEDLE DEVICE-iovera left knee;  Surgeon: Chavo Gold III, MD;  Location: [facility] CATH LAB;  Service: Pain Management;  Laterality: Left;    PERCUTANEOUS CRYOTHERAPY OF PERIPHERAL NERVE USING LIQUID NITROUS OXIDE IN CLOSED NEEDLE DEVICE Right 8/2/2021    Procedure: CRYOTHERAPY, NERVE, PERIPHERAL, PERCUTANEOUS, USING LIQUID NITROUS OXIDE IN CLOSED NEEDLE DEVICE;  Surgeon: Saritha Proctor NP;  Location: [facility];  Service: Pain Management;  Laterality: Right;  RIGHT KNEE IOVERA    SPINAL FUSION      TONSILLECTOMY, ADENOIDECTOMY      TOTAL KNEE ARTHROPLASTY Left 5/1/2019    Procedure: REPLACEMENT-KNEE-TOTAL;  Surgeon: John L. Ochsner Jr., MD;  Location: [address];  Service: Orthopedics;  Laterality: Left;       Review of Systems   Constitutional:  Negative for chills, fatigue, fever and unexpected weight change.   HENT:  Negative for congestion, hearing loss, rhinorrhea, sore throat, tinnitus and trouble swallowing.    Eyes:  Negative for visual disturbance.   Respiratory:  Positive for wheezing (occasional). Negative for cough, chest tightness and shortness of breath.         STOP BANG risk factors:  Snoring  HTN  BMI > 35   Cardiovascular:  Negative for chest pain, palpitations and leg swelling.  "  Gastrointestinal:  Positive for blood in stool (attributes to opioid use; Colonoscopy pending) and constipation. Negative for diarrhea.   Genitourinary:  Positive for frequency and urgency. Negative for decreased urine volume, difficulty urinating, dysuria and hematuria.   Musculoskeletal:  Positive for back pain and gait problem (uses cane). Negative for arthralgias, neck pain and neck stiffness.   Skin:  Negative for rash and wound.   Allergic/Immunologic: Positive for environmental allergies.   Neurological:  Positive for dizziness (reports vertigo- occasional; positional dizziness) and numbness. Negative for syncope, weakness and headaches.   Hematological:  Does not bruise/bleed easily.   Psychiatric/Behavioral:  Negative for sleep disturbance and suicidal ideas.               VITALS  Visit Vitals  BP (!) 153/74 (BP Location: Left arm, Patient Position: Sitting)   Pulse 100   Temp 98.6 °F (37 °C) (Oral)   Ht 5' 1" (1.549 m)   Wt 96 kg (211 lb 10.3 oz)   LMP 12/06/2007   SpO2 95%   BMI 39.99 kg/m²          Physical Exam  Vitals reviewed.   Constitutional:       General: She is not in acute distress.     Appearance: She is well-developed. She is obese.   HENT:      Head: Normocephalic.      Nose: Nose normal.      Mouth/Throat:      Pharynx: No oropharyngeal exudate.   Eyes:      General:         Right eye: No discharge.         Left eye: No discharge.      Conjunctiva/sclera: Conjunctivae normal.      Pupils: Pupils are equal, round, and reactive to light.   Neck:      Thyroid: No thyromegaly.      Vascular: No carotid bruit or JVD.      Trachea: No tracheal deviation.   Cardiovascular:      Rate and Rhythm: Normal rate and regular rhythm.      Pulses:           Carotid pulses are 2+ on the right side and 2+ on the left side.       Dorsalis pedis pulses are 2+ on the right side and 2+ on the left side.        Posterior tibial pulses are 2+ on the right side and 2+ on the left side.      Heart sounds: Murmur " (soft- heard best to second left intercostal space) heard.      Systolic murmur is present with a grade of 1/6.   Pulmonary:      Effort: Pulmonary effort is normal. No respiratory distress.      Breath sounds: Normal breath sounds. No stridor. No wheezing, rhonchi or rales.   Abdominal:      General: Bowel sounds are normal. There is no distension.      Palpations: Abdomen is soft.      Tenderness: There is abdominal tenderness in the periumbilical area. There is no guarding.      Hernia: A hernia is present. Hernia is present in the umbilical area.   Musculoskeletal:      Cervical back: Normal range of motion.        Back:       Right lower leg: Edema (trace) present.      Left lower leg: No edema.   Lymphadenopathy:      Cervical: No cervical adenopathy.   Skin:     General: Skin is warm and dry.      Capillary Refill: Capillary refill takes less than 2 seconds.      Findings: No erythema or rash.   Neurological:      Mental Status: She is alert and oriented to person, place, and time.   Psychiatric:         Behavior: Behavior normal.          Significant Labs:  Lab Results   Component Value Date    WBC 9.68 02/15/2024    HGB 12.6 02/15/2024    HCT 42.3 02/15/2024     02/15/2024    CHOL 124 09/20/2023    TRIG 136 09/20/2023    HDL 44 09/20/2023    ALT 14 02/15/2024    AST 16 02/15/2024     02/15/2024    K 4.8 02/15/2024     02/15/2024    CREATININE 0.8 02/15/2024    BUN 11 02/15/2024    CO2 31 (H) 02/15/2024    TSH 11.112 (H) 01/25/2024    INR 1.0 02/12/2024    HGBA1C 5.6 01/25/2024       Diagnostic Studies: No relevant studies.    EKG:   Results for orders placed or performed during the hospital encounter of 11/20/23   EKG 12-lead    Collection Time: 11/20/23  3:27 PM    Narrative    Test Reason : Z01.818,    Vent. Rate : 085 BPM     Atrial Rate : 085 BPM     P-R Int : 158 ms          QRS Dur : 088 ms      QT Int : 366 ms       P-R-T Axes : 066 -44 074 degrees     QTc Int : 435 ms    Normal  sinus rhythm  Left anterior fascicular block  Right ventricular conduction delay  Abnormal ECG  When compared with ECG of 20-DEC-2019 21:46,  No significant change was found  Confirmed by DEISY COHN MD ([id number]) on 11/20/2023 4:36:28 PM    Referred By: REBEKAH DURAN           Confirmed By:DEISY COHN MD       2D ECHO:  TTE:  Results for orders placed or performed during the hospital encounter of 05/29/23   Echo   Result Value Ref Range    Ascending aorta 3.52 cm    STJ 3.39 cm    AV mean gradient 5 mmHg    Ao peak timothy 1.68 m/s    Ao VTI 30.80 cm    IVRT 77.07 msec    IVS 0.87 0.6 - 1.1 cm    LA size 3.14 cm    Left Atrium Major Axis 4.98 cm    Left Atrium Minor Axis 5.28 cm    LVIDd 4.40 3.5 - 6.0 cm    LVIDs 2.48 2.1 - 4.0 cm    LVOT diameter 2.53 cm    LVOT peak VTI 31.66 cm    Posterior Wall 0.93 0.6 - 1.1 cm    MV Peak A Timothy 0.76 m/s    E wave deceleration time 158.89 msec    MV Peak E Timothy 0.71 m/s    RA Major Axis 4.39 cm    RA Width 3.70 cm    RVDD 3.59 cm    Sinus 3.60 cm    TAPSE 2.57 cm    TR Max Timothy 2.60 m/s    LA WIDTH 3.39 cm    MV stenosis pressure 1/2 time 46.08 ms    LV Diastolic Volume 87.86 mL    LV Systolic Volume 21.87 mL    LVOT peak timothy 1.67 m/s    TDI LATERAL 0.08 m/s    TDI SEPTAL 0.06 m/s    LA volume (mod) 46.14 cm3    LV LATERAL E/E' RATIO 8.88 m/s    LV SEPTAL E/E' RATIO 11.83 m/s    FS 44 %    LA volume 46.38 cm3    LV mass 128.02 g    Left Ventricle Relative Wall Thickness 0.42 cm    AV valve area 5.17 cm2    AV Velocity Ratio 0.99     AV index (prosthetic) 1.03     MV valve area p 1/2 method 4.77 cm2    E/A ratio 0.93     Mean e' 0.07 m/s    LVOT area 5.0 cm2    LVOT stroke volume 159.08 cm3    AV peak gradient 11 mmHg    E/E' ratio 10.14 m/s    Triscuspid Valve Regurgitation Peak Gradient 27 mmHg    BSA 2.01 m2    LV Systolic Volume Index 11.3 mL/m2    LV Diastolic Volume Index 45.52 mL/m2    LA Volume Index 24.0 mL/m2    LV Mass Index 66 g/m2    LA Volume Index (Mod) 23.9 mL/m2     Right Atrial Pressure (from IVC) 3 mmHg    EF 65 %    TV resting pulmonary artery pressure 30 mmHg    Narrative    · Technically challenging study.  · The left ventricle is normal in size with hyperdynamic systolic   function.  · The estimated ejection fraction is 65-70%.  · Normal left ventricular diastolic function.  · The LVOT velocity is elevated, as per text.  · Normal right ventricular size with normal right ventricular systolic   function.  · The estimated PA systolic pressure is 30 mmHg.  · Normal central venous pressure (3 mmHg).          PET Stress 2016  CONCLUSIONS: NORMAL MYOCARDIAL PERFUSION PET STRESS TEST   1. The perfusion scan is free of evidence for myocardial ischemia or injury.   2. Resting wall motion is physiologic. Stress wall motion is physiologic.   3. Visually estimated LV function is normal at rest and normal at stress.   4. The ventricular volumes are normal at rest and stress.   5. The extracardiac distribution of radioactivity is normal.   6. There was no previous study available to compare.            Active Cardiac Conditions: None      Revised Cardiac Risk Index   High -Risk Surgery  Intraperitoneal; Intrathoracic; suprainguinal vascular Yes- + 1 No- 0   History of Ischemic Heart Disease   (Hx of MI/positive exercise test/current chest pain due to ischemia/use of nitrate therapy/EKG with pathological Q waves) Yes- + 1 No- 0   History of CHF  (Pulmonary edema/bilateral rales or S3 gallop/PND/CXR showing pulmonary vascular redistribution) Yes- + 1 No- 0   History of CVA   (Prior stroke or TIA) Yes- + 1 No- 0   Pre-operative treatment with insulin Yes- + 1 No- 0   Pre-operative creatinine > 2mg/dl Yes- + 1 No- 0   Total: 0      Risk Status:  Estimated risk of cardiac complications after non-cardiac surgery using the Revised Cardiac Risk Index for Preoperative risk is 3.9 %      ARISCAT (Canet) risk index: Intermediate: 13.3% risk of post-op pulmonary complications.    American  Society of Anesthesiologists Physical Status classification (ASA): 3           No further cardiac workup needed prior to surgery.    Outpatient Subjective & Objective

## 2024-02-15 NOTE — ASSESSMENT & PLAN NOTE
Current BP  not at goal today- not feeling well with diarrhea and recent nephrostomy tube placement.   Taking: lisinopril  Patient reports home BP readings of: 113-120/70s-80s      Lifestyle changes to reduce systolic BP:   exercise 30 minutes per day,  5 days per week or 150 minutes weekly; sodium reduction and avoidance of high salt foods such as processed meats, frozen meals and  fast foods.   Keeping a healthy weight/BMI can help with better BP control    BP acceptable for surgery. I recommend monitoring BP during perioperative period as uncontrolled pain can elevate blood pressure.

## 2024-02-16 ENCOUNTER — TELEPHONE (OUTPATIENT)
Dept: UROLOGY | Facility: CLINIC | Age: 59
End: 2024-02-16
Payer: MEDICARE

## 2024-02-16 LAB
BACTERIA SPEC ANAEROBE CULT: ABNORMAL
PATHOLOGIST INTERPRETATION AB/XM: NORMAL

## 2024-02-16 NOTE — TELEPHONE ENCOUNTER
Called pt to confirm arrival time of 9am for procedure on 2/19/24. Gave pt NPO instructions and gave pt opportunity to ask questions. Pt verbalized understanding.

## 2024-02-17 ENCOUNTER — HOSPITAL ENCOUNTER (OUTPATIENT)
Dept: RADIOLOGY | Facility: HOSPITAL | Age: 59
Discharge: HOME | DRG: 660 | End: 2024-02-17
Attending: UROLOGY
Payer: MEDICARE

## 2024-02-17 DIAGNOSIS — R10.9 ABDOMINAL PAIN, UNSPECIFIED ABDOMINAL LOCATION: ICD-10-CM

## 2024-02-17 PROCEDURE — 74176 CT ABD & PELVIS W/O CONTRAST: CPT | Mod: TC

## 2024-02-17 PROCEDURE — 74176 CT ABD & PELVIS W/O CONTRAST: CPT | Mod: 26,,, | Performed by: INTERNAL MEDICINE

## 2024-02-19 ENCOUNTER — HOSPITAL ENCOUNTER (INPATIENT)
Facility: HOSPITAL | Age: 59
LOS: 1 days | Discharge: HOME | DRG: 660 | End: 2024-02-20
Attending: UROLOGY | Admitting: UROLOGY
Payer: MEDICARE

## 2024-02-19 ENCOUNTER — ANESTHESIA (OUTPATIENT)
Dept: SURGERY | Facility: HOSPITAL | Age: 59
DRG: 660 | End: 2024-02-19
Payer: MEDICARE

## 2024-02-19 ENCOUNTER — ANESTHESIA EVENT (OUTPATIENT)
Dept: SURGERY | Facility: HOSPITAL | Age: 59
DRG: 660 | End: 2024-02-19
Payer: MEDICARE

## 2024-02-19 DIAGNOSIS — K75.81 NASH (NONALCOHOLIC STEATOHEPATITIS): ICD-10-CM

## 2024-02-19 DIAGNOSIS — Z01.818 PREOPERATIVE TESTING: Primary | ICD-10-CM

## 2024-02-19 DIAGNOSIS — N20.0 STAGHORN CALCULUS: ICD-10-CM

## 2024-02-19 LAB
BASOPHILS # BLD AUTO: 0.03 K/UL (ref 0–0.2)
BASOPHILS NFR BLD: 0.4 % (ref 0–1.9)
DIFFERENTIAL METHOD BLD: ABNORMAL
EOSINOPHIL # BLD AUTO: 0.1 K/UL (ref 0–0.5)
EOSINOPHIL NFR BLD: 0.6 % (ref 0–8)
ERYTHROCYTE [DISTWIDTH] IN BLOOD BY AUTOMATED COUNT: 16.2 % (ref 11.5–14.5)
GRAM STN SPEC: NORMAL
GRAM STN SPEC: NORMAL
HCT VFR BLD AUTO: 37.9 % (ref 37–48.5)
HGB BLD-MCNC: 11.5 G/DL (ref 12–16)
IMM GRANULOCYTES # BLD AUTO: 0.08 K/UL (ref 0–0.04)
IMM GRANULOCYTES NFR BLD AUTO: 1 % (ref 0–0.5)
LYMPHOCYTES # BLD AUTO: 1.2 K/UL (ref 1–4.8)
LYMPHOCYTES NFR BLD: 15.6 % (ref 18–48)
MCH RBC QN AUTO: 23 PG (ref 27–31)
MCHC RBC AUTO-ENTMCNC: 30.3 G/DL (ref 32–36)
MCV RBC AUTO: 76 FL (ref 82–98)
MONOCYTES # BLD AUTO: 0.2 K/UL (ref 0.3–1)
MONOCYTES NFR BLD: 2.9 % (ref 4–15)
NEUTROPHILS # BLD AUTO: 6.3 K/UL (ref 1.8–7.7)
NEUTROPHILS NFR BLD: 79.5 % (ref 38–73)
NRBC BLD-RTO: 0 /100 WBC
PLATELET # BLD AUTO: 210 K/UL (ref 150–450)
PMV BLD AUTO: 10.7 FL (ref 9.2–12.9)
POCT GLUCOSE: 103 MG/DL (ref 70–110)
POCT GLUCOSE: 131 MG/DL (ref 70–110)
POCT GLUCOSE: 144 MG/DL (ref 70–110)
RBC # BLD AUTO: 4.99 M/UL (ref 4–5.4)
WBC # BLD AUTO: 7.97 K/UL (ref 3.9–12.7)

## 2024-02-19 PROCEDURE — 87205 SMEAR GRAM STAIN: CPT | Performed by: UROLOGY

## 2024-02-19 PROCEDURE — 50081 PERQ NL/PL LITHOTRP CPLX>2CM: CPT | Mod: 22,RT,, | Performed by: UROLOGY

## 2024-02-19 PROCEDURE — 25000003 PHARM REV CODE 250: Performed by: STUDENT IN AN ORGANIZED HEALTH CARE EDUCATION/TRAINING PROGRAM

## 2024-02-19 PROCEDURE — C1769 GUIDE WIRE: HCPCS | Performed by: UROLOGY

## 2024-02-19 PROCEDURE — 0T9030Z DRAINAGE OF RIGHT KIDNEY WITH DRAINAGE DEVICE, PERCUTANEOUS APPROACH: ICD-10-PCS | Performed by: UROLOGY

## 2024-02-19 PROCEDURE — D9220A PRA ANESTHESIA: Mod: ANES,,, | Performed by: ANESTHESIOLOGY

## 2024-02-19 PROCEDURE — 36000708 HC OR TIME LEV III 1ST 15 MIN: Performed by: UROLOGY

## 2024-02-19 PROCEDURE — C1729 CATH, DRAINAGE: HCPCS | Performed by: UROLOGY

## 2024-02-19 PROCEDURE — 82962 GLUCOSE BLOOD TEST: CPT | Performed by: UROLOGY

## 2024-02-19 PROCEDURE — 37000008 HC ANESTHESIA 1ST 15 MINUTES: Performed by: UROLOGY

## 2024-02-19 PROCEDURE — 71000016 HC POSTOP RECOV ADDL HR: Performed by: UROLOGY

## 2024-02-19 PROCEDURE — 63600175 PHARM REV CODE 636 W HCPCS: Performed by: NURSE ANESTHETIST, CERTIFIED REGISTERED

## 2024-02-19 PROCEDURE — 71000033 HC RECOVERY, INTIAL HOUR: Performed by: UROLOGY

## 2024-02-19 PROCEDURE — 11000001 HC ACUTE MED/SURG PRIVATE ROOM

## 2024-02-19 PROCEDURE — 27201423 OPTIME MED/SURG SUP & DEVICES STERILE SUPPLY: Performed by: UROLOGY

## 2024-02-19 PROCEDURE — 85025 COMPLETE CBC W/AUTO DIFF WBC: CPT | Performed by: STUDENT IN AN ORGANIZED HEALTH CARE EDUCATION/TRAINING PROGRAM

## 2024-02-19 PROCEDURE — 82365 CALCULUS SPECTROSCOPY: CPT | Performed by: UROLOGY

## 2024-02-19 PROCEDURE — 94761 N-INVAS EAR/PLS OXIMETRY MLT: CPT

## 2024-02-19 PROCEDURE — 71000015 HC POSTOP RECOV 1ST HR: Performed by: UROLOGY

## 2024-02-19 PROCEDURE — 63600175 PHARM REV CODE 636 W HCPCS: Performed by: STUDENT IN AN ORGANIZED HEALTH CARE EDUCATION/TRAINING PROGRAM

## 2024-02-19 PROCEDURE — 36000709 HC OR TIME LEV III EA ADD 15 MIN: Performed by: UROLOGY

## 2024-02-19 PROCEDURE — D9220A PRA ANESTHESIA: Mod: CRNA,,, | Performed by: STUDENT IN AN ORGANIZED HEALTH CARE EDUCATION/TRAINING PROGRAM

## 2024-02-19 PROCEDURE — 87075 CULTR BACTERIA EXCEPT BLOOD: CPT | Performed by: UROLOGY

## 2024-02-19 PROCEDURE — 63600175 PHARM REV CODE 636 W HCPCS

## 2024-02-19 PROCEDURE — 37000009 HC ANESTHESIA EA ADD 15 MINS: Performed by: UROLOGY

## 2024-02-19 PROCEDURE — 25000003 PHARM REV CODE 250: Performed by: ANESTHESIOLOGY

## 2024-02-19 PROCEDURE — 25000003 PHARM REV CODE 250: Performed by: NURSE ANESTHETIST, CERTIFIED REGISTERED

## 2024-02-19 PROCEDURE — 87206 SMEAR FLUORESCENT/ACID STAI: CPT | Performed by: UROLOGY

## 2024-02-19 PROCEDURE — C1758 CATHETER, URETERAL: HCPCS | Performed by: UROLOGY

## 2024-02-19 PROCEDURE — 0TC33ZZ EXTIRPATION OF MATTER FROM RIGHT KIDNEY PELVIS, PERCUTANEOUS APPROACH: ICD-10-PCS | Performed by: UROLOGY

## 2024-02-19 PROCEDURE — 63600175 PHARM REV CODE 636 W HCPCS: Performed by: ANESTHESIOLOGY

## 2024-02-19 PROCEDURE — 87102 FUNGUS ISOLATION CULTURE: CPT | Performed by: UROLOGY

## 2024-02-19 PROCEDURE — C1726 CATH, BAL DIL, NON-VASCULAR: HCPCS | Performed by: UROLOGY

## 2024-02-19 PROCEDURE — 25500020 PHARM REV CODE 255: Performed by: UROLOGY

## 2024-02-19 PROCEDURE — 87116 MYCOBACTERIA CULTURE: CPT | Performed by: UROLOGY

## 2024-02-19 RX ORDER — PROMETHAZINE HYDROCHLORIDE 12.5 MG/1
25 TABLET ORAL EVERY 6 HOURS PRN
Status: DISCONTINUED | OUTPATIENT
Start: 2024-02-19 | End: 2024-02-20 | Stop reason: HOSPADM

## 2024-02-19 RX ORDER — ZOLPIDEM TARTRATE 5 MG/1
5 TABLET ORAL NIGHTLY PRN
Status: DISCONTINUED | OUTPATIENT
Start: 2024-02-19 | End: 2024-02-20 | Stop reason: HOSPADM

## 2024-02-19 RX ORDER — OXYCODONE HYDROCHLORIDE 5 MG/1
5 TABLET ORAL EVERY 4 HOURS PRN
Status: DISCONTINUED | OUTPATIENT
Start: 2024-02-19 | End: 2024-02-20 | Stop reason: HOSPADM

## 2024-02-19 RX ORDER — PHENYLEPHRINE HYDROCHLORIDE 10 MG/ML
INJECTION INTRAVENOUS
Status: DISCONTINUED | OUTPATIENT
Start: 2024-02-19 | End: 2024-02-19

## 2024-02-19 RX ORDER — INSULIN ASPART 100 [IU]/ML
0-10 INJECTION, SOLUTION INTRAVENOUS; SUBCUTANEOUS
Status: DISCONTINUED | OUTPATIENT
Start: 2024-02-19 | End: 2024-02-20 | Stop reason: HOSPADM

## 2024-02-19 RX ORDER — EPHEDRINE SULFATE 50 MG/ML
INJECTION, SOLUTION INTRAVENOUS
Status: DISCONTINUED | OUTPATIENT
Start: 2024-02-19 | End: 2024-02-19

## 2024-02-19 RX ORDER — GLUCAGON 1 MG
1 KIT INJECTION
Status: DISCONTINUED | OUTPATIENT
Start: 2024-02-19 | End: 2024-02-20 | Stop reason: HOSPADM

## 2024-02-19 RX ORDER — TALC
6 POWDER (GRAM) TOPICAL NIGHTLY PRN
Status: DISCONTINUED | OUTPATIENT
Start: 2024-02-19 | End: 2024-02-20 | Stop reason: HOSPADM

## 2024-02-19 RX ORDER — ONDANSETRON HYDROCHLORIDE 2 MG/ML
INJECTION, SOLUTION INTRAVENOUS
Status: DISCONTINUED | OUTPATIENT
Start: 2024-02-19 | End: 2024-02-19

## 2024-02-19 RX ORDER — DULOXETIN HYDROCHLORIDE 60 MG/1
60 CAPSULE, DELAYED RELEASE ORAL NIGHTLY
Status: DISCONTINUED | OUTPATIENT
Start: 2024-02-19 | End: 2024-02-20 | Stop reason: HOSPADM

## 2024-02-19 RX ORDER — SODIUM CHLORIDE 0.9 % (FLUSH) 0.9 %
10 SYRINGE (ML) INJECTION
Status: DISCONTINUED | OUTPATIENT
Start: 2024-02-19 | End: 2024-02-19 | Stop reason: HOSPADM

## 2024-02-19 RX ORDER — GABAPENTIN 400 MG/1
800 CAPSULE ORAL 3 TIMES DAILY
Status: DISCONTINUED | OUTPATIENT
Start: 2024-02-19 | End: 2024-02-20 | Stop reason: HOSPADM

## 2024-02-19 RX ORDER — ACETAMINOPHEN 10 MG/ML
INJECTION, SOLUTION INTRAVENOUS
Status: DISCONTINUED | OUTPATIENT
Start: 2024-02-19 | End: 2024-02-19

## 2024-02-19 RX ORDER — HALOPERIDOL 5 MG/ML
INJECTION INTRAMUSCULAR
Status: DISCONTINUED | OUTPATIENT
Start: 2024-02-19 | End: 2024-02-19

## 2024-02-19 RX ORDER — LIDOCAINE HYDROCHLORIDE 20 MG/ML
INJECTION INTRAVENOUS
Status: DISCONTINUED | OUTPATIENT
Start: 2024-02-19 | End: 2024-02-19

## 2024-02-19 RX ORDER — ROCURONIUM BROMIDE 10 MG/ML
INJECTION, SOLUTION INTRAVENOUS
Status: DISCONTINUED | OUTPATIENT
Start: 2024-02-19 | End: 2024-02-19

## 2024-02-19 RX ORDER — DROPERIDOL 2.5 MG/ML
0.62 INJECTION, SOLUTION INTRAMUSCULAR; INTRAVENOUS ONCE AS NEEDED
Status: DISCONTINUED | OUTPATIENT
Start: 2024-02-19 | End: 2024-02-19 | Stop reason: HOSPADM

## 2024-02-19 RX ORDER — IBUPROFEN 200 MG
16 TABLET ORAL
Status: DISCONTINUED | OUTPATIENT
Start: 2024-02-19 | End: 2024-02-20 | Stop reason: HOSPADM

## 2024-02-19 RX ORDER — ALBUTEROL SULFATE 90 UG/1
2 AEROSOL, METERED RESPIRATORY (INHALATION) EVERY 6 HOURS PRN
Status: DISCONTINUED | OUTPATIENT
Start: 2024-02-19 | End: 2024-02-20 | Stop reason: HOSPADM

## 2024-02-19 RX ORDER — HYDROMORPHONE HYDROCHLORIDE 1 MG/ML
0.2 INJECTION, SOLUTION INTRAMUSCULAR; INTRAVENOUS; SUBCUTANEOUS EVERY 5 MIN PRN
Status: DISCONTINUED | OUTPATIENT
Start: 2024-02-19 | End: 2024-02-19 | Stop reason: HOSPADM

## 2024-02-19 RX ORDER — ACETAMINOPHEN 325 MG/1
650 TABLET ORAL EVERY 6 HOURS
Status: DISCONTINUED | OUTPATIENT
Start: 2024-02-19 | End: 2024-02-20 | Stop reason: HOSPADM

## 2024-02-19 RX ORDER — ATORVASTATIN CALCIUM 40 MG/1
40 TABLET, FILM COATED ORAL DAILY
Status: DISCONTINUED | OUTPATIENT
Start: 2024-02-20 | End: 2024-02-20 | Stop reason: HOSPADM

## 2024-02-19 RX ORDER — PROPOFOL 10 MG/ML
VIAL (ML) INTRAVENOUS
Status: DISCONTINUED | OUTPATIENT
Start: 2024-02-19 | End: 2024-02-19

## 2024-02-19 RX ORDER — MIDAZOLAM HYDROCHLORIDE 1 MG/ML
INJECTION, SOLUTION INTRAMUSCULAR; INTRAVENOUS
Status: DISCONTINUED | OUTPATIENT
Start: 2024-02-19 | End: 2024-02-19

## 2024-02-19 RX ORDER — OXYCODONE HYDROCHLORIDE 10 MG/1
10 TABLET ORAL EVERY 4 HOURS PRN
Status: DISCONTINUED | OUTPATIENT
Start: 2024-02-19 | End: 2024-02-20 | Stop reason: HOSPADM

## 2024-02-19 RX ORDER — SCOLOPAMINE TRANSDERMAL SYSTEM 1 MG/1
1 PATCH, EXTENDED RELEASE TRANSDERMAL ONCE
Status: DISCONTINUED | OUTPATIENT
Start: 2024-02-19 | End: 2024-02-20 | Stop reason: HOSPADM

## 2024-02-19 RX ORDER — SODIUM CHLORIDE, SODIUM LACTATE, POTASSIUM CHLORIDE, CALCIUM CHLORIDE 600; 310; 30; 20 MG/100ML; MG/100ML; MG/100ML; MG/100ML
INJECTION, SOLUTION INTRAVENOUS CONTINUOUS
Status: DISCONTINUED | OUTPATIENT
Start: 2024-02-19 | End: 2024-02-20 | Stop reason: HOSPADM

## 2024-02-19 RX ORDER — ONDANSETRON 8 MG/1
8 TABLET, ORALLY DISINTEGRATING ORAL EVERY 8 HOURS PRN
Status: DISCONTINUED | OUTPATIENT
Start: 2024-02-19 | End: 2024-02-20 | Stop reason: HOSPADM

## 2024-02-19 RX ORDER — FENTANYL CITRATE 50 UG/ML
INJECTION, SOLUTION INTRAMUSCULAR; INTRAVENOUS
Status: DISCONTINUED | OUTPATIENT
Start: 2024-02-19 | End: 2024-02-19

## 2024-02-19 RX ORDER — LIDOCAINE HYDROCHLORIDE 10 MG/ML
1 INJECTION, SOLUTION EPIDURAL; INFILTRATION; INTRACAUDAL; PERINEURAL ONCE AS NEEDED
Status: DISCONTINUED | OUTPATIENT
Start: 2024-02-19 | End: 2024-02-20 | Stop reason: HOSPADM

## 2024-02-19 RX ORDER — HYDROMORPHONE HYDROCHLORIDE 1 MG/ML
INJECTION, SOLUTION INTRAMUSCULAR; INTRAVENOUS; SUBCUTANEOUS
Status: COMPLETED
Start: 2024-02-19 | End: 2024-02-19

## 2024-02-19 RX ORDER — PANTOPRAZOLE SODIUM 40 MG/1
40 TABLET, DELAYED RELEASE ORAL DAILY
Status: DISCONTINUED | OUTPATIENT
Start: 2024-02-20 | End: 2024-02-20 | Stop reason: HOSPADM

## 2024-02-19 RX ORDER — IBUPROFEN 200 MG
24 TABLET ORAL
Status: DISCONTINUED | OUTPATIENT
Start: 2024-02-19 | End: 2024-02-20 | Stop reason: HOSPADM

## 2024-02-19 RX ORDER — DEXAMETHASONE SODIUM PHOSPHATE 4 MG/ML
INJECTION, SOLUTION INTRA-ARTICULAR; INTRALESIONAL; INTRAMUSCULAR; INTRAVENOUS; SOFT TISSUE
Status: DISCONTINUED | OUTPATIENT
Start: 2024-02-19 | End: 2024-02-19

## 2024-02-19 RX ORDER — HEPARIN SODIUM 5000 [USP'U]/ML
5000 INJECTION, SOLUTION INTRAVENOUS; SUBCUTANEOUS EVERY 8 HOURS
Status: DISCONTINUED | OUTPATIENT
Start: 2024-02-19 | End: 2024-02-20

## 2024-02-19 RX ORDER — KETAMINE HCL IN 0.9 % NACL 50 MG/5 ML
SYRINGE (ML) INTRAVENOUS
Status: DISCONTINUED | OUTPATIENT
Start: 2024-02-19 | End: 2024-02-19

## 2024-02-19 RX ORDER — FLUTICASONE PROPIONATE 50 MCG
1 SPRAY, SUSPENSION (ML) NASAL DAILY
Status: DISCONTINUED | OUTPATIENT
Start: 2024-02-20 | End: 2024-02-20 | Stop reason: HOSPADM

## 2024-02-19 RX ORDER — LISINOPRIL 2.5 MG/1
2.5 TABLET ORAL DAILY
Status: DISCONTINUED | OUTPATIENT
Start: 2024-02-20 | End: 2024-02-20 | Stop reason: HOSPADM

## 2024-02-19 RX ADMIN — MIDAZOLAM HYDROCHLORIDE 2 MG: 1 INJECTION, SOLUTION INTRAMUSCULAR; INTRAVENOUS at 12:02

## 2024-02-19 RX ADMIN — DEXAMETHASONE SODIUM PHOSPHATE 4 MG: 4 INJECTION, SOLUTION INTRAMUSCULAR; INTRAVENOUS at 01:02

## 2024-02-19 RX ADMIN — ACETAMINOPHEN 1000 MG: 10 INJECTION, SOLUTION INTRAVENOUS at 02:02

## 2024-02-19 RX ADMIN — HYDROMORPHONE HYDROCHLORIDE 0.2 MG: 1 INJECTION, SOLUTION INTRAMUSCULAR; INTRAVENOUS; SUBCUTANEOUS at 04:02

## 2024-02-19 RX ADMIN — GABAPENTIN 800 MG: 400 CAPSULE ORAL at 09:02

## 2024-02-19 RX ADMIN — Medication 10 MG: at 03:02

## 2024-02-19 RX ADMIN — ONDANSETRON 4 MG: 2 INJECTION INTRAMUSCULAR; INTRAVENOUS at 02:02

## 2024-02-19 RX ADMIN — ROCURONIUM BROMIDE 20 MG: 10 INJECTION, SOLUTION INTRAVENOUS at 02:02

## 2024-02-19 RX ADMIN — HEPARIN SODIUM 5000 UNITS: 5000 INJECTION INTRAVENOUS; SUBCUTANEOUS at 09:02

## 2024-02-19 RX ADMIN — ACETAMINOPHEN 650 MG: 325 TABLET ORAL at 11:02

## 2024-02-19 RX ADMIN — EPHEDRINE SULFATE 10 MG: 50 INJECTION INTRAVENOUS at 01:02

## 2024-02-19 RX ADMIN — FENTANYL CITRATE 100 MCG: 50 INJECTION, SOLUTION INTRAMUSCULAR; INTRAVENOUS at 01:02

## 2024-02-19 RX ADMIN — PROPOFOL 150 MG: 10 INJECTION, EMULSION INTRAVENOUS at 01:02

## 2024-02-19 RX ADMIN — HALOPERIDOL LACTATE 1 MG: 5 INJECTION, SOLUTION INTRAMUSCULAR at 03:02

## 2024-02-19 RX ADMIN — AMPICILLIN SODIUM 1 G: 1 INJECTION, POWDER, FOR SOLUTION INTRAMUSCULAR; INTRAVENOUS at 01:02

## 2024-02-19 RX ADMIN — DULOXETINE HYDROCHLORIDE 60 MG: 60 CAPSULE, DELAYED RELEASE ORAL at 09:02

## 2024-02-19 RX ADMIN — SODIUM CHLORIDE, POTASSIUM CHLORIDE, SODIUM LACTATE AND CALCIUM CHLORIDE: 600; 310; 30; 20 INJECTION, SOLUTION INTRAVENOUS at 11:02

## 2024-02-19 RX ADMIN — OXYCODONE HYDROCHLORIDE 10 MG: 10 TABLET ORAL at 04:02

## 2024-02-19 RX ADMIN — Medication 20 MG: at 01:02

## 2024-02-19 RX ADMIN — ROCURONIUM BROMIDE 10 MG: 10 INJECTION, SOLUTION INTRAVENOUS at 02:02

## 2024-02-19 RX ADMIN — SUGAMMADEX 200 MG: 100 INJECTION, SOLUTION INTRAVENOUS at 03:02

## 2024-02-19 RX ADMIN — ROCURONIUM BROMIDE 10 MG: 10 INJECTION, SOLUTION INTRAVENOUS at 03:02

## 2024-02-19 RX ADMIN — SODIUM CHLORIDE, POTASSIUM CHLORIDE, SODIUM LACTATE AND CALCIUM CHLORIDE: 600; 310; 30; 20 INJECTION, SOLUTION INTRAVENOUS at 04:02

## 2024-02-19 RX ADMIN — PHENYLEPHRINE HYDROCHLORIDE 100 MCG: 10 INJECTION INTRAVENOUS at 01:02

## 2024-02-19 RX ADMIN — ZOLPIDEM TARTRATE 5 MG: 5 TABLET ORAL at 10:02

## 2024-02-19 RX ADMIN — Medication 10 MG: at 02:02

## 2024-02-19 RX ADMIN — OXYCODONE HYDROCHLORIDE 10 MG: 10 TABLET ORAL at 09:02

## 2024-02-19 RX ADMIN — SODIUM CHLORIDE, SODIUM ACETATE ANHYDROUS, SODIUM GLUCONATE, POTASSIUM CHLORIDE, AND MAGNESIUM CHLORIDE: 526; 222; 502; 37; 30 INJECTION, SOLUTION INTRAVENOUS at 02:02

## 2024-02-19 RX ADMIN — LIDOCAINE HYDROCHLORIDE 100 MG: 20 INJECTION INTRAVENOUS at 01:02

## 2024-02-19 RX ADMIN — SODIUM CHLORIDE: 0.9 INJECTION, SOLUTION INTRAVENOUS at 10:02

## 2024-02-19 RX ADMIN — GENTAMICIN SULFATE 201 MG: 40 INJECTION, SOLUTION INTRAMUSCULAR; INTRAVENOUS at 01:02

## 2024-02-19 RX ADMIN — ROCURONIUM BROMIDE 50 MG: 10 INJECTION, SOLUTION INTRAVENOUS at 01:02

## 2024-02-19 RX ADMIN — SCOPALAMINE 1 PATCH: 1 PATCH, EXTENDED RELEASE TRANSDERMAL at 10:02

## 2024-02-19 NOTE — ASSESSMENT & PLAN NOTE
-OR today for R PCNL, access previously obtained   - consent obtained, all questions answered   - blood consent obtained   - urine dip negative

## 2024-02-19 NOTE — PLAN OF CARE
Patient was prepared for surgery.    Patient needs Anesthesia consent.    No one here with patient- belongings placed in DOSC lockers.    Patient would like doctor to call sister  Ena Ham [phone] with updates but said ok for sig other  Jami to get text updates during surgery.

## 2024-02-19 NOTE — TRANSFER OF CARE
"Anesthesia Transfer of Care Note    Patient: Angie Mccarthy    Procedure(s) Performed: Procedure(s) (LRB):  NEPHROLITHOTOMY, PERCUTANEOUS (Right)  Nephrostogram (Right)  CREATION, NEPHROSTOMY, PERCUTANEOUS (Right)  NEPHROSCOPY, PERCUTANEOUS  URETEROSCOPY    Patient location: PACU    Anesthesia Type: general    Transport from OR: Transported from OR on room air with adequate spontaneous ventilation    Post pain: adequate analgesia    Post assessment: no apparent anesthetic complications    Post vital signs: stable    Level of consciousness: awake    Nausea/Vomiting: no nausea/vomiting    Complications: none    Transfer of care protocol was followed      Last vitals: Visit Vitals  /68 (BP Location: Right arm, Patient Position: Lying)   Pulse 85   Temp 36.9 °C (98.4 °F) (Oral)   Resp 20   Ht 5' 1" (1.549 m)   Wt 95.7 kg (211 lb)   LMP 12/06/2007   SpO2 95%   Breastfeeding No   BMI 39.87 kg/m²     "

## 2024-02-19 NOTE — HPI
58F with bilateral staghorn calculi who presents for R PCNL.  She is s/p R nephroureteral stent placement with IR on 2/12.  Has been on empiric augmentin.  Urine dip negative nitrites, negative leuks.

## 2024-02-19 NOTE — ANESTHESIA PROCEDURE NOTES
Intubation    Date/Time: 2/19/2024 1:04 PM    Performed by: Paige Morris CRNA  Authorized by: Kathrin Zuleta MD    Intubation:     Induction:  Intravenous    Intubated:  Postinduction    Mask Ventilation:  Easy mask    Attempts:  1    Attempted By:  CRNA    Method of Intubation:  Video laryngoscopy    Blade:  Hernadez 3    Laryngeal View Grade: Grade I - full view of cords      Difficult Airway Encountered?: No      Complications:  None    Airway Device:  Oral endotracheal tube    Airway Device Size:  7.0    Style/Cuff Inflation:  Cuffed    Inflation Amount (mL):  8    Tube secured:  22    Secured at:  The lips    Placement Verified By:  Capnometry    Complicating Factors:  None    Findings Post-Intubation:  BS equal bilateral

## 2024-02-19 NOTE — H&P
[facility] - Surgery ([facility])  Urology  History & Physical    Patient Name: Angie Mccarthy  MRN: [id number]  Admission Date: 2/19/2024  Code Status: Prior   Attending Provider: Suni Patel, *   Primary Care Physician: Rangel Swenson MD  Principal Problem:<principal problem not specified>    Subjective:     HPI:  58F with hx of hyperparathyroidism, recurrent UTIs, and bilateral staghorn calculi who presents for R PCNL.  She is s/p R nephroureteral stent placement with IR on 2/12.  Has been on augmentin since neph tube placement.  Urine dip negative nitrites, negative leuks.      Past Medical History:   Diagnosis Date    Allergy     seasonal    Arthritis     Blood transfusion     with back surgeries    Chronic back pain     Diabetes mellitus, type 2     GERD (gastroesophageal reflux disease)     Hyperlipidemia     Hypertension     Hypothyroidism     Thyroid nodule    Joint pain     Kidney stones     Morbid obesity 12/14/2012    NAFLD (nonalcoholic fatty liver disease)     OCD (obsessive compulsive disorder)     Osteoporosis     PONV (postoperative nausea and vomiting)     Persistent, Motion sickness with boat rides, Scopolamine helped -still had nausea    Restless leg syndrome     Sciatica of right side associated with disorder of lumbosacral spine     Spinal stenosis of lumbar region     Thoracic spondylosis        Past Surgical History:   Procedure Laterality Date    APPENDECTOMY      BACK SURGERY      x 6    CHOLECYSTECTOMY      GASTRECTOMY      Lap Gastric Sleeve    HERNIA REPAIR      HYSTERECTOMY      JOINT REPLACEMENT      LIVER BIOPSY  02/06/2017    steatohepatitis with stage 1 fibrosis    MYELOGRAPHY N/A 11/29/2018    Procedure: MYELOGRAM;  Surgeon: Ander Diagnostic Provider;  Location: [facility] OR [address] [place] [address];  Service: Radiology;  Laterality: N/A;    MYELOGRAPHY N/A 1/7/2019    Procedure: Myelogram;  Surgeon: Lissett Surgeon;  Location: [facility];  Service: Anesthesiology;  Laterality: N/A;     "OOPHORECTOMY      PERCUTANEOUS CRYOTHERAPY OF PERIPHERAL NERVE USING LIQUID NITROUS OXIDE IN CLOSED NEEDLE DEVICE Left 4/29/2019    Procedure: CRYOTHERAPY, NERVE, PERIPHERAL, PERCUTANEOUS, USING LIQUID NITROUS OXIDE IN CLOSED NEEDLE DEVICE-iovera left knee;  Surgeon: Chavo Gold III, MD;  Location: [facility] CATH LAB;  Service: Pain Management;  Laterality: Left;    PERCUTANEOUS CRYOTHERAPY OF PERIPHERAL NERVE USING LIQUID NITROUS OXIDE IN CLOSED NEEDLE DEVICE Right 8/2/2021    Procedure: CRYOTHERAPY, NERVE, PERIPHERAL, PERCUTANEOUS, USING LIQUID NITROUS OXIDE IN CLOSED NEEDLE DEVICE;  Surgeon: Saritha Proctor NP;  Location: [facility];  Service: Pain Management;  Laterality: Right;  RIGHT KNEE IOVERA    SPINAL FUSION      TONSILLECTOMY, ADENOIDECTOMY      TOTAL KNEE ARTHROPLASTY Left 5/1/2019    Procedure: REPLACEMENT-KNEE-TOTAL;  Surgeon: John L. Ochsner Jr., MD;  Location: [address];  Service: Orthopedics;  Laterality: Left;       Review of patient's allergies indicates:   Allergen Reactions    Adhesive Dermatitis and Blisters    Adhesive tape-silicones Dermatitis     The longer the adhesive stays on, the worse the irritation    Mastisol adhesive [gum bjcbsf-vnhqgi-zrdi-alcohol] Itching, Rash and Blisters     "Looked like poison ivy"       Family History       Problem Relation (Age of Onset)    Acne Other    Breast cancer Paternal Aunt    COPD Father    Cancer Father, Paternal Uncle    Eczema Other    Heart disease Mother, Father, Maternal Grandmother, Maternal Aunt, Maternal Uncle, Paternal Aunt, Paternal Uncle    Hypertension Sister, Brother    Scoliosis Sister            Tobacco Use    Smoking status: Never    Smokeless tobacco: Never    Tobacco comments:     Tried a few cigarettes during teenage   Substance and Sexual Activity    Alcohol use: Not Currently     Comment: socially    Drug use: No    Sexual activity: Never       Review of Systems    Objective:     Temp:  [98.4 °F (36.9 °C)] 98.4 °F " (36.9 °C)  Pulse:  [93] 93  Resp:  [20] 20  SpO2:  [95 %] 95 %  BP: (154)/(83) 154/83     Body mass index is 39.87 kg/m².    No intake/output data recorded.       Drains       Drain  Duration                  Nephrostomy 02/12/24 0905 Right 8 Fr. 7 days                      Physical Exam  Constitutional:       Appearance: Normal appearance.   Pulmonary:      Effort: Pulmonary effort is normal.   Abdominal:      Comments: R NT in place draining thin dark pink urine.  Erythema consistent with dermatitis surrounding neph tube dressing.     Neurological:      Mental Status: She is alert.          Significant Labs:    BMP:  Recent Labs   Lab 02/15/24  1239      K 4.8      CO2 31*   BUN 11   CREATININE 0.8   CALCIUM 10.1       CBC:  Recent Labs   Lab 02/15/24  1239   WBC 9.68   HGB 12.6   HCT 42.3          All pertinent labs results from the past 24 hours have been reviewed.    Significant Imaging:  All pertinent imaging results/findings from the past 24 hours have been reviewed.            Urine cx from NT placement growing GPRs, E coli     Assessment and Plan:     Staghorn calculus   -OR today for R PCNL, access previously obtained   - consent obtained, all questions answered   - blood consent obtained   - urine dip negative         VTE Risk Mitigation (From admission, onward)           Ordered     IP VTE LOW RISK PATIENT  Once         02/19/24 0934     Place JEANE hose  Until discontinued         02/19/24 0934     Place sequential compression device  Until discontinued         02/19/24 0934                    Saritha Merlos MD  Urology  [facility] - Surgery ([facility])

## 2024-02-19 NOTE — BRIEF OP NOTE
[facility] - Surgery ([facility])  Brief Operative Note    SUMMARY     Surgery Date: 2/19/2024     Surgeon(s) and Role:     * Suni Patel MD - Primary     * Nahum Purvis MD - Resident - Assisting     * Saritha Merlos MD - Resident - Assisting        Pre-op Diagnosis:  Staghorn calculus [N20.0]    Post-op Diagnosis:  Post-Op Diagnosis Codes:     * Staghorn calculus [N20.0]    Procedure(s) (LRB):  NEPHROLITHOTOMY, PERCUTANEOUS (Right)  Nephrostogram (Right)  CREATION, NEPHROSTOMY, PERCUTANEOUS (Right)  NEPHROSCOPY, PERCUTANEOUS  URETEROSCOPY    Anesthesia: General    Implants:  * No implants in log *    Operative Findings:   Right PCNL performed through existing tract    Estimated Blood Loss: * No values recorded between 2/19/2024  1:38 PM and 2/19/2024  3:32 PM *    Estimated Blood Loss has been documented.         Specimens:   Specimen (24h ago, onward)      None            [id number]

## 2024-02-19 NOTE — SUBJECTIVE & OBJECTIVE
Past Medical History:   Diagnosis Date    Allergy     seasonal    Arthritis     Blood transfusion     with back surgeries    Chronic back pain     Diabetes mellitus, type 2     GERD (gastroesophageal reflux disease)     Hyperlipidemia     Hypertension     Hypothyroidism     Thyroid nodule    Joint pain     Kidney stones     Morbid obesity 12/14/2012    NAFLD (nonalcoholic fatty liver disease)     OCD (obsessive compulsive disorder)     Osteoporosis     PONV (postoperative nausea and vomiting)     Persistent, Motion sickness with boat rides, Scopolamine helped -still had nausea    Restless leg syndrome     Sciatica of right side associated with disorder of lumbosacral spine     Spinal stenosis of lumbar region     Thoracic spondylosis        Past Surgical History:   Procedure Laterality Date    APPENDECTOMY      BACK SURGERY      x 6    CHOLECYSTECTOMY      GASTRECTOMY      Lap Gastric Sleeve    HERNIA REPAIR      HYSTERECTOMY      JOINT REPLACEMENT      LIVER BIOPSY  02/06/2017    steatohepatitis with stage 1 fibrosis    MYELOGRAPHY N/A 11/29/2018    Procedure: MYELOGRAM;  Surgeon: Ander Diagnostic Provider;  Location: [facility] OR [address] [place] [address];  Service: Radiology;  Laterality: N/A;    MYELOGRAPHY N/A 1/7/2019    Procedure: Myelogram;  Surgeon: Lissett Surgeon;  Location: [facility];  Service: Anesthesiology;  Laterality: N/A;    OOPHORECTOMY      PERCUTANEOUS CRYOTHERAPY OF PERIPHERAL NERVE USING LIQUID NITROUS OXIDE IN CLOSED NEEDLE DEVICE Left 4/29/2019    Procedure: CRYOTHERAPY, NERVE, PERIPHERAL, PERCUTANEOUS, USING LIQUID NITROUS OXIDE IN CLOSED NEEDLE DEVICE-iovera left knee;  Surgeon: Chavo Gold III, MD;  Location: [facility] CATH LAB;  Service: Pain Management;  Laterality: Left;    PERCUTANEOUS CRYOTHERAPY OF PERIPHERAL NERVE USING LIQUID NITROUS OXIDE IN CLOSED NEEDLE DEVICE Right 8/2/2021    Procedure: CRYOTHERAPY, NERVE, PERIPHERAL, PERCUTANEOUS, USING LIQUID NITROUS OXIDE IN CLOSED NEEDLE DEVICE;  Surgeon: Saritha  "GALDINO Proctor NP;  Location: [facility];  Service: Pain Management;  Laterality: Right;  RIGHT KNEE IOVERA    SPINAL FUSION      TONSILLECTOMY, ADENOIDECTOMY      TOTAL KNEE ARTHROPLASTY Left 5/1/2019    Procedure: REPLACEMENT-KNEE-TOTAL;  Surgeon: John L. [facility]ner Jr., MD;  Location: [address];  Service: Orthopedics;  Laterality: Left;       Review of patient's allergies indicates:   Allergen Reactions    Adhesive Dermatitis and Blisters    Adhesive tape-silicones Dermatitis     The longer the adhesive stays on, the worse the irritation    Mastisol adhesive [gum ndgxig-dsggsh-gmby-alcohol] Itching, Rash and Blisters     "Looked like poison ivy"       Family History       Problem Relation (Age of Onset)    Acne Other    Breast cancer Paternal Aunt    COPD Father    Cancer Father, Paternal Uncle    Eczema Other    Heart disease Mother, Father, Maternal Grandmother, Maternal Aunt, Maternal Uncle, Paternal Aunt, Paternal Uncle    Hypertension Sister, Brother    Scoliosis Sister            Tobacco Use    Smoking status: Never    Smokeless tobacco: Never    Tobacco comments:     Tried a few cigarettes during teenage   Substance and Sexual Activity    Alcohol use: Not Currently     Comment: socially    Drug use: No    Sexual activity: Never       Review of Systems    Objective:     Temp:  [98.4 °F (36.9 °C)] 98.4 °F (36.9 °C)  Pulse:  [93] 93  Resp:  [20] 20  SpO2:  [95 %] 95 %  BP: (154)/(83) 154/83     Body mass index is 39.87 kg/m².    No intake/output data recorded.       Drains       Drain  Duration                  Nephrostomy 02/12/24 0905 Right 8 Fr. 7 days                      Physical Exam  Constitutional:       Appearance: Normal appearance.   Pulmonary:      Effort: Pulmonary effort is normal.   Abdominal:      Comments: R NT in place draining thin dark pink urine.  Erythema consistent with dermatitis surrounding neph tube dressing.     Neurological:      Mental Status: She is alert.          Significant " Labs:    BMP:  Recent Labs   Lab 02/15/24  1239      K 4.8      CO2 31*   BUN 11   CREATININE 0.8   CALCIUM 10.1       CBC:  Recent Labs   Lab 02/15/24  1239   WBC 9.68   HGB 12.6   HCT 42.3          All pertinent labs results from the past 24 hours have been reviewed.    Significant Imaging:  All pertinent imaging results/findings from the past 24 hours have been reviewed.

## 2024-02-19 NOTE — OP NOTE
[facility]  Operative Note    Date: 02/19/2024    Pre-Op Diagnosis: Right renal stone  Patient Active Problem List    Diagnosis Date Noted    Pre-operative cardiovascular examination 12/12/2023    Hypercalciuria 10/27/2023    Primary hyperparathyroidism 10/27/2023    Nephrolithiasis 10/27/2023    Type 2 diabetes mellitus with hyperglycemia 11/01/2022    Staghorn calculus 08/16/2021    Osteoarthritis of right knee 08/02/2021    Hepatic fibrosis, stage 3 06/09/2021    FERRELL (nonalcoholic steatohepatitis) 06/09/2021    Status post total left knee replacement 05/08/2021    Pyelonephritis 12/21/2019    Hypophosphatemia 12/21/2019    Hypomagnesemia 12/21/2019    Menopausal flushing 09/26/2019    Primary hypothyroidism 09/26/2019    Abnormality of gait 05/28/2019    Microscopic hematuria 04/25/2019    Anxiety 04/23/2019    Depression 04/23/2019    Chronic, continuous use of opioids 04/23/2019    Snoring 04/23/2019    Occult blood in stools 04/23/2019    PONV (postoperative nausea and vomiting) 04/23/2019    History of prediabetes 04/23/2019    Urinary hesitancy 04/23/2019    Wheezing 04/23/2019    Lumbar disc disease 01/07/2019    Back pain 11/29/2018    Class 2 severe obesity with serious comorbidity and body mass index (BMI) of 39.0 to 39.9 in adult 03/08/2017    S/P laparoscopic sleeve gastrectomy 02/13/2017    Fatty liver disease, nonalcoholic 11/16/2016    GERD (gastroesophageal reflux disease) 08/08/2016    Palpitations 09/01/2015    Osteoporosis, idiopathic 11/12/2014    Thyroid nodule 11/12/2014    Spinal stenosis, lumbar region, without neurogenic claudication 04/02/2014    Thoracic spondylosis without myelopathy 02/20/2013    Essential hypertension     Hyperlipidemia     Syringomyelia and syringobulbia 09/05/2012    Chronic low back pain 09/05/2012       Post-Op Diagnosis: same    Procedure(s) Performed:   1.  Right PCNL (> 5cm)  2.  Right nephrostomy tube placement  3.  Right antegrade  nephrostogram  4. Antegrade ureteroscopy, nephroscopy  5.  Fluoro < 1   6. 22 modifier    Specimen(s): Right renal stone    Staff Surgeon: Suni Patel MD    Assistant Surgeon: Nahum Purvis MD    Anesthesia: General endotracheal anesthesia    Indications: Angie Mccarthy is a 58 y.o. female with a right renal stones presenting for definitive management.      Findings:  1.  Dilation of existing percutaneous nephrostomy tract  2.  Right percutaneous nephrolithotomy performed without issue    Estimated Blood Loss: 50 cc    Drains:   1.  22 Fr nephrostomy tube  2.  16 Fr urethral back catheter    Procedure in detail:  After the risks, benefits and possible complications of the procedure were explained, the patient elected to undergo the above procedures and informed consent was obtained. Pre-operative antibiotics were administered. Time out was performed.  SCDs were applied and working prior to the procedure.      A 16 Fr back was placed prior to flipping the patient prone. The patient was then placed in prone position, and prepped and draped in the usual sterile fashion. We turned our attention to the previously obtained access. The nephroureteral stent was cut and a motion wire was passed through the stent into the bladder. The wire was exchanged for a super stiff using a ureteral catheter. A dual lumen was passed into the collecting system and contrast was instilled to delineate the collecting system. A motion wire was passed into the dual lumen.     We then dilated the nephrostomy tract under fluoroscopic guidance using the 30 fr nephromax balloon dilator.  The 30 fr percutaneous renal access sheath was then advanced over the dilator balloon into the renal collecting system. The balloon was then deflated and removed keeping the super stiff wire in place.    The nephroscope was advanced into the sheath. The stones were identified. The UPJ was identified.  Stones were removed using the Trilogy  device. The stone fragments were removed using the graspers and a basket.    Flexible pyeloscopy was then performed to evaluate all the calyces. Stone fragments that were visualized were removed with a basket.  There were no stone fragments identified at the end of flexible pyeloscopy.  Ureteroscopy performed to mid ureter. No stones noted.   22 modifier due to very hard stone, multiple calyces branching and size of stone >8cm.     A 22 Fr [place] tipped back catheter was placed into the collecting system as a nephrostomy tube.  Antegrade nephrostogram was performed showing no extravasation of contrast and no filling defects.  3 mL of water was placed in the balloon.     The sheath was removed over the catheter leaving the nephrotomy tube in place.  The skin was closed with 3-0 vicryl in a vertical mattress fashion.  The nephrostomy tube was secured to the skin using a 2-0 nylon.      A sterile compressive dressing was placed. The patient was then transferred back to PACU in stable condition.     Disposition:  The patient will be admitted to the floor for postoperative monitoring.      MD DR. Jorge Lance was presnet for the entire procedure.

## 2024-02-19 NOTE — PROGRESS NOTES
Gentamicin Pharmacy to Dose Consult:     Ordered Gentamicin 7 mg/kg * adjusted body weight (469 mg) for one dose   Gentamicin random level to be drawn ~ 8 hours after the start of infusion at 1200 on 2/20   Pharmacy will schedule regimen based on level     We will continue to monitor and follow along.     Kenyetta Thompson, PharmD   [id number]

## 2024-02-19 NOTE — ANESTHESIA PREPROCEDURE EVALUATION
02/19/2024  Angie Mccarthy is a 58 y.o., female.  Pre-operative evaluation for Procedure(s) (LRB):  NEPHROLITHOTOMY, PERCUTANEOUS (Right)  CYSTOURETEROSCOPY, WITH RETROGRADE PYELOGRAM AND URETERAL STENT INSERTION (Right)  Nephrostogram (Right)    Angie Mccarthy is a 58 y.o. female     Patient Active Problem List   Diagnosis    Syringomyelia and syringobulbia    Chronic low back pain    Essential hypertension    Hyperlipidemia    Thoracic spondylosis without myelopathy    Spinal stenosis, lumbar region, without neurogenic claudication    Osteoporosis, idiopathic    Thyroid nodule    Palpitations    GERD (gastroesophageal reflux disease)    Fatty liver disease, nonalcoholic    S/P laparoscopic sleeve gastrectomy    Class 2 severe obesity with serious comorbidity and body mass index (BMI) of 39.0 to 39.9 in adult    Back pain    Lumbar disc disease    Anxiety    Depression    Chronic, continuous use of opioids    Snoring    Occult blood in stools    PONV (postoperative nausea and vomiting)    History of prediabetes    Urinary hesitancy    Wheezing    Microscopic hematuria    Abnormality of gait    Menopausal flushing    Primary hypothyroidism    Pyelonephritis    Hypophosphatemia    Hypomagnesemia    Status post total left knee replacement    Hepatic fibrosis, stage 3    FERRELL (nonalcoholic steatohepatitis)    Osteoarthritis of right knee    Staghorn calculus    Type 2 diabetes mellitus with hyperglycemia    Hypercalciuria    Primary hyperparathyroidism    Nephrolithiasis    Pre-operative cardiovascular examination       Review of patient's allergies indicates:   Allergen Reactions    Adhesive Dermatitis and Blisters    Adhesive tape-silicones Dermatitis     The longer the adhesive stays on, the worse the irritation    Mastisol adhesive [gum stqoex-zrsxyq-qcxj-alcohol] Itching, Rash and  "Blisters     "Looked like poison ivy"       No current facility-administered medications on file prior to encounter.     Current Outpatient Medications on File Prior to Encounter   Medication Sig Dispense Refill    ascorbic acid, vitamin C, (VITAMIN C) 500 MG tablet       aspirin (ECOTRIN) 81 MG EC tablet Take 1 tablet (81 mg total) by mouth 2 (two) times daily. 60 tablet 0    BIOTIN ORAL Take 10,000 mg by mouth every morning.       cyclobenzaprine (FLEXERIL) 10 MG tablet TK 1 T PO QD- as needed for muscle spasms  0    DULoxetine (CYMBALTA) 60 MG capsule TAKE 1 CAPSULE EVERY DAY (Patient taking differently: Take by mouth every evening.) 90 capsule 3    fish oil-omega-3 fatty acids 300-1,000 mg capsule Take 1,200 mg by mouth 2 (two) times daily.       gabapentin (NEURONTIN) 800 MG tablet Take 800 mg by mouth 3 (three) times daily. Takes 2-3 times per day  1    lisinopriL (PRINIVIL,ZESTRIL) 2.5 MG tablet TAKE 1 TABLET EVERY DAY 90 tablet 3    metFORMIN (GLUCOPHAGE) 500 MG tablet TAKE 1 TABLET (500 MG TOTAL) BY MOUTH 2 (TWO) TIMES DAILY WITH MEALS. 180 tablet 3    multivitamin capsule Take 1 capsule by mouth every morning.      omeprazole (PRILOSEC) 40 MG capsule TAKE 1 CAPSULE EVERY DAY (Patient taking differently: Take by mouth every morning.) 90 capsule 3    potassium citrate (UROCIT-K) 5 mEq (540 mg) TbSR Take 2 tablets (10 mEq total) by mouth 3 (three) times daily with meals. 180 tablet 11    rosuvastatin (CRESTOR) 10 MG tablet TAKE 1 TABLET EVERY NIGHT 90 tablet 3    zinc sulfate (ZINCATE) 50 mg zinc (220 mg) capsule       zolpidem (AMBIEN) 10 mg Tab TAKE 1 TABLET BY MOUTH EVERY NIGHT AT BEDTIME AS NEEDED FOR INSOMNIA 90 tablet 0    albuterol (VENTOLIN HFA) 90 mcg/actuation inhaler Inhale 2 puffs into the lungs every 6 (six) hours as needed for Wheezing or Shortness of Breath. Rescue 18 g 0    alcohol swabs PadM Apply 1 each topically once daily. 100 each 3    ALPRAZolam (XANAX) 0.25 MG tablet Take 0.5 tablets " (0.125 mg total) by mouth nightly as needed for Anxiety. 10 tablet 0    blood glucose control, low (TRUE METRIX LEVEL 1) Soln USE AS DIRECTED WITH GLUCOSE METER 1 each 0    blood sugar diagnostic (TRUE METRIX GLUCOSE TEST STRIP) Strp TEST BLOOD SUGAR EVERY  strip 3    blood-glucose meter (TRUE METRIX GLUCOSE METER) Misc 1 each by Misc.(Non-Drug; Combo Route) route once daily. 1 each 0    fluticasone propionate (FLONASE) 50 mcg/actuation nasal spray USE 2 SPRAYS NASALLY ONE TIME DAILY (Patient taking differently: daily as needed.) 48 g 3    oxyCODONE-acetaminophen (PERCOCET)  mg per tablet Take 1 tablet by mouth 4 (four) times daily as needed.      tamsulosin (FLOMAX) 0.4 mg Cap TAKE 1 CAPSULE(0.4 MG) BY MOUTH AFTER DINNER (Patient not taking: Reported on 2/15/2024) 30 capsule 1    TRUEPLUS LANCETS 33 gauge Misc TEST BLOOD SUGAR EVERY  each 3       Past Surgical History:   Procedure Laterality Date    APPENDECTOMY      BACK SURGERY      x 6    CHOLECYSTECTOMY      GASTRECTOMY      Lap Gastric Sleeve    HERNIA REPAIR      HYSTERECTOMY      JOINT REPLACEMENT      LIVER BIOPSY  02/06/2017    steatohepatitis with stage 1 fibrosis    MYELOGRAPHY N/A 11/29/2018    Procedure: MYELOGRAM;  Surgeon: Ander Diagnostic Provider;  Location: [facility] OR [address] [place] [address];  Service: Radiology;  Laterality: N/A;    MYELOGRAPHY N/A 1/7/2019    Procedure: Myelogram;  Surgeon: Lissett Surgeon;  Location: [facility];  Service: Anesthesiology;  Laterality: N/A;    OOPHORECTOMY      PERCUTANEOUS CRYOTHERAPY OF PERIPHERAL NERVE USING LIQUID NITROUS OXIDE IN CLOSED NEEDLE DEVICE Left 4/29/2019    Procedure: CRYOTHERAPY, NERVE, PERIPHERAL, PERCUTANEOUS, USING LIQUID NITROUS OXIDE IN CLOSED NEEDLE DEVICE-iovera left knee;  Surgeon: Chavo Gold III, MD;  Location: [facility] CATH LAB;  Service: Pain Management;  Laterality: Left;    PERCUTANEOUS CRYOTHERAPY OF PERIPHERAL NERVE USING LIQUID NITROUS OXIDE IN CLOSED NEEDLE DEVICE Right  8/2/2021    Procedure: CRYOTHERAPY, NERVE, PERIPHERAL, PERCUTANEOUS, USING LIQUID NITROUS OXIDE IN CLOSED NEEDLE DEVICE;  Surgeon: Saritha Proctor NP;  Location: [facility];  Service: Pain Management;  Laterality: Right;  RIGHT KNEE IOVERA    SPINAL FUSION      TONSILLECTOMY, ADENOIDECTOMY      TOTAL KNEE ARTHROPLASTY Left 5/1/2019    Procedure: REPLACEMENT-KNEE-TOTAL;  Surgeon: John L. Ochsner Jr., MD;  Location: [address];  Service: Orthopedics;  Laterality: Left;       Social History     Socioeconomic History    Marital status:    Occupational History    Occupation: disable   Tobacco Use    Smoking status: Never    Smokeless tobacco: Never    Tobacco comments:     Tried a few cigarettes during teenage   Substance and Sexual Activity    Alcohol use: Not Currently     Comment: socially    Drug use: No    Sexual activity: Never   Other Topics Concern    Are you pregnant or think you may be? No    Breast-feeding No     Social Determinants of Health     Financial Resource Strain: High Risk (11/15/2023)    Overall Financial Resource Strain (CARDIA)     Difficulty of Paying Living Expenses: Hard   Food Insecurity: No Food Insecurity (11/15/2023)    Hunger Vital Sign     Worried About Running Out of Food in the Last Year: Never true     Ran Out of Food in the Last Year: Never true   Transportation Needs: Unmet Transportation Needs (11/15/2023)    PRAPARE - Transportation     Lack of Transportation (Medical): Yes     Lack of Transportation (Non-Medical): No   Physical Activity: Inactive (11/15/2023)    Exercise Vital Sign     Days of Exercise per Week: 0 days     Minutes of Exercise per Session: 0 min   Stress: Stress Concern Present (11/15/2023)    [employer] - Occupational Stress Questionnaire     Feeling of Stress : To some extent   Social Connections: Unknown (11/15/2023)    Social Connection and Isolation Panel [NHANES]     Frequency of Communication with Friends and Family:  "More than three times a week     Frequency of Social Gatherings with Friends and Family: Twice a week     Active Member of Clubs or Organizations: Yes     Attends Club or Organization Meetings: More than 4 times per year     Marital Status: Living with partner   Housing Stability: Low Risk  (11/15/2023)    Housing Stability Vital Sign     Unable to Pay for Housing in the Last Year: No     Number of Places Lived in the Last Year: 1     Unstable Housing in the Last Year: No         CBC: No results for input(s): "WBC", "RBC", "HGB", "HCT", "PLT", "MCV", "MCH", "MCHC" in the last 72 hours.    CMP: No results for input(s): "NA", "K", "CL", "CO2", "BUN", "CREATININE", "GLU", "MG", "PHOS", "CALCIUM", "ALBUMIN", "PROT", "ALKPHOS", "ALT", "AST", "BILITOT" in the last 72 hours.    INR  No results for input(s): "PT", "INR", "PROTIME", "APTT" in the last 72 hours.        Diagnostic Studies:      EKD Echo:  No results found. However, due to the size of the patient record, not all encounters were searched. Please check Results Review for a complete set of results.      Pre-op Assessment    I have reviewed the Patient Summary Reports.    I have reviewed the NPO Status.   I have reviewed the Medications.     Review of Systems  Anesthesia Hx:  No problems with previous Anesthesia   History of prior surgery of interest to airway management or planning:           Personal Hx of Anesthesia complications, Post-Operative Nausea/Vomiting                    Cardiovascular:     Hypertension                                        Pulmonary:      Shortness of breath                  Renal/:  Chronic Renal Disease                Hepatic/GI:     GERD             Musculoskeletal:  Arthritis          Spine Disorders: lumbar and thoracic            Neurological:        Chronic Pain Syndrome                         Endocrine:  Diabetes Hypothyroidism        Obesity / BMI > 30      Physical Exam  General: Well nourished, Cooperative, " Alert and Oriented    Airway:  Mallampati: III   Mouth Opening: Normal  TM Distance: Normal  Tongue: Normal    Dental:  Intact        Anesthesia Plan  Type of Anesthesia, risks & benefits discussed:    Anesthesia Type: Gen ETT  Intra-op Monitoring Plan: Standard ASA Monitors  Post Op Pain Control Plan: multimodal analgesia  Induction:  IV  Airway Plan: Direct, Post-Induction  Informed Consent: Informed consent signed with the Patient and all parties understand the risks and agree with anesthesia plan.  All questions answered.   ASA Score: 3    Ready For Surgery From Anesthesia Perspective.     .

## 2024-02-20 ENCOUNTER — TELEPHONE (OUTPATIENT)
Dept: UROLOGY | Facility: CLINIC | Age: 59
End: 2024-02-20
Payer: MEDICARE

## 2024-02-20 ENCOUNTER — PATIENT MESSAGE (OUTPATIENT)
Dept: UROLOGY | Facility: CLINIC | Age: 59
End: 2024-02-20
Payer: MEDICARE

## 2024-02-20 VITALS
HEIGHT: 61 IN | HEART RATE: 62 BPM | BODY MASS INDEX: 47.58 KG/M2 | OXYGEN SATURATION: 96 % | RESPIRATION RATE: 18 BRPM | SYSTOLIC BLOOD PRESSURE: 119 MMHG | DIASTOLIC BLOOD PRESSURE: 71 MMHG | WEIGHT: 252 LBS | TEMPERATURE: 98 F

## 2024-02-20 DIAGNOSIS — N20.0 RIGHT RENAL STONE: Primary | ICD-10-CM

## 2024-02-20 DIAGNOSIS — N20.0 STAGHORN KIDNEY STONES: Primary | ICD-10-CM

## 2024-02-20 LAB
ANION GAP SERPL CALC-SCNC: 7 MMOL/L (ref 8–16)
BASOPHILS # BLD AUTO: 0.02 K/UL (ref 0–0.2)
BASOPHILS NFR BLD: 0.2 % (ref 0–1.9)
BUN SERPL-MCNC: 11 MG/DL (ref 6–20)
CALCIUM SERPL-MCNC: 9.3 MG/DL (ref 8.7–10.5)
CHLORIDE SERPL-SCNC: 105 MMOL/L (ref 95–110)
CO2 SERPL-SCNC: 27 MMOL/L (ref 23–29)
CREAT SERPL-MCNC: 0.7 MG/DL (ref 0.5–1.4)
DIFFERENTIAL METHOD BLD: ABNORMAL
EOSINOPHIL # BLD AUTO: 0 K/UL (ref 0–0.5)
EOSINOPHIL NFR BLD: 0 % (ref 0–8)
ERYTHROCYTE [DISTWIDTH] IN BLOOD BY AUTOMATED COUNT: 16 % (ref 11.5–14.5)
EST. GFR  (NO RACE VARIABLE): >60 ML/MIN/1.73 M^2
GENTAMICIN SERPL-MCNC: 5.2 UG/ML
GLUCOSE SERPL-MCNC: 125 MG/DL (ref 70–110)
HCT VFR BLD AUTO: 33.2 % (ref 37–48.5)
HGB BLD-MCNC: 10.2 G/DL (ref 12–16)
IMM GRANULOCYTES # BLD AUTO: 0.06 K/UL (ref 0–0.04)
IMM GRANULOCYTES NFR BLD AUTO: 0.7 % (ref 0–0.5)
LYMPHOCYTES # BLD AUTO: 1.1 K/UL (ref 1–4.8)
LYMPHOCYTES NFR BLD: 11.6 % (ref 18–48)
MCH RBC QN AUTO: 23.2 PG (ref 27–31)
MCHC RBC AUTO-ENTMCNC: 30.7 G/DL (ref 32–36)
MCV RBC AUTO: 76 FL (ref 82–98)
MONOCYTES # BLD AUTO: 0.6 K/UL (ref 0.3–1)
MONOCYTES NFR BLD: 6.1 % (ref 4–15)
NEUTROPHILS # BLD AUTO: 7.5 K/UL (ref 1.8–7.7)
NEUTROPHILS NFR BLD: 81.4 % (ref 38–73)
NRBC BLD-RTO: 0 /100 WBC
PLATELET # BLD AUTO: 229 K/UL (ref 150–450)
PMV BLD AUTO: 10.7 FL (ref 9.2–12.9)
POCT GLUCOSE: 110 MG/DL (ref 70–110)
POCT GLUCOSE: 120 MG/DL (ref 70–110)
POCT GLUCOSE: 122 MG/DL (ref 70–110)
POTASSIUM SERPL-SCNC: 4.6 MMOL/L (ref 3.5–5.1)
RBC # BLD AUTO: 4.4 M/UL (ref 4–5.4)
SODIUM SERPL-SCNC: 139 MMOL/L (ref 136–145)
WBC # BLD AUTO: 9.2 K/UL (ref 3.9–12.7)

## 2024-02-20 PROCEDURE — 36415 COLL VENOUS BLD VENIPUNCTURE: CPT | Mod: XB | Performed by: UROLOGY

## 2024-02-20 PROCEDURE — 63600175 PHARM REV CODE 636 W HCPCS

## 2024-02-20 PROCEDURE — 36415 COLL VENOUS BLD VENIPUNCTURE: CPT | Performed by: STUDENT IN AN ORGANIZED HEALTH CARE EDUCATION/TRAINING PROGRAM

## 2024-02-20 PROCEDURE — 80170 ASSAY OF GENTAMICIN: CPT | Performed by: UROLOGY

## 2024-02-20 PROCEDURE — 63600175 PHARM REV CODE 636 W HCPCS: Performed by: UROLOGY

## 2024-02-20 PROCEDURE — 80048 BASIC METABOLIC PNL TOTAL CA: CPT | Performed by: STUDENT IN AN ORGANIZED HEALTH CARE EDUCATION/TRAINING PROGRAM

## 2024-02-20 PROCEDURE — 25000003 PHARM REV CODE 250

## 2024-02-20 PROCEDURE — 25000003 PHARM REV CODE 250: Performed by: STUDENT IN AN ORGANIZED HEALTH CARE EDUCATION/TRAINING PROGRAM

## 2024-02-20 PROCEDURE — 25000003 PHARM REV CODE 250: Performed by: UROLOGY

## 2024-02-20 PROCEDURE — 85025 COMPLETE CBC W/AUTO DIFF WBC: CPT | Performed by: STUDENT IN AN ORGANIZED HEALTH CARE EDUCATION/TRAINING PROGRAM

## 2024-02-20 RX ORDER — OXYCODONE AND ACETAMINOPHEN 10; 325 MG/1; MG/1
1 TABLET ORAL 4 TIMES DAILY PRN
Qty: 10 TABLET | Refills: 0 | Status: SHIPPED | OUTPATIENT
Start: 2024-02-20

## 2024-02-20 RX ORDER — AMOXICILLIN AND CLAVULANATE POTASSIUM 875; 125 MG/1; MG/1
1 TABLET, FILM COATED ORAL EVERY 12 HOURS
Qty: 28 TABLET | Refills: 0 | Status: SHIPPED | OUTPATIENT
Start: 2024-02-20 | End: 2024-03-21

## 2024-02-20 RX ADMIN — GABAPENTIN 800 MG: 400 CAPSULE ORAL at 09:02

## 2024-02-20 RX ADMIN — ACETAMINOPHEN 650 MG: 325 TABLET ORAL at 05:02

## 2024-02-20 RX ADMIN — PANTOPRAZOLE SODIUM 40 MG: 40 TABLET, DELAYED RELEASE ORAL at 09:02

## 2024-02-20 RX ADMIN — ATORVASTATIN CALCIUM 40 MG: 40 TABLET, FILM COATED ORAL at 09:02

## 2024-02-20 RX ADMIN — ONDANSETRON 8 MG: 8 TABLET, ORALLY DISINTEGRATING ORAL at 11:02

## 2024-02-20 RX ADMIN — GABAPENTIN 800 MG: 400 CAPSULE ORAL at 02:02

## 2024-02-20 RX ADMIN — SODIUM CHLORIDE, POTASSIUM CHLORIDE, SODIUM LACTATE AND CALCIUM CHLORIDE 1000 ML: 600; 310; 30; 20 INJECTION, SOLUTION INTRAVENOUS at 07:02

## 2024-02-20 RX ADMIN — GENTAMICIN SULFATE 469.2 MG: 40 INJECTION, SOLUTION INTRAMUSCULAR; INTRAVENOUS at 04:02

## 2024-02-20 RX ADMIN — AMPICILLIN 1 G: 1 INJECTION, POWDER, FOR SOLUTION INTRAMUSCULAR; INTRAVENOUS at 03:02

## 2024-02-20 RX ADMIN — LEVOTHYROXINE SODIUM 137 MCG: 25 TABLET ORAL at 05:02

## 2024-02-20 RX ADMIN — ACETAMINOPHEN 650 MG: 325 TABLET ORAL at 12:02

## 2024-02-20 RX ADMIN — OXYCODONE 5 MG: 5 TABLET ORAL at 11:02

## 2024-02-20 NOTE — TELEPHONE ENCOUNTER
Could you approve the left side for a PCNU for 3/14?     I'll plan on surgery the Monday following.

## 2024-02-20 NOTE — NURSING TRANSFER
Nursing Transfer Note      2/19/2024   6:37 PM    Nurse giving handoff:SAM Slade  Nurse receiving handoff:Cyn    Reason patient is being transferred: Post procedure    Transfer To: \[facility]\"" [facility]    Transfer via stretcher    Transfer with IV Pole    Transported by SAM Slade    Order for Tele Monitor? No    Additional Lines: Jiménez Catheter    Medicines sent: IV LR infusing    Patient belongings transferred with patient: Yes    Chart send with patient: Yes    Notified: significant other    Patient reassessed at: 1837    Upon arrival to floor: patient oriented to room, call bell in reach, and bed in lowest position

## 2024-02-20 NOTE — H&P (VIEW-ONLY)
[facility]  Urology  Progress Note    Patient Name: Angie Mccarthy  MRN: [id number]  Admission Date: 2/19/2024  Hospital Length of Stay: 1 days  Code Status: Full Code   Attending Provider: Suni Patel, *   Primary Care Physician: Rangel Swenson MD    Subjective:     HPI:  58F with bilateral staghorn calculi who presents for R PCNL.  She is s/p R nephroureteral stent placement with IR on 2/12.  Has been on empiric augmentin.  Urine dip negative nitrites, negative leuks.      Interval History: Soft pressures overnight while sleeping, SBPs in 90s/100s.  Asymptomatic.  Pain well-controlled.    Ambulating.      Review of Systems: per HPI   Objective:     Temp:  [98.1 °F (36.7 °C)-98.6 °F (37 °C)] 98.2 °F (36.8 °C)  Pulse:  [59-93] 59  Resp:  [16-23] 18  SpO2:  [92 %-97 %] 93 %  BP: ()/(53-83) 93/55     Body mass index is 47.61 kg/m².           Drains       Drain  Duration                  Nephrostomy 02/19/24 1300 Right 22 Fr. <1 day         Urethral Catheter 02/19/24 1308 Silicone 16 Fr. <1 day                     Physical Exam  Constitutional:       Appearance: Normal appearance.   HENT:      Head: Normocephalic.   Pulmonary:      Effort: Pulmonary effort is normal.   Abdominal:      Comments: R NT in place draining thin pink urine.  Gauze dressing in place.    Genitourinary:     Comments: Jiménez draining clear yellow urine   Neurological:      Mental Status: She is alert.           Significant Labs:    BMP:  Recent Labs   Lab 02/15/24  1239 02/20/24  0333    139   K 4.8 4.6    105   CO2 31* 27   BUN 11 11   CREATININE 0.8 0.7   CALCIUM 10.1 9.3       CBC:   Recent Labs   Lab 02/15/24  1239 02/19/24  1610 02/20/24  0333   WBC 9.68 7.97 9.20   HGB 12.6 11.5* 10.2*   HCT 42.3 37.9 33.2*    210 229       All pertinent labs results from the past 24 hours have been reviewed.    Significant Imaging:  All pertinent imaging results/findings from the past 24 hours have been  reviewed.                  Assessment/Plan:     * Staghorn calculus  58F s/p R PCNL on 2/19   - AM CTRSS with 1 cm lower pole stone burden, small fragment in distal ureter   - maintain R nephrostomy tube  - ok for back catheter removal/voiding trial this morning  - ok for diet this AM   - will arrange for antegrade repeat ureteroscopy with Dr. Patel   - aaliyah/gent this AM   - 1L bolus given mild hypotension   - home meds   - MM pain control   - will discharge with two weeks of Augmentin         VTE Risk Mitigation (From admission, onward)      None            Saritha Merlos MD  Urology  [facility] - Surgery

## 2024-02-20 NOTE — PLAN OF CARE
Problem: Adult Inpatient Plan of Care  Goal: Plan of Care Review  Outcome: Met  Goal: Patient-Specific Goal (Individualized)  Outcome: Met  Goal: Absence of Hospital-Acquired Illness or Injury  Outcome: Met  Goal: Optimal Comfort and Wellbeing  Outcome: Met  Goal: Readiness for Transition of Care  Outcome: Met     Problem: Diabetes Comorbidity  Goal: Blood Glucose Level Within Targeted Range  Outcome: Met     Problem: Infection  Goal: Absence of Infection Signs and Symptoms  Outcome: Met     Problem: Bariatric Environmental Safety  Goal: Safety Maintained with Care  Outcome: Met      IV's dcd intact , site wnl, dsg C/D/I , nephrostomy tube draining serosanginous urine , Drs are aware , pt voided without difficulty, discharge instructions given , pt vebalized understanding, pt dcd via w/c with staff and family , vss no distress, spoke with urology , they reviewed gentamicin and stated ok for pt to go home

## 2024-02-20 NOTE — PROGRESS NOTES
[facility]  Urology  Progress Note    Patient Name: Angie Mccarthy  MRN: [id number]  Admission Date: 2/19/2024  Hospital Length of Stay: 1 days  Code Status: Full Code   Attending Provider: Suni Patel, *   Primary Care Physician: Rangel Swenson MD    Subjective:     HPI:  58F with bilateral staghorn calculi who presents for R PCNL.  She is s/p R nephroureteral stent placement with IR on 2/12.  Has been on empiric augmentin.  Urine dip negative nitrites, negative leuks.      Interval History: Soft pressures overnight while sleeping, SBPs in 90s/100s.  Asymptomatic.  Pain well-controlled.    Ambulating.      Review of Systems: per HPI   Objective:     Temp:  [98.1 °F (36.7 °C)-98.6 °F (37 °C)] 98.2 °F (36.8 °C)  Pulse:  [59-93] 59  Resp:  [16-23] 18  SpO2:  [92 %-97 %] 93 %  BP: ()/(53-83) 93/55     Body mass index is 47.61 kg/m².           Drains       Drain  Duration                  Nephrostomy 02/19/24 1300 Right 22 Fr. <1 day         Urethral Catheter 02/19/24 1308 Silicone 16 Fr. <1 day                     Physical Exam  Constitutional:       Appearance: Normal appearance.   HENT:      Head: Normocephalic.   Pulmonary:      Effort: Pulmonary effort is normal.   Abdominal:      Comments: R NT in place draining thin pink urine.  Gauze dressing in place.    Genitourinary:     Comments: Jiménez draining clear yellow urine   Neurological:      Mental Status: She is alert.           Significant Labs:    BMP:  Recent Labs   Lab 02/15/24  1239 02/20/24  0333    139   K 4.8 4.6    105   CO2 31* 27   BUN 11 11   CREATININE 0.8 0.7   CALCIUM 10.1 9.3       CBC:   Recent Labs   Lab 02/15/24  1239 02/19/24  1610 02/20/24  0333   WBC 9.68 7.97 9.20   HGB 12.6 11.5* 10.2*   HCT 42.3 37.9 33.2*    210 229       All pertinent labs results from the past 24 hours have been reviewed.    Significant Imaging:  All pertinent imaging results/findings from the past 24 hours have been  reviewed.                  Assessment/Plan:     * Staghorn calculus  58F s/p R PCNL on 2/19   - AM CTRSS with 1 cm lower pole stone burden, small fragment in distal ureter   - maintain R nephrostomy tube  - ok for back catheter removal/voiding trial this morning  - ok for diet this AM   - will arrange for antegrade repeat ureteroscopy with Dr. Patel   - aaliyah/gent this AM   - 1L bolus given mild hypotension   - home meds   - MM pain control   - will discharge with two weeks of Augmentin         VTE Risk Mitigation (From admission, onward)      None            Saritha Merlos MD  Urology  [facility] - Surgery

## 2024-02-20 NOTE — PLAN OF CARE
02/20/24 1321   Discharge Assessment   Assessment Type Discharge Planning Assessment   Confirmed/corrected address, phone number and insurance Yes   Confirmed Demographics Correct on Facesheet   Source of Information patient   Communicated ANNETTE with patient/caregiver Yes   People in Home significant other   Facility Arrived From: [facility]   Do you expect to return to your current living situation? Yes   Do you have help at home or someone to help you manage your care at home? Yes   Who are your caregiver(s) and their phone number(s)? [facility]   Prior to hospitilization cognitive status: Alert/Oriented   Current cognitive status: Alert/Oriented   Home Layout Able to live on 1st floor   Equipment Currently Used at Home wheelchair;walker, rolling;cane, straight;bedside commode   Do you currently have service(s) that help you manage your care at home? No   Do you take prescription medications? Yes   Do you have prescription coverage? Yes   Do you have any problems affording any of your prescribed medications? No   Is the patient taking medications as prescribed? yes   How do you get to doctors appointments? car, drives self;family or friend will provide   Are you on dialysis? No   Do you take coumadin? No   Discharge Plan A Home with family   Discharge Plan B Home Health;Home with family   DME Needed Upon Discharge  none   Discharge Plan discussed with: Patient   Transition of Care Barriers None     Patient lives with [facility] in a single story home with three entry steps. Patient does not feel she will need any post acute services upon hospital discharge. Mr Farrell is primary caregiver and will provide transportation home.     Discharge Plan A and Plan B have been determined by review of patient's clinical status, future medical and therapeutic needs, and coverage/benefits for post-acute care in coordination with multidisciplinary team members.

## 2024-02-20 NOTE — SUBJECTIVE & OBJECTIVE
Interval History: Soft pressures overnight while sleeping, SBPs in 90s/100s.  Asymptomatic.  Pain well-controlled.    Ambulating.      Review of Systems: per HPI   Objective:     Temp:  [98.1 °F (36.7 °C)-98.6 °F (37 °C)] 98.2 °F (36.8 °C)  Pulse:  [59-93] 59  Resp:  [16-23] 18  SpO2:  [92 %-97 %] 93 %  BP: ()/(53-83) 93/55     Body mass index is 47.61 kg/m².           Drains       Drain  Duration                  Nephrostomy 02/19/24 1300 Right 22 Fr. <1 day         Urethral Catheter 02/19/24 1308 Silicone 16 Fr. <1 day                     Physical Exam  Constitutional:       Appearance: Normal appearance.   HENT:      Head: Normocephalic.   Pulmonary:      Effort: Pulmonary effort is normal.   Abdominal:      Comments: R NT in place draining thin pink urine.  Gauze dressing in place.    Genitourinary:     Comments: Jiménez draining clear yellow urine   Neurological:      Mental Status: She is alert.           Significant Labs:    BMP:  Recent Labs   Lab 02/15/24  1239 02/20/24  0333    139   K 4.8 4.6    105   CO2 31* 27   BUN 11 11   CREATININE 0.8 0.7   CALCIUM 10.1 9.3       CBC:   Recent Labs   Lab 02/15/24  1239 02/19/24  1610 02/20/24  0333   WBC 9.68 7.97 9.20   HGB 12.6 11.5* 10.2*   HCT 42.3 37.9 33.2*    210 229       All pertinent labs results from the past 24 hours have been reviewed.    Significant Imaging:  All pertinent imaging results/findings from the past 24 hours have been reviewed.

## 2024-02-20 NOTE — DISCHARGE INSTRUCTIONS
Do not shower until 48 hours after surgery. Do not scrub incision in the shower, but rather, let soapy water run over incisions. Do not submerge incision in water (no baths, hot tubs, or swimming pools). It is expected to have leakage of fluid from your former nephrostomy tube site until the incision heals. Keep the site of your former nephrostomy tube covered with gauze and Tegaderm (plastic dressing).     You may see some blood in your urine while the stone fragments are passing and a few days afterward. Do not be alarmed, even if the urine was clear for a while. Push fluids and refrain from strenuous activity until clearing occurs. If you have difficulty passing clots or don't improve, call us. Avoid medications such as Aspirin, Advil, Motrin, Plavix, or Coumadin, which thin the blood and cause bleeding.    Diet:  You may return to your normal diet immediately. Alcohol, spicy foods, acidy foods and drinks with caffeine may cause irritation or frequency and should be used in moderation. To keep your urine flowing freely and to avoid constipation, drink plenty of fluids during the day (8-10 glasses).  Activity:  While the kidney is healing do not engage in strenuous activity. If you are active, you may see more blood in the urine. We would suggest cutting down your activity under these circumstances until the bleeding has stopped, perhaps two weeks.  Bowels:  It is important to keep your bowels regular during the postoperative period. Straining with bowel movements can cause bleeding. A bowel movement every other day is reasonable. Use a mild over-the-counter laxative if needed, such as Milk of Magnesia 2-3 tablespoons, or 1-2 Dulcolax tablets. Call if you continue to have problems. Narcotics can worsen constipation; if you had been taking narcotics for pain, before, during or after your surgery, you may be constipated. Ditropan for bladder spasms may also cause constipation.  Problems you should report to  us:  Fevers over 101.5 degrees Fahrenheit.   Inability to urinate.   Drug reactions (hives, rash, nausea, vomiting, diarrhea)   Severe burning or pain with urination that is not improving.   You will also have some burning with urination. This is normal after stone therapy. This burning should be mild or moderate and should improve over time. Severe burning, especially when it is not improving, can be a sign of infection.  Follow-up  After the stone has been fragmented you will likely need an x-ray and kidney ultrasound prior to next clinic appointment    Call Urology at [phone] with any problems

## 2024-02-20 NOTE — CARE UPDATE
I have reviewed the chart of Angie Mccarthy and collaborated with Suni Patel, * in the care of the patient who is hospitalized for the following:    Active Hospital Problems    Diagnosis    *Staghorn calculus    Type 2 diabetes mellitus with hyperglycemia    FERRELL (nonalcoholic steatohepatitis)    Anxiety    Severe obesity (BMI >= 40)    GERD (gastroesophageal reflux disease)    Essential hypertension    Hyperlipidemia          I have reviewed Angie Mccarthy with the multidisciplinary team during discharge huddle.       Saritha Suero PA-C  [facility]     Linda De Santiago is a 72 year old female here presenting with:    Reported symptoms:   Warn out  Sinus issues, not sleeping  Productive cough  Started with sore throat, not now    Symptoms present for: 13th    Denies: n/v/d, fever, sob, exposure to illness, cp, dizziness    OTC medications/Home remedy: no    Recent ABX use: no      Patient would like communication of their results via:        Cell Phone:   Telephone Information:   Mobile [phone]     Okay to leave a message containing results? Yes        Patient states BP is always high when in office    Visit Vitals  BP (!) 202/64   Pulse 94   Temp 98.4 °F (36.9 °C) (Tympanic)   SpO2 99%

## 2024-02-20 NOTE — ASSESSMENT & PLAN NOTE
58F s/p R PCNL on 2/19   - AM CTRSS with 1 cm lower pole stone burden, small fragment in distal ureter   - maintain R nephrostomy tube  - ok for back catheter removal/voiding trial this morning  - will discuss repeat procedure with Dr. Patel   - NPO for now   - amp/gent this AM   - 1L bolus given mild hypotension   - home meds   - MM pain control

## 2024-02-21 ENCOUNTER — PATIENT OUTREACH (OUTPATIENT)
Dept: ADMINISTRATIVE | Facility: CLINIC | Age: 59
End: 2024-02-21
Payer: MEDICARE

## 2024-02-21 NOTE — PROGRESS NOTES
C3 nurse spoke with Angie Mccarthy for a TCC post hospital discharge follow up call. The patient has a scheduled nephroscopy with Dr. Patel on 2/23/24, and has all supplements/vitamins on hold until that procedure and will hold others as directed.

## 2024-02-21 NOTE — ANESTHESIA PAT ROS NOTE
02/21/2024  Angie Mccarthy is a 58 y.o., female.      Pre-op Assessment    I have reviewed the NPO Status.   I have reviewed the Medications.     Review of Systems  Anesthesia Hx:    Pt has h/o PONV but states it's usually only a real issue with prolonged surgeries. Should be fine with prophylactic nausea meds. History of prior surgery of interest to airway management or planning:          Denies Family Hx of Anesthesia complications.   Personal Hx of Anesthesia complications, Post-Operative Nausea/Vomiting, in the past, but not with recent anesthetics / prophylaxis                    Social:  Non-Smoker, No Alcohol Use       Hematology/Oncology:  Hematology Normal   Oncology Normal                                   EENT/Dental:  chronic allergic rhinitis           Cardiovascular:  Exercise tolerance: good   Denies Pacemaker. Hypertension, well controlled       Denies Angina.     hyperlipidemia     Functional Capacity good / => 4 METS                         Pulmonary:  Pulmonary Normal    Denies Asthma.   Denies Shortness of breath.   Denies Sleep Apnea.                Renal/:   renal calculi               Hepatic/GI:     GERD, well controlled Liver Disease,         Liver Disease, Fatty Liver     NAFLD   Musculoskeletal:  Arthritis   Lumbar stenosis, chronic low back pain       Spine Disorders: lumbar Chronic Pain           Neurological:  Denies TIA.  Denies CVA.    Denies Seizures.                                Endocrine:  Diabetes, type 2 Hypothyroidism        Morbid Obesity / BMI > 40  Dermatological:  Skin Normal    Psych:  Psychiatric History  depression                     Anesthesia Assessment: Preoperative EQUATION    Planned Procedure: Procedure(s) (LRB):  NEPHROLITHOTOMY, PERCUTANEOUS (Left)  CYSTOSCOPY, WITH URETERAL STENT INSERTION (Left)  Nephrostogram (N/A)  CYSTOSCOPY, WITH  RETROGRADE PYELOGRAM (N/A)  PLACEMENT, NEPHROSTOMY TUBE (Left)  URETEROSCOPY (N/A)  Requested Anesthesia Type:General  Surgeon: Suni Patel MD  Service: Urology  Known or anticipated Date of Surgery:3/18/2024    Surgeon notes: reviewed    Electronic QUestionnaire Assessment completed via nurse interview with patient.        Triage considerations:     The patient has no apparent active cardiac condition (No unstable coronary Syndrome such as severe unstable angina or recent [<1 month] myocardial infarction, decompensated CHF, severe valvular   disease or significant arrhythmia)    Previous anesthesia records:GETA, No problems, and Hx PONV  02/19/24 PCNL (right kidney), nephrostogram (rt kidney), creation nephrostomy, percutaneous nephroscopy (right),percutaneous ureteroscopy, gen anes, ASA 3  Airway:  Mallampati: III   Mouth Opening: Normal  TM Distance: Normal  Tongue: Normal  Intubation     Date/Time: 2/19/2024 1:04 PM     Performed by: Paige Morris CRNA  Authorized by: Kathrin Zuleta MD    Intubation:     Induction:  Intravenous    Intubated:  Postinduction    Mask Ventilation:  Easy mask    Attempts:  1    Attempted By:  CRNA    Method of Intubation:  Video laryngoscopy    Blade:  Hernadez 3    Laryngeal View Grade: Grade I - full view of cords      Difficult Airway Encountered?: No      Complications:  None    Airway Device:  Oral endotracheal tube    Airway Device Size:  7.0    Style/Cuff Inflation:  Cuffed    Inflation Amount (mL):  8    Tube secured:  22    Secured at:  The lips    Placement Verified By:  Capnometry    Complicating Factors:  None    Findings Post-Intubation:  BS equal bilateral    Last PCP note: 3-6 months ago , within [facility]   Subspecialty notes: Urology    Other important co-morbidities: DM2, GERD, HLD, HTN, Hypothyroid, Morbid Obesity, and h/o PONV, arthritis, OCD, osteoporosis, RLS, NAFLD, lumbar stenosis, chronic low back pain, h/o lap gastric sleeve, staghorn  kidney stones       Tests already available:  Available tests,  within 1 month , within 3 months , within [facility] .   02/20/24 CBC  02/15/24 CMP, PTT  02/12/24 PT/INR  01/25/24 TSH, A1C (5.6)  11/20/23 EKG            Instructions given. (See in Nurse's note)    Optimization:  Anesthesia Preop Clinic Assessment Indicated:                                 --phone screen done        Plan:    Testing:  T&S preop dos       Navigation:  Straight Line to surgery.               No tests, anesthesia preop clinic visit, or consult required.    Pt was seen in our clinic by one of our preop NP for clearance last month for same procedure. Status remains same.

## 2024-02-21 NOTE — ANESTHESIA POSTPROCEDURE EVALUATION
Anesthesia Post Evaluation    Patient: Angie Mccarthy    Procedure(s) Performed: Procedure(s) (LRB):  NEPHROLITHOTOMY, PERCUTANEOUS (Right)  Nephrostogram (Right)  CREATION, NEPHROSTOMY, PERCUTANEOUS (Right)  NEPHROSCOPY, PERCUTANEOUS  URETEROSCOPY    Final Anesthesia Type: general      Patient location during evaluation: PACU  Patient participation: Yes- Able to Participate  Level of consciousness: awake and alert  Post-procedure vital signs: reviewed and stable  Pain management: adequate  Airway patency: patent    PONV status at discharge: No PONV  Anesthetic complications: no      Cardiovascular status: stable  Respiratory status: unassisted and spontaneous ventilation  Hydration status: euvolemic  Follow-up not needed.              Vitals Value Taken Time   /71 02/20/24 1511   Temp 36.9 °C (98.4 °F) 02/20/24 1511   Pulse 62 02/20/24 1511   Resp 18 02/20/24 1511   SpO2 96 % 02/20/24 1511         Event Time   Out of Recovery 16:00:00         Pain/Venessa Score: Pain Rating Prior to Med Admin: 5 (2/20/2024 12:35 PM)  Pain Rating Post Med Admin: 1 (2/20/2024  6:30 AM)

## 2024-02-21 NOTE — PLAN OF CARE
[facility] - Surgery  Discharge Final Note    Primary Care Provider: Rangel Swenson MD    Expected Discharge Date: 2/20/2024    Final Discharge Note (most recent)       Final Note - 02/20/24 1612          Final Note    Assessment Type Final Discharge Note     Anticipated Discharge Disposition Home or Self Care     Hospital Resources/Appts/Education Provided Provided patient/caregiver with written discharge plan information;Provided education on problems/symptoms using teachback                   Future Appointments   Date Time Provider Department Center   2/29/2024 10:30 AM Ira Back MD [facility] HAND [facility]   2/29/2024  2:30 PM Ligia Raya MD [facility] ENDSUR [facility]   3/6/2024  2:45 PM [facility] OIC-MAMMO2 [facility] MAM[facility]   5/15/2024 10:30 AM Leonard Haddad MD [facility] NEPHRO [facility]   5/17/2024  3:00 PM Mami Chino, NP [facility] HEPAT [facility]   6/6/2024  2:45 PM Armand Costello, OD [facility] OPTOMTY [place]   6/18/2024  2:00 PM Felisha Sands MD [facility] ENDODIA [facility]          Contact Info       Suni Patel MD   Specialty: Urology    [address] [place] [id number]   Phone: [phone]       Next Steps: Follow up in 2 week(s)

## 2024-02-22 ENCOUNTER — TELEPHONE (OUTPATIENT)
Dept: ORTHOPEDICS | Facility: CLINIC | Age: 59
End: 2024-02-22
Payer: MEDICARE

## 2024-02-22 ENCOUNTER — TELEPHONE (OUTPATIENT)
Dept: UROLOGY | Facility: CLINIC | Age: 59
End: 2024-02-22
Payer: MEDICARE

## 2024-02-22 DIAGNOSIS — N20.0 STAGHORN KIDNEY STONES: Primary | ICD-10-CM

## 2024-02-22 NOTE — TELEPHONE ENCOUNTER
Called pt to confirm arrival time of 5:30am for procedure on 2/23/24. Gave pt NPO instructions and gave pt opportunity to ask questions. Pt verbalized understanding.

## 2024-02-23 ENCOUNTER — PATIENT MESSAGE (OUTPATIENT)
Dept: UROLOGY | Facility: CLINIC | Age: 59
End: 2024-02-23

## 2024-02-23 ENCOUNTER — PATIENT MESSAGE (OUTPATIENT)
Dept: SURGERY | Facility: HOSPITAL | Age: 59
End: 2024-02-23
Payer: MEDICARE

## 2024-02-23 ENCOUNTER — ANESTHESIA (OUTPATIENT)
Dept: SURGERY | Facility: HOSPITAL | Age: 59
End: 2024-02-23
Payer: MEDICARE

## 2024-02-23 ENCOUNTER — TELEPHONE (OUTPATIENT)
Dept: UROLOGY | Facility: CLINIC | Age: 59
End: 2024-02-23
Payer: MEDICARE

## 2024-02-23 ENCOUNTER — HOSPITAL ENCOUNTER (OUTPATIENT)
Facility: HOSPITAL | Age: 59
Discharge: HOME | End: 2024-02-23
Attending: UROLOGY | Admitting: UROLOGY
Payer: MEDICARE

## 2024-02-23 ENCOUNTER — ANESTHESIA EVENT (OUTPATIENT)
Dept: SURGERY | Facility: HOSPITAL | Age: 59
End: 2024-02-23
Payer: MEDICARE

## 2024-02-23 VITALS
HEART RATE: 71 BPM | DIASTOLIC BLOOD PRESSURE: 60 MMHG | OXYGEN SATURATION: 95 % | BODY MASS INDEX: 47.39 KG/M2 | TEMPERATURE: 98 F | HEIGHT: 61 IN | WEIGHT: 251 LBS | SYSTOLIC BLOOD PRESSURE: 125 MMHG | RESPIRATION RATE: 17 BRPM

## 2024-02-23 DIAGNOSIS — N20.0 RIGHT RENAL STONE: ICD-10-CM

## 2024-02-23 DIAGNOSIS — N20.0 NEPHROLITHIASIS: Primary | ICD-10-CM

## 2024-02-23 DIAGNOSIS — Z01.818 PREOPERATIVE TESTING: ICD-10-CM

## 2024-02-23 LAB — POCT GLUCOSE: 108 MG/DL (ref 70–110)

## 2024-02-23 PROCEDURE — 36000709 HC OR TIME LEV III EA ADD 15 MIN: Performed by: UROLOGY

## 2024-02-23 PROCEDURE — 36000708 HC OR TIME LEV III 1ST 15 MIN: Performed by: UROLOGY

## 2024-02-23 PROCEDURE — D9220A PRA ANESTHESIA: Mod: CRNA,,, | Performed by: NURSE ANESTHETIST, CERTIFIED REGISTERED

## 2024-02-23 PROCEDURE — 63600175 PHARM REV CODE 636 W HCPCS: Performed by: NURSE ANESTHETIST, CERTIFIED REGISTERED

## 2024-02-23 PROCEDURE — D9220A PRA ANESTHESIA: Mod: ANES,,, | Performed by: ANESTHESIOLOGY

## 2024-02-23 PROCEDURE — 37000009 HC ANESTHESIA EA ADD 15 MINS: Performed by: UROLOGY

## 2024-02-23 PROCEDURE — 63600175 PHARM REV CODE 636 W HCPCS: Performed by: ANESTHESIOLOGY

## 2024-02-23 PROCEDURE — 71000044 HC DOSC ROUTINE RECOVERY FIRST HOUR: Performed by: UROLOGY

## 2024-02-23 PROCEDURE — 25000003 PHARM REV CODE 250: Performed by: NURSE ANESTHETIST, CERTIFIED REGISTERED

## 2024-02-23 PROCEDURE — 50435 EXCHANGE NEPHROSTOMY CATH: CPT | Mod: 51,58,RT, | Performed by: UROLOGY

## 2024-02-23 PROCEDURE — 71000016 HC POSTOP RECOV ADDL HR: Performed by: UROLOGY

## 2024-02-23 PROCEDURE — 27201423 OPTIME MED/SURG SUP & DEVICES STERILE SUPPLY: Performed by: UROLOGY

## 2024-02-23 PROCEDURE — 37000008 HC ANESTHESIA 1ST 15 MINUTES: Performed by: UROLOGY

## 2024-02-23 PROCEDURE — 63600175 PHARM REV CODE 636 W HCPCS: Performed by: STUDENT IN AN ORGANIZED HEALTH CARE EDUCATION/TRAINING PROGRAM

## 2024-02-23 PROCEDURE — 25000003 PHARM REV CODE 250: Performed by: STUDENT IN AN ORGANIZED HEALTH CARE EDUCATION/TRAINING PROGRAM

## 2024-02-23 PROCEDURE — C1769 GUIDE WIRE: HCPCS | Performed by: UROLOGY

## 2024-02-23 PROCEDURE — 71000015 HC POSTOP RECOV 1ST HR: Performed by: UROLOGY

## 2024-02-23 PROCEDURE — 50561 KIDNEY ENDOSCOPY & TREATMENT: CPT | Mod: 58,RT,, | Performed by: UROLOGY

## 2024-02-23 PROCEDURE — 25500020 PHARM REV CODE 255: Performed by: UROLOGY

## 2024-02-23 RX ORDER — PHENYLEPHRINE HYDROCHLORIDE 10 MG/ML
INJECTION INTRAVENOUS
Status: DISCONTINUED | OUTPATIENT
Start: 2024-02-23 | End: 2024-02-23

## 2024-02-23 RX ORDER — EPHEDRINE SULFATE 50 MG/ML
INJECTION, SOLUTION INTRAVENOUS
Status: DISCONTINUED | OUTPATIENT
Start: 2024-02-23 | End: 2024-02-23

## 2024-02-23 RX ORDER — PROCHLORPERAZINE EDISYLATE 5 MG/ML
5 INJECTION INTRAMUSCULAR; INTRAVENOUS EVERY 30 MIN PRN
Status: DISCONTINUED | OUTPATIENT
Start: 2024-02-23 | End: 2024-02-23 | Stop reason: HOSPADM

## 2024-02-23 RX ORDER — KETAMINE HCL IN 0.9 % NACL 50 MG/5 ML
SYRINGE (ML) INTRAVENOUS
Status: DISCONTINUED | OUTPATIENT
Start: 2024-02-23 | End: 2024-02-23

## 2024-02-23 RX ORDER — KETOROLAC TROMETHAMINE 15 MG/ML
15 INJECTION, SOLUTION INTRAMUSCULAR; INTRAVENOUS
Status: DISCONTINUED | OUTPATIENT
Start: 2024-02-23 | End: 2024-02-23 | Stop reason: HOSPADM

## 2024-02-23 RX ORDER — PROPOFOL 10 MG/ML
VIAL (ML) INTRAVENOUS
Status: DISCONTINUED | OUTPATIENT
Start: 2024-02-23 | End: 2024-02-23

## 2024-02-23 RX ORDER — ONDANSETRON HYDROCHLORIDE 2 MG/ML
4 INJECTION, SOLUTION INTRAVENOUS DAILY PRN
Status: DISCONTINUED | OUTPATIENT
Start: 2024-02-23 | End: 2024-02-23

## 2024-02-23 RX ORDER — ACETAMINOPHEN 10 MG/ML
INJECTION, SOLUTION INTRAVENOUS
Status: DISCONTINUED | OUTPATIENT
Start: 2024-02-23 | End: 2024-02-23

## 2024-02-23 RX ORDER — TRIAMCINOLONE ACETONIDE 1 MG/G
OINTMENT TOPICAL 2 TIMES DAILY
Qty: 1 EACH | Refills: 0 | Status: SHIPPED | OUTPATIENT
Start: 2024-02-23 | End: 2024-02-27 | Stop reason: SDUPTHER

## 2024-02-23 RX ORDER — KETOCONAZOLE 20 MG/G
CREAM TOPICAL DAILY
Qty: 1 EACH | Refills: 0 | Status: SHIPPED | OUTPATIENT
Start: 2024-02-23 | End: 2024-02-27

## 2024-02-23 RX ORDER — FLUCONAZOLE 2 MG/ML
INJECTION, SOLUTION INTRAVENOUS
Status: DISCONTINUED | OUTPATIENT
Start: 2024-02-23 | End: 2024-02-23

## 2024-02-23 RX ORDER — ONDANSETRON HYDROCHLORIDE 2 MG/ML
4 INJECTION, SOLUTION INTRAVENOUS DAILY PRN
Status: DISCONTINUED | OUTPATIENT
Start: 2024-02-23 | End: 2024-02-23 | Stop reason: HOSPADM

## 2024-02-23 RX ORDER — FENTANYL CITRATE 50 UG/ML
INJECTION, SOLUTION INTRAMUSCULAR; INTRAVENOUS
Status: DISCONTINUED | OUTPATIENT
Start: 2024-02-23 | End: 2024-02-23

## 2024-02-23 RX ORDER — LIDOCAINE HYDROCHLORIDE 20 MG/ML
INJECTION INTRAVENOUS
Status: DISCONTINUED | OUTPATIENT
Start: 2024-02-23 | End: 2024-02-23

## 2024-02-23 RX ORDER — ROCURONIUM BROMIDE 10 MG/ML
INJECTION, SOLUTION INTRAVENOUS
Status: DISCONTINUED | OUTPATIENT
Start: 2024-02-23 | End: 2024-02-23

## 2024-02-23 RX ORDER — ACETAMINOPHEN 10 MG/ML
1000 INJECTION, SOLUTION INTRAVENOUS ONCE
Status: DISCONTINUED | OUTPATIENT
Start: 2024-02-23 | End: 2024-02-23

## 2024-02-23 RX ORDER — AMPICILLIN 1 G/1
INJECTION, POWDER, FOR SOLUTION INTRAMUSCULAR; INTRAVENOUS
Status: COMPLETED
Start: 2024-02-23 | End: 2024-02-23

## 2024-02-23 RX ORDER — AMPICILLIN 1 G/1
INJECTION, POWDER, FOR SOLUTION INTRAMUSCULAR; INTRAVENOUS
Status: DISCONTINUED | OUTPATIENT
Start: 2024-02-23 | End: 2024-02-23

## 2024-02-23 RX ORDER — HYDROMORPHONE HYDROCHLORIDE 1 MG/ML
0.2 INJECTION, SOLUTION INTRAMUSCULAR; INTRAVENOUS; SUBCUTANEOUS EVERY 5 MIN PRN
Status: DISCONTINUED | OUTPATIENT
Start: 2024-02-23 | End: 2024-02-23

## 2024-02-23 RX ORDER — FLUCONAZOLE 100 MG/1
100 TABLET ORAL DAILY
Qty: 7 TABLET | Refills: 0 | Status: SHIPPED | OUTPATIENT
Start: 2024-02-23 | End: 2024-03-01

## 2024-02-23 RX ORDER — PROCHLORPERAZINE EDISYLATE 5 MG/ML
5 INJECTION INTRAMUSCULAR; INTRAVENOUS EVERY 30 MIN PRN
Status: DISCONTINUED | OUTPATIENT
Start: 2024-02-23 | End: 2024-02-23

## 2024-02-23 RX ORDER — ONDANSETRON HYDROCHLORIDE 2 MG/ML
INJECTION, SOLUTION INTRAVENOUS
Status: DISCONTINUED | OUTPATIENT
Start: 2024-02-23 | End: 2024-02-23

## 2024-02-23 RX ORDER — HYDROMORPHONE HYDROCHLORIDE 1 MG/ML
0.2 INJECTION, SOLUTION INTRAMUSCULAR; INTRAVENOUS; SUBCUTANEOUS EVERY 5 MIN PRN
Status: DISCONTINUED | OUTPATIENT
Start: 2024-02-23 | End: 2024-02-23 | Stop reason: HOSPADM

## 2024-02-23 RX ORDER — FLUCONAZOLE 2 MG/ML
INJECTION, SOLUTION INTRAVENOUS
Status: COMPLETED
Start: 2024-02-23 | End: 2024-02-23

## 2024-02-23 RX ORDER — LIDOCAINE HYDROCHLORIDE 10 MG/ML
1 INJECTION, SOLUTION EPIDURAL; INFILTRATION; INTRACAUDAL; PERINEURAL ONCE
Status: DISCONTINUED | OUTPATIENT
Start: 2024-02-23 | End: 2024-02-23 | Stop reason: HOSPADM

## 2024-02-23 RX ORDER — DEXAMETHASONE SODIUM PHOSPHATE 4 MG/ML
INJECTION, SOLUTION INTRA-ARTICULAR; INTRALESIONAL; INTRAMUSCULAR; INTRAVENOUS; SOFT TISSUE
Status: DISCONTINUED | OUTPATIENT
Start: 2024-02-23 | End: 2024-02-23

## 2024-02-23 RX ORDER — MIDAZOLAM HYDROCHLORIDE 1 MG/ML
INJECTION INTRAMUSCULAR; INTRAVENOUS
Status: DISCONTINUED | OUTPATIENT
Start: 2024-02-23 | End: 2024-02-23

## 2024-02-23 RX ADMIN — LIDOCAINE HYDROCHLORIDE 40 MG: 20 INJECTION INTRAVENOUS at 07:02

## 2024-02-23 RX ADMIN — SODIUM CHLORIDE: 0.9 INJECTION, SOLUTION INTRAVENOUS at 07:02

## 2024-02-23 RX ADMIN — PROCHLORPERAZINE EDISYLATE 5 MG: 5 INJECTION INTRAMUSCULAR; INTRAVENOUS at 10:02

## 2024-02-23 RX ADMIN — DEXAMETHASONE SODIUM PHOSPHATE 8 MG: 4 INJECTION, SOLUTION INTRAMUSCULAR; INTRAVENOUS at 07:02

## 2024-02-23 RX ADMIN — HYDROMORPHONE HYDROCHLORIDE 0.2 MG: 1 INJECTION, SOLUTION INTRAMUSCULAR; INTRAVENOUS; SUBCUTANEOUS at 10:02

## 2024-02-23 RX ADMIN — SUGAMMADEX 200 MG: 100 INJECTION, SOLUTION INTRAVENOUS at 09:02

## 2024-02-23 RX ADMIN — AMPICILLIN 1 G: 1 INJECTION, POWDER, FOR SOLUTION INTRAMUSCULAR; INTRAVENOUS at 08:02

## 2024-02-23 RX ADMIN — EPHEDRINE SULFATE 10 MG: 50 INJECTION INTRAVENOUS at 07:02

## 2024-02-23 RX ADMIN — MIDAZOLAM HYDROCHLORIDE 2 MG: 2 INJECTION, SOLUTION INTRAMUSCULAR; INTRAVENOUS at 07:02

## 2024-02-23 RX ADMIN — ROCURONIUM BROMIDE 15 MG: 10 INJECTION, SOLUTION INTRAVENOUS at 08:02

## 2024-02-23 RX ADMIN — GENTAMICIN SULFATE 160 MG: 40 INJECTION, SOLUTION INTRAMUSCULAR; INTRAVENOUS at 07:02

## 2024-02-23 RX ADMIN — FENTANYL CITRATE 100 MCG: 50 INJECTION, SOLUTION INTRAMUSCULAR; INTRAVENOUS at 07:02

## 2024-02-23 RX ADMIN — PROPOFOL 150 MG: 10 INJECTION, EMULSION INTRAVENOUS at 07:02

## 2024-02-23 RX ADMIN — ONDANSETRON 4 MG: 2 INJECTION INTRAMUSCULAR; INTRAVENOUS at 10:02

## 2024-02-23 RX ADMIN — Medication 10 MG: at 08:02

## 2024-02-23 RX ADMIN — ONDANSETRON 4 MG: 2 INJECTION INTRAMUSCULAR; INTRAVENOUS at 08:02

## 2024-02-23 RX ADMIN — ACETAMINOPHEN 1000 MG: 10 INJECTION, SOLUTION INTRAVENOUS at 08:02

## 2024-02-23 RX ADMIN — FLUCONAZOLE 400 MG: 2 INJECTION, SOLUTION INTRAVENOUS at 08:02

## 2024-02-23 RX ADMIN — ROCURONIUM BROMIDE 50 MG: 10 INJECTION, SOLUTION INTRAVENOUS at 07:02

## 2024-02-23 RX ADMIN — PHENYLEPHRINE HYDROCHLORIDE 150 MCG: 10 INJECTION INTRAVENOUS at 07:02

## 2024-02-23 NOTE — ANESTHESIA PREPROCEDURE EVALUATION
02/23/2024  Angie Mccarthy is a 58 y.o., female.  Pre-operative evaluation for NEPHROSCOPY (Right), CYSTOURETEROSCOPY,WITH HOLMIUM LASER LITHOTRIPSY OF URETERAL CALCULUS (Right), PLACEMENT-STENT (Right)    Chief Complaint: staghorn calculus    PMH:  Morbid obesity\  S/p gastric sleeve  GERD  FERRELL  HTN  Primary hyperparathyroidism  Past Surgical History:   Procedure Laterality Date    APPENDECTOMY      BACK SURGERY      x 6    CHOLECYSTECTOMY      GASTRECTOMY      Lap Gastric Sleeve    HERNIA REPAIR      HYSTERECTOMY      JOINT REPLACEMENT      LIVER BIOPSY  02/06/2017    steatohepatitis with stage 1 fibrosis    MYELOGRAPHY N/A 11/29/2018    Procedure: MYELOGRAM;  Surgeon: Ander Diagnostic Provider;  Location: [facility] OR [address] [place] [address];  Service: Radiology;  Laterality: N/A;    MYELOGRAPHY N/A 1/7/2019    Procedure: Myelogram;  Surgeon: Lissett Surgeon;  Location: [facility];  Service: Anesthesiology;  Laterality: N/A;    NEPHROSTOGRAPHY Right 2/19/2024    Procedure: Nephrostogram;  Surgeon: Suni Patel MD;  Location: [address];  Service: Urology;  Laterality: Right;    OOPHORECTOMY      PERCUTANEOUS CRYOTHERAPY OF PERIPHERAL NERVE USING LIQUID NITROUS OXIDE IN CLOSED NEEDLE DEVICE Left 4/29/2019    Procedure: CRYOTHERAPY, NERVE, PERIPHERAL, PERCUTANEOUS, USING LIQUID NITROUS OXIDE IN CLOSED NEEDLE DEVICE-iovera left knee;  Surgeon: Chavo Gold III, MD;  Location: [facility] CATH LAB;  Service: Pain Management;  Laterality: Left;    PERCUTANEOUS CRYOTHERAPY OF PERIPHERAL NERVE USING LIQUID NITROUS OXIDE IN CLOSED NEEDLE DEVICE Right 8/2/2021    Procedure: CRYOTHERAPY, NERVE, PERIPHERAL, PERCUTANEOUS, USING LIQUID NITROUS OXIDE IN CLOSED NEEDLE DEVICE;  Surgeon: Saritha Proctor NP;  Location: [facility];  Service: Pain Management;  Laterality: Right;  RIGHT KNEE IOVERA    PERCUTANEOUS  NEPHROLITHOTOMY Right 2/19/2024    Procedure: NEPHROLITHOTOMY, PERCUTANEOUS;  Surgeon: Suni Patel MD;  [facility];  Service: Urology;  Laterality: Right;  2 HRS    PERCUTANEOUS NEPHROSCOPY  2/19/2024    Procedure: NEPHROSCOPY, PERCUTANEOUS;  Surgeon: Suni Patel MD;  [facility];  Service: Urology;;    PERCUTANEOUS NEPHROSTOMY Right 2/19/2024    Procedure: CREATION, NEPHROSTOMY, PERCUTANEOUS;  Surgeon: Suni Patel MD;  Location: [facility] OR [address] [place] [address];  Service: Urology;  Laterality: Right;    SPINAL FUSION      TONSILLECTOMY, ADENOIDECTOMY      TOTAL KNEE ARTHROPLASTY Left 5/1/2019    Procedure: REPLACEMENT-KNEE-TOTAL;  Surgeon: John L. Ochsner Jr., MD;  Location: [facility] OR [address] [place] [address];  Service: Orthopedics;  Laterality: Left;    URETEROSCOPY  2/19/2024    Procedure: URETEROSCOPY;  Surgeon: Suni Patel MD;  Location: [facility] OR [address] [place] [address];  Service: Urology;;         Vital Signs Range (Last 24H):         CBC:     Recent Labs   Lab 01/25/24  1038 02/15/24  1239 02/19/24  1610 02/20/24  0333   WBC 8.34 9.68 7.97 9.20   RBC 5.60* 5.50* 4.99 4.40   HGB 12.8 12.6 11.5* 10.2*   HCT 41.8 42.3 37.9 33.2*    250 210 229   MCV 75* 77* 76* 76*   MCH 22.9* 22.9* 23.0* 23.2*   MCHC 30.6* 29.8* 30.3* 30.7*       CMP:   Recent Labs   Lab 01/25/24  1038 02/15/24  1239 02/20/24  0333     140 143 139   K 4.2  4.2 4.8 4.6     106 103 105   CO2 27  27 31* 27   BUN 10  10 11 11   CREATININE 0.9  0.9 0.8 0.7   GLU 97  97 99 125*   PHOS 3.2  --   --    CALCIUM 10.0  10.0 10.1 9.3   ALBUMIN 4.1 3.7  --    PROT  --  7.2  --    ALKPHOS  --  89  --    ALT  --  14  --    AST  --  16  --    BILITOT  --  0.5  --        INR:  Recent Labs   Lab 02/12/24  0647 02/15/24  1239   INR 1.0  --    APTT  --  24.2         Diagnostic Studies:      EKG:  Vent. Rate : 085 BPM     Atrial Rate : 085 BPM      P-R Int : 158 ms          QRS Dur : 088 ms       QT Int : 366 ms        P-R-T Axes : 066 -44 074 degrees      QTc Int : 435 ms     Normal sinus rhythm   Left anterior fascicular block   Right ventricular conduction delay   Abnormal ECG   When compared with ECG of 20-DEC-2019 21:46,   No significant change was found   Confirmed by DEISY COHN MD ([id number]) on 11/20/2023 4:36:28 PM     Referred By: REBEKAH DURAN           Confirmed By:DEISY COHN MD     2D Echo:   Technically challenging study.  The left ventricle is normal in size with hyperdynamic systolic function.  The estimated ejection fraction is 65-70%.  Normal left ventricular diastolic function.  The LVOT velocity is elevated, as per text.  Normal right ventricular size with normal right ventricular systolic function.  The estimated PA systolic pressure is 30 mmHg.  Normal central venous pressure (3 mmHg).  Pre-op Assessment    I have reviewed the Patient Summary Reports.     I have reviewed the Nursing Notes. I have reviewed the NPO Status.   I have reviewed the Medications.     Review of Systems  Anesthesia Hx:  No problems with previous Anesthesia                Social:  Non-Smoker       Pulmonary:  Pulmonary Normal                       Endocrine:        Morbid Obesity / BMI > 40      Physical Exam  General: Well nourished, Cooperative, Alert and Oriented    Airway:  Mallampati: I   Mouth Opening: Normal  TM Distance: Normal  Tongue: Normal  Neck ROM: Normal ROM    Dental:  Intact    Chest/Lungs:  Normal Respiratory Rate        Anesthesia Plan  Type of Anesthesia, risks & benefits discussed:    Anesthesia Type: Gen ETT  Intra-op Monitoring Plan: Standard ASA Monitors  Post Op Pain Control Plan: multimodal analgesia  Induction:  IV  Airway Plan: Video  ASA Score: 3  Day of Surgery Review of History & Physical: H&P Update referred to the surgeon/provider.    Ready For Surgery From Anesthesia Perspective.     .

## 2024-02-23 NOTE — TRANSFER OF CARE
"Anesthesia Transfer of Care Note    Patient: Angie Mccarthy    Procedure(s) Performed: Procedure(s) (LRB):  NEPHROSCOPY (Right)  EXTRACTION - STONE (Right)  NEPHROSTOGRAM, ANTEGRADE (Right)  URETEROSCOPY, ANTEGRADE (Right)    Patient location: [facility]    Anesthesia Type: general    Transport from OR: Transported from OR on 6-10 L/min O2 by face mask with adequate spontaneous ventilation    Post pain: adequate analgesia    Post assessment: no apparent anesthetic complications and tolerated procedure well    Post vital signs: stable    Level of consciousness: awake, alert and oriented    Nausea/Vomiting: no nausea/vomiting    Complications: none    Transfer of care protocol was followed    Last vitals: Visit Vitals  /75   Pulse 77   Temp 36.7 °C (98.1 °F)   Resp 18   Ht 5' 1" (1.549 m)   Wt 113.9 kg (251 lb)   LMP 12/06/2007   SpO2 96%   Breastfeeding No   BMI 47.43 kg/m²     "

## 2024-02-23 NOTE — ANESTHESIA POSTPROCEDURE EVALUATION
Anesthesia Post Evaluation    Patient: Angie Mccarthy    Procedure(s) Performed: Procedure(s) (LRB):  NEPHROSCOPY (Right)  EXTRACTION - STONE (Right)  NEPHROSTOGRAM, ANTEGRADE (Right)  URETEROSCOPY, ANTEGRADE (Right)    Final Anesthesia Type: general      Patient location during evaluation: PACU  Patient participation: Yes- Able to Participate  Level of consciousness: awake and alert and oriented  Post-procedure vital signs: reviewed and stable  Pain management: adequate  Airway patency: patent    PONV status at discharge: No PONV  Anesthetic complications: no      Cardiovascular status: hemodynamically stable  Respiratory status: unassisted, spontaneous ventilation and room air  Hydration status: euvolemic  Follow-up not needed.              Vitals Value Taken Time   /60 02/23/24 1001   Temp 36.7 °C (98.1 °F) 02/23/24 0919   Pulse 76 02/23/24 1006   Resp 20 02/23/24 1006   SpO2 95 % 02/23/24 1006   Vitals shown include unvalidated device data.      No case tracking events are documented in the log.      Pain/Venessa Score: Pain Rating Prior to Med Admin: 7 (2/23/2024 10:02 AM)  Venessa Score: 7 (2/23/2024  9:19 AM)

## 2024-02-23 NOTE — OP NOTE
[facility]  Operative Note    Date: 02/23/2024    Pre-Op Diagnosis: Right renal stone  Right ureteral stone    Post-Op Diagnosis: same    Procedure(s) Performed:   1.  Second-look Right PCNL  2.  Right antegrade nephrostogram  3.  Fluoro < 1 h  4. Right nephrostomy tube exchange    Specimen(s): Right renal stone    Staff Surgeon: Suni Patel MD    Assistant Surgeon: Michael Wallace MD    Anesthesia: General endotracheal anesthesia    Indications: Angie Mccarthy is a 58 y.o. female with a history of right renal stones s/p PCNL with residual stone, here for second-look PCNL.      Findings:  - Flexible nephroscopy performed to basket extract all stone fragments. Fragments identified in mid/lower pole calyces  - Several distal ureteral stones identified and basket extracted  - Flexible ureteroscope used to confirm no additional stone fragments. Ureteroscopy performed to bladder which showed patent ureter with no additional fragments or inflammation  - 16 Fr nephrostomy tube replaced with 5 cc in balloon and sutured to skin    Estimated Blood Loss: none    Drains:   16 Fr nephrostomy tube    Procedure in detail:  After the risks, benefits and possible complications of the procedure were explained, the patient elected to undergo the above procedures and informed consent was obtained. The patient was taken to the cystoscopy suite and placed under anesthesia. Pre-operative antibiotics were administered. Time out was performed.  SCDs were applied and working prior to the procedure.      On the stretcher, 16 F back catheter was placed using sterile technique.     The patient was then placed in prone position upon the cystoscopy table and prepped and draped in the usual sterile fashion.      A motion wire was advanced through the nephrostomy tube down the ureter and into the bladder. The nephrostomy tube was removed keeping the wire in place.    Flexible cystoscopy was advanced through the  nephrostomy tract and was used to performed pyeloscopy and nephroscopy to evaluate all the calyces and identify residual stone. The residual stone fragments were visualized in the mid and lower poles and were removed with a basket.  After removal of the residual stone, inspection of UPJ and all calyces was performed, with no stone fragments identified at the end of flexible pyeloscopy. A flexible ureteroscope was advanced into the ureter and ureteral fragments were removed at the distal ureter. The ureter was cleared to the bladder with no additional fragments and minimal inflammation. No stent was placed.    An antegrade nephrostogram was performed through the flexible cystoscope. This demonstrated no filling defects or extravasation.    A 16 Fr councill tip catheter was placed as a nephrostomy tube. An antegrade nephrostogram was performed to confirm positioning. The motion wire was removed. The nephrostomy tube was clamped. The back catheter was removed.    The patient was then transferred back to recovery in stable condition, and will be discharged home.     Disposition:  The patient will be discharged home after recovery from anesthesia.      Michael Wallace MD

## 2024-02-23 NOTE — BRIEF OP NOTE
[facility] - Surgery ([facility])  Brief Operative Note    Surgery Date: 2/23/2024     Surgeon(s) and Role:     * Suni Patel MD - Primary     * Michael Wallace MD - Resident - Assisting        Pre-op Diagnosis:  Right renal stone [N20.0]    Post-op Diagnosis:  Post-Op Diagnosis Codes:     * Right renal stone [N20.0]    Procedure(s) (LRB):  NEPHROSCOPY (Right)  EXTRACTION - STONE (Right)    Anesthesia: General    Operative Findings:   - Flexible nephroscopy performed to basket extract all stone fragments. Fragments identified in mid/lower pole calyces  - Several distal ureteral stones identified and basket extracted  - Flexible ureteroscope used to confirm no additional stone fragments. Ureteroscopy performed to bladder which showed patent ureter with no additional fragments or inflammation  - 16 Fr nephrostomy tube replaced with 5 cc in balloon and sutured to skin    Estimated Blood Loss: * No values recorded between 2/23/2024 12:00 AM and 2/23/2024  9:09 AM *         Specimens:   Specimen (24h ago, onward)      None              Discharge Note    OUTCOME: Patient tolerated treatment/procedure well without complication and is now ready for discharge.    DISPOSITION: Home or Self Care    FINAL DIAGNOSIS:  <principal problem not specified>    FOLLOWUP: In clinic    DISCHARGE INSTRUCTIONS:    Discharge Procedure Orders   Type And Screen Preop   Standing Status: Future Standing Exp. Date: 04/23/25

## 2024-02-23 NOTE — DISCHARGE INSTRUCTIONS
Postoperative Instructions for Stone Surgery    1. Hydration and Fluid Intake:     - It is essential to drink plenty of fluids following the ureteroscopy procedure to promote flushing of the urinary system and prevent dehydration.     - Aim to drink at least 8 to 10 glasses of water or other non-caffeinated, non-alcoholic beverages daily, unless instructed otherwise by your healthcare provider.    2. Pain Management:     - You may experience mild to moderate discomfort or pain after the ureteroscopy procedure. This is usually manageable with over-the-counter pain relievers such as acetaminophen or nonsteroidal anti-inflammatory drugs (NSAIDs).     - If prescribed pain medication, take it as instructed, following the prescribed dosage and frequency.    3. Urinary Symptoms:     - It is common to experience some urinary symptoms after the procedure, such as urgency, frequency, burning sensation, or blood in the urine (hematuria). These symptoms should gradually improve within a few days.     - If the symptoms worsen or if you notice significant blood clots or inability to urinate, contact your healthcare provider.    4. Activity and Rest:     - It is advisable to take it easy and get plenty of rest for the first few days following the procedure. Avoid strenuous activities, heavy lifting, or vigorous exercise for 1-2 days.     - Gradually resume normal activities as you feel comfortable, but listen to your body and avoid overexertion.    5. Diet and Nutrition:     - Follow any dietary recommendations provided by your healthcare provider. In general, maintain a balanced diet with adequate fiber intake to prevent constipation.     - Avoid excessive consumption of salt, spicy foods, and caffeinated or carbonated beverages, as these may irritate the urinary system.    6. Follow-up Appointments:     - Attend all scheduled follow-up appointments with your healthcare provider to monitor your recovery and assess the success of  the stone removal.     - You will be contacted via phone or the patient portal about any follow up appointments and tests/imaging in about 1-2 weeks.     - Additional imaging or laboratory tests may be ordered to evaluate the effectiveness of the procedure.    7. Signs of Complications:     - Be aware of potential complications and contact your healthcare provider if you experience any of the following: persistent or worsening pain, fever, chills, excessive fatigue, severe bleeding, inability to pass urine, or signs of infection.    8. Medication Compliance:     - If prescribed any medications, such as antibiotics or medications to prevent stone recurrence, take them as instructed by your healthcare provider. Follow the recommended dosage and complete the full course of medication.    If you have any additional questions, call [phone] and ask for your provider. If after hours (night or weekends) ask for urology on call and you will be directed to the appropriate provider.

## 2024-02-23 NOTE — INTERVAL H&P NOTE
The patient has been examined and the H&P has been reviewed:    I concur with the findings and changes have been noted since the H&P was written: She presents today for 2nd stage right PCNL. Feeling well, good UOP.  Intra-op cultures growing e coli.    Surgery risks, benefits and alternative options discussed and understood by patient/family.  Urine dipstick - positive for blood. Negative for all remaining components.         There are no hospital problems to display for this patient.

## 2024-02-23 NOTE — ANESTHESIA PROCEDURE NOTES
Intubation    Date/Time: 2/23/2024 7:33 AM    Performed by: Verenice Rowan CRNA  Authorized by: Isaias Loredo MD    Intubation:     Induction:  Intravenous    Intubated:  Postinduction    Mask Ventilation:  Easy mask    Attempts:  1    Attempted By:  CRNA    Method of Intubation:  Video laryngoscopy    Blade:  Hernadez 3    Laryngeal View Grade: Grade I - full view of cords      Difficult Airway Encountered?: No      Complications:  None    Airway Device:  Oral endotracheal tube    Airway Device Size:  7.0    Style/Cuff Inflation:  Cuffed (inflated to minimal occlusive pressure)    Tube secured:  22    Secured at:  The teeth    Placement Verified By:  Capnometry    Complicating Factors:  Obesity and small mouth    Findings Post-Intubation:  BS equal bilateral and atraumatic/condition of teeth unchanged

## 2024-02-23 NOTE — TELEPHONE ENCOUNTER
Patient left a message on the [employer] and states that her tube is leaking. She had surgery today for neph tube placement. A message was left on the patient's VM and a written message was left for the patient on [facility].     Patient was advised that if she receives the message after 4 pm she will have to contact the after hours urology staff.    KELI Arredondo

## 2024-02-26 ENCOUNTER — PATIENT MESSAGE (OUTPATIENT)
Dept: UROLOGY | Facility: CLINIC | Age: 59
End: 2024-02-26
Payer: MEDICARE

## 2024-02-26 ENCOUNTER — TELEPHONE (OUTPATIENT)
Dept: UROLOGY | Facility: CLINIC | Age: 59
End: 2024-02-26
Payer: MEDICARE

## 2024-02-26 LAB — BACTERIA SPEC ANAEROBE CULT: NORMAL

## 2024-02-26 NOTE — TELEPHONE ENCOUNTER
Message was left on the patient's VM and a written message was left for the patient.     She is schedule for 2/27/2024 to have her neph tube removed by the NP in Urology.    KELI Arredondo

## 2024-02-26 NOTE — DISCHARGE SUMMARY
[facility] - Surgery  Urology  Discharge Summary      Patient Name: Angie Mccarthy  MRN: [id number]  Admission Date: 2/19/2024  Hospital Length of Stay: 1 days  Discharge Date and Time: 2/20/2024  4:12 PM  Attending Physician: Suni Patel MD    Discharging Provider: Saritha Merlos MD  Primary Care Physician: Rangel Swenson MD    HPI:   [id number] with bilateral staghorn calculi who presents for R PCNL.  She is s/p R nephroureteral stent placement with IR on 2/12.  Has been on empiric augmentin.  Urine dip negative nitrites, negative leuks.       Procedure(s) (LRB):  NEPHROLITHOTOMY, PERCUTANEOUS (Right)  Nephrostogram (Right)  CREATION, NEPHROSTOMY, PERCUTANEOUS (Right)  NEPHROSCOPY, PERCUTANEOUS  URETEROSCOPY     Indwelling Lines/Drains at time of discharge:   Lines/Drains/Airways       None                   Hospital Course (synopsis of major diagnoses, care, treatment, and services provided during the course of the hospital stay):  The patient presented with the above history and underwent above procedure. She tolerated the procedure well. Please see the operative note for further procedure details. Vitals remained stable throughout the post operative period. Physical exam was appropriate. The patient was able to void without difficulty, and tolerate a regular diet prior to discharge. Patient was deemed suitable for discharge on 2/20/2024  4:12 PM     Medications and instructions as below.  For more thorough information, please refer to the hospital record and operative report.    Consults: none    Significant Diagnostic Studies: none    Pending Diagnostic Studies:       None            Final Active Diagnoses:    Diagnosis Date Noted POA    PRINCIPAL PROBLEM:  Staghorn calculus [N20.0] 08/16/2021 Yes    Type 2 diabetes mellitus with hyperglycemia [E11.65] 11/01/2022 Yes    FERRELL (nonalcoholic steatohepatitis) [K75.81] 06/09/2021 Yes    Anxiety [F41.9] 04/23/2019 Yes    Severe obesity (BMI >= 40) [E66.01]  03/08/2017 Yes    GERD (gastroesophageal reflux disease) [K21.9] 08/08/2016 Yes    Essential hypertension [I10]  Yes     Chronic    Hyperlipidemia [E78.5]  Yes     Chronic      Problems Resolved During this Admission:       Discharged Condition: good    Disposition: Home or Self Care    Follow Up:   Follow-up Information       Suni Patel MD Follow up in 2 week(s).    Specialty: Urology  Contact information:  [address]  [facility] [id number]  [phone]                           Patient Instructions:      APTT   Standing Status: Future Number of Occurrences: 1 Standing Exp. Date: 03/25/25     Protime-INR   Standing Status: Future Standing Exp. Date: 03/25/25     Type & Screen   Standing Status: Future Number of Occurrences: 1 Standing Exp. Date: 03/25/25     Medications:  Reconciled Home Medications:      Medication List        START taking these medications      * amoxicillin-clavulanate 875-125mg 875-125 mg per tablet  Commonly known as: AUGMENTIN  Take 1 tablet by mouth every 12 (twelve) hours. for 14 days  If you were already taking this medication, take it this way instead.           * This list has 1 medication(s) that are the same as other medications prescribed for you. Read the directions carefully, and ask your doctor or other care provider to review them with you.                CHANGE how you take these medications      oxyCODONE-acetaminophen  mg per tablet  Commonly known as: PERCOCET  Take 1 tablet by mouth 4 (four) times daily as needed for Pain.  What changed: reasons to take this            ASK your doctor about these medications      albuterol 90 mcg/actuation inhaler  Commonly known as: VENTOLIN HFA  Inhale 2 puffs into the lungs every 6 (six) hours as needed for Wheezing or Shortness of Breath. Rescue     alcohol swabs Padm  Apply 1 each topically once daily.     ALPRAZolam 0.25 MG tablet  Commonly known as: XANAX  Take 0.5 tablets (0.125 mg total) by mouth nightly as  needed for Anxiety.     * amoxicillin-clavulanate 875-125mg 875-125 mg per tablet  Commonly known as: AUGMENTIN  Take 1 tablet by mouth every 12 (twelve) hours. for 7 days  Ask about: Should I take this medication?     ascorbic acid (vitamin C) 500 MG tablet  Commonly known as: VITAMIN C     aspirin 81 MG EC tablet  Commonly known as: ECOTRIN  Take 1 tablet (81 mg total) by mouth 2 (two) times daily.     BIOTIN ORAL  Take 10,000 mg by mouth every morning.     blood-glucose meter Misc  Commonly known as: TRUE METRIX GLUCOSE METER  1 each by Misc.(Non-Drug; Combo Route) route once daily.     cyclobenzaprine 10 MG tablet  Commonly known as: FLEXERIL  TK 1 T PO QD- as needed for muscle spasms     DULoxetine 60 MG capsule  Commonly known as: CYMBALTA  TAKE 1 CAPSULE EVERY DAY     fish oil-omega-3 fatty acids 300-1,000 mg capsule  Take 1,200 mg by mouth 2 (two) times daily.     fluticasone propionate 50 mcg/actuation nasal spray  Commonly known as: FLONASE  USE 2 SPRAYS NASALLY ONE TIME DAILY     gabapentin 800 MG tablet  Commonly known as: NEURONTIN  Take 800 mg by mouth 3 (three) times daily. Takes 2-3 times per day     HYDROcodone-acetaminophen  mg per tablet  Commonly known as: NORCO  Take 1 tablet by mouth 4 (four) times daily as needed.     levothyroxine 137 MCG Tab tablet  Commonly known as: SYNTHROID  Take 1 tablet (137 mcg total) by mouth before breakfast.     lisinopriL 2.5 MG tablet  Commonly known as: PRINIVIL,ZESTRIL  TAKE 1 TABLET EVERY DAY     metFORMIN 500 MG tablet  Commonly known as: GLUCOPHAGE  TAKE 1 TABLET (500 MG TOTAL) BY MOUTH 2 (TWO) TIMES DAILY WITH MEALS.     MOUNJARO 12.5 mg/0.5 mL Pnij  Generic drug: tirzepatide  Inject 12.5 mg into the skin every 7 days.     multivitamin capsule  Take 1 capsule by mouth every morning.     omeprazole 40 MG capsule  Commonly known as: PRILOSEC  TAKE 1 CAPSULE EVERY DAY     potassium citrate 5 mEq (540 mg) Tbsr  Commonly known as: UROCIT-K  Take 2 tablets  (10 mEq total) by mouth 3 (three) times daily with meals.     rosuvastatin 10 MG tablet  Commonly known as: CRESTOR  TAKE 1 TABLET EVERY NIGHT     tamsulosin 0.4 mg Cap  Commonly known as: FLOMAX  TAKE 1 CAPSULE(0.4 MG) BY MOUTH AFTER DINNER     TRUE METRIX GLUCOSE TEST STRIP Strp  Generic drug: blood sugar diagnostic  TEST BLOOD SUGAR EVERY DAY     TRUE METRIX LEVEL 1 Soln  Generic drug: blood glucose control, low  USE AS DIRECTED WITH GLUCOSE METER     TRUEPLUS LANCETS 33 gauge Misc  Generic drug: lancets  TEST BLOOD SUGAR EVERY DAY     zinc sulfate 50 mg zinc (220 mg) capsule  Commonly known as: ZINCATE     zolpidem 10 mg Tab  Commonly known as: AMBIEN  TAKE 1 TABLET BY MOUTH EVERY NIGHT AT BEDTIME AS NEEDED FOR INSOMNIA           * This list has 1 medication(s) that are the same as other medications prescribed for you. Read the directions carefully, and ask your doctor or other care provider to review them with you.                  Time spent on the discharge of patient: 15 minutes    Saritha Merlos MD  Urology  [facility] - Surgery

## 2024-02-27 ENCOUNTER — OFFICE VISIT (OUTPATIENT)
Dept: UROLOGY | Facility: CLINIC | Age: 59
End: 2024-02-27
Payer: MEDICARE

## 2024-02-27 VITALS
SYSTOLIC BLOOD PRESSURE: 133 MMHG | DIASTOLIC BLOOD PRESSURE: 81 MMHG | HEART RATE: 83 BPM | BODY MASS INDEX: 40.15 KG/M2 | WEIGHT: 212.63 LBS | HEIGHT: 61 IN

## 2024-02-27 DIAGNOSIS — L30.9 DERMATITIS: ICD-10-CM

## 2024-02-27 DIAGNOSIS — N20.0 NEPHROLITHIASIS: Primary | ICD-10-CM

## 2024-02-27 DIAGNOSIS — K13.79 MOUTH PAIN: ICD-10-CM

## 2024-02-27 LAB
BILIRUB SERPL-MCNC: NORMAL MG/DL
BLOOD URINE, POC: 250
CLARITY, POC UA: CLEAR
COLOR, POC UA: NORMAL
GLUCOSE UR QL STRIP: NORMAL
KETONES UR QL STRIP: NORMAL
LEUKOCYTE ESTERASE URINE, POC: NORMAL
NITRITE, POC UA: NORMAL
PH, POC UA: 5
PROTEIN, POC: NORMAL
SPECIFIC GRAVITY, POC UA: 1.01
UROBILINOGEN, POC UA: NORMAL

## 2024-02-27 PROCEDURE — 3075F SYST BP GE 130 - 139MM HG: CPT | Mod: CPTII,S$GLB,,

## 2024-02-27 PROCEDURE — 3044F HG A1C LEVEL LT 7.0%: CPT | Mod: CPTII,S$GLB,,

## 2024-02-27 PROCEDURE — 3079F DIAST BP 80-89 MM HG: CPT | Mod: CPTII,S$GLB,,

## 2024-02-27 PROCEDURE — 1160F RVW MEDS BY RX/DR IN RCRD: CPT | Mod: CPTII,S$GLB,,

## 2024-02-27 PROCEDURE — 99999 PR PBB SHADOW E&M-EST. PATIENT-LVL IV: CPT | Mod: PBBFAC,,,

## 2024-02-27 PROCEDURE — 99024 POSTOP FOLLOW-UP VISIT: CPT | Mod: S$GLB,,,

## 2024-02-27 PROCEDURE — 1159F MED LIST DOCD IN RCRD: CPT | Mod: CPTII,S$GLB,,

## 2024-02-27 PROCEDURE — 81002 URINALYSIS NONAUTO W/O SCOPE: CPT | Mod: S$GLB,,,

## 2024-02-27 RX ORDER — TRIAMCINOLONE ACETONIDE 1 MG/G
OINTMENT TOPICAL 2 TIMES DAILY
Qty: 1 EACH | Refills: 0 | Status: SHIPPED | OUTPATIENT
Start: 2024-02-27 | End: 2024-03-21

## 2024-02-27 RX ORDER — KETOCONAZOLE 20 MG/G
CREAM TOPICAL DAILY
Qty: 1 EACH | Refills: 0 | Status: SHIPPED | OUTPATIENT
Start: 2024-02-27

## 2024-02-27 NOTE — PROGRESS NOTES
CHIEF COMPLAINT:  S/p R PCNL      HISTORY OF PRESENTING ILLINESS:  Angie Mccarthy is a 58 y.o. female established with Dr. Patel. Presents today s/p R PCNL 2/26/2024 for R nephrostomy tube removal. She is doing well overall but is suffering from fatigue and malaise. She is c/o of itching due to tape and gum soreness.            Attestation signed by Suni Patel MD at 2/25/2024  3:22 PM     Dr. Patel was present for the entire procedure.               [facility]  Operative Note     Date: 02/23/2024     Pre-Op Diagnosis: Right renal stone  Right ureteral stone     Post-Op Diagnosis: same     Procedure(s) Performed:   1.  Second-look Right PCNL  2.  Right antegrade nephrostogram  3.  Fluoro < 1 h  4. Right nephrostomy tube exchange     Specimen(s): Right renal stone     Staff Surgeon: Suni Patel MD     Assistant Surgeon: Michael Wallace MD     Anesthesia: General endotracheal anesthesia     Indications: Angie Mccarthy is a 58 y.o. female with a history of right renal stones s/p PCNL with residual stone, here for second-look PCNL.       Findings:  - Flexible nephroscopy performed to basket extract all stone fragments. Fragments identified in mid/lower pole calyces  - Several distal ureteral stones identified and basket extracted  - Flexible ureteroscope used to confirm no additional stone fragments. Ureteroscopy performed to bladder which showed patent ureter with no additional fragments or inflammation  - 16 Fr nephrostomy tube replaced with 5 cc in balloon and sutured to skin          PATIENT HISTORY:    Past Medical History:   Diagnosis Date    Allergy     seasonal    Arthritis     Blood transfusion     with back surgeries    Chronic back pain     Diabetes mellitus, type 2     GERD (gastroesophageal reflux disease)     Hyperlipidemia     Hypertension     Hypothyroidism     Thyroid nodule    Joint pain     Kidney stones     Morbid obesity 12/14/2012     NAFLD (nonalcoholic fatty liver disease)     OCD (obsessive compulsive disorder)     Osteoporosis     PONV (postoperative nausea and vomiting)     Persistent, Motion sickness with boat rides, Scopolamine helped -still had nausea    Restless leg syndrome     Sciatica of right side associated with disorder of lumbosacral spine     Spinal stenosis of lumbar region     Thoracic spondylosis        Past Surgical History:   Procedure Laterality Date    ANTEGRADE NEPHROSTOGRAPHY Right 2/23/2024    Procedure: NEPHROSTOGRAM, ANTEGRADE;  Surgeon: Suni Patel MD;  Location: [address];  Service: Urology;  Laterality: Right;    APPENDECTOMY      BACK SURGERY      x 6    CHOLECYSTECTOMY      EXTRACTION - STONE Right 2/23/2024    Procedure: EXTRACTION - STONE;  Surgeon: Suni Patel MD;  Location: [facility] OR [address] [place] [address];  Service: Urology;  Laterality: Right;    GASTRECTOMY      Lap Gastric Sleeve    HERNIA REPAIR      HYSTERECTOMY      JOINT REPLACEMENT      LIVER BIOPSY  02/06/2017    steatohepatitis with stage 1 fibrosis    MYELOGRAPHY N/A 11/29/2018    Procedure: MYELOGRAM;  Surgeon: [facility];  Location: [address];  Service: Radiology;  Laterality: N/A;    MYELOGRAPHY N/A 1/7/2019    Procedure: Myelogram;  Surgeon: Lissett Surgeon;  Location: [facility] [place];  Service: Anesthesiology;  Laterality: N/A;    NEPHROSCOPY Right 2/23/2024    Procedure: NEPHROSCOPY;  Surgeon: Suni Patel MD;  Location: [facility] OR [address] [place] [address];  Service: Urology;  Laterality: Right;  2 hr    NEPHROSTOGRAPHY Right 2/19/2024    Procedure: Nephrostogram;  Surgeon: Suni Patel MD;  Location: [facility] OR [address] [place] [address];  Service: Urology;  Laterality: Right;    OOPHORECTOMY      PERCUTANEOUS CRYOTHERAPY OF PERIPHERAL NERVE USING LIQUID NITROUS OXIDE IN CLOSED NEEDLE DEVICE Left 4/29/2019    Procedure: CRYOTHERAPY, NERVE, PERIPHERAL, PERCUTANEOUS, USING LIQUID NITROUS OXIDE IN CLOSED NEEDLE DEVICE-iovera left knee;   Surgeon: Chavo Gold III, MD;  Location: [facility] CATH LAB;  Service: Pain Management;  Laterality: Left;    PERCUTANEOUS CRYOTHERAPY OF PERIPHERAL NERVE USING LIQUID NITROUS OXIDE IN CLOSED NEEDLE DEVICE Right 8/2/2021    Procedure: CRYOTHERAPY, NERVE, PERIPHERAL, PERCUTANEOUS, USING LIQUID NITROUS OXIDE IN CLOSED NEEDLE DEVICE;  Surgeon: Saritha Proctor NP;  Location: [facility];  Service: Pain Management;  Laterality: Right;  RIGHT KNEE IOVERA    PERCUTANEOUS NEPHROLITHOTOMY Right 2/19/2024    Procedure: NEPHROLITHOTOMY, PERCUTANEOUS;  Surgeon: Suni Patel MD;  [facility];  Service: Urology;  Laterality: Right;  2 HRS    PERCUTANEOUS NEPHROSCOPY  2/19/2024    Procedure: NEPHROSCOPY, PERCUTANEOUS;  Surgeon: Suni Patel MD;  Location: [facility] OR 2ND FLR;  Service: Urology;;    PERCUTANEOUS NEPHROSTOMY Right 2/19/2024    Procedure: CREATION, NEPHROSTOMY, PERCUTANEOUS;  Surgeon: Suni Patel MD;  [facility];  Service: Urology;  Laterality: Right;    SPINAL FUSION      TONSILLECTOMY, ADENOIDECTOMY      TOTAL KNEE ARTHROPLASTY Left 5/1/2019    Procedure: REPLACEMENT-KNEE-TOTAL;  Surgeon: John L. [facility] Jr., MD;  Location: [facility] OR [address] [place] [address];  Service: Orthopedics;  Laterality: Left;    URETEROSCOPY  2/19/2024    Procedure: URETEROSCOPY;  Surgeon: Suni Patel MD;  Location: [facility] OR 2ND FLR;  Service: Urology;;    URETEROSCOPY, ANTEGRADE Right 2/23/2024    Procedure: URETEROSCOPY, ANTEGRADE;  Surgeon: Suni Patel MD;  Location: [facility] OR 1ST FLR;  Service: Urology;  Laterality: Right;       Family History   Problem Relation Age of Onset    Heart disease Mother     Heart disease Father     Cancer Father     COPD Father     Hypertension Sister     Scoliosis Sister     Hypertension Brother     Heart disease Maternal Grandmother     Heart disease Maternal Aunt     Heart disease Maternal Uncle     Heart disease Paternal Aunt     Breast  cancer Paternal Aunt     Heart disease Paternal Uncle     Cancer Paternal Uncle     Acne Other     Eczema Other     Melanoma Neg Hx     Psoriasis Neg Hx     Lupus Neg Hx        Social History     Socioeconomic History    Marital status:    Occupational History    Occupation: disable   Tobacco Use    Smoking status: Never    Smokeless tobacco: Never    Tobacco comments:     Tried a few cigarettes during teenage   Substance and Sexual Activity    Alcohol use: Not Currently     Comment: socially    Drug use: No    Sexual activity: Never   Other Topics Concern    Are you pregnant or think you may be? No    Breast-feeding No     Social Determinants of Health     Financial Resource Strain: High Risk (11/15/2023)    Overall Financial Resource Strain (CARDIA)     Difficulty of Paying Living Expenses: Hard   Food Insecurity: No Food Insecurity (11/15/2023)    Hunger Vital Sign     Worried About Running Out of Food in the Last Year: Never true     Ran Out of Food in the Last Year: Never true   Transportation Needs: Unmet Transportation Needs (11/15/2023)    PRAPARE - Transportation     Lack of Transportation (Medical): Yes     Lack of Transportation (Non-Medical): No   Physical Activity: Inactive (11/15/2023)    Exercise Vital Sign     Days of Exercise per Week: 0 days     Minutes of Exercise per Session: 0 min   Stress: Stress Concern Present (11/15/2023)    [employer] [place] [employer] - Occupational Stress Questionnaire     Feeling of Stress : To some extent   Social Connections: Unknown (11/15/2023)    Social Connection and Isolation Panel [NHANES]     Frequency of Communication with Friends and Family: More than three times a week     Frequency of Social Gatherings with Friends and Family: Twice a week     Active Member of Clubs or Organizations: Yes     Attends Club or Organization Meetings: More than 4 times per year     Marital Status: Living with partner   Housing Stability: Low Risk  (11/15/2023)     Housing Stability Vital Sign     Unable to Pay for Housing in the Last Year: No     Number of Places Lived in the Last Year: 1     Unstable Housing in the Last Year: No       Allergies:  Adhesive, Adhesive tape-silicones, and Mastisol adhesive [gum [id number]-[id number]-uuyy-alcohol]    Medications:    Current Outpatient Medications:     (Magic mouthwash) 1:1:1 diphenhydrAMINE(Benadryl) 12.5mg/5ml liq, aluminum & magnesium hydroxide-simethicone (Maalox), LIDOcaine viscous 2%, Swish and spit 5 mLs every 4 (four) hours as needed (gum pain). for mouth sores, Disp: 180 each, Rfl: 0    albuterol (VENTOLIN HFA) 90 mcg/actuation inhaler, Inhale 2 puffs into the lungs every 6 (six) hours as needed for Wheezing or Shortness of Breath. Rescue, Disp: 18 g, Rfl: 0    alcohol swabs PadM, Apply 1 each topically once daily., Disp: 100 each, Rfl: 3    ALPRAZolam (XANAX) 0.25 MG tablet, Take 0.5 tablets (0.125 mg total) by mouth nightly as needed for Anxiety., Disp: 10 tablet, Rfl: 0    amoxicillin-clavulanate 875-125mg (AUGMENTIN) 875-125 mg per tablet, Take 1 tablet by mouth every 12 (twelve) hours. for 14 days, Disp: 28 tablet, Rfl: 0    ascorbic acid, vitamin C, (VITAMIN C) 500 MG tablet, , Disp: , Rfl:     aspirin (ECOTRIN) 81 MG EC tablet, Take 1 tablet (81 mg total) by mouth 2 (two) times daily., Disp: 60 tablet, Rfl: 0    BIOTIN ORAL, Take 10,000 mg by mouth every morning. , Disp: , Rfl:     blood glucose control, low (TRUE METRIX LEVEL 1) Soln, USE AS DIRECTED WITH GLUCOSE METER, Disp: 1 each, Rfl: 0    blood sugar diagnostic (TRUE METRIX GLUCOSE TEST STRIP) Strp, TEST BLOOD SUGAR EVERY DAY, Disp: 100 strip, Rfl: 3    blood-glucose meter (TRUE METRIX GLUCOSE METER) Misc, 1 each by Misc.(Non-Drug; Combo Route) route once daily., Disp: 1 each, Rfl: 0    cyclobenzaprine (FLEXERIL) 10 MG tablet, TK 1 T PO QD- as needed for muscle spasms, Disp: , Rfl: 0    DULoxetine (CYMBALTA) 60 MG capsule, TAKE 1 CAPSULE EVERY DAY (Patient taking  differently: Take by mouth every evening.), Disp: 90 capsule, Rfl: 3    fish oil-omega-3 fatty acids 300-1,000 mg capsule, Take 1,200 mg by mouth 2 (two) times daily. , Disp: , Rfl:     fluconazole (DIFLUCAN) 100 MG tablet, Take 1 tablet (100 mg total) by mouth once daily. for 7 days, Disp: 7 tablet, Rfl: 0    fluticasone propionate (FLONASE) 50 mcg/actuation nasal spray, USE 2 SPRAYS NASALLY ONE TIME DAILY (Patient taking differently: daily as needed.), Disp: 48 g, Rfl: 3    gabapentin (NEURONTIN) 800 MG tablet, Take 800 mg by mouth 3 (three) times daily. Takes 2-3 times per day, Disp: , Rfl: 1    HYDROcodone-acetaminophen (NORCO)  mg per tablet, Take 1 tablet by mouth 4 (four) times daily as needed., Disp: , Rfl:     ketoconazole (NIZORAL) 2 % cream, Apply topically once daily., Disp: 1 each, Rfl: 0    levothyroxine (SYNTHROID) 137 MCG Tab tablet, Take 1 tablet (137 mcg total) by mouth before breakfast., Disp: 90 tablet, Rfl: 3    lisinopriL (PRINIVIL,ZESTRIL) 2.5 MG tablet, TAKE 1 TABLET EVERY DAY, Disp: 90 tablet, Rfl: 3    metFORMIN (GLUCOPHAGE) 500 MG tablet, TAKE 1 TABLET (500 MG TOTAL) BY MOUTH 2 (TWO) TIMES DAILY WITH MEALS., Disp: 180 tablet, Rfl: 3    multivitamin capsule, Take 1 capsule by mouth every morning., Disp: , Rfl:     omeprazole (PRILOSEC) 40 MG capsule, TAKE 1 CAPSULE EVERY DAY (Patient taking differently: Take by mouth every morning.), Disp: 90 capsule, Rfl: 3    oxyCODONE-acetaminophen (PERCOCET)  mg per tablet, Take 1 tablet by mouth 4 (four) times daily as needed for Pain., Disp: 10 tablet, Rfl: 0    potassium citrate (UROCIT-K) 5 mEq (540 mg) TbSR, Take 2 tablets (10 mEq total) by mouth 3 (three) times daily with meals., Disp: 180 tablet, Rfl: 11    rosuvastatin (CRESTOR) 10 MG tablet, TAKE 1 TABLET EVERY NIGHT, Disp: 90 tablet, Rfl: 3    tamsulosin (FLOMAX) 0.4 mg Cap, TAKE 1 CAPSULE(0.4 MG) BY MOUTH AFTER DINNER, Disp: 30 capsule, Rfl: 1    tirzepatide (MOUNJARO) 12.5 mg/0.5  mL PnIj, Inject 12.5 mg into the skin every 7 days., Disp: 4 Pen, Rfl: 3    triamcinolone acetonide 0.1% (KENALOG) 0.1 % ointment, Apply topically 2 (two) times daily. for 7 days, Disp: 1 each, Rfl: 0    TRUEPLUS LANCETS 33 gauge Misc, TEST BLOOD SUGAR EVERY DAY, Disp: 100 each, Rfl: 3    zinc sulfate (ZINCATE) 50 mg zinc (220 mg) capsule, , Disp: , Rfl:     zolpidem (AMBIEN) 10 mg Tab, TAKE 1 TABLET BY MOUTH EVERY NIGHT AT BEDTIME AS NEEDED FOR INSOMNIA, Disp: 90 tablet, Rfl: 0    Current Facility-Administered Medications:     triamcinolone acetonide injection 40 mg, 40 mg, Intradermal, 1 time in Clinic/HOD, Teri Manuel MD          Lab Results   Component Value Date    CREATININE 0.7 02/20/2024    EGFRNORACEVR >60.0 02/20/2024             IMPRESSION:    Encounter Diagnoses   Name Primary?    Nephrolithiasis Yes    Dermatitis     Mouth pain          Assessment:       1. Nephrolithiasis    2. Dermatitis    3. Mouth pain        Plan:   - 16 fr nephrostomy tube removed without issue. 2 ml withdrawn from balloon prior to removal. Site dressed with sterile gauze and band aid (pt can tolerate band aid adhesive).   - Nizoral and Kenalog sent to pharmacy of choice.  - Magic mouthwash for gum soreness sent to pharmacy of choice.     3/14/2024 procedure scheduled     I spent 30 minutes with the patient of which more than half was spent in direct consultation with the patient in regards to our treatment and plan.  We addressed the office findings and recent labs.   Education and recommendations of today's plan of care including home remedies and needed follow up with PCP.   We discussed the chief complaint; reviewed the LUTS and the possible contributory factors.

## 2024-03-04 LAB
COMPN STONE: NORMAL
LABORATORY COMMENT REPORT: NORMAL
SPECIMEN SOURCE: NORMAL
STONE ANALYSIS IR-IMP: NORMAL

## 2024-03-05 ENCOUNTER — TELEPHONE (OUTPATIENT)
Dept: RADIOLOGY | Facility: HOSPITAL | Age: 59
End: 2024-03-05
Payer: MEDICARE

## 2024-03-05 NOTE — TELEPHONE ENCOUNTER
Spoke with patient, and patient canceled her mammogram and will reschedule via the patient portal.

## 2024-03-05 NOTE — TELEPHONE ENCOUNTER
----- Message from Justa Dickerson sent at 3/5/2024  2:42 PM CST -----  Type:  Patient Returning Call    Who Called:pt   Who Left Message for Patient:  Does the patient know what this is regarding?:mammo   Would the patient rather a call back or a response via [facility]? Call   Best Call Back Number:[phone]  Additional Information: was advised by [facility] center employee to message dept in regards to seeing if appt can be reschedule. No appts are found and I checked as far as the end of the year.

## 2024-03-12 ENCOUNTER — DOCUMENTATION ONLY (OUTPATIENT)
Dept: PREADMISSION TESTING | Facility: HOSPITAL | Age: 59
End: 2024-03-12
Payer: MEDICARE

## 2024-03-12 NOTE — PRE-PROCEDURE INSTRUCTIONS
PRE-OP INSTRUCTIONS:  Instructed patient to have no food,milk or milk products after midnight   It is ok to take AM medications with a few sips of water   Medication instructions for pm prior to and am of surgery reviewed.  Instructed patient to avoid taking vitamins,supplements,aspirin or ibuprofen the am of surgery.  Shower instructions provided    Patient denies any side effects or issues with anesthesia or sedation other than PONV    PATIENT TAKES MOUNJARO ONCE WEEKLY AND TOOK IT THIS MORNING 3/12/2024.MESSAGED IR TEAMS CHAT-CURRENT GUIDELINES FOR GLP 1 MEDICATIONS CALL FOR HOLDING THEM 1 WEEK PRIOR TO ANESTHESIA

## 2024-03-13 ENCOUNTER — TELEPHONE (OUTPATIENT)
Dept: UROLOGY | Facility: CLINIC | Age: 59
End: 2024-03-13
Payer: MEDICARE

## 2024-03-13 NOTE — TELEPHONE ENCOUNTER
Received a message stating the pt took a medication she would have had to stop 7 days prior to surgery scheduled 3/18/24. The pt's surgery has been moved to 4/8/24.

## 2024-03-14 ENCOUNTER — PATIENT MESSAGE (OUTPATIENT)
Dept: ORTHOPEDICS | Facility: CLINIC | Age: 59
End: 2024-03-14
Payer: MEDICARE

## 2024-03-15 ENCOUNTER — TELEPHONE (OUTPATIENT)
Dept: UROLOGY | Facility: CLINIC | Age: 59
End: 2024-03-15
Payer: MEDICARE

## 2024-03-15 ENCOUNTER — PATIENT MESSAGE (OUTPATIENT)
Dept: UROLOGY | Facility: CLINIC | Age: 59
End: 2024-03-15
Payer: MEDICARE

## 2024-03-15 NOTE — TELEPHONE ENCOUNTER
Called and spoke with the pt to inform that surgery has been moved to 4/8 and IR will contact the pt with access appointment on 4/5. Pt verbalized understanding.

## 2024-03-18 LAB — FUNGUS SPEC CULT: NORMAL

## 2024-03-18 NOTE — PROGRESS NOTES
Endocrine Surgery History & Physical     REFERRING PROVIDER: Felisha Sands MD    REASON FOR VISIT: Hyperparathyroidism    HPI: Angie Mccarthy is a 58 y.o. female patient with a history notable for morbid obesity, GERD, osteoporosis, nephrolithiasis, HTN, DMII who presents in consultation for primary hyperparathyroidism.  Suspicion for hyperparathyroidism was raised due to persistent nephrolithiasis.    Details of the workup are as follows:     Recent laboratory studies:  Hypercalcemia noted once in 08/2021 and once in 09/2018, calcium ranges from 9.3-10.1 recently  Max calcium elevation to 10.7 mg/dL  Parathyroid hormone levels elevated with normal calcium levels   Most recent PTH level was 121.1 with a corresponding calcium of 10  Phosphorus:   Vitamin D: 47 in 10/2023  24 hour urine calcium: 152 mg/24 hours, Benign Familial Hypocalciuric Hypercalcemia unlikely  This was however elevated to 558 in 2009    Medications:  Vitamin D supplementation: 1000 IU over the counter vitamin D3  Lithium, thiazide diuretics: none  Calcium supplements or calcimimetic: none    Symptoms:   The patient reports the following symptoms: [x]musculoskeletal pain - also has scoliosis with anterior and posterior fusion, []fractures, [x]nephrolithiasis, [x]GERD, []peptic ulcer disease, [x]abdominal pain (vague), [x]polydipsia, [x]polyuria, []pruritis  The patient reports the following neurocognitive symptoms: [x]brain fog, []concentration difficulties, [x]fatigue, [x]forgetfulness, []mood/psychiatric disturbances  The patient denies dysphagia, does report globus sensation, denies overt compression symptoms, or anterior neck pain.  The patient denies hoarseness, voice changes or increased need to clear the throat.  Bone mineral density: [x]Osteoporosis []Osteopenia []Normal bone density.  Last DEXA 10/2023 with T-score -2.6 of the femoral neck    Surgical risk factors:  Risk of concurrent thyroid disease: right thyroid nodule,  stable in size and prior benign biopsy in 2015  Ultrasound of the thyroid in 10/2023   History of neck radiation: none  Prior neck surgery: none  Cardiovascular risk: last echocardiogram in 05/2023 for preoperative evaluation, has normal EF, PA pressure 30 mmHg  Reports heart murmur  Antiplatelet therapy and anticoagulation: aspirin, fish oil  On Mounjaro    Family history:  No family history of endocrinopathies or endocrine cancers including pituitary tumors, pancreatic neuroendocrine tumors, medullary thyroid cancer or pheochromocytoma/paraganglioma.   Nephrolithiasis in sister and father     Localization studies done prior to this visit:  Ultrasound: not localizing for parathyroid adenoma, done in 2023  Nuclear Medicine Parathyroid Scan: not obtained  4D-CT Parathyroid scan: not obtained    LABORATORY STUDIES:  I personally and independently reviewed relevant lab test results, including the following:    Lab Results   Component Value Date    MG 2.1 08/18/2021    [id number] 47 10/25/2023    TSH 11.112 (H) 01/25/2024         Component      Latest Ref Rng 1/9/2024   Urine Total Volume      mL 1450    Urine Collection Duration      Hr 24    Calcium, Urine      0.0 - 15.0 mg/dL 10.5    Urine Calcium Absolute      mg/Spec 152    CREATININE, URINE (SEND OUT)      15.0 - 325.0 mg/dL 80.0    Creatinine, Timed Urine      40.0 - 75.0 mg/Hr 48.3    Urine Creatinine Absolute      mg/Spec 1160.0        PAST MEDICAL HISTORY:  Patient Active Problem List   Diagnosis    Syringomyelia and syringobulbia    Chronic low back pain    Essential hypertension    Hyperlipidemia    Thoracic spondylosis without myelopathy    Spinal stenosis, lumbar region, without neurogenic claudication    Osteoporosis, idiopathic    Thyroid nodule    Palpitations    GERD (gastroesophageal reflux disease)    Fatty liver disease, nonalcoholic    S/P laparoscopic sleeve gastrectomy    Severe obesity (BMI >= 40)    Back pain    Lumbar disc disease     Anxiety    Depression    Chronic, continuous use of opioids    Snoring    Occult blood in stools    PONV (postoperative nausea and vomiting)    History of prediabetes    Urinary hesitancy    Wheezing    Microscopic hematuria    Abnormality of gait    Menopausal flushing    Primary hypothyroidism    Pyelonephritis    Hypophosphatemia    Hypomagnesemia    Status post total left knee replacement    Hepatic fibrosis, stage 3    FERRELL (nonalcoholic steatohepatitis)    Osteoarthritis of right knee    Staghorn calculus    Type 2 diabetes mellitus with hyperglycemia    Hypercalciuria    Primary hyperparathyroidism    Right renal stone    Pre-operative cardiovascular examination         PAST SURGICAL HISTORY:  Past Surgical History:   Procedure Laterality Date    ANTEGRADE NEPHROSTOGRAPHY Right 2/23/2024    Procedure: NEPHROSTOGRAM, ANTEGRADE;  Surgeon: Suni Patel MD;  Location: [address];  Service: Urology;  Laterality: Right;    APPENDECTOMY      BACK SURGERY      x 6    CHOLECYSTECTOMY      EXTRACTION - STONE Right 2/23/2024    Procedure: EXTRACTION - STONE;  Surgeon: Suni Patel MD;  Location: [facility] OR [address] [place] [address];  Service: Urology;  Laterality: Right;    GASTRECTOMY      Lap Gastric Sleeve    HERNIA REPAIR      HYSTERECTOMY      JOINT REPLACEMENT      LIVER BIOPSY  02/06/2017    steatohepatitis with stage 1 fibrosis    MYELOGRAPHY N/A 11/29/2018    Procedure: MYELOGRAM;  Surgeon: Ander Diagnostic Provider;  Location: [facility] OR [address] [place] [address];  Service: Radiology;  Laterality: N/A;    MYELOGRAPHY N/A 1/7/2019    Procedure: Myelogram;  Surgeon: Aric Surgeon;  Location: [facility] ARIC;  Service: Anesthesiology;  Laterality: N/A;    NEPHROSCOPY Right 2/23/2024    Procedure: NEPHROSCOPY;  Surgeon: Suni Patel MD;  Location: [address];  Service: Urology;  Laterality: Right;  2 hr    NEPHROSTOGRAPHY Right 2/19/2024    Procedure: Nephrostogram;  Surgeon: Suni Patel MD;  Location: [facility]  OR 2ND FLR;  Service: Urology;  Laterality: Right;    OOPHORECTOMY      PERCUTANEOUS CRYOTHERAPY OF PERIPHERAL NERVE USING LIQUID NITROUS OXIDE IN CLOSED NEEDLE DEVICE Left 4/29/2019    Procedure: CRYOTHERAPY, NERVE, PERIPHERAL, PERCUTANEOUS, USING LIQUID NITROUS OXIDE IN CLOSED NEEDLE DEVICE-iovera left knee;  Surgeon: Chavo Gold III, MD;  Location: [facility] CATH LAB;  Service: Pain Management;  Laterality: Left;    PERCUTANEOUS CRYOTHERAPY OF PERIPHERAL NERVE USING LIQUID NITROUS OXIDE IN CLOSED NEEDLE DEVICE Right 8/2/2021    Procedure: CRYOTHERAPY, NERVE, PERIPHERAL, PERCUTANEOUS, USING LIQUID NITROUS OXIDE IN CLOSED NEEDLE DEVICE;  Surgeon: Saritha Proctor NP;  Location: [facility];  Service: Pain Management;  Laterality: Right;  RIGHT KNEE IOVERA    PERCUTANEOUS NEPHROLITHOTOMY Right 2/19/2024    Procedure: NEPHROLITHOTOMY, PERCUTANEOUS;  Surgeon: Suni Patel MD;  Location: [facility] OR 2ND FLR;  Service: Urology;  Laterality: Right;  2 HRS    PERCUTANEOUS NEPHROSCOPY  2/19/2024    Procedure: NEPHROSCOPY, PERCUTANEOUS;  Surgeon: Suni Patel MD;  Location: [facility] OR 2ND FLR;  Service: Urology;;    PERCUTANEOUS NEPHROSTOMY Right 2/19/2024    Procedure: CREATION, NEPHROSTOMY, PERCUTANEOUS;  Surgeon: Suni Patel MD;  Location: [facility] OR 2ND FLR;  Service: Urology;  Laterality: Right;    SPINAL FUSION      TONSILLECTOMY, ADENOIDECTOMY      TOTAL KNEE ARTHROPLASTY Left 5/1/2019    Procedure: REPLACEMENT-KNEE-TOTAL;  Surgeon: John L. Ochsner Jr., MD;  Location: [facility] OR 2ND FLR;  Service: Orthopedics;  Laterality: Left;    URETEROSCOPY  2/19/2024    Procedure: URETEROSCOPY;  Surgeon: Suni Patel MD;  Location: [facility] OR 2ND FLR;  Service: Urology;;    URETEROSCOPY, ANTEGRADE Right 2/23/2024    Procedure: URETEROSCOPY, ANTEGRADE;  Surgeon: Suni Patel MD;  Location: [facility] OR 1ST FLR;  Service: Urology;  Laterality: Right;        MEDICATIONS:  Current Outpatient  Medications   Medication Sig Dispense Refill    (Magic mouthwash) 1:1:1 diphenhydrAMINE(Benadryl) 12.5mg/5ml liq, aluminum & magnesium hydroxide-simethicone (Maalox), LIDOcaine viscous 2% Swish and spit 5 mLs every 4 (four) hours as needed (gum pain). for mouth sores 180 each 0    alcohol swabs PadM Apply 1 each topically once daily. 100 each 3    ascorbic acid, vitamin C, (VITAMIN C) 500 MG tablet       BIOTIN ORAL Take 10,000 mg by mouth every morning.       blood glucose control, low (TRUE METRIX LEVEL 1) Soln USE AS DIRECTED WITH GLUCOSE METER 1 each 0    blood sugar diagnostic (TRUE METRIX GLUCOSE TEST STRIP) Strp TEST BLOOD SUGAR EVERY  strip 3    cyclobenzaprine (FLEXERIL) 10 MG tablet TK 1 T PO QD- as needed for muscle spasms  0    DULoxetine (CYMBALTA) 60 MG capsule TAKE 1 CAPSULE EVERY DAY (Patient taking differently: Take by mouth every evening.) 90 capsule 3    fish oil-omega-3 fatty acids 300-1,000 mg capsule Take 1,200 mg by mouth 2 (two) times daily.       fluticasone propionate (FLONASE) 50 mcg/actuation nasal spray USE 2 SPRAYS NASALLY ONE TIME DAILY (Patient taking differently: daily as needed.) 48 g 3    gabapentin (NEURONTIN) 800 MG tablet Take 800 mg by mouth 3 (three) times daily. Takes 2-3 times per day  1    HYDROcodone-acetaminophen (NORCO)  mg per tablet Take 1 tablet by mouth 4 (four) times daily as needed.      ketoconazole (NIZORAL) 2 % cream Apply topically once daily. 1 each 0    levothyroxine (SYNTHROID) 137 MCG Tab tablet Take 1 tablet (137 mcg total) by mouth before breakfast. 90 tablet 3    lisinopriL (PRINIVIL,ZESTRIL) 2.5 MG tablet TAKE 1 TABLET EVERY DAY 90 tablet 3    metFORMIN (GLUCOPHAGE) 500 MG tablet TAKE 1 TABLET (500 MG TOTAL) BY MOUTH 2 (TWO) TIMES DAILY WITH MEALS. 180 tablet 3    multivitamin capsule Take 1 capsule by mouth every morning.      omeprazole (PRILOSEC) 40 MG capsule TAKE 1 CAPSULE EVERY DAY (Patient taking differently: Take by mouth every  "morning.) 90 capsule 3    oxyCODONE-acetaminophen (PERCOCET)  mg per tablet Take 1 tablet by mouth 4 (four) times daily as needed for Pain. 10 tablet 0    potassium citrate (UROCIT-K) 5 mEq (540 mg) TbSR Take 2 tablets (10 mEq total) by mouth 3 (three) times daily with meals. 180 tablet 11    rosuvastatin (CRESTOR) 10 MG tablet TAKE 1 TABLET EVERY NIGHT 90 tablet 3    tamsulosin (FLOMAX) 0.4 mg Cap TAKE 1 CAPSULE(0.4 MG) BY MOUTH AFTER DINNER 30 capsule 1    tirzepatide (MOUNJARO) 12.5 mg/0.5 mL PnIj Inject 12.5 mg into the skin every 7 days. 4 Pen 3    TRUEPLUS LANCETS 33 gauge Misc TEST BLOOD SUGAR EVERY  each 3    zinc sulfate (ZINCATE) 50 mg zinc (220 mg) capsule       zolpidem (AMBIEN) 10 mg Tab TAKE 1 TABLET BY MOUTH EVERY NIGHT AT BEDTIME AS NEEDED FOR INSOMNIA 90 tablet 0     Current Facility-Administered Medications   Medication Dose Route Frequency Provider Last Rate Last Admin    triamcinolone acetonide injection 40 mg  40 mg Intradermal 1 time in Clinic/HOD Teri Manuel MD           ALLERGIES:  Review of patient's allergies indicates:   Allergen Reactions    Adhesive Dermatitis and Blisters    Adhesive tape-silicones Dermatitis     The longer the adhesive stays on, the worse the irritation    Mastisol adhesive [gum xectmt-fgfrda-ivth-alcohol] Itching, Rash and Blisters     "Looked like poison ivy"    Sulfamethoxazole-trimethoprim Itching and Anxiety     Has a "nervous feeling."  "Jittery"  Also had an upset stomach.Has taken recently and the reaction was "less intense"       SOCIAL HISTORY:  Social History     Socioeconomic History    Marital status:    Occupational History    Occupation: disable   Tobacco Use    Smoking status: Never    Smokeless tobacco: Never    Tobacco comments:     Tried a few cigarettes during teenage   Substance and Sexual Activity    Alcohol use: Not Currently     Comment: socially    Drug use: No    Sexual activity: Never   Other Topics Concern    Are " you pregnant or think you may be? No    Breast-feeding No     Social Determinants of Health     Financial Resource Strain: High Risk (11/15/2023)    Overall Financial Resource Strain (CARDIA)     Difficulty of Paying Living Expenses: Hard   Food Insecurity: No Food Insecurity (11/15/2023)    Hunger Vital Sign     Worried About Running Out of Food in the Last Year: Never true     Ran Out of Food in the Last Year: Never true   Transportation Needs: Unmet Transportation Needs (11/15/2023)    PRAPARE - Transportation     Lack of Transportation (Medical): Yes     Lack of Transportation (Non-Medical): No   Physical Activity: Inactive (11/15/2023)    Exercise Vital Sign     Days of Exercise per Week: 0 days     Minutes of Exercise per Session: 0 min   Stress: Stress Concern Present (11/15/2023)    [employer] - Occupational Stress Questionnaire     Feeling of Stress : To some extent   Social Connections: Unknown (11/15/2023)    Social Connection and Isolation Panel [NHANES]     Frequency of Communication with Friends and Family: More than three times a week     Frequency of Social Gatherings with Friends and Family: Twice a week     Active Member of Clubs or Organizations: Yes     Attends Club or Organization Meetings: More than 4 times per year     Marital Status: Living with partner   Housing Stability: Low Risk  (11/15/2023)    Housing Stability Vital Sign     Unable to Pay for Housing in the Last Year: No     Number of Places Lived in the Last Year: 1     Unstable Housing in the Last Year: No         FAMILY HISTORY:  Family History   Problem Relation Age of Onset    Heart disease Mother     Heart disease Father     Cancer Father     COPD Father     Hypertension Sister     Scoliosis Sister     Hypertension Brother     Heart disease Maternal Grandmother     Heart disease Maternal Aunt     Heart disease Maternal Uncle     Heart disease Paternal Aunt     Breast cancer Paternal Aunt     Heart  "disease Paternal Uncle     Cancer Paternal Uncle     Acne Other     Eczema Other     Melanoma Neg Hx     Psoriasis Neg Hx     Lupus Neg Hx         REVIEW OF SYSTEMS:  A detailed review of systems has been reviewed with the patient, pertinent positives and negatives are presented in the note and is otherwise negative.    PHYSICAL EXAMINATION:  Vital Signs: /73   Pulse 88   Ht 5' 1" (1.549 m)   Wt 93.2 kg (205 lb 9.3 oz)   LMP 12/06/2007   SpO2 95%   BMI 38.84 kg/m²     Constitutional: well-developed, well-nourished, no acute distress   HENT: no lid lag, no exophthalmos, no scleral icterus, moist mucous membranes, good dentition  Neck: supple, trachea in midline, thyroid is soft and moves well with swallowing, small palpable nodule, adequate neck extension, scars from right IJ catheters  Heme/Lymph: no cervical or supraclavicular lymphadenopathy  Respiratory: normal respiratory effort, no wheezes or stridor  Cardiovascular: regular rate and rhythm  Extremities: no edema  Skin: warm and dry, no rashes  Neurologic: gross resting tremor of outstretched hands, voice strong  Vascular: radial pulses palpable bilaterally  Psychiatric: affect normal    IMAGING STUDIES:  I personally and independently reviewed, visualized and interpreted the images of the below listed radiology studies (including ultrasound from 2023) and my findings are notable for small thyroid nodules without grossly suspicious features, diminutive thyroid gland, no lesions suspicious for a parathyroid adenoma.  Reports below for reference.    US Soft Tissue Head Neck Thyroid 10/31/2023  CLINICAL HISTORY:.  Nontoxic single thyroid nodule    TECHNIQUE:Ultrasound of the thyroid and cervical lymph nodes was performed.    COMPARISON:Thyroid ultrasound 06/16/2021, 11/11/2019.    FINDINGS:  The thyroid is normal in size.  Right lobe of the thyroid measures 3.4 x 1.9 x 1.6 cm.  Isthmus measures 0.2 cm in AP dimension.  Left lobe of the thyroid measures " 3.3 x 1.7 x 1.3 cm.  Normal thyroid parenchyma.    Bilateral thyroid nodules.    Right lobe: Midpole solid isoechoic nodule measuring 1.9 cm (previously 1.8 cm).    Left lobe:  Upper pole solid isoechoic nodule measuring 1.1 cm (previously 1.1 cm), TI-RADS 3.  Lower pole solid isoechoic nodule measuring 0.9 cm, TI-RADS 3.  Cervical lymph nodes demonstrate normal morphology and size.    Impression  Stable right thyroid nodule, previously biopsied with benign pathology, and stable left thyroid TI-RADS 3 nodule.  These are stable in size since 2017.  Further imaging surveillance as clinically warranted.  No other nodules that meet criteria for imaging follow-up or FNA.    Electronically signed by resident: Eric Parker  Date:    10/31/2023  Time:    11:15    Electronically signed by: Teri Khan MD  Date:    10/31/2023  Time:    11:37    DXA Bone Density Axial Skeleton 1 or more sites 10/31/2023  CLINICAL HISTORY:Other osteoporosis without current pathological fracture.  57 y/o female with no history of fractures.  She had surgical menopause at 41 y/o.  Pt is taking Vit D supplements.  She has a history of Hyperparathyroidism, Diabetes and Renal Stones.  She does not exercise or smoke.    TECHNIQUE:DXA specification: [facility] (S/N [id number])    Bone Mineral Density scanning was performed over the hip and lumbar spine.    Review of the images confirms satisfactory positioning and technique.    COMPARISON:  Comparison study done on 11/13/2014.    Lumbar spine:     BMD  g/cm2 and T-score .  Total Hip:           BMD 0.989 g/cm2 and T-score 0.4.  Distal 1/3 radius: BMD 0.752 g/cm2 and T-score 1.2.    FINDINGS:  Lumbar spine (L1-L4):           BMD is  g/cm2, T-score is , and Z-score is .    Total hip:                                BMD is 0.948 g/cm2, T-score is 0.0, and Z-score is 0.9.    Femoral neck:                          BMD is 0.558 g/cm2, T-score is -2.6, and Z-score is -1.4.    Distal 1/3  radius:                      BMD is 0.690 g/cm2, T-score is 0.1, and Z-score is 1.3.    FRAX:    10% risk of a major osteoporotic fracture in the next 10 years.  1.8% risk of hip fracture in the next 10 years.    Impression  *Osteoporosis based on T-score below -2.5  *Fracture risk is high  *Compared with previous DXA, BMD at the lumbar spine has declined by 8.3%, and BMD at the total hip has declined by 4.1%.    Electronically signed by: Nahum Castro  Date:    10/31/2023  Time:    17:57      IMPRESSION:  I had the pleasure of seeing Ms. Mccarthy in Endocrine Surgical consultation regarding the biochemical diagnosis of normocalcemic primary hyperparathyroidism.     We discussed that hyperparathyroidism can compromise bone and cardiovascular health. In addition to classic symptoms such as kidney stones and bone loss, hyperparathyroidism can be associated with multiple symptoms including fatigue, depression, memory loss, pruritis, polyuria, nocturia, constipation, polydipsia, and musculoskeletal aches and pains. Hyperparathyroidism can also worsen hypertension.  I discussed the implications of non-operative observation as well as the standard of care surgical treatment of primary hyperparathyroidism.  Based on the current clinical findings and a thorough discussion about the risks, benefits, and alternatives, the patient elected to proceed with parathyroidectomy.      We extensively discussed the pros and cons for surgical intervention, in this case parathyroidectomy with intraoperative parathyroid hormone monitoring.  We discussed that in the majority of cases, a single adenoma is the culprit gland. However, multigland disease is possible and multigland disease has a lower likelihood of radiographic localization.  Parathyroidectomy for single gland disease is a same day surgery while four-gland parathyroid exploration may require an overnight stay. We discussed that in all cases, parathyroidectomy has an attendant  risk of postoperative hypocalcemia which can be mitigated with calcium and vitamin D supplementation. Other possible complications associated with this may include, but may not be restricted to hoarseness, recurrent laryngeal nerve injury - temporary or permanent, superior laryngeal nerve injury - temporary or permanent, hypocalcemia and hypoparathyroidism - temporary or permanent, neck hematoma, bleeding, infection, scarring, wound healing problems and possible death. We discussed that in rare cases, parathyroid exploration may be negative. Recurrent and persistent disease are also possible.  We also discussed that parathyroidectomy may not completely resolve or improve the nephrolithiasis.     All questions were answered and the patient expressed understanding of all the risks, benefits and alternatives, and agreed to proceed with the plan despite the risks.      Problem List Items Addressed This Visit       Primary hyperparathyroidism     Symptomatic normocalcemic primary hyperparathyroidism with nephrolithiasis and osteoporosis, meets criteria for parathyroid surgery.  Localization studies are pending.    - OR for parathyroidectomy with intraoperative PTH monitoring  - 4D CT neck imaging for assisted localization.  The patient understands that positive localization is not needed nor does negative imaging preclude surgery for primary hyperparathyroidism  - Maintain adequate hydration and avoid the use of calcium supplements         Relevant Orders    CT Neck Parathyroid (4D)    Case Request Operating Room: PARATHYROIDECTOMY With Intraoperative PTH Levels (Completed)    CREATININE, SERUM      Ligia Raya MD  Staff Surgeon  Endocrine Surgery  3/21/24

## 2024-03-21 ENCOUNTER — OFFICE VISIT (OUTPATIENT)
Dept: SURGERY | Facility: CLINIC | Age: 59
End: 2024-03-21
Payer: MEDICARE

## 2024-03-21 VITALS
SYSTOLIC BLOOD PRESSURE: 131 MMHG | WEIGHT: 205.56 LBS | BODY MASS INDEX: 38.81 KG/M2 | OXYGEN SATURATION: 95 % | DIASTOLIC BLOOD PRESSURE: 73 MMHG | HEART RATE: 88 BPM | HEIGHT: 61 IN

## 2024-03-21 DIAGNOSIS — E21.0 PRIMARY HYPERPARATHYROIDISM: ICD-10-CM

## 2024-03-21 PROCEDURE — 99205 OFFICE O/P NEW HI 60 MIN: CPT | Mod: S$GLB,,, | Performed by: STUDENT IN AN ORGANIZED HEALTH CARE EDUCATION/TRAINING PROGRAM

## 2024-03-21 PROCEDURE — 3078F DIAST BP <80 MM HG: CPT | Mod: CPTII,S$GLB,, | Performed by: STUDENT IN AN ORGANIZED HEALTH CARE EDUCATION/TRAINING PROGRAM

## 2024-03-21 PROCEDURE — 3075F SYST BP GE 130 - 139MM HG: CPT | Mod: CPTII,S$GLB,, | Performed by: STUDENT IN AN ORGANIZED HEALTH CARE EDUCATION/TRAINING PROGRAM

## 2024-03-21 PROCEDURE — 1159F MED LIST DOCD IN RCRD: CPT | Mod: CPTII,S$GLB,, | Performed by: STUDENT IN AN ORGANIZED HEALTH CARE EDUCATION/TRAINING PROGRAM

## 2024-03-21 PROCEDURE — 3008F BODY MASS INDEX DOCD: CPT | Mod: CPTII,S$GLB,, | Performed by: STUDENT IN AN ORGANIZED HEALTH CARE EDUCATION/TRAINING PROGRAM

## 2024-03-21 PROCEDURE — 1111F DSCHRG MED/CURRENT MED MERGE: CPT | Mod: CPTII,S$GLB,, | Performed by: STUDENT IN AN ORGANIZED HEALTH CARE EDUCATION/TRAINING PROGRAM

## 2024-03-21 PROCEDURE — 3044F HG A1C LEVEL LT 7.0%: CPT | Mod: CPTII,S$GLB,, | Performed by: STUDENT IN AN ORGANIZED HEALTH CARE EDUCATION/TRAINING PROGRAM

## 2024-03-21 PROCEDURE — 99999 PR PBB SHADOW E&M-EST. PATIENT-LVL V: CPT | Mod: PBBFAC,,, | Performed by: STUDENT IN AN ORGANIZED HEALTH CARE EDUCATION/TRAINING PROGRAM

## 2024-03-21 NOTE — ASSESSMENT & PLAN NOTE
Symptomatic normocalcemic primary hyperparathyroidism with nephrolithiasis and osteoporosis, meets criteria for parathyroid surgery.  Localization studies are pending.    - OR for parathyroidectomy with intraoperative PTH monitoring  - 4D CT neck imaging for assisted localization.  The patient understands that positive localization is not needed nor does negative imaging preclude surgery for primary hyperparathyroidism  - Maintain adequate hydration and avoid the use of calcium supplements

## 2024-03-31 RX ORDER — METRONIDAZOLE 500 MG/1
500 TABLET ORAL
Status: CANCELLED | OUTPATIENT
Start: 2024-03-31

## 2024-04-01 ENCOUNTER — OFFICE VISIT (OUTPATIENT)
Dept: UROLOGY | Facility: CLINIC | Age: 59
End: 2024-04-01
Payer: MEDICARE

## 2024-04-01 ENCOUNTER — HOSPITAL ENCOUNTER (OUTPATIENT)
Dept: RADIOLOGY | Facility: HOSPITAL | Age: 59
Discharge: HOME | End: 2024-04-01
Attending: STUDENT IN AN ORGANIZED HEALTH CARE EDUCATION/TRAINING PROGRAM
Payer: MEDICARE

## 2024-04-01 DIAGNOSIS — E21.0 PRIMARY HYPERPARATHYROIDISM: ICD-10-CM

## 2024-04-01 DIAGNOSIS — N20.0 STAGHORN KIDNEY STONES: Primary | ICD-10-CM

## 2024-04-01 LAB
CREAT SERPL-MCNC: 0.9 MG/DL (ref 0.5–1.4)
SAMPLE: NORMAL

## 2024-04-01 PROCEDURE — 99499 UNLISTED E&M SERVICE: CPT | Mod: S$GLB,,, | Performed by: UROLOGY

## 2024-04-01 PROCEDURE — 25500020 PHARM REV CODE 255: Performed by: STUDENT IN AN ORGANIZED HEALTH CARE EDUCATION/TRAINING PROGRAM

## 2024-04-01 PROCEDURE — 87086 URINE CULTURE/COLONY COUNT: CPT

## 2024-04-01 PROCEDURE — 70492 CT SFT TSUE NCK W/O & W/DYE: CPT | Mod: 26,,, | Performed by: RADIOLOGY

## 2024-04-01 PROCEDURE — 70492 CT SFT TSUE NCK W/O & W/DYE: CPT | Mod: TC

## 2024-04-01 RX ORDER — CIPROFLOXACIN 500 MG/1
500 TABLET ORAL EVERY 12 HOURS
Qty: 14 TABLET | Refills: 0 | Status: SHIPPED | OUTPATIENT
Start: 2024-04-01 | End: 2024-04-08

## 2024-04-01 RX ADMIN — IOHEXOL 75 ML: 350 INJECTION, SOLUTION INTRAVENOUS at 10:04

## 2024-04-01 NOTE — PROGRESS NOTES
Urology ([facility]) H&P  Staff: Suni Patel MD    CC:  Nephrolithiasis     HPI:  Angie Mccarthy is a 58 y.o. female with history of bilateral staghorn calculi.    She underwent right PCNL on 02/19/2024.  She underwent second-look right PCNL 02/23/2024 to remove residual lower pole stone burden.   She remains with a left lower pole staghorn. She is scheduled to undergo left PCNT placement on 04/05/24 by IR. She is scheduled for subsequent left PCNL on 04/08/2024.     Of note, she has hyperparathyroidism and is scheduled to undergo parathyroidectomy in 05/2024 with Dr. Raya.     She is on Mounjaro. She last took this on 03/23/24. She does not take anticoagulation.  She takes baby aspirin which held starting 03/31/24.     ROS:  Neg except per HPI    Past Medical History:   Diagnosis Date    Allergy     seasonal    Arthritis     Blood transfusion     with back surgeries    Chronic back pain     Diabetes mellitus, type 2     GERD (gastroesophageal reflux disease)     Hyperlipidemia     Hypertension     Hypothyroidism     Thyroid nodule    Joint pain     Kidney stones     Morbid obesity 12/14/2012    NAFLD (nonalcoholic fatty liver disease)     OCD (obsessive compulsive disorder)     Osteoporosis     PONV (postoperative nausea and vomiting)     Persistent, Motion sickness with boat rides, Scopolamine helped -still had nausea    Restless leg syndrome     Sciatica of right side associated with disorder of lumbosacral spine     Spinal stenosis of lumbar region     Thoracic spondylosis        Past Surgical History:   Procedure Laterality Date    ANTEGRADE NEPHROSTOGRAPHY Right 2/23/2024    Procedure: NEPHROSTOGRAM, ANTEGRADE;  Surgeon: Suni Patel MD;  Location: [facility] OR [address] [place] [address];  Service: Urology;  Laterality: Right;    APPENDECTOMY      BACK SURGERY      x 6    CHOLECYSTECTOMY      EXTRACTION - STONE Right 2/23/2024    Procedure: EXTRACTION - STONE;  Surgeon: Suni Patel MD;   [address];  Service: Urology;  Laterality: Right;    GASTRECTOMY      Lap Gastric Sleeve    HERNIA REPAIR      HYSTERECTOMY      JOINT REPLACEMENT      LIVER BIOPSY  02/06/2017    steatohepatitis with stage 1 fibrosis    MYELOGRAPHY N/A 11/29/2018    Procedure: MYELOGRAM;  Surgeon: Ander Diagnostic Provider;  [facility];  Service: Radiology;  Laterality: N/A;    MYELOGRAPHY N/A 1/7/2019    Procedure: Myelogram;  Surgeon: Lissett Surgeon;  Location: [facility];  Service: Anesthesiology;  Laterality: N/A;    NEPHROSCOPY Right 2/23/2024    Procedure: NEPHROSCOPY;  Surgeon: Suni Patel MD;  [address];  Service: Urology;  Laterality: Right;  2 hr    NEPHROSTOGRAPHY Right 2/19/2024    Procedure: Nephrostogram;  Surgeon: Suni Patel MD;  Location: [facility] OR [address] [place] [address];  Service: Urology;  Laterality: Right;    OOPHORECTOMY      PERCUTANEOUS CRYOTHERAPY OF PERIPHERAL NERVE USING LIQUID NITROUS OXIDE IN CLOSED NEEDLE DEVICE Left 4/29/2019    Procedure: CRYOTHERAPY, NERVE, PERIPHERAL, PERCUTANEOUS, USING LIQUID NITROUS OXIDE IN CLOSED NEEDLE DEVICE-iovera left knee;  Surgeon: Chavo Gold III, MD;  Location: [facility] CATH LAB;  Service: Pain Management;  Laterality: Left;    PERCUTANEOUS CRYOTHERAPY OF PERIPHERAL NERVE USING LIQUID NITROUS OXIDE IN CLOSED NEEDLE DEVICE Right 8/2/2021    Procedure: CRYOTHERAPY, NERVE, PERIPHERAL, PERCUTANEOUS, USING LIQUID NITROUS OXIDE IN CLOSED NEEDLE DEVICE;  Surgeon: Saritha Proctor NP;  Location: [facility];  Service: Pain Management;  Laterality: Right;  RIGHT KNEE IOVERA    PERCUTANEOUS NEPHROLITHOTOMY Right 2/19/2024    Procedure: NEPHROLITHOTOMY, PERCUTANEOUS;  Surgeon: Suni Patel MD;  Location: [address];  Service: Urology;  Laterality: Right;  2 HRS    PERCUTANEOUS NEPHROSCOPY  2/19/2024    Procedure: NEPHROSCOPY, PERCUTANEOUS;  Surgeon: Suni Patel MD;  Location: [address];   Service: Urology;;    PERCUTANEOUS NEPHROSTOMY Right 2/19/2024    Procedure: CREATION, NEPHROSTOMY, PERCUTANEOUS;  Surgeon: Suni Patel MD;  Location: [facility] OR [address] [place] [address];  Service: Urology;  Laterality: Right;    SPINAL FUSION      TONSILLECTOMY, ADENOIDECTOMY      TOTAL KNEE ARTHROPLASTY Left 5/1/2019    Procedure: REPLACEMENT-KNEE-TOTAL;  Surgeon: John L. Ochsner Jr., MD;  Location: [facility] OR [address] [place] [address];  Service: Orthopedics;  Laterality: Left;    URETEROSCOPY  2/19/2024    Procedure: URETEROSCOPY;  Surgeon: Suni Patel MD;  Location: [facility] OR 2ND FLR;  Service: Urology;;    URETEROSCOPY, ANTEGRADE Right 2/23/2024    Procedure: URETEROSCOPY, ANTEGRADE;  Surgeon: Suni Patel MD;  Location: [facility] OR 1ST FLR;  Service: Urology;  Laterality: Right;       Social History     Socioeconomic History    Marital status:    Occupational History    Occupation: disable   Tobacco Use    Smoking status: Never    Smokeless tobacco: Never    Tobacco comments:     Tried a few cigarettes during teenage   Substance and Sexual Activity    Alcohol use: Not Currently     Comment: socially    Drug use: No    Sexual activity: Never   Other Topics Concern    Are you pregnant or think you may be? No    Breast-feeding No     Social Determinants of Health     Financial Resource Strain: High Risk (11/15/2023)    Overall Financial Resource Strain (CARDIA)     Difficulty of Paying Living Expenses: Hard   Food Insecurity: No Food Insecurity (11/15/2023)    Hunger Vital Sign     Worried About Running Out of Food in the Last Year: Never true     Ran Out of Food in the Last Year: Never true   Transportation Needs: Unmet Transportation Needs (11/15/2023)    PRAPARE - Transportation     Lack of Transportation (Medical): Yes     Lack of Transportation (Non-Medical): No   Physical Activity: Inactive (11/15/2023)    Exercise Vital Sign     Days of Exercise per Week: 0 days     Minutes of Exercise per Session: 0 min  "  Stress: Stress Concern Present (11/15/2023)    [employer] - Occupational Stress Questionnaire     Feeling of Stress : To some extent   Social Connections: Unknown (11/15/2023)    Social Connection and Isolation Panel [NHANES]     Frequency of Communication with Friends and Family: More than three times a week     Frequency of Social Gatherings with Friends and Family: Twice a week     Active Member of Clubs or Organizations: Yes     Attends Club or Organization Meetings: More than 4 times per year     Marital Status: Living with partner   Housing Stability: Low Risk  (11/15/2023)    Housing Stability Vital Sign     Unable to Pay for Housing in the Last Year: No     Number of Places Lived in the Last Year: 1     Unstable Housing in the Last Year: No       Family History   Problem Relation Age of Onset    Heart disease Mother     Heart disease Father     Cancer Father     COPD Father     Hypertension Sister     Scoliosis Sister     Hypertension Brother     Heart disease Maternal Grandmother     Heart disease Maternal Aunt     Heart disease Maternal Uncle     Heart disease Paternal Aunt     Breast cancer Paternal Aunt     Heart disease Paternal Uncle     Cancer Paternal Uncle     Acne Other     Eczema Other     Melanoma Neg Hx     Psoriasis Neg Hx     Lupus Neg Hx        Review of patient's allergies indicates:   Allergen Reactions    Adhesive Dermatitis and Blisters    Adhesive tape-silicones Dermatitis     The longer the adhesive stays on, the worse the irritation    Mastisol adhesive [gum [id number]-[id number]-alcohol] Itching, Rash and Blisters     "Looked like poison ivy"    Sulfamethoxazole-trimethoprim Itching and Anxiety     Has a "nervous feeling."  "Jittery"  Also had an upset stomach.Has taken recently and the reaction was "less intense"       Current Outpatient Medications on File Prior to Visit   Medication Sig Dispense Refill    alcohol swabs PadM Apply 1 each topically once " daily. 100 each 3    ascorbic acid, vitamin C, (VITAMIN C) 500 MG tablet       BIOTIN ORAL Take 10,000 mg by mouth every morning.       blood glucose control, low (TRUE METRIX LEVEL 1) Soln USE AS DIRECTED WITH GLUCOSE METER 1 each 0    blood sugar diagnostic (TRUE METRIX GLUCOSE TEST STRIP) Strp TEST BLOOD SUGAR EVERY  strip 3    cyclobenzaprine (FLEXERIL) 10 MG tablet TK 1 T PO QD- as needed for muscle spasms  0    DULoxetine (CYMBALTA) 60 MG capsule TAKE 1 CAPSULE EVERY DAY (Patient taking differently: Take by mouth every evening.) 90 capsule 3    fish oil-omega-3 fatty acids 300-1,000 mg capsule Take 1,200 mg by mouth 2 (two) times daily.       fluticasone propionate (FLONASE) 50 mcg/actuation nasal spray USE 2 SPRAYS NASALLY ONE TIME DAILY (Patient taking differently: daily as needed.) 48 g 3    gabapentin (NEURONTIN) 800 MG tablet Take 800 mg by mouth 3 (three) times daily. Takes 2-3 times per day  1    HYDROcodone-acetaminophen (NORCO)  mg per tablet Take 1 tablet by mouth 4 (four) times daily as needed.      ketoconazole (NIZORAL) 2 % cream Apply topically once daily. 1 each 0    levothyroxine (SYNTHROID) 137 MCG Tab tablet Take 1 tablet (137 mcg total) by mouth before breakfast. 90 tablet 3    lisinopriL (PRINIVIL,ZESTRIL) 2.5 MG tablet TAKE 1 TABLET EVERY DAY 90 tablet 3    metFORMIN (GLUCOPHAGE) 500 MG tablet TAKE 1 TABLET (500 MG TOTAL) BY MOUTH 2 (TWO) TIMES DAILY WITH MEALS. 180 tablet 3    multivitamin capsule Take 1 capsule by mouth every morning.      omeprazole (PRILOSEC) 40 MG capsule TAKE 1 CAPSULE EVERY DAY (Patient taking differently: Take by mouth every morning.) 90 capsule 3    oxyCODONE-acetaminophen (PERCOCET)  mg per tablet Take 1 tablet by mouth 4 (four) times daily as needed for Pain. 10 tablet 0    potassium citrate (UROCIT-K) 5 mEq (540 mg) TbSR Take 2 tablets (10 mEq total) by mouth 3 (three) times daily with meals. 180 tablet 11    rosuvastatin (CRESTOR) 10 MG  "tablet TAKE 1 TABLET EVERY NIGHT 90 tablet 3    tamsulosin (FLOMAX) 0.4 mg Cap TAKE 1 CAPSULE(0.4 MG) BY MOUTH AFTER DINNER 30 capsule 1    tirzepatide (MOUNJARO) 12.5 mg/0.5 mL PnIj Inject 12.5 mg into the skin every 7 days. 4 Pen 3    TRUEPLUS LANCETS 33 gauge Misc TEST BLOOD SUGAR EVERY  each 3    zinc sulfate (ZINCATE) 50 mg zinc (220 mg) capsule       zolpidem (AMBIEN) 10 mg Tab TAKE 1 TABLET BY MOUTH EVERY NIGHT AT BEDTIME AS NEEDED FOR INSOMNIA 90 tablet 0     Current Facility-Administered Medications on File Prior to Visit   Medication Dose Route Frequency Provider Last Rate Last Admin    triamcinolone acetonide injection 40 mg  40 mg Intradermal 1 time in Clinic/HOD Teri Manuel MD           Anticoagulation:  No    Physical Exam:  General: No acute distress, well developed. AAOx3  Head: Normocephalic, Atraumatic  Eyes: Extra-occular movements intact, No discharge  Neck: supple, symmetrical, trachea midline  Lungs: normal respiratory effort, no respiratory distress, no wheezes  CV: regular rate, 2+ pulses  Abdomen: soft, non-tender, non-distended, no organomegaly; Prior right PCNL access site healing well.  MSK: no edema, no deformities, normal ROM  Skin: skin color, texture, turgor normal.  Neurologic: no focal deficits, sensation intact    Labs:    Urine dipstick today - Positive for protein and blood, negative for all other components.    Lab Results   Component Value Date    WBC 9.20 02/20/2024    HGB 10.2 (L) 02/20/2024    HCT 33.2 (L) 02/20/2024    MCV 76 (L) 02/20/2024     02/20/2024           BMP  Lab Results   Component Value Date     02/20/2024    K 4.6 02/20/2024     02/20/2024    CO2 27 02/20/2024    BUN 11 02/20/2024    CREATININE 0.7 02/20/2024    CALCIUM 9.3 02/20/2024    ANIONGAP 7 (L) 02/20/2024    EGFRNORACEVR >60.0 02/20/2024       No results found for: "PSA"    Imaging:      Impression:     Residual stones particular in the right lower pole kidney.  Air in " the collecting system.  Probable left lower pole staghorn calculus.  Additional nonobstructive left renal stones.  Right percutaneous nephrostomy tube in stable position and interval removal of right nephroureteral stent.     High density foci and focus of air in the distal right ureter with mild fullness of the right renal collecting system, mild thickening of the right ureter with possible subtle Periureteral fat stranding.  High-density foci are likely obstructive stones.     Fracture left Zamudio niall.     Additional findings as detailed above.     This report was flagged in Epic as abnormal.     Electronically signed by resident: Marcie Contreras  Date:                                            02/20/2024  Time:                                           07:45     Electronically signed by: Talib Goldstein MD  Date:                                            02/20/2024  Time:                                           08:17    Assessment: Angie Mccarthy is a 58 y.o. female with left staghorn calculi.    Plan:     1. To OR on 04/08/24 for left PCNL  2. Consents signed   3. I have explained the risk, benefits, and alternatives of the procedure in detail. The patient voices understanding and all questions have been answered. The patient agrees to proceed as planned.   4. Instructed to continue to hold aspirin and Mounjaro until after her PCNL  5. Urine for culture today. Will start ciprofloxacin and tailor to culture.    Nathan Mendoza MD

## 2024-04-01 NOTE — H&P (VIEW-ONLY)
Urology ([facility]) H&P  Staff: Suni Patel MD    CC:  Nephrolithiasis     HPI:  Angie Mccarthy is a 58 y.o. female with history of bilateral staghorn calculi.    She underwent right PCNL on 02/19/2024.  She underwent second-look right PCNL 02/23/2024 to remove residual lower pole stone burden.   She remains with a left lower pole staghorn. She is scheduled to undergo left PCNT placement on 04/05/24 by IR. She is scheduled for subsequent left PCNL on 04/08/2024.     Of note, she has hyperparathyroidism and is scheduled to undergo parathyroidectomy in 05/2024 with Dr. Raya.     She is on Mounjaro. She last took this on 03/23/24. She does not take anticoagulation.  She takes baby aspirin which held starting 03/31/24.     ROS:  Neg except per HPI    Past Medical History:   Diagnosis Date    Allergy     seasonal    Arthritis     Blood transfusion     with back surgeries    Chronic back pain     Diabetes mellitus, type 2     GERD (gastroesophageal reflux disease)     Hyperlipidemia     Hypertension     Hypothyroidism     Thyroid nodule    Joint pain     Kidney stones     Morbid obesity 12/14/2012    NAFLD (nonalcoholic fatty liver disease)     OCD (obsessive compulsive disorder)     Osteoporosis     PONV (postoperative nausea and vomiting)     Persistent, Motion sickness with boat rides, Scopolamine helped -still had nausea    Restless leg syndrome     Sciatica of right side associated with disorder of lumbosacral spine     Spinal stenosis of lumbar region     Thoracic spondylosis        Past Surgical History:   Procedure Laterality Date    ANTEGRADE NEPHROSTOGRAPHY Right 2/23/2024    Procedure: NEPHROSTOGRAM, ANTEGRADE;  Surgeon: Suni Patel MD;  Location: [facility] OR [address] [place] [address];  Service: Urology;  Laterality: Right;    APPENDECTOMY      BACK SURGERY      x 6    CHOLECYSTECTOMY      EXTRACTION - STONE Right 2/23/2024    Procedure: EXTRACTION - STONE;  Surgeon: Suni Patel MD;   [address];  Service: Urology;  Laterality: Right;    GASTRECTOMY      Lap Gastric Sleeve    HERNIA REPAIR      HYSTERECTOMY      JOINT REPLACEMENT      LIVER BIOPSY  02/06/2017    steatohepatitis with stage 1 fibrosis    MYELOGRAPHY N/A 11/29/2018    Procedure: MYELOGRAM;  Surgeon: Ander Diagnostic Provider;  [facility];  Service: Radiology;  Laterality: N/A;    MYELOGRAPHY N/A 1/7/2019    Procedure: Myelogram;  Surgeon: Lissett Surgeon;  Location: [facility];  Service: Anesthesiology;  Laterality: N/A;    NEPHROSCOPY Right 2/23/2024    Procedure: NEPHROSCOPY;  Surgeon: Suni Patel MD;  [address];  Service: Urology;  Laterality: Right;  2 hr    NEPHROSTOGRAPHY Right 2/19/2024    Procedure: Nephrostogram;  Surgeon: Suni Patel MD;  Location: [facility] OR [address] [place] [address];  Service: Urology;  Laterality: Right;    OOPHORECTOMY      PERCUTANEOUS CRYOTHERAPY OF PERIPHERAL NERVE USING LIQUID NITROUS OXIDE IN CLOSED NEEDLE DEVICE Left 4/29/2019    Procedure: CRYOTHERAPY, NERVE, PERIPHERAL, PERCUTANEOUS, USING LIQUID NITROUS OXIDE IN CLOSED NEEDLE DEVICE-iovera left knee;  Surgeon: Chavo Gold III, MD;  Location: [facility] CATH LAB;  Service: Pain Management;  Laterality: Left;    PERCUTANEOUS CRYOTHERAPY OF PERIPHERAL NERVE USING LIQUID NITROUS OXIDE IN CLOSED NEEDLE DEVICE Right 8/2/2021    Procedure: CRYOTHERAPY, NERVE, PERIPHERAL, PERCUTANEOUS, USING LIQUID NITROUS OXIDE IN CLOSED NEEDLE DEVICE;  Surgeon: Saritha Proctor NP;  Location: [facility];  Service: Pain Management;  Laterality: Right;  RIGHT KNEE IOVERA    PERCUTANEOUS NEPHROLITHOTOMY Right 2/19/2024    Procedure: NEPHROLITHOTOMY, PERCUTANEOUS;  Surgeon: Suni Patel MD;  Location: [address];  Service: Urology;  Laterality: Right;  2 HRS    PERCUTANEOUS NEPHROSCOPY  2/19/2024    Procedure: NEPHROSCOPY, PERCUTANEOUS;  Surgeon: Suni Patel MD;  Location: [address];   Service: Urology;;    PERCUTANEOUS NEPHROSTOMY Right 2/19/2024    Procedure: CREATION, NEPHROSTOMY, PERCUTANEOUS;  Surgeon: Suni Patel MD;  Location: [facility] OR [address] [place] [address];  Service: Urology;  Laterality: Right;    SPINAL FUSION      TONSILLECTOMY, ADENOIDECTOMY      TOTAL KNEE ARTHROPLASTY Left 5/1/2019    Procedure: REPLACEMENT-KNEE-TOTAL;  Surgeon: John L. Ochsner Jr., MD;  Location: [facility] OR [address] [place] [address];  Service: Orthopedics;  Laterality: Left;    URETEROSCOPY  2/19/2024    Procedure: URETEROSCOPY;  Surgeon: Suni Patel MD;  Location: [facility] OR 2ND FLR;  Service: Urology;;    URETEROSCOPY, ANTEGRADE Right 2/23/2024    Procedure: URETEROSCOPY, ANTEGRADE;  Surgeon: Suni Patel MD;  Location: [facility] OR 1ST FLR;  Service: Urology;  Laterality: Right;       Social History     Socioeconomic History    Marital status:    Occupational History    Occupation: disable   Tobacco Use    Smoking status: Never    Smokeless tobacco: Never    Tobacco comments:     Tried a few cigarettes during teenage   Substance and Sexual Activity    Alcohol use: Not Currently     Comment: socially    Drug use: No    Sexual activity: Never   Other Topics Concern    Are you pregnant or think you may be? No    Breast-feeding No     Social Determinants of Health     Financial Resource Strain: High Risk (11/15/2023)    Overall Financial Resource Strain (CARDIA)     Difficulty of Paying Living Expenses: Hard   Food Insecurity: No Food Insecurity (11/15/2023)    Hunger Vital Sign     Worried About Running Out of Food in the Last Year: Never true     Ran Out of Food in the Last Year: Never true   Transportation Needs: Unmet Transportation Needs (11/15/2023)    PRAPARE - Transportation     Lack of Transportation (Medical): Yes     Lack of Transportation (Non-Medical): No   Physical Activity: Inactive (11/15/2023)    Exercise Vital Sign     Days of Exercise per Week: 0 days     Minutes of Exercise per Session: 0 min  "  Stress: Stress Concern Present (11/15/2023)    [employer] [place] [employer] - Occupational Stress Questionnaire     Feeling of Stress : To some extent   Social Connections: Unknown (11/15/2023)    Social Connection and Isolation Panel [NHANES]     Frequency of Communication with Friends and Family: More than three times a week     Frequency of Social Gatherings with Friends and Family: Twice a week     Active Member of Clubs or Organizations: Yes     Attends Club or Organization Meetings: More than 4 times per year     Marital Status: Living with partner   Housing Stability: Low Risk  (11/15/2023)    Housing Stability Vital Sign     Unable to Pay for Housing in the Last Year: No     Number of Places Lived in the Last Year: 1     Unstable Housing in the Last Year: No       Family History   Problem Relation Age of Onset    Heart disease Mother     Heart disease Father     Cancer Father     COPD Father     Hypertension Sister     Scoliosis Sister     Hypertension Brother     Heart disease Maternal Grandmother     Heart disease Maternal Aunt     Heart disease Maternal Uncle     Heart disease Paternal Aunt     Breast cancer Paternal Aunt     Heart disease Paternal Uncle     Cancer Paternal Uncle     Acne Other     Eczema Other     Melanoma Neg Hx     Psoriasis Neg Hx     Lupus Neg Hx        Review of patient's allergies indicates:   Allergen Reactions    Adhesive Dermatitis and Blisters    Adhesive tape-silicones Dermatitis     The longer the adhesive stays on, the worse the irritation    Mastisol adhesive [gum urnlvh-lwdrlb-etfw-alcohol] Itching, Rash and Blisters     "Looked like poison ivy"    Sulfamethoxazole-trimethoprim Itching and Anxiety     Has a "nervous feeling."  "Jittery"  Also had an upset stomach.Has taken recently and the reaction was "less intense"       Current Outpatient Medications on File Prior to Visit   Medication Sig Dispense Refill    alcohol swabs PadM Apply 1 each topically once " daily. 100 each 3    ascorbic acid, vitamin C, (VITAMIN C) 500 MG tablet       BIOTIN ORAL Take 10,000 mg by mouth every morning.       blood glucose control, low (TRUE METRIX LEVEL 1) Soln USE AS DIRECTED WITH GLUCOSE METER 1 each 0    blood sugar diagnostic (TRUE METRIX GLUCOSE TEST STRIP) Strp TEST BLOOD SUGAR EVERY  strip 3    cyclobenzaprine (FLEXERIL) 10 MG tablet TK 1 T PO QD- as needed for muscle spasms  0    DULoxetine (CYMBALTA) 60 MG capsule TAKE 1 CAPSULE EVERY DAY (Patient taking differently: Take by mouth every evening.) 90 capsule 3    fish oil-omega-3 fatty acids 300-1,000 mg capsule Take 1,200 mg by mouth 2 (two) times daily.       fluticasone propionate (FLONASE) 50 mcg/actuation nasal spray USE 2 SPRAYS NASALLY ONE TIME DAILY (Patient taking differently: daily as needed.) 48 g 3    gabapentin (NEURONTIN) 800 MG tablet Take 800 mg by mouth 3 (three) times daily. Takes 2-3 times per day  1    HYDROcodone-acetaminophen (NORCO)  mg per tablet Take 1 tablet by mouth 4 (four) times daily as needed.      ketoconazole (NIZORAL) 2 % cream Apply topically once daily. 1 each 0    levothyroxine (SYNTHROID) 137 MCG Tab tablet Take 1 tablet (137 mcg total) by mouth before breakfast. 90 tablet 3    lisinopriL (PRINIVIL,ZESTRIL) 2.5 MG tablet TAKE 1 TABLET EVERY DAY 90 tablet 3    metFORMIN (GLUCOPHAGE) 500 MG tablet TAKE 1 TABLET (500 MG TOTAL) BY MOUTH 2 (TWO) TIMES DAILY WITH MEALS. 180 tablet 3    multivitamin capsule Take 1 capsule by mouth every morning.      omeprazole (PRILOSEC) 40 MG capsule TAKE 1 CAPSULE EVERY DAY (Patient taking differently: Take by mouth every morning.) 90 capsule 3    oxyCODONE-acetaminophen (PERCOCET)  mg per tablet Take 1 tablet by mouth 4 (four) times daily as needed for Pain. 10 tablet 0    potassium citrate (UROCIT-K) 5 mEq (540 mg) TbSR Take 2 tablets (10 mEq total) by mouth 3 (three) times daily with meals. 180 tablet 11    rosuvastatin (CRESTOR) 10 MG  "tablet TAKE 1 TABLET EVERY NIGHT 90 tablet 3    tamsulosin (FLOMAX) 0.4 mg Cap TAKE 1 CAPSULE(0.4 MG) BY MOUTH AFTER DINNER 30 capsule 1    tirzepatide (MOUNJARO) 12.5 mg/0.5 mL PnIj Inject 12.5 mg into the skin every 7 days. 4 Pen 3    TRUEPLUS LANCETS 33 gauge Misc TEST BLOOD SUGAR EVERY  each 3    zinc sulfate (ZINCATE) 50 mg zinc (220 mg) capsule       zolpidem (AMBIEN) 10 mg Tab TAKE 1 TABLET BY MOUTH EVERY NIGHT AT BEDTIME AS NEEDED FOR INSOMNIA 90 tablet 0     Current Facility-Administered Medications on File Prior to Visit   Medication Dose Route Frequency Provider Last Rate Last Admin    triamcinolone acetonide injection 40 mg  40 mg Intradermal 1 time in Clinic/HOD Teir Manuel MD           Anticoagulation:  No    Physical Exam:  General: No acute distress, well developed. AAOx3  Head: Normocephalic, Atraumatic  Eyes: Extra-occular movements intact, No discharge  Neck: supple, symmetrical, trachea midline  Lungs: normal respiratory effort, no respiratory distress, no wheezes  CV: regular rate, 2+ pulses  Abdomen: soft, non-tender, non-distended, no organomegaly; Prior right PCNL access site healing well.  MSK: no edema, no deformities, normal ROM  Skin: skin color, texture, turgor normal.  Neurologic: no focal deficits, sensation intact    Labs:    Urine dipstick today - Positive for protein and blood, negative for all other components.    Lab Results   Component Value Date    WBC 9.20 02/20/2024    HGB 10.2 (L) 02/20/2024    HCT 33.2 (L) 02/20/2024    MCV 76 (L) 02/20/2024     02/20/2024           BMP  Lab Results   Component Value Date     02/20/2024    K 4.6 02/20/2024     02/20/2024    CO2 27 02/20/2024    BUN 11 02/20/2024    CREATININE 0.7 02/20/2024    CALCIUM 9.3 02/20/2024    ANIONGAP 7 (L) 02/20/2024    EGFRNORACEVR >60.0 02/20/2024       No results found for: "PSA"    Imaging:      Impression:     Residual stones particular in the right lower pole kidney.  Air in " the collecting system.  Probable left lower pole staghorn calculus.  Additional nonobstructive left renal stones.  Right percutaneous nephrostomy tube in stable position and interval removal of right nephroureteral stent.     High density foci and focus of air in the distal right ureter with mild fullness of the right renal collecting system, mild thickening of the right ureter with possible subtle Periureteral fat stranding.  High-density foci are likely obstructive stones.     Fracture left Zamudio niall.     Additional findings as detailed above.     This report was flagged in Epic as abnormal.     Electronically signed by resident: Marcie Contreras  Date:                                            02/20/2024  Time:                                           07:45     Electronically signed by: Talib Goldstein MD  Date:                                            02/20/2024  Time:                                           08:17    Assessment: Angie Mccarthy is a 58 y.o. female with left staghorn calculi.    Plan:     1. To OR on 04/08/24 for left PCNL  2. Consents signed   3. I have explained the risk, benefits, and alternatives of the procedure in detail. The patient voices understanding and all questions have been answered. The patient agrees to proceed as planned.   4. Instructed to continue to hold aspirin and Mounjaro until after her PCNL  5. Urine for culture today. Will start ciprofloxacin and tailor to culture.    Nathan Mendoza MD

## 2024-04-02 DIAGNOSIS — K74.02 HEPATIC FIBROSIS, STAGE 3: Primary | ICD-10-CM

## 2024-04-02 DIAGNOSIS — K76.0 FATTY LIVER: ICD-10-CM

## 2024-04-02 LAB — BACTERIA UR CULT: NO GROWTH

## 2024-04-03 ENCOUNTER — LAB VISIT (OUTPATIENT)
Dept: LAB | Facility: HOSPITAL | Age: 59
End: 2024-04-03
Attending: STUDENT IN AN ORGANIZED HEALTH CARE EDUCATION/TRAINING PROGRAM
Payer: MEDICARE

## 2024-04-03 ENCOUNTER — DOCUMENTATION ONLY (OUTPATIENT)
Dept: PREADMISSION TESTING | Facility: HOSPITAL | Age: 59
End: 2024-04-03
Payer: MEDICARE

## 2024-04-03 DIAGNOSIS — K74.02 HEPATIC FIBROSIS, STAGE 3: ICD-10-CM

## 2024-04-03 DIAGNOSIS — N20.0 STAGHORN CALCULUS: ICD-10-CM

## 2024-04-03 DIAGNOSIS — E21.0 PRIMARY HYPERPARATHYROIDISM: ICD-10-CM

## 2024-04-03 DIAGNOSIS — N28.89 OTHER SPECIFIED DISORDERS OF KIDNEY AND URETER: ICD-10-CM

## 2024-04-03 DIAGNOSIS — K76.0 FATTY LIVER: ICD-10-CM

## 2024-04-03 LAB
AFP SERPL-MCNC: <2 NG/ML (ref 0–8.4)
ALBUMIN SERPL BCP-MCNC: 4 G/DL (ref 3.5–5.2)
ALP SERPL-CCNC: 90 U/L (ref 55–135)
ALT SERPL W/O P-5'-P-CCNC: 16 U/L (ref 10–44)
ANION GAP SERPL CALC-SCNC: 10 MMOL/L (ref 8–16)
AST SERPL-CCNC: 24 U/L (ref 10–40)
BASOPHILS # BLD AUTO: 0.06 K/UL (ref 0–0.2)
BASOPHILS NFR BLD: 1 % (ref 0–1.9)
BILIRUB SERPL-MCNC: 0.4 MG/DL (ref 0.1–1)
BUN SERPL-MCNC: 12 MG/DL (ref 6–20)
CALCIUM SERPL-MCNC: 9.9 MG/DL (ref 8.7–10.5)
CHLORIDE SERPL-SCNC: 105 MMOL/L (ref 95–110)
CO2 SERPL-SCNC: 26 MMOL/L (ref 23–29)
CREAT SERPL-MCNC: 0.9 MG/DL (ref 0.5–1.4)
CREAT SERPL-MCNC: 0.9 MG/DL (ref 0.5–1.4)
DIFFERENTIAL METHOD BLD: ABNORMAL
EOSINOPHIL # BLD AUTO: 0.1 K/UL (ref 0–0.5)
EOSINOPHIL NFR BLD: 1.1 % (ref 0–8)
ERYTHROCYTE [DISTWIDTH] IN BLOOD BY AUTOMATED COUNT: 14.8 % (ref 11.5–14.5)
EST. GFR  (NO RACE VARIABLE): >60 ML/MIN/1.73 M^2
EST. GFR  (NO RACE VARIABLE): >60 ML/MIN/1.73 M^2
GLUCOSE SERPL-MCNC: 129 MG/DL (ref 70–110)
HCT VFR BLD AUTO: 40.3 % (ref 37–48.5)
HGB BLD-MCNC: 11.8 G/DL (ref 12–16)
IMM GRANULOCYTES # BLD AUTO: 0.02 K/UL (ref 0–0.04)
IMM GRANULOCYTES NFR BLD AUTO: 0.3 % (ref 0–0.5)
INR PPP: 1 (ref 0.8–1.2)
LYMPHOCYTES # BLD AUTO: 2.3 K/UL (ref 1–4.8)
LYMPHOCYTES NFR BLD: 35.8 % (ref 18–48)
MCH RBC QN AUTO: 21.8 PG (ref 27–31)
MCHC RBC AUTO-ENTMCNC: 29.3 G/DL (ref 32–36)
MCV RBC AUTO: 74 FL (ref 82–98)
MONOCYTES # BLD AUTO: 0.5 K/UL (ref 0.3–1)
MONOCYTES NFR BLD: 8.6 % (ref 4–15)
NEUTROPHILS # BLD AUTO: 3.4 K/UL (ref 1.8–7.7)
NEUTROPHILS NFR BLD: 53.2 % (ref 38–73)
NRBC BLD-RTO: 0 /100 WBC
PLATELET # BLD AUTO: 275 K/UL (ref 150–450)
PMV BLD AUTO: 11.7 FL (ref 9.2–12.9)
POTASSIUM SERPL-SCNC: 4.1 MMOL/L (ref 3.5–5.1)
PROT SERPL-MCNC: 6.8 G/DL (ref 6–8.4)
PROTHROMBIN TIME: 10.5 SEC (ref 9–12.5)
RBC # BLD AUTO: 5.42 M/UL (ref 4–5.4)
SODIUM SERPL-SCNC: 141 MMOL/L (ref 136–145)
WBC # BLD AUTO: 6.31 K/UL (ref 3.9–12.7)

## 2024-04-03 PROCEDURE — 36415 COLL VENOUS BLD VENIPUNCTURE: CPT | Performed by: FAMILY MEDICINE

## 2024-04-03 PROCEDURE — 85025 COMPLETE CBC W/AUTO DIFF WBC: CPT | Performed by: FAMILY MEDICINE

## 2024-04-03 PROCEDURE — 85610 PROTHROMBIN TIME: CPT | Performed by: FAMILY MEDICINE

## 2024-04-03 PROCEDURE — 80053 COMPREHEN METABOLIC PANEL: CPT | Performed by: FAMILY MEDICINE

## 2024-04-03 PROCEDURE — 82105 ALPHA-FETOPROTEIN SERUM: CPT | Performed by: NURSE PRACTITIONER

## 2024-04-04 ENCOUNTER — TELEPHONE (OUTPATIENT)
Dept: SURGERY | Facility: CLINIC | Age: 59
End: 2024-04-04
Payer: MEDICARE

## 2024-04-04 NOTE — PRE-PROCEDURE INSTRUCTIONS
PRE-OP INSTRUCTIONS:  Instructed patient to have no food,milk or milk products after midnight   It is ok to take AM medications with a few sips of water   Medication instructions for pm prior to and am of surgery reviewed.  Instructed patient to avoid taking vitamins,supplements,aspirin or ibuprofen the am of surgery.    PATIENT LAST TOOK MOUNJARO 3/24/2024    Shower instructions provided      Patient denies any side effects or issues with anesthesia or sedation other than PONV

## 2024-04-04 NOTE — TELEPHONE ENCOUNTER
Left message on patient's voicemail that she may restart Metformin after her CT Scan on 4/1/24 - Creatinine is within normal limits.  Direct phone number given for her to call with any questions or concerns.

## 2024-04-05 ENCOUNTER — ANESTHESIA EVENT (OUTPATIENT)
Dept: INTERVENTIONAL RADIOLOGY/VASCULAR | Facility: HOSPITAL | Age: 59
DRG: 660 | End: 2024-04-05
Payer: MEDICARE

## 2024-04-05 ENCOUNTER — PATIENT MESSAGE (OUTPATIENT)
Dept: UROLOGY | Facility: CLINIC | Age: 59
End: 2024-04-05
Payer: MEDICARE

## 2024-04-05 ENCOUNTER — TELEPHONE (OUTPATIENT)
Dept: UROLOGY | Facility: CLINIC | Age: 59
End: 2024-04-05
Payer: MEDICARE

## 2024-04-05 ENCOUNTER — HOSPITAL ENCOUNTER (OUTPATIENT)
Dept: INTERVENTIONAL RADIOLOGY/VASCULAR | Facility: HOSPITAL | Age: 59
Discharge: HOME | DRG: 660 | End: 2024-04-05
Payer: MEDICARE

## 2024-04-05 VITALS
DIASTOLIC BLOOD PRESSURE: 66 MMHG | HEART RATE: 64 BPM | RESPIRATION RATE: 18 BRPM | SYSTOLIC BLOOD PRESSURE: 130 MMHG | TEMPERATURE: 98 F | OXYGEN SATURATION: 97 % | BODY MASS INDEX: 37.76 KG/M2 | HEIGHT: 61 IN | WEIGHT: 200 LBS

## 2024-04-05 DIAGNOSIS — N20.0 STAGHORN KIDNEY STONES: ICD-10-CM

## 2024-04-05 DIAGNOSIS — N28.89 OTHER SPECIFIED DISORDERS OF KIDNEY AND URETER: ICD-10-CM

## 2024-04-05 DIAGNOSIS — N20.0 STAGHORN CALCULUS: Primary | ICD-10-CM

## 2024-04-05 LAB — POCT GLUCOSE: 114 MG/DL (ref 70–110)

## 2024-04-05 PROCEDURE — 25000003 PHARM REV CODE 250: Performed by: NURSE ANESTHETIST, CERTIFIED REGISTERED

## 2024-04-05 PROCEDURE — C1729 CATH, DRAINAGE: HCPCS

## 2024-04-05 PROCEDURE — 37000008 HC ANESTHESIA 1ST 15 MINUTES

## 2024-04-05 PROCEDURE — 82962 GLUCOSE BLOOD TEST: CPT

## 2024-04-05 PROCEDURE — 63600175 PHARM REV CODE 636 W HCPCS: Performed by: ANESTHESIOLOGY

## 2024-04-05 PROCEDURE — 37000009 HC ANESTHESIA EA ADD 15 MINS

## 2024-04-05 PROCEDURE — D9220A PRA ANESTHESIA: Mod: CRNA,,, | Performed by: NURSE ANESTHETIST, CERTIFIED REGISTERED

## 2024-04-05 PROCEDURE — 0T9430Z DRAINAGE OF LEFT KIDNEY PELVIS WITH DRAINAGE DEVICE, PERCUTANEOUS APPROACH: ICD-10-PCS | Performed by: STUDENT IN AN ORGANIZED HEALTH CARE EDUCATION/TRAINING PROGRAM

## 2024-04-05 PROCEDURE — D9220A PRA ANESTHESIA: Mod: ANES,,, | Performed by: ANESTHESIOLOGY

## 2024-04-05 PROCEDURE — 63600175 PHARM REV CODE 636 W HCPCS: Performed by: NURSE ANESTHETIST, CERTIFIED REGISTERED

## 2024-04-05 PROCEDURE — 25500020 PHARM REV CODE 255: Performed by: ANESTHESIOLOGY

## 2024-04-05 PROCEDURE — 50432 PLMT NEPHROSTOMY CATHETER: CPT | Mod: LT | Performed by: STUDENT IN AN ORGANIZED HEALTH CARE EDUCATION/TRAINING PROGRAM

## 2024-04-05 RX ORDER — SCOLOPAMINE TRANSDERMAL SYSTEM 1 MG/1
PATCH, EXTENDED RELEASE TRANSDERMAL
Status: DISCONTINUED | OUTPATIENT
Start: 2024-04-05 | End: 2024-04-05

## 2024-04-05 RX ORDER — PROPOFOL 10 MG/ML
VIAL (ML) INTRAVENOUS
Status: DISCONTINUED | OUTPATIENT
Start: 2024-04-05 | End: 2024-04-05

## 2024-04-05 RX ORDER — FENTANYL CITRATE 50 UG/ML
INJECTION, SOLUTION INTRAMUSCULAR; INTRAVENOUS
Status: DISCONTINUED | OUTPATIENT
Start: 2024-04-05 | End: 2024-04-05

## 2024-04-05 RX ORDER — HALOPERIDOL 5 MG/ML
0.5 INJECTION INTRAMUSCULAR EVERY 10 MIN PRN
Status: DISCONTINUED | OUTPATIENT
Start: 2024-04-05 | End: 2024-04-06 | Stop reason: HOSPADM

## 2024-04-05 RX ORDER — SUCCINYLCHOLINE CHLORIDE 20 MG/ML
INJECTION INTRAMUSCULAR; INTRAVENOUS
Status: DISCONTINUED | OUTPATIENT
Start: 2024-04-05 | End: 2024-04-05

## 2024-04-05 RX ORDER — SCOLOPAMINE TRANSDERMAL SYSTEM 1 MG/1
PATCH, EXTENDED RELEASE TRANSDERMAL
Status: COMPLETED
Start: 2024-04-05 | End: 2024-04-05

## 2024-04-05 RX ORDER — DEXAMETHASONE SODIUM PHOSPHATE 4 MG/ML
INJECTION, SOLUTION INTRA-ARTICULAR; INTRALESIONAL; INTRAMUSCULAR; INTRAVENOUS; SOFT TISSUE
Status: DISCONTINUED | OUTPATIENT
Start: 2024-04-05 | End: 2024-04-05

## 2024-04-05 RX ORDER — PROCHLORPERAZINE EDISYLATE 5 MG/ML
5 INJECTION INTRAMUSCULAR; INTRAVENOUS EVERY 30 MIN PRN
Status: DISCONTINUED | OUTPATIENT
Start: 2024-04-05 | End: 2024-04-06 | Stop reason: HOSPADM

## 2024-04-05 RX ORDER — HYDROMORPHONE HYDROCHLORIDE 1 MG/ML
0.2 INJECTION, SOLUTION INTRAMUSCULAR; INTRAVENOUS; SUBCUTANEOUS EVERY 5 MIN PRN
Status: DISCONTINUED | OUTPATIENT
Start: 2024-04-05 | End: 2024-04-06 | Stop reason: HOSPADM

## 2024-04-05 RX ORDER — HALOPERIDOL 5 MG/ML
INJECTION INTRAMUSCULAR
Status: DISCONTINUED | OUTPATIENT
Start: 2024-04-05 | End: 2024-04-05

## 2024-04-05 RX ORDER — LIDOCAINE HYDROCHLORIDE 10 MG/ML
1 INJECTION, SOLUTION EPIDURAL; INFILTRATION; INTRACAUDAL; PERINEURAL ONCE
Status: DISCONTINUED | OUTPATIENT
Start: 2024-04-05 | End: 2024-04-06 | Stop reason: HOSPADM

## 2024-04-05 RX ORDER — ONDANSETRON HYDROCHLORIDE 2 MG/ML
INJECTION, SOLUTION INTRAVENOUS
Status: DISCONTINUED | OUTPATIENT
Start: 2024-04-05 | End: 2024-04-05

## 2024-04-05 RX ORDER — ROCURONIUM BROMIDE 10 MG/ML
INJECTION, SOLUTION INTRAVENOUS
Status: DISCONTINUED | OUTPATIENT
Start: 2024-04-05 | End: 2024-04-05

## 2024-04-05 RX ORDER — OXYCODONE HYDROCHLORIDE 5 MG/1
5 TABLET ORAL
Status: DISCONTINUED | OUTPATIENT
Start: 2024-04-05 | End: 2024-04-06 | Stop reason: HOSPADM

## 2024-04-05 RX ORDER — SODIUM CHLORIDE 9 MG/ML
INJECTION, SOLUTION INTRAVENOUS CONTINUOUS
Status: DISCONTINUED | OUTPATIENT
Start: 2024-04-05 | End: 2024-04-06 | Stop reason: HOSPADM

## 2024-04-05 RX ORDER — KETAMINE HCL IN 0.9 % NACL 50 MG/5 ML
SYRINGE (ML) INTRAVENOUS
Status: DISCONTINUED | OUTPATIENT
Start: 2024-04-05 | End: 2024-04-05

## 2024-04-05 RX ORDER — FENTANYL CITRATE 50 UG/ML
25 INJECTION, SOLUTION INTRAMUSCULAR; INTRAVENOUS EVERY 5 MIN PRN
Status: DISCONTINUED | OUTPATIENT
Start: 2024-04-05 | End: 2024-04-06 | Stop reason: HOSPADM

## 2024-04-05 RX ORDER — MIDAZOLAM HYDROCHLORIDE 1 MG/ML
INJECTION INTRAMUSCULAR; INTRAVENOUS
Status: DISCONTINUED | OUTPATIENT
Start: 2024-04-05 | End: 2024-04-05

## 2024-04-05 RX ORDER — LIDOCAINE HYDROCHLORIDE 20 MG/ML
INJECTION INTRAVENOUS
Status: DISCONTINUED | OUTPATIENT
Start: 2024-04-05 | End: 2024-04-05

## 2024-04-05 RX ADMIN — Medication 10 MG: at 02:04

## 2024-04-05 RX ADMIN — HALOPERIDOL LACTATE 0.5 MG: 5 INJECTION, SOLUTION INTRAMUSCULAR at 03:04

## 2024-04-05 RX ADMIN — SODIUM CHLORIDE: 0.9 INJECTION, SOLUTION INTRAVENOUS at 01:04

## 2024-04-05 RX ADMIN — PROPOFOL 200 MG: 10 INJECTION, EMULSION INTRAVENOUS at 02:04

## 2024-04-05 RX ADMIN — DEXAMETHASONE SODIUM PHOSPHATE 4 MG: 4 INJECTION, SOLUTION INTRAMUSCULAR; INTRAVENOUS at 02:04

## 2024-04-05 RX ADMIN — LIDOCAINE HYDROCHLORIDE 100 MG: 20 INJECTION INTRAVENOUS at 02:04

## 2024-04-05 RX ADMIN — SCOPALAMINE 1 PATCH: 1 PATCH, EXTENDED RELEASE TRANSDERMAL at 02:04

## 2024-04-05 RX ADMIN — FENTANYL CITRATE 25 MCG: 50 INJECTION INTRAMUSCULAR; INTRAVENOUS at 04:04

## 2024-04-05 RX ADMIN — FENTANYL CITRATE 50 MCG: 50 INJECTION INTRAMUSCULAR; INTRAVENOUS at 02:04

## 2024-04-05 RX ADMIN — SUCCINYLCHOLINE CHLORIDE 120 MG: 20 INJECTION, SOLUTION INTRAMUSCULAR; INTRAVENOUS at 02:04

## 2024-04-05 RX ADMIN — Medication 20 MG: at 02:04

## 2024-04-05 RX ADMIN — ONDANSETRON 4 MG: 2 INJECTION INTRAMUSCULAR; INTRAVENOUS at 02:04

## 2024-04-05 RX ADMIN — CEFOXITIN 1 G: 1 INJECTION, POWDER, FOR SOLUTION INTRAVENOUS at 02:04

## 2024-04-05 RX ADMIN — IOHEXOL 15 ML: 300 INJECTION, SOLUTION INTRAVENOUS at 03:04

## 2024-04-05 RX ADMIN — MIDAZOLAM 2 MG: 1 INJECTION INTRAMUSCULAR; INTRAVENOUS at 02:04

## 2024-04-05 RX ADMIN — ROCURONIUM BROMIDE 10 MG: 10 INJECTION INTRAVENOUS at 02:04

## 2024-04-05 NOTE — TRANSFER OF CARE
"Anesthesia Transfer of Care Note    Patient: Angie Mccarthy    Procedure(s) Performed: * No procedures listed *    Patient location: PACU    Anesthesia Type: general    Transport from OR: Transported from OR on 6-10 L/min O2 by face mask with adequate spontaneous ventilation    Post pain: adequate analgesia    Post assessment: no apparent anesthetic complications and tolerated procedure well    Post vital signs: stable    Level of consciousness: awake and alert    Nausea/Vomiting: no nausea/vomiting    Complications: none    Transfer of care protocol was followed      Last vitals: Visit Vitals  /75 (BP Location: Left arm, Patient Position: Lying)   Pulse 90   Temp 36.2 °C (97.2 °F) (Temporal)   Resp 16   Ht 5' 1" (1.549 m)   Wt 90.7 kg (200 lb)   LMP 12/06/2007   SpO2 96%   Breastfeeding No   BMI 37.79 kg/m²     "

## 2024-04-05 NOTE — H&P
Radiology History & Physical      SUBJECTIVE:     Chief Complaint: staghorn kidney stones.    History of Present Illness:  Angie Mccarthy is a 58 y.o. female who presents for L PCNU placement.    Past Medical History:   Diagnosis Date    Allergy     seasonal    Arthritis     Blood transfusion     with back surgeries    Chronic back pain     Diabetes mellitus, type 2     GERD (gastroesophageal reflux disease)     Hyperlipidemia     Hypertension     Hypothyroidism     Thyroid nodule    Joint pain     Kidney stones     Morbid obesity 12/14/2012    NAFLD (nonalcoholic fatty liver disease)     OCD (obsessive compulsive disorder)     Osteoporosis     PONV (postoperative nausea and vomiting)     Persistent, Motion sickness with boat rides, Scopolamine helped -still had nausea    Restless leg syndrome     Sciatica of right side associated with disorder of lumbosacral spine     Spinal stenosis of lumbar region     Thoracic spondylosis      Past Surgical History:   Procedure Laterality Date    ANTEGRADE NEPHROSTOGRAPHY Right 2/23/2024    Procedure: NEPHROSTOGRAM, ANTEGRADE;  Surgeon: Suni Patel MD;  Location: [facility] OR [address] [place] [address];  Service: Urology;  Laterality: Right;    APPENDECTOMY      BACK SURGERY      x 6    CHOLECYSTECTOMY      EXTRACTION - STONE Right 2/23/2024    Procedure: EXTRACTION - STONE;  Surgeon: Suni Patel MD;  Location: [facility] OR [address] [place] [address];  Service: Urology;  Laterality: Right;    GASTRECTOMY      Lap Gastric Sleeve    HERNIA REPAIR      HYSTERECTOMY      JOINT REPLACEMENT      LIVER BIOPSY  02/06/2017    steatohepatitis with stage 1 fibrosis    MYELOGRAPHY N/A 11/29/2018    Procedure: MYELOGRAM;  Surgeon: Ander Diagnostic Provider;  Location: [facility] OR [address] [place] [address];  Service: Radiology;  Laterality: N/A;    MYELOGRAPHY N/A 1/7/2019    Procedure: Myelogram;  Surgeon: Lissett Surgeon;  Location: [facility];  Service: Anesthesiology;  Laterality: N/A;    NEPHROSCOPY Right 2/23/2024     Procedure: NEPHROSCOPY;  Surgeon: Suni Patel MD;  Location: [facility] OR 1ST FLR;  Service: Urology;  Laterality: Right;  2 hr    NEPHROSTOGRAPHY Right 2/19/2024    Procedure: Nephrostogram;  Surgeon: Suni Patel MD;  Location: [facility] OR 2ND FLR;  Service: Urology;  Laterality: Right;    OOPHORECTOMY      PERCUTANEOUS CRYOTHERAPY OF PERIPHERAL NERVE USING LIQUID NITROUS OXIDE IN CLOSED NEEDLE DEVICE Left 4/29/2019    Procedure: CRYOTHERAPY, NERVE, PERIPHERAL, PERCUTANEOUS, USING LIQUID NITROUS OXIDE IN CLOSED NEEDLE DEVICE-iovera left knee;  Surgeon: Chavo Gold III, MD;  Location: [facility] CATH LAB;  Service: Pain Management;  Laterality: Left;    PERCUTANEOUS CRYOTHERAPY OF PERIPHERAL NERVE USING LIQUID NITROUS OXIDE IN CLOSED NEEDLE DEVICE Right 8/2/2021    Procedure: CRYOTHERAPY, NERVE, PERIPHERAL, PERCUTANEOUS, USING LIQUID NITROUS OXIDE IN CLOSED NEEDLE DEVICE;  Surgeon: Saritha Proctor NP;  Location: [facility];  Service: Pain Management;  Laterality: Right;  RIGHT KNEE IOVERA    PERCUTANEOUS NEPHROLITHOTOMY Right 2/19/2024    Procedure: NEPHROLITHOTOMY, PERCUTANEOUS;  Surgeon: Suni Patel MD;  Location: [facility] OR [address] [place] [address];  Service: Urology;  Laterality: Right;  2 HRS    PERCUTANEOUS NEPHROSCOPY  2/19/2024    Procedure: NEPHROSCOPY, PERCUTANEOUS;  Surgeon: Suni Patel MD;  [facility];  Service: Urology;;    PERCUTANEOUS NEPHROSTOMY Right 2/19/2024    Procedure: CREATION, NEPHROSTOMY, PERCUTANEOUS;  Surgeon: Suni Patel MD;  [facility];  Service: Urology;  Laterality: Right;    SPINAL FUSION      TONSILLECTOMY, ADENOIDECTOMY      TOTAL KNEE ARTHROPLASTY Left 5/1/2019    Procedure: REPLACEMENT-KNEE-TOTAL;  Surgeon: John L. Ochsner Jr., MD;  Location: [facility] OR [address] [place] [address];  Service: Orthopedics;  Laterality: Left;    URETEROSCOPY  2/19/2024    Procedure: URETEROSCOPY;  Surgeon: Suni Patel MD;  Location: [facility] OR [facility]  FLR;  Service: Urology;;    URETEROSCOPY, ANTEGRADE Right 2/23/2024    Procedure: URETEROSCOPY, ANTEGRADE;  Surgeon: Suni Patel MD;  Location: [facility] OR [address] [place] [address];  Service: Urology;  Laterality: Right;       Home Meds:   Prior to Admission medications    Medication Sig Start Date End Date Taking? Authorizing Provider   ascorbic acid, vitamin C, (VITAMIN C) 500 MG tablet  4/10/10  Yes Provider, Historical   BIOTIN ORAL Take 10,000 mg by mouth every morning.    Yes Provider, Historical   ciprofloxacin HCl (CIPRO) 500 MG tablet Take 1 tablet (500 mg total) by mouth every 12 (twelve) hours. for 7 days 4/1/24 4/8/24 Yes Nathan Mendoza MD   DULoxetine (CYMBALTA) 60 MG capsule TAKE 1 CAPSULE EVERY DAY  Patient taking differently: Take by mouth every evening. 8/23/23  Yes Rangel Swenson MD   fish oil-omega-3 fatty acids 300-1,000 mg capsule Take 1,200 mg by mouth 2 (two) times daily.    Yes Provider, Historical   gabapentin (NEURONTIN) 800 MG tablet Take 800 mg by mouth 3 (three) times daily. Takes 2-3 times per day 6/15/16  Yes Provider, Historical   levothyroxine (SYNTHROID) 137 MCG Tab tablet Take 1 tablet (137 mcg total) by mouth before breakfast. 2/4/24 2/3/25 Yes Felisha Sands MD   lisinopriL (PRINIVIL,ZESTRIL) 2.5 MG tablet TAKE 1 TABLET EVERY DAY 8/23/23  Yes Rangel Swenson MD   metFORMIN (GLUCOPHAGE) 500 MG tablet TAKE 1 TABLET (500 MG TOTAL) BY MOUTH 2 (TWO) TIMES DAILY WITH MEALS. 5/8/23 5/7/24 Yes Rangel Swenson MD   multivitamin capsule Take 1 capsule by mouth every morning.   Yes Provider, Historical   omeprazole (PRILOSEC) 40 MG capsule TAKE 1 CAPSULE EVERY DAY  Patient taking differently: Take by mouth every morning. 5/23/23  Yes Rangel Swenson MD   oxyCODONE-acetaminophen (PERCOCET)  mg per tablet Take 1 tablet by mouth 4 (four) times daily as needed for Pain. 2/20/24  Yes Saritha Merlos MD   potassium citrate (UROCIT-K) 5 mEq (540 mg) TbSR Take 2 tablets (10 mEq total) by mouth 3  "(three) times daily with meals. 10/27/23 10/26/24 Yes Leonard Haddad MD   rosuvastatin (CRESTOR) 10 MG tablet TAKE 1 TABLET EVERY NIGHT 1/25/24  Yes Rangel Swenson MD   zinc sulfate (ZINCATE) 50 mg zinc (220 mg) capsule  11/3/21  Yes Provider, Historical   zolpidem (AMBIEN) 10 mg Tab TAKE 1 TABLET BY MOUTH EVERY NIGHT AT BEDTIME AS NEEDED FOR INSOMNIA 3/20/24  Yes Rangel Swenson MD   alcohol swabs PadM Apply 1 each topically once daily. 6/28/22   Rangel Swenson MD   blood glucose control, low (TRUE METRIX LEVEL 1) Soln USE AS DIRECTED WITH GLUCOSE METER 8/23/23   Rangel Swenson MD   blood sugar diagnostic (TRUE METRIX GLUCOSE TEST STRIP) Strp TEST BLOOD SUGAR EVERY DAY 5/23/23   Rangel Swenson MD   cyclobenzaprine (FLEXERIL) 10 MG tablet TK 1 T PO QD- as needed for muscle spasms 2/6/19   Provider, Historical   fluticasone propionate (FLONASE) 50 mcg/actuation nasal spray USE 2 SPRAYS NASALLY ONE TIME DAILY  Patient taking differently: daily as needed. 9/13/21   Rangel Swenson MD   HYDROcodone-acetaminophen (NORCO)  mg per tablet Take 1 tablet by mouth 4 (four) times daily as needed.    Provider, Historical   ketoconazole (NIZORAL) 2 % cream Apply topically once daily. 2/27/24   Lilly Morgan, NP   tamsulosin (FLOMAX) 0.4 mg Cap TAKE 1 CAPSULE(0.4 MG) BY MOUTH AFTER DINNER 4/26/22   Suni Patel MD   tirzepatide (MOUNJARO) 12.5 mg/0.5 mL PnIj Inject 12.5 mg into the skin every 7 days. 2/14/24   Felisha Sands MD   TRUEPLUS LANCETS 33 gauge Misc TEST BLOOD SUGAR EVERY DAY 5/23/23   Rangel Swenson MD     Anticoagulants/Antiplatelets: no anticoagulation    Allergies:   Review of patient's allergies indicates:   Allergen Reactions    Adhesive Dermatitis and Blisters    Adhesive tape-silicones Dermatitis     The longer the adhesive stays on, the worse the irritation    Mastisol adhesive [gum hzbigh-rjcwrz-zavh-alcohol] Itching, Rash and Blisters     "Looked like poison ivy"    " "Sulfamethoxazole-trimethoprim Itching and Anxiety     Has a "nervous feeling."  "Jittery"  Also had an upset stomach.Has taken recently and the reaction was "less intense"     Sedation History:  no adverse reactions    Review of Systems:   Hematological: no known coagulopathies  Respiratory: no shortness of breath  Cardiovascular: no chest pain  Gastrointestinal: no abdominal pain  Genito-Urinary: no dysuria  Musculoskeletal: negative  Neurological: no TIA or stroke symptoms         OBJECTIVE:     Vital Signs (Most Recent)  Temp: 97.2 °F (36.2 °C) (04/05/24 1111)  Pulse: 90 (04/05/24 1111)  Resp: 16 (04/05/24 1111)  BP: 128/75 (04/05/24 1111)  SpO2: 96 % (04/05/24 1111)    Physical Exam:  ASA: 2  Mallampati: 2    General: no acute distress  Mental Status: alert and oriented to person, place and time  HEENT: normocephalic, atraumatic  Chest: unlabored breathing  Abdomen: nondistended  Extremity: moves all extremities    Laboratory  Lab Results   Component Value Date    INR 1.0 04/03/2024       Lab Results   Component Value Date    WBC 6.31 04/03/2024    HGB 11.8 (L) 04/03/2024    HCT 40.3 04/03/2024    MCV 74 (L) 04/03/2024     04/03/2024      Lab Results   Component Value Date     (H) 04/03/2024     04/03/2024    K 4.1 04/03/2024     04/03/2024    CO2 26 04/03/2024    BUN 12 04/03/2024    CREATININE 0.9 04/03/2024    CREATININE 0.9 04/03/2024    CALCIUM 9.9 04/03/2024    MG 2.1 08/18/2021    ALT 16 04/03/2024    AST 24 04/03/2024    ALBUMIN 4.0 04/03/2024    BILITOT 0.4 04/03/2024    BILIDIR 0.2 06/20/2022       ASSESSMENT/PLAN:     Sedation Plan: per anesthesia.  Patient will undergo left PCNU placement.    Eric Parker MD  Department of Radiology, PGY-2    "

## 2024-04-05 NOTE — PLAN OF CARE
Pt arrived to IR rm 188 for L PCNU placement. Pt oriented to unit and staff, Pt safely transferred from stretcher to procedural table. Fall risk reviewed and comfort measures utilized with interventions. Safety strap applied, position pillows to minimize pressure points. Blankets applied. Pt prepped and draped utilizing standard sterile technique. Patient placed on continuous monitoring, as required by sedation policy. Timeouts implemented utilizing standard universal time-out per department and facility policy. CRNA to remain at bedside with continuous monitoring. Pt resting comfortably. Denies pain/discomfort. Will continue to monitor. See flow sheets for monitoring, medication administration, and updates. patient verbalizes understanding.

## 2024-04-05 NOTE — DISCHARGE INSTRUCTIONS
Please call with any questions or concerns.      Monday thru Friday 8:00 am - 4:30 pm    Interventional Radiology   [phone]    After Hours    Ask for the Radiology Resident on call  [phone]

## 2024-04-05 NOTE — BRIEF OP NOTE
Radiology Post-Procedure Note    Pre Op Diagnosis: nephrolithiasis    Post Op Diagnosis: same    Procedure: left NUS     Procedure performed by: Bonnie Jarquin MD    Written Informed Consent Obtained: Yes    Specimen Removed: NO    Estimated Blood Loss: less than 50     Findings:   Using US and fluoroscopic guidance 8 fr 22 cm NUS placed.      Patient tolerated procedure well.    Bonnie Jarquin MD  Interventional Radiology

## 2024-04-05 NOTE — PLAN OF CARE
Procedure complete. Pt tolerated well. Recovery for 2 hr. 8 fr tube placed. Site CDI. Pt transferred to [place] [address] and report to be given bedside.

## 2024-04-05 NOTE — ANESTHESIA PROCEDURE NOTES
Intubation    Date/Time: 4/5/2024 2:26 PM    Performed by: Shaista Smith CRNA  Authorized by: Britni Fair MD    Intubation:     Induction:  Intravenous    Intubated:  Postinduction    Mask Ventilation:  Easy mask    Attempts:  1    Attempted By:  CRNA    Method of Intubation:  Video laryngoscopy    Blade:  Hernadez 3    Laryngeal View Grade: Grade I - full view of cords      Difficult Airway Encountered?: No      Complications:  None    Airway Device:  Oral endotracheal tube    Airway Device Size:  7.5    Style/Cuff Inflation:  Cuffed (inflated to minimal occlusive pressure)    Tube secured:  22    Secured at:  The lips    Placement Verified By:  Capnometry    Complicating Factors:  None    Findings Post-Intubation:  BS equal bilateral and atraumatic/condition of teeth unchanged

## 2024-04-08 ENCOUNTER — ANESTHESIA (OUTPATIENT)
Dept: SURGERY | Facility: HOSPITAL | Age: 59
DRG: 660 | End: 2024-04-08
Payer: MEDICARE

## 2024-04-08 ENCOUNTER — ANESTHESIA EVENT (OUTPATIENT)
Dept: SURGERY | Facility: HOSPITAL | Age: 59
DRG: 660 | End: 2024-04-08
Payer: MEDICARE

## 2024-04-08 ENCOUNTER — HOSPITAL ENCOUNTER (INPATIENT)
Facility: HOSPITAL | Age: 59
LOS: 1 days | Discharge: HOME | DRG: 660 | End: 2024-04-09
Attending: UROLOGY | Admitting: UROLOGY
Payer: MEDICARE

## 2024-04-08 DIAGNOSIS — Z01.818 PREOPERATIVE TESTING: ICD-10-CM

## 2024-04-08 DIAGNOSIS — N20.0 STAGHORN CALCULUS: Primary | ICD-10-CM

## 2024-04-08 LAB
ABO + RH BLD: ABNORMAL
ACID FAST MOD KINY STN SPEC: NORMAL
BLD GP AB SCN CELLS X3 SERPL QL: ABNORMAL
BLOOD GROUP ANTIBODIES SERPL: NORMAL
MYCOBACTERIUM SPEC QL CULT: NORMAL
POCT GLUCOSE: 149 MG/DL (ref 70–110)
POCT GLUCOSE: 86 MG/DL (ref 70–110)
POCT GLUCOSE: 96 MG/DL (ref 70–110)
SPECIMEN OUTDATE: ABNORMAL

## 2024-04-08 PROCEDURE — 71000015 HC POSTOP RECOV 1ST HR: Performed by: UROLOGY

## 2024-04-08 PROCEDURE — 82365 CALCULUS SPECTROSCOPY: CPT | Performed by: UROLOGY

## 2024-04-08 PROCEDURE — 63600175 PHARM REV CODE 636 W HCPCS

## 2024-04-08 PROCEDURE — 37000009 HC ANESTHESIA EA ADD 15 MINS: Performed by: UROLOGY

## 2024-04-08 PROCEDURE — 25500020 PHARM REV CODE 255: Performed by: UROLOGY

## 2024-04-08 PROCEDURE — 63600175 PHARM REV CODE 636 W HCPCS: Performed by: ANESTHESIOLOGY

## 2024-04-08 PROCEDURE — 50081 PERQ NL/PL LITHOTRP CPLX>2CM: CPT | Mod: 58,LT,, | Performed by: UROLOGY

## 2024-04-08 PROCEDURE — C1729 CATH, DRAINAGE: HCPCS | Performed by: UROLOGY

## 2024-04-08 PROCEDURE — BT12ZZZ FLUOROSCOPY OF LEFT KIDNEY: ICD-10-PCS | Performed by: UROLOGY

## 2024-04-08 PROCEDURE — 0T9130Z DRAINAGE OF LEFT KIDNEY WITH DRAINAGE DEVICE, PERCUTANEOUS APPROACH: ICD-10-PCS | Performed by: UROLOGY

## 2024-04-08 PROCEDURE — 0T778DZ DILATION OF LEFT URETER WITH INTRALUMINAL DEVICE, VIA NATURAL OR ARTIFICIAL OPENING ENDOSCOPIC: ICD-10-PCS | Performed by: UROLOGY

## 2024-04-08 PROCEDURE — 82962 GLUCOSE BLOOD TEST: CPT | Performed by: UROLOGY

## 2024-04-08 PROCEDURE — 25000003 PHARM REV CODE 250

## 2024-04-08 PROCEDURE — C1726 CATH, BAL DIL, NON-VASCULAR: HCPCS | Performed by: UROLOGY

## 2024-04-08 PROCEDURE — 0TC13ZZ EXTIRPATION OF MATTER FROM LEFT KIDNEY, PERCUTANEOUS APPROACH: ICD-10-PCS | Performed by: UROLOGY

## 2024-04-08 PROCEDURE — C1758 CATHETER, URETERAL: HCPCS | Performed by: UROLOGY

## 2024-04-08 PROCEDURE — 11000001 HC ACUTE MED/SURG PRIVATE ROOM

## 2024-04-08 PROCEDURE — 36000709 HC OR TIME LEV III EA ADD 15 MIN: Performed by: UROLOGY

## 2024-04-08 PROCEDURE — 71000016 HC POSTOP RECOV ADDL HR: Performed by: UROLOGY

## 2024-04-08 PROCEDURE — 71000044 HC DOSC ROUTINE RECOVERY FIRST HOUR: Performed by: UROLOGY

## 2024-04-08 PROCEDURE — D9220A PRA ANESTHESIA: Mod: ANES,,, | Performed by: ANESTHESIOLOGY

## 2024-04-08 PROCEDURE — 37000008 HC ANESTHESIA 1ST 15 MINUTES: Performed by: UROLOGY

## 2024-04-08 PROCEDURE — C2617 STENT, NON-COR, TEM W/O DEL: HCPCS | Performed by: UROLOGY

## 2024-04-08 PROCEDURE — 36000708 HC OR TIME LEV III 1ST 15 MIN: Performed by: UROLOGY

## 2024-04-08 PROCEDURE — 0T9430Z DRAINAGE OF LEFT KIDNEY PELVIS WITH DRAINAGE DEVICE, PERCUTANEOUS APPROACH: ICD-10-PCS | Performed by: UROLOGY

## 2024-04-08 PROCEDURE — 86870 RBC ANTIBODY IDENTIFICATION: CPT

## 2024-04-08 PROCEDURE — 27201423 OPTIME MED/SURG SUP & DEVICES STERILE SUPPLY: Performed by: UROLOGY

## 2024-04-08 PROCEDURE — C1769 GUIDE WIRE: HCPCS | Performed by: UROLOGY

## 2024-04-08 PROCEDURE — 86901 BLOOD TYPING SEROLOGIC RH(D): CPT

## 2024-04-08 PROCEDURE — D9220A PRA ANESTHESIA: Mod: CRNA,,, | Performed by: NURSE ANESTHETIST, CERTIFIED REGISTERED

## 2024-04-08 DEVICE — STENT URETERAL UNIV 6FR 22CM
Type: IMPLANTABLE DEVICE | Site: URETER | Status: NON-FUNCTIONAL
Removed: 2024-04-17

## 2024-04-08 RX ORDER — PROPOFOL 10 MG/ML
VIAL (ML) INTRAVENOUS
Status: DISCONTINUED | OUTPATIENT
Start: 2024-04-08 | End: 2024-04-08

## 2024-04-08 RX ORDER — FENTANYL CITRATE 50 UG/ML
INJECTION, SOLUTION INTRAMUSCULAR; INTRAVENOUS
Status: DISCONTINUED | OUTPATIENT
Start: 2024-04-08 | End: 2024-04-08

## 2024-04-08 RX ORDER — IBUPROFEN 200 MG
24 TABLET ORAL
Status: DISCONTINUED | OUTPATIENT
Start: 2024-04-08 | End: 2024-04-09 | Stop reason: HOSPADM

## 2024-04-08 RX ORDER — DIPHENHYDRAMINE HYDROCHLORIDE 50 MG/ML
INJECTION INTRAMUSCULAR; INTRAVENOUS
Status: DISCONTINUED | OUTPATIENT
Start: 2024-04-08 | End: 2024-04-08

## 2024-04-08 RX ORDER — LIDOCAINE HYDROCHLORIDE 20 MG/ML
INJECTION, SOLUTION EPIDURAL; INFILTRATION; INTRACAUDAL; PERINEURAL
Status: DISCONTINUED | OUTPATIENT
Start: 2024-04-08 | End: 2024-04-08

## 2024-04-08 RX ORDER — DULOXETIN HYDROCHLORIDE 60 MG/1
60 CAPSULE, DELAYED RELEASE ORAL NIGHTLY
Status: DISCONTINUED | OUTPATIENT
Start: 2024-04-08 | End: 2024-04-09 | Stop reason: HOSPADM

## 2024-04-08 RX ORDER — TAMSULOSIN HYDROCHLORIDE 0.4 MG/1
0.4 CAPSULE ORAL DAILY
Status: DISCONTINUED | OUTPATIENT
Start: 2024-04-08 | End: 2024-04-09 | Stop reason: HOSPADM

## 2024-04-08 RX ORDER — POTASSIUM CITRATE 10 MEQ/1
10 TABLET, EXTENDED RELEASE ORAL
Status: DISCONTINUED | OUTPATIENT
Start: 2024-04-08 | End: 2024-04-09 | Stop reason: HOSPADM

## 2024-04-08 RX ORDER — ACETAMINOPHEN 325 MG/1
650 TABLET ORAL EVERY 6 HOURS
Status: DISCONTINUED | OUTPATIENT
Start: 2024-04-08 | End: 2024-04-09 | Stop reason: HOSPADM

## 2024-04-08 RX ORDER — IBUPROFEN 200 MG
16 TABLET ORAL
Status: DISCONTINUED | OUTPATIENT
Start: 2024-04-08 | End: 2024-04-09 | Stop reason: HOSPADM

## 2024-04-08 RX ORDER — GABAPENTIN 400 MG/1
800 CAPSULE ORAL 2 TIMES DAILY
Status: DISCONTINUED | OUTPATIENT
Start: 2024-04-08 | End: 2024-04-09 | Stop reason: HOSPADM

## 2024-04-08 RX ORDER — METHOCARBAMOL 500 MG/1
1000 TABLET, FILM COATED ORAL EVERY 6 HOURS PRN
Status: DISCONTINUED | OUTPATIENT
Start: 2024-04-08 | End: 2024-04-09 | Stop reason: HOSPADM

## 2024-04-08 RX ORDER — CEFAZOLIN SODIUM 1 G/3ML
INJECTION, POWDER, FOR SOLUTION INTRAMUSCULAR; INTRAVENOUS
Status: DISCONTINUED | OUTPATIENT
Start: 2024-04-08 | End: 2024-04-08

## 2024-04-08 RX ORDER — ONDANSETRON HYDROCHLORIDE 2 MG/ML
4 INJECTION, SOLUTION INTRAVENOUS EVERY 6 HOURS PRN
Status: DISCONTINUED | OUTPATIENT
Start: 2024-04-08 | End: 2024-04-09 | Stop reason: HOSPADM

## 2024-04-08 RX ORDER — ATORVASTATIN CALCIUM 40 MG/1
40 TABLET, FILM COATED ORAL DAILY
Status: DISCONTINUED | OUTPATIENT
Start: 2024-04-08 | End: 2024-04-09 | Stop reason: HOSPADM

## 2024-04-08 RX ORDER — PANTOPRAZOLE SODIUM 40 MG/1
40 TABLET, DELAYED RELEASE ORAL DAILY
Status: DISCONTINUED | OUTPATIENT
Start: 2024-04-08 | End: 2024-04-09 | Stop reason: HOSPADM

## 2024-04-08 RX ORDER — SODIUM CHLORIDE, SODIUM LACTATE, POTASSIUM CHLORIDE, CALCIUM CHLORIDE 600; 310; 30; 20 MG/100ML; MG/100ML; MG/100ML; MG/100ML
INJECTION, SOLUTION INTRAVENOUS CONTINUOUS
Status: DISCONTINUED | OUTPATIENT
Start: 2024-04-08 | End: 2024-04-09

## 2024-04-08 RX ORDER — FAMOTIDINE 10 MG/ML
INJECTION INTRAVENOUS
Status: DISCONTINUED | OUTPATIENT
Start: 2024-04-08 | End: 2024-04-08

## 2024-04-08 RX ORDER — SODIUM CHLORIDE 9 MG/ML
INJECTION, SOLUTION INTRAVENOUS CONTINUOUS
Status: DISCONTINUED | OUTPATIENT
Start: 2024-04-08 | End: 2024-04-09

## 2024-04-08 RX ORDER — DIPHENHYDRAMINE HCL 25 MG
25 CAPSULE ORAL EVERY 12 HOURS PRN
Status: DISCONTINUED | OUTPATIENT
Start: 2024-04-08 | End: 2024-04-09 | Stop reason: HOSPADM

## 2024-04-08 RX ORDER — LEVOTHYROXINE SODIUM 137 UG/1
137 TABLET ORAL
Status: DISCONTINUED | OUTPATIENT
Start: 2024-04-09 | End: 2024-04-09 | Stop reason: HOSPADM

## 2024-04-08 RX ORDER — TALC
6 POWDER (GRAM) TOPICAL NIGHTLY PRN
Status: DISCONTINUED | OUTPATIENT
Start: 2024-04-08 | End: 2024-04-09 | Stop reason: HOSPADM

## 2024-04-08 RX ORDER — ACETAMINOPHEN 500 MG
1000 TABLET ORAL
Status: COMPLETED | OUTPATIENT
Start: 2024-04-08 | End: 2024-04-08

## 2024-04-08 RX ORDER — SODIUM CHLORIDE 0.9 % (FLUSH) 0.9 %
10 SYRINGE (ML) INJECTION
Status: DISCONTINUED | OUTPATIENT
Start: 2024-04-08 | End: 2024-04-09 | Stop reason: HOSPADM

## 2024-04-08 RX ORDER — ONDANSETRON HYDROCHLORIDE 2 MG/ML
INJECTION, SOLUTION INTRAVENOUS
Status: DISCONTINUED | OUTPATIENT
Start: 2024-04-08 | End: 2024-04-08

## 2024-04-08 RX ORDER — OXYCODONE HYDROCHLORIDE 5 MG/1
5 TABLET ORAL EVERY 4 HOURS PRN
Status: DISCONTINUED | OUTPATIENT
Start: 2024-04-08 | End: 2024-04-09 | Stop reason: HOSPADM

## 2024-04-08 RX ORDER — HYDROMORPHONE HYDROCHLORIDE 1 MG/ML
0.2 INJECTION, SOLUTION INTRAMUSCULAR; INTRAVENOUS; SUBCUTANEOUS EVERY 5 MIN PRN
Status: DISCONTINUED | OUTPATIENT
Start: 2024-04-08 | End: 2024-04-08 | Stop reason: HOSPADM

## 2024-04-08 RX ORDER — DEXAMETHASONE SODIUM PHOSPHATE 4 MG/ML
INJECTION, SOLUTION INTRA-ARTICULAR; INTRALESIONAL; INTRAMUSCULAR; INTRAVENOUS; SOFT TISSUE
Status: DISCONTINUED | OUTPATIENT
Start: 2024-04-08 | End: 2024-04-08

## 2024-04-08 RX ORDER — LISINOPRIL 2.5 MG/1
2.5 TABLET ORAL DAILY
Status: DISCONTINUED | OUTPATIENT
Start: 2024-04-09 | End: 2024-04-09 | Stop reason: HOSPADM

## 2024-04-08 RX ORDER — DEXMEDETOMIDINE HYDROCHLORIDE 100 UG/ML
INJECTION, SOLUTION INTRAVENOUS
Status: DISCONTINUED | OUTPATIENT
Start: 2024-04-08 | End: 2024-04-08

## 2024-04-08 RX ORDER — ONDANSETRON HYDROCHLORIDE 2 MG/ML
4 INJECTION, SOLUTION INTRAVENOUS DAILY PRN
Status: DISCONTINUED | OUTPATIENT
Start: 2024-04-08 | End: 2024-04-08 | Stop reason: HOSPADM

## 2024-04-08 RX ORDER — INSULIN ASPART 100 [IU]/ML
0-5 INJECTION, SOLUTION INTRAVENOUS; SUBCUTANEOUS
Status: DISCONTINUED | OUTPATIENT
Start: 2024-04-08 | End: 2024-04-09 | Stop reason: HOSPADM

## 2024-04-08 RX ORDER — KETOROLAC TROMETHAMINE 15 MG/ML
15 INJECTION, SOLUTION INTRAMUSCULAR; INTRAVENOUS EVERY 6 HOURS PRN
Status: DISCONTINUED | OUTPATIENT
Start: 2024-04-08 | End: 2024-04-09 | Stop reason: HOSPADM

## 2024-04-08 RX ORDER — MEPERIDINE HYDROCHLORIDE 50 MG/ML
12.5 INJECTION INTRAMUSCULAR; INTRAVENOUS; SUBCUTANEOUS ONCE AS NEEDED
Status: DISCONTINUED | OUTPATIENT
Start: 2024-04-08 | End: 2024-04-08 | Stop reason: HOSPADM

## 2024-04-08 RX ORDER — ROCURONIUM BROMIDE 10 MG/ML
INJECTION, SOLUTION INTRAVENOUS
Status: DISCONTINUED | OUTPATIENT
Start: 2024-04-08 | End: 2024-04-08

## 2024-04-08 RX ORDER — MIDAZOLAM HYDROCHLORIDE 1 MG/ML
INJECTION INTRAMUSCULAR; INTRAVENOUS
Status: DISCONTINUED | OUTPATIENT
Start: 2024-04-08 | End: 2024-04-08

## 2024-04-08 RX ORDER — PHENYLEPHRINE HYDROCHLORIDE 10 MG/ML
INJECTION INTRAVENOUS
Status: DISCONTINUED | OUTPATIENT
Start: 2024-04-08 | End: 2024-04-08

## 2024-04-08 RX ORDER — GLUCAGON 1 MG
1 KIT INJECTION
Status: DISCONTINUED | OUTPATIENT
Start: 2024-04-08 | End: 2024-04-09 | Stop reason: HOSPADM

## 2024-04-08 RX ADMIN — HYDROMORPHONE HYDROCHLORIDE 0.2 MG: 1 INJECTION, SOLUTION INTRAMUSCULAR; INTRAVENOUS; SUBCUTANEOUS at 03:04

## 2024-04-08 RX ADMIN — FAMOTIDINE 20 MG: 10 INJECTION, SOLUTION INTRAVENOUS at 09:04

## 2024-04-08 RX ADMIN — ONDANSETRON 4 MG: 2 INJECTION INTRAMUSCULAR; INTRAVENOUS at 09:04

## 2024-04-08 RX ADMIN — POTASSIUM CITRATE 10 MEQ: 10 TABLET, EXTENDED RELEASE ORAL at 05:04

## 2024-04-08 RX ADMIN — TAMSULOSIN HYDROCHLORIDE 0.4 MG: 0.4 CAPSULE ORAL at 03:04

## 2024-04-08 RX ADMIN — GABAPENTIN 800 MG: 400 CAPSULE ORAL at 09:04

## 2024-04-08 RX ADMIN — DEXMEDETOMIDINE 4 MCG: 100 INJECTION, SOLUTION, CONCENTRATE INTRAVENOUS at 11:04

## 2024-04-08 RX ADMIN — FENTANYL CITRATE 100 MCG: 50 INJECTION, SOLUTION INTRAMUSCULAR; INTRAVENOUS at 09:04

## 2024-04-08 RX ADMIN — ONDANSETRON 4 MG: 2 INJECTION INTRAMUSCULAR; INTRAVENOUS at 11:04

## 2024-04-08 RX ADMIN — CEFAZOLIN 2 G: 330 INJECTION, POWDER, FOR SOLUTION INTRAMUSCULAR; INTRAVENOUS at 09:04

## 2024-04-08 RX ADMIN — SODIUM CHLORIDE, POTASSIUM CHLORIDE, SODIUM LACTATE AND CALCIUM CHLORIDE: 600; 310; 30; 20 INJECTION, SOLUTION INTRAVENOUS at 04:04

## 2024-04-08 RX ADMIN — MIDAZOLAM HYDROCHLORIDE 2 MG: 2 INJECTION, SOLUTION INTRAMUSCULAR; INTRAVENOUS at 09:04

## 2024-04-08 RX ADMIN — SODIUM CHLORIDE: 0.9 INJECTION, SOLUTION INTRAVENOUS at 09:04

## 2024-04-08 RX ADMIN — ATORVASTATIN CALCIUM 40 MG: 40 TABLET, FILM COATED ORAL at 03:04

## 2024-04-08 RX ADMIN — PHENYLEPHRINE HYDROCHLORIDE 100 MCG: 10 INJECTION INTRAVENOUS at 09:04

## 2024-04-08 RX ADMIN — LIDOCAINE HYDROCHLORIDE 100 MG: 20 INJECTION, SOLUTION EPIDURAL; INFILTRATION; INTRACAUDAL; PERINEURAL at 09:04

## 2024-04-08 RX ADMIN — ROCURONIUM BROMIDE 50 MG: 10 INJECTION, SOLUTION INTRAVENOUS at 09:04

## 2024-04-08 RX ADMIN — DIPHENHYDRAMINE HYDROCHLORIDE 12.5 MG: 50 INJECTION, SOLUTION INTRAMUSCULAR; INTRAVENOUS at 09:04

## 2024-04-08 RX ADMIN — SUGAMMADEX 200 MG: 100 INJECTION, SOLUTION INTRAVENOUS at 11:04

## 2024-04-08 RX ADMIN — ACETAMINOPHEN 650 MG: 325 TABLET ORAL at 05:04

## 2024-04-08 RX ADMIN — DEXAMETHASONE SODIUM PHOSPHATE 4 MG: 4 INJECTION, SOLUTION INTRAMUSCULAR; INTRAVENOUS at 09:04

## 2024-04-08 RX ADMIN — DULOXETINE HYDROCHLORIDE 60 MG: 60 CAPSULE, DELAYED RELEASE ORAL at 08:04

## 2024-04-08 RX ADMIN — KETOROLAC TROMETHAMINE 15 MG: 15 INJECTION, SOLUTION INTRAMUSCULAR; INTRAVENOUS at 03:04

## 2024-04-08 RX ADMIN — PROPOFOL 150 MG: 10 INJECTION, EMULSION INTRAVENOUS at 09:04

## 2024-04-08 RX ADMIN — PHENYLEPHRINE HYDROCHLORIDE 100 MCG: 10 INJECTION INTRAVENOUS at 11:04

## 2024-04-08 RX ADMIN — ACETAMINOPHEN 1000 MG: 500 TABLET ORAL at 08:04

## 2024-04-08 RX ADMIN — SODIUM CHLORIDE: 0.9 INJECTION, SOLUTION INTRAVENOUS at 10:04

## 2024-04-08 RX ADMIN — ONDANSETRON 4 MG: 2 INJECTION INTRAMUSCULAR; INTRAVENOUS at 12:04

## 2024-04-08 RX ADMIN — ROCURONIUM BROMIDE 20 MG: 10 INJECTION, SOLUTION INTRAVENOUS at 10:04

## 2024-04-08 RX ADMIN — OXYCODONE 5 MG: 5 TABLET ORAL at 08:04

## 2024-04-08 RX ADMIN — PANTOPRAZOLE SODIUM 40 MG: 40 TABLET, DELAYED RELEASE ORAL at 03:04

## 2024-04-08 RX ADMIN — HYDROMORPHONE HYDROCHLORIDE 0.2 MG: 1 INJECTION, SOLUTION INTRAMUSCULAR; INTRAVENOUS; SUBCUTANEOUS at 12:04

## 2024-04-08 NOTE — PLAN OF CARE
Patient report given to Yulisa VARGAS. Patient vital stable, tolerating PO, neph tube and back have output, urology team spoke with patient at the bedside before transfer to the floor.

## 2024-04-08 NOTE — INTERVAL H&P NOTE
The patient has been examined and the H&P has been reviewed:    I concur with the findings and no changes have occurred since H&P was written.    Surgery risks, benefits and alternative options discussed and understood by patient/family.    Urine culture 4/1 with no growth. She has been taking cipro since 4/1.       Patient Active Problem List   Diagnosis    Syringomyelia and syringobulbia    Chronic low back pain    Essential hypertension    Hyperlipidemia    Thoracic spondylosis without myelopathy    Spinal stenosis, lumbar region, without neurogenic claudication    Osteoporosis, idiopathic    Thyroid nodule    Palpitations    GERD (gastroesophageal reflux disease)    Fatty liver disease, nonalcoholic    S/P laparoscopic sleeve gastrectomy    Severe obesity (BMI >= 40)    Back pain    Lumbar disc disease    Anxiety    Depression    Chronic, continuous use of opioids    Snoring    Occult blood in stools    PONV (postoperative nausea and vomiting)    History of prediabetes    Urinary hesitancy    Wheezing    Microscopic hematuria    Abnormality of gait    Menopausal flushing    Primary hypothyroidism    Pyelonephritis    Hypophosphatemia    Hypomagnesemia    Status post total left knee replacement    Hepatic fibrosis, stage 3    FERRELL (nonalcoholic steatohepatitis)    Osteoarthritis of right knee    Staghorn calculus    Type 2 diabetes mellitus with hyperglycemia    Hypercalciuria    Primary hyperparathyroidism    Right renal stone    Pre-operative cardiovascular examination

## 2024-04-08 NOTE — OP NOTE
[facility] - Surgery ([facility])  Urology Department  Operative Note    Date of Procedure: 4/8/2024     Pre-Operative Diagnosis:   Left Staghorn kidney stones [N20.0]    Post-Operative Diagnosis: same    Procedure(s) Performed:   Left PCNL (> 2 cm)  Dilation of nephrostomy tube tract  Left antegrade JJ ureteral stent placement  Left nephrostomy tube placement  Left antegrade nephrostogram    Specimen(s): Left renal stone    Primary: Suni Patel MD    Assisting Surgeon: MD Chilo Mansfield MD    Anesthesia: General endotracheal anesthesia    Indications: Angie Mccarthy is a 58 y.o. female with a left renal stones presenting for definitive management. She has been extensively counseled on the options for stones and understands the risks, benefits, and alternatives. She has elected to proceed with PCNL.     Findings:  Prior mid pole access gained by IR  Large lower pole stone removed with trilogy  Lower pole calyx adjacent to access unable to be accessed with nephroscope or flexible cystoscope  Ureteral stent placed    Estimated Blood Loss: 50cc    Drains:   16 Fr urethral back catheter  6 x 22 left JJ ureteral stent  22 Fr [place] tip nephrostomy tube    Procedure in detail:  After the risks, benefits and possible complications of the procedure were explained, the patient elected to undergo the above procedures and informed consent was obtained. The patient was taken to the OR and placed under anesthesia. Pre-operative antibiotics were administered. Time out was performed.  SCDs were applied and working prior to the procedure.    Our attention was turned to the patient's left nephrostomy tube which was already in place. An antegrade nephrostogram was performed using fluoroscopy to delineate the collecting system. The tube was then cut with scissors. An Amplatz wire was inserted through the nephroureteral stent and advanced to the level of the bladder. This was confirmed on fluoroscopy.  The nephrostomy tube was then removed keeping the wire in place. A dual lumen access catheter was inserted over the wire. A motion wire was then inserted through the second lumen and advanced to the level of the bladder. The dual lumen was then removed keeping both wires in place.    We then dilated the nephrostomy tract under fluoroscopic guidance using the 30 fr Ultraxx nephrostomy balloon dilator.  The 30 Fr percutaneous renal access sheath was then advanced over the dilator balloon into the renal collecting system. The balloon was then deflated and removed keeping the super stiff wire in place.    The nephroscope was advanced into the sheath. The collecting system was visualized. The UPJ was identified. The stones were identified.  These were broken up and removed using the [id number] device. Flexible pyeloscopy was then performed to evaluate all the calyces. Any residual stone fragments were removed with a basket.  There were no stone fragments identified at the end of flexible pyeloscopy.    A 6 x 22 JJ ureteral stent was passed over the super stiff wire under direct vision. When an adequate coil was seen in the bladder using fluoro, the super stiff was removed.  The proximal coil was manipulated into adequate position using a grasper.      A 22 Fr [place] tipped back catheter was placed into the collecting system as a nephrostomy tube. Antegrade nephrostogram was performed showing no extravasation of contrast and no filling defects. 3 mL of water was placed in the balloon.    The sheath was removed over the catheter leaving the nephrotomy tube in place. The skin was closed with 3-0 vicryl in a vertical mattress fashion. The nephrostomy tube was secured to the skin using a 2-0 nylon.      A sterile compressive dressing was placed. The patient was then transferred back to PACU in stable condition.     Disposition:  The patient will be admitted to the floor for postoperative monitoring.  A CTRSS will be obtained  in the AM.    EDU OLIVER MD

## 2024-04-08 NOTE — ANESTHESIA PROCEDURE NOTES
Intubation    Date/Time: 4/8/2024 9:16 AM    Performed by: Margarita Arreaga  Authorized by: Ben Conti Jr., MD    Intubation:     Induction:  Intravenous    Intubated:  Postinduction    Mask Ventilation:  Easy with oral airway (two hand mask)    Attempts:  1    Attempted By:  Student    Method of Intubation:  Video laryngoscopy    Blade:  Hernadez 3    Laryngeal View Grade: Grade I - full view of cords      Difficult Airway Encountered?: No      Complications:  None    Airway Device:  Oral endotracheal tube    Airway Device Size:  7.0    Style/Cuff Inflation:  Cuffed (inflated to minimal occlusive pressure)    Tube secured:  22    Secured at:  The lips    Placement Verified By:  Capnometry    Complicating Factors:  None    Findings Post-Intubation:  BS equal bilateral and atraumatic/condition of teeth unchanged

## 2024-04-08 NOTE — NURSING
Pt arrived to floor, AAOX4, VSS. Patient reports moderate pain to L flank. PRN medication given. Neph tube in place to L back, draining clear pink/red drainage. Jiménez in place, draining yellow/pink urine. SCDs applied. Fall and safety precautions in place. Oriented to room. Call light within reach.

## 2024-04-08 NOTE — NURSING
Nurses Note -- 4 Eyes      4/8/2024   4:28 PM      Skin assessed during: Admit      [x] No Altered Skin Integrity Present    []Prevention Measures Documented      [] Yes- Altered Skin Integrity Present or Discovered   [] LDA Added if Not in Epic (Describe Wound)   [] New Altered Skin Integrity was Present on Admit and Documented in LDA   [] Wound Image Taken    Wound Care Consulted? No    Attending Nurse:  oRsie De La Paz RN    Second RN/Staff Member: Yulisa Pollock RN

## 2024-04-08 NOTE — PLAN OF CARE
Problem: Adult Inpatient Plan of Care  Goal: Plan of Care Review  Outcome: Ongoing, Progressing  Goal: Patient-Specific Goal (Individualized)  Outcome: Ongoing, Progressing  Goal: Absence of Hospital-Acquired Illness or Injury  Outcome: Ongoing, Progressing  Goal: Optimal Comfort and Wellbeing  Outcome: Ongoing, Progressing  Goal: Readiness for Transition of Care  Outcome: Ongoing, Progressing     Problem: Diabetes Comorbidity  Goal: Blood Glucose Level Within Targeted Range  Outcome: Ongoing, Progressing     Problem: Infection  Goal: Absence of Infection Signs and Symptoms  Outcome: Ongoing, Progressing     Problem: Pain Acute  Goal: Acceptable Pain Control and Functional Ability  Outcome: Ongoing, Progressing     Problem: Fall Injury Risk  Goal: Absence of Fall and Fall-Related Injury  Outcome: Ongoing, Progressing     Patient resting comfortably, denies any needs at present, Addressed patients pain, pump, position, need for potty, and possessions in reach. Patient remains free of falls, and verbalizes understanding in fall prevention and safety. Safety measures remain in place. Reinforced fall prevention and safety education. Call light and personal belongings within reach, bed in lowest position with wheels locked, side rails up x2, Instructed to call with any needs or complaints, verbalized understanding. Continue current plan of care.

## 2024-04-08 NOTE — DISCHARGE INSTRUCTIONS
Do not shower until 48 hours after surgery. Do not scrub incision in the shower, but rather, let soapy water run over incisions. Do not submerge incision in water (no baths, hot tubs, or swimming pools). It is expected to have leakage of fluid from your former nephrostomy tube site until the incision heals. Keep the site of your former nephrostomy tube covered with gauze and Tegaderm (plastic dressing).     You may see some blood in your urine while the stone fragments are passing and a few days afterward. Do not be alarmed, even if the urine was clear for a while. Push fluids and refrain from strenuous activity until clearing occurs. If you have difficulty passing clots or don't improve, call us. Avoid medications such as Aspirin, Advil, Motrin, Plavix, or Coumadin, which thin the blood and cause bleeding.    Diet:  You may return to your normal diet immediately. Alcohol, spicy foods, acidy foods and drinks with caffeine may cause irritation or frequency and should be used in moderation. To keep your urine flowing freely and to avoid constipation, drink plenty of fluids during the day (8-10 glasses).  Activity:  While the kidney is healing do not engage in strenuous activity. If you are active, you may see more blood in the urine. We would suggest cutting down your activity under these circumstances until the bleeding has stopped, perhaps two weeks.  Bowels:  It is important to keep your bowels regular during the postoperative period. Straining with bowel movements can cause bleeding. A bowel movement every other day is reasonable. Use a mild over-the-counter laxative if needed, such as Milk of Magnesia 2-3 tablespoons, or 1-2 Dulcolax tablets. Call if you continue to have problems. Narcotics can worsen constipation; if you had been taking narcotics for pain, before, during or after your surgery, you may be constipated. Ditropan for bladder spasms may also cause constipation.  Problems you should report to  us:  Fevers over 101.5 degrees Fahrenheit.   Inability to urinate.   Drug reactions (hives, rash, nausea, vomiting, diarrhea)   Severe burning or pain with urination that is not improving.   You will also have some burning with urination. This is normal after stone therapy. This burning should be mild or moderate and should improve over time. Severe burning, especially when it is not improving, can be a sign of infection.  Follow-up  After the stone has been fragmented you will likely need an x-ray and kidney ultrasound prior to next clinic appointment    Call Urology at [phone] with any problems

## 2024-04-08 NOTE — ANESTHESIA PREPROCEDURE EVALUATION
04/08/2024  Angie Mccarthy is a 58 y.o., female.      Pre-op Assessment    I have reviewed the Patient Summary Reports.     I have reviewed the Nursing Notes.       Review of Systems  Anesthesia Hx:  No problems with previous Anesthesia                Hematology/Oncology:  Hematology Normal   Oncology Normal                                   EENT/Dental:  EENT/Dental Normal           Cardiovascular:     Hypertension                                        Pulmonary:  Pulmonary Normal                       Renal/:  Chronic Renal Disease                Hepatic/GI:     GERD Liver Disease, Hepatitis           Musculoskeletal:  Musculoskeletal Normal                Neurological:    Neuromuscular Disease,                                   Endocrine:  Diabetes Hypothyroidism          Dermatological:  Skin Normal    Psych:  Psychiatric History                  Physical Exam  General: Well nourished    Airway:  Mallampati: III / II  Mouth Opening: Normal  TM Distance: Normal  Tongue: Normal  Neck ROM: Normal ROM    Dental:  Intact        Anesthesia Plan  Type of Anesthesia, risks & benefits discussed:    Anesthesia Type: Gen Natural Airway  Intra-op Monitoring Plan: Standard ASA Monitors  Post Op Pain Control Plan: multimodal analgesia  Induction:  IV  Airway Plan: Direct  Informed Consent: Informed consent signed with the Patient and all parties understand the risks and agree with anesthesia plan.  All questions answered. Patient consented to blood products? No  ASA Score: 3  Day of Surgery Review of History & Physical: H&P Update referred to the surgeon/provider.    Ready For Surgery From Anesthesia Perspective.     .

## 2024-04-08 NOTE — ANESTHESIA POSTPROCEDURE EVALUATION
Anesthesia Post Evaluation    Patient: Angie Mccarthy    Procedure(s) Performed: IR Nephrostomy tube placement     Final Anesthesia Type: general      Patient location during evaluation: PACU  Patient participation: Yes- Able to Participate  Level of consciousness: awake and alert  Post-procedure vital signs: reviewed and stable  Pain management: adequate  Airway patency: patent    PONV status at discharge: No PONV  Anesthetic complications: no      Cardiovascular status: blood pressure returned to baseline  Respiratory status: room air  Hydration status: euvolemic  Follow-up not needed.              Vitals Value Taken Time   /66 04/05/24 1717   Temp 36.6 °C (97.9 °F) 04/05/24 1715   Pulse 76 04/05/24 1722   Resp 18 04/05/24 1715   SpO2 94 % 04/05/24 1722   Vitals shown include unvalidated device data.      No case tracking events are documented in the log.      Pain/Venessa Score: Pain Rating Prior to Med Admin: 6 (4/8/2024 12:33 PM)  Venessa Score: 10 (4/8/2024 12:45 PM)

## 2024-04-08 NOTE — TRANSFER OF CARE
"Anesthesia Transfer of Care Note    Patient: Angie Mccarthy    Procedure(s) Performed: Procedure(s) (LRB):  NEPHROLITHOTOMY, PERCUTANEOUS (Left)  CYSTOSCOPY, WITH URETERAL STENT INSERTION (Left)  Nephrostogram (N/A)  CYSTOSCOPY, WITH RETROGRADE PYELOGRAM (N/A)  PLACEMENT, NEPHROSTOMY TUBE (Left)  URETEROSCOPY (N/A)    Patient location: PACU    Anesthesia Type: general    Transport from OR: Transported from OR on 6-10 L/min O2 by face mask with adequate spontaneous ventilation    Post pain: adequate analgesia    Post assessment: no apparent anesthetic complications    Post vital signs: stable    Level of consciousness: awake    Nausea/Vomiting: no nausea/vomiting    Complications: none    Transfer of care protocol was followed      Last vitals: Visit Vitals  /72 (Patient Position: Lying)   Pulse 98   Temp 36.6 °C (97.9 °F) (Temporal)   Resp 18   Ht 5' 1" (1.549 m)   Wt 90.7 kg (200 lb)   LMP 12/06/2007   SpO2 95%   Breastfeeding No   BMI 37.79 kg/m²     "

## 2024-04-08 NOTE — ANESTHESIA PREPROCEDURE EVALUATION
04/08/2024  Angie Mccarthy is a 58 y.o., female.      Pre-op Assessment    I have reviewed the Patient Summary Reports.    I have reviewed the NPO Status.      Review of Systems  Anesthesia Hx:             Denies Family Hx of Anesthesia complications.    Denies Personal Hx of Anesthesia complications.                    Social:  Social Alcohol Use, Non-Smoker       Cardiovascular:     Hypertension                                  Hypertension         Pulmonary:        Denies Recent URI.  Denies Sleep Apnea.                Renal/:  Chronic Renal Disease        Kidney Function/Disease             Hepatic/GI:     GERD Liver Disease, Hepatitis    Gerd       Liver Disease, Hepatitis        Musculoskeletal:  Arthritis        Arthritis          Neurological:    Neuromuscular Disease,           Arthritis                         Neuromuscular Disease   Endocrine:  Diabetes, type 2 Hypothyroidism   Diabetes                   Hypothyroidism        Obesity / BMI > 30  Psych:  Psychiatric History                  Physical Exam  General: Cooperative, Alert and Oriented    Airway:  Mallampati: II   Mouth Opening: Normal  TM Distance: Normal  Tongue: Normal  Neck ROM: Normal ROM    Chest/Lungs:  Normal Respiratory Rate    Heart:  Rate: Normal        Anesthesia Plan  Type of Anesthesia, risks & benefits discussed:    Anesthesia Type: Gen ETT, Gen Supraglottic Airway, Gen Natural Airway  Intra-op Monitoring Plan: Standard ASA Monitors  Induction:  IV  Informed Consent: Informed consent signed with the Patient and all parties understand the risks and agree with anesthesia plan.  All questions answered.   ASA Score: 3    Ready For Surgery From Anesthesia Perspective.     .

## 2024-04-09 ENCOUNTER — TELEPHONE (OUTPATIENT)
Dept: UROLOGY | Facility: CLINIC | Age: 59
End: 2024-04-09
Payer: MEDICARE

## 2024-04-09 VITALS
WEIGHT: 200 LBS | DIASTOLIC BLOOD PRESSURE: 70 MMHG | TEMPERATURE: 98 F | BODY MASS INDEX: 37.76 KG/M2 | OXYGEN SATURATION: 99 % | HEIGHT: 61 IN | RESPIRATION RATE: 18 BRPM | HEART RATE: 77 BPM | SYSTOLIC BLOOD PRESSURE: 149 MMHG

## 2024-04-09 DIAGNOSIS — N20.0 LEFT RENAL STONE: Primary | ICD-10-CM

## 2024-04-09 PROBLEM — E66.9 CLASS 2 OBESITY WITH BODY MASS INDEX (BMI) OF 37.0 TO 37.9 IN ADULT: Status: ACTIVE | Noted: 2017-03-08

## 2024-04-09 LAB
ANION GAP SERPL CALC-SCNC: 6 MMOL/L (ref 8–16)
BASOPHILS # BLD AUTO: 0.01 K/UL (ref 0–0.2)
BASOPHILS # BLD AUTO: 0.01 K/UL (ref 0–0.2)
BASOPHILS NFR BLD: 0.1 % (ref 0–1.9)
BASOPHILS NFR BLD: 0.1 % (ref 0–1.9)
BUN SERPL-MCNC: 14 MG/DL (ref 6–20)
CALCIUM SERPL-MCNC: 8.9 MG/DL (ref 8.7–10.5)
CHLORIDE SERPL-SCNC: 106 MMOL/L (ref 95–110)
CO2 SERPL-SCNC: 26 MMOL/L (ref 23–29)
CREAT SERPL-MCNC: 0.8 MG/DL (ref 0.5–1.4)
DIFFERENTIAL METHOD BLD: ABNORMAL
DIFFERENTIAL METHOD BLD: ABNORMAL
EOSINOPHIL # BLD AUTO: 0 K/UL (ref 0–0.5)
EOSINOPHIL # BLD AUTO: 0 K/UL (ref 0–0.5)
EOSINOPHIL NFR BLD: 0.1 % (ref 0–8)
EOSINOPHIL NFR BLD: 0.3 % (ref 0–8)
ERYTHROCYTE [DISTWIDTH] IN BLOOD BY AUTOMATED COUNT: 14.6 % (ref 11.5–14.5)
ERYTHROCYTE [DISTWIDTH] IN BLOOD BY AUTOMATED COUNT: 14.8 % (ref 11.5–14.5)
EST. GFR  (NO RACE VARIABLE): >60 ML/MIN/1.73 M^2
GLUCOSE SERPL-MCNC: 133 MG/DL (ref 70–110)
HCT VFR BLD AUTO: 28.9 % (ref 37–48.5)
HCT VFR BLD AUTO: 29.4 % (ref 37–48.5)
HGB BLD-MCNC: 8.6 G/DL (ref 12–16)
HGB BLD-MCNC: 8.7 G/DL (ref 12–16)
IMM GRANULOCYTES # BLD AUTO: 0.02 K/UL (ref 0–0.04)
IMM GRANULOCYTES # BLD AUTO: 0.03 K/UL (ref 0–0.04)
IMM GRANULOCYTES NFR BLD AUTO: 0.3 % (ref 0–0.5)
IMM GRANULOCYTES NFR BLD AUTO: 0.4 % (ref 0–0.5)
LYMPHOCYTES # BLD AUTO: 1.2 K/UL (ref 1–4.8)
LYMPHOCYTES # BLD AUTO: 1.7 K/UL (ref 1–4.8)
LYMPHOCYTES NFR BLD: 15.1 % (ref 18–48)
LYMPHOCYTES NFR BLD: 22.5 % (ref 18–48)
MCH RBC QN AUTO: 22.1 PG (ref 27–31)
MCH RBC QN AUTO: 22.3 PG (ref 27–31)
MCHC RBC AUTO-ENTMCNC: 29.6 G/DL (ref 32–36)
MCHC RBC AUTO-ENTMCNC: 29.8 G/DL (ref 32–36)
MCV RBC AUTO: 74 FL (ref 82–98)
MCV RBC AUTO: 75 FL (ref 82–98)
MONOCYTES # BLD AUTO: 0.7 K/UL (ref 0.3–1)
MONOCYTES # BLD AUTO: 0.8 K/UL (ref 0.3–1)
MONOCYTES NFR BLD: 10.1 % (ref 4–15)
MONOCYTES NFR BLD: 8.9 % (ref 4–15)
NEUTROPHILS # BLD AUTO: 5.2 K/UL (ref 1.8–7.7)
NEUTROPHILS # BLD AUTO: 6 K/UL (ref 1.8–7.7)
NEUTROPHILS NFR BLD: 66.6 % (ref 38–73)
NEUTROPHILS NFR BLD: 75.5 % (ref 38–73)
NRBC BLD-RTO: 0 /100 WBC
NRBC BLD-RTO: 0 /100 WBC
PLATELET # BLD AUTO: 172 K/UL (ref 150–450)
PLATELET # BLD AUTO: 182 K/UL (ref 150–450)
PMV BLD AUTO: 11.9 FL (ref 9.2–12.9)
PMV BLD AUTO: 12.1 FL (ref 9.2–12.9)
POCT GLUCOSE: 104 MG/DL (ref 70–110)
POCT GLUCOSE: 105 MG/DL (ref 70–110)
POCT GLUCOSE: 148 MG/DL (ref 70–110)
POCT GLUCOSE: 99 MG/DL (ref 70–110)
POTASSIUM SERPL-SCNC: 4 MMOL/L (ref 3.5–5.1)
RBC # BLD AUTO: 3.89 M/UL (ref 4–5.4)
RBC # BLD AUTO: 3.91 M/UL (ref 4–5.4)
SODIUM SERPL-SCNC: 138 MMOL/L (ref 136–145)
WBC # BLD AUTO: 7.75 K/UL (ref 3.9–12.7)
WBC # BLD AUTO: 7.9 K/UL (ref 3.9–12.7)

## 2024-04-09 PROCEDURE — 63600175 PHARM REV CODE 636 W HCPCS

## 2024-04-09 PROCEDURE — 25000003 PHARM REV CODE 250

## 2024-04-09 PROCEDURE — 85025 COMPLETE CBC W/AUTO DIFF WBC: CPT | Mod: 91 | Performed by: UROLOGY

## 2024-04-09 PROCEDURE — 80048 BASIC METABOLIC PNL TOTAL CA: CPT | Performed by: UROLOGY

## 2024-04-09 PROCEDURE — 25000003 PHARM REV CODE 250: Performed by: UROLOGY

## 2024-04-09 PROCEDURE — 36415 COLL VENOUS BLD VENIPUNCTURE: CPT

## 2024-04-09 PROCEDURE — 36415 COLL VENOUS BLD VENIPUNCTURE: CPT | Performed by: UROLOGY

## 2024-04-09 PROCEDURE — 85025 COMPLETE CBC W/AUTO DIFF WBC: CPT

## 2024-04-09 RX ORDER — ACETAMINOPHEN 325 MG/1
325 TABLET ORAL EVERY 6 HOURS PRN
Qty: 40 TABLET | Refills: 0 | Status: SHIPPED | OUTPATIENT
Start: 2024-04-09 | End: 2024-04-19

## 2024-04-09 RX ORDER — OXYBUTYNIN CHLORIDE 5 MG/1
5 TABLET ORAL 3 TIMES DAILY PRN
Status: DISCONTINUED | OUTPATIENT
Start: 2024-04-09 | End: 2024-04-09 | Stop reason: HOSPADM

## 2024-04-09 RX ORDER — OXYCODONE HYDROCHLORIDE 5 MG/1
5 TABLET ORAL EVERY 4 HOURS PRN
Qty: 10 TABLET | Refills: 0 | Status: SHIPPED | OUTPATIENT
Start: 2024-04-09 | End: 2024-06-10

## 2024-04-09 RX ORDER — MUPIROCIN 20 MG/G
OINTMENT TOPICAL 2 TIMES DAILY
Status: DISCONTINUED | OUTPATIENT
Start: 2024-04-09 | End: 2024-04-09 | Stop reason: HOSPADM

## 2024-04-09 RX ORDER — POLYETHYLENE GLYCOL 3350 17 G/17G
17 POWDER, FOR SOLUTION ORAL DAILY
Qty: 238 G | Refills: 0 | Status: SHIPPED | OUTPATIENT
Start: 2024-04-09 | End: 2024-04-23

## 2024-04-09 RX ADMIN — GABAPENTIN 800 MG: 400 CAPSULE ORAL at 08:04

## 2024-04-09 RX ADMIN — OXYCODONE 5 MG: 5 TABLET ORAL at 03:04

## 2024-04-09 RX ADMIN — LEVOTHYROXINE SODIUM 137 MCG: 137 TABLET ORAL at 08:04

## 2024-04-09 RX ADMIN — PANTOPRAZOLE SODIUM 40 MG: 40 TABLET, DELAYED RELEASE ORAL at 08:04

## 2024-04-09 RX ADMIN — ONDANSETRON 4 MG: 2 INJECTION INTRAMUSCULAR; INTRAVENOUS at 02:04

## 2024-04-09 RX ADMIN — ACETAMINOPHEN 650 MG: 325 TABLET ORAL at 11:04

## 2024-04-09 RX ADMIN — POTASSIUM CITRATE 10 MEQ: 10 TABLET, EXTENDED RELEASE ORAL at 08:04

## 2024-04-09 RX ADMIN — ATORVASTATIN CALCIUM 40 MG: 40 TABLET, FILM COATED ORAL at 08:04

## 2024-04-09 RX ADMIN — MUPIROCIN: 20 OINTMENT TOPICAL at 08:04

## 2024-04-09 RX ADMIN — ACETAMINOPHEN 650 MG: 325 TABLET ORAL at 05:04

## 2024-04-09 RX ADMIN — POTASSIUM CITRATE 10 MEQ: 10 TABLET, EXTENDED RELEASE ORAL at 11:04

## 2024-04-09 RX ADMIN — METHOCARBAMOL 1000 MG: 500 TABLET ORAL at 05:04

## 2024-04-09 RX ADMIN — Medication 6 MG: at 02:04

## 2024-04-09 RX ADMIN — TAMSULOSIN HYDROCHLORIDE 0.4 MG: 0.4 CAPSULE ORAL at 08:04

## 2024-04-09 RX ADMIN — OXYCODONE 5 MG: 5 TABLET ORAL at 08:04

## 2024-04-09 RX ADMIN — ACETAMINOPHEN 650 MG: 325 TABLET ORAL at 12:04

## 2024-04-09 NOTE — PROGRESS NOTES
[facility]  Urology  Progress Note    Patient Name: Angie Mccarthy  MRN: [id number]  Admission Date: 4/8/2024  Hospital Length of Stay: 1 days  Code Status: Full Code   Attending Provider: Suni Patel, *   Primary Care Physician: Rangel Swenson MD    Subjective:     HPI:  Angie Mccarthy is a 58 y.o. F with left lower pole staghorn s/p PCNL on 4/8.     Interval History: NAOE. Reports inability to sleep overnight.   Pain controlled. Tolerating diet. Has not ambulated.       Objective:     Temp:  [97.9 °F (36.6 °C)-98.5 °F (36.9 °C)] 98.4 °F (36.9 °C)  Pulse:  [62-98] 63  Resp:  [16-57] 20  SpO2:  [94 %-100 %] 96 %  BP: (109-148)/(56-82) 109/56     Body mass index is 37.79 kg/m².           Drains       Drain  Duration                  Open Drain Lateral;Left Back Other (see comments)  -- days         Urethral Catheter Non-latex;Straight-tip;Silicone 16 Fr. -- days         Nephrostomy 04/05/24 1513 Left 8 Fr. 3 days                     Physical Exam  Constitutional:       General: She is not in acute distress.  HENT:      Head: Normocephalic.   Cardiovascular:      Rate and Rhythm: Normal rate.   Pulmonary:      Effort: Pulmonary effort is normal.   Abdominal:      General: There is no distension.      Palpations: Abdomen is soft.      Tenderness: There is no abdominal tenderness. There is no right CVA tenderness or left CVA tenderness.      Comments: L NT draining clear pink   Genitourinary:     Comments: Jiménez clear yellow  Musculoskeletal:         General: Normal range of motion.   Skin:     General: Skin is warm and dry.   Neurological:      Mental Status: She is alert.      Coordination: Coordination normal.   Psychiatric:         Mood and Affect: Mood normal.         Behavior: Behavior normal.           Significant Labs:    BMP:  Recent Labs   Lab 04/03/24  1427      K 4.1      CO2 26   BUN 12   CREATININE 0.9  0.9   CALCIUM 9.9       CBC:   Recent Labs   Lab  04/03/24  1427   WBC 6.31   HGB 11.8*   HCT 40.3          All pertinent labs results from the past 24 hours have been reviewed.    Significant Imaging:  All pertinent imaging results/findings from the past 24 hours have been reviewed.                  Assessment/Plan:     Staghorn calculus  Angie Mccarthy is an 58 y.o. female that is s/p PCNL on 4/8. Doing well post-op.    - Pain - sarabjit tylenol, prn toradol, robaxin, oxy  - Diet - diabetic   - Nausea control w/ PRN antiemetics   - OOB  - Encourage IS  - DVT ppx - SCDs  - Voiding Trial today  - Neph tube clamped  - Labs in process.   - CT scan pending    - Dispo: home today after CT          VTE Risk Mitigation (From admission, onward)      None            EDU OLIVER MD  Urology  [facility]

## 2024-04-09 NOTE — ASSESSMENT & PLAN NOTE
Angie Mccarthy is an 58 y.o. female that is s/p PCNL on 4/8. Doing well post-op.    - Pain - sarabjit tylenol, prn toradol, robaxin, oxy  - Diet - diabetic   - Nausea control w/ PRN antiemetics   - OOB  - Encourage IS  - DVT ppx - SCDs  - Voiding Trial today  - Neph tube clamped  - Labs in process.   - CT scan pending    - Dispo: home today after CT

## 2024-04-09 NOTE — H&P (VIEW-ONLY)
[facility]  Urology  Progress Note    Patient Name: Angie Mccarthy  MRN: [id number]  Admission Date: 4/8/2024  Hospital Length of Stay: 1 days  Code Status: Full Code   Attending Provider: Suni Patel, *   Primary Care Physician: Rangel Swenson MD    Subjective:     HPI:  Angie Mccarthy is a 58 y.o. F with left lower pole staghorn s/p PCNL on 4/8.     Interval History: NAOE. Reports inability to sleep overnight.   Pain controlled. Tolerating diet. Has not ambulated.       Objective:     Temp:  [97.9 °F (36.6 °C)-98.5 °F (36.9 °C)] 98.4 °F (36.9 °C)  Pulse:  [62-98] 63  Resp:  [16-57] 20  SpO2:  [94 %-100 %] 96 %  BP: (109-148)/(56-82) 109/56     Body mass index is 37.79 kg/m².           Drains       Drain  Duration                  Open Drain Lateral;Left Back Other (see comments)  -- days         Urethral Catheter Non-latex;Straight-tip;Silicone 16 Fr. -- days         Nephrostomy 04/05/24 1513 Left 8 Fr. 3 days                     Physical Exam  Constitutional:       General: She is not in acute distress.  HENT:      Head: Normocephalic.   Cardiovascular:      Rate and Rhythm: Normal rate.   Pulmonary:      Effort: Pulmonary effort is normal.   Abdominal:      General: There is no distension.      Palpations: Abdomen is soft.      Tenderness: There is no abdominal tenderness. There is no right CVA tenderness or left CVA tenderness.      Comments: L NT draining clear pink   Genitourinary:     Comments: Jiménez clear yellow  Musculoskeletal:         General: Normal range of motion.   Skin:     General: Skin is warm and dry.   Neurological:      Mental Status: She is alert.      Coordination: Coordination normal.   Psychiatric:         Mood and Affect: Mood normal.         Behavior: Behavior normal.           Significant Labs:    BMP:  Recent Labs   Lab 04/03/24  1427      K 4.1      CO2 26   BUN 12   CREATININE 0.9  0.9   CALCIUM 9.9       CBC:   Recent Labs   Lab  04/03/24  1427   WBC 6.31   HGB 11.8*   HCT 40.3          All pertinent labs results from the past 24 hours have been reviewed.    Significant Imaging:  All pertinent imaging results/findings from the past 24 hours have been reviewed.                  Assessment/Plan:     Staghorn calculus  Angie Mccarthy is an 58 y.o. female that is s/p PCNL on 4/8. Doing well post-op.    - Pain - sarabjit tylenol, prn toradol, robaxin, oxy  - Diet - diabetic   - Nausea control w/ PRN antiemetics   - OOB  - Encourage IS  - DVT ppx - SCDs  - Voiding Trial today  - Neph tube clamped  - Labs in process.   - CT scan pending    - Dispo: home today after CT          VTE Risk Mitigation (From admission, onward)      None            EDU OLIVER MD  Urology  [facility]

## 2024-04-09 NOTE — PROGRESS NOTES
Discharge instructions and AVS given to reviewed with patient. Patient verbalized understanding. PIVs removed. Medications delivered to bedside. Awaiting transport

## 2024-04-09 NOTE — PLAN OF CARE
Problem: Adult Inpatient Plan of Care  Goal: Plan of Care Review  Outcome: Ongoing, Progressing     Problem: Adult Inpatient Plan of Care  Goal: Patient-Specific Goal (Individualized)  Outcome: Ongoing, Progressing     Problem: Adult Inpatient Plan of Care  Goal: Absence of Hospital-Acquired Illness or Injury  Outcome: Ongoing, Progressing     Problem: Adult Inpatient Plan of Care  Goal: Optimal Comfort and Wellbeing  Outcome: Ongoing, Progressing     Problem: Diabetes Comorbidity  Goal: Blood Glucose Level Within Targeted Range  Outcome: Ongoing, Progressing     Problem: Infection  Goal: Absence of Infection Signs and Symptoms  Outcome: Ongoing, Progressing     Problem: Pain Acute  Goal: Acceptable Pain Control and Functional Ability  Outcome: Ongoing, Progressing     Problem: Fall Injury Risk  Goal: Absence of Fall and Fall-Related Injury  Outcome: Ongoing, Progressing

## 2024-04-09 NOTE — SUBJECTIVE & OBJECTIVE
Interval History: NAOE. Reports inability to sleep overnight.   Pain controlled. Tolerating diet. Has not ambulated.       Objective:     Temp:  [97.9 °F (36.6 °C)-98.5 °F (36.9 °C)] 98.4 °F (36.9 °C)  Pulse:  [62-98] 63  Resp:  [16-57] 20  SpO2:  [94 %-100 %] 96 %  BP: (109-148)/(56-82) 109/56     Body mass index is 37.79 kg/m².           Drains       Drain  Duration                  Open Drain Lateral;Left Back Other (see comments)  -- days         Urethral Catheter Non-latex;Straight-tip;Silicone 16 Fr. -- days         Nephrostomy 04/05/24 1513 Left 8 Fr. 3 days                     Physical Exam  Constitutional:       General: She is not in acute distress.  HENT:      Head: Normocephalic.   Cardiovascular:      Rate and Rhythm: Normal rate.   Pulmonary:      Effort: Pulmonary effort is normal.   Abdominal:      General: There is no distension.      Palpations: Abdomen is soft.      Tenderness: There is no abdominal tenderness. There is no right CVA tenderness or left CVA tenderness.      Comments: L NT draining clear pink   Genitourinary:     Comments: Jiménez clear yellow  Musculoskeletal:         General: Normal range of motion.   Skin:     General: Skin is warm and dry.   Neurological:      Mental Status: She is alert.      Coordination: Coordination normal.   Psychiatric:         Mood and Affect: Mood normal.         Behavior: Behavior normal.           Significant Labs:    BMP:  Recent Labs   Lab 04/03/24  1427      K 4.1      CO2 26   BUN 12   CREATININE 0.9  0.9   CALCIUM 9.9       CBC:   Recent Labs   Lab 04/03/24  1427   WBC 6.31   HGB 11.8*   HCT 40.3          All pertinent labs results from the past 24 hours have been reviewed.    Significant Imaging:  All pertinent imaging results/findings from the past 24 hours have been reviewed.

## 2024-04-09 NOTE — DISCHARGE SUMMARY
[facility]  Urology  Discharge Summary      Patient Name: Angie Mccarthy  MRN: [id number]  Admission Date: 4/8/2024  Hospital Length of Stay: 1 days  Discharge Date and Time: No discharge date for patient encounter.  Attending Physician: Suni Patel, *   Discharging Provider: Deacon Rahman MD  Primary Care Physician: Rangel Swenson MD    HPI:   Angie Mccarthy is a 58 y.o. F with left lower pole staghorn s/p PCNL on 4/8.     Procedure(s) (LRB):  NEPHROLITHOTOMY, PERCUTANEOUS (Left)  INSERTION, STENT, URETER (Left)  Nephrostogram (Left)  PLACEMENT, NEPHROSTOMY TUBE (Left)  DILATION, NEPHROSTOMY TRACT (Left)     Indwelling Lines/Drains at time of discharge:   Lines/Drains/Airways       Drain  Duration                  Open Drain Lateral;Left Back Other (see comments)  -- days         Urethral Catheter Non-latex;Straight-tip;Silicone 16 Fr. -- days         Nephrostomy 04/05/24 1513 Left 8 Fr. 3 days                    Hospital Course (synopsis of major diagnoses, care, treatment, and services provided during the course of the hospital stay):     Please see the preoperative H&P and other available documentation for full details related to history prior to this admission.  Briefly, Angie Mccarthy is a 58 y.o. female who was admitted following scheduled elective surgery for Staghorn calculus    Following a complete preoperative discussion of the risks and benefits of surgery with signed informed consent, the patient was taken to the operating room on 4/8/2024 and underwent the above stated procedures.  The patient tolerated surgery well and there were no complications.  Please see the operative report for full intraoperative findings and details.  Postoperatively, the patient did well and was transferred from the PACU to the floor in stable condition where they had a stable and uncomplicated hospital course.  Labs and vital signs remained stable and appropriate throughout  course.  Diet was advanced as tolerated and the patient's pain was controlled on oral pain medications without problem.  Currently, the patient is doing well at 1 Day Post-Op and is stable and appropriate for discharge home at this time.      Patient is planned to go home with nephrostomy tube and return on Thursday for removal. Patient passed void trial after back catheter was removed on POD1.       Goals of Care Treatment Preferences:  Code Status: Full Code    Living Will: Yes              Consults: none    Significant Diagnostic Studies:     Pending Diagnostic Studies:       None            Final Active Diagnoses:    Diagnosis Date Noted POA    PRINCIPAL PROBLEM:  Staghorn calculus [N20.0] 08/16/2021 Yes    Type 2 diabetes mellitus with hyperglycemia [E11.65] 11/01/2022 Yes    FERRELL (nonalcoholic steatohepatitis) [K75.81] 06/09/2021 Yes    Primary hypothyroidism [E03.9] 09/26/2019 Yes    Class 2 obesity with body mass index (BMI) of 37.0 to 37.9 in adult [E66.9, Z68.37] 03/08/2017 Not Applicable    Acute postoperative anemia due to expected blood loss [D62] 02/19/2013 Yes    Essential hypertension [I10]  Yes     Chronic    Hyperlipidemia [E78.5]  Yes     Chronic      Problems Resolved During this Admission:         Discharged Condition: good    Disposition: home    Follow Up: in clinic    Patient Instructions:   No discharge procedures on file.  Medications:  Reconciled Home Medications:      Medication List        START taking these medications      acetaminophen 325 MG tablet  Commonly known as: TYLENOL  Take 1 tablet (325 mg total) by mouth every 6 (six) hours as needed for Pain.     oxyCODONE 5 MG immediate release tablet  Commonly known as: ROXICODONE  Take 1 tablet (5 mg total) by mouth every 4 (four) hours as needed for Pain.  (Do not take with home medication: oxycodone/apap 10/325)     polyethylene glycol 17 gram/dose powder  Commonly known as: GLYCOLAX  Use cap to measure 17g, mix with liquid, and  drink by mouth once daily. for 14 days            CHANGE how you take these medications      DULoxetine 60 MG capsule  Commonly known as: CYMBALTA  TAKE 1 CAPSULE EVERY DAY  What changed:   how much to take  when to take this     fluticasone propionate 50 mcg/actuation nasal spray  Commonly known as: FLONASE  USE 2 SPRAYS NASALLY ONE TIME DAILY  What changed: See the new instructions.     omeprazole 40 MG capsule  Commonly known as: PRILOSEC  TAKE 1 CAPSULE EVERY DAY  What changed:   how much to take  when to take this            CONTINUE taking these medications      alcohol swabs Padm  Apply 1 each topically once daily.     ascorbic acid (vitamin C) 500 MG tablet  Commonly known as: VITAMIN C     BIOTIN ORAL  Take 10,000 mg by mouth every morning.     cyclobenzaprine 10 MG tablet  Commonly known as: FLEXERIL  TK 1 T PO QD- as needed for muscle spasms     fish oil-omega-3 fatty acids 300-1,000 mg capsule  Take 1,200 mg by mouth 2 (two) times daily.     gabapentin 800 MG tablet  Commonly known as: NEURONTIN  Take 800 mg by mouth 3 (three) times daily. Takes 2-3 times per day     HYDROcodone-acetaminophen  mg per tablet  Commonly known as: NORCO  Take 1 tablet by mouth 4 (four) times daily as needed.     ketoconazole 2 % cream  Commonly known as: NIZORAL  Apply topically once daily.     levothyroxine 137 MCG Tab tablet  Commonly known as: SYNTHROID  Take 1 tablet (137 mcg total) by mouth before breakfast.     lisinopriL 2.5 MG tablet  Commonly known as: PRINIVIL,ZESTRIL  TAKE 1 TABLET EVERY DAY     metFORMIN 500 MG tablet  Commonly known as: GLUCOPHAGE  TAKE 1 TABLET (500 MG TOTAL) BY MOUTH 2 (TWO) TIMES DAILY WITH MEALS.     MOUNJARO 12.5 mg/0.5 mL Pnij  Generic drug: tirzepatide  Inject 12.5 mg into the skin every 7 days.     multivitamin capsule  Take 1 capsule by mouth every morning.     oxyCODONE-acetaminophen  mg per tablet  Commonly known as: PERCOCET  Take 1 tablet by mouth 4 (four) times daily as  needed for Pain.     potassium citrate 5 mEq (540 mg) Tbsr  Commonly known as: UROCIT-K  Take 2 tablets (10 mEq total) by mouth 3 (three) times daily with meals.     rosuvastatin 10 MG tablet  Commonly known as: CRESTOR  TAKE 1 TABLET EVERY NIGHT     tamsulosin 0.4 mg Cap  Commonly known as: FLOMAX  TAKE 1 CAPSULE(0.4 MG) BY MOUTH AFTER DINNER     TRUE METRIX GLUCOSE TEST STRIP Strp  Generic drug: blood sugar diagnostic  TEST BLOOD SUGAR EVERY DAY     TRUE METRIX LEVEL 1 Soln  Generic drug: blood glucose control, low  USE AS DIRECTED WITH GLUCOSE METER     TRUEPLUS LANCETS 33 gauge Misc  Generic drug: lancets  TEST BLOOD SUGAR EVERY DAY     zinc sulfate 50 mg zinc (220 mg) capsule  Commonly known as: ZINCATE     zolpidem 10 mg Tab  Commonly known as: AMBIEN  TAKE 1 TABLET BY MOUTH EVERY NIGHT AT BEDTIME AS NEEDED FOR INSOMNIA            ASK your doctor about these medications      ciprofloxacin HCl 500 MG tablet  Commonly known as: CIPRO  Take 1 tablet (500 mg total) by mouth every 12 (twelve) hours. for 7 days  Ask about: Should I take this medication?              Time spent on the discharge of patient: 20 minutes    Deacon Rahman MD  Urology  [facility] - Surgery

## 2024-04-09 NOTE — PLAN OF CARE
04/09/24 1441   Discharge Assessment   Assessment Type Discharge Planning Assessment   Confirmed/corrected address, phone number and insurance Yes   Confirmed Demographics Correct on Facesheet   Source of Information patient   Communicated ANNETTE with patient/caregiver Yes   People in Home significant other  (Jami Lama s.o.)   Do you expect to return to your current living situation? Yes   Do you have help at home or someone to help you manage your care at home? Yes   Who are your caregiver(s) and their phone number(s)? Jami Lama   Prior to hospitilization cognitive status: Alert/Oriented   Current cognitive status: Alert/Oriented   Home Layout Able to live on 1st floor   Equipment Currently Used at Home walker, rolling;cane, straight   Do you currently have service(s) that help you manage your care at home? No   Do you take prescription medications? Yes   Do you have prescription coverage? Yes   Do you have any problems affording any of your prescribed medications? No   Is the patient taking medications as prescribed? yes   How do you get to doctors appointments? car, drives self;family or friend will provide   Are you on dialysis? No   Do you take coumadin? No   Discharge Plan A Home with family   Discharge Plan B Home Health;Home with family   DME Needed Upon Discharge  none   Discharge Plan discussed with: Patient   Transition of Care Barriers None       Patient lives with s.o. Jami Lama in a single story home with four entry steps. Patient does not feel she will need any post acute services upon hospital discharge. Jami will provide transportation home.

## 2024-04-09 NOTE — ANESTHESIA POSTPROCEDURE EVALUATION
Anesthesia Post Evaluation    Patient: Angie Mccarthy    Procedure(s) Performed: Procedure(s) (LRB):  NEPHROLITHOTOMY, PERCUTANEOUS (Left)  INSERTION, STENT, URETER (Left)  Nephrostogram (Left)  PLACEMENT, NEPHROSTOMY TUBE (Left)  DILATION, NEPHROSTOMY TRACT (Left)    Final Anesthesia Type: general      Patient location during evaluation: PACU  Patient participation: Yes- Able to Participate  Level of consciousness: awake and alert  Post-procedure vital signs: reviewed and stable  Pain management: adequate  Airway patency: patent    PONV status at discharge: No PONV  Anesthetic complications: no      Cardiovascular status: blood pressure returned to baseline  Respiratory status: spontaneous ventilation and room air  Hydration status: euvolemic  Follow-up not needed.              Vitals Value Taken Time   /56 04/09/24 0405   Temp 36.9 °C (98.4 °F) 04/09/24 0405   Pulse 63 04/09/24 0405   Resp 20 04/09/24 0405   SpO2 96 % 04/09/24 0405         No case tracking events are documented in the log.      Pain/Venessa Score: Pain Rating Prior to Med Admin: 7 (4/9/2024  5:45 AM)  Pain Rating Post Med Admin: 0 (4/9/2024  1:24 AM)  Venessa Score: 9 (4/8/2024  2:00 PM)

## 2024-04-09 NOTE — CARE UPDATE
I have reviewed the chart of Angie Mccarthy and collaborated with Suni Patel, * in the care of the patient who is hospitalized for the following:    Active Hospital Problems    Diagnosis    *Staghorn calculus    Type 2 diabetes mellitus with hyperglycemia    FERRELL (nonalcoholic steatohepatitis)    Primary hypothyroidism    Class 2 obesity with body mass index (BMI) of 37.0 to 37.9 in adult    Acute postoperative anemia due to expected blood loss    Essential hypertension    Hyperlipidemia          I have reviewed Angie Mccarthy with the multidisciplinary team during discharge huddle.       Saritha Suero PA-C  [facility]

## 2024-04-09 NOTE — NURSING
Nurses Note -- 4 Eyes      4/9/2024   1:02 AM      Skin assessed during: Daily Assessment      [x] No Altered Skin Integrity Present    []Prevention Measures Documented      [] Yes- Altered Skin Integrity Present or Discovered   [] LDA Added if Not in Epic (Describe Wound)   [] New Altered Skin Integrity was Present on Admit and Documented in LDA   [] Wound Image Taken    Wound Care Consulted? No    Attending Nurse:  Chavez Murillo LPN    Second RN/Staff Member: Angela Roberts LPN

## 2024-04-09 NOTE — H&P (VIEW-ONLY)
[facility]  Urology  Progress Note    Patient Name: Angie Mccarthy  MRN: [id number]  Admission Date: 4/8/2024  Hospital Length of Stay: 1 days  Code Status: Full Code   Attending Provider: Suni Patel, *   Primary Care Physician: Rangel Swenson MD    Subjective:     HPI:  Angie Mccarthy is a 58 y.o. F with left lower pole staghorn s/p PCNL on 4/8.     Interval History: NAOE. Reports inability to sleep overnight.   Pain controlled. Tolerating diet. Has not ambulated.       Objective:     Temp:  [97.9 °F (36.6 °C)-98.5 °F (36.9 °C)] 98.4 °F (36.9 °C)  Pulse:  [62-98] 63  Resp:  [16-57] 20  SpO2:  [94 %-100 %] 96 %  BP: (109-148)/(56-82) 109/56     Body mass index is 37.79 kg/m².           Drains       Drain  Duration                  Open Drain Lateral;Left Back Other (see comments)  -- days         Urethral Catheter Non-latex;Straight-tip;Silicone 16 Fr. -- days         Nephrostomy 04/05/24 1513 Left 8 Fr. 3 days                     Physical Exam  Constitutional:       General: She is not in acute distress.  HENT:      Head: Normocephalic.   Cardiovascular:      Rate and Rhythm: Normal rate.   Pulmonary:      Effort: Pulmonary effort is normal.   Abdominal:      General: There is no distension.      Palpations: Abdomen is soft.      Tenderness: There is no abdominal tenderness. There is no right CVA tenderness or left CVA tenderness.      Comments: L NT draining clear pink   Genitourinary:     Comments: Jiménez clear yellow  Musculoskeletal:         General: Normal range of motion.   Skin:     General: Skin is warm and dry.   Neurological:      Mental Status: She is alert.      Coordination: Coordination normal.   Psychiatric:         Mood and Affect: Mood normal.         Behavior: Behavior normal.           Significant Labs:    BMP:  Recent Labs   Lab 04/03/24  1427      K 4.1      CO2 26   BUN 12   CREATININE 0.9  0.9   CALCIUM 9.9       CBC:   Recent Labs   Lab  04/03/24  1427   WBC 6.31   HGB 11.8*   HCT 40.3          All pertinent labs results from the past 24 hours have been reviewed.    Significant Imaging:  All pertinent imaging results/findings from the past 24 hours have been reviewed.                  Assessment/Plan:     Staghorn calculus  Angie Mccarthy is an 58 y.o. female that is s/p PCNL on 4/8. Doing well post-op.    - Pain - sarabjit tylenol, prn toradol, robaxin, oxy  - Diet - diabetic   - Nausea control w/ PRN antiemetics   - OOB  - Encourage IS  - DVT ppx - SCDs  - Voiding Trial today  - Neph tube clamped  - Labs in process.   - CT scan pending    - Dispo: home today after CT          VTE Risk Mitigation (From admission, onward)      None            EDU OLIVER MD  Urology  [facility]

## 2024-04-10 ENCOUNTER — PATIENT OUTREACH (OUTPATIENT)
Dept: ADMINISTRATIVE | Facility: CLINIC | Age: 59
End: 2024-04-10
Payer: MEDICARE

## 2024-04-10 NOTE — PLAN OF CARE
[facility]  Discharge Final Note    Primary Care Provider: Rangel Swenson MD    Expected Discharge Date: 4/9/2024    Final Discharge Note (most recent)       Final Note - 04/09/24 1751          Final Note    Assessment Type Final Discharge Note     Anticipated Discharge Disposition Home or Self Care     Hospital Resources/Appts/Education Provided Provided patient/caregiver with written discharge plan information;Provided education on problems/symptoms using teachback                   Future Appointments   Date Time Provider Department Center   4/11/2024  3:30 PM Ligia Raya MD [facility] ENDSUR [facility]   4/22/2024  9:30 AM [facility] OIC-US1 MASTER [facility] ULTR [facility]   4/26/2024  1:30 PM PRE-ADMIT, [facility] PREADMT [facility]   4/30/2024 10:30 AM Ira Back MD [facility] HAND [facility]   5/15/2024 10:30 AM Leonard Haddad MD [facility] NEPHRO [facility]   6/6/2024  2:45 PM Armand Costello, OD [facility] OPTOMTY [facility]   6/18/2024  2:00 PM Felisha Sands MD [facility] ENDODIA [facility]   6/26/2024  8:00 AM Yisel Aguilar PA-C [facility] NEURO8 [facility]   8/30/2024  2:30 PM Mami Chino, NP [facility] HEPAT [facility]

## 2024-04-10 NOTE — PROGRESS NOTES
C3 nurse attempted to contact Angie Mccarthy  for a TCC post hospital discharge follow up call. No answer.  Voicemail left with call back information.  The patient does not have a scheduled HOSFU appointment, and the pt [facility] not [facility]..

## 2024-04-10 NOTE — PROGRESS NOTES
C3 nurse spoke with Angie Mccarthy  for a TCC post hospital discharge follow up call. The patient reports does not have a scheduled HOSFU appointment. C3 nurse was unable to schedule HOSFU appointment for [facility]. Patient advised to contact their PCP to schedule a HOSPFU within 5-7 days.

## 2024-04-11 ENCOUNTER — PATIENT MESSAGE (OUTPATIENT)
Dept: UROLOGY | Facility: CLINIC | Age: 59
End: 2024-04-11
Payer: MEDICARE

## 2024-04-11 ENCOUNTER — OFFICE VISIT (OUTPATIENT)
Dept: SURGERY | Facility: CLINIC | Age: 59
End: 2024-04-11
Payer: MEDICARE

## 2024-04-11 ENCOUNTER — OFFICE VISIT (OUTPATIENT)
Dept: UROLOGY | Facility: CLINIC | Age: 59
End: 2024-04-11
Payer: MEDICARE

## 2024-04-11 VITALS
BODY MASS INDEX: 39.25 KG/M2 | DIASTOLIC BLOOD PRESSURE: 65 MMHG | SYSTOLIC BLOOD PRESSURE: 114 MMHG | HEIGHT: 61 IN | WEIGHT: 207.88 LBS | HEART RATE: 90 BPM

## 2024-04-11 VITALS
DIASTOLIC BLOOD PRESSURE: 65 MMHG | SYSTOLIC BLOOD PRESSURE: 114 MMHG | BODY MASS INDEX: 39.27 KG/M2 | HEART RATE: 90 BPM | HEIGHT: 61 IN | WEIGHT: 208 LBS

## 2024-04-11 DIAGNOSIS — Z48.03 ENCOUNTER FOR CHANGE OR REMOVAL OF DRAINS: Primary | ICD-10-CM

## 2024-04-11 DIAGNOSIS — R35.0 URINARY FREQUENCY: Primary | ICD-10-CM

## 2024-04-11 DIAGNOSIS — M47.814 THORACIC SPONDYLOSIS WITHOUT MYELOPATHY: ICD-10-CM

## 2024-04-11 DIAGNOSIS — Z98.890 POST-OPERATIVE STATE: ICD-10-CM

## 2024-04-11 DIAGNOSIS — Z98.890 PONV (POSTOPERATIVE NAUSEA AND VOMITING): ICD-10-CM

## 2024-04-11 DIAGNOSIS — N20.0 NEPHROLITHIASIS: ICD-10-CM

## 2024-04-11 DIAGNOSIS — R11.2 PONV (POSTOPERATIVE NAUSEA AND VOMITING): ICD-10-CM

## 2024-04-11 DIAGNOSIS — E21.0 PRIMARY HYPERPARATHYROIDISM: Primary | ICD-10-CM

## 2024-04-11 PROCEDURE — 4010F ACE/ARB THERAPY RXD/TAKEN: CPT | Mod: CPTII,S$GLB,, | Performed by: STUDENT IN AN ORGANIZED HEALTH CARE EDUCATION/TRAINING PROGRAM

## 2024-04-11 PROCEDURE — 99999 PR PBB SHADOW E&M-EST. PATIENT-LVL III: CPT | Mod: PBBFAC,,, | Performed by: NURSE PRACTITIONER

## 2024-04-11 PROCEDURE — 3078F DIAST BP <80 MM HG: CPT | Mod: CPTII,S$GLB,, | Performed by: STUDENT IN AN ORGANIZED HEALTH CARE EDUCATION/TRAINING PROGRAM

## 2024-04-11 PROCEDURE — 3074F SYST BP LT 130 MM HG: CPT | Mod: CPTII,S$GLB,, | Performed by: NURSE PRACTITIONER

## 2024-04-11 PROCEDURE — 99024 POSTOP FOLLOW-UP VISIT: CPT | Mod: S$GLB,,, | Performed by: NURSE PRACTITIONER

## 2024-04-11 PROCEDURE — 3008F BODY MASS INDEX DOCD: CPT | Mod: CPTII,S$GLB,, | Performed by: STUDENT IN AN ORGANIZED HEALTH CARE EDUCATION/TRAINING PROGRAM

## 2024-04-11 PROCEDURE — 99212 OFFICE O/P EST SF 10 MIN: CPT | Mod: S$GLB,,, | Performed by: STUDENT IN AN ORGANIZED HEALTH CARE EDUCATION/TRAINING PROGRAM

## 2024-04-11 PROCEDURE — 3044F HG A1C LEVEL LT 7.0%: CPT | Mod: CPTII,S$GLB,, | Performed by: STUDENT IN AN ORGANIZED HEALTH CARE EDUCATION/TRAINING PROGRAM

## 2024-04-11 PROCEDURE — 3078F DIAST BP <80 MM HG: CPT | Mod: CPTII,S$GLB,, | Performed by: NURSE PRACTITIONER

## 2024-04-11 PROCEDURE — 99999 PR PBB SHADOW E&M-EST. PATIENT-LVL III: CPT | Mod: PBBFAC,,, | Performed by: STUDENT IN AN ORGANIZED HEALTH CARE EDUCATION/TRAINING PROGRAM

## 2024-04-11 PROCEDURE — 3074F SYST BP LT 130 MM HG: CPT | Mod: CPTII,S$GLB,, | Performed by: STUDENT IN AN ORGANIZED HEALTH CARE EDUCATION/TRAINING PROGRAM

## 2024-04-11 PROCEDURE — 3044F HG A1C LEVEL LT 7.0%: CPT | Mod: CPTII,S$GLB,, | Performed by: NURSE PRACTITIONER

## 2024-04-11 PROCEDURE — 1111F DSCHRG MED/CURRENT MED MERGE: CPT | Mod: CPTII,S$GLB,, | Performed by: STUDENT IN AN ORGANIZED HEALTH CARE EDUCATION/TRAINING PROGRAM

## 2024-04-11 PROCEDURE — 3066F NEPHROPATHY DOC TX: CPT | Mod: CPTII,S$GLB,, | Performed by: STUDENT IN AN ORGANIZED HEALTH CARE EDUCATION/TRAINING PROGRAM

## 2024-04-11 NOTE — ASSESSMENT & PLAN NOTE
Symptomatic normocalcemic primary hyperparathyroidism with nephrolithiasis and osteoporosis, meets criteria for parathyroid surgery.  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    - OR for parathyroidectomy with intraoperative PTH monitoring  - Consent obtained in clinic   - Maintain adequate hydration and avoid the use of calcium supplements

## 2024-04-11 NOTE — PROGRESS NOTES
Angie Mccarthy is a 58 y.o. female with history of bilateral staghorn calculi.    She is a patient of Dr. Patel.   She underwent right PCNL on 02/19/2024.  She underwent second-look right PCNL 02/23/2024 to remove residual lower pole stone burden.     She remains with a left lower pole staghorn.   04/08/2024 Procedure(s) Performed:   Left PCNL (> 2 cm)  Dilation of nephrostomy tube tract  Left antegrade JJ ureteral stent placement  Left nephrostomy tube placement  Left antegrade nephrostogram     Patient is planned to go home with nephrostomy tube and return on Thursday for removal. Patient passed void trial after back catheter was removed on POD1.     She is here today for removal of her Left NT.   Reports no issues with urination. Urine has been slowly clearing up.   No dysuria.   No fever, n/v    I easily removed removed her NT and stitches holding it in place.  There was no resistance noted.    Tolerated well. I cleaned the site and closed with steri strip then covered with pressure dressing and paper tape. Reports her skin is very sensitive. Tegaderm causes itching.   We discussed the post NT removal expectations.   Extra gauze and tape given to patient for daily care. Should close soon.  If there is any problems or issues please let me know.

## 2024-04-11 NOTE — PROGRESS NOTES
Endocrine Surgery History & Physical     ENDOCRINOLOGIST: Dr. Sands    REASON FOR VISIT: Hyperparathyroidism    HPI: Angie Mccarthy is a 58 y.o. female patient with a history notable for morbid obesity, GERD, osteoporosis, nephrolithiasis, HTN, DMII who presents in consultation for primary hyperparathyroidism.  Suspicion for hyperparathyroidism was raised due to persistent nephrolithiasis.    INTERVAL HISTORY 4/11:   Patient returns to clinic today for her pre-op visit for parathyroidectomy. She underwent CT neck since last here revealing 7 mm presumed parathyroid adenoma at the level of the upper pole of the right lobe of the thyroid gland. She has been having multiple procedures for her kidney stones recently with the urology team. She is anxious to have surgery done.      Details of the workup are as follows:     Recent laboratory studies:  Hypercalcemia noted once in 08/2021 and once in 09/2018, calcium ranges from 9.3-10.1 recently  Max calcium elevation to 10.7 mg/dL  Parathyroid hormone levels elevated with normal calcium levels   Most recent PTH level was 121.1 with a corresponding calcium of 10  Phosphorus:   Vitamin D: 47 in 10/2023  24 hour urine calcium: 152 mg/24 hours, Benign Familial Hypocalciuric Hypercalcemia unlikely  This was however elevated to 558 in 2009    Medications:  Vitamin D supplementation: 1000 IU over the counter vitamin D3  Lithium, thiazide diuretics: none  Calcium supplements or calcimimetic: none    Symptoms:   The patient reports the following symptoms: [x]musculoskeletal pain - also has scoliosis with anterior and posterior fusion, []fractures, [x]nephrolithiasis, [x]GERD, []peptic ulcer disease, [x]abdominal pain (vague), [x]polydipsia, [x]polyuria, []pruritis  The patient reports the following neurocognitive symptoms: [x]brain fog, []concentration difficulties, [x]fatigue, [x]forgetfulness, []mood/psychiatric disturbances  The patient denies dysphagia, does report  globus sensation, denies overt compression symptoms, or anterior neck pain.  The patient denies hoarseness, voice changes or increased need to clear the throat.  Bone mineral density: [x]Osteoporosis []Osteopenia []Normal bone density.  Last DEXA 10/2023 with T-score -2.6 of the femoral neck    Surgical risk factors:  Risk of concurrent thyroid disease: right thyroid nodule, stable in size and prior benign biopsy in 2015  Ultrasound of the thyroid in 10/2023   History of neck radiation: none  Prior neck surgery: none  Cardiovascular risk: last echocardiogram in 05/2023 for preoperative evaluation, has normal EF, PA pressure 30 mmHg  Reports heart murmur  Antiplatelet therapy and anticoagulation: aspirin, fish oil  On Mounjaro    Family history:  No family history of endocrinopathies or endocrine cancers including pituitary tumors, pancreatic neuroendocrine tumors, medullary thyroid cancer or pheochromocytoma/paraganglioma.   Nephrolithiasis in sister and father     Localization studies done prior to this visit:  Ultrasound: not localizing for parathyroid adenoma, done in 2023  Nuclear Medicine Parathyroid Scan: not obtained  4D-CT Parathyroid scan: not obtained    LABORATORY STUDIES:  I personally and independently reviewed relevant lab test results, including the following:    Lab Results   Component Value Date    MG 2.1 08/18/2021    [id number] 47 10/25/2023    TSH 11.112 (H) 01/25/2024         Component      Latest Ref Rng 1/9/2024   Urine Total Volume      mL 1450    Urine Collection Duration      Hr 24    Calcium, Urine      0.0 - 15.0 mg/dL 10.5    Urine Calcium Absolute      mg/Spec 152    CREATININE, URINE (SEND OUT)      15.0 - 325.0 mg/dL 80.0    Creatinine, Timed Urine      40.0 - 75.0 mg/Hr 48.3    Urine Creatinine Absolute      mg/Spec 1160.0        PAST MEDICAL HISTORY:  Patient Active Problem List   Diagnosis    Syringomyelia and syringobulbia    Chronic low back pain    Essential hypertension     Hyperlipidemia    Acute postoperative anemia due to expected blood loss    Thoracic spondylosis without myelopathy    Spinal stenosis, lumbar region, without neurogenic claudication    Osteoporosis, idiopathic    Thyroid nodule    Palpitations    GERD (gastroesophageal reflux disease)    Fatty liver disease, nonalcoholic    S/P laparoscopic sleeve gastrectomy    Class 2 obesity with body mass index (BMI) of 37.0 to 37.9 in adult    Back pain    Lumbar disc disease    Anxiety    Depression    Chronic, continuous use of opioids    Snoring    Occult blood in stools    PONV (postoperative nausea and vomiting)    History of prediabetes    Urinary hesitancy    Wheezing    Microscopic hematuria    Abnormality of gait    Menopausal flushing    Primary hypothyroidism    Pyelonephritis    Hypophosphatemia    Hypomagnesemia    Status post total left knee replacement    Hepatic fibrosis, stage 3    FERRELL (nonalcoholic steatohepatitis)    Osteoarthritis of right knee    Staghorn calculus    Type 2 diabetes mellitus with hyperglycemia    Hypercalciuria    Primary hyperparathyroidism    Nephrolithiasis    Pre-operative cardiovascular examination         PAST SURGICAL HISTORY:  Past Surgical History:   Procedure Laterality Date    ANTEGRADE NEPHROSTOGRAPHY Right 2/23/2024    Procedure: NEPHROSTOGRAM, ANTEGRADE;  Surgeon: Suni Patel MD;  Location: [address];  Service: Urology;  Laterality: Right;    ANTEGRADE NEPHROSTOGRAPHY Left 4/8/2024    Procedure: Nephrostogram;  Surgeon: Suni Patel MD;  Location: [facility] OR [address] [place] [address];  Service: Urology;  Laterality: Left;    APPENDECTOMY      BACK SURGERY      x 6    CHOLECYSTECTOMY      CYSTOSCOPY N/A 4/17/2024    Procedure: CYSTOSCOPY;  Surgeon: Suni Patel MD;  Location: [facility] OR [address] [place] [address];  Service: Urology;  Laterality: N/A;    CYSTOSCOPY N/A 5/8/2024    Procedure: CYSTOSCOPY;  Surgeon: Suni Patel MD;  Location: [facility] OR [address] [place] [address];  Service:  Urology;  Laterality: N/A;    DILATION OF NEPHROSTOMY TRACT Left 4/8/2024    Procedure: DILATION, NEPHROSTOMY TRACT;  Surgeon: Suni Patel MD;  Location: [facility] OR 1ST FLR;  Service: Urology;  Laterality: Left;    EXTRACTION - STONE Right 2/23/2024    Procedure: EXTRACTION - STONE;  Surgeon: Suni Patel MD;  Location: [facility] OR 1ST FLR;  Service: Urology;  Laterality: Right;    EXTRACTION - STONE Left 4/17/2024    Procedure: EXTRACTION - STONE;  Surgeon: Suni Patel MD;  Location: [facility] OR 1ST FLR;  Service: Urology;  Laterality: Left;    EXTRACTION - STONE Left 5/8/2024    Procedure: EXTRACTION - STONE;  Surgeon: Suni Patel MD;  Location: [facility] OR 1ST FLR;  Service: Urology;  Laterality: Left;    GASTRECTOMY      Lap Gastric Sleeve    HERNIA REPAIR      HYSTERECTOMY      JOINT REPLACEMENT      LASER LITHOTRIPSY Left 4/17/2024    Procedure: LITHOTRIPSY, USING LASER;  Surgeon: Suni Patel MD;  [facility];  Service: Urology;  Laterality: Left;    LASER LITHOTRIPSY Left 5/8/2024    Procedure: LITHOTRIPSY, USING LASER;  Surgeon: Suni Patel MD;  Location: [facility] OR 1ST FLR;  Service: Urology;  Laterality: Left;    LIVER BIOPSY  02/06/2017    steatohepatitis with stage 1 fibrosis    MYELOGRAPHY N/A 11/29/2018    Procedure: MYELOGRAM;  Surgeon: [facility] Diagnostic Provider;  Location: [facility] OR 2ND FLR;  Service: Radiology;  Laterality: N/A;    MYELOGRAPHY N/A 1/7/2019    Procedure: Myelogram;  Surgeon: Lissett Surgeon;  Location: [facility] [place];  Service: Anesthesiology;  Laterality: N/A;    NEPHROSCOPY Right 2/23/2024    Procedure: NEPHROSCOPY;  Surgeon: Suni Patel MD;  Location: [facility] OR 1ST FLR;  Service: Urology;  Laterality: Right;  2 hr    NEPHROSTOGRAPHY Right 2/19/2024    Procedure: Nephrostogram;  Surgeon: Suni Patel MD;  Location: [facility] OR 2ND FLR;  Service: Urology;  Laterality: Right;    OOPHORECTOMY      PERCUTANEOUS  CRYOTHERAPY OF PERIPHERAL NERVE USING LIQUID NITROUS OXIDE IN CLOSED NEEDLE DEVICE Left 4/29/2019    Procedure: CRYOTHERAPY, NERVE, PERIPHERAL, PERCUTANEOUS, USING LIQUID NITROUS OXIDE IN CLOSED NEEDLE DEVICE-iovera left knee;  Surgeon: Chavo Gold III, MD;  Location: [facility] CATH LAB;  Service: Pain Management;  Laterality: Left;    PERCUTANEOUS CRYOTHERAPY OF PERIPHERAL NERVE USING LIQUID NITROUS OXIDE IN CLOSED NEEDLE DEVICE Right 8/2/2021    Procedure: CRYOTHERAPY, NERVE, PERIPHERAL, PERCUTANEOUS, USING LIQUID NITROUS OXIDE IN CLOSED NEEDLE DEVICE;  Surgeon: Saritha Proctor NP;  Location: [facility];  Service: Pain Management;  Laterality: Right;  RIGHT KNEE IOVERA    PERCUTANEOUS NEPHROLITHOTOMY Right 2/19/2024    Procedure: NEPHROLITHOTOMY, PERCUTANEOUS;  Surgeon: Suni Patel MD;  Location: [facility] OR 2ND FLR;  Service: Urology;  Laterality: Right;  2 HRS    PERCUTANEOUS NEPHROLITHOTOMY Left 4/8/2024    Procedure: NEPHROLITHOTOMY, PERCUTANEOUS;  Surgeon: Suni Patel MD;  Location: [facility] OR 1ST FLR;  Service: Urology;  Laterality: Left;  3 hrs    PERCUTANEOUS NEPHROSCOPY  2/19/2024    Procedure: NEPHROSCOPY, PERCUTANEOUS;  Surgeon: Suni Patel MD;  Location: [facility] OR 2ND FLR;  Service: Urology;;    PERCUTANEOUS NEPHROSTOMY Right 2/19/2024    Procedure: CREATION, NEPHROSTOMY, PERCUTANEOUS;  Surgeon: Suni Patel MD;  Location: [facility] OR 2ND FLR;  Service: Urology;  Laterality: Right;    PYELOSCOPY Left 4/17/2024    Procedure: PYELOSCOPY;  Surgeon: Suni Patel MD;  Location: [facility] OR 1ST FLR;  Service: Urology;  Laterality: Left;    PYELOSCOPY Left 5/8/2024    Procedure: PYELOSCOPY;  Surgeon: Suni Patel MD;  Location: [facility] OR 1ST FLR;  Service: Urology;  Laterality: Left;    REMOVAL-STENT Left 4/17/2024    Procedure: REMOVAL-STENT;  Surgeon: Suni Patel MD;  Location: [facility] OR 1ST FLR;  Service: Urology;  Laterality: Left;     REMOVAL-STENT Left 5/8/2024    Procedure: REMOVAL-STENT;  Surgeon: Suni Patel MD;  Location: [facility] OR 1ST FLR;  Service: Urology;  Laterality: Left;    REPLACEMENT OF NEPHROSTOMY TUBE Left 4/8/2024    Procedure: PLACEMENT, NEPHROSTOMY TUBE;  Surgeon: Suni Patel MD;  Location: [facility] OR 1ST FLR;  Service: Urology;  Laterality: Left;    REPLACEMENT OF STENT Left 4/17/2024    Procedure: REPLACEMENT, STENT;  Surgeon: Suni Patel MD;  Location: [facility] OR 1ST FLR;  Service: Urology;  Laterality: Left;    REPLACEMENT OF STENT Left 5/8/2024    Procedure: REPLACEMENT, STENT;  Surgeon: Suni Patel MD;  [facility];  Service: Urology;  Laterality: Left;    SPINAL FUSION      TONSILLECTOMY, ADENOIDECTOMY      TOTAL KNEE ARTHROPLASTY Left 5/1/2019    Procedure: REPLACEMENT-KNEE-TOTAL;  Surgeon: John L. [facility] Jr., MD;  Location: [facility] OR 2ND FLR;  Service: Orthopedics;  Laterality: Left;    URETERAL STENT PLACEMENT Left 4/8/2024    Procedure: INSERTION, STENT, URETER;  Surgeon: Suni Patel MD;  [facility];  Service: Urology;  Laterality: Left;    URETEROSCOPY  2/19/2024    Procedure: URETEROSCOPY;  Surgeon: Suni Patel MD;  Location: [facility] OR 2ND FLR;  Service: Urology;;    URETEROSCOPY Left 4/17/2024    Procedure: URETEROSCOPY;  Surgeon: Suni Patel MD;  [facility];  Service: Urology;  Laterality: Left;    URETEROSCOPY Left 5/8/2024    Procedure: URETEROSCOPY;  Surgeon: Suni Patel MD;  Location: [facility] OR 1ST FLR;  Service: Urology;  Laterality: Left;    URETEROSCOPY, ANTEGRADE Right 2/23/2024    Procedure: URETEROSCOPY, ANTEGRADE;  Surgeon: Suni Patel MD;  Location: [facility] OR 1ST FLR;  Service: Urology;  Laterality: Right;        MEDICATIONS:  Current Outpatient Medications   Medication Sig Dispense Refill    alcohol swabs PadM Apply 1 each topically once daily. 100 each 3     ascorbic acid, vitamin C, (VITAMIN C) 500 MG tablet       BIOTIN ORAL Take 10,000 mg by mouth every morning.      blood glucose control, low (TRUE METRIX LEVEL 1) Soln USE AS DIRECTED WITH GLUCOSE METER 1 each 0    blood sugar diagnostic (TRUE METRIX GLUCOSE TEST STRIP) Strp TEST BLOOD SUGAR EVERY  strip 3    cyclobenzaprine (FLEXERIL) 10 MG tablet TK 1 T PO QD- as needed for muscle spasms  0    DULoxetine (CYMBALTA) 60 MG capsule TAKE 1 CAPSULE EVERY DAY (Patient taking differently: Take by mouth every evening.) 90 capsule 3    fish oil-omega-3 fatty acids 300-1,000 mg capsule Take 1,200 mg by mouth 2 (two) times daily.      fluticasone propionate (FLONASE) 50 mcg/actuation nasal spray USE 2 SPRAYS NASALLY ONE TIME DAILY (Patient not taking: Reported on 4/15/2024) 48 g 3    gabapentin (NEURONTIN) 800 MG tablet Take 800 mg by mouth 3 (three) times daily. Takes 2-3 times per day  1    HYDROcodone-acetaminophen (NORCO)  mg per tablet Take 1 tablet by mouth 4 (four) times daily as needed.      ketoconazole (NIZORAL) 2 % cream Apply topically once daily. 1 each 0    levothyroxine (SYNTHROID) 137 MCG Tab tablet Take 1 tablet (137 mcg total) by mouth before breakfast. 90 tablet 3    lisinopriL (PRINIVIL,ZESTRIL) 2.5 MG tablet TAKE 1 TABLET EVERY DAY 90 tablet 3    metFORMIN (GLUCOPHAGE) 500 MG tablet TAKE 1 TABLET (500 MG TOTAL) BY MOUTH 2 (TWO) TIMES DAILY WITH MEALS. 180 tablet 3    multivitamin capsule Take 1 capsule by mouth every morning.      omeprazole (PRILOSEC) 40 MG capsule TAKE 1 CAPSULE EVERY DAY (Patient taking differently: Take by mouth every morning.) 90 capsule 3    oxyCODONE (ROXICODONE) 5 MG immediate release tablet Take 1 tablet (5 mg total) by mouth every 4 (four) hours as needed for Pain.  (Do not take with home medication: oxycodone/apap 10/325) (Patient not taking: Reported on 5/16/2024) 10 tablet 0    oxyCODONE-acetaminophen (PERCOCET)  mg per tablet Take 1 tablet by mouth 4  (four) times daily as needed for Pain. 10 tablet 0    potassium citrate (UROCIT-K) 5 mEq (540 mg) TbSR Take 2 tablets (10 mEq total) by mouth 3 (three) times daily with meals. 180 tablet 11    rosuvastatin (CRESTOR) 10 MG tablet TAKE 1 TABLET EVERY NIGHT 90 tablet 3    tamsulosin (FLOMAX) 0.4 mg Cap TAKE 1 CAPSULE(0.4 MG) BY MOUTH AFTER DINNER (Patient not taking: Reported on 5/16/2024) 30 capsule 1    tirzepatide (MOUNJARO) 12.5 mg/0.5 mL PnIj Inject 12.5 mg into the skin every 7 days. 4 Pen 3    TRUEPLUS LANCETS 33 gauge Misc TEST BLOOD SUGAR EVERY  each 3    zinc sulfate (ZINCATE) 50 mg zinc (220 mg) capsule       zolpidem (AMBIEN) 10 mg Tab TAKE 1 TABLET BY MOUTH EVERY NIGHT AT BEDTIME AS NEEDED FOR INSOMNIA 90 tablet 0    aspirin (ECOTRIN) 81 MG EC tablet Take 81 mg by mouth nightly.      ketorolac (TORADOL) 10 mg tablet Take 1 tablet (10 mg total) by mouth every 6 (six) hours as needed for Pain. (Patient not taking: Reported on 5/16/2024) 12 tablet 0    oxybutynin (DITROPAN) 5 MG Tab Take 1 tablet (5 mg total) by mouth 3 (three) times daily as needed (urinary frequency). 30 tablet 0    tamsulosin (FLOMAX) 0.4 mg Cap Take 1 capsule (0.4 mg total) by mouth every evening. for 30 doses (Patient not taking: Reported on 5/16/2024) 30 capsule 0     Current Facility-Administered Medications   Medication Dose Route Frequency Provider Last Rate Last Admin    triamcinolone acetonide injection 40 mg  40 mg Intradermal 1 time in Clinic/HOD Teri Manuel MD         Facility-Administered Medications Ordered in Other Visits   Medication Dose Route Frequency Provider Last Rate Last Admin    ceFAZolin 2 g in dextrose 5 % in water (D5W) 50 mL IVPB (MB+)  2 g Intravenous On Call Procedure Saritha Merlos MD           ALLERGIES:  Review of patient's allergies indicates:   Allergen Reactions    Adhesive Dermatitis and Blisters    Adhesive tape-silicones Dermatitis     The longer the adhesive stays on, the worse the irritation  "   Mastisol adhesive [gum itlhgb-hsjzyi-hcjq-alcohol] Itching, Rash and Blisters     "Looked like poison ivy"    Sulfamethoxazole-trimethoprim Itching and Anxiety     Has a "nervous feeling."  "Jittery"  Also had an upset stomach.Has taken recently and the reaction was "less intense"       SOCIAL HISTORY:  Social History     Socioeconomic History    Marital status:    Occupational History    Occupation: disable   Tobacco Use    Smoking status: Never    Smokeless tobacco: Never    Tobacco comments:     Tried a few cigarettes during teenage   Substance and Sexual Activity    Alcohol use: Not Currently     Comment: socially    Drug use: No    Sexual activity: Not Currently     Partners: Male   Other Topics Concern    Are you pregnant or think you may be? No    Breast-feeding No     Social Determinants of Health     Financial Resource Strain: High Risk (11/15/2023)    Overall Financial Resource Strain (CARDIA)     Difficulty of Paying Living Expenses: Hard   Food Insecurity: No Food Insecurity (11/15/2023)    Hunger Vital Sign     Worried About Running Out of Food in the Last Year: Never true     Ran Out of Food in the Last Year: Never true   Transportation Needs: Unmet Transportation Needs (11/15/2023)    PRAPARE - Transportation     Lack of Transportation (Medical): Yes     Lack of Transportation (Non-Medical): No   Physical Activity: Inactive (11/15/2023)    Exercise Vital Sign     Days of Exercise per Week: 0 days     Minutes of Exercise per Session: 0 min   Stress: Stress Concern Present (11/15/2023)    [employer] - Occupational Stress Questionnaire     Feeling of Stress : To some extent   Housing Stability: Low Risk  (11/15/2023)    Housing Stability Vital Sign     Unable to Pay for Housing in the Last Year: No     Number of Places Lived in the Last Year: 1     Unstable Housing in the Last Year: No         FAMILY HISTORY:  Family History   Problem Relation Name Age of Onset    " "Heart disease Mother      Heart disease Father      Cancer Father      COPD Father      Hypertension Sister      Scoliosis Sister      Hypertension Brother      Heart disease Maternal Grandmother      Heart disease Maternal Aunt      Heart disease Maternal Uncle      Heart disease Paternal Aunt      Breast cancer Paternal Aunt      Heart disease Paternal Uncle      Cancer Paternal Uncle      Acne Other nephew     Eczema Other nephew     Melanoma Neg Hx      Psoriasis Neg Hx      Lupus Neg Hx          REVIEW OF SYSTEMS:  A detailed review of systems has been reviewed with the patient, pertinent positives and negatives are presented in the note and is otherwise negative.    PHYSICAL EXAMINATION:  Vital Signs: /65   Pulse 90   Ht 5' 1" (1.549 m)   Wt 94.3 kg (207 lb 14.3 oz)   LMP 12/06/2007   BMI 39.28 kg/m²     Constitutional: well-developed, well-nourished, no acute distress   HENT: no lid lag, no exophthalmos, no scleral icterus, moist mucous membranes, good dentition  Neck: supple, trachea in midline, thyroid is soft and moves well with swallowing, small palpable nodule, adequate neck extension, scars from right IJ catheters  Heme/Lymph: no cervical or supraclavicular lymphadenopathy  Respiratory: normal respiratory effort, no wheezes or stridor  Cardiovascular: regular rate and rhythm  Extremities: no edema  Skin: warm and dry, no rashes  Neurologic: gross resting tremor of outstretched hands, voice strong  Vascular: radial pulses palpable bilaterally  Psychiatric: affect normal    IMAGING STUDIES:  I personally and independently reviewed, visualized and interpreted the images of the below listed radiology studies (including ultrasound from 2023 as well as CT parathyroid scan.) and my findings are notable for small thyroid nodules without grossly suspicious features, diminutive thyroid gland, no lesions suspicious for a parathyroid adenoma on ultrasound.  CT suggests dominant right superior gland, " possible second adenoma and multigland disease. Reports below for reference.    CT Neck Parathyroid (4D) 04/01/2024    Narrative  EXAMINATION:  CT NECK PARATHYROID (4D)    CLINICAL HISTORY:  Hyperparathyroidism; Primary hyperparathyroidism    TECHNIQUE:  Axial CT images obtained throughout the region of the neck before and after the administration of 75 ml omnipaque 350 intravenous contrast via parathyroid protocol.  Axial, sagittal and coronal reconstructions were performed.    COMPARISON:  Ultrasound 10/31/2023.    FINDINGS:  There is an enhancing nodule insinuating between the right side of the esophagus and longus coli muscle at the level of the upper pole of the right lobe of the thyroid gland measuring 7 mm suspicious for parathyroid adenoma (series 3, image 130).    Small subcentimeter nodules below the left lower pole likely represent small lymph nodes although a 4 mm nodule appears to more prominently enhance which may be artifactual (streak artifact (however an additional small parathyroid adenoma is at least questioned (series 3, image 172)    No other definite suspicious lesion is identified throughout the remainder of the neck.    1.4 cm right thyroid nodule, better characterized on ultrasound 10/31/2023.    No soft tissue mass or adenopathy otherwise identified throughout the neck.    Major vessels of the neck appear normal with retropharyngeal course of the carotid arteries.    Lung apices are clear.  Partially visualized postoperative change thoracic spine posterior instrumented fusion.  Bilateral cervical ribs at C7.    Impression  7 mm presumed parathyroid adenoma at the level of the upper pole of the right lobe of the thyroid gland noted posteriorly insinuating between the esophagus and longus coli musculature.  Only questionable additional enhancing nodule below the left lower pole of the thyroid although this latter finding may not represent true enhancement but streak artifact.    Thyroid  nodules better characterized on ultrasound 10/31/2023.    Electronically signed by resident: Nela Hart  Date:    04/01/2024  Time:    10:46    Electronically signed by: Nahum Talamantes  Date:    04/02/2024  Time:    07:06      US Soft Tissue Head Neck Thyroid 10/31/2023  CLINICAL HISTORY:.  Nontoxic single thyroid nodule    TECHNIQUE:Ultrasound of the thyroid and cervical lymph nodes was performed.    COMPARISON:Thyroid ultrasound 06/16/2021, 11/11/2019.    FINDINGS:  The thyroid is normal in size.  Right lobe of the thyroid measures 3.4 x 1.9 x 1.6 cm.  Isthmus measures 0.2 cm in AP dimension.  Left lobe of the thyroid measures 3.3 x 1.7 x 1.3 cm.  Normal thyroid parenchyma.    Bilateral thyroid nodules.    Right lobe: Midpole solid isoechoic nodule measuring 1.9 cm (previously 1.8 cm).    Left lobe:  Upper pole solid isoechoic nodule measuring 1.1 cm (previously 1.1 cm), TI-RADS 3.  Lower pole solid isoechoic nodule measuring 0.9 cm, TI-RADS 3.  Cervical lymph nodes demonstrate normal morphology and size.    Impression  Stable right thyroid nodule, previously biopsied with benign pathology, and stable left thyroid TI-RADS 3 nodule.  These are stable in size since 2017.  Further imaging surveillance as clinically warranted.  No other nodules that meet criteria for imaging follow-up or FNA.    Electronically signed by resident: Eric Parker  Date:    10/31/2023  Time:    11:15    Electronically signed by: Teri Khna MD  Date:    10/31/2023  Time:    11:37    DXA Bone Density Axial Skeleton 1 or more sites 10/31/2023  CLINICAL HISTORY:Other osteoporosis without current pathological fracture.  59 y/o female with no history of fractures.  She had surgical menopause at 39 y/o.  Pt is taking Vit D supplements.  She has a history of Hyperparathyroidism, Diabetes and Renal Stones.  She does not exercise or smoke.    TECHNIQUE:DXA specification: [facility] (S/N [id number])    Bone Mineral Density  scanning was performed over the hip and lumbar spine.    Review of the images confirms satisfactory positioning and technique.    COMPARISON:  Comparison study done on 11/13/2014.    Lumbar spine:     BMD  g/cm2 and T-score .  Total Hip:           BMD 0.989 g/cm2 and T-score 0.4.  Distal 1/3 radius: BMD 0.752 g/cm2 and T-score 1.2.    FINDINGS:  Lumbar spine (L1-L4):           BMD is  g/cm2, T-score is , and Z-score is .    Total hip:                                BMD is 0.948 g/cm2, T-score is 0.0, and Z-score is 0.9.    Femoral neck:                          BMD is 0.558 g/cm2, T-score is -2.6, and Z-score is -1.4.    Distal 1/3 radius:                      BMD is 0.690 g/cm2, T-score is 0.1, and Z-score is 1.3.    FRAX:    10% risk of a major osteoporotic fracture in the next 10 years.  1.8% risk of hip fracture in the next 10 years.    Impression  *Osteoporosis based on T-score below -2.5  *Fracture risk is high  *Compared with previous DXA, BMD at the lumbar spine has declined by 8.3%, and BMD at the total hip has declined by 4.1%.    Electronically signed by: Nahum Castro  Date:    10/31/2023  Time:    17:57      IMPRESSION:  I had the pleasure of seeing Ms. Mccarthy in Endocrine Surgical consultation regarding the biochemical diagnosis of normocalcemic primary hyperparathyroidism.     We discussed that hyperparathyroidism can compromise bone and cardiovascular health. In addition to classic symptoms such as kidney stones and bone loss, hyperparathyroidism can be associated with multiple symptoms including fatigue, depression, memory loss, pruritis, polyuria, nocturia, constipation, polydipsia, and musculoskeletal aches and pains. Hyperparathyroidism can also worsen hypertension.  I discussed the implications of non-operative observation as well as the standard of care surgical treatment of primary hyperparathyroidism.  Based on the current clinical findings and a thorough discussion about the risks,  benefits, and alternatives, the patient elected to proceed with parathyroidectomy.      We extensively discussed the pros and cons for surgical intervention, in this case parathyroidectomy with intraoperative parathyroid hormone monitoring.  We discussed that in the majority of cases, a single adenoma is the culprit gland. However, multigland disease is possible and multigland disease has a lower likelihood of radiographic localization.  Parathyroidectomy for single gland disease is a same day surgery while four-gland parathyroid exploration may require an overnight stay. We discussed that in all cases, parathyroidectomy has an attendant risk of postoperative hypocalcemia which can be mitigated with calcium and vitamin D supplementation. Other possible complications associated with this may include, but may not be restricted to hoarseness, recurrent laryngeal nerve injury - temporary or permanent, superior laryngeal nerve injury - temporary or permanent, hypocalcemia and hypoparathyroidism - temporary or permanent, neck hematoma, bleeding, infection, scarring, wound healing problems and possible death. We discussed that in rare cases, parathyroid exploration may be negative. Recurrent and persistent disease are also possible.  We also discussed that parathyroidectomy may not completely resolve or improve the nephrolithiasis.     All questions were answered and the patient expressed understanding of all the risks, benefits and alternatives, and agreed to proceed with the plan despite the risks.      Problem List Items Addressed This Visit       Thoracic spondylosis without myelopathy     - Cautious positioning intraoperatively with additional padding and support at shoulders and knees         PONV (postoperative nausea and vomiting)     - Multimodal perioperative antiemetics planned  - Postoperative prescription for antiemetic if needed         Primary hyperparathyroidism - Primary     Symptomatic normocalcemic  primary hyperparathyroidism with nephrolithiasis and osteoporosis, meets criteria for parathyroid surgery.  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    - OR for parathyroidectomy with intraoperative PTH monitoring  - Consent obtained in clinic   - Maintain adequate hydration and avoid the use of calcium supplements         Nephrolithiasis     Indication for parathyroidectomy    - See primary hyperparathyroidism          Patient was seen and evaluated with surgery resident ROSENDO Smith MD.     Ligia Raya MD  Staff Surgeon  Endocrine Surgery  4/11/24

## 2024-04-12 RX ORDER — OXYBUTYNIN CHLORIDE 5 MG/1
5 TABLET ORAL 3 TIMES DAILY PRN
Qty: 30 TABLET | Refills: 0 | Status: SHIPPED | OUTPATIENT
Start: 2024-04-12 | End: 2024-04-12 | Stop reason: SDUPTHER

## 2024-04-12 RX ORDER — OXYBUTYNIN CHLORIDE 5 MG/1
5 TABLET ORAL 3 TIMES DAILY PRN
Qty: 30 TABLET | Refills: 0 | Status: SHIPPED | OUTPATIENT
Start: 2024-04-12 | End: 2024-06-10

## 2024-04-15 ENCOUNTER — ANESTHESIA EVENT (OUTPATIENT)
Dept: SURGERY | Facility: HOSPITAL | Age: 59
End: 2024-04-15
Payer: MEDICARE

## 2024-04-15 RX ORDER — ASPIRIN 81 MG/1
81 TABLET ORAL NIGHTLY
COMMUNITY

## 2024-04-15 NOTE — ANESTHESIA PREPROCEDURE EVALUATION
[facility]  Anesthesia Pre-Operative Evaluation         Patient Name: Angie Mccarthy  YOB: 1965  MRN: [id number]    SUBJECTIVE:     Pre-operative evaluation for Procedure(s) (LRB):  CYSTOURETEROSCOPY, WITH HOLMIUM LASER LITHOTRIPSY OF URETERAL CALCULUS AND STENT EXCHANGE. (Left)     04/17/2024    Angie Mccarthy is a 58 y.o. female w/ a significant PMHx as below who presents for the above procedure.    Mounjaro held >7 days      LDA: None documented.    Prev airway: None documented.     Drips: None documented.    Patient Active Problem List   Diagnosis    Syringomyelia and syringobulbia    Chronic low back pain    Essential hypertension    Hyperlipidemia    Acute postoperative anemia due to expected blood loss    Thoracic spondylosis without myelopathy    Spinal stenosis, lumbar region, without neurogenic claudication    Osteoporosis, idiopathic    Thyroid nodule    Palpitations    GERD (gastroesophageal reflux disease)    Fatty liver disease, nonalcoholic    S/P laparoscopic sleeve gastrectomy    Class 2 obesity with body mass index (BMI) of 37.0 to 37.9 in adult    Back pain    Lumbar disc disease    Anxiety    Depression    Chronic, continuous use of opioids    Snoring    Occult blood in stools    PONV (postoperative nausea and vomiting)    History of prediabetes    Urinary hesitancy    Wheezing    Microscopic hematuria    Abnormality of gait    Menopausal flushing    Primary hypothyroidism    Pyelonephritis    Hypophosphatemia    Hypomagnesemia    Status post total left knee replacement    Hepatic fibrosis, stage 3    FERRELL (nonalcoholic steatohepatitis)    Osteoarthritis of right knee    Staghorn calculus    Type 2 diabetes mellitus with hyperglycemia    Hypercalciuria    Primary hyperparathyroidism    Right renal stone    Pre-operative cardiovascular examination       Review of patient's allergies indicates:  "  Allergen Reactions    Adhesive Dermatitis and Blisters    Adhesive tape-silicones Dermatitis     The longer the adhesive stays on, the worse the irritation    Mastisol adhesive [gum owkbbw-nhfwoa-iwpi-alcohol] Itching, Rash and Blisters     "Looked like poison ivy"    Sulfamethoxazole-trimethoprim Itching and Anxiety     Has a "nervous feeling."  "Jittery"  Also had an upset stomach.Has taken recently and the reaction was "less intense"       Current Inpatient Medications:  Current Facility-Administered Medications   Medication Dose Route Frequency Provider Last Rate Last Admin    triamcinolone acetonide injection 40 mg  40 mg Intradermal 1 time in Clinic/HOD Teri Manuel MD         Current Outpatient Medications   Medication Sig Dispense Refill    acetaminophen (TYLENOL) 325 MG tablet Take 1 tablet (325 mg total) by mouth every 6 (six) hours as needed for Pain. 40 tablet 0    alcohol swabs PadM Apply 1 each topically once daily. 100 each 3    ascorbic acid, vitamin C, (VITAMIN C) 500 MG tablet       aspirin (ECOTRIN) 81 MG EC tablet Take 81 mg by mouth nightly.      BIOTIN ORAL Take 10,000 mg by mouth every morning.      blood glucose control, low (TRUE METRIX LEVEL 1) Soln USE AS DIRECTED WITH GLUCOSE METER 1 each 0    blood sugar diagnostic (TRUE METRIX GLUCOSE TEST STRIP) Strp TEST BLOOD SUGAR EVERY  strip 3    cyclobenzaprine (FLEXERIL) 10 MG tablet TK 1 T PO QD- as needed for muscle spasms  0    DULoxetine (CYMBALTA) 60 MG capsule TAKE 1 CAPSULE EVERY DAY (Patient taking differently: Take by mouth every evening.) 90 capsule 3    fish oil-omega-3 fatty acids 300-1,000 mg capsule Take 1,200 mg by mouth 2 (two) times daily.      fluticasone propionate (FLONASE) 50 mcg/actuation nasal spray USE 2 SPRAYS NASALLY ONE TIME DAILY (Patient not taking: Reported on 4/15/2024) 48 g 3    gabapentin (NEURONTIN) 800 MG tablet Take 800 mg by mouth 3 (three) times daily. Takes 2-3 times per day  1    " HYDROcodone-acetaminophen (NORCO)  mg per tablet Take 1 tablet by mouth 4 (four) times daily as needed.      ketoconazole (NIZORAL) 2 % cream Apply topically once daily. 1 each 0    levothyroxine (SYNTHROID) 137 MCG Tab tablet Take 1 tablet (137 mcg total) by mouth before breakfast. 90 tablet 3    lisinopriL (PRINIVIL,ZESTRIL) 2.5 MG tablet TAKE 1 TABLET EVERY DAY 90 tablet 3    metFORMIN (GLUCOPHAGE) 500 MG tablet TAKE 1 TABLET (500 MG TOTAL) BY MOUTH 2 (TWO) TIMES DAILY WITH MEALS. 180 tablet 3    multivitamin capsule Take 1 capsule by mouth every morning.      omeprazole (PRILOSEC) 40 MG capsule TAKE 1 CAPSULE EVERY DAY (Patient taking differently: Take by mouth every morning.) 90 capsule 3    oxybutynin (DITROPAN) 5 MG Tab Take 1 tablet (5 mg total) by mouth 3 (three) times daily as needed (urinary frequency). 30 tablet 0    oxyCODONE (ROXICODONE) 5 MG immediate release tablet Take 1 tablet (5 mg total) by mouth every 4 (four) hours as needed for Pain.  (Do not take with home medication: oxycodone/apap 10/325) 10 tablet 0    oxyCODONE-acetaminophen (PERCOCET)  mg per tablet Take 1 tablet by mouth 4 (four) times daily as needed for Pain. 10 tablet 0    polyethylene glycol (GLYCOLAX) 17 gram/dose powder Use cap to measure 17g, mix with liquid, and drink by mouth once daily. for 14 days 238 g 0    potassium citrate (UROCIT-K) 5 mEq (540 mg) TbSR Take 2 tablets (10 mEq total) by mouth 3 (three) times daily with meals. 180 tablet 11    rosuvastatin (CRESTOR) 10 MG tablet TAKE 1 TABLET EVERY NIGHT 90 tablet 3    tamsulosin (FLOMAX) 0.4 mg Cap TAKE 1 CAPSULE(0.4 MG) BY MOUTH AFTER DINNER 30 capsule 1    tirzepatide (MOUNJARO) 12.5 mg/0.5 mL PnIj Inject 12.5 mg into the skin every 7 days. 4 Pen 3    TRUEPLUS LANCETS 33 gauge Misc TEST BLOOD SUGAR EVERY  each 3    zinc sulfate (ZINCATE) 50 mg zinc (220 mg) capsule       zolpidem (AMBIEN) 10 mg Tab TAKE 1 TABLET BY MOUTH EVERY NIGHT AT BEDTIME AS NEEDED  FOR INSOMNIA 90 tablet 0       Current Facility-Administered Medications on File Prior to Encounter   Medication Dose Route Frequency Provider Last Rate Last Admin    triamcinolone acetonide injection 40 mg  40 mg Intradermal 1 time in Clinic/HOD Teri Manuel MD         Current Outpatient Medications on File Prior to Encounter   Medication Sig Dispense Refill    acetaminophen (TYLENOL) 325 MG tablet Take 1 tablet (325 mg total) by mouth every 6 (six) hours as needed for Pain. 40 tablet 0    alcohol swabs PadM Apply 1 each topically once daily. 100 each 3    ascorbic acid, vitamin C, (VITAMIN C) 500 MG tablet       aspirin (ECOTRIN) 81 MG EC tablet Take 81 mg by mouth nightly.      BIOTIN ORAL Take 10,000 mg by mouth every morning.      blood glucose control, low (TRUE METRIX LEVEL 1) Soln USE AS DIRECTED WITH GLUCOSE METER 1 each 0    blood sugar diagnostic (TRUE METRIX GLUCOSE TEST STRIP) Strp TEST BLOOD SUGAR EVERY  strip 3    cyclobenzaprine (FLEXERIL) 10 MG tablet TK 1 T PO QD- as needed for muscle spasms  0    DULoxetine (CYMBALTA) 60 MG capsule TAKE 1 CAPSULE EVERY DAY (Patient taking differently: Take by mouth every evening.) 90 capsule 3    fish oil-omega-3 fatty acids 300-1,000 mg capsule Take 1,200 mg by mouth 2 (two) times daily.      fluticasone propionate (FLONASE) 50 mcg/actuation nasal spray USE 2 SPRAYS NASALLY ONE TIME DAILY (Patient not taking: Reported on 4/15/2024) 48 g 3    gabapentin (NEURONTIN) 800 MG tablet Take 800 mg by mouth 3 (three) times daily. Takes 2-3 times per day  1    HYDROcodone-acetaminophen (NORCO)  mg per tablet Take 1 tablet by mouth 4 (four) times daily as needed.      ketoconazole (NIZORAL) 2 % cream Apply topically once daily. 1 each 0    levothyroxine (SYNTHROID) 137 MCG Tab tablet Take 1 tablet (137 mcg total) by mouth before breakfast. 90 tablet 3    lisinopriL (PRINIVIL,ZESTRIL) 2.5 MG tablet TAKE 1 TABLET EVERY DAY 90 tablet 3    metFORMIN  (GLUCOPHAGE) 500 MG tablet TAKE 1 TABLET (500 MG TOTAL) BY MOUTH 2 (TWO) TIMES DAILY WITH MEALS. 180 tablet 3    multivitamin capsule Take 1 capsule by mouth every morning.      omeprazole (PRILOSEC) 40 MG capsule TAKE 1 CAPSULE EVERY DAY (Patient taking differently: Take by mouth every morning.) 90 capsule 3    oxyCODONE (ROXICODONE) 5 MG immediate release tablet Take 1 tablet (5 mg total) by mouth every 4 (four) hours as needed for Pain.  (Do not take with home medication: oxycodone/apap 10/325) 10 tablet 0    oxyCODONE-acetaminophen (PERCOCET)  mg per tablet Take 1 tablet by mouth 4 (four) times daily as needed for Pain. 10 tablet 0    polyethylene glycol (GLYCOLAX) 17 gram/dose powder Use cap to measure 17g, mix with liquid, and drink by mouth once daily. for 14 days 238 g 0    potassium citrate (UROCIT-K) 5 mEq (540 mg) TbSR Take 2 tablets (10 mEq total) by mouth 3 (three) times daily with meals. 180 tablet 11    rosuvastatin (CRESTOR) 10 MG tablet TAKE 1 TABLET EVERY NIGHT 90 tablet 3    tamsulosin (FLOMAX) 0.4 mg Cap TAKE 1 CAPSULE(0.4 MG) BY MOUTH AFTER DINNER 30 capsule 1    tirzepatide (MOUNJARO) 12.5 mg/0.5 mL PnIj Inject 12.5 mg into the skin every 7 days. 4 Pen 3    TRUEPLUS LANCETS 33 gauge Misc TEST BLOOD SUGAR EVERY  each 3    zinc sulfate (ZINCATE) 50 mg zinc (220 mg) capsule       zolpidem (AMBIEN) 10 mg Tab TAKE 1 TABLET BY MOUTH EVERY NIGHT AT BEDTIME AS NEEDED FOR INSOMNIA 90 tablet 0       Past Surgical History:   Procedure Laterality Date    ANTEGRADE NEPHROSTOGRAPHY Right 2/23/2024    Procedure: NEPHROSTOGRAM, ANTEGRADE;  Surgeon: Suni Patel MD;  Location: [facility] OR [address] [place] [address];  Service: Urology;  Laterality: Right;    ANTEGRADE NEPHROSTOGRAPHY Left 4/8/2024    Procedure: Nephrostogram;  Surgeon: Suni Patel MD;  Location: [facility] OR [address] [place] [address];  Service: Urology;  Laterality: Left;    APPENDECTOMY      BACK SURGERY      x 6    CHOLECYSTECTOMY      DILATION OF  NEPHROSTOMY TRACT Left 4/8/2024    Procedure: DILATION, NEPHROSTOMY TRACT;  Surgeon: Suni Patel MD;  [facility];  Service: Urology;  Laterality: Left;    EXTRACTION - STONE Right 2/23/2024    Procedure: EXTRACTION - STONE;  Surgeon: Suni Patel MD;  [facility];  Service: Urology;  Laterality: Right;    GASTRECTOMY      Lap Gastric Sleeve    HERNIA REPAIR      HYSTERECTOMY      JOINT REPLACEMENT      LIVER BIOPSY  02/06/2017    steatohepatitis with stage 1 fibrosis    MYELOGRAPHY N/A 11/29/2018    Procedure: MYELOGRAM;  Surgeon: [facility] Diagnostic Provider;  [facility];  Service: Radiology;  Laterality: N/A;    MYELOGRAPHY N/A 1/7/2019    Procedure: Myelogram;  Surgeon: Lissett Surgeon;  Location: [facility];  Service: Anesthesiology;  Laterality: N/A;    NEPHROSCOPY Right 2/23/2024    Procedure: NEPHROSCOPY;  Surgeon: Suni Patel MD;  Location: [facility] OR [address] [place] [address];  Service: Urology;  Laterality: Right;  2 hr    NEPHROSTOGRAPHY Right 2/19/2024    Procedure: Nephrostogram;  Surgeon: Suni Patel MD;  [facility];  Service: Urology;  Laterality: Right;    OOPHORECTOMY      PERCUTANEOUS CRYOTHERAPY OF PERIPHERAL NERVE USING LIQUID NITROUS OXIDE IN CLOSED NEEDLE DEVICE Left 4/29/2019    Procedure: CRYOTHERAPY, NERVE, PERIPHERAL, PERCUTANEOUS, USING LIQUID NITROUS OXIDE IN CLOSED NEEDLE DEVICE-iovera left knee;  Surgeon: Chavo Gold III, MD;  Location: [facility] CATH LAB;  Service: Pain Management;  Laterality: Left;    PERCUTANEOUS CRYOTHERAPY OF PERIPHERAL NERVE USING LIQUID NITROUS OXIDE IN CLOSED NEEDLE DEVICE Right 8/2/2021    Procedure: CRYOTHERAPY, NERVE, PERIPHERAL, PERCUTANEOUS, USING LIQUID NITROUS OXIDE IN CLOSED NEEDLE DEVICE;  Surgeon: Saritha Proctor NP;  Location: [facility];  Service: Pain Management;  Laterality: Right;  RIGHT KNEE IOVERA    PERCUTANEOUS NEPHROLITHOTOMY Right 2/19/2024    Procedure:  NEPHROLITHOTOMY, PERCUTANEOUS;  Surgeon: Suni Patel MD;  Location: [facility] OR 2ND FLR;  Service: Urology;  Laterality: Right;  2 HRS    PERCUTANEOUS NEPHROLITHOTOMY Left 4/8/2024    Procedure: NEPHROLITHOTOMY, PERCUTANEOUS;  Surgeon: Suni Patel MD;  Location: [facility] OR 1ST FLR;  Service: Urology;  Laterality: Left;  3 hrs    PERCUTANEOUS NEPHROSCOPY  2/19/2024    Procedure: NEPHROSCOPY, PERCUTANEOUS;  Surgeon: Suni Patel MD;  Location: [facility] OR 2ND FLR;  Service: Urology;;    PERCUTANEOUS NEPHROSTOMY Right 2/19/2024    Procedure: CREATION, NEPHROSTOMY, PERCUTANEOUS;  Surgeon: Suni Patel MD;  Location: [facility] OR 2ND FLR;  Service: Urology;  Laterality: Right;    REPLACEMENT OF NEPHROSTOMY TUBE Left 4/8/2024    Procedure: PLACEMENT, NEPHROSTOMY TUBE;  Surgeon: Suni Patel MD;  [facility];  Service: Urology;  Laterality: Left;    SPINAL FUSION      TONSILLECTOMY, ADENOIDECTOMY      TOTAL KNEE ARTHROPLASTY Left 5/1/2019    Procedure: REPLACEMENT-KNEE-TOTAL;  Surgeon: John L. [facility] Jr., MD;  [facility];  Service: Orthopedics;  Laterality: Left;    URETERAL STENT PLACEMENT Left 4/8/2024    Procedure: INSERTION, STENT, URETER;  Surgeon: Suni Patel MD;  [facility];  Service: Urology;  Laterality: Left;    URETEROSCOPY  2/19/2024    Procedure: URETEROSCOPY;  Surgeon: Suni Patel MD;  Location: [facility] OR 2ND FLR;  Service: Urology;;    URETEROSCOPY, ANTEGRADE Right 2/23/2024    Procedure: URETEROSCOPY, ANTEGRADE;  Surgeon: Suni Patel MD;  Location: [facility] OR 1ST FLR;  Service: Urology;  Laterality: Right;       Social History     Socioeconomic History    Marital status:    Occupational History    Occupation: disable   Tobacco Use    Smoking status: Never    Smokeless tobacco: Never    Tobacco comments:     Tried a few cigarettes during teenage   Substance and Sexual Activity     "Alcohol use: Not Currently     Comment: socially    Drug use: No    Sexual activity: Not Currently     Partners: Male   Other Topics Concern    Are you pregnant or think you may be? No    Breast-feeding No     Social Determinants of Health     Financial Resource Strain: High Risk (11/15/2023)    Overall Financial Resource Strain (CARDIA)     Difficulty of Paying Living Expenses: Hard   Food Insecurity: No Food Insecurity (11/15/2023)    Hunger Vital Sign     Worried About Running Out of Food in the Last Year: Never true     Ran Out of Food in the Last Year: Never true   Transportation Needs: Unmet Transportation Needs (11/15/2023)    PRAPARE - Transportation     Lack of Transportation (Medical): Yes     Lack of Transportation (Non-Medical): No   Physical Activity: Inactive (11/15/2023)    Exercise Vital Sign     Days of Exercise per Week: 0 days     Minutes of Exercise per Session: 0 min   Stress: Stress Concern Present (11/15/2023)    [employer] [place] [employer] - Occupational Stress Questionnaire     Feeling of Stress : To some extent   Social Connections: Unknown (11/15/2023)    Social Connection and Isolation Panel [NHANES]     Frequency of Communication with Friends and Family: More than three times a week     Frequency of Social Gatherings with Friends and Family: Twice a week     Active Member of Clubs or Organizations: Yes     Attends Club or Organization Meetings: More than 4 times per year     Marital Status: Living with partner   Housing Stability: Low Risk  (11/15/2023)    Housing Stability Vital Sign     Unable to Pay for Housing in the Last Year: No     Number of Places Lived in the Last Year: 1     Unstable Housing in the Last Year: No       OBJECTIVE:     Vital Signs Range (Last 24H):         CBC:   No results for input(s): "WBC", "RBC", "HGB", "HCT", "PLT", "MCV", "MCH", "MCHC" in the last 72 hours.    CMP: No results for input(s): "NA", "K", "CL", "CO2", "BUN", "CREATININE", "GLU", " ""MG", "PHOS", "CALCIUM", "ALBUMIN", "PROT", "ALKPHOS", "ALT", "AST", "BILITOT" in the last 72 hours.    INR:  No results for input(s): "PT", "INR", "PROTIME", "APTT" in the last 72 hours.    Diagnostic Studies: No relevant studies.    EKG: No recent studies available.    2D ECHO:  No results found. However, due to the size of the patient record, not all encounters were searched. Please check Results Review for a complete set of results.      ASSESSMENT/PLAN:           Pre-op Assessment    I have reviewed the Patient Summary Reports.    I have reviewed the NPO Status.   I have reviewed the Medications.     Review of Systems  Anesthesia Hx:  No problems with previous Anesthesia              Personal Hx of Anesthesia complications, Post-Operative Nausea/Vomiting, in the past, but not with recent anesthetics / prophylaxis                    Cardiovascular:     Hypertension                                        Renal/:  Chronic Renal Disease                Hepatic/GI:     GERD Liver Disease,            Musculoskeletal:  Arthritis               Endocrine:  Diabetes Hypothyroidism        Morbid Obesity / BMI > 40  Psych:  Psychiatric History                  Physical Exam  General: Alert and Cooperative    Airway:  Mallampati: III / II  Mouth Opening: Normal  TM Distance: Normal  Tongue: Normal  Neck ROM: Normal ROM    Dental:  Any loose or missing teeth verified with patient  Chest/Lungs:  Normal Respiratory Rate    Heart:  Rate: Normal        Anesthesia Plan  Type of Anesthesia, risks & benefits discussed:    Anesthesia Type: Gen ETT, Gen Supraglottic Airway  Intra-op Monitoring Plan: Standard ASA Monitors  Post Op Pain Control Plan: multimodal analgesia  Induction:  IV  Airway Plan: Direct, Post-Induction  Informed Consent: Informed consent signed with the Patient and all parties understand the risks and agree with anesthesia plan.  All questions answered.   ASA Score: 3  Day of Surgery Review of History & Physical: " H&P Update referred to the surgeon/provider.    Ready For Surgery From Anesthesia Perspective.     .

## 2024-04-15 NOTE — PRE-PROCEDURE INSTRUCTIONS
PreOp Instructions given:   - Verbal medication information (what to hold and what to take)   - NPO guidelines 2400  - Arrival place directions given; time to be given the day before procedure by the   Surgeon's Office [facility]  - Bathing with antibacterial soap   - Don't wear any jewelry or bring any valuables AM of surgery   - No makeup or moisturizer to face   - No perfume/cologne, powder, lotions or aftershave   Pt. verbalized understanding.   Pt reports h/o PONV - Do not give Scopolamine patch   Patient does not know arrival time.  Explained that this information comes from the surgeon's office and if they haven't heard from them by 2 or 3 pm to call the office.  Patient stated an understanding.

## 2024-04-16 ENCOUNTER — TELEPHONE (OUTPATIENT)
Dept: UROLOGY | Facility: CLINIC | Age: 59
End: 2024-04-16
Payer: MEDICARE

## 2024-04-16 NOTE — TELEPHONE ENCOUNTER
Called pt to confirm arrival time of 11:40am for procedure on 4/17/24. Gave pt NPO instructions and gave pt opportunity to ask questions. Pt verbalized understanding.

## 2024-04-17 ENCOUNTER — ANESTHESIA (OUTPATIENT)
Dept: SURGERY | Facility: HOSPITAL | Age: 59
End: 2024-04-17
Payer: MEDICARE

## 2024-04-17 ENCOUNTER — HOSPITAL ENCOUNTER (OUTPATIENT)
Facility: HOSPITAL | Age: 59
Discharge: HOME | End: 2024-04-17
Attending: UROLOGY | Admitting: UROLOGY
Payer: MEDICARE

## 2024-04-17 VITALS
RESPIRATION RATE: 18 BRPM | HEART RATE: 63 BPM | DIASTOLIC BLOOD PRESSURE: 57 MMHG | SYSTOLIC BLOOD PRESSURE: 113 MMHG | TEMPERATURE: 99 F | BODY MASS INDEX: 33.32 KG/M2 | WEIGHT: 200 LBS | OXYGEN SATURATION: 96 % | HEIGHT: 65 IN

## 2024-04-17 DIAGNOSIS — N20.9 UROLITHIASIS: Primary | ICD-10-CM

## 2024-04-17 LAB
POCT GLUCOSE: 104 MG/DL (ref 70–110)
POCT GLUCOSE: 93 MG/DL (ref 70–110)

## 2024-04-17 PROCEDURE — 36000706: Performed by: UROLOGY

## 2024-04-17 PROCEDURE — C1747 OPTIME ENDOSCOPE, SINGLE, URINARY TRACT: HCPCS | Performed by: UROLOGY

## 2024-04-17 PROCEDURE — 27201423 OPTIME MED/SURG SUP & DEVICES STERILE SUPPLY: Performed by: UROLOGY

## 2024-04-17 PROCEDURE — C2617 STENT, NON-COR, TEM W/O DEL: HCPCS | Performed by: UROLOGY

## 2024-04-17 PROCEDURE — 37000008 HC ANESTHESIA 1ST 15 MINUTES: Performed by: UROLOGY

## 2024-04-17 PROCEDURE — C1769 GUIDE WIRE: HCPCS | Performed by: UROLOGY

## 2024-04-17 PROCEDURE — 25000003 PHARM REV CODE 250

## 2024-04-17 PROCEDURE — C1894 INTRO/SHEATH, NON-LASER: HCPCS | Performed by: UROLOGY

## 2024-04-17 PROCEDURE — 25000003 PHARM REV CODE 250: Performed by: NURSE ANESTHETIST, CERTIFIED REGISTERED

## 2024-04-17 PROCEDURE — 82962 GLUCOSE BLOOD TEST: CPT | Performed by: UROLOGY

## 2024-04-17 PROCEDURE — 63600175 PHARM REV CODE 636 W HCPCS: Performed by: STUDENT IN AN ORGANIZED HEALTH CARE EDUCATION/TRAINING PROGRAM

## 2024-04-17 PROCEDURE — 63600175 PHARM REV CODE 636 W HCPCS

## 2024-04-17 PROCEDURE — 71000015 HC POSTOP RECOV 1ST HR: Performed by: UROLOGY

## 2024-04-17 PROCEDURE — 25000003 PHARM REV CODE 250: Performed by: STUDENT IN AN ORGANIZED HEALTH CARE EDUCATION/TRAINING PROGRAM

## 2024-04-17 PROCEDURE — 82365 CALCULUS SPECTROSCOPY: CPT | Performed by: UROLOGY

## 2024-04-17 PROCEDURE — 52356 CYSTO/URETERO W/LITHOTRIPSY: CPT | Mod: 58,LT,, | Performed by: UROLOGY

## 2024-04-17 PROCEDURE — 37000009 HC ANESTHESIA EA ADD 15 MINS: Performed by: UROLOGY

## 2024-04-17 PROCEDURE — D9220A PRA ANESTHESIA: Mod: ANES,,, | Performed by: STUDENT IN AN ORGANIZED HEALTH CARE EDUCATION/TRAINING PROGRAM

## 2024-04-17 PROCEDURE — 63600175 PHARM REV CODE 636 W HCPCS: Performed by: NURSE ANESTHETIST, CERTIFIED REGISTERED

## 2024-04-17 PROCEDURE — D9220A PRA ANESTHESIA: Mod: CRNA,,, | Performed by: NURSE ANESTHETIST, CERTIFIED REGISTERED

## 2024-04-17 PROCEDURE — C1773 RET DEV, INSERTABLE: HCPCS | Performed by: UROLOGY

## 2024-04-17 PROCEDURE — 36000707: Performed by: UROLOGY

## 2024-04-17 PROCEDURE — 82962 GLUCOSE BLOOD TEST: CPT | Mod: 91 | Performed by: UROLOGY

## 2024-04-17 PROCEDURE — 71000016 HC POSTOP RECOV ADDL HR: Performed by: UROLOGY

## 2024-04-17 PROCEDURE — 71000044 HC DOSC ROUTINE RECOVERY FIRST HOUR: Performed by: UROLOGY

## 2024-04-17 DEVICE — STENT URETERAL UNIV 6FR 24CM
Type: IMPLANTABLE DEVICE | Site: URETER | Status: NON-FUNCTIONAL
Removed: 2024-05-08

## 2024-04-17 RX ORDER — OXYCODONE HYDROCHLORIDE 10 MG/1
10 TABLET ORAL EVERY 4 HOURS PRN
Status: DISCONTINUED | OUTPATIENT
Start: 2024-04-17 | End: 2024-04-17 | Stop reason: HOSPADM

## 2024-04-17 RX ORDER — MIDAZOLAM HYDROCHLORIDE 1 MG/ML
INJECTION INTRAMUSCULAR; INTRAVENOUS
Status: DISCONTINUED | OUTPATIENT
Start: 2024-04-17 | End: 2024-04-17

## 2024-04-17 RX ORDER — DEXAMETHASONE SODIUM PHOSPHATE 4 MG/ML
INJECTION, SOLUTION INTRA-ARTICULAR; INTRALESIONAL; INTRAMUSCULAR; INTRAVENOUS; SOFT TISSUE
Status: DISCONTINUED | OUTPATIENT
Start: 2024-04-17 | End: 2024-04-17

## 2024-04-17 RX ORDER — OXYBUTYNIN CHLORIDE 5 MG/1
5 TABLET ORAL 3 TIMES DAILY PRN
Qty: 42 TABLET | Refills: 0 | Status: SHIPPED | OUTPATIENT
Start: 2024-04-17 | End: 2024-05-01

## 2024-04-17 RX ORDER — ONDANSETRON HYDROCHLORIDE 2 MG/ML
INJECTION, SOLUTION INTRAVENOUS
Status: DISCONTINUED | OUTPATIENT
Start: 2024-04-17 | End: 2024-04-17

## 2024-04-17 RX ORDER — ACETAMINOPHEN 10 MG/ML
INJECTION, SOLUTION INTRAVENOUS
Status: DISCONTINUED | OUTPATIENT
Start: 2024-04-17 | End: 2024-04-17

## 2024-04-17 RX ORDER — TAMSULOSIN HYDROCHLORIDE 0.4 MG/1
0.4 CAPSULE ORAL NIGHTLY
Qty: 30 CAPSULE | Refills: 0 | Status: SHIPPED | OUTPATIENT
Start: 2024-04-17 | End: 2024-05-17

## 2024-04-17 RX ORDER — HYDROMORPHONE HYDROCHLORIDE 1 MG/ML
0.2 INJECTION, SOLUTION INTRAMUSCULAR; INTRAVENOUS; SUBCUTANEOUS EVERY 5 MIN PRN
Status: DISCONTINUED | OUTPATIENT
Start: 2024-04-17 | End: 2024-04-17 | Stop reason: HOSPADM

## 2024-04-17 RX ORDER — PROPOFOL 10 MG/ML
VIAL (ML) INTRAVENOUS
Status: DISCONTINUED | OUTPATIENT
Start: 2024-04-17 | End: 2024-04-17

## 2024-04-17 RX ORDER — FENTANYL CITRATE 50 UG/ML
INJECTION, SOLUTION INTRAMUSCULAR; INTRAVENOUS
Status: DISCONTINUED | OUTPATIENT
Start: 2024-04-17 | End: 2024-04-17

## 2024-04-17 RX ORDER — HALOPERIDOL 5 MG/ML
INJECTION INTRAMUSCULAR
Status: DISCONTINUED
Start: 2024-04-17 | End: 2024-04-17 | Stop reason: HOSPADM

## 2024-04-17 RX ORDER — HALOPERIDOL 5 MG/ML
INJECTION INTRAMUSCULAR
Status: DISCONTINUED | OUTPATIENT
Start: 2024-04-17 | End: 2024-04-17

## 2024-04-17 RX ORDER — FAMOTIDINE 10 MG/ML
INJECTION INTRAVENOUS
Status: DISCONTINUED | OUTPATIENT
Start: 2024-04-17 | End: 2024-04-17

## 2024-04-17 RX ORDER — DEXMEDETOMIDINE HYDROCHLORIDE 100 UG/ML
INJECTION, SOLUTION INTRAVENOUS
Status: DISCONTINUED | OUTPATIENT
Start: 2024-04-17 | End: 2024-04-17

## 2024-04-17 RX ORDER — LIDOCAINE HYDROCHLORIDE 20 MG/ML
INJECTION INTRAVENOUS
Status: DISCONTINUED | OUTPATIENT
Start: 2024-04-17 | End: 2024-04-17

## 2024-04-17 RX ORDER — PHENAZOPYRIDINE HYDROCHLORIDE 200 MG/1
200 TABLET, FILM COATED ORAL 3 TIMES DAILY PRN
Qty: 42 TABLET | Refills: 0 | Status: SHIPPED | OUTPATIENT
Start: 2024-04-17 | End: 2024-05-01

## 2024-04-17 RX ORDER — ROCURONIUM BROMIDE 10 MG/ML
INJECTION, SOLUTION INTRAVENOUS
Status: DISCONTINUED | OUTPATIENT
Start: 2024-04-17 | End: 2024-04-17

## 2024-04-17 RX ORDER — PHENYLEPHRINE HYDROCHLORIDE 10 MG/ML
INJECTION INTRAVENOUS
Status: DISCONTINUED | OUTPATIENT
Start: 2024-04-17 | End: 2024-04-17

## 2024-04-17 RX ORDER — SODIUM CHLORIDE 0.9 % (FLUSH) 0.9 %
10 SYRINGE (ML) INJECTION
Status: DISCONTINUED | OUTPATIENT
Start: 2024-04-17 | End: 2024-04-17 | Stop reason: HOSPADM

## 2024-04-17 RX ADMIN — PHENYLEPHRINE HYDROCHLORIDE 100 MCG: 10 INJECTION INTRAVENOUS at 02:04

## 2024-04-17 RX ADMIN — HALOPERIDOL LACTATE 0.5 MG: 5 INJECTION, SOLUTION INTRAMUSCULAR at 02:04

## 2024-04-17 RX ADMIN — DEXAMETHASONE SODIUM PHOSPHATE 8 MG: 4 INJECTION, SOLUTION INTRAMUSCULAR; INTRAVENOUS at 01:04

## 2024-04-17 RX ADMIN — DEXMEDETOMIDINE 4 MCG: 100 INJECTION, SOLUTION, CONCENTRATE INTRAVENOUS at 03:04

## 2024-04-17 RX ADMIN — ROCURONIUM BROMIDE 50 MG: 10 INJECTION, SOLUTION INTRAVENOUS at 01:04

## 2024-04-17 RX ADMIN — SODIUM CHLORIDE, SODIUM GLUCONATE, SODIUM ACETATE, POTASSIUM CHLORIDE, MAGNESIUM CHLORIDE, SODIUM PHOSPHATE, DIBASIC, AND POTASSIUM PHOSPHATE: .53; .5; .37; .037; .03; .012; .00082 INJECTION, SOLUTION INTRAVENOUS at 03:04

## 2024-04-17 RX ADMIN — OXYCODONE HYDROCHLORIDE 10 MG: 10 TABLET ORAL at 05:04

## 2024-04-17 RX ADMIN — CEFAZOLIN 2 G: 2 INJECTION, POWDER, FOR SOLUTION INTRAMUSCULAR; INTRAVENOUS at 01:04

## 2024-04-17 RX ADMIN — SODIUM CHLORIDE: 0.9 INJECTION, SOLUTION INTRAVENOUS at 12:04

## 2024-04-17 RX ADMIN — PROPOFOL 170 MG: 10 INJECTION, EMULSION INTRAVENOUS at 01:04

## 2024-04-17 RX ADMIN — MIDAZOLAM HYDROCHLORIDE 2 MG: 2 INJECTION, SOLUTION INTRAMUSCULAR; INTRAVENOUS at 01:04

## 2024-04-17 RX ADMIN — ACETAMINOPHEN 1000 MG: 10 INJECTION, SOLUTION INTRAVENOUS at 03:04

## 2024-04-17 RX ADMIN — LIDOCAINE HYDROCHLORIDE 75 MG: 20 INJECTION INTRAVENOUS at 01:04

## 2024-04-17 RX ADMIN — ONDANSETRON 4 MG: 2 INJECTION INTRAMUSCULAR; INTRAVENOUS at 01:04

## 2024-04-17 RX ADMIN — FAMOTIDINE 20 MG: 10 INJECTION, SOLUTION INTRAVENOUS at 03:04

## 2024-04-17 RX ADMIN — HYDROMORPHONE HYDROCHLORIDE 0.2 MG: 1 INJECTION, SOLUTION INTRAMUSCULAR; INTRAVENOUS; SUBCUTANEOUS at 04:04

## 2024-04-17 RX ADMIN — FENTANYL CITRATE 100 MCG: 50 INJECTION, SOLUTION INTRAMUSCULAR; INTRAVENOUS at 01:04

## 2024-04-17 NOTE — DISCHARGE INSTRUCTIONS
Post Cystoscopy Instructions  Do not strain to have a bowel movement  No strenuous exercise x 7 days  No driving while you are on narcotic pain medications or if your back  catheter is in place    You can expect:  To pass stone fragments if you had a stone procedure  Have pain when you void from your stent if you have a stent in place  See blood in your urine if you have a stent in place    If you have a catheter, please return to the ER if your catheter stops draining or you are having abdominal pain.    Call the doctor if:  Temperature is greater than 101F  Persistent vomiting and inability to keep food down  Inability to void if you do not have a catheter

## 2024-04-17 NOTE — OP NOTE
[facility] - Surgery (1st Fl)  [facility]  Operative Note    Date: 04/17/2024    Pre-Op Diagnosis: Left renal stones after PCNL    Patient Active Problem List    Diagnosis Date Noted    Pre-operative cardiovascular examination 12/12/2023    Hypercalciuria 10/27/2023    Primary hyperparathyroidism 10/27/2023    Right renal stone 10/27/2023    Type 2 diabetes mellitus with hyperglycemia 11/01/2022    Staghorn calculus 08/16/2021    Osteoarthritis of right knee 08/02/2021    Hepatic fibrosis, stage 3 06/09/2021    FERRELL (nonalcoholic steatohepatitis) 06/09/2021    Status post total left knee replacement 05/08/2021    Pyelonephritis 12/21/2019    Hypophosphatemia 12/21/2019    Hypomagnesemia 12/21/2019    Menopausal flushing 09/26/2019    Primary hypothyroidism 09/26/2019    Abnormality of gait 05/28/2019    Microscopic hematuria 04/25/2019    Anxiety 04/23/2019    Depression 04/23/2019    Chronic, continuous use of opioids 04/23/2019    Snoring 04/23/2019    Occult blood in stools 04/23/2019    PONV (postoperative nausea and vomiting) 04/23/2019    History of prediabetes 04/23/2019    Urinary hesitancy 04/23/2019    Wheezing 04/23/2019    Lumbar disc disease 01/07/2019    Back pain 11/29/2018    Class 2 obesity with body mass index (BMI) of 37.0 to 37.9 in adult 03/08/2017    S/P laparoscopic sleeve gastrectomy 02/13/2017    Fatty liver disease, nonalcoholic 11/16/2016    GERD (gastroesophageal reflux disease) 08/08/2016    Palpitations 09/01/2015    Osteoporosis, idiopathic 11/12/2014    Thyroid nodule 11/12/2014    Spinal stenosis, lumbar region, without neurogenic claudication 04/02/2014    Thoracic spondylosis without myelopathy 02/20/2013    Acute postoperative anemia due to expected blood loss 02/19/2013    Essential hypertension     Hyperlipidemia     Syringomyelia and syringobulbia 09/05/2012    Chronic low back pain 09/05/2012       Post-Op Diagnosis: Same    Procedure(s) Performed:   1. Left  ureteroscopy with pyeloscopy  2. Laser lithotripsy  3. Stone basket extraction  4. Cystoscopy  5. Left ureteral stent replacement    Specimen(s):  Left renal stones    Staff Surgeon: Suni Patel MD    Assistant Surgeon: Isidro Mathews MD (TA); Deacon Rahman MD    Circulator: Basilia Hunt RN  Nurse: Saritha Adkins RN     Anesthesia: General endotracheal anesthesia    Indications: Angie Mccarthy is a 58 y.o. female with a left renal stone presenting for definitive stone management. She is pre-stented.    Findings:  1. Stones radiopaque in mid pole, lower pole and upper pole on  film.  2. Laser lithotripsy of mid pole stone and lower pole stones  3. Stone basket extraction of stone fragments  4. Left stent replacement    Laser Fiber - holmium  Fiber Diameter - 200    Estimated Blood Loss: Minimal    Drains:   1. Left 6 Fr x 24 cm JJ ureteral stent without strings    Procedure in detail:  After risks, benefits, and possible complications were explained, the patient elected to undergo the procedure and informed consent was obtained. All questions were answered in the cristino-operative area. The patient was transferred to the cystoscopy suite and placed in the supine position. SCDs were applied and working. Anesthesia was administered. The patient was then placed in the dorsal lithotomy position and prepped and draped in the usual sterile fashion. Time out was performed, and cristino-procedural antibiotics were confirmed.     The stones were radiopaque in left mid pole, lower pole and upper pole on  film. A rigid cystoscope in a 22 Fr sheath was introduced into the patient's urethra. This passed easily. The entire urethra was visualized which showed no strictures or masses. Cystoscopy revealed the ureteral orifices in the normal anatomic location bilaterally. There were no bladder tumors, no bladder stones and no diverticula seen.    The patient's left ureteral stent was grasped with  alligator graspers and brought to the urethral meatus. A motion wire was passed through the stent and into the kidney with fluoroscopic confirmation. The stent was was removed keeping the wire in place.    An 8 Fr semirigid ureteroscope was passed into the patient's bladder alongside the wire under direct vision. It was then passed through the left ureteral orifice alongside the wire. No stones were encountered throughout the course of the left ureter. A second stiff glide wire was inserted through the semirigid ureteroscope and into the kidney with fluoroscopic confirmation. The ureteroscope was removed keeping both wires in place.    A 12/14 Fr 35 cm ureteral access sheath was advanced over the stiff glide wire to the level of the renal pelvis under continuous fluoroscopy. This passed easily. The inner dilator and wire were removed, keeping the outer sheath in place. An 8 Fr flexible ureteroscope was then advanced through the access sheath and into the kidney.    Formal pyeloscopy was performed and multiple stones were encountered throughout the kidney, the largest was in the mid pole. The laser fiber was inserted per the flexible ureteroscope and the stones were fragmented and dusted. An Ncircle basket was inserted per the scope. Stone fragments were then removed and passed off the sterile field for analysis. Systematic pyeloscopy was repeated and all remaining stone fragments were deemed small enough to pass spontaneously, <1 mm. Due to poor visibility and possible persistent stone burden, the decision was made to replace the ureteral stent without strings. The flexible ureteroscope and ureteral access sheath were removed, keeping the motion wire in place.    A 6 Fr x 24 cm JJ ureteral stent without strings was passed over the wire and up into the renal pelvis using fluoro. When the coil appeared to be in good position in the kidney and the radio-opaque marker of the pusher was at the inferior pubis, the wire  was removed under continuous fluoro. Good coils were seen in the kidney and the bladder using fluoro.    The cystoscope was reinserted and the bladder was irrigated to remove the stone fragments. The distal coil of the ureteral stent was observed within the bladder lumen. The bladder was drained and the cystoscope removed. The patient tolerated the procedure well and was transferred to the recovery room in stable condition.    Disposition:  The patient will be discharged home from PACU. We will discuss with the patient the timing of follow up CT scan and possible repeat left ureteroscopy.    Isidro Mathews MD

## 2024-04-17 NOTE — INTERVAL H&P NOTE
The patient has been examined and the H&P has been reviewed:    I concur with the findings and no changes have occurred since H&P was written.    Surgery risks, benefits and alternative options discussed and understood by patient/family.    Interval h&p reviewed and unchanged from previous encounter. Urine dipped: positive for blood .    Patient not taking antibiotics, denies blood thinners.    Patient consented and would like to proceed with the procedure.      Active Hospital Problems    Diagnosis  POA    *Staghorn calculus [N20.0]  Yes      Resolved Hospital Problems   No resolved problems to display.

## 2024-04-17 NOTE — DISCHARGE SUMMARY
[facility] - [facility] (2nd Fl)  Discharge Note  Short Stay    Procedure(s) (LRB):  CYSTOSCOPY (N/A)  URETEROSCOPY (Left)  PYELOSCOPY (Left)  REMOVAL-STENT (Left)  REPLACEMENT, STENT (Left)  LITHOTRIPSY, USING LASER (Left)  EXTRACTION - STONE (Left)      OUTCOME: Patient tolerated treatment/procedure well without complication and is now ready for discharge.    DISPOSITION: Home or Self Care    FINAL DIAGNOSIS:  Staghorn calculus    FOLLOWUP: Will contact patient and discuss follow up plans regarding timing of parathyroid surgery, possible CT scan and possible repeat left ureteroscopy/LL    DISCHARGE INSTRUCTIONS:    Discharge Procedure Orders   Notify your health care provider if you experience any of the following:  temperature >100.4     Notify your health care provider if you experience any of the following:  persistent nausea and vomiting or diarrhea     Notify your health care provider if you experience any of the following:  severe uncontrolled pain     Notify your health care provider if you experience any of the following:  redness, tenderness, or signs of infection (pain, swelling, redness, odor or green/yellow discharge around incision site)     Notify your health care provider if you experience any of the following:  worsening rash     Notify your health care provider if you experience any of the following:  persistent dizziness, light-headedness, or visual disturbances        TIME SPENT ON DISCHARGE: 10 minutes

## 2024-04-17 NOTE — PLAN OF CARE
Pt alert and orientated. Family not at bs, on their way for . VSS. No distress noted. Pt denies N/V/D. Pt states they are ready for discharge. Pt able to void without difficulty.

## 2024-04-17 NOTE — ANESTHESIA PROCEDURE NOTES
Intubation    Date/Time: 4/17/2024 1:14 PM    Performed by: Yulisa Young CRNA  Authorized by: Talib Tapia MD    Intubation:     Induction:  Intravenous    Intubated:  Postinduction    Mask Ventilation:  Easy mask    Attempts:  1    Attempted By:  CRNA    Method of Intubation:  Video laryngoscopy    Blade:  Hernadez 3    Laryngeal View Grade: Grade I - full view of cords      Difficult Airway Encountered?: No      Complications:  None    Airway Device:  Oral endotracheal tube    Airway Device Size:  7.0    Style/Cuff Inflation:  Cuffed (inflated to minimal occlusive pressure)    Inflation Amount (mL):  5    Tube secured:  21    Secured at:  The lips    Placement Verified By:  Capnometry    Complicating Factors:  None    Findings Post-Intubation:  BS equal bilateral

## 2024-04-17 NOTE — ANESTHESIA POSTPROCEDURE EVALUATION
Anesthesia Post Evaluation    Patient: Angie Mccarthy    Procedure(s) Performed: Procedure(s) (LRB):  CYSTOSCOPY (N/A)  URETEROSCOPY (Left)  PYELOSCOPY (Left)  REMOVAL-STENT (Left)  REPLACEMENT, STENT (Left)  LITHOTRIPSY, USING LASER (Left)  EXTRACTION - STONE (Left)    Final Anesthesia Type: general      Patient location during evaluation: PACU  Patient participation: Yes- Able to Participate  Level of consciousness: awake  Post-procedure vital signs: reviewed and stable  Pain management: adequate  Airway patency: patent    PONV status at discharge: No PONV  Anesthetic complications: no      Cardiovascular status: hemodynamically stable  Respiratory status: unassisted and spontaneous ventilation  Hydration status: euvolemic  Follow-up not needed.          Vitals Value Taken Time   /67 04/17/24 1646   Temp 36.8 °C (98.2 °F) 04/17/24 1637   Pulse 77 04/17/24 1655   Resp 21 04/17/24 1655   SpO2 96 % 04/17/24 1655   Vitals shown include unfiled device data.      No case tracking events are documented in the log.      Pain/Venessa Score: Venessa Score: 10 (4/17/2024  4:46 PM)

## 2024-04-18 ENCOUNTER — TELEPHONE (OUTPATIENT)
Dept: UROLOGY | Facility: CLINIC | Age: 59
End: 2024-04-18
Payer: MEDICARE

## 2024-04-18 DIAGNOSIS — N22 CALCULUS OF URINARY TRACT IN DISEASES CLASSIFIED ELSEWHERE: Primary | ICD-10-CM

## 2024-04-18 DIAGNOSIS — N20.0 LEFT RENAL STONE: Primary | ICD-10-CM

## 2024-04-18 NOTE — TELEPHONE ENCOUNTER
Left ureteroscopy, holmium laser litho, stent removal/exchange, possible rpg  Left renal stone  General  2 hours  Urine culture  prior  CT RSS prior    Surgery 29th.

## 2024-04-24 ENCOUNTER — ANESTHESIA EVENT (OUTPATIENT)
Dept: SURGERY | Facility: HOSPITAL | Age: 59
End: 2024-04-24
Payer: MEDICARE

## 2024-04-24 ENCOUNTER — HOSPITAL ENCOUNTER (OUTPATIENT)
Dept: RADIOLOGY | Facility: HOSPITAL | Age: 59
Discharge: HOME | End: 2024-04-24
Attending: UROLOGY
Payer: MEDICARE

## 2024-04-24 DIAGNOSIS — N20.0 STAGHORN CALCULUS: Primary | ICD-10-CM

## 2024-04-24 DIAGNOSIS — N22 CALCULUS OF URINARY TRACT IN DISEASES CLASSIFIED ELSEWHERE: ICD-10-CM

## 2024-04-24 PROCEDURE — 74176 CT ABD & PELVIS W/O CONTRAST: CPT | Mod: TC

## 2024-04-24 PROCEDURE — 74176 CT ABD & PELVIS W/O CONTRAST: CPT | Mod: 26,,, | Performed by: RADIOLOGY

## 2024-04-25 NOTE — ANESTHESIA PREPROCEDURE EVALUATION
04/25/2024  [facility]  Anesthesia Pre-Operative Evaluation         Patient Name: Angie Mccarthy  YOB: 1965  MRN: [id number]    SUBJECTIVE:     Pre-operative evaluation for Procedure(s) (LRB):  URETEROSCOPY (Left)  CYSTOSCOPY (N/A)  PYELOGRAM, RETROGRADE (Left)  LITHOTRIPSY, USING LASER (Left)  EXTRACTION - STONE (Left)  REMOVAL-STENT (Left)  REPLACEMENT, STENT (Left)  PYELOSCOPY (Left)     05/08/2024    Angie Mccarthy is a 58 y.o. female w/ a pertinent PMHx of FERRELL, hepatic fibrosis, NIDDM2, s/p gastric sleeve, chronic opioid dependece here for above procedures.     Full medical history listed below      LDA: None documented.    Prev airway: None documented.     GTTs: None documented.        Patient Active Problem List   Diagnosis    Syringomyelia and syringobulbia    Chronic low back pain    Essential hypertension    Hyperlipidemia    Acute postoperative anemia due to expected blood loss    Thoracic spondylosis without myelopathy    Spinal stenosis, lumbar region, without neurogenic claudication    Osteoporosis, idiopathic    Thyroid nodule    Palpitations    GERD (gastroesophageal reflux disease)    Fatty liver disease, nonalcoholic    S/P laparoscopic sleeve gastrectomy    Class 2 obesity with body mass index (BMI) of 37.0 to 37.9 in adult    Back pain    Lumbar disc disease    Anxiety    Depression    Chronic, continuous use of opioids    Snoring    Occult blood in stools    PONV (postoperative nausea and vomiting)    History of prediabetes    Urinary hesitancy    Wheezing    Microscopic hematuria    Abnormality of gait    Menopausal flushing    Primary hypothyroidism    Pyelonephritis    Hypophosphatemia    Hypomagnesemia    Status post total left knee replacement    Hepatic fibrosis, stage 3    FERRELL (nonalcoholic steatohepatitis)    Osteoarthritis of  "right knee    Staghorn calculus    Type 2 diabetes mellitus with hyperglycemia    Hypercalciuria    Primary hyperparathyroidism    Right renal stone    Pre-operative cardiovascular examination       Review of patient's allergies indicates:   Allergen Reactions    Adhesive Dermatitis and Blisters    Adhesive tape-silicones Dermatitis     The longer the adhesive stays on, the worse the irritation    Mastisol adhesive [gum aavjgh-pmlpzf-kznf-alcohol] Itching, Rash and Blisters     "Looked like poison ivy"    Sulfamethoxazole-trimethoprim Itching and Anxiety     Has a "nervous feeling."  "Jittery"  Also had an upset stomach.Has taken recently and the reaction was "less intense"       Current Inpatient Medications:      No current facility-administered medications on file prior to encounter.     Current Outpatient Medications on File Prior to Encounter   Medication Sig Dispense Refill    DULoxetine (CYMBALTA) 60 MG capsule TAKE 1 CAPSULE EVERY DAY (Patient taking differently: Take by mouth every evening.) 90 capsule 3    gabapentin (NEURONTIN) 800 MG tablet Take 800 mg by mouth 3 (three) times daily. Takes 2-3 times per day  1    levothyroxine (SYNTHROID) 137 MCG Tab tablet Take 1 tablet (137 mcg total) by mouth before breakfast. 90 tablet 3    lisinopriL (PRINIVIL,ZESTRIL) 2.5 MG tablet TAKE 1 TABLET EVERY DAY 90 tablet 3    metFORMIN (GLUCOPHAGE) 500 MG tablet TAKE 1 TABLET (500 MG TOTAL) BY MOUTH 2 (TWO) TIMES DAILY WITH MEALS. 180 tablet 3    omeprazole (PRILOSEC) 40 MG capsule TAKE 1 CAPSULE EVERY DAY (Patient taking differently: Take by mouth every morning.) 90 capsule 3    oxyCODONE-acetaminophen (PERCOCET)  mg per tablet Take 1 tablet by mouth 4 (four) times daily as needed for Pain. 10 tablet 0    rosuvastatin (CRESTOR) 10 MG tablet TAKE 1 TABLET EVERY NIGHT 90 tablet 3    tamsulosin (FLOMAX) 0.4 mg Cap TAKE 1 CAPSULE(0.4 MG) BY MOUTH AFTER DINNER 30 capsule 1    tamsulosin (FLOMAX) 0.4 mg Cap Take 1 " capsule (0.4 mg total) by mouth every evening. for 30 doses 30 capsule 0    zolpidem (AMBIEN) 10 mg Tab TAKE 1 TABLET BY MOUTH EVERY NIGHT AT BEDTIME AS NEEDED FOR INSOMNIA 90 tablet 0    alcohol swabs PadM Apply 1 each topically once daily. 100 each 3    ascorbic acid, vitamin C, (VITAMIN C) 500 MG tablet       aspirin (ECOTRIN) 81 MG EC tablet Take 81 mg by mouth nightly.      BIOTIN ORAL Take 10,000 mg by mouth every morning.      blood glucose control, low (TRUE METRIX LEVEL 1) Soln USE AS DIRECTED WITH GLUCOSE METER 1 each 0    blood sugar diagnostic (TRUE METRIX GLUCOSE TEST STRIP) Strp TEST BLOOD SUGAR EVERY  strip 3    cyclobenzaprine (FLEXERIL) 10 MG tablet TK 1 T PO QD- as needed for muscle spasms  0    fish oil-omega-3 fatty acids 300-1,000 mg capsule Take 1,200 mg by mouth 2 (two) times daily.      fluticasone propionate (FLONASE) 50 mcg/actuation nasal spray USE 2 SPRAYS NASALLY ONE TIME DAILY (Patient not taking: Reported on 4/15/2024) 48 g 3    HYDROcodone-acetaminophen (NORCO)  mg per tablet Take 1 tablet by mouth 4 (four) times daily as needed.      ketoconazole (NIZORAL) 2 % cream Apply topically once daily. 1 each 0    multivitamin capsule Take 1 capsule by mouth every morning.      oxybutynin (DITROPAN) 5 MG Tab Take 1 tablet (5 mg total) by mouth 3 (three) times daily as needed (urinary frequency). 30 tablet 0    oxyCODONE (ROXICODONE) 5 MG immediate release tablet Take 1 tablet (5 mg total) by mouth every 4 (four) hours as needed for Pain.  (Do not take with home medication: oxycodone/apap 10/325) 10 tablet 0    potassium citrate (UROCIT-K) 5 mEq (540 mg) TbSR Take 2 tablets (10 mEq total) by mouth 3 (three) times daily with meals. 180 tablet 11    tirzepatide (MOUNJARO) 12.5 mg/0.5 mL PnIj Inject 12.5 mg into the skin every 7 days. 4 Pen 3    TRUEPLUS LANCETS 33 gauge Misc TEST BLOOD SUGAR EVERY  each 3    zinc sulfate (ZINCATE) 50 mg zinc (220 mg) capsule          Past  Surgical History:   Procedure Laterality Date    ANTEGRADE NEPHROSTOGRAPHY Right 2/23/2024    Procedure: NEPHROSTOGRAM, ANTEGRADE;  Surgeon: Suni Patel MD;  [facility];  Service: Urology;  Laterality: Right;    ANTEGRADE NEPHROSTOGRAPHY Left 4/8/2024    Procedure: Nephrostogram;  Surgeon: Suni Patel MD;  [facility];  Service: Urology;  Laterality: Left;    APPENDECTOMY      BACK SURGERY      x 6    CHOLECYSTECTOMY      CYSTOSCOPY N/A 4/17/2024    Procedure: CYSTOSCOPY;  Surgeon: Suni Patel MD;  [facility];  Service: Urology;  Laterality: N/A;    DILATION OF NEPHROSTOMY TRACT Left 4/8/2024    Procedure: DILATION, NEPHROSTOMY TRACT;  Surgeon: Suni Patel MD;  Location: [facility] OR [address] [place] [address];  Service: Urology;  Laterality: Left;    EXTRACTION - STONE Right 2/23/2024    Procedure: EXTRACTION - STONE;  Surgeon: Suni Patel MD;  [facility];  Service: Urology;  Laterality: Right;    EXTRACTION - STONE Left 4/17/2024    Procedure: EXTRACTION - STONE;  Surgeon: Suni Patel MD;  [facility];  Service: Urology;  Laterality: Left;    GASTRECTOMY      Lap Gastric Sleeve    HERNIA REPAIR      HYSTERECTOMY      JOINT REPLACEMENT      LASER LITHOTRIPSY Left 4/17/2024    Procedure: LITHOTRIPSY, USING LASER;  Surgeon: Suni Patel MD;  [facility];  Service: Urology;  Laterality: Left;    LIVER BIOPSY  02/06/2017    steatohepatitis with stage 1 fibrosis    MYELOGRAPHY N/A 11/29/2018    Procedure: MYELOGRAM;  Surgeon: Ander Diagnostic Provider;  Location: [facility] OR 2ND FLR;  Service: Radiology;  Laterality: N/A;    MYELOGRAPHY N/A 1/7/2019    Procedure: Myelogram;  Surgeon: Aric Surgeon;  Location: [facility];  Service: Anesthesiology;  Laterality: N/A;    NEPHROSCOPY Right 2/23/2024    Procedure: NEPHROSCOPY;  Surgeon: Suni Patel MD;  Location: [facility] OR [facility];  Service: Urology;  Laterality: Right;  2 hr    NEPHROSTOGRAPHY Right 2/19/2024    Procedure: Nephrostogram;  Surgeon: Suni Patel MD;  Location: [facility] OR 2ND FLR;  Service: Urology;  Laterality: Right;    OOPHORECTOMY      PERCUTANEOUS CRYOTHERAPY OF PERIPHERAL NERVE USING LIQUID NITROUS OXIDE IN CLOSED NEEDLE DEVICE Left 4/29/2019    Procedure: CRYOTHERAPY, NERVE, PERIPHERAL, PERCUTANEOUS, USING LIQUID NITROUS OXIDE IN CLOSED NEEDLE DEVICE-iovera left knee;  Surgeon: Chavo Gold III, MD;  Location: [facility] CATH LAB;  Service: Pain Management;  Laterality: Left;    PERCUTANEOUS CRYOTHERAPY OF PERIPHERAL NERVE USING LIQUID NITROUS OXIDE IN CLOSED NEEDLE DEVICE Right 8/2/2021    Procedure: CRYOTHERAPY, NERVE, PERIPHERAL, PERCUTANEOUS, USING LIQUID NITROUS OXIDE IN CLOSED NEEDLE DEVICE;  Surgeon: Saritha Proctor NP;  Location: [facility];  Service: Pain Management;  Laterality: Right;  RIGHT KNEE IOVERA    PERCUTANEOUS NEPHROLITHOTOMY Right 2/19/2024    Procedure: NEPHROLITHOTOMY, PERCUTANEOUS;  Surgeon: Suni Patel MD;  Location: [facility] OR 2ND FLR;  Service: Urology;  Laterality: Right;  2 HRS    PERCUTANEOUS NEPHROLITHOTOMY Left 4/8/2024    Procedure: NEPHROLITHOTOMY, PERCUTANEOUS;  Surgeon: Suni Patel MD;  Location: [facility] OR 1ST FLR;  Service: Urology;  Laterality: Left;  3 hrs    PERCUTANEOUS NEPHROSCOPY  2/19/2024    Procedure: NEPHROSCOPY, PERCUTANEOUS;  Surgeon: Suni Patel MD;  Location: [facility] OR 2ND FLR;  Service: Urology;;    PERCUTANEOUS NEPHROSTOMY Right 2/19/2024    Procedure: CREATION, NEPHROSTOMY, PERCUTANEOUS;  Surgeon: Suni Patel MD;  Location: [facility] OR 2ND FLR;  Service: Urology;  Laterality: Right;    PYELOSCOPY Left 4/17/2024    Procedure: PYELOSCOPY;  Surgeon: Suni Patel MD;  Location: [facility] OR 1ST FLR;  Service: Urology;  Laterality: Left;    REMOVAL-STENT Left 4/17/2024    Procedure: REMOVAL-STENT;  Surgeon:  Suni Patel MD;  Location: [facility] OR 1ST FLR;  Service: Urology;  Laterality: Left;    REPLACEMENT OF NEPHROSTOMY TUBE Left 4/8/2024    Procedure: PLACEMENT, NEPHROSTOMY TUBE;  Surgeon: Suni Patel MD;  Location: [facility] OR 1ST FLR;  Service: Urology;  Laterality: Left;    REPLACEMENT OF STENT Left 4/17/2024    Procedure: REPLACEMENT, STENT;  Surgeon: Suni Patel MD;  Location: [facility] OR 1ST FLR;  Service: Urology;  Laterality: Left;    SPINAL FUSION      TONSILLECTOMY, ADENOIDECTOMY      TOTAL KNEE ARTHROPLASTY Left 5/1/2019    Procedure: REPLACEMENT-KNEE-TOTAL;  Surgeon: John L. [facility] Jr., MD;  Location: [facility] OR 2ND FLR;  Service: Orthopedics;  Laterality: Left;    URETERAL STENT PLACEMENT Left 4/8/2024    Procedure: INSERTION, STENT, URETER;  Surgeon: Suni Patel MD;  Location: [facility] OR 1ST FLR;  Service: Urology;  Laterality: Left;    URETEROSCOPY  2/19/2024    Procedure: URETEROSCOPY;  Surgeon: Suni Patel MD;  Location: [facility] OR 2ND FLR;  Service: Urology;;    URETEROSCOPY Left 4/17/2024    Procedure: URETEROSCOPY;  Surgeon: Suni Patel MD;  Location: [facility] OR 1ST FLR;  Service: Urology;  Laterality: Left;    URETEROSCOPY, ANTEGRADE Right 2/23/2024    Procedure: URETEROSCOPY, ANTEGRADE;  Surgeon: Suni Patel MD;  [facility];  Service: Urology;  Laterality: Right;       Social History     Socioeconomic History    Marital status:    Occupational History    Occupation: disable   Tobacco Use    Smoking status: Never    Smokeless tobacco: Never    Tobacco comments:     Tried a few cigarettes during teenage   Substance and Sexual Activity    Alcohol use: Not Currently     Comment: socially    Drug use: No    Sexual activity: Not Currently     Partners: Male   Other Topics Concern    Are you pregnant or think you may be? No    Breast-feeding No     Social Determinants of Health     Financial Resource Strain: High Risk  "(11/15/2023)    Overall Financial Resource Strain (CARDIA)     Difficulty of Paying Living Expenses: Hard   Food Insecurity: No Food Insecurity (11/15/2023)    Hunger Vital Sign     Worried About Running Out of Food in the Last Year: Never true     Ran Out of Food in the Last Year: Never true   Transportation Needs: Unmet Transportation Needs (11/15/2023)    PRAPARE - Transportation     Lack of Transportation (Medical): Yes     Lack of Transportation (Non-Medical): No   Physical Activity: Inactive (11/15/2023)    Exercise Vital Sign     Days of Exercise per Week: 0 days     Minutes of Exercise per Session: 0 min   Stress: Stress Concern Present (11/15/2023)    [employer] [place] [employer] - Occupational Stress Questionnaire     Feeling of Stress : To some extent   Housing Stability: Low Risk  (11/15/2023)    Housing Stability Vital Sign     Unable to Pay for Housing in the Last Year: No     Number of Places Lived in the Last Year: 1     Unstable Housing in the Last Year: No       OBJECTIVE:     Vital Signs Range (Last 24H):  Temp:  [36.7 °C (98.1 °F)]   Pulse:  [89]   Resp:  [20]   BP: (141)/(74)   SpO2:  [95 %]       CBC:   No results for input(s): "WBC", "RBC", "HGB", "HCT", "PLT", "MCV", "MCH", "MCHC" in the last 72 hours.    CMP: No results for input(s): "NA", "K", "CL", "CO2", "BUN", "CREATININE", "GLU", "MG", "PHOS", "CALCIUM", "ALBUMIN", "PROT", "ALKPHOS", "ALT", "AST", "BILITOT" in the last 72 hours.    INR:  No results for input(s): "PT", "INR", "PROTIME", "APTT" in the last 72 hours.    Diagnostic Studies: No relevant studies.    EKG:   Results for orders placed or performed during the hospital encounter of 11/20/23   EKG 12-lead    Collection Time: 11/20/23  3:27 PM    Narrative    Test Reason : Z01.818,    Vent. Rate : 085 BPM     Atrial Rate : 085 BPM     P-R Int : 158 ms          QRS Dur : 088 ms      QT Int : 366 ms       P-R-T Axes : 066 -44 074 degrees     QTc Int : 435 ms    Normal " sinus rhythm  Left anterior fascicular block  Right ventricular conduction delay  Abnormal ECG  When compared with ECG of 20-DEC-2019 21:46,  No significant change was found  Confirmed by DEISY COHN MD ([id number]) on 11/20/2023 4:36:28 PM    Referred By: REBEKAH DURAN           Confirmed By:DEISY COHN MD        2D ECHO:   Results for orders placed during the hospital encounter of 05/29/23    Echo    Interpretation Summary  · Technically challenging study.  · The left ventricle is normal in size with hyperdynamic systolic function.  · The estimated ejection fraction is 65-70%.  · Normal left ventricular diastolic function.  · The LVOT velocity is elevated, as per text.  · Normal right ventricular size with normal right ventricular systolic function.  · The estimated PA systolic pressure is 30 mmHg.  · Normal central venous pressure (3 mmHg).         ASSESSMENT/PLAN:           Pre-op Assessment    I have reviewed the Patient Summary Reports.     I have reviewed the Nursing Notes.    I have reviewed the Medications.     Review of Systems  Anesthesia Hx:    PONV           Denies Family Hx of Anesthesia complications.   Personal Hx of Anesthesia complications, Post-Operative Nausea/Vomiting                    Social:  Non-Smoker, No Alcohol Use       Hematology/Oncology:       -- Anemia:               Hematology Comments: Hgb 8.6                    Cardiovascular:     Hypertension           hyperlipidemia    ECHO 5/2023:  EF 65%    Neg stress test 2016                         Pulmonary:  Pulmonary Normal                       Renal/:   renal calculi               Hepatic/GI:     GERD Liver Disease,  FERRELL  S/p sleeve gastrectomy          Musculoskeletal:     S/p TKA Left       Spine Disorders: thoracic and lumbar Degenerative disease, Disc disease and Chronic Pain           Neurological:           Syringomyelia and syringobulbia                            Endocrine:  Diabetes Hypothyroidism  hyperparathyroidism       Obesity / BMI > 30  Psych:   anxiety depression            Physical Exam  General: Well nourished and Cooperative    Airway:  Mallampati: III   Mouth Opening: Normal  TM Distance: Normal  Neck ROM: Normal ROM    Dental:  Intact    Chest/Lungs:  Clear to auscultation, Normal Respiratory Rate    Heart:  Rate: Normal  Rhythm: Regular Rhythm      Anesthesia Plan  Type of Anesthesia, risks & benefits discussed:    Anesthesia Type: Gen ETT  Intra-op Monitoring Plan: Standard ASA Monitors  Post Op Pain Control Plan: multimodal analgesia  Induction:  IV  Airway Plan: Video  Informed Consent: Informed consent signed with the Patient and all parties understand the risks and agree with anesthesia plan.  All questions answered.   ASA Score: 3  Day of Surgery Review of History & Physical: H&P Update referred to the surgeon/provider.  Anesthesia Plan Notes: Preop from chart review  Labs 4/2024  Multiple general anesthetics this year  On Mounjaro    Hgb 8.6    Ready For Surgery From Anesthesia Perspective.     .

## 2024-04-28 ENCOUNTER — ANESTHESIA EVENT (OUTPATIENT)
Dept: SURGERY | Facility: HOSPITAL | Age: 59
End: 2024-04-28
Payer: MEDICARE

## 2024-04-30 ENCOUNTER — DOCUMENTATION ONLY (OUTPATIENT)
Dept: ORTHOPEDICS | Facility: CLINIC | Age: 59
End: 2024-04-30
Payer: MEDICARE

## 2024-04-30 ENCOUNTER — PATIENT MESSAGE (OUTPATIENT)
Dept: UROLOGY | Facility: CLINIC | Age: 59
End: 2024-04-30
Payer: MEDICARE

## 2024-05-03 ENCOUNTER — PATIENT MESSAGE (OUTPATIENT)
Dept: SURGERY | Facility: CLINIC | Age: 59
End: 2024-05-03
Payer: MEDICARE

## 2024-05-07 ENCOUNTER — TELEPHONE (OUTPATIENT)
Dept: UROLOGY | Facility: CLINIC | Age: 59
End: 2024-05-07
Payer: MEDICARE

## 2024-05-07 ENCOUNTER — TELEPHONE (OUTPATIENT)
Dept: ENDOSCOPY | Facility: HOSPITAL | Age: 59
End: 2024-05-07
Payer: MEDICARE

## 2024-05-07 ENCOUNTER — ANESTHESIA (OUTPATIENT)
Dept: SURGERY | Facility: HOSPITAL | Age: 59
End: 2024-05-07
Payer: MEDICARE

## 2024-05-07 ENCOUNTER — PATIENT MESSAGE (OUTPATIENT)
Dept: UROLOGY | Facility: CLINIC | Age: 59
End: 2024-05-07
Payer: MEDICARE

## 2024-05-07 DIAGNOSIS — Z12.11 SCREEN FOR COLON CANCER: Primary | ICD-10-CM

## 2024-05-07 RX ORDER — POLYETHYLENE GLYCOL 3350, SODIUM SULFATE, POTASSIUM CHLORIDE, MAGNESIUM SULFATE, AND SODIUM CHLORIDE FOR ORAL SOLUTION 178.7-7.3G
1 KIT ORAL DAILY
Qty: 2 EACH | Refills: 0 | Status: SHIPPED | OUTPATIENT
Start: 2024-05-07 | End: 2024-05-09

## 2024-05-07 NOTE — TELEPHONE ENCOUNTER
Spoke to patient to reschedule Colonoscopy       Physician to perform procedure(s) Dr. POOJA Dai  Date of Procedure (s) 7/17/24  Arrival Time 12:15 PM  Time of Procedure(s) 1:15 PM   Location of Procedure(s) [facility] [address] [facility]  Type of Rx Prep sent to patient's pharmacy: [facility]  Instructions provided to patient via [facility]    The following information was discussed with patient, and patient verbalized understanding:  Screening questionnaire reviewed with patient and complete. If procedure requires anesthesia, a responsible adult needs to be present to accompany the patient home. Appointment details are tentative, especially check-in time. Patient will receive a pre-op call 7 days prior to appointment to confirm check-in time for procedure. If applicable the patient should contact their pharmacy to verify Rx for procedure prep is ready for pick-up. Patient was instructed to call the scheduling department at [phone] if pharmacy states no Rx is available. Patient was also advised to call the endoscopy scheduling department if any questions or concerns arise.       Endoscopy Scheduling Department

## 2024-05-07 NOTE — TELEPHONE ENCOUNTER
Called pt to confirm arrival time of 8:30am for procedure on 5/8/24. Gave pt NPO instructions and gave pt opportunity to ask questions. Pt verbalized understanding.

## 2024-05-08 ENCOUNTER — HOSPITAL ENCOUNTER (OUTPATIENT)
Facility: HOSPITAL | Age: 59
Discharge: HOME | End: 2024-05-08
Attending: UROLOGY | Admitting: UROLOGY
Payer: MEDICARE

## 2024-05-08 ENCOUNTER — PATIENT MESSAGE (OUTPATIENT)
Dept: SURGERY | Facility: CLINIC | Age: 59
End: 2024-05-08
Payer: MEDICARE

## 2024-05-08 ENCOUNTER — ANESTHESIA (OUTPATIENT)
Dept: SURGERY | Facility: HOSPITAL | Age: 59
End: 2024-05-08
Payer: MEDICARE

## 2024-05-08 ENCOUNTER — TELEPHONE (OUTPATIENT)
Dept: ENDOSCOPY | Facility: HOSPITAL | Age: 59
End: 2024-05-08
Payer: MEDICARE

## 2024-05-08 VITALS
OXYGEN SATURATION: 96 % | DIASTOLIC BLOOD PRESSURE: 66 MMHG | TEMPERATURE: 98 F | BODY MASS INDEX: 33.32 KG/M2 | HEART RATE: 80 BPM | RESPIRATION RATE: 18 BRPM | HEIGHT: 65 IN | SYSTOLIC BLOOD PRESSURE: 122 MMHG | WEIGHT: 200 LBS

## 2024-05-08 DIAGNOSIS — N20.0 NEPHROLITHIASIS: Primary | ICD-10-CM

## 2024-05-08 LAB — POCT GLUCOSE: 111 MG/DL (ref 70–110)

## 2024-05-08 PROCEDURE — 25000003 PHARM REV CODE 250: Performed by: NURSE ANESTHETIST, CERTIFIED REGISTERED

## 2024-05-08 PROCEDURE — 63600175 PHARM REV CODE 636 W HCPCS: Performed by: NURSE ANESTHETIST, CERTIFIED REGISTERED

## 2024-05-08 PROCEDURE — 52356 CYSTO/URETERO W/LITHOTRIPSY: CPT | Mod: 58,LT,ICN, | Performed by: UROLOGY

## 2024-05-08 PROCEDURE — C1894 INTRO/SHEATH, NON-LASER: HCPCS | Performed by: UROLOGY

## 2024-05-08 PROCEDURE — 71000044 HC DOSC ROUTINE RECOVERY FIRST HOUR: Performed by: UROLOGY

## 2024-05-08 PROCEDURE — 25500020 PHARM REV CODE 255: Performed by: UROLOGY

## 2024-05-08 PROCEDURE — D9220A PRA ANESTHESIA: Mod: ANES,,, | Performed by: ANESTHESIOLOGY

## 2024-05-08 PROCEDURE — 71000015 HC POSTOP RECOV 1ST HR: Performed by: UROLOGY

## 2024-05-08 PROCEDURE — 37000008 HC ANESTHESIA 1ST 15 MINUTES: Performed by: UROLOGY

## 2024-05-08 PROCEDURE — 37000009 HC ANESTHESIA EA ADD 15 MINS: Performed by: UROLOGY

## 2024-05-08 PROCEDURE — 36000706: Performed by: UROLOGY

## 2024-05-08 PROCEDURE — D9220A PRA ANESTHESIA: Mod: CRNA,,, | Performed by: NURSE ANESTHETIST, CERTIFIED REGISTERED

## 2024-05-08 PROCEDURE — 82962 GLUCOSE BLOOD TEST: CPT | Performed by: UROLOGY

## 2024-05-08 PROCEDURE — C1769 GUIDE WIRE: HCPCS | Performed by: UROLOGY

## 2024-05-08 PROCEDURE — 27201423 OPTIME MED/SURG SUP & DEVICES STERILE SUPPLY: Performed by: UROLOGY

## 2024-05-08 PROCEDURE — 36000707: Performed by: UROLOGY

## 2024-05-08 PROCEDURE — C2617 STENT, NON-COR, TEM W/O DEL: HCPCS | Performed by: UROLOGY

## 2024-05-08 DEVICE — STENT SET URETERAL 6X24CM: Type: IMPLANTABLE DEVICE | Site: URETER | Status: FUNCTIONAL

## 2024-05-08 RX ORDER — FENTANYL CITRATE 50 UG/ML
INJECTION, SOLUTION INTRAMUSCULAR; INTRAVENOUS
Status: DISCONTINUED | OUTPATIENT
Start: 2024-05-08 | End: 2024-05-08

## 2024-05-08 RX ORDER — OXYBUTYNIN CHLORIDE 5 MG/1
5 TABLET ORAL 3 TIMES DAILY
Qty: 21 TABLET | Refills: 0 | Status: SHIPPED | OUTPATIENT
Start: 2024-05-08 | End: 2024-05-15

## 2024-05-08 RX ORDER — LIDOCAINE HYDROCHLORIDE 20 MG/ML
INJECTION INTRAVENOUS
Status: DISCONTINUED | OUTPATIENT
Start: 2024-05-08 | End: 2024-05-08

## 2024-05-08 RX ORDER — DEXAMETHASONE SODIUM PHOSPHATE 4 MG/ML
INJECTION, SOLUTION INTRA-ARTICULAR; INTRALESIONAL; INTRAMUSCULAR; INTRAVENOUS; SOFT TISSUE
Status: DISCONTINUED | OUTPATIENT
Start: 2024-05-08 | End: 2024-05-08

## 2024-05-08 RX ORDER — CEFAZOLIN SODIUM 1 G/3ML
INJECTION, POWDER, FOR SOLUTION INTRAMUSCULAR; INTRAVENOUS
Status: DISCONTINUED | OUTPATIENT
Start: 2024-05-08 | End: 2024-05-08

## 2024-05-08 RX ORDER — SODIUM CHLORIDE 9 MG/ML
INJECTION, SOLUTION INTRAVENOUS CONTINUOUS PRN
Status: DISCONTINUED | OUTPATIENT
Start: 2024-05-08 | End: 2024-05-08

## 2024-05-08 RX ORDER — PHENYLEPHRINE HYDROCHLORIDE 10 MG/ML
INJECTION INTRAVENOUS
Status: DISCONTINUED | OUTPATIENT
Start: 2024-05-08 | End: 2024-05-08

## 2024-05-08 RX ORDER — ROCURONIUM BROMIDE 10 MG/ML
INJECTION, SOLUTION INTRAVENOUS
Status: DISCONTINUED | OUTPATIENT
Start: 2024-05-08 | End: 2024-05-08

## 2024-05-08 RX ORDER — KETOROLAC TROMETHAMINE 10 MG/1
10 TABLET, FILM COATED ORAL EVERY 6 HOURS PRN
Qty: 12 TABLET | Refills: 0 | Status: SHIPPED | OUTPATIENT
Start: 2024-05-08 | End: 2024-06-10

## 2024-05-08 RX ORDER — PHENAZOPYRIDINE HYDROCHLORIDE 100 MG/1
100 TABLET, FILM COATED ORAL 3 TIMES DAILY PRN
Qty: 21 TABLET | Refills: 0 | Status: SHIPPED | OUTPATIENT
Start: 2024-05-08 | End: 2024-05-15

## 2024-05-08 RX ORDER — PROCHLORPERAZINE EDISYLATE 5 MG/ML
INJECTION INTRAMUSCULAR; INTRAVENOUS
Status: DISCONTINUED | OUTPATIENT
Start: 2024-05-08 | End: 2024-05-08

## 2024-05-08 RX ORDER — MIDAZOLAM HYDROCHLORIDE 1 MG/ML
INJECTION INTRAMUSCULAR; INTRAVENOUS
Status: DISCONTINUED | OUTPATIENT
Start: 2024-05-08 | End: 2024-05-08

## 2024-05-08 RX ORDER — FENTANYL CITRATE 50 UG/ML
25 INJECTION, SOLUTION INTRAMUSCULAR; INTRAVENOUS EVERY 5 MIN PRN
Status: CANCELLED | OUTPATIENT
Start: 2024-05-08

## 2024-05-08 RX ORDER — SODIUM CHLORIDE 0.9 % (FLUSH) 0.9 %
3 SYRINGE (ML) INJECTION
Status: CANCELLED | OUTPATIENT
Start: 2024-05-08

## 2024-05-08 RX ORDER — ONDANSETRON HYDROCHLORIDE 2 MG/ML
INJECTION, SOLUTION INTRAVENOUS
Status: DISCONTINUED | OUTPATIENT
Start: 2024-05-08 | End: 2024-05-08

## 2024-05-08 RX ORDER — PROPOFOL 10 MG/ML
VIAL (ML) INTRAVENOUS
Status: DISCONTINUED | OUTPATIENT
Start: 2024-05-08 | End: 2024-05-08

## 2024-05-08 RX ORDER — HALOPERIDOL 5 MG/ML
0.5 INJECTION INTRAMUSCULAR EVERY 10 MIN PRN
Status: CANCELLED | OUTPATIENT
Start: 2024-05-08

## 2024-05-08 RX ADMIN — ROCURONIUM BROMIDE 50 MG: 10 INJECTION, SOLUTION INTRAVENOUS at 10:05

## 2024-05-08 RX ADMIN — PHENYLEPHRINE HYDROCHLORIDE 100 MCG: 10 INJECTION INTRAVENOUS at 10:05

## 2024-05-08 RX ADMIN — MIDAZOLAM HYDROCHLORIDE 2 MG: 1 INJECTION, SOLUTION INTRAMUSCULAR; INTRAVENOUS at 10:05

## 2024-05-08 RX ADMIN — SODIUM CHLORIDE: 0.9 INJECTION, SOLUTION INTRAVENOUS at 09:05

## 2024-05-08 RX ADMIN — LIDOCAINE HYDROCHLORIDE 100 MG: 20 INJECTION INTRAVENOUS at 10:05

## 2024-05-08 RX ADMIN — FENTANYL CITRATE 100 MCG: 50 INJECTION, SOLUTION INTRAMUSCULAR; INTRAVENOUS at 10:05

## 2024-05-08 RX ADMIN — CEFAZOLIN 2 G: 330 INJECTION, POWDER, FOR SOLUTION INTRAMUSCULAR; INTRAVENOUS at 10:05

## 2024-05-08 RX ADMIN — PHENYLEPHRINE HYDROCHLORIDE 100 MCG: 10 INJECTION INTRAVENOUS at 11:05

## 2024-05-08 RX ADMIN — PROPOFOL 160 MG: 10 INJECTION, EMULSION INTRAVENOUS at 10:05

## 2024-05-08 RX ADMIN — ONDANSETRON 4 MG: 2 INJECTION INTRAMUSCULAR; INTRAVENOUS at 11:05

## 2024-05-08 RX ADMIN — PROCHLORPERAZINE EDISYLATE 2.5 MG: 5 INJECTION INTRAMUSCULAR; INTRAVENOUS at 10:05

## 2024-05-08 RX ADMIN — DEXAMETHASONE SODIUM PHOSPHATE 8 MG: 4 INJECTION, SOLUTION INTRAMUSCULAR; INTRAVENOUS at 10:05

## 2024-05-08 RX ADMIN — SODIUM CHLORIDE: 0.9 INJECTION, SOLUTION INTRAVENOUS at 11:05

## 2024-05-08 RX ADMIN — PHENYLEPHRINE HYDROCHLORIDE 200 MCG: 10 INJECTION INTRAVENOUS at 11:05

## 2024-05-08 NOTE — TELEPHONE ENCOUNTER
----- Message from Dimple June RN sent at 2024  3:26 PM CDT -----  Regardin/17 post op clearance  The patient is currently under an internal Urologist's (Suni Patel MD) care and requires a clearance for her upcoming scheduled Colonoscopy on 24. Patient is having surgery 24.    The patient is also currently under an internal surgeon's (Ligia Raya MD) care and requires a clearance for her upcoming scheduled Colonoscopy on 24. Patient is having surgery 24.

## 2024-05-08 NOTE — ANESTHESIA POSTPROCEDURE EVALUATION
Anesthesia Post Evaluation    Patient: Angie Mccarthy    Procedure(s) Performed: Procedure(s) (LRB):  URETEROSCOPY (Left)  CYSTOSCOPY (N/A)  LITHOTRIPSY, USING LASER (Left)  EXTRACTION - STONE (Left)  REMOVAL-STENT (Left)  REPLACEMENT, STENT (Left)  PYELOSCOPY (Left)    Final Anesthesia Type: general      Patient location during evaluation: PACU  Patient participation: Yes- Able to Participate  Level of consciousness: awake and alert and oriented  Post-procedure vital signs: reviewed and stable  Pain management: adequate  Airway patency: patent  JENNI mitigation strategies: Intraoperative administration of CPAP, nasopharyngeal airway, or oral appliance during sedation, Multimodal analgesia, Extubation while patient is awake, Verification of full reversal of neuromuscular block and Extubation and recovery carried out in lateral, semiupright, or other nonsupine position  PONV status at discharge: No PONV  Anesthetic complications: no      Cardiovascular status: hemodynamically stable  Respiratory status: unassisted  Hydration status: euvolemic  Follow-up not needed.              Vitals Value Taken Time   /66 05/08/24 1346   Temp 36.6 °C (97.9 °F) 05/08/24 1215   Pulse 84 05/08/24 1353   Resp 19 05/08/24 1237   SpO2 92 % 05/08/24 1353   Vitals shown include unfiled device data.      No case tracking events are documented in the log.      Pain/Venessa Score: Venessa Score: 8 (5/8/2024 12:50 PM)

## 2024-05-08 NOTE — TRANSFER OF CARE
"Anesthesia Transfer of Care Note    Patient: Angie Mccarthy    Procedure(s) Performed: Procedure(s) (LRB):  URETEROSCOPY (Left)  CYSTOSCOPY (N/A)  LITHOTRIPSY, USING LASER (Left)  EXTRACTION - STONE (Left)  REMOVAL-STENT (Left)  REPLACEMENT, STENT (Left)  PYELOSCOPY (Left)    Patient location: [facility]    Anesthesia Type: general    Transport from OR: Transported from OR on 6-10 L/min O2 by face mask with adequate spontaneous ventilation    Post pain: adequate analgesia    Post assessment: no apparent anesthetic complications and tolerated procedure well    Post vital signs: stable    Level of consciousness: lethargic and responds to stimulation    Nausea/Vomiting: no nausea/vomiting    Complications: none    Transfer of care protocol was followed      Last vitals: Visit Vitals  BP (!) 141/74 (BP Location: Left arm, Patient Position: Lying)   Pulse 89   Temp 36.7 °C (98.1 °F) (Temporal)   Resp 20   Ht 5' 5" (1.651 m)   Wt 90.7 kg (200 lb)   LMP 12/06/2007   SpO2 95%   Breastfeeding No   BMI 33.28 kg/m²     "

## 2024-05-08 NOTE — DISCHARGE INSTRUCTIONS
Postoperative Instructions for Stone Surgery    1. Ureteral Stent:     - You have a ureteral stent in place with strings. You may remove your stent on Monday 5/13/24.     - To remove your stent, it is recommended that you do so in the shower or while urinating. To remove the stent, gently grasp the strings hanging outside of your urethra and slowly and gently pull downward until the stent is completely removed. The ureteral stent is approximately 12 inches in length.    2. Hydration and Fluid Intake:     - It is essential to drink plenty of fluids following the ureteroscopy procedure to promote flushing of the urinary system and prevent dehydration.     - Aim to drink at least 8 to 10 glasses of water or other non-caffeinated, non-alcoholic beverages daily, unless instructed otherwise by your healthcare provider.    3. Pain Management:     - You may experience mild to moderate discomfort or pain after the ureteroscopy procedure. This is usually manageable with over-the-counter pain relievers such as acetaminophen or nonsteroidal anti-inflammatory drugs (NSAIDs).     - You may experience mild bladder and flank discomfort especially when voiding due to your ureteral stent. This may be alleviated with medications that were prescribed to you such as oxybutynin and/or pyridium. You may take these as needed for any urinary discomfort while a stent. Please note this discomfort is temporary and should subside once the stent is removed.     - If prescribed pain medication, take it as instructed, following the prescribed dosage and frequency.    4. Urinary Symptoms:     - It is common to experience some urinary symptoms after the procedure, such as urgency, frequency, burning sensation, or blood in the urine (hematuria). These symptoms should gradually improve within a few days.     - If the symptoms worsen or if you notice significant blood clots or inability to urinate, contact your healthcare provider.    5. Activity and  Rest:     - It is advisable to take it easy and get plenty of rest for the first few days following the procedure. Avoid strenuous activities, heavy lifting, or vigorous exercise for 1-2 days.     - Gradually resume normal activities as you feel comfortable, but listen to your body and avoid overexertion.    6. Diet and Nutrition:     - Follow any dietary recommendations provided by your healthcare provider. In general, maintain a balanced diet with adequate fiber intake to prevent constipation.     - Avoid excessive consumption of salt, spicy foods, and caffeinated or carbonated beverages, as these may irritate the urinary system.    7. Follow-up Appointments:     - Attend all scheduled follow-up appointments with your healthcare provider to monitor your recovery and assess the success of the stone removal.     - You will be contacted via phone or the patient portal about any follow up appointments and tests/imaging in about 1-2 weeks.     - Additional imaging or laboratory tests may be ordered to evaluate the effectiveness of the procedure.    8. Signs of Complications:     - Be aware of potential complications and contact your healthcare provider if you experience any of the following: persistent or worsening pain, fever, chills, excessive fatigue, severe bleeding, inability to pass urine, or signs of infection.    9. Medication Compliance:     - If prescribed any medications, such as antibiotics or medications to prevent stone recurrence, take them as instructed by your healthcare provider. Follow the recommended dosage and complete the full course of medication.    If you have any additional questions, call [phone] and ask for your provider. If after hours (night or weekends) ask for urology on call and you will be directed to the appropriate provider.

## 2024-05-08 NOTE — PLAN OF CARE
Pre-op checklist complete. Pending H&P update, anesthesia consent, and surgical consent. Vitals stable, no distress noted.

## 2024-05-08 NOTE — OP NOTE
[facility]  Operative Note    Date: 05/08/2024    Pre-Op Diagnosis:  Left renal stones    Patient Active Problem List    Diagnosis Date Noted    Pre-operative cardiovascular examination 12/12/2023    Hypercalciuria 10/27/2023    Primary hyperparathyroidism 10/27/2023    Nephrolithiasis 10/27/2023    Type 2 diabetes mellitus with hyperglycemia 11/01/2022    Staghorn calculus 08/16/2021    Osteoarthritis of right knee 08/02/2021    Hepatic fibrosis, stage 3 06/09/2021    FERRELL (nonalcoholic steatohepatitis) 06/09/2021    Status post total left knee replacement 05/08/2021    Pyelonephritis 12/21/2019    Hypophosphatemia 12/21/2019    Hypomagnesemia 12/21/2019    Menopausal flushing 09/26/2019    Primary hypothyroidism 09/26/2019    Abnormality of gait 05/28/2019    Microscopic hematuria 04/25/2019    Anxiety 04/23/2019    Depression 04/23/2019    Chronic, continuous use of opioids 04/23/2019    Snoring 04/23/2019    Occult blood in stools 04/23/2019    PONV (postoperative nausea and vomiting) 04/23/2019    History of prediabetes 04/23/2019    Urinary hesitancy 04/23/2019    Wheezing 04/23/2019    Lumbar disc disease 01/07/2019    Back pain 11/29/2018    Class 2 obesity with body mass index (BMI) of 37.0 to 37.9 in adult 03/08/2017    S/P laparoscopic sleeve gastrectomy 02/13/2017    Fatty liver disease, nonalcoholic 11/16/2016    GERD (gastroesophageal reflux disease) 08/08/2016    Palpitations 09/01/2015    Osteoporosis, idiopathic 11/12/2014    Thyroid nodule 11/12/2014    Spinal stenosis, lumbar region, without neurogenic claudication 04/02/2014    Thoracic spondylosis without myelopathy 02/20/2013    Acute postoperative anemia due to expected blood loss 02/19/2013    Essential hypertension     Hyperlipidemia     Syringomyelia and syringobulbia 09/05/2012    Chronic low back pain 09/05/2012       Post-Op Diagnosis:  Same    Procedure(s) Performed:   1. Left ureteroscopy with laser lithotripsy and  stone basketing  2. Cystoscopy  3. Pyeloscopy  4. Left ureteral stent exchange  5. Fluoro < 1 hr    Specimen(s):  None    Staff Surgeon:  Suni Patel MD    Assistant Surgeon: Goran Eubanks MD    Anesthesia: General endotracheal anesthesia    Indications: Angie Mccarthy is a 58 y.o. female with a left renal stones status post recent PCNL and ureteroscopy presenting for definitive stone management. She is pre-stented.    Findings:  1. Stones not visible on  film.  2. No stones identified within the bladder or left ureter  3. Significant stone burden found within the left middle and lower poles, dusted with all fragments larger than 1 mm extracted via basket  4. Left ureteral stent exchange with good position under direct vision and fluoroscopy    Estimated Blood Loss: min    Drains:   1. Left 6 Fr x 24 cm JJ ureteral stent with strings    Procedure in detail:  After risks, benefits, and possible complications were explained, the patient elected to undergo the procedure and informed consent was obtained. All questions were answered in the cristino-operative area. The patient was transferred to the cystoscopy suite and placed in the supine position. SCDs were applied and working. Anesthesia was administered. The patient was then placed in the dorsal lithotomy position and prepped and draped in the usual sterile fashion. Time out was performed, and cristino-procedural antibiotics were confirmed.     The stones are not on  film. A rigid cystoscope in a 22 Fr sheath was introduced into the patient's urethra. This passed easily. The entire urethra was visualized which showed no strictures or masses. Cystoscopy revealed the ureteral orifices in the normal anatomic location bilaterally.    The patient's left ureteral stent was grasped with alligator graspers and brought to the meatus. A motion wire was passed through the stent and into the kidney with fluoroscopic confirmation. The stent was was removed  keeping the wire in place.    An 8 Fr rigid ureteroscope was passed into the patient's bladder alongside the wire under direct vision. It was then passed through the left ureteral orifice alongside the wire. The entire length of the ureter was surveyed and no stone fragments were identified. A motion wire was inserted through the ureteroscope and into the kidney with fluoroscopic confirmation. The ureteroscope was removed keeping both wires in place.    A ureteral access sheath was advanced over the motion wire to the level of the renal pelvis under continuous fluoroscopy. This passed easily. The inner dilator and motion wire were removed keeping the outer sheath in place. An 8 Fr flexible ureteroscope was advanced through the access sheath and into the kidney.    Stones were encountered in middle and lower poles. The laser fiber was inserted per the scope and the stones were fragmented/dusted. An NCircle basket was inserted per the scope and fragments were removed and passed off the field for analysis. Systematic pyeloscopy was performed and all remaining stone fragments were deemed small enough to pass spontaneously, <1 mm. The scope and ureteral access sheath were removed keeping the motion wire in place.    The cystoscope was reinserted and the bladder was irrigated to remove the stone fragments. The bladder was drained and the cystoscope removed keeping the wire in place.    The cystoscope was backloaded over the wire and advanced into the bladder. We then passed a 6 Fr x 24 cm JJ ureteral stent with strings over the wire to the level of the renal pelvis under direct vision as well as flouroscopy. The wire was removed. A 180 degree coil was observed in the renal pelvis as well as the bladder using fluoroscopy. A 180 degree coil was also seen using direct visualization in the bladder.    The patient tolerated the procedure well and was transferred to the recovery room in stable condition.    Disposition:  The  patient will be discharged home from PACU. She follow up with Dr. Patel  in 3 months with a renal ultrasound prior. She was given written instructions to self-remove her ureteral stent with strings on Monday, 5/13/2024.    Goran Eubanks MD

## 2024-05-08 NOTE — DISCHARGE SUMMARY
[facility] (1st Fl)  Discharge Note  Short Stay    Procedure(s) (LRB):  URETEROSCOPY (Left)  CYSTOSCOPY (N/A)  LITHOTRIPSY, USING LASER (Left)  EXTRACTION - STONE (Left)  REMOVAL-STENT (Left)  REPLACEMENT, STENT (Left)  PYELOSCOPY (Left)      OUTCOME: Patient tolerated treatment/procedure well without complication and is now ready for discharge.    DISPOSITION: Home or Self Care    FINAL DIAGNOSIS:  Nephrolithiasis    FOLLOWUP: In clinic    DISCHARGE INSTRUCTIONS:    Discharge Procedure Orders   US Kidney   Standing Status: Future Standing Exp. Date: 05/08/25     Order Specific Question Answer Comments   May the Radiologist modify the order per protocol to meet the clinical needs of the patient? Yes    Release to patient Immediate      No dressing needed     Notify your health care provider if you experience any of the following:  temperature >100.4     Notify your health care provider if you experience any of the following:  persistent nausea and vomiting or diarrhea     Notify your health care provider if you experience any of the following:  severe uncontrolled pain     Notify your health care provider if you experience any of the following:  difficulty breathing or increased cough     Notify your health care provider if you experience any of the following:  severe persistent headache     Notify your health care provider if you experience any of the following:  worsening rash     Notify your health care provider if you experience any of the following:  persistent dizziness, light-headedness, or visual disturbances     Activity as tolerated        TIME SPENT ON DISCHARGE: 15 minutes

## 2024-05-08 NOTE — PLAN OF CARE
Discharge instructions reviewed with patient. Patient vitals stable, no nausea or vomiting present. Patient pain under control. Patient ready for discharge.

## 2024-05-08 NOTE — ANESTHESIA PROCEDURE NOTES
Intubation    Date/Time: 5/8/2024 10:40 AM    Performed by: Vicky Massey CRNA  Authorized by: Addison Coronado MD    Intubation:     Induction:  Intravenous    Intubated:  Postinduction    Mask Ventilation:  Easy mask    Attempts:  1    Attempted By:  CRNA    Method of Intubation:  Video laryngoscopy    Blade:  Hernadez 3    Laryngeal View Grade: Grade I - full view of cords      Difficult Airway Encountered?: No      Complications:  None    Airway Device:  Oral endotracheal tube    Airway Device Size:  7.0    Style/Cuff Inflation:  Cuffed (inflated to minimal occlusive pressure)    Inflation Amount (mL):  7    Tube secured:  20    Secured at:  The lips    Placement Verified By:  Capnometry    Complicating Factors:  None    Findings Post-Intubation:  BS equal bilateral and atraumatic/condition of teeth unchanged

## 2024-05-08 NOTE — INTERVAL H&P NOTE
The patient has been examined and the H&P has been reviewed:    I concur with the findings and no changes have occurred since H&P was written.    Procedure risks, benefits and alternative options discussed and understood by patient/family.      Patient is here for a second-stage left ureteroscopy to remove the remaining of her left ureteral stones and left ureteral stent.    There are no hospital problems to display for this patient.

## 2024-05-14 ENCOUNTER — LAB VISIT (OUTPATIENT)
Dept: LAB | Facility: HOSPITAL | Age: 59
End: 2024-05-14
Attending: INTERNAL MEDICINE
Payer: MEDICARE

## 2024-05-14 DIAGNOSIS — N20.0 STAGHORN CALCULUS: ICD-10-CM

## 2024-05-14 LAB
BACTERIA #/AREA URNS AUTO: ABNORMAL /HPF
BILIRUB UR QL STRIP: NEGATIVE
CLARITY UR REFRACT.AUTO: CLEAR
COLOR UR AUTO: YELLOW
CREAT UR-MCNC: 131 MG/DL (ref 15–325)
GLUCOSE UR QL STRIP: NEGATIVE
HGB UR QL STRIP: ABNORMAL
KETONES UR QL STRIP: NEGATIVE
LEUKOCYTE ESTERASE UR QL STRIP: ABNORMAL
MICROSCOPIC COMMENT: ABNORMAL
NITRITE UR QL STRIP: NEGATIVE
PH UR STRIP: 6 [PH] (ref 5–8)
PROT UR QL STRIP: ABNORMAL
PROT UR-MCNC: 17 MG/DL (ref 0–15)
PROT/CREAT UR: 0.13 MG/G{CREAT} (ref 0–0.2)
RBC #/AREA URNS AUTO: >100 /HPF (ref 0–4)
SP GR UR STRIP: 1.02 (ref 1–1.03)
SQUAMOUS #/AREA URNS AUTO: 8 /HPF
URN SPEC COLLECT METH UR: ABNORMAL
WBC #/AREA URNS AUTO: 10 /HPF (ref 0–5)

## 2024-05-14 PROCEDURE — 81001 URINALYSIS AUTO W/SCOPE: CPT | Performed by: INTERNAL MEDICINE

## 2024-05-14 PROCEDURE — 82570 ASSAY OF URINE CREATININE: CPT | Performed by: INTERNAL MEDICINE

## 2024-05-15 ENCOUNTER — OFFICE VISIT (OUTPATIENT)
Dept: NEPHROLOGY | Facility: CLINIC | Age: 59
End: 2024-05-15
Payer: MEDICARE

## 2024-05-15 DIAGNOSIS — E21.3 HYPERPARATHYROIDISM, UNSPECIFIED: ICD-10-CM

## 2024-05-15 DIAGNOSIS — N20.0 NEPHROLITHIASIS: Primary | ICD-10-CM

## 2024-05-15 DIAGNOSIS — I10 HYPERTENSION, UNSPECIFIED TYPE: ICD-10-CM

## 2024-05-15 PROCEDURE — 3066F NEPHROPATHY DOC TX: CPT | Mod: CPTII,95,, | Performed by: INTERNAL MEDICINE

## 2024-05-15 PROCEDURE — 3044F HG A1C LEVEL LT 7.0%: CPT | Mod: CPTII,95,, | Performed by: INTERNAL MEDICINE

## 2024-05-15 PROCEDURE — 4010F ACE/ARB THERAPY RXD/TAKEN: CPT | Mod: CPTII,95,, | Performed by: INTERNAL MEDICINE

## 2024-05-15 PROCEDURE — 99214 OFFICE O/P EST MOD 30 MIN: CPT | Mod: 95,,, | Performed by: INTERNAL MEDICINE

## 2024-05-15 NOTE — PATIENT INSTRUCTIONS
- Sufficient fluid intake distributed throughout the day to produce at least 2 liters of urine per day, including drinking at night   - Avoiding excessive animal protein in the diet.   - Limiting dietary sodium to 100 meq/day.    - Increasing dietary potassium intake   - adding citrate to water in the form of karen, lemon juice, or crystal light  - Limiting dietary sucrose and fructose.  - Limiting dietary oxalate (limit iced tea and wang as well as foods high in oxalate)  - limit vitamin C supplements.

## 2024-05-15 NOTE — PROGRESS NOTES
Subjective:       Patient ID: Angie Mccarthy is a 58 y.o. White female who presents for a follow up for nephrolithiasis  The patient location is: home  The chief complaint leading to consultation is: \kidney stones    Visit type: audiovisual    Face to Face time with [id number]  30 minutes of total time spent on the encounter, which includes face to face time and non-face to face time preparing to see the patient (eg, review of tests), Obtaining and/or reviewing separately obtained history, Documenting clinical information in the electronic or other health record, Independently interpreting results (not separately reported) and communicating results to the patient/family/caregiver, or Care coordination (not separately reported).         Each patient to whom he or she provides medical services by telemedicine is:  (1) informed of the relationship between the physician and patient and the respective role of any other health care provider with respect to management of the patient; and (2) notified that he or she may decline to receive medical services by telemedicine and may withdraw from such care at any time.    Notes:     The patient has a history of kidney stones, has had them since her 20s, calcium phosphate stones. She has had a sleeve gastrectomy in 2017 (initially lose 30lbs but gained it back). Hx of HTN x 10 yrs, hepatosteatosis, seen at [facility], thyroid issues (nodules), scoliosis.  The patient has no family history of kidney disease. Dad had a few kidney stones as has her sister. Multiple cousins from her fathers site. The patient does not freuqently use NSAIDS or herbal supplements. Took Aleve rarely for her backpain.  Patient has had a hx of urinary tract infections (a few per year). She has a history of sepsis after ureteroscopy in [facility] approximately 10 years ago. Her urologist in  is Dr. Labadie done many ureteroscopies on her.   She has been on UroCitK in the past but lost follow up. She  has a high stone burden, including a staghorn calculus in her right kidney and has been treated with multiple interventions now by Dr. Patel. She just got her stent removed 5/8/24 and her stone burden decreased significantly in her CT.        Last stone: 4/17/2024 - 90% Calcium oxalate monohydrate.  10% Calcium phosphat   4/8/24: 100% Calcium Oxalate  Before that last stone was: 60% Calcium phosphate (apatite), 40% Calcium oxalate dihydrate    She drinks a lot of water per day (close to a gallon per day). Takes potassium citrate tid as prescribed.    HPI  Review of Systems   Constitutional:  Negative for activity change, appetite change, chills, fatigue, fever and unexpected weight change.   HENT:  Positive for postnasal drip. Negative for nosebleeds.    Eyes:  Positive for pain.        Swollen left lid, no pain   Respiratory:  Positive for shortness of breath (on exertion). Negative for cough and chest tightness.    Cardiovascular:  Positive for palpitations. Negative for chest pain and leg swelling.   Gastrointestinal:  Negative for abdominal pain, anal bleeding, diarrhea, nausea and vomiting.   Genitourinary:  Negative for difficulty urinating, dysuria, flank pain, frequency, hematuria and urgency.   Musculoskeletal:  Positive for arthralgias and back pain. Negative for joint swelling and myalgias.   Skin: Negative.  Negative for rash.   Neurological: Negative.    Psychiatric/Behavioral: Negative.     All other systems reviewed and are negative.      Objective:      Physical Exam  video visit  Assessment:       1. Nephrolithiasis    2. Hyperparathyroidism, unspecified    3. Hypertension, unspecified type            Plan:       1. Nephrolithiasis: calcium phosphate stones in the past. Had elevated calcium excretion (220) and oxalate excretion as well as low citrate in her 24 hour stone profile in 8/2022. Elevated PTH with normal vitamin D level points toward primary hyperpara. However, she has a strong family  history of kidney stones and the cause is likely multifactorial.       She had mixed calcium phosphate/calcium oxalate stones in the past, seems to be predominantly calcium oxalate now.   - continue potasium citrate to keep urine ph >6 and increase urinary citrate concentration.  - she is scheduled for a parathyroidectomy because of her osteoporosis and calcium kidney stones.    - will get 24 hour urine 2-3 month after procedure (with next visit)    - Sufficient fluid intake distributed throughout the day to produce at least 2 liters of urine per day, including drinking at night   - Avoiding excessive animal protein in the diet.   - Limiting dietary sodium to 100 meq/day.    - Increasing dietary potassium intake   - adding citrate to water in the form of karen, lemon juice, or crystal light  - Limiting dietary sucrose and fructose.  - Limiting dietary oxalate (limit iced tea and wang as well as foods high in oxalate)  - limit vitamin C supplements.       2. HTN: on Lisinopril. Per patient well controlled at home, not here in the office though.       continue lisinopril    See back in 3 month with labs and stone profile (after her parathyroidectomy)

## 2024-05-16 ENCOUNTER — TELEPHONE (OUTPATIENT)
Dept: SURGERY | Facility: CLINIC | Age: 59
End: 2024-05-16
Payer: MEDICARE

## 2024-05-16 DIAGNOSIS — Z90.89 S/P PARATHYROIDECTOMY: Primary | ICD-10-CM

## 2024-05-16 DIAGNOSIS — Z98.890 S/P PARATHYROIDECTOMY: Primary | ICD-10-CM

## 2024-05-16 NOTE — PRE-PROCEDURE INSTRUCTIONS
PreOp Instructions given:   - Verbal medication information (what to hold and what to take)   - NPO guidelines 2400  - Arrival place directions given; time to be given the day before procedure by the   Surgeon's Office DOSC  - Bathing with antibacterial soap   - Don't wear any jewelry or bring any valuables AM of surgery   - No makeup or moisturizer to face   - No perfume/cologne, powder, lotions or aftershave   Pt. verbalized understanding.   Pt denies any h/o Anesthesia/Sedation complications or side effects.  Patient does not know arrival time.  Explained that this information comes from the surgeon's office and if they haven't heard from them by 2 or 3 pm to call the office.  Patient stated an understanding.

## 2024-05-16 NOTE — ASSESSMENT & PLAN NOTE
- Multimodal perioperative antiemetics planned  - Postoperative prescription for antiemetic if needed

## 2024-05-16 NOTE — ASSESSMENT & PLAN NOTE
- Cautious positioning intraoperatively with additional padding and support at shoulders and knees

## 2024-05-17 ENCOUNTER — HOSPITAL ENCOUNTER (OUTPATIENT)
Facility: HOSPITAL | Age: 59
Discharge: HOME | End: 2024-05-17
Attending: STUDENT IN AN ORGANIZED HEALTH CARE EDUCATION/TRAINING PROGRAM | Admitting: STUDENT IN AN ORGANIZED HEALTH CARE EDUCATION/TRAINING PROGRAM
Payer: MEDICARE

## 2024-05-17 VITALS
RESPIRATION RATE: 16 BRPM | SYSTOLIC BLOOD PRESSURE: 154 MMHG | TEMPERATURE: 98 F | BODY MASS INDEX: 35.16 KG/M2 | OXYGEN SATURATION: 96 % | DIASTOLIC BLOOD PRESSURE: 70 MMHG | HEIGHT: 65 IN | HEART RATE: 77 BPM | WEIGHT: 211 LBS

## 2024-05-17 DIAGNOSIS — E21.3 HYPERPARATHYROIDISM: ICD-10-CM

## 2024-05-17 DIAGNOSIS — E21.0 PRIMARY HYPERPARATHYROIDISM: Primary | ICD-10-CM

## 2024-05-17 LAB
POCT GLUCOSE: 109 MG/DL (ref 70–110)
PTH-INTACT SERPL-MCNC: 113.6 PG/ML (ref 9–77)
PTH-INTACT SERPL-MCNC: 46 PG/ML (ref 9–77)

## 2024-05-17 PROCEDURE — 88331 PATH CONSLTJ SURG 1 BLK 1SPC: CPT | Mod: 59 | Performed by: PATHOLOGY

## 2024-05-17 PROCEDURE — 36620 INSERTION CATHETER ARTERY: CPT | Mod: 59,,, | Performed by: ANESTHESIOLOGY

## 2024-05-17 PROCEDURE — 63600175 PHARM REV CODE 636 W HCPCS: Performed by: ANESTHESIOLOGY

## 2024-05-17 PROCEDURE — 83970 ASSAY OF PARATHORMONE: CPT | Mod: 91 | Performed by: STUDENT IN AN ORGANIZED HEALTH CARE EDUCATION/TRAINING PROGRAM

## 2024-05-17 PROCEDURE — D9220A PRA ANESTHESIA: Mod: CRNA,,, | Performed by: NURSE ANESTHETIST, CERTIFIED REGISTERED

## 2024-05-17 PROCEDURE — 36000706: Performed by: STUDENT IN AN ORGANIZED HEALTH CARE EDUCATION/TRAINING PROGRAM

## 2024-05-17 PROCEDURE — 60500 EXPLORE PARATHYROID GLANDS: CPT | Mod: ,,, | Performed by: STUDENT IN AN ORGANIZED HEALTH CARE EDUCATION/TRAINING PROGRAM

## 2024-05-17 PROCEDURE — 82962 GLUCOSE BLOOD TEST: CPT | Performed by: STUDENT IN AN ORGANIZED HEALTH CARE EDUCATION/TRAINING PROGRAM

## 2024-05-17 PROCEDURE — 63600175 PHARM REV CODE 636 W HCPCS: Performed by: NURSE ANESTHETIST, CERTIFIED REGISTERED

## 2024-05-17 PROCEDURE — 25000003 PHARM REV CODE 250: Performed by: ANESTHESIOLOGY

## 2024-05-17 PROCEDURE — 25000003 PHARM REV CODE 250: Performed by: STUDENT IN AN ORGANIZED HEALTH CARE EDUCATION/TRAINING PROGRAM

## 2024-05-17 PROCEDURE — 36000707: Performed by: STUDENT IN AN ORGANIZED HEALTH CARE EDUCATION/TRAINING PROGRAM

## 2024-05-17 PROCEDURE — 88331 PATH CONSLTJ SURG 1 BLK 1SPC: CPT | Mod: 26,,, | Performed by: PATHOLOGY

## 2024-05-17 PROCEDURE — 27201037 HC PRESSURE MONITORING SET UP

## 2024-05-17 PROCEDURE — 27201423 OPTIME MED/SURG SUP & DEVICES STERILE SUPPLY: Performed by: STUDENT IN AN ORGANIZED HEALTH CARE EDUCATION/TRAINING PROGRAM

## 2024-05-17 PROCEDURE — 63600175 PHARM REV CODE 636 W HCPCS: Mod: JZ,JG | Performed by: STUDENT IN AN ORGANIZED HEALTH CARE EDUCATION/TRAINING PROGRAM

## 2024-05-17 PROCEDURE — 88307 TISSUE EXAM BY PATHOLOGIST: CPT | Performed by: PATHOLOGY

## 2024-05-17 PROCEDURE — 88305 TISSUE EXAM BY PATHOLOGIST: CPT | Mod: 26,,, | Performed by: PATHOLOGY

## 2024-05-17 PROCEDURE — 71000033 HC RECOVERY, INTIAL HOUR: Performed by: STUDENT IN AN ORGANIZED HEALTH CARE EDUCATION/TRAINING PROGRAM

## 2024-05-17 PROCEDURE — 88305 TISSUE EXAM BY PATHOLOGIST: CPT | Performed by: PATHOLOGY

## 2024-05-17 PROCEDURE — D9220A PRA ANESTHESIA: Mod: ANES,,, | Performed by: ANESTHESIOLOGY

## 2024-05-17 PROCEDURE — 71000015 HC POSTOP RECOV 1ST HR: Performed by: STUDENT IN AN ORGANIZED HEALTH CARE EDUCATION/TRAINING PROGRAM

## 2024-05-17 PROCEDURE — 37000008 HC ANESTHESIA 1ST 15 MINUTES: Performed by: STUDENT IN AN ORGANIZED HEALTH CARE EDUCATION/TRAINING PROGRAM

## 2024-05-17 PROCEDURE — 71000016 HC POSTOP RECOV ADDL HR: Performed by: STUDENT IN AN ORGANIZED HEALTH CARE EDUCATION/TRAINING PROGRAM

## 2024-05-17 PROCEDURE — 37000009 HC ANESTHESIA EA ADD 15 MINS: Performed by: STUDENT IN AN ORGANIZED HEALTH CARE EDUCATION/TRAINING PROGRAM

## 2024-05-17 RX ORDER — DEXAMETHASONE SODIUM PHOSPHATE 4 MG/ML
INJECTION, SOLUTION INTRA-ARTICULAR; INTRALESIONAL; INTRAMUSCULAR; INTRAVENOUS; SOFT TISSUE
Status: DISCONTINUED | OUTPATIENT
Start: 2024-05-17 | End: 2024-05-17

## 2024-05-17 RX ORDER — SODIUM CHLORIDE 9 MG/ML
INJECTION, SOLUTION INTRAVENOUS CONTINUOUS
Status: DISCONTINUED | OUTPATIENT
Start: 2024-05-17 | End: 2024-05-17 | Stop reason: HOSPADM

## 2024-05-17 RX ORDER — HYDROMORPHONE HYDROCHLORIDE 1 MG/ML
INJECTION, SOLUTION INTRAMUSCULAR; INTRAVENOUS; SUBCUTANEOUS
Status: DISCONTINUED | OUTPATIENT
Start: 2024-05-17 | End: 2024-05-17

## 2024-05-17 RX ORDER — CALCIUM CARBONATE 400(1000)
1 TABLET,CHEWABLE ORAL 2 TIMES DAILY
COMMUNITY
Start: 2024-05-17 | End: 2024-06-10 | Stop reason: ALTCHOICE

## 2024-05-17 RX ORDER — HYDROMORPHONE HYDROCHLORIDE 1 MG/ML
0.2 INJECTION, SOLUTION INTRAMUSCULAR; INTRAVENOUS; SUBCUTANEOUS EVERY 5 MIN PRN
Status: DISCONTINUED | OUTPATIENT
Start: 2024-05-17 | End: 2024-05-17 | Stop reason: HOSPADM

## 2024-05-17 RX ORDER — MIDAZOLAM HYDROCHLORIDE 1 MG/ML
INJECTION INTRAMUSCULAR; INTRAVENOUS
Status: DISCONTINUED | OUTPATIENT
Start: 2024-05-17 | End: 2024-05-17

## 2024-05-17 RX ORDER — SODIUM CHLORIDE, SODIUM LACTATE, POTASSIUM CHLORIDE, CALCIUM CHLORIDE 600; 310; 30; 20 MG/100ML; MG/100ML; MG/100ML; MG/100ML
INJECTION, SOLUTION INTRAVENOUS CONTINUOUS
Status: DISCONTINUED | OUTPATIENT
Start: 2024-05-17 | End: 2024-06-10

## 2024-05-17 RX ORDER — FENTANYL CITRATE 50 UG/ML
INJECTION, SOLUTION INTRAMUSCULAR; INTRAVENOUS
Status: DISCONTINUED | OUTPATIENT
Start: 2024-05-17 | End: 2024-05-17

## 2024-05-17 RX ORDER — ONDANSETRON HYDROCHLORIDE 2 MG/ML
4 INJECTION, SOLUTION INTRAVENOUS EVERY 12 HOURS PRN
Status: DISCONTINUED | OUTPATIENT
Start: 2024-05-17 | End: 2024-05-17 | Stop reason: HOSPADM

## 2024-05-17 RX ORDER — LIDOCAINE HYDROCHLORIDE 10 MG/ML
0.5 INJECTION, SOLUTION EPIDURAL; INFILTRATION; INTRACAUDAL; PERINEURAL ONCE
Status: DISCONTINUED | OUTPATIENT
Start: 2024-05-17 | End: 2024-06-10

## 2024-05-17 RX ORDER — ONDANSETRON HYDROCHLORIDE 2 MG/ML
INJECTION, SOLUTION INTRAVENOUS
Status: DISCONTINUED | OUTPATIENT
Start: 2024-05-17 | End: 2024-05-17

## 2024-05-17 RX ORDER — LIDOCAINE HYDROCHLORIDE 20 MG/ML
INJECTION, SOLUTION EPIDURAL; INFILTRATION; INTRACAUDAL; PERINEURAL
Status: DISCONTINUED | OUTPATIENT
Start: 2024-05-17 | End: 2024-05-17

## 2024-05-17 RX ORDER — OXYCODONE AND ACETAMINOPHEN 10; 325 MG/1; MG/1
1 TABLET ORAL ONCE
Status: COMPLETED | OUTPATIENT
Start: 2024-05-17 | End: 2024-05-17

## 2024-05-17 RX ORDER — METOCLOPRAMIDE HYDROCHLORIDE 5 MG/ML
10 INJECTION INTRAMUSCULAR; INTRAVENOUS EVERY 10 MIN PRN
Status: DISCONTINUED | OUTPATIENT
Start: 2024-05-17 | End: 2024-05-17 | Stop reason: HOSPADM

## 2024-05-17 RX ORDER — ACETAMINOPHEN 500 MG
1000 TABLET ORAL
Status: COMPLETED | OUTPATIENT
Start: 2024-05-17 | End: 2024-05-17

## 2024-05-17 RX ORDER — SCOLOPAMINE TRANSDERMAL SYSTEM 1 MG/1
1 PATCH, EXTENDED RELEASE TRANSDERMAL
Status: DISCONTINUED | OUTPATIENT
Start: 2024-05-17 | End: 2024-05-17 | Stop reason: HOSPADM

## 2024-05-17 RX ORDER — HALOPERIDOL 5 MG/ML
0.5 INJECTION INTRAMUSCULAR EVERY 10 MIN PRN
Status: DISCONTINUED | OUTPATIENT
Start: 2024-05-17 | End: 2024-05-17 | Stop reason: HOSPADM

## 2024-05-17 RX ORDER — BUPIVACAINE HYDROCHLORIDE 2.5 MG/ML
INJECTION, SOLUTION EPIDURAL; INFILTRATION; INTRACAUDAL
Status: DISCONTINUED | OUTPATIENT
Start: 2024-05-17 | End: 2024-05-17 | Stop reason: HOSPADM

## 2024-05-17 RX ORDER — PHENYLEPHRINE HCL IN 0.9% NACL 1 MG/10 ML
SYRINGE (ML) INTRAVENOUS
Status: DISCONTINUED | OUTPATIENT
Start: 2024-05-17 | End: 2024-05-17

## 2024-05-17 RX ORDER — OXYCODONE HYDROCHLORIDE 5 MG/1
5 TABLET ORAL
Status: DISCONTINUED | OUTPATIENT
Start: 2024-05-17 | End: 2024-05-17 | Stop reason: HOSPADM

## 2024-05-17 RX ORDER — KETAMINE HCL IN 0.9 % NACL 50 MG/5 ML
SYRINGE (ML) INTRAVENOUS
Status: DISCONTINUED | OUTPATIENT
Start: 2024-05-17 | End: 2024-05-17

## 2024-05-17 RX ORDER — PREGABALIN 75 MG/1
75 CAPSULE ORAL ONCE
Status: DISCONTINUED | OUTPATIENT
Start: 2024-05-17 | End: 2024-06-10

## 2024-05-17 RX ORDER — SODIUM CHLORIDE 0.9 % (FLUSH) 0.9 %
10 SYRINGE (ML) INJECTION
Status: DISCONTINUED | OUTPATIENT
Start: 2024-05-17 | End: 2024-05-17 | Stop reason: HOSPADM

## 2024-05-17 RX ORDER — PROPOFOL 10 MG/ML
VIAL (ML) INTRAVENOUS
Status: DISCONTINUED | OUTPATIENT
Start: 2024-05-17 | End: 2024-05-17

## 2024-05-17 RX ORDER — SUCCINYLCHOLINE CHLORIDE 20 MG/ML
INJECTION INTRAMUSCULAR; INTRAVENOUS
Status: DISCONTINUED | OUTPATIENT
Start: 2024-05-17 | End: 2024-05-17

## 2024-05-17 RX ADMIN — LIDOCAINE HYDROCHLORIDE 100 MG: 20 INJECTION, SOLUTION EPIDURAL; INFILTRATION; INTRACAUDAL at 01:05

## 2024-05-17 RX ADMIN — FENTANYL CITRATE 100 MCG: 50 INJECTION INTRAMUSCULAR; INTRAVENOUS at 01:05

## 2024-05-17 RX ADMIN — DEXAMETHASONE SODIUM PHOSPHATE 8 MG: 4 INJECTION INTRA-ARTICULAR; INTRALESIONAL; INTRAMUSCULAR; INTRAVENOUS; SOFT TISSUE at 01:05

## 2024-05-17 RX ADMIN — Medication 50 MCG: at 03:05

## 2024-05-17 RX ADMIN — Medication 50 MCG: at 04:05

## 2024-05-17 RX ADMIN — HYDROMORPHONE HYDROCHLORIDE 0.2 MG: 1 INJECTION, SOLUTION INTRAMUSCULAR; INTRAVENOUS; SUBCUTANEOUS at 05:05

## 2024-05-17 RX ADMIN — SODIUM CHLORIDE: 0.9 INJECTION, SOLUTION INTRAVENOUS at 01:05

## 2024-05-17 RX ADMIN — MIDAZOLAM 2 MG: 1 INJECTION INTRAMUSCULAR; INTRAVENOUS at 01:05

## 2024-05-17 RX ADMIN — Medication 100 MCG: at 01:05

## 2024-05-17 RX ADMIN — HYDROMORPHONE HYDROCHLORIDE 0.2 MG: 1 INJECTION, SOLUTION INTRAMUSCULAR; INTRAVENOUS; SUBCUTANEOUS at 06:05

## 2024-05-17 RX ADMIN — Medication 20 MG: at 02:05

## 2024-05-17 RX ADMIN — ONDANSETRON 4 MG: 2 INJECTION INTRAMUSCULAR; INTRAVENOUS at 04:05

## 2024-05-17 RX ADMIN — GLYCOPYRROLATE 0.2 MG: 0.2 INJECTION INTRAMUSCULAR; INTRAVENOUS at 01:05

## 2024-05-17 RX ADMIN — SODIUM CHLORIDE, SODIUM GLUCONATE, SODIUM ACETATE, POTASSIUM CHLORIDE, MAGNESIUM CHLORIDE, SODIUM PHOSPHATE, DIBASIC, AND POTASSIUM PHOSPHATE: .53; .5; .37; .037; .03; .012; .00082 INJECTION, SOLUTION INTRAVENOUS at 02:05

## 2024-05-17 RX ADMIN — ACETAMINOPHEN 1000 MG: 500 TABLET ORAL at 11:05

## 2024-05-17 RX ADMIN — SUCCINYLCHOLINE CHLORIDE 120 MG: 20 INJECTION, SOLUTION INTRAMUSCULAR; INTRAVENOUS; PARENTERAL at 01:05

## 2024-05-17 RX ADMIN — SCOPALAMINE 1 PATCH: 1 PATCH, EXTENDED RELEASE TRANSDERMAL at 11:05

## 2024-05-17 RX ADMIN — Medication 30 MG: at 01:05

## 2024-05-17 RX ADMIN — PROPOFOL 150 MG: 10 INJECTION, EMULSION INTRAVENOUS at 01:05

## 2024-05-17 RX ADMIN — HYDROMORPHONE HYDROCHLORIDE 1 MG: 1 INJECTION, SOLUTION INTRAMUSCULAR; INTRAVENOUS; SUBCUTANEOUS at 01:05

## 2024-05-17 RX ADMIN — OXYCODONE AND ACETAMINOPHEN 1 TABLET: 10; 325 TABLET ORAL at 05:05

## 2024-05-17 NOTE — OP NOTE
[facility]  Endocrine Surgery  Operative Report         Date of Procedure: 5/17/2024     Procedure: Procedure(s) (LRB):  PARATHYROIDECTOMY With Intraoperative PTH Levels (N/A)     Indications: This patient presents with primary hyperparathyroidism.  Preoperative imaging localizes to a likely right superior parathyroid adenoma.  The patient now presents for a parathyroidectomy with intraoperative PTH monitoring.     Surgeons and Role:     * Ligia Raya MD - Primary     * Deon Sarabia MD - Resident - Assisting (PGY4)    Pre-Operative Diagnosis: Primary hyperparathyroidism [E21.0]    Post-Operative Diagnosis: Primary hyperparathyroidism [E21.0]    Anesthesia: General    Procedures:   Parathyroidectomy, bilateral exploration, excision of bilateral superior parathyroid adenomas.  Biopsy of right inferior parathyroid gland.  Exploration for left inferior parathyroid gland.  Intraoperative monitoring and interpretation of bilateral cranial nerves (vagus and recurrent laryngeal nerves) using the [place] system  Intraoperative PTH level sampling and interpretation     Intraoperative Findings:   Right superior parathyroid adenoma was identified.  Confirmed parathyroid tissue with frozen section, reported as distinctly hypercellular.  Gland excised.  Right inferior parathyroid gland identified, biopsied and confirmed parathyroid tissue with frozen section.  Gland remains in situ and marked with a small clip.  Left superior parathyroid gland was identified.  Moderately abnormal in size and color.  Confirmed parathyroid tissue with frozen section.  Gland excised.  Extensive exploration for left inferior parathyroid gland.  Two candidates excised, though appeared to be lymph nodes, one biopsied and confirmed lymph node/thymic tissue by frozen section.  Appropriate decrease in PTH following excision of right superior parathyroid adenoma.  The bilateral recurrent laryngeal nerves and vagus nerves  were identified and preserved.  Function was verified using the nerve monitoring system.    Description of the Procedure:  The patient was seen in the Holding Room. The risks, benefits, complications, treatment options, and expected outcomes were discussed with the patient. The possibilities of reaction to medication, pulmonary aspiration, bleeding, infection, finding a normal parathyroid tissue, inability to identify all explored parathyroid glands, recurrent or persistent hyperparathyroidism, temporary or permanent hypocalcemia, temporary or permanent hypoparathyroidism, superior laryngeal or recurrent laryngeal nerve damage, the need for additional procedures, failure to diagnose a condition, creating a complication requiring transfusion or operation, stroke, death and imponderables. The patient concurred with the proposed plan and agreed to proceed despite the risks, giving informed consent.  The site of surgery was confirmed and marked. The patient was taken to Operating Room, identified as Angie Mccarthy and the procedure verified as parathyroidectomy with intraoperative PTH monitoring.     Induction of general anesthesia was performed, the patient was intubated with an electromyography endotracheal tube, and the anesthesia team placed all appropriate lines.  A baseline PTH level was drawn and resulted as 113.6.  The patient was positioned by the operating room, anesthesia, and surgical staff in a modified beachchair position with a shoulder roll, the neck supported in an extended position, the arms padded and tucked.  An intraoperative ultrasound was performed prior to incision and identified the anatomic landmarks and a very diminutive thyroid gland.  The surgical field was prepped and draped in standard sterile fashion.  A timeout was performed.  A 5 cm transverse cervical incision was created below the cricoid cartilage within a natural skin fold. Dissection was carried down through the platysma  layer. Once this was completed, sub-platysmal flaps were raised superiorly to the thyroid cartilage and inferiorly to the sternal notch. The strap muscles were identified and divided at the midline. Sharp and blunt dissection were used to mobilize the right thyroid lobe in a medial direction.  A vagus signal was confirmed with the nerve monitoring system.      The right inferior parathyroid gland was identified readily at the inferolateral pole of the right thyroid lobe.  This was carefully dissected free circumferentially until it was isolated on it's vascular pedicle. It appeared slightly enlarged but normal in color and architecture.  A small biopsy of the gland was taken and the gland remains marked with small clip.  The frozen section ultimately confirmed parathyroid tissue of relative normal cellularity.     Dissection continued on the ipsilateral side and the right superior parathyroid gland was identified deep in the tracheoesophageal groove.  The right superior parathyroid appeared to be slightly abnormal in size and color but clearly matched target identified on the the preoperative localization studies.  The right superior parathyroid adenoma was arefully dissected free circumferentially, the vascular pedicle was ligated with a 4-0 silk, and the adenoma was excised.   A small biopsy of the gland was taken and confirmed hypercellular parathyroid tissue with frozen section.  Vagus nerve signal was confirmed with the nerve monitoring system prior to proceeding to the contralateral side.    As the right inferior gland was also mildly enlarged, attention was then turned to the left side to complete a four gland exploration.  The left thyroid lobe was medialized with some degree of struggle given the underlying hashimotos and firmness of the gland.  Ultimately the carotid sheath was exposed and a vagus nerve signal was confirmed. The left superior parathyroid gland was identified deep and lateral to the  recurrent laryngeal nerve in the tracheoesophageal groove.  The left superior parathyroid gland appeared slightly abnormal in size and color and was ultimately was excised after the post right-superior parathyroid gland was excised and a PTH was in process. A frozen section confirmed this to be relatively normocellular parathyroid tissue.     An extensive search for the left inferior parathyroid gland was undertaken and despite a thorough search in the left central neck, carotid sheath, superior to the thyroid, along the inferior pole of the left thyroid lobe, within the thyrothymic ligament, within the thymic tissue, retrosternal, and the mediastinum, only two possible candidates were identified and these were thought to be lymph nodes.  A biopsy of one was sent for frozen section confirming lymph node tissue.  The left inferior parathyroid gland was not clearly identified.      During the search for the left inferior parathyroid gland, an intraoperative PTH level was drawn and this was over 10 minutes from the time the right superior adenoma was excised.  This ultimately resulted as 46.0 and the search for the left inferior parathyroid gland ceased.      The surgical bed was irrigated and inspected carefully. Multiple Valsalva maneuvers were performed at 30-40 cm of water and additional hemostasis was achieved as necessary with focal application of bipolar cautery. This was augmented with Fibrillar which was placed in the surgical bed.  Recurrent laryngeal and vagus nerve function was verified bilaterally using the nerve monitor prior to closure.  0.25% Marcaine was injected into the subcutaneous tissue of the incision for post-op analgesia. The strap muscles were then closed with interrupted 3-0 Vicryl suture.  The platysma was closed with interrupted 3-0 Vicryl suture, and the skin incision was closed with a 6-0 Vicryl subcuticular knot-less closure. Sterile skin glue was applied to the incision.    A debrief  was performed.  Specimens were confirmed.  Instrument, sponge, and needle counts were reported correct prior to closure and at the conclusion of the case.  The family was updated at the completion of the case.    Complications: No    Estimated Blood Loss (EBL): less than 50 mL           Drains: None    Implants: None    Specimens:   Specimen (24h ago, onward)       Start     Ordered    05/17/24 1644  Specimen to Pathology, Surgery General Surgery  Once        Comments: Pre-op Diagnosis: Primary hyperparathyroidism [E21.0]Procedure(s):PARATHYROIDECTOMY With Intraoperative PTH Levels Number of specimens: 7Name of specimens: 1. question left inferior parathyroid - frozen 2. left superior parathyroid biopsy - frozen3. right inferior parathyroid biopsy - frozen4. right superior parathyroid biopy - frozen 5. left level VI lymph nodes - perm6. left superior parathyroid adenoma - perm 7. right superior parathyroid adenoma - perm     References:    Click here for ordering Quick Tip   Question Answer Comment   Procedure Type: General Surgery    Release to patient Immediate        05/17/24 4459                           Condition: stable    Disposition: PACU - hemodynamically stable.    Attestation: I was present and scrubbed for the entire procedure.

## 2024-05-17 NOTE — DISCHARGE INSTRUCTIONS
Post-Operative Instructions: Parathyroidectomy    MEDICATIONS  You are being discharged home on the following medications:    Calcium Supplementation  Calcium Carbonate (Tums Ultra): 1 tablets, 2 times a day with meals  One Tums Ultra tablet has 1000 mg calcium carbonate which is equal to 400 mg elemental calcium.  *If you notice any numbness or tingling of the face, hands, or feet, take 2 extra tablets of Tums.  If symptoms do not subside within a half-hour, please call your surgeon's office.  Do NOT take your Tums in the morning of your lab draw.   Calcium carbonate (Tums) can cause constipation.  Please use over the counter stool softeners such as docusate (Colace) or laxatives such as polyethylene glycol (Miralax) to help avoid constipation.       Pain medication  Recommend Tylenol 1000mg every 8 hours  OK to take your home narcotic prescription as needed for breakthrough pain control    You may resume taking your normal medications, with the EXCEPTION of the following:  Please check with your surgeon and internist/cardiologist for specific instructions about when you should re-start:  Apixaban (Eliquis), Clopidogrel (Plavix), Dabigatran (Pradaxa), Dipyridamole (Aggrenox), Rivaroxaban (Xarelto), Warfarin (Coumadin), or any other type of blood thinner you may be taking.  Aspirin & anti-inflammatories (i.e. Goody's, Excedrin, ibuprofen, Advil, Aleve): May begin 72 hours after surgery.  Vitamins, minerals, and herbal supplements: Please wait 1 week after surgery to restart these medications    WOUND CARE  Please use your ice pack for 30 minutes on / 30 minutes off for at least the first 7 days.  You will be discharged from the hospital with your incision covered by skin glue that will stay on until your postoperative appointment.  It is normal to develop a lump under the incision, this is expected and is related to the healing process.  The lump will gradually go away with time.  You may shower when you return home  after the surgery.  No bathing or swimming (do not submerge in water) until cleared by your surgeon.  Please notify your physician if your incision is red, warm/hot, if you have an increase in swelling, any new drainage, or if you have a fever >101.5 F.  Please call 911 or proceed to the Emergency Room if you have difficulty breathing.    ACTIVITIES  You may resume normal activities, depending on your energy level.  Please refrain from driving for at least 3 days, or until you have complete mobility of your neck.  Do not drive if you are taking narcotic pain medication.  Please refrain from heavy lifting (10 lbs.) for 10 days.    If you have any questions or concerns, please call the surgeon's office at [phone].      Future Appointments   Date Time Provider Department Center   5/31/2024  2:00 PM LAB, APPOINTMENT [facility] INTMED [facility] LAB IM [facility] PCW   6/3/2024  8:30 AM Ligia Raya MD [facility] ENSRG [facility]   6/18/2024  2:00 PM Felisha Sands MD [facility] ENDODIA [facility]   6/19/2024 11:00 AM [facility] OIC-US1 MASTER [facility] ULTR IC Imaging Ctr   8/6/2024  9:30 AM Yisel Aguilar PA-C [facility] NEURO8 [facility]   8/9/2024 11:00 AM [facility] OIC-US1 MASTER [facility] ULTR IC [facility]   8/13/2024 10:45 AM Suni Patel MD [facility] UROLOGY [facility]   8/28/2024  1:00 PM Leonard Haddad MD [facility] NEPHRO [facility]   8/30/2024  2:30 PM Mami Chino NP [facility] HEPAT [facility]      Do NOT take your calcium supplements in the morning of your lab appointment.

## 2024-05-17 NOTE — H&P
ENDOCRINOLOGIST: Dr. Sands     REASON FOR VISIT: Hyperparathyroidism     HPI: Angie Mccarthy is a 58 y.o. female patient with a history notable for morbid obesity, GERD, osteoporosis, nephrolithiasis, HTN, DMII who presents in consultation for primary hyperparathyroidism.  Suspicion for hyperparathyroidism was raised due to persistent nephrolithiasis.     INTERVAL HISTORY 4/11:   Patient returns to clinic today for her pre-op visit for parathyroidectomy. She underwent CT neck since last here revealing 7 mm presumed parathyroid adenoma at the level of the upper pole of the right lobe of the thyroid gland. She has been having multiple procedures for her kidney stones recently with the urology team. She is anxious to have surgery done.      Interval History (5/17/24): No significant changes since last visit.      Details of the workup are as follows:      Recent laboratory studies:  Hypercalcemia noted once in 08/2021 and once in 09/2018, calcium ranges from 9.3-10.1 recently  Max calcium elevation to 10.7 mg/dL  Parathyroid hormone levels elevated with normal calcium levels   Most recent PTH level was 121.1 with a corresponding calcium of 10  Phosphorus:   Vitamin D: 47 in 10/2023  24 hour urine calcium: 152 mg/24 hours, Benign Familial Hypocalciuric Hypercalcemia unlikely  This was however elevated to 558 in 2009     Medications:  Vitamin D supplementation: 1000 IU over the counter vitamin D3  Lithium, thiazide diuretics: none  Calcium supplements or calcimimetic: none     Symptoms:   The patient reports the following symptoms: [x]musculoskeletal pain - also has scoliosis with anterior and posterior fusion, []fractures, [x]nephrolithiasis, [x]GERD, []peptic ulcer disease, [x]abdominal pain (vague), [x]polydipsia, [x]polyuria, []pruritis  The patient reports the following neurocognitive symptoms: [x]brain fog, []concentration difficulties, [x]fatigue, [x]forgetfulness, []mood/psychiatric disturbances  The  patient denies dysphagia, does report globus sensation, denies overt compression symptoms, or anterior neck pain.  The patient denies hoarseness, voice changes or increased need to clear the throat.  Bone mineral density: [x]Osteoporosis []Osteopenia []Normal bone density.  Last DEXA 10/2023 with T-score -2.6 of the femoral neck     Surgical risk factors:  Risk of concurrent thyroid disease: right thyroid nodule, stable in size and prior benign biopsy in 2015  Ultrasound of the thyroid in 10/2023   History of neck radiation: none  Prior neck surgery: none  Cardiovascular risk: last echocardiogram in 05/2023 for preoperative evaluation, has normal EF, PA pressure 30 mmHg  Reports heart murmur  Antiplatelet therapy and anticoagulation: aspirin, fish oil  On Mounjaro     Family history:  No family history of endocrinopathies or endocrine cancers including pituitary tumors, pancreatic neuroendocrine tumors, medullary thyroid cancer or pheochromocytoma/paraganglioma.   Nephrolithiasis in sister and father      Localization studies done prior to this visit:  Ultrasound: not localizing for parathyroid adenoma, done in 2023  Nuclear Medicine Parathyroid Scan: not obtained  4D-CT Parathyroid scan: not obtained     LABORATORY STUDIES:  I personally and independently reviewed relevant lab test results, including the following:           Lab Results   Component Value Date     MG 2.1 08/18/2021     [id number] 47 10/25/2023     TSH 11.112 (H) 01/25/2024           Component      Latest Ref Rng 1/9/2024   Urine Total Volume      mL 1450    Urine Collection Duration      Hr 24    Calcium, Urine      0.0 - 15.0 mg/dL 10.5    Urine Calcium Absolute      mg/Spec 152    CREATININE, URINE (SEND OUT)      15.0 - 325.0 mg/dL 80.0    Creatinine, Timed Urine      40.0 - 75.0 mg/Hr 48.3    Urine Creatinine Absolute      mg/Spec 1160.0          PAST MEDICAL HISTORY:  Problem List       Patient Active Problem List   Diagnosis    Syringomyelia  and syringobulbia    Chronic low back pain    Essential hypertension    Hyperlipidemia    Acute postoperative anemia due to expected blood loss    Thoracic spondylosis without myelopathy    Spinal stenosis, lumbar region, without neurogenic claudication    Osteoporosis, idiopathic    Thyroid nodule    Palpitations    GERD (gastroesophageal reflux disease)    Fatty liver disease, nonalcoholic    S/P laparoscopic sleeve gastrectomy    Class 2 obesity with body mass index (BMI) of 37.0 to 37.9 in adult    Back pain    Lumbar disc disease    Anxiety    Depression    Chronic, continuous use of opioids    Snoring    Occult blood in stools    PONV (postoperative nausea and vomiting)    History of prediabetes    Urinary hesitancy    Wheezing    Microscopic hematuria    Abnormality of gait    Menopausal flushing    Primary hypothyroidism    Pyelonephritis    Hypophosphatemia    Hypomagnesemia    Status post total left knee replacement    Hepatic fibrosis, stage 3    FERRELL (nonalcoholic steatohepatitis)    Osteoarthritis of right knee    Staghorn calculus    Type 2 diabetes mellitus with hyperglycemia    Hypercalciuria    Primary hyperparathyroidism    Nephrolithiasis    Pre-operative cardiovascular examination             PAST SURGICAL HISTORY:        Past Surgical History:   Procedure Laterality Date    ANTEGRADE NEPHROSTOGRAPHY Right 2/23/2024     Procedure: NEPHROSTOGRAM, ANTEGRADE;  Surgeon: Suni Patel MD;  Location: [address];  Service: Urology;  Laterality: Right;    ANTEGRADE NEPHROSTOGRAPHY Left 4/8/2024     Procedure: Nephrostogram;  Surgeon: Suni Patel MD;  Location: [facility] OR [address] [place] [address];  Service: Urology;  Laterality: Left;    APPENDECTOMY        BACK SURGERY         x 6    CHOLECYSTECTOMY        CYSTOSCOPY N/A 4/17/2024     Procedure: CYSTOSCOPY;  Surgeon: Suni Patel MD;  Location: [address];  Service: Urology;  Laterality: N/A;    CYSTOSCOPY N/A 5/8/2024      Procedure: CYSTOSCOPY;  Surgeon: Suni Patel MD;  Location: [facility] OR 1ST FLR;  Service: Urology;  Laterality: N/A;    DILATION OF NEPHROSTOMY TRACT Left 4/8/2024     Procedure: DILATION, NEPHROSTOMY TRACT;  Surgeon: Suni Patel MD;  Location: [facility] OR 1ST FLR;  Service: Urology;  Laterality: Left;    EXTRACTION - STONE Right 2/23/2024     Procedure: EXTRACTION - STONE;  Surgeon: Suni Patel MD;  Location: [facility] OR 1ST FLR;  Service: Urology;  Laterality: Right;    EXTRACTION - STONE Left 4/17/2024     Procedure: EXTRACTION - STONE;  Surgeon: Suni Patel MD;  Location: [facility] OR 1ST FLR;  Service: Urology;  Laterality: Left;    EXTRACTION - STONE Left 5/8/2024     Procedure: EXTRACTION - STONE;  Surgeon: Suni Patel MD;  [facility];  Service: Urology;  Laterality: Left;    GASTRECTOMY         Lap Gastric Sleeve    HERNIA REPAIR        HYSTERECTOMY        JOINT REPLACEMENT        LASER LITHOTRIPSY Left 4/17/2024     Procedure: LITHOTRIPSY, USING LASER;  Surgeon: Suni Patel MD;  [facility];  Service: Urology;  Laterality: Left;    LASER LITHOTRIPSY Left 5/8/2024     Procedure: LITHOTRIPSY, USING LASER;  Surgeon: Suni Patel MD;  [facility];  Service: Urology;  Laterality: Left;    LIVER BIOPSY   02/06/2017     steatohepatitis with stage 1 fibrosis    MYELOGRAPHY N/A 11/29/2018     Procedure: MYELOGRAM;  Surgeon: [facility];  Location: [facility] OR 2ND FLR;  Service: Radiology;  Laterality: N/A;    MYELOGRAPHY N/A 1/7/2019     Procedure: Myelogram;  Surgeon: Lissett Surgeon;  Location: [facility];  Service: Anesthesiology;  Laterality: N/A;    NEPHROSCOPY Right 2/23/2024     Procedure: NEPHROSCOPY;  Surgeon: Suni Patel MD;  Location: [facility] OR 1ST FLR;  Service: Urology;  Laterality: Right;  2 hr    NEPHROSTOGRAPHY Right 2/19/2024     Procedure: Nephrostogram;  Surgeon: Jorge  Suni AHMADI MD;  Location: [facility] OR 2ND FLR;  Service: Urology;  Laterality: Right;    OOPHORECTOMY        PERCUTANEOUS CRYOTHERAPY OF PERIPHERAL NERVE USING LIQUID NITROUS OXIDE IN CLOSED NEEDLE DEVICE Left 4/29/2019     Procedure: CRYOTHERAPY, NERVE, PERIPHERAL, PERCUTANEOUS, USING LIQUID NITROUS OXIDE IN CLOSED NEEDLE DEVICE-iovera left knee;  Surgeon: Chavo Gold III, MD;  Location: [facility]ATH [facility];  Service: Pain Management;  Laterality: Left;    PERCUTANEOUS CRYOTHERAPY OF PERIPHERAL NERVE USING LIQUID NITROUS OXIDE IN CLOSED NEEDLE DEVICE Right 8/2/2021     Procedure: CRYOTHERAPY, NERVE, PERIPHERAL, PERCUTANEOUS, USING LIQUID NITROUS OXIDE IN CLOSED NEEDLE DEVICE;  Surgeon: Saritha Proctor NP;  [facility];  Service: Pain Management;  Laterality: Right;  RIGHT KNEE IOVERA    PERCUTANEOUS NEPHROLITHOTOMY Right 2/19/2024     Procedure: NEPHROLITHOTOMY, PERCUTANEOUS;  Surgeon: Suni Patel MD;  Location: [facility] OR 2ND FLR;  Service: Urology;  Laterality: Right;  2 HRS    PERCUTANEOUS NEPHROLITHOTOMY Left 4/8/2024     Procedure: NEPHROLITHOTOMY, PERCUTANEOUS;  Surgeon: Suni Patel MD;  Location: [facility] OR 1ST FLR;  Service: Urology;  Laterality: Left;  3 hrs    PERCUTANEOUS NEPHROSCOPY   2/19/2024     Procedure: NEPHROSCOPY, PERCUTANEOUS;  Surgeon: Suni Patel MD;  Location: [facility] OR 2ND FLR;  Service: Urology;;    PERCUTANEOUS NEPHROSTOMY Right 2/19/2024     Procedure: CREATION, NEPHROSTOMY, PERCUTANEOUS;  Surgeon: Suni Patel MD;  Location: [facility] OR 2ND FLR;  Service: Urology;  Laterality: Right;    PYELOSCOPY Left 4/17/2024     Procedure: PYELOSCOPY;  Surgeon: Suni Patel MD;  Location: [facility] OR 1ST FLR;  Service: Urology;  Laterality: Left;    PYELOSCOPY Left 5/8/2024     Procedure: PYELOSCOPY;  Surgeon: Suni Patel MD;  Location: [facility] OR 1ST FLR;  Service: Urology;  Laterality: Left;    REMOVAL-STENT Left 4/17/2024     Procedure:  REMOVAL-STENT;  Surgeon: Suni Patel MD;  Location: [facility] OR 1ST FLR;  Service: Urology;  Laterality: Left;    REMOVAL-STENT Left 5/8/2024     Procedure: REMOVAL-STENT;  Surgeon: Suni Patel MD;  Location: [facility] OR 1ST FLR;  Service: Urology;  Laterality: Left;    REPLACEMENT OF NEPHROSTOMY TUBE Left 4/8/2024     Procedure: PLACEMENT, NEPHROSTOMY TUBE;  Surgeon: Suni Patel MD;  Location: [facility] OR 1ST FLR;  Service: Urology;  Laterality: Left;    REPLACEMENT OF STENT Left 4/17/2024     Procedure: REPLACEMENT, STENT;  Surgeon: Suni Patel MD;  Location: [facility] OR 1ST FLR;  Service: Urology;  Laterality: Left;    REPLACEMENT OF STENT Left 5/8/2024     Procedure: REPLACEMENT, STENT;  Surgeon: Suni Patel MD;  Location: [facility] OR 1ST FLR;  Service: Urology;  Laterality: Left;    SPINAL FUSION        TONSILLECTOMY, ADENOIDECTOMY        TOTAL KNEE ARTHROPLASTY Left 5/1/2019     Procedure: REPLACEMENT-KNEE-TOTAL;  Surgeon: John L. Ochsner Jr., MD;  Location: [facility] OR 2ND FLR;  Service: Orthopedics;  Laterality: Left;    URETERAL STENT PLACEMENT Left 4/8/2024     Procedure: INSERTION, STENT, URETER;  Surgeon: Suni Patel MD;  Location: [facility] OR 1ST FLR;  Service: Urology;  Laterality: Left;    URETEROSCOPY   2/19/2024     Procedure: URETEROSCOPY;  Surgeon: Suni Patel MD;  Location: [facility] OR 2ND FLR;  Service: Urology;;    URETEROSCOPY Left 4/17/2024     Procedure: URETEROSCOPY;  Surgeon: Suni Patel MD;  Location: [facility] OR 1ST FLR;  Service: Urology;  Laterality: Left;    URETEROSCOPY Left 5/8/2024     Procedure: URETEROSCOPY;  Surgeon: Suni Patel MD;  Location: [facility] OR 1ST FLR;  Service: Urology;  Laterality: Left;    URETEROSCOPY, ANTEGRADE Right 2/23/2024     Procedure: URETEROSCOPY, ANTEGRADE;  Surgeon: Suni Patel MD;  Location: [facility] OR [address] [place] [address];  Service: Urology;  Laterality: Right;          MEDICATIONS:  Current Medications          Current Outpatient Medications   Medication Sig Dispense Refill    alcohol swabs PadM Apply 1 each topically once daily. 100 each 3    ascorbic acid, vitamin C, (VITAMIN C) 500 MG tablet          BIOTIN ORAL Take 10,000 mg by mouth every morning.        blood glucose control, low (TRUE METRIX LEVEL 1) Soln USE AS DIRECTED WITH GLUCOSE METER 1 each 0    blood sugar diagnostic (TRUE METRIX GLUCOSE TEST STRIP) Strp TEST BLOOD SUGAR EVERY  strip 3    cyclobenzaprine (FLEXERIL) 10 MG tablet TK 1 T PO QD- as needed for muscle spasms   0    DULoxetine (CYMBALTA) 60 MG capsule TAKE 1 CAPSULE EVERY DAY (Patient taking differently: Take by mouth every evening.) 90 capsule 3    fish oil-omega-3 fatty acids 300-1,000 mg capsule Take 1,200 mg by mouth 2 (two) times daily.        fluticasone propionate (FLONASE) 50 mcg/actuation nasal spray USE 2 SPRAYS NASALLY ONE TIME DAILY (Patient not taking: Reported on 4/15/2024) 48 g 3    gabapentin (NEURONTIN) 800 MG tablet Take 800 mg by mouth 3 (three) times daily. Takes 2-3 times per day   1    HYDROcodone-acetaminophen (NORCO)  mg per tablet Take 1 tablet by mouth 4 (four) times daily as needed.        ketoconazole (NIZORAL) 2 % cream Apply topically once daily. 1 each 0    levothyroxine (SYNTHROID) 137 MCG Tab tablet Take 1 tablet (137 mcg total) by mouth before breakfast. 90 tablet 3    lisinopriL (PRINIVIL,ZESTRIL) 2.5 MG tablet TAKE 1 TABLET EVERY DAY 90 tablet 3    metFORMIN (GLUCOPHAGE) 500 MG tablet TAKE 1 TABLET (500 MG TOTAL) BY MOUTH 2 (TWO) TIMES DAILY WITH MEALS. 180 tablet 3    multivitamin capsule Take 1 capsule by mouth every morning.        omeprazole (PRILOSEC) 40 MG capsule TAKE 1 CAPSULE EVERY DAY (Patient taking differently: Take by mouth every morning.) 90 capsule 3    oxyCODONE (ROXICODONE) 5 MG immediate release tablet Take 1 tablet (5 mg total) by mouth every 4 (four) hours as needed for Pain.  (Do not  take with home medication: oxycodone/apap 10/325) (Patient not taking: Reported on 5/16/2024) 10 tablet 0    oxyCODONE-acetaminophen (PERCOCET)  mg per tablet Take 1 tablet by mouth 4 (four) times daily as needed for Pain. 10 tablet 0    potassium citrate (UROCIT-K) 5 mEq (540 mg) TbSR Take 2 tablets (10 mEq total) by mouth 3 (three) times daily with meals. 180 tablet 11    rosuvastatin (CRESTOR) 10 MG tablet TAKE 1 TABLET EVERY NIGHT 90 tablet 3    tamsulosin (FLOMAX) 0.4 mg Cap TAKE 1 CAPSULE(0.4 MG) BY MOUTH AFTER DINNER (Patient not taking: Reported on 5/16/2024) 30 capsule 1    tirzepatide (MOUNJARO) 12.5 mg/0.5 mL PnIj Inject 12.5 mg into the skin every 7 days. 4 Pen 3    TRUEPLUS LANCETS 33 gauge Misc TEST BLOOD SUGAR EVERY  each 3    zinc sulfate (ZINCATE) 50 mg zinc (220 mg) capsule          zolpidem (AMBIEN) 10 mg Tab TAKE 1 TABLET BY MOUTH EVERY NIGHT AT BEDTIME AS NEEDED FOR INSOMNIA 90 tablet 0    aspirin (ECOTRIN) 81 MG EC tablet Take 81 mg by mouth nightly.        ketorolac (TORADOL) 10 mg tablet Take 1 tablet (10 mg total) by mouth every 6 (six) hours as needed for Pain. (Patient not taking: Reported on 5/16/2024) 12 tablet 0    oxybutynin (DITROPAN) 5 MG Tab Take 1 tablet (5 mg total) by mouth 3 (three) times daily as needed (urinary frequency). 30 tablet 0    tamsulosin (FLOMAX) 0.4 mg Cap Take 1 capsule (0.4 mg total) by mouth every evening. for 30 doses (Patient not taking: Reported on 5/16/2024) 30 capsule 0                Current Facility-Administered Medications   Medication Dose Route Frequency Provider Last Rate Last Admin    triamcinolone acetonide injection 40 mg  40 mg Intradermal 1 time in Clinic/HOD Teri Manuel MD                    Facility-Administered Medications Ordered in Other Visits   Medication Dose Route Frequency Provider Last Rate Last Admin    ceFAZolin 2 g in dextrose 5 % in water (D5W) 50 mL IVPB (MB+)  2 g Intravenous On Call Procedure Saritha Merlos MD      "           ALLERGIES:        Review of patient's allergies indicates:   Allergen Reactions    Adhesive Dermatitis and Blisters    Adhesive tape-silicones Dermatitis       The longer the adhesive stays on, the worse the irritation    Mastisol adhesive [gum oqvrxu-dzusqn-pmnm-alcohol] Itching, Rash and Blisters       "Looked like poison ivy"    Sulfamethoxazole-trimethoprim Itching and Anxiety       Has a "nervous feeling."  "Jittery"  Also had an upset stomach.Has taken recently and the reaction was "less intense"         SOCIAL HISTORY:  Social History            Socioeconomic History    Marital status:    Occupational History    Occupation: disable   Tobacco Use    Smoking status: Never    Smokeless tobacco: Never    Tobacco comments:       Tried a few cigarettes during teenage   Substance and Sexual Activity    Alcohol use: Not Currently       Comment: socially    Drug use: No    Sexual activity: Not Currently       Partners: Male   Other Topics Concern    Are you pregnant or think you may be? No    Breast-feeding No      Social Determinants of Health           Financial Resource Strain: High Risk (11/15/2023)     Overall Financial Resource Strain (CARDIA)      Difficulty of Paying Living Expenses: Hard   Food Insecurity: No Food Insecurity (11/15/2023)     Hunger Vital Sign      Worried About Running Out of Food in the Last Year: Never true      Ran Out of Food in the Last Year: Never true   Transportation Needs: Unmet Transportation Needs (11/15/2023)     PRAPARE - Transportation      Lack of Transportation (Medical): Yes      Lack of Transportation (Non-Medical): No   Physical Activity: Inactive (11/15/2023)     Exercise Vital Sign      Days of Exercise per Week: 0 days      Minutes of Exercise per Session: 0 min   Stress: Stress Concern Present (11/15/2023)     [employer] - Occupational Stress Questionnaire      Feeling of Stress : To some extent   Housing Stability: Low " "Risk  (11/15/2023)     Housing Stability Vital Sign      Unable to Pay for Housing in the Last Year: No      Number of Places Lived in the Last Year: 1      Unstable Housing in the Last Year: No          FAMILY HISTORY:         Family History   Problem Relation Name Age of Onset    Heart disease Mother        Heart disease Father        Cancer Father        COPD Father        Hypertension Sister        Scoliosis Sister        Hypertension Brother        Heart disease Maternal Grandmother        Heart disease Maternal Aunt        Heart disease Maternal Uncle        Heart disease Paternal Aunt        Breast cancer Paternal Aunt        Heart disease Paternal Uncle        Cancer Paternal Uncle        Acne Other nephew      Eczema Other nephew      Melanoma Neg Hx        Psoriasis Neg Hx        Lupus Neg Hx             REVIEW OF SYSTEMS:  A detailed review of systems has been reviewed with the patient, pertinent positives and negatives are presented in the note and is otherwise negative.     PHYSICAL EXAMINATION:  Vital Signs: /65   Pulse 90   Ht 5' 1" (1.549 m)   Wt 94.3 kg (207 lb 14.3 oz)   LMP 12/06/2007   BMI 39.28 kg/m²      Constitutional: well-developed, well-nourished, no acute distress   HENT: no lid lag, no exophthalmos, no scleral icterus, moist mucous membranes, good dentition  Neck: supple, trachea in midline, thyroid is soft and moves well with swallowing, small palpable nodule, adequate neck extension, scars from right IJ catheters  Heme/Lymph: no cervical or supraclavicular lymphadenopathy  Respiratory: normal respiratory effort, no wheezes or stridor  Cardiovascular: regular rate and rhythm  Extremities: no edema  Skin: warm and dry, no rashes  Neurologic: gross resting tremor of outstretched hands, voice strong  Vascular: radial pulses palpable bilaterally  Psychiatric: affect normal     IMAGING STUDIES:  I personally and independently reviewed, visualized and interpreted the images of the " below listed radiology studies (including ultrasound from 2023 as well as CT parathyroid scan.) and my findings are notable for small thyroid nodules without grossly suspicious features, diminutive thyroid gland, no lesions suspicious for a parathyroid adenoma on ultrasound.  CT suggests dominant right superior gland, possible second adenoma and multigland disease. Reports below for reference.     CT Neck Parathyroid (4D) 04/01/2024     Narrative  EXAMINATION:  CT NECK PARATHYROID (4D)     CLINICAL HISTORY:  Hyperparathyroidism; Primary hyperparathyroidism     TECHNIQUE:  Axial CT images obtained throughout the region of the neck before and after the administration of 75 ml omnipaque 350 intravenous contrast via parathyroid protocol.  Axial, sagittal and coronal reconstructions were performed.     COMPARISON:  Ultrasound 10/31/2023.     FINDINGS:  There is an enhancing nodule insinuating between the right side of the esophagus and longus coli muscle at the level of the upper pole of the right lobe of the thyroid gland measuring 7 mm suspicious for parathyroid adenoma (series 3, image 130).     Small subcentimeter nodules below the left lower pole likely represent small lymph nodes although a 4 mm nodule appears to more prominently enhance which may be artifactual (streak artifact (however an additional small parathyroid adenoma is at least questioned (series 3, image 172)     No other definite suspicious lesion is identified throughout the remainder of the neck.     1.4 cm right thyroid nodule, better characterized on ultrasound 10/31/2023.     No soft tissue mass or adenopathy otherwise identified throughout the neck.     Major vessels of the neck appear normal with retropharyngeal course of the carotid arteries.     Lung apices are clear.  Partially visualized postoperative change thoracic spine posterior instrumented fusion.  Bilateral cervical ribs at C7.     Impression  7 mm presumed parathyroid adenoma at the  level of the upper pole of the right lobe of the thyroid gland noted posteriorly insinuating between the esophagus and longus coli musculature.  Only questionable additional enhancing nodule below the left lower pole of the thyroid although this latter finding may not represent true enhancement but streak artifact.     Thyroid nodules better characterized on ultrasound 10/31/2023.     Electronically signed by resident: Nela Hart  Date:                                                04/01/2024  Time:                                               10:46     Electronically signed by:Nahum Talamantes  Date:                                                04/02/2024  Time:                                               07:06        US Soft Tissue Head Neck Thyroid 10/31/2023  CLINICAL HISTORY:.  Nontoxic single thyroid nodule     TECHNIQUE:Ultrasound of the thyroid and cervical lymph nodes was performed.     COMPARISON:Thyroid ultrasound 06/16/2021, 11/11/2019.     FINDINGS:  The thyroid is normal in size.  Right lobe of the thyroid measures 3.4 x 1.9 x 1.6 cm.  Isthmus measures 0.2 cm in AP dimension.  Left lobe of the thyroid measures 3.3 x 1.7 x 1.3 cm.  Normal thyroid parenchyma.     Bilateral thyroid nodules.     Right lobe: Midpole solid isoechoic nodule measuring 1.9 cm (previously 1.8 cm).     Left lobe:  Upper pole solid isoechoic nodule measuring 1.1 cm (previously 1.1 cm), TI-RADS 3.  Lower pole solid isoechoic nodule measuring 0.9 cm, TI-RADS 3.  Cervical lymph nodes demonstrate normal morphology and size.     Impression  Stable right thyroid nodule, previously biopsied with benign pathology, and stable left thyroid TI-RADS 3 nodule.  These are stable in size since 2017.  Further imaging surveillance as clinically warranted.  No other nodules that meet criteria for imaging follow-up or FNA.     Electronically signed by resident: Eric Parker  Date:                                                 10/31/2023  Time:                                               11:15     Electronically signed by:Teri Khan MD  Date:                                                10/31/2023  Time:                                               11:37     DXA Bone Density Axial Skeleton 1 or more sites 10/31/2023  CLINICAL HISTORY:Other osteoporosis without current pathological fracture.  59 y/o female with no history of fractures.  She had surgical menopause at 39 y/o.  Pt is taking Vit D supplements.  She has a history of Hyperparathyroidism, Diabetes and Renal Stones.  She does not exercise or smoke.     TECHNIQUE:DXA specification: [facility] (S/N [id number])     Bone Mineral Density scanning was performed over the hip and lumbar spine.     Review of the images confirms satisfactory positioning and technique.     COMPARISON:  Comparison study done on 11/13/2014.     Lumbar spine:     BMD  g/cm2 and T-score .  Total Hip:           BMD 0.989 g/cm2 and T-score 0.4.  Distal 1/3 radius: BMD 0.752 g/cm2 and T-score 1.2.     FINDINGS:  Lumbar spine (L1-L4):           BMD is  g/cm2, T-score is , and Z-score is .     Total hip:                                BMD is 0.948 g/cm2, T-score is 0.0, and Z-score is 0.9.     Femoral neck:                          BMD is 0.558 g/cm2, T-score is -2.6, and Z-score is -1.4.     Distal 1/3 radius:                      BMD is 0.690 g/cm2, T-score is 0.1, and Z-score is 1.3.     FRAX:     10% risk of a major osteoporotic fracture in the next 10 years.  1.8% risk of hip fracture in the next 10 years.     Impression  *Osteoporosis based on T-score below -2.5  *Fracture risk is high  *Compared with previous DXA, BMD at the lumbar spine has declined by 8.3%, and BMD at the total hip has declined by 4.1%.     Electronically signed by:Nahum Castro  Date:                                                10/31/2023  Time:                                               17:57         IMPRESSION:  I had the pleasure of seeing Ms. Mccarthy in Endocrine Surgical consultation regarding the biochemical diagnosis of normocalcemic primary hyperparathyroidism.      We discussed that hyperparathyroidism can compromise bone and cardiovascular health. In addition to classic symptoms such as kidney stones and bone loss, hyperparathyroidism can be associated with multiple symptoms including fatigue, depression, memory loss, pruritis, polyuria, nocturia, constipation, polydipsia, and musculoskeletal aches and pains. Hyperparathyroidism can also worsen hypertension.  I discussed the implications of non-operative observation as well as the standard of care surgical treatment of primary hyperparathyroidism.  Based on the current clinical findings and a thorough discussion about the risks, benefits, and alternatives, the patient elected to proceed with parathyroidectomy.       We extensively discussed the pros and cons for surgical intervention, in this case parathyroidectomy with intraoperative parathyroid hormone monitoring.  We discussed that in the majority of cases, a single adenoma is the culprit gland. However, multigland disease is possible and multigland disease has a lower likelihood of radiographic localization.  Parathyroidectomy for single gland disease is a same day surgery while four-gland parathyroid exploration may require an overnight stay. We discussed that in all cases, parathyroidectomy has an attendant risk of postoperative hypocalcemia which can be mitigated with calcium and vitamin D supplementation. Other possible complications associated with this may include, but may not be restricted to hoarseness, recurrent laryngeal nerve injury - temporary or permanent, superior laryngeal nerve injury - temporary or permanent, hypocalcemia and hypoparathyroidism - temporary or permanent, neck hematoma, bleeding, infection, scarring, wound healing problems and possible death. We discussed that in  rare cases, parathyroid exploration may be negative. Recurrent and persistent disease are also possible.  We also discussed that parathyroidectomy may not completely resolve or improve the nephrolithiasis.      All questions were answered and the patient expressed understanding of all the risks, benefits and alternatives, and agreed to proceed with the plan despite the risks.       Problem List Items Addressed This Visit         Thoracic spondylosis without myelopathy       - Cautious positioning intraoperatively with additional padding and support at shoulders and knees           PONV (postoperative nausea and vomiting)       - Multimodal perioperative antiemetics planned  - Postoperative prescription for antiemetic if needed           Primary hyperparathyroidism - Primary       Symptomatic normocalcemic primary hyperparathyroidism with nephrolithiasis and osteoporosis, meets criteria for parathyroid surgery.  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     - OR for parathyroidectomy with intraoperative PTH monitoring  - Consent obtained in clinic   - Maintain adequate hydration and avoid the use of calcium supplements           Nephrolithiasis       Indication for parathyroidectomy     - See primary hyperparathyroidism               Ligia Raya MD  Staff Surgeon  Endocrine Surgery  4/11/24

## 2024-05-17 NOTE — ANESTHESIA PROCEDURE NOTES
Arterial    Diagnosis: hyperparathyroidism    Patient location during procedure: done in OR    Staffing  Authorizing Provider: Alfonso Anguiano III, MD  Performing Provider: Alfonso Anguiano III, MD    Staffing  Performed by: Alfonso Anguiano III, MD  Authorized by: Alfonso Anguiano III, MD    Anesthesiologist was present at the time of the procedure.    Preanesthetic Checklist  Completed: patient identified, IV checked, site marked, risks and benefits discussed, surgical consent, monitors and equipment checked, pre-op evaluation, timeout performed and anesthesia consent givenArterial  Skin Prep: alcohol swabs  Local Infiltration: none  Orientation: left  Location: radial    Catheter Size: 20 G  Catheter placement by Ultrasound guidance. Heme positive aspiration all ports.   Vessel Caliber: medium, patent  Needle advanced into vessel with real time Ultrasound guidance.Insertion Attempts: 1  Assessment  Dressing: secured with tape and tegaderm  Patient: Tolerated well

## 2024-05-17 NOTE — ANESTHESIA PROCEDURE NOTES
Intubation    Date/Time: 5/17/2024 1:15 PM    Performed by: Alfonso Anguiano III, MD  Authorized by: Alfonso Anguiano III, MD    Intubation:     Induction:  Intravenous    Intubated:  Postinduction    Mask Ventilation:  Easy with oral airway    Attempts:  1    Attempted By:  Staff anesthesiologist    Method of Intubation:  Video laryngoscopy    Blade:  Hernadez 3    Laryngeal View Grade: Grade I - full view of cords      Difficult Airway Encountered?: No      Complications:  None    Airway Device:  EMG ETT (NIMS)    Airway Device Size:  7.0    Style/Cuff Inflation:  Cuffed (inflated to minimal occlusive pressure)    Tube secured:  22    Secured at:  The lips    Placement Verified By:  Capnometry    Complicating Factors:  None    Findings Post-Intubation:  BS equal bilateral and atraumatic/condition of teeth unchanged

## 2024-05-17 NOTE — ANESTHESIA PREPROCEDURE EVALUATION
05/17/2024  Pre-operative evaluation for Procedure(s) (LRB):  PARATHYROIDECTOMY With Intraoperative PTH Levels (N/A)    Angie Mccarthy is a 58 y.o. female PMHx of FERRELL, hepatic fibrosis, NIDDM2, s/p gastric sleeve, chronic opioid dependece here for above procedure     Past Medical History:   Diagnosis Date    Allergy     seasonal    Arthritis     Blood transfusion     with back surgeries    Chronic back pain     Diabetes mellitus, type 2     GERD (gastroesophageal reflux disease)     Hyperlipidemia     Hypertension     Hypothyroidism     Thyroid nodule    Joint pain     Kidney stones     Morbid obesity 12/14/2012    NAFLD (nonalcoholic fatty liver disease)     OCD (obsessive compulsive disorder)     Osteoporosis     PONV (postoperative nausea and vomiting)     Persistent, Motion sickness with boat rides, Scopolamine helped -still had nausea    Restless leg syndrome     Sciatica of right side associated with disorder of lumbosacral spine     Spinal stenosis of lumbar region     Thoracic spondylosis      Patient Active Problem List   Diagnosis    Syringomyelia and syringobulbia    Chronic low back pain    Essential hypertension    Hyperlipidemia    Acute postoperative anemia due to expected blood loss    Thoracic spondylosis without myelopathy    Spinal stenosis, lumbar region, without neurogenic claudication    Osteoporosis, idiopathic    Thyroid nodule    Palpitations    GERD (gastroesophageal reflux disease)    Fatty liver disease, nonalcoholic    S/P laparoscopic sleeve gastrectomy    Class 2 obesity with body mass index (BMI) of 37.0 to 37.9 in adult    Back pain    Lumbar disc disease    Anxiety    Depression    Chronic, continuous use of opioids    Snoring    Occult blood in stools    PONV (postoperative nausea and vomiting)    History of prediabetes    Urinary hesitancy    Wheezing     "Microscopic hematuria    Abnormality of gait    Menopausal flushing    Primary hypothyroidism    Pyelonephritis    Hypophosphatemia    Hypomagnesemia    Status post total left knee replacement    Hepatic fibrosis, stage 3    FERRELL (nonalcoholic steatohepatitis)    Osteoarthritis of right knee    Staghorn calculus    Type 2 diabetes mellitus with hyperglycemia    Hypercalciuria    Primary hyperparathyroidism    Nephrolithiasis    Pre-operative cardiovascular examination     Review of patient's allergies indicates:   Allergen Reactions    Adhesive Dermatitis and Blisters    Adhesive tape-silicones Dermatitis     The longer the adhesive stays on, the worse the irritation    Mastisol adhesive [gum awwwqp-blnrth-ugbu-alcohol] Itching, Rash and Blisters     "Looked like poison ivy"    Sulfamethoxazole-trimethoprim Itching and Anxiety     Has a "nervous feeling."  "Jittery"  Also had an upset stomach.Has taken recently and the reaction was "less intense"       No current facility-administered medications on file prior to encounter.     Current Outpatient Medications on File Prior to Encounter   Medication Sig Dispense Refill    metFORMIN (GLUCOPHAGE) 500 MG tablet TAKE 1 TABLET (500 MG TOTAL) BY MOUTH 2 (TWO) TIMES DAILY WITH MEALS. 180 tablet 3    alcohol swabs PadM Apply 1 each topically once daily. 100 each 3    ascorbic acid, vitamin C, (VITAMIN C) 500 MG tablet       BIOTIN ORAL Take 10,000 mg by mouth every morning.      blood glucose control, low (TRUE METRIX LEVEL 1) Soln USE AS DIRECTED WITH GLUCOSE METER 1 each 0    blood sugar diagnostic (TRUE METRIX GLUCOSE TEST STRIP) Strp TEST BLOOD SUGAR EVERY  strip 3    cyclobenzaprine (FLEXERIL) 10 MG tablet TK 1 T PO QD- as needed for muscle spasms  0    DULoxetine (CYMBALTA) 60 MG capsule TAKE 1 CAPSULE EVERY DAY (Patient taking differently: Take by mouth every evening.) 90 capsule 3    fish oil-omega-3 fatty acids 300-1,000 mg capsule Take 1,200 mg by mouth 2 " (two) times daily.      fluticasone propionate (FLONASE) 50 mcg/actuation nasal spray USE 2 SPRAYS NASALLY ONE TIME DAILY (Patient not taking: Reported on 4/15/2024) 48 g 3    gabapentin (NEURONTIN) 800 MG tablet Take 800 mg by mouth 3 (three) times daily. Takes 2-3 times per day  1    HYDROcodone-acetaminophen (NORCO)  mg per tablet Take 1 tablet by mouth 4 (four) times daily as needed.      ketoconazole (NIZORAL) 2 % cream Apply topically once daily. 1 each 0    levothyroxine (SYNTHROID) 137 MCG Tab tablet Take 1 tablet (137 mcg total) by mouth before breakfast. 90 tablet 3    lisinopriL (PRINIVIL,ZESTRIL) 2.5 MG tablet TAKE 1 TABLET EVERY DAY 90 tablet 3    multivitamin capsule Take 1 capsule by mouth every morning.      omeprazole (PRILOSEC) 40 MG capsule TAKE 1 CAPSULE EVERY DAY (Patient taking differently: Take by mouth every morning.) 90 capsule 3    oxyCODONE (ROXICODONE) 5 MG immediate release tablet Take 1 tablet (5 mg total) by mouth every 4 (four) hours as needed for Pain.  (Do not take with home medication: oxycodone/apap 10/325) (Patient not taking: Reported on 5/16/2024) 10 tablet 0    oxyCODONE-acetaminophen (PERCOCET)  mg per tablet Take 1 tablet by mouth 4 (four) times daily as needed for Pain. 10 tablet 0    potassium citrate (UROCIT-K) 5 mEq (540 mg) TbSR Take 2 tablets (10 mEq total) by mouth 3 (three) times daily with meals. 180 tablet 11    rosuvastatin (CRESTOR) 10 MG tablet TAKE 1 TABLET EVERY NIGHT 90 tablet 3    tamsulosin (FLOMAX) 0.4 mg Cap TAKE 1 CAPSULE(0.4 MG) BY MOUTH AFTER DINNER (Patient not taking: Reported on 5/16/2024) 30 capsule 1    tirzepatide (MOUNJARO) 12.5 mg/0.5 mL PnIj Inject 12.5 mg into the skin every 7 days. 4 Pen 3    TRUEPLUS LANCETS 33 gauge Misc TEST BLOOD SUGAR EVERY  each 3    zinc sulfate (ZINCATE) 50 mg zinc (220 mg) capsule       zolpidem (AMBIEN) 10 mg Tab TAKE 1 TABLET BY MOUTH EVERY NIGHT AT BEDTIME AS NEEDED FOR INSOMNIA 90 tablet 0        Past Surgical History:   Procedure Laterality Date    ANTEGRADE NEPHROSTOGRAPHY Right 2/23/2024    Procedure: NEPHROSTOGRAM, ANTEGRADE;  Surgeon: Suni Patel MD;  Location: [facility] OR 1ST FLR;  Service: Urology;  Laterality: Right;    ANTEGRADE NEPHROSTOGRAPHY Left 4/8/2024    Procedure: Nephrostogram;  Surgeon: Suni Patel MD;  Location: [facility] OR 1ST FLR;  Service: Urology;  Laterality: Left;    APPENDECTOMY      BACK SURGERY      x 6    CHOLECYSTECTOMY      CYSTOSCOPY N/A 4/17/2024    Procedure: CYSTOSCOPY;  Surgeon: Suni Patel MD;  Location: [facility] OR 1ST FLR;  Service: Urology;  Laterality: N/A;    CYSTOSCOPY N/A 5/8/2024    Procedure: CYSTOSCOPY;  Surgeon: Suni Patel MD;  Location: [facility] OR 1ST FLR;  Service: Urology;  Laterality: N/A;    DILATION OF NEPHROSTOMY TRACT Left 4/8/2024    Procedure: DILATION, NEPHROSTOMY TRACT;  Surgeon: Suni Patel MD;  Location: [facility] OR 1ST FLR;  Service: Urology;  Laterality: Left;    EXTRACTION - STONE Right 2/23/2024    Procedure: EXTRACTION - STONE;  Surgeon: Suni Patel MD;  Location: [facility] OR 1ST FLR;  Service: Urology;  Laterality: Right;    EXTRACTION - STONE Left 4/17/2024    Procedure: EXTRACTION - STONE;  Surgeon: Suni Patel MD;  Location: [facility] OR 1ST FLR;  Service: Urology;  Laterality: Left;    EXTRACTION - STONE Left 5/8/2024    Procedure: EXTRACTION - STONE;  Surgeon: Suni Patel MD;  Location: [facility] OR 1ST FLR;  Service: Urology;  Laterality: Left;    GASTRECTOMY      Lap Gastric Sleeve    HERNIA REPAIR      HYSTERECTOMY      JOINT REPLACEMENT      LASER LITHOTRIPSY Left 4/17/2024    Procedure: LITHOTRIPSY, USING LASER;  Surgeon: Suni Patel MD;  Location: [facility] OR 1ST FLR;  Service: Urology;  Laterality: Left;    LASER LITHOTRIPSY Left 5/8/2024    Procedure: LITHOTRIPSY, USING LASER;  Surgeon: Suni Patel MD;  Location: [facility] OR [address] [place] [address];  Service:  Urology;  Laterality: Left;    LIVER BIOPSY  02/06/2017    steatohepatitis with stage 1 fibrosis    MYELOGRAPHY N/A 11/29/2018    Procedure: MYELOGRAM;  Surgeon: Ander Diagnostic Provider;  Location: [address];  Service: Radiology;  Laterality: N/A;    MYELOGRAPHY N/A 1/7/2019    Procedure: Myelogram;  Surgeon: Lissett Surgeon;  Location: [facility];  Service: Anesthesiology;  Laterality: N/A;    NEPHROSCOPY Right 2/23/2024    Procedure: NEPHROSCOPY;  Surgeon: Suni Patel MD;  [address]  Service: Urology;  Laterality: Right;  2 hr    NEPHROSTOGRAPHY Right 2/19/2024    Procedure: Nephrostogram;  Surgeon: Suni Patel MD;  [facility];  Service: Urology;  Laterality: Right;    OOPHORECTOMY      PERCUTANEOUS CRYOTHERAPY OF PERIPHERAL NERVE USING LIQUID NITROUS OXIDE IN CLOSED NEEDLE DEVICE Left 4/29/2019    Procedure: CRYOTHERAPY, NERVE, PERIPHERAL, PERCUTANEOUS, USING LIQUID NITROUS OXIDE IN CLOSED NEEDLE DEVICE-iovera left knee;  Surgeon: Chavo Gold III, MD;  Location: [facility] CATH LAB;  Service: Pain Management;  Laterality: Left;    PERCUTANEOUS CRYOTHERAPY OF PERIPHERAL NERVE USING LIQUID NITROUS OXIDE IN CLOSED NEEDLE DEVICE Right 8/2/2021    Procedure: CRYOTHERAPY, NERVE, PERIPHERAL, PERCUTANEOUS, USING LIQUID NITROUS OXIDE IN CLOSED NEEDLE DEVICE;  Surgeon: Saritha Proctor NP;  Location: [facility];  Service: Pain Management;  Laterality: Right;  RIGHT KNEE IOVERA    PERCUTANEOUS NEPHROLITHOTOMY Right 2/19/2024    Procedure: NEPHROLITHOTOMY, PERCUTANEOUS;  Surgeon: Suni Patel MD;  [address];  Service: Urology;  Laterality: Right;  2 HRS    PERCUTANEOUS NEPHROLITHOTOMY Left 4/8/2024    Procedure: NEPHROLITHOTOMY, PERCUTANEOUS;  Surgeon: Suni Patel MD;  [address];  Service: Urology;  Laterality: Left;  3 hrs    PERCUTANEOUS NEPHROSCOPY  2/19/2024    Procedure: NEPHROSCOPY, PERCUTANEOUS;   Surgeon: Suni Patel MD;  Location: [facility] OR 2ND FLR;  Service: Urology;;    PERCUTANEOUS NEPHROSTOMY Right 2/19/2024    Procedure: CREATION, NEPHROSTOMY, PERCUTANEOUS;  Surgeon: Suni Patel MD;  Location: [facility] OR 2ND FLR;  Service: Urology;  Laterality: Right;    PYELOSCOPY Left 4/17/2024    Procedure: PYELOSCOPY;  Surgeon: Suni Patel MD;  Location: [facility] OR 1ST FLR;  Service: Urology;  Laterality: Left;    PYELOSCOPY Left 5/8/2024    Procedure: PYELOSCOPY;  Surgeon: Suni Patel MD;  Location: [facility] OR 1ST FLR;  Service: Urology;  Laterality: Left;    REMOVAL-STENT Left 4/17/2024    Procedure: REMOVAL-STENT;  Surgeon: Suni Patel MD;  Location: [facility] OR 1ST FLR;  Service: Urology;  Laterality: Left;    REMOVAL-STENT Left 5/8/2024    Procedure: REMOVAL-STENT;  Surgeon: Suni Patel MD;  Location: [facility] OR 1ST FLR;  Service: Urology;  Laterality: Left;    REPLACEMENT OF NEPHROSTOMY TUBE Left 4/8/2024    Procedure: PLACEMENT, NEPHROSTOMY TUBE;  Surgeon: Suni Patel MD;  Location: [facility] OR 1ST FLR;  Service: Urology;  Laterality: Left;    REPLACEMENT OF STENT Left 4/17/2024    Procedure: REPLACEMENT, STENT;  Surgeon: Suni Patel MD;  Location: [facility] OR 1ST FLR;  Service: Urology;  Laterality: Left;    REPLACEMENT OF STENT Left 5/8/2024    Procedure: REPLACEMENT, STENT;  Surgeon: Suni Patel MD;  Location: [facility] OR 1ST FLR;  Service: Urology;  Laterality: Left;    SPINAL FUSION      TONSILLECTOMY, ADENOIDECTOMY      TOTAL KNEE ARTHROPLASTY Left 5/1/2019    Procedure: REPLACEMENT-KNEE-TOTAL;  Surgeon: John L. Ochsner Jr., MD;  Location: [facility] OR 2ND FLR;  Service: Orthopedics;  Laterality: Left;    URETERAL STENT PLACEMENT Left 4/8/2024    Procedure: INSERTION, STENT, URETER;  Surgeon: Suni Patel MD;  Location: [facility] OR [address] [place] [address];  Service: Urology;  Laterality: Left;    URETEROSCOPY  2/19/2024    Procedure:  "URETEROSCOPY;  Surgeon: Suni Patel MD;  Location: [facility] OR 2ND FLR;  Service: Urology;;    URETEROSCOPY Left 4/17/2024    Procedure: URETEROSCOPY;  Surgeon: Suni Patel MD;  [facility];  Service: Urology;  Laterality: Left;    URETEROSCOPY Left 5/8/2024    Procedure: URETEROSCOPY;  Surgeon: Suni Patel MD;  [facility];  Service: Urology;  Laterality: Left;    URETEROSCOPY, ANTEGRADE Right 2/23/2024    Procedure: URETEROSCOPY, ANTEGRADE;  Surgeon: Suni Patel MD;  [facility];  Service: Urology;  Laterality: Right;       CBC:   Recent Labs     05/14/24  1615   WBC 8.33   HGB 9.9*   HCT 35.4*          CMP:   Recent Labs     05/14/24  1615      K 4.4      CO2 24   BUN 10   CREATININE 0.8   GLU 82   PHOS 3.5   CALCIUM 9.7   ALBUMIN 3.6       COAGS  No results for input(s): "PT", "INR", "PROTIME", "APTT" in the last 72 hours.    BP Readings from Last 3 Encounters:   05/08/24 122/66   04/17/24 (!) 113/57   04/11/24 114/65       Diagnostic Studies:    EKG:  Results for orders placed or performed during the hospital encounter of 11/20/23   EKG 12-lead    Collection Time: 11/20/23  3:27 PM    Narrative    Test Reason : Z01.818,    Vent. Rate : 085 BPM     Atrial Rate : 085 BPM     P-R Int : 158 ms          QRS Dur : 088 ms      QT Int : 366 ms       P-R-T Axes : 066 -44 074 degrees     QTc Int : 435 ms    Normal sinus rhythm  Left anterior fascicular block  Right ventricular conduction delay  Abnormal ECG  When compared with ECG of 20-DEC-2019 21:46,  No significant change was found  Confirmed by DEISY COHN MD ([id number]) on 11/20/2023 4:36:28 PM    Referred By: REBEKAH DURAN           Confirmed By:DEISY COHN MD       2D Echo:  Results for orders placed during the hospital encounter of 05/29/23    Echo    Interpretation Summary  · Technically challenging study.  · The left ventricle is normal in size with " hyperdynamic systolic function.  · The estimated ejection fraction is 65-70%.  · Normal left ventricular diastolic function.  · The LVOT velocity is elevated, as per text.  · Normal right ventricular size with normal right ventricular systolic function.  · The estimated PA systolic pressure is 30 mmHg.  · Normal central venous pressure (3 mmHg).        Pre-op Assessment    I have reviewed the Patient Summary Reports.     I have reviewed the Nursing Notes. I have reviewed the NPO Status.      Review of Systems  Cardiovascular:     Hypertension               Neg stress test 2016                          Renal/:  Chronic Renal Disease                Hepatic/GI:     GERD Liver Disease, (FERRELL) Hepatitis S/p gastric sleeve          Musculoskeletal:  Arthritis          Spine Disorders: lumbar Degenerative disease and Chronic Pain           Neurological:    Neuromuscular Disease,                                   Endocrine:  Diabetes, type 2 Hypothyroidism          Psych:  Psychiatric History                  Physical Exam  General: Well nourished and Cooperative    Airway:  Mallampati: III   Mouth Opening: Normal  TM Distance: Normal  Neck ROM: Normal ROM    Dental:  Intact        Anesthesia Plan  Type of Anesthesia, risks & benefits discussed:    Anesthesia Type: Gen ETT  Intra-op Monitoring Plan: Art Line and Standard ASA Monitors  Post Op Pain Control Plan: IV/PO Opioids PRN  Induction:  IV  Informed Consent: Informed consent signed with the Patient and all parties understand the risks and agree with anesthesia plan.  All questions answered.   ASA Score: 3  Day of Surgery Review of History & Physical: H&P Update referred to the surgeon/provider.    Ready For Surgery From Anesthesia Perspective.     .

## 2024-05-17 NOTE — TRANSFER OF CARE
"Anesthesia Transfer of Care Note    Patient: Angie Mccarthy    Procedure(s) Performed: Procedure(s) (LRB):  PARATHYROIDECTOMY With Intraoperative PTH Levels (N/A)    Patient location: PACU    Anesthesia Type: general    Transport from OR: Transported from OR on 6-10 L/min O2 by face mask with adequate spontaneous ventilation    Post pain: adequate analgesia    Post assessment: no apparent anesthetic complications    Post vital signs: stable    Level of consciousness: sedated    Nausea/Vomiting: no nausea/vomiting    Complications: none    Transfer of care protocol was followed      Last vitals: Visit Vitals  /70   Pulse 72   Temp 36.7 °C (98.1 °F)   Resp 18   Ht 5' 5" (1.651 m)   Wt 95.7 kg (211 lb)   LMP 12/06/2007   SpO2 98%   Breastfeeding No   BMI 35.11 kg/m²     "

## 2024-05-17 NOTE — BRIEF OP NOTE
Lokesh Ceja - Surgery ([facility])  Brief Operative Note    Surgery Date: 5/17/2024     Surgeons and Role:     * Ligia Raya MD - Primary     * Deon Sarabia MD - Resident - Assisting        Pre-op Diagnosis:  Primary hyperparathyroidism [E21.0]    Post-op Diagnosis:  Post-Op Diagnosis Codes:     * Primary hyperparathyroidism [E21.0]    Procedure(s) (LRB):  PARATHYROIDECTOMY With Intraoperative PTH Levels (N/A)    Anesthesia: General    Operative Findings: Four-gland parathyroid exploration performed. Right superior and left superior glands excised. Right inferior gland biopsied. Left inferior gland not identified. Appropriate drop in intraoperative PTH level following excision.     Estimated Blood Loss: 20mL         Specimens:   Specimen (24h ago, onward)       Start     Ordered    05/17/24 1644  Specimen to Pathology, Surgery General Surgery  Once        Comments: Pre-op Diagnosis: Primary hyperparathyroidism [E21.0]Procedure(s):PARATHYROIDECTOMY With Intraoperative PTH Levels Number of specimens: 7Name of specimens: 1. question left inferior parathyroid - frozen 2. left superior parathyroid biopsy - frozen3. right inferior parathyroid biopsy - frozen4. right superior parathyroid biopy - frozen 5. left level VI lymph nodes - perm6. left superior parathyroid adenoma - perm 7. right superior parathyroid adenoma - perm     References:    Click here for ordering Quick Tip   Question Answer Comment   Procedure Type: General Surgery    Release to patient Immediate        05/17/24 1643                      Discharge Note    OUTCOME: Patient tolerated treatment/procedure well without complication and is now ready for discharge.    DISPOSITION: Home or Self Care    FINAL DIAGNOSIS:  Primary hyperparathyroidism    FOLLOWUP: In clinic    DISCHARGE INSTRUCTIONS:    Discharge Procedure Orders   Diet Adult Regular     No driving until:   Order Comments: No Driving while taking narcotic pain medication     Notify your  health care provider if you experience any of the following:  temperature >100.4     Notify your health care provider if you experience any of the following:  persistent nausea and vomiting or diarrhea     Notify your health care provider if you experience any of the following:  severe uncontrolled pain     Notify your health care provider if you experience any of the following:  redness, tenderness, or signs of infection (pain, swelling, redness, odor or green/yellow discharge around incision site)     No dressing needed     Activity as tolerated

## 2024-05-17 NOTE — NURSING TRANSFER
Nursing Transfer Note      5/17/2024   6:45 PM    Reason patient is being transferred: post-procedure    Transfer To: [facility]    Transfer via stretcher    Transfer with RN    Transported by RN    Order for Tele Monitor? No    Medicines sent: none    Any special needs or follow-up needed: routine    Patient belongings transferred with patient: Yes    Chart send with patient: Yes    Notified: spouse

## 2024-05-18 NOTE — OR NURSING
Patient is stable and ready for discharge. Instructions  given to patient Questions answered. Patient tolerating po liquids with no difficulty. Patient has no pain or states it is a tolerable level for them. Anesthesia consent and surgical consent in chart.

## 2024-05-19 NOTE — ANESTHESIA POSTPROCEDURE EVALUATION
Anesthesia Post Evaluation    Patient: Angie Mccarthy    Procedure(s) Performed: Procedure(s) (LRB):  PARATHYROIDECTOMY With Intraoperative PTH Levels (N/A)    Final Anesthesia Type: general      Patient location during evaluation: PACU  Patient participation: Yes- Able to Participate  Level of consciousness: awake and alert  Post-procedure vital signs: reviewed and stable  Pain management: adequate  Airway patency: patent  JENNI mitigation strategies: Extubation and recovery carried out in lateral, semiupright, or other nonsupine position  PONV status at discharge: No PONV  Anesthetic complications: no      Cardiovascular status: blood pressure returned to baseline  Respiratory status: room air  Hydration status: euvolemic  Follow-up not needed.          Vitals Value Taken Time   /70 05/17/24 1930   Temp 36.5 °C (97.7 °F) 05/17/24 1930   Pulse 76 05/17/24 2001   Resp 16 05/17/24 2000   SpO2 96 % 05/17/24 2001   Vitals shown include unfiled device data.      Event Time   Out of Recovery 18:40:00         Pain/Venessa Score: No data recorded

## 2024-05-21 LAB — POCT GLUCOSE: 117 MG/DL (ref 70–110)

## 2024-05-22 ENCOUNTER — PATIENT MESSAGE (OUTPATIENT)
Dept: SURGERY | Facility: CLINIC | Age: 59
End: 2024-05-22
Payer: MEDICARE

## 2024-05-22 LAB
FINAL PATHOLOGIC DIAGNOSIS: NORMAL
FROZEN SECTION DIAGNOSIS: NORMAL
FROZEN SECTION FOOTNOTE: NORMAL
GROSS: NORMAL
Lab: NORMAL

## 2024-05-27 ENCOUNTER — TELEPHONE (OUTPATIENT)
Dept: SURGERY | Facility: CLINIC | Age: 59
End: 2024-05-27
Payer: MEDICARE

## 2024-05-27 NOTE — TELEPHONE ENCOUNTER
Pt called with c/o numbness and tingling in her hands s/p parathyroidectomy with Dr. Raya on 5/17/24.  She took 2 TUMS tablets 45 minutes ago and the tingling has not stopped.  She may repeat taking 2 TUMS.  If symptoms do not go away, she should to go the ER for evaluation.  She verbalized understanding.

## 2024-05-27 NOTE — TELEPHONE ENCOUNTER
----- Message from Arlene Watkins sent at 5/27/2024  4:22 PM CDT -----  Regarding: post op issues  Contact: [phone]  Angie Mccarthy calling regarding Patient Advice (message) for post op issues.  Pts had surgery on 5/17/24.  Pt states she is having numbness and tingling in finger tip on both of her hands.  She states she took 2 extra tums about 45 mins ago and still have the numbness and tingling.  She stated she stated feeling this about 2:30.  Please advise

## 2024-05-31 ENCOUNTER — LAB VISIT (OUTPATIENT)
Dept: LAB | Facility: HOSPITAL | Age: 59
End: 2024-05-31
Attending: STUDENT IN AN ORGANIZED HEALTH CARE EDUCATION/TRAINING PROGRAM
Payer: MEDICARE

## 2024-05-31 DIAGNOSIS — Z90.89 S/P PARATHYROIDECTOMY: ICD-10-CM

## 2024-05-31 DIAGNOSIS — Z98.890 S/P PARATHYROIDECTOMY: ICD-10-CM

## 2024-05-31 LAB
ALBUMIN SERPL BCP-MCNC: 3.7 G/DL (ref 3.5–5.2)
ANION GAP SERPL CALC-SCNC: 9 MMOL/L (ref 8–16)
BUN SERPL-MCNC: 11 MG/DL (ref 6–20)
CALCIUM SERPL-MCNC: 9.7 MG/DL (ref 8.7–10.5)
CHLORIDE SERPL-SCNC: 105 MMOL/L (ref 95–110)
CO2 SERPL-SCNC: 27 MMOL/L (ref 23–29)
CREAT SERPL-MCNC: 0.8 MG/DL (ref 0.5–1.4)
EST. GFR  (NO RACE VARIABLE): >60 ML/MIN/1.73 M^2
GLUCOSE SERPL-MCNC: 94 MG/DL (ref 70–110)
PHOSPHATE SERPL-MCNC: 3.6 MG/DL (ref 2.7–4.5)
POTASSIUM SERPL-SCNC: 4.6 MMOL/L (ref 3.5–5.1)
SODIUM SERPL-SCNC: 141 MMOL/L (ref 136–145)

## 2024-05-31 PROCEDURE — 80069 RENAL FUNCTION PANEL: CPT | Performed by: STUDENT IN AN ORGANIZED HEALTH CARE EDUCATION/TRAINING PROGRAM

## 2024-05-31 PROCEDURE — 36415 COLL VENOUS BLD VENIPUNCTURE: CPT | Performed by: STUDENT IN AN ORGANIZED HEALTH CARE EDUCATION/TRAINING PROGRAM

## 2024-06-10 ENCOUNTER — OFFICE VISIT (OUTPATIENT)
Dept: SURGERY | Facility: CLINIC | Age: 59
End: 2024-06-10
Attending: STUDENT IN AN ORGANIZED HEALTH CARE EDUCATION/TRAINING PROGRAM
Payer: MEDICARE

## 2024-06-10 ENCOUNTER — PATIENT MESSAGE (OUTPATIENT)
Dept: SURGERY | Facility: CLINIC | Age: 59
End: 2024-06-10
Payer: MEDICARE

## 2024-06-10 VITALS
HEIGHT: 65 IN | SYSTOLIC BLOOD PRESSURE: 140 MMHG | WEIGHT: 198.88 LBS | DIASTOLIC BLOOD PRESSURE: 70 MMHG | BODY MASS INDEX: 33.13 KG/M2

## 2024-06-10 DIAGNOSIS — Z98.890 S/P PARATHYROIDECTOMY: ICD-10-CM

## 2024-06-10 DIAGNOSIS — E21.0 PRIMARY HYPERPARATHYROIDISM: Primary | ICD-10-CM

## 2024-06-10 DIAGNOSIS — Z90.89 S/P PARATHYROIDECTOMY: ICD-10-CM

## 2024-06-10 PROCEDURE — 3066F NEPHROPATHY DOC TX: CPT | Mod: CPTII,S$GLB,, | Performed by: STUDENT IN AN ORGANIZED HEALTH CARE EDUCATION/TRAINING PROGRAM

## 2024-06-10 PROCEDURE — 3078F DIAST BP <80 MM HG: CPT | Mod: CPTII,S$GLB,, | Performed by: STUDENT IN AN ORGANIZED HEALTH CARE EDUCATION/TRAINING PROGRAM

## 2024-06-10 PROCEDURE — 3077F SYST BP >= 140 MM HG: CPT | Mod: CPTII,S$GLB,, | Performed by: STUDENT IN AN ORGANIZED HEALTH CARE EDUCATION/TRAINING PROGRAM

## 2024-06-10 PROCEDURE — 99024 POSTOP FOLLOW-UP VISIT: CPT | Mod: S$GLB,,, | Performed by: STUDENT IN AN ORGANIZED HEALTH CARE EDUCATION/TRAINING PROGRAM

## 2024-06-10 PROCEDURE — 3044F HG A1C LEVEL LT 7.0%: CPT | Mod: CPTII,S$GLB,, | Performed by: STUDENT IN AN ORGANIZED HEALTH CARE EDUCATION/TRAINING PROGRAM

## 2024-06-10 PROCEDURE — 99999 PR PBB SHADOW E&M-EST. PATIENT-LVL II: CPT | Mod: PBBFAC,,, | Performed by: STUDENT IN AN ORGANIZED HEALTH CARE EDUCATION/TRAINING PROGRAM

## 2024-06-10 PROCEDURE — 4010F ACE/ARB THERAPY RXD/TAKEN: CPT | Mod: CPTII,S$GLB,, | Performed by: STUDENT IN AN ORGANIZED HEALTH CARE EDUCATION/TRAINING PROGRAM

## 2024-06-10 RX ORDER — VIT C/E/ZN/COPPR/LUTEIN/ZEAXAN 250MG-90MG
1000 CAPSULE ORAL DAILY
COMMUNITY
Start: 2024-06-10

## 2024-06-10 NOTE — ASSESSMENT & PLAN NOTE
Symptomatic normocalcemic primary hyperparathyroidism with nephrolithiasis and osteoporosis s/p parathyroidectomy (bilateral exploration, excision of bilateral superior parathyroid adenomas, biopsy of right inferior parathyroid gland) on 5/17/2024.      Doing well postoperatively.  Voice adequate.  No hypocalcemia symptoms.    - Discontinue calcium supplements  - Discontinue narcotics  - Reviewed pathology  - Incision care discussed, scar massage and routine scar care information provided  - Recommended daily dietary calcium intake equal to about 8308-6499 mg  - Recommend 4889-2346 IU vitamin D3 supplementation daily, available over the counter  - Obtain labs in six months to document cure of primary hyperparathyroidism with RFP, PTH and Vitamin D

## 2024-06-10 NOTE — PROGRESS NOTES
Postoperative Endocrine Surgery Clinic Note    Reason for visit / Chief complaint: Postoperative evaluation  Endocrinologist: Wicho  Procedure:  PARATHYROIDECTOMY With Intraoperative PTH Levels  Procedure Date: 5/17/2024    Subjective:     Angie Mccarthy returns today for postoperative evaluation, she is approximately 2 weeks post op.    Procedure:   Parathyroidectomy, bilateral exploration, excision of bilateral superior parathyroid adenomas.  Biopsy of right inferior parathyroid gland.  Exploration for left inferior parathyroid gland.  Intraoperative monitoring and interpretation of bilateral cranial nerves (vagus and recurrent laryngeal nerves) using the [employer] system  Intraoperative PTH level sampling and interpretation    Operative findings:   Right superior parathyroid adenoma was identified.  Confirmed parathyroid tissue with frozen section, reported as distinctly hypercellular.  Gland excised.  Right inferior parathyroid gland identified, biopsied and confirmed parathyroid tissue with frozen section.  Gland remains in situ and marked with a small clip.  Left superior parathyroid gland was identified.  Moderately abnormal in size and color.  Confirmed parathyroid tissue with frozen section.  Gland excised.  Extensive exploration for left inferior parathyroid gland.  Two candidates excised, though appeared to be lymph nodes, one biopsied and confirmed lymph node/thymic tissue by frozen section.  Appropriate decrease in PTH following excision of right superior parathyroid adenoma.  The bilateral recurrent laryngeal nerves and vagus nerves were identified and preserved.  Function was verified using the nerve monitoring system.    She has had an uncomplicated postoperative course. She denies signs or symptoms of hypocalcemia. She stopped taking calcium supplements after the lab draw.  Her phonation is at baseline.  Pain is well controlled.    Current Outpatient Medications   Medication Sig  Dispense Refill    alcohol swabs PadM Apply 1 each topically once daily. 100 each 3    ascorbic acid, vitamin C, (VITAMIN C) 500 MG tablet       aspirin (ECOTRIN) 81 MG EC tablet Take 81 mg by mouth nightly.      BIOTIN ORAL Take 10,000 mg by mouth every morning.      blood glucose control, low (TRUE METRIX LEVEL 1) Soln USE AS DIRECTED WITH GLUCOSE METER 1 each 0    blood sugar diagnostic (TRUE METRIX GLUCOSE TEST STRIP) Strp TEST BLOOD SUGAR EVERY  strip 3    cyclobenzaprine (FLEXERIL) 10 MG tablet TK 1 T PO QD- as needed for muscle spasms  0    DULoxetine (CYMBALTA) 60 MG capsule TAKE 1 CAPSULE EVERY DAY (Patient taking differently: Take by mouth every evening.) 90 capsule 3    fish oil-omega-3 fatty acids 300-1,000 mg capsule Take 1,200 mg by mouth 2 (two) times daily.      fluticasone propionate (FLONASE) 50 mcg/actuation nasal spray USE 2 SPRAYS NASALLY ONE TIME DAILY 48 g 3    gabapentin (NEURONTIN) 800 MG tablet Take 800 mg by mouth 3 (three) times daily. Takes 2-3 times per day  1    ketoconazole (NIZORAL) 2 % cream Apply topically once daily. 1 each 0    levothyroxine (SYNTHROID) 137 MCG Tab tablet Take 1 tablet (137 mcg total) by mouth before breakfast. 90 tablet 3    lisinopriL (PRINIVIL,ZESTRIL) 2.5 MG tablet TAKE 1 TABLET EVERY DAY 90 tablet 3    multivitamin capsule Take 1 capsule by mouth every morning.      omeprazole (PRILOSEC) 40 MG capsule TAKE 1 CAPSULE EVERY DAY (Patient taking differently: Take by mouth every morning.) 90 capsule 3    oxyCODONE-acetaminophen (PERCOCET)  mg per tablet Take 1 tablet by mouth 4 (four) times daily as needed for Pain. 10 tablet 0    potassium citrate (UROCIT-K) 5 mEq (540 mg) TbSR Take 2 tablets (10 mEq total) by mouth 3 (three) times daily with meals. 180 tablet 11    rosuvastatin (CRESTOR) 10 MG tablet TAKE 1 TABLET EVERY NIGHT 90 tablet 3    tirzepatide (MOUNJARO) 12.5 mg/0.5 mL PnIj Inject 12.5 mg into the skin every 7 days. 4 Pen 3    TRUEPLUS  "LANCETS 33 gauge Misc TEST BLOOD SUGAR EVERY  each 3    zinc sulfate (ZINCATE) 50 mg zinc (220 mg) capsule       zolpidem (AMBIEN) 10 mg Tab TAKE 1 TABLET BY MOUTH EVERY NIGHT AT BEDTIME AS NEEDED FOR INSOMNIA 90 tablet 0    cholecalciferol, vitamin D3, (VITAMIN D3) 25 mcg (1,000 unit) capsule Take 1 capsule (1,000 Units total) by mouth once daily.      metFORMIN (GLUCOPHAGE) 500 MG tablet TAKE 1 TABLET (500 MG TOTAL) BY MOUTH 2 (TWO) TIMES DAILY WITH MEALS. 180 tablet 3     Current Facility-Administered Medications   Medication Dose Route Frequency Provider Last Rate Last Admin    triamcinolone acetonide injection 40 mg  40 mg Intradermal 1 time in Clinic/HOD Teri Manuel MD         Review of patient's allergies indicates:   Allergen Reactions    Adhesive Dermatitis and Blisters    Adhesive tape-silicones Dermatitis     The longer the adhesive stays on, the worse the irritation    Mastisol adhesive [gum osxbhd-iwnoqa-lgmb-alcohol] Itching, Rash and Blisters     "Looked like poison ivy"    Sulfamethoxazole-trimethoprim Itching and Anxiety     Has a "nervous feeling."  "Jittery"  Also had an upset stomach.Has taken recently and the reaction was "less intense"    Bactoshield chg [chlorhexidine gluconate] Rash     Caused red, itchy patch to skin        Review of Systems  Negative except as per HPI.     Objective:   BP (!) 140/70   Ht 5' 5" (1.651 m)   Wt 90.2 kg (198 lb 13.7 oz)   LMP 12/06/2007   BMI 33.09 kg/m²     General: alert, well appearing, and in no distress  Neck: neck is flat, no erythema or edema  Incision: well approximated, healing well  Neurological: phonation is normal    Labs:  Lab Results   Component Value Date    CALCIUM 9.7 05/31/2024    ALBUMIN 3.7 05/31/2024    PHOS 3.6 05/31/2024    [id number] 47 10/25/2023       Pathology:  Final Pathologic Diagnosis   Date Value Ref Range Status   05/17/2024   Final    1. Parathyroid, left inferior, biopsy:   - Benign lymphoid tissue   - " Negative for malignancy    2. Parathyroid, left superior, biopsy:   - Parathyroid tissue present   - Weight:  22.9 mg     3. Parathyroid, right inferior, biopsy:  - Parathyroid tissue present   - Weight:  10.5 mg     4. Parathyroid, right superior, biopsy:  - Parathyroid tissue present   - Weight:  21.8 mg    5. Lymph node, left level 6, excision:   - Two lymph nodes, negative for malignancy (0/2)      6. Parathyroid, left superior, adenoma, excision:   - Enlarged and hypercellular parathyroid gland   - Weight:  279.5 mg     7. Parathyroid, right superior, adenoma, excision:   - Enlarged and hypercellular parathyroid gland  - Weight:  120.5 mg         Comment:     Interp By MORIS Cline MD, Signed on 05/22/2024 at 11:19         Assessment and Plan     Problem List Items Addressed This Visit       Primary hyperparathyroidism - Primary    Current Assessment & Plan     Symptomatic normocalcemic primary hyperparathyroidism with nephrolithiasis and osteoporosis s/p parathyroidectomy (bilateral exploration, excision of bilateral superior parathyroid adenomas, biopsy of right inferior parathyroid gland) on 5/17/2024.      Doing well postoperatively.  Voice adequate.  No hypocalcemia symptoms.    - Discontinue calcium supplements  - Discontinue narcotics  - Reviewed pathology  - Incision care discussed, scar massage and routine scar care information provided  - Recommended daily dietary calcium intake equal to about 4403-4359 mg  - Recommend 4828-9407 IU vitamin D3 supplementation daily, available over the counter  - Obtain labs in six months to document cure of primary hyperparathyroidism with RFP, PTH and Vitamin D         S/P parathyroidectomy    Overview     Symptomatic normocalcemic primary hyperparathyroidism with nephrolithiasis and osteoporosis s/p parathyroidectomy (bilateral exploration, excision of bilateral superior parathyroid adenomas, biopsy of right inferior parathyroid gland) on 5/17/2024.            Current Assessment & Plan     - See Primary hyperparathyroidism           Ligia Raya MD  Staff Surgeon  Endocrine Surgery  6/10/24

## 2024-06-11 ENCOUNTER — TELEPHONE (OUTPATIENT)
Dept: ENDOSCOPY | Facility: HOSPITAL | Age: 59
End: 2024-06-11
Payer: MEDICARE

## 2024-06-11 NOTE — TELEPHONE ENCOUNTER
Dear Dr Raya,    Patient has a scheduled procedure Colonoscopy 7/17/24 and in order to ensure patient safety, we would like to confirm if he/she can be cleared for the procedure.      Thank you for your prompt reply.    [facility] Endoscopy Scheduling

## 2024-06-13 ENCOUNTER — PATIENT MESSAGE (OUTPATIENT)
Dept: ENDOCRINOLOGY | Facility: CLINIC | Age: 59
End: 2024-06-13
Payer: MEDICARE

## 2024-06-14 RX ORDER — TIRZEPATIDE 15 MG/.5ML
15 INJECTION, SOLUTION SUBCUTANEOUS
Qty: 4 PEN | Refills: 11 | Status: SHIPPED | OUTPATIENT
Start: 2024-06-14

## 2024-06-18 ENCOUNTER — OFFICE VISIT (OUTPATIENT)
Dept: ENDOCRINOLOGY | Facility: CLINIC | Age: 59
End: 2024-06-18
Payer: MEDICARE

## 2024-06-18 DIAGNOSIS — E11.65 TYPE 2 DIABETES MELLITUS WITH HYPERGLYCEMIA, WITHOUT LONG-TERM CURRENT USE OF INSULIN: ICD-10-CM

## 2024-06-18 DIAGNOSIS — E03.9 PRIMARY HYPOTHYROIDISM: Primary | ICD-10-CM

## 2024-06-18 DIAGNOSIS — E04.1 THYROID NODULE: ICD-10-CM

## 2024-06-18 PROBLEM — E21.0 PRIMARY HYPERPARATHYROIDISM: Status: RESOLVED | Noted: 2023-10-27 | Resolved: 2024-06-18

## 2024-06-18 PROCEDURE — 3066F NEPHROPATHY DOC TX: CPT | Mod: CPTII,95,, | Performed by: INTERNAL MEDICINE

## 2024-06-18 PROCEDURE — 3044F HG A1C LEVEL LT 7.0%: CPT | Mod: CPTII,95,, | Performed by: INTERNAL MEDICINE

## 2024-06-18 PROCEDURE — G2211 COMPLEX E/M VISIT ADD ON: HCPCS | Mod: 95,,, | Performed by: INTERNAL MEDICINE

## 2024-06-18 PROCEDURE — 99214 OFFICE O/P EST MOD 30 MIN: CPT | Mod: 95,,, | Performed by: INTERNAL MEDICINE

## 2024-06-18 PROCEDURE — 4010F ACE/ARB THERAPY RXD/TAKEN: CPT | Mod: CPTII,95,, | Performed by: INTERNAL MEDICINE

## 2024-06-18 RX ORDER — METFORMIN HYDROCHLORIDE 500 MG/1
500 TABLET ORAL 2 TIMES DAILY WITH MEALS
Qty: 180 TABLET | Refills: 3 | Status: SHIPPED | OUTPATIENT
Start: 2024-06-18 | End: 2025-06-18

## 2024-06-18 NOTE — PROGRESS NOTES
Subjective:      Patient ID: Angie Mccarthy is a 58 y.o. female.    Chief Complaint:  No chief complaint on file.      History of Present Illness  Ms. Mccarthy is a 58 year old woman who is here for evaluation of s/p parathryoidectomy X 2 for primary hyperparathyroidism, T2DM, hypothyroidism and osteoporosis. Also has a personal and family history of kidney stones.    For her PHPTH,   Had resection of two hyperplastic parathyroid glands 5/17/2024  Intra op PTH 46 pg/ml    6. Parathyroid, left superior, adenoma, excision:  - Enlarged and hypercellular parathyroid gland  - Weight: 279.5 mg  7. Parathyroid, right superior, adenoma, excision:  - Enlarged and hypercellular parathyroid gland  - Weight: 120.5 mg    Complicated by kidney stones    Stopped TUMS  Dietary calcium   MVI and vitamin D     Hypothyroidism on replacement, levothyroxine 125 mcg daily.   Lab Results   Component Value Date    TSH 11.112 (H) 01/25/2024        Osteoporosis Risk Factors:  Needs updated DXA scan in 10/2025  Recent DXA scan demonstrates osteoporosis at the FN, decline at the hip (4%) and 1/3 distal radius (8%). TAHBSO 16 years ago  Hormone therapy for 1- 2 years   Denies fractures.      Last DXA five years ago, FN -1.4     Supplements:  Calcium:  no  MVI yes   D3 1000 IUs daily   Last vitamin D 47     Dietary calcium: cheese, yogurt, occ broccoli, regular salads but no spinach     Type 2 diabetes, most recent   Lab Results   Component Value Date    HGBA1C 5.6 01/25/2024       Trying to avoid stress eating  Lost 45 lbs total has regained about 13 lbs.   Current weight 198 lbs     Current regimen:   Mounjaro 15 mg weekly   Metformin 500 mg one tablet twice a day    Has not started exercising yet.     Review of Systems    Objective:   Physical Exam  There were no vitals filed for this visit.    BP Readings from Last 3 Encounters:   06/10/24 (!) 140/70   05/17/24 (!) 154/70   05/08/24 122/66     Wt Readings from Last 1 Encounters:    06/10/24 1500 90.2 kg (198 lb 13.7 oz)         There is no height or weight on file to calculate BMI.    Lab Review:   Lab Results   Component Value Date    HGBA1C 5.6 01/25/2024     Lab Results   Component Value Date    CHOL 124 09/20/2023    HDL 44 09/20/2023    LDLCALC 52.8 (L) 09/20/2023    TRIG 136 09/20/2023    CHOLHDL 35.5 09/20/2023     Lab Results   Component Value Date     05/31/2024    K 4.6 05/31/2024     05/31/2024    CO2 27 05/31/2024    GLU 94 05/31/2024    BUN 11 05/31/2024    CREATININE 0.8 05/31/2024    CALCIUM 9.7 05/31/2024    PROT 6.8 04/03/2024    ALBUMIN 3.7 05/31/2024    BILITOT 0.4 04/03/2024    ALKPHOS 90 04/03/2024    AST 24 04/03/2024    ALT 16 04/03/2024    ANIONGAP 9 05/31/2024    ESTGFRAFRICA >60.0 04/25/2022    EGFRNONAA >60.0 04/25/2022    TSH 11.112 (H) 01/25/2024         Assessment and Plan     Primary hypothyroidism  Check labs and adjust thyroid hormone if needed  Repeat labs due soon    Type 2 diabetes mellitus with hyperglycemia  Doing well   Repeat A1c and microalb with next labs   Needs ophthalmology appointment soon     Current regimen:   metformin 500 mg one tablet twice a day   Mounjaro 15 mg weekly     Regained some weight however back on 15 mg weekly     Thyroid nodule  TIRADS 3 nodule, previous aspiration with benign results   Does not need updated US for surveillance

## 2024-06-18 NOTE — ASSESSMENT & PLAN NOTE
TIRADS 3 nodule, previous aspiration with benign results   Does not need updated US for surveillance

## 2024-06-18 NOTE — ASSESSMENT & PLAN NOTE
Doing well   Repeat A1c and microalb with next labs   Needs ophthalmology appointment soon     Current regimen:   metformin 500 mg one tablet twice a day   Mounjaro 15 mg weekly     Regained some weight however back on 15 mg weekly

## 2024-07-10 ENCOUNTER — TELEPHONE (OUTPATIENT)
Dept: ENDOSCOPY | Facility: HOSPITAL | Age: 59
End: 2024-07-10
Payer: MEDICARE

## 2024-07-10 NOTE — TELEPHONE ENCOUNTER
Left voicemail and sent portal message for patient to call Endoscopy Scheduling to review instructions and confirm appointment for Colonoscopy on 7/17/24.

## 2024-07-16 ENCOUNTER — TELEPHONE (OUTPATIENT)
Dept: ENDOSCOPY | Facility: HOSPITAL | Age: 59
End: 2024-07-16
Payer: MEDICARE

## 2024-07-16 NOTE — TELEPHONE ENCOUNTER
Spoke to pt to reschedule procedure(s) Colonoscopy       Physician to perform procedure(s) Dr. POOJA Dai  Date of Procedure (s) 8/19/24  Arrival Time 12:00 PM  Time of Procedure(s) 1:00 PM   Location of Procedure(s) [place] [address]  Type of Rx Prep sent to patient: [place]  Instructions provided to patient via [facility]    Patient was informed on the following information and verbalized understanding. Screening questionnaire reviewed with patient and complete. If procedure requires anesthesia, a responsible adult needs to be present to accompany the patient home, patient cannot drive after receiving anesthesia. Appointment details are tentative, especially check-in time. Patient will receive a prep-op call 7 days prior to confirm check-in time for procedure. If applicable the patient should contact their pharmacy to verify Rx for procedure prep is ready for pick-up. Patient was advised to call the scheduling department at [phone] if pharmacy states no Rx is available. Patient was advised to call the endoscopy scheduling department if any questions or concerns arise.      [facility] Endoscopy Scheduling Department

## 2024-08-05 ENCOUNTER — TELEPHONE (OUTPATIENT)
Dept: NEUROLOGY | Facility: CLINIC | Age: 59
End: 2024-08-05
Payer: MEDICARE

## 2024-08-12 ENCOUNTER — TELEPHONE (OUTPATIENT)
Dept: ENDOSCOPY | Facility: HOSPITAL | Age: 59
End: 2024-08-12
Payer: MEDICARE

## 2024-08-12 DIAGNOSIS — Z12.11 SCREEN FOR COLON CANCER: Primary | ICD-10-CM

## 2024-08-12 RX ORDER — POLYETHYLENE GLYCOL 3350, SODIUM SULFATE, POTASSIUM CHLORIDE, MAGNESIUM SULFATE, AND SODIUM CHLORIDE FOR ORAL SOLUTION 178.7-7.3G
1 KIT ORAL DAILY
Qty: 2 EACH | Refills: 0 | Status: SHIPPED | OUTPATIENT
Start: 2024-08-12 | End: 2024-08-14

## 2024-08-12 NOTE — TELEPHONE ENCOUNTER
Spoke to patient for pre-call to confirm scheduled Colonoscopy and patient verbalized understanding of the following:       Date & arrival time of procedure(s) verified 8/19/24, 12:00 PM.  Location of procedure(s) [place] [address] verified.  NPO status reinforced. Ok to continue clear liquids until 9:00 AM.   Patient denies use of blood thinners, GLP-1 medications, and weight loss medications.  Patient is no longer taking Mounjaro (Tirzepatide), and stopped >1 month ago.  Patient confirmed receipt of prep instructions and Rx prep was resent to pharmacy today.  Instructions provided to patient via [facility].  Patient confirmed ride home after procedure if procedure requires anesthesia.   Pre-call screening questionnaire reviewed and completed with patient.   Appointment details are tentative, including check-in time.  If the patient begins taking any blood thinning medications, injectable weight loss/diabetes medications (other than insulin), or Adipex (phentermine) patient was instructed to contact the endoscopy scheduling department as soon as possible.  Patient was advised to call the endoscopy scheduling department if any questions or concerns arise.     [facility] Endoscopy Scheduling Department

## 2024-08-14 ENCOUNTER — TELEPHONE (OUTPATIENT)
Dept: NEUROLOGY | Facility: CLINIC | Age: 59
End: 2024-08-14
Payer: MEDICARE

## 2024-08-16 ENCOUNTER — TELEPHONE (OUTPATIENT)
Dept: ENDOSCOPY | Facility: HOSPITAL | Age: 59
End: 2024-08-16
Payer: MEDICARE

## 2024-08-16 VITALS — HEIGHT: 65 IN | BODY MASS INDEX: 32.99 KG/M2 | WEIGHT: 198 LBS

## 2024-08-16 NOTE — TELEPHONE ENCOUNTER
Spoke to pt to reschedule procedure(s) Colonoscopy       Physician to perform procedure(s) Dr. POOJA Dai  Date of Procedure (s) 9/18/24  Arrival Time 8:45 AM  Time of Procedure(s) 9:45 AM   Location of Procedure(s) [place] [address]  Type of Rx Prep sent to patient: PEG  Instructions provided to patient via [facility]    Patient was informed on the following information and verbalized understanding. Screening questionnaire reviewed with patient and complete. If procedure requires anesthesia, a responsible adult needs to be present to accompany the patient home, patient cannot drive after receiving anesthesia. Appointment details are tentative, especially check-in time. Patient will receive a prep-op call 7 days prior to confirm check-in time for procedure. If applicable the patient should contact their pharmacy to verify Rx for procedure prep is ready for pick-up. Patient was advised to call the scheduling department at [phone] if pharmacy states no Rx is available. Patient was advised to call the endoscopy scheduling department if any questions or concerns arise.      [facility] Endoscopy Scheduling Department

## 2024-08-16 NOTE — PLAN OF CARE
Spoke to pt to reschedule procedure(s) Colonoscopy       Physician to perform procedure(s) Dr. POOJA Dai  Date of Procedure (s) 9/18/24  Arrival Time 8:45 AM  Time of Procedure(s) 9:45 AM   Location of Procedure(s) [place] [address] Floor  Type of Rx Prep sent to patient: PEG  Instructions provided to patient via [facility]    Patient was informed on the following information and verbalized understanding. Screening questionnaire reviewed with patient and complete. If procedure requires anesthesia, a responsible adult needs to be present to accompany the patient home, patient cannot drive after receiving anesthesia. Appointment details are tentative, especially check-in time. Patient will receive a prep-op call 7 days prior to confirm check-in time for procedure. If applicable the patient should contact their pharmacy to verify Rx for procedure prep is ready for pick-up. Patient was advised to call the scheduling department at [phone] if pharmacy states no Rx is available. Patient was advised to call the endoscopy scheduling department if any questions or concerns arise.      [facility] Endoscopy Scheduling Department

## 2024-08-26 ENCOUNTER — TELEPHONE (OUTPATIENT)
Dept: NEPHROLOGY | Facility: CLINIC | Age: 59
End: 2024-08-26
Payer: MEDICARE

## 2024-08-26 ENCOUNTER — PATIENT MESSAGE (OUTPATIENT)
Dept: NEPHROLOGY | Facility: CLINIC | Age: 59
End: 2024-08-26
Payer: MEDICARE

## 2024-08-29 ENCOUNTER — TELEPHONE (OUTPATIENT)
Facility: CLINIC | Age: 59
End: 2024-08-29
Payer: MEDICARE

## 2024-08-29 DIAGNOSIS — K74.02 HEPATIC FIBROSIS, STAGE 3: Primary | ICD-10-CM

## 2024-08-29 DIAGNOSIS — K76.0 FATTY LIVER: ICD-10-CM

## 2024-08-29 NOTE — TELEPHONE ENCOUNTER
Pt was call no answer voice mail left asking her to call back in regards to US message.    Ena      ----- Message from Rosie Najera sent at 8/29/2024 11:22 AM CDT -----  Regarding: Appt  Contact: Pt  [phone]            Current Appt date: 08/29 and 08/30     Type of Appt:  US, and EP      Physician: Mami Chino NP    Reason for rescheduling:  States she fannie breakfast could not do US      Caller: Angie      Contact Preference:  [phone]

## 2024-08-29 NOTE — TELEPHONE ENCOUNTER
Pt's ultrasound appt was RsMinerva Sutherland     ----- Message from Mami Chino NP sent at 8/29/2024  3:33 PM CDT -----  Regarding: RE: Appt  Contact: Pt  [phone]  No she can keep her appointment and do the ultrasound another day.  ----- Message -----  From: Ena Mtz MA  Sent: 8/29/2024   2:24 PM CDT  To: Mami Chino NP  Subject: FW: Appt                                         Pt's appt is schld for 8/30 but she was unable to have US completed. Do the pt need to Rs ?  ----- Message -----  From: Rosie aNjera  Sent: 8/29/2024  11:26 AM CDT  To: Matti De Anda  Subject: Appt                                                       Current Appt date: 08/29 and 08/30     Type of Appt:  US, and EP      Physician: Mami Chino NP    Reason for rescheduling:  States she fannie breakfast could not do US      Caller: Angie      Contact Preference:  [phone]

## 2024-09-10 ENCOUNTER — TELEPHONE (OUTPATIENT)
Dept: ENDOSCOPY | Facility: HOSPITAL | Age: 59
End: 2024-09-10
Payer: MEDICARE

## 2024-09-10 NOTE — TELEPHONE ENCOUNTER
Left voicemail and sent portal message for patient to call Endoscopy Scheduling to review instructions and confirm appointment for Colonoscopy on 9/18/24.

## 2024-09-13 ENCOUNTER — HOSPITAL ENCOUNTER (OUTPATIENT)
Dept: RADIOLOGY | Facility: HOSPITAL | Age: 59
Discharge: HOME | End: 2024-09-13
Attending: NURSE PRACTITIONER
Payer: MEDICARE

## 2024-09-13 ENCOUNTER — PATIENT MESSAGE (OUTPATIENT)
Dept: UROLOGY | Facility: CLINIC | Age: 59
End: 2024-09-13
Payer: MEDICARE

## 2024-09-13 ENCOUNTER — HOSPITAL ENCOUNTER (OUTPATIENT)
Dept: RADIOLOGY | Facility: HOSPITAL | Age: 59
Discharge: HOME | End: 2024-09-13
Payer: MEDICARE

## 2024-09-13 ENCOUNTER — PATIENT MESSAGE (OUTPATIENT)
Dept: NEPHROLOGY | Facility: CLINIC | Age: 59
End: 2024-09-13
Payer: MEDICARE

## 2024-09-13 DIAGNOSIS — K74.02 HEPATIC FIBROSIS, STAGE 3: ICD-10-CM

## 2024-09-13 DIAGNOSIS — N20.0 NEPHROLITHIASIS: Primary | ICD-10-CM

## 2024-09-13 DIAGNOSIS — N20.0 NEPHROLITHIASIS: ICD-10-CM

## 2024-09-13 DIAGNOSIS — K76.0 FATTY LIVER: ICD-10-CM

## 2024-09-13 DIAGNOSIS — N20.0 BILATERAL NEPHROLITHIASIS: ICD-10-CM

## 2024-09-13 PROCEDURE — 76775 US EXAM ABDO BACK WALL LIM: CPT | Mod: 26,,, | Performed by: RADIOLOGY

## 2024-09-13 PROCEDURE — 76705 ECHO EXAM OF ABDOMEN: CPT | Mod: TC

## 2024-09-13 PROCEDURE — 76775 US EXAM ABDO BACK WALL LIM: CPT | Mod: TC

## 2024-09-13 PROCEDURE — 76705 ECHO EXAM OF ABDOMEN: CPT | Mod: 26,,, | Performed by: RADIOLOGY

## 2024-09-14 ENCOUNTER — LAB VISIT (OUTPATIENT)
Dept: LAB | Facility: HOSPITAL | Age: 59
End: 2024-09-14
Attending: INTERNAL MEDICINE
Payer: MEDICARE

## 2024-09-14 DIAGNOSIS — E11.65 TYPE 2 DIABETES MELLITUS WITH HYPERGLYCEMIA, WITHOUT LONG-TERM CURRENT USE OF INSULIN: ICD-10-CM

## 2024-09-14 DIAGNOSIS — N20.0 NEPHROLITHIASIS: ICD-10-CM

## 2024-09-14 DIAGNOSIS — E03.9 PRIMARY HYPOTHYROIDISM: ICD-10-CM

## 2024-09-14 DIAGNOSIS — E21.3 HYPERPARATHYROIDISM, UNSPECIFIED: ICD-10-CM

## 2024-09-14 LAB
25(OH)D3+25(OH)D2 SERPL-MCNC: 42 NG/ML (ref 30–96)
ALBUMIN SERPL BCP-MCNC: 3.6 G/DL (ref 3.5–5.2)
ANION GAP SERPL CALC-SCNC: 11 MMOL/L (ref 8–16)
BASOPHILS # BLD AUTO: 0.08 K/UL (ref 0–0.2)
BASOPHILS NFR BLD: 1.1 % (ref 0–1.9)
BUN SERPL-MCNC: 11 MG/DL (ref 6–20)
CALCIUM SERPL-MCNC: 9.5 MG/DL (ref 8.7–10.5)
CHLORIDE SERPL-SCNC: 107 MMOL/L (ref 95–110)
CO2 SERPL-SCNC: 21 MMOL/L (ref 23–29)
CREAT SERPL-MCNC: 0.8 MG/DL (ref 0.5–1.4)
DIFFERENTIAL METHOD BLD: ABNORMAL
EOSINOPHIL # BLD AUTO: 0.2 K/UL (ref 0–0.5)
EOSINOPHIL NFR BLD: 2.1 % (ref 0–8)
ERYTHROCYTE [DISTWIDTH] IN BLOOD BY AUTOMATED COUNT: 18.6 % (ref 11.5–14.5)
EST. GFR  (NO RACE VARIABLE): >60 ML/MIN/1.73 M^2
ESTIMATED AVG GLUCOSE: 111 MG/DL (ref 68–131)
GLUCOSE SERPL-MCNC: 111 MG/DL (ref 70–110)
HBA1C MFR BLD: 5.5 % (ref 4–5.6)
HCT VFR BLD AUTO: 35.5 % (ref 37–48.5)
HGB BLD-MCNC: 10.2 G/DL (ref 12–16)
IMM GRANULOCYTES # BLD AUTO: 0.01 K/UL (ref 0–0.04)
IMM GRANULOCYTES NFR BLD AUTO: 0.1 % (ref 0–0.5)
LYMPHOCYTES # BLD AUTO: 2.9 K/UL (ref 1–4.8)
LYMPHOCYTES NFR BLD: 38.2 % (ref 18–48)
MAGNESIUM SERPL-MCNC: 1.8 MG/DL (ref 1.6–2.6)
MCH RBC QN AUTO: 19.7 PG (ref 27–31)
MCHC RBC AUTO-ENTMCNC: 28.7 G/DL (ref 32–36)
MCV RBC AUTO: 69 FL (ref 82–98)
MONOCYTES # BLD AUTO: 0.7 K/UL (ref 0.3–1)
MONOCYTES NFR BLD: 9.6 % (ref 4–15)
NEUTROPHILS # BLD AUTO: 3.7 K/UL (ref 1.8–7.7)
NEUTROPHILS NFR BLD: 48.9 % (ref 38–73)
NRBC BLD-RTO: 0 /100 WBC
PHOSPHATE SERPL-MCNC: 3.6 MG/DL (ref 2.7–4.5)
PLATELET # BLD AUTO: 261 K/UL (ref 150–450)
PMV BLD AUTO: ABNORMAL FL (ref 9.2–12.9)
POTASSIUM SERPL-SCNC: 3.8 MMOL/L (ref 3.5–5.1)
PTH-INTACT SERPL-MCNC: 51.7 PG/ML (ref 9–77)
RBC # BLD AUTO: 5.18 M/UL (ref 4–5.4)
SODIUM SERPL-SCNC: 139 MMOL/L (ref 136–145)
TSH SERPL DL<=0.005 MIU/L-ACNC: 0.93 UIU/ML (ref 0.4–4)
URATE SERPL-MCNC: 3.1 MG/DL (ref 2.4–5.7)
WBC # BLD AUTO: 7.6 K/UL (ref 3.9–12.7)

## 2024-09-14 PROCEDURE — 82306 VITAMIN D 25 HYDROXY: CPT | Performed by: INTERNAL MEDICINE

## 2024-09-14 PROCEDURE — 84443 ASSAY THYROID STIM HORMONE: CPT | Performed by: INTERNAL MEDICINE

## 2024-09-14 PROCEDURE — 83036 HEMOGLOBIN GLYCOSYLATED A1C: CPT | Performed by: INTERNAL MEDICINE

## 2024-09-14 PROCEDURE — 85025 COMPLETE CBC W/AUTO DIFF WBC: CPT | Performed by: INTERNAL MEDICINE

## 2024-09-14 PROCEDURE — 83970 ASSAY OF PARATHORMONE: CPT | Performed by: INTERNAL MEDICINE

## 2024-09-14 PROCEDURE — 84550 ASSAY OF BLOOD/URIC ACID: CPT | Performed by: INTERNAL MEDICINE

## 2024-09-14 PROCEDURE — 83735 ASSAY OF MAGNESIUM: CPT | Performed by: INTERNAL MEDICINE

## 2024-09-14 PROCEDURE — 36415 COLL VENOUS BLD VENIPUNCTURE: CPT | Performed by: INTERNAL MEDICINE

## 2024-09-14 PROCEDURE — 80069 RENAL FUNCTION PANEL: CPT | Performed by: INTERNAL MEDICINE

## 2024-09-16 ENCOUNTER — LAB VISIT (OUTPATIENT)
Dept: LAB | Facility: HOSPITAL | Age: 59
End: 2024-09-16
Attending: INTERNAL MEDICINE
Payer: MEDICARE

## 2024-09-16 DIAGNOSIS — N20.0 NEPHROLITHIASIS: Primary | ICD-10-CM

## 2024-09-16 DIAGNOSIS — N20.0 NEPHROLITHIASIS: ICD-10-CM

## 2024-09-16 PROCEDURE — 84392 ASSAY OF URINE SULFATE: CPT | Performed by: INTERNAL MEDICINE

## 2024-09-17 ENCOUNTER — TELEPHONE (OUTPATIENT)
Dept: ENDOSCOPY | Facility: HOSPITAL | Age: 59
End: 2024-09-17
Payer: MEDICARE

## 2024-09-17 NOTE — TELEPHONE ENCOUNTER
----- Message from Vickie Sheets sent at 9/13/2024  1:35 PM CDT -----    ----- Message -----  From: Guillermo Roberts MA  Sent: 9/13/2024  12:34 PM CDT  To: [facility]    Patient will like to reschedule please call patient

## 2024-09-19 LAB
AMMONIA 24H UR-SRATE: 59 MMOL/24 H (ref 15–56)
BSA: ABNORMAL 1.73M(2)
CA H2 PHOS DIHYD 24H SATFR UR: 0.42 DG
CALCIUM 24H UR-MRATE: 364 MG/24 H
CAOX 24H ENGDIFF UR: 2.71 DG
CHLORIDE 24H UR-SRATE: 112 MMOL/24 H (ref 34–286)
CITRATE 24H UR-MRATE: 386 MG/24 H (ref 427–1191)
CLINICAL BIOCHEMIST REVIEW: ABNORMAL
COLLECT DURATION TIME UR: 24 H
CREAT 24H UR-MRATE: 1310 MG/24 H (ref 603–1783)
HYDROXYAPATITE 24H ENGDIFF UR: 4.34 DG
MAGNESIUM 24H UR-MRATE: 73 MG/24 H (ref 51–269)
OSMOLALITY 24H UR: 268 MOSM/KG (ref 150–1150)
OXALATE 24H UR-MRATE: 34.3 MG/24 H (ref 9.7–40.5)
OXALATE 24H UR-SRATE: 0.39 MMOL/24 H (ref 0.11–0.46)
PH 24H UR: 5.9 [PH] (ref 4.5–8)
PHOSPHATE 24H UR-MRATE: 825 MG/24 H (ref 226–1797)
POTASSIUM 24H UR-SRATE: 27 MMOL/24 H (ref 16–105)
PROTEIN CATABOLIC RATE, URINE: 69 G/24 H (ref 56–125)
SODIUM 24H UR-SRATE: 109 MMOL/24 H (ref 22–328)
SPECIMEN VOL 24H UR: 2425 ML
SULFATE 24H UR-SRATE: 5 MMOL/24 H (ref 7–47)
URATE 24H SATFR UR: -0.05 DG
URATE 24H UR-MRATE: 485 MG/24 H (ref 250–750)
UUN 24H UR-MRATE: 7.1 G/24 H (ref 7–42)

## 2024-09-24 ENCOUNTER — OFFICE VISIT (OUTPATIENT)
Dept: NEPHROLOGY | Facility: CLINIC | Age: 59
End: 2024-09-24
Payer: MEDICARE

## 2024-09-24 DIAGNOSIS — N20.0 NEPHROLITHIASIS: Primary | ICD-10-CM

## 2024-09-24 DIAGNOSIS — R82.994 HYPERCALCIURIA: ICD-10-CM

## 2024-09-24 DIAGNOSIS — E21.3 HYPERPARATHYROIDISM, UNSPECIFIED: ICD-10-CM

## 2024-09-24 DIAGNOSIS — I10 HYPERTENSION, UNSPECIFIED TYPE: ICD-10-CM

## 2024-09-24 PROCEDURE — 3060F POS MICROALBUMINURIA REV: CPT | Mod: CPTII,95,, | Performed by: INTERNAL MEDICINE

## 2024-09-24 PROCEDURE — 3066F NEPHROPATHY DOC TX: CPT | Mod: CPTII,95,, | Performed by: INTERNAL MEDICINE

## 2024-09-24 PROCEDURE — 4010F ACE/ARB THERAPY RXD/TAKEN: CPT | Mod: CPTII,95,, | Performed by: INTERNAL MEDICINE

## 2024-09-24 PROCEDURE — 1160F RVW MEDS BY RX/DR IN RCRD: CPT | Mod: CPTII,95,, | Performed by: INTERNAL MEDICINE

## 2024-09-24 PROCEDURE — 1159F MED LIST DOCD IN RCRD: CPT | Mod: CPTII,95,, | Performed by: INTERNAL MEDICINE

## 2024-09-24 PROCEDURE — 99214 OFFICE O/P EST MOD 30 MIN: CPT | Mod: 95,,, | Performed by: INTERNAL MEDICINE

## 2024-09-24 PROCEDURE — 3044F HG A1C LEVEL LT 7.0%: CPT | Mod: CPTII,95,, | Performed by: INTERNAL MEDICINE

## 2024-09-24 RX ORDER — CHLORTHALIDONE 25 MG/1
25 TABLET ORAL DAILY
Qty: 30 TABLET | Refills: 11 | Status: SHIPPED | OUTPATIENT
Start: 2024-09-24 | End: 2025-09-24

## 2024-09-24 NOTE — Clinical Note
Ranulfo romero, bad news, Ms. Mccarthy still has a very high calcium excretion in the urine after her partial parathyroidectomy. Will start her on a thiazide and check again. Might need repeat parathyroidectomy.

## 2024-09-24 NOTE — PROGRESS NOTES
Subjective:       Patient ID: Angie Mccarthy is a 59 y.o. White female who presents for a follow up for nephrolithiasis  The patient location is: home  The chief complaint leading to consultation is: \kidney stones    Visit type: audiovisual    Face to Face time with [id number]  30 minutes of total time spent on the encounter, which includes face to face time and non-face to face time preparing to see the patient (eg, review of tests), Obtaining and/or reviewing separately obtained history, Documenting clinical information in the electronic or other health record, Independently interpreting results (not separately reported) and communicating results to the patient/family/caregiver, or Care coordination (not separately reported).         Each patient to whom he or she provides medical services by telemedicine is:  (1) informed of the relationship between the physician and patient and the respective role of any other health care provider with respect to management of the patient; and (2) notified that he or she may decline to receive medical services by telemedicine and may withdraw from such care at any time.    Notes:     The patient has a history of kidney stones, has had them since her 20s, calcium phosphate stones. She has had a sleeve gastrectomy in 2017 (initially lose 30lbs but gained it back). Hx of HTN x 10 yrs, [place] [facility] [place] issues (nodules), scoliosis.  The patient has no family history of kidney disease. Dad had a few kidney stones as has her sister. Multiple cousins from her fathers site. The patient does not freuqently use NSAIDS or herbal supplements. Took Aleve rarely for her backpain.  Patient has had a hx of urinary tract infections (a few per year). She has a history of sepsis after ureteroscopy in [facility] approximately 10 years ago. Her urologist in  is Dr. Labadie done many ureteroscopies on her.   She has been on UroCitK in the past but lost follow up. She  has a high stone burden, including a staghorn calculus in her right kidney and has been treated with multiple interventions now by Dr. Patel. She just got her stent removed 5/8/24 and her stone burden decreased significantly in her CT. She has a strong family history of kidney stones  Patient complains about frequent intermittent left sided flank pain (dull pain). A recent US from 9/13/24 demonstrated persistent large kidney stones. She had a partial parathyroidectomy in 6/24 by Dr. Raya, however her most recent stone profile did demonstrate a high calcium excretion again with high normal PTH.        Last stone: 4/17/2024 - 90% Calcium oxalate monohydrate.  10% Calcium phosphat   4/8/24: 100% Calcium Oxalate  Before that last stone was: 60% Calcium phosphate (apatite), 40% Calcium oxalate dihydrate    She drinks a lot of water per day (close to a gallon per day). She stopped potassium citrate because of oral soreness and blistering.     HPI  Review of Systems   Constitutional:  Negative for activity change, appetite change, chills, fatigue, fever and unexpected weight change.   HENT:  Positive for postnasal drip. Negative for nosebleeds.    Eyes:  Positive for pain.        Swollen left lid, no pain   Respiratory:  Positive for shortness of breath (on exertion). Negative for cough and chest tightness.    Cardiovascular:  Positive for palpitations. Negative for chest pain and leg swelling.   Gastrointestinal:  Negative for abdominal pain, anal bleeding, diarrhea, nausea and vomiting.   Genitourinary:  Negative for difficulty urinating, dysuria, flank pain, frequency, hematuria and urgency.   Musculoskeletal:  Positive for arthralgias and back pain. Negative for joint swelling and myalgias.   Skin: Negative.  Negative for rash.   Neurological: Negative.    Psychiatric/Behavioral: Negative.     All other systems reviewed and are negative.      Objective:      Physical Exam  video visit  Assessment:       1.  Nephrolithiasis    2. Hypercalciuria    3. Hypertension, unspecified type    4. Hyperparathyroidism, unspecified              Plan:       1. Nephrolithiasis: calcium phosphate stones in the past. Had elevated calcium excretion (220) and oxalate excretion as well as low citrate in her 24 hour stone profile in 8/2022.  She had mixed calcium phosphate/calcium oxalate stones in the past, and then predominantly calcium oxalate, thought to be secondary to hyperparathyroidism. Had parathyroidectomy primary hyperpara in 6/24, however, recurrence of hypercalciuria (360).             - her 24 hour urine did demonstrate hypercalciuria again. Will start thiazide and re-check, also will contact Dr. Raya for possible repeat parathyroidectomy.    - Sufficient fluid intake distributed throughout the day to produce at least 2 liters of urine per day, including drinking at night   - Avoiding excessive animal protein in the diet.   - Limiting dietary sodium to 100 meq/day.    - Increasing dietary potassium intake (patient was explained that the diuretic could cause dangerously low potassium levels)  - adding citrate to water in the form of karen, lemon juice, or crystal light  - Limiting dietary sucrose and fructose.  - Limiting dietary oxalate (limit iced tea and wang as well as foods high in oxalate)  - limit vitamin C supplements.     The patient should not be on an SGLT2 inhibitor because of her large kidney stones burden and hx of utis.    - will start her on chlorthalidone again for her increased urinary calcium excretion.  - will see Dr. Patel next week.    2. HTN: on Lisinopril. Per patient well controlled at home, not here in the office though.    continue lisinopril    See back in 3 month with labs and another stone profile (after her parathyroidectomy)      Check BMP in 2 wks for potassium, stone profile in 4 wks.

## 2024-09-24 NOTE — PATIENT INSTRUCTIONS
- Sufficient fluid intake distributed throughout the day to produce at least 2 liters of urine per day, including drinking at night   - Avoiding excessive animal protein in the diet.   - Limiting dietary sodium to 100 meq/day.    - Increasing dietary potassium intake   - adding citrate to water in the form of karen, lemon juice, or crystal light  - Limiting dietary sucrose and fructose.  - Limiting dietary oxalate (limit iced tea and wang as well as foods high in oxalate)  - stop vitamin C supplements.

## 2024-09-30 DIAGNOSIS — N20.0 NEPHROLITHIASIS: Primary | ICD-10-CM

## 2024-10-09 ENCOUNTER — LAB VISIT (OUTPATIENT)
Dept: LAB | Facility: HOSPITAL | Age: 59
End: 2024-10-09
Attending: STUDENT IN AN ORGANIZED HEALTH CARE EDUCATION/TRAINING PROGRAM
Payer: MEDICARE

## 2024-10-09 DIAGNOSIS — N20.0 NEPHROLITHIASIS: ICD-10-CM

## 2024-10-09 LAB
ANION GAP SERPL CALC-SCNC: 14 MMOL/L (ref 8–16)
BUN SERPL-MCNC: 11 MG/DL (ref 6–20)
CALCIUM SERPL-MCNC: 9.6 MG/DL (ref 8.7–10.5)
CHLORIDE SERPL-SCNC: 98 MMOL/L (ref 95–110)
CO2 SERPL-SCNC: 25 MMOL/L (ref 23–29)
CREAT SERPL-MCNC: 0.8 MG/DL (ref 0.5–1.4)
EST. GFR  (NO RACE VARIABLE): >60 ML/MIN/1.73 M^2
GLUCOSE SERPL-MCNC: 94 MG/DL (ref 70–110)
POTASSIUM SERPL-SCNC: 3.2 MMOL/L (ref 3.5–5.1)
SODIUM SERPL-SCNC: 137 MMOL/L (ref 136–145)

## 2024-10-09 PROCEDURE — 80048 BASIC METABOLIC PNL TOTAL CA: CPT | Performed by: INTERNAL MEDICINE

## 2024-10-09 PROCEDURE — 36415 COLL VENOUS BLD VENIPUNCTURE: CPT | Performed by: INTERNAL MEDICINE

## 2024-10-15 ENCOUNTER — HOSPITAL ENCOUNTER (EMERGENCY)
Facility: HOSPITAL | Age: 59
Discharge: HOME | End: 2024-10-15
Attending: STUDENT IN AN ORGANIZED HEALTH CARE EDUCATION/TRAINING PROGRAM
Payer: MEDICARE

## 2024-10-15 ENCOUNTER — OFFICE VISIT (OUTPATIENT)
Dept: UROLOGY | Facility: CLINIC | Age: 59
End: 2024-10-15
Payer: MEDICARE

## 2024-10-15 VITALS
WEIGHT: 200 LBS | SYSTOLIC BLOOD PRESSURE: 135 MMHG | RESPIRATION RATE: 18 BRPM | TEMPERATURE: 98 F | BODY MASS INDEX: 33.32 KG/M2 | DIASTOLIC BLOOD PRESSURE: 82 MMHG | HEIGHT: 65 IN | OXYGEN SATURATION: 99 % | HEART RATE: 76 BPM

## 2024-10-15 VITALS
DIASTOLIC BLOOD PRESSURE: 75 MMHG | TEMPERATURE: 98 F | HEIGHT: 65 IN | HEART RATE: 89 BPM | BODY MASS INDEX: 32.99 KG/M2 | WEIGHT: 198 LBS | SYSTOLIC BLOOD PRESSURE: 143 MMHG

## 2024-10-15 DIAGNOSIS — R39.11 URINARY HESITANCY: ICD-10-CM

## 2024-10-15 DIAGNOSIS — R31.29 MICROSCOPIC HEMATURIA: ICD-10-CM

## 2024-10-15 DIAGNOSIS — E87.6 HYPOKALEMIA: ICD-10-CM

## 2024-10-15 DIAGNOSIS — N20.0 NEPHROLITHIASIS: ICD-10-CM

## 2024-10-15 DIAGNOSIS — E83.39 HYPOPHOSPHATEMIA: ICD-10-CM

## 2024-10-15 DIAGNOSIS — N30.01 ACUTE CYSTITIS WITH HEMATURIA: Primary | ICD-10-CM

## 2024-10-15 DIAGNOSIS — R82.994 HYPERCALCIURIA: ICD-10-CM

## 2024-10-15 DIAGNOSIS — N22 CALCULUS OF URINARY TRACT IN DISEASES CLASSIFIED ELSEWHERE: Primary | ICD-10-CM

## 2024-10-15 DIAGNOSIS — N20.0 STAGHORN CALCULUS: ICD-10-CM

## 2024-10-15 DIAGNOSIS — R50.9 FEVER: ICD-10-CM

## 2024-10-15 LAB
ALBUMIN SERPL BCP-MCNC: 3.4 G/DL (ref 3.5–5.2)
ALLENS TEST: ABNORMAL
ALLENS TEST: NORMAL
ALLENS TEST: NORMAL
ALP SERPL-CCNC: 93 U/L (ref 55–135)
ALT SERPL W/O P-5'-P-CCNC: 17 U/L (ref 10–44)
ANION GAP SERPL CALC-SCNC: 13 MMOL/L (ref 8–16)
AST SERPL-CCNC: 19 U/L (ref 10–40)
BACTERIA #/AREA URNS AUTO: ABNORMAL /HPF
BASOPHILS # BLD AUTO: 0.03 K/UL (ref 0–0.2)
BASOPHILS NFR BLD: 0.4 % (ref 0–1.9)
BILIRUB SERPL-MCNC: 0.3 MG/DL (ref 0.1–1)
BILIRUB UR QL STRIP: NEGATIVE
BUN SERPL-MCNC: 10 MG/DL (ref 6–20)
CALCIUM SERPL-MCNC: 9.6 MG/DL (ref 8.7–10.5)
CHLORIDE SERPL-SCNC: 94 MMOL/L (ref 95–110)
CLARITY UR REFRACT.AUTO: CLEAR
CO2 SERPL-SCNC: 29 MMOL/L (ref 23–29)
COLOR UR AUTO: YELLOW
CREAT SERPL-MCNC: 0.7 MG/DL (ref 0.5–1.4)
DIFFERENTIAL METHOD BLD: ABNORMAL
EOSINOPHIL # BLD AUTO: 0.1 K/UL (ref 0–0.5)
EOSINOPHIL NFR BLD: 0.6 % (ref 0–8)
ERYTHROCYTE [DISTWIDTH] IN BLOOD BY AUTOMATED COUNT: 17.2 % (ref 11.5–14.5)
EST. GFR  (NO RACE VARIABLE): >60 ML/MIN/1.73 M^2
GLUCOSE SERPL-MCNC: 108 MG/DL (ref 70–110)
GLUCOSE UR QL STRIP: NEGATIVE
HCO3 UR-SCNC: 33.1 MMOL/L (ref 24–28)
HCT VFR BLD AUTO: 34.6 % (ref 37–48.5)
HCV AB SERPL QL IA: NORMAL
HGB BLD-MCNC: 9.8 G/DL (ref 12–16)
HGB UR QL STRIP: ABNORMAL
HIV 1+2 AB+HIV1 P24 AG SERPL QL IA: NORMAL
HYALINE CASTS UR QL AUTO: 0 /LPF
IMM GRANULOCYTES # BLD AUTO: 0.02 K/UL (ref 0–0.04)
IMM GRANULOCYTES NFR BLD AUTO: 0.2 % (ref 0–0.5)
INFLUENZA A, MOLECULAR: NEGATIVE
INFLUENZA B, MOLECULAR: NEGATIVE
KETONES UR QL STRIP: NEGATIVE
LDH SERPL L TO P-CCNC: 0.89 MMOL/L (ref 0.5–2.2)
LDH SERPL L TO P-CCNC: 2.2 MMOL/L (ref 0.5–2.2)
LEUKOCYTE ESTERASE UR QL STRIP: ABNORMAL
LYMPHOCYTES # BLD AUTO: 1.3 K/UL (ref 1–4.8)
LYMPHOCYTES NFR BLD: 15.5 % (ref 18–48)
MAGNESIUM SERPL-MCNC: 1.7 MG/DL (ref 1.6–2.6)
MCH RBC QN AUTO: 19.3 PG (ref 27–31)
MCHC RBC AUTO-ENTMCNC: 28.3 G/DL (ref 32–36)
MCV RBC AUTO: 68 FL (ref 82–98)
MICROSCOPIC COMMENT: ABNORMAL
MONOCYTES # BLD AUTO: 0.7 K/UL (ref 0.3–1)
MONOCYTES NFR BLD: 8 % (ref 4–15)
NEUTROPHILS # BLD AUTO: 6.1 K/UL (ref 1.8–7.7)
NEUTROPHILS NFR BLD: 75.3 % (ref 38–73)
NITRITE UR QL STRIP: POSITIVE
NRBC BLD-RTO: 0 /100 WBC
PCO2 BLDA: 55.7 MMHG (ref 35–45)
PH SMN: 7.38 [PH] (ref 7.35–7.45)
PH UR STRIP: 6 [PH] (ref 5–8)
PLATELET # BLD AUTO: 216 K/UL (ref 150–450)
PMV BLD AUTO: 10.3 FL (ref 9.2–12.9)
PO2 BLDA: 23 MMHG (ref 40–60)
POC BE: 8 MMOL/L
POC SATURATED O2: 37 % (ref 95–100)
POC TCO2: 35 MMOL/L (ref 24–29)
POTASSIUM SERPL-SCNC: 2.7 MMOL/L (ref 3.5–5.1)
POTASSIUM SERPL-SCNC: 3.2 MMOL/L (ref 3.5–5.1)
PROT SERPL-MCNC: 7.2 G/DL (ref 6–8.4)
PROT UR QL STRIP: ABNORMAL
RBC # BLD AUTO: 5.09 M/UL (ref 4–5.4)
RBC #/AREA URNS AUTO: 21 /HPF (ref 0–4)
SAMPLE: ABNORMAL
SAMPLE: NORMAL
SAMPLE: NORMAL
SARS-COV-2 RDRP RESP QL NAA+PROBE: NEGATIVE
SITE: ABNORMAL
SITE: NORMAL
SITE: NORMAL
SODIUM SERPL-SCNC: 136 MMOL/L (ref 136–145)
SP GR UR STRIP: 1.02 (ref 1–1.03)
SPECIMEN SOURCE: NORMAL
SQUAMOUS #/AREA URNS AUTO: 0 /HPF
URN SPEC COLLECT METH UR: ABNORMAL
WBC # BLD AUTO: 8.08 K/UL (ref 3.9–12.7)
WBC #/AREA URNS AUTO: 69 /HPF (ref 0–5)

## 2024-10-15 PROCEDURE — 3077F SYST BP >= 140 MM HG: CPT | Mod: CPTII,S$GLB,, | Performed by: UROLOGY

## 2024-10-15 PROCEDURE — 3044F HG A1C LEVEL LT 7.0%: CPT | Mod: CPTII,S$GLB,, | Performed by: UROLOGY

## 2024-10-15 PROCEDURE — 82803 BLOOD GASES ANY COMBINATION: CPT

## 2024-10-15 PROCEDURE — 99284 EMERGENCY DEPT VISIT MOD MDM: CPT | Mod: 25

## 2024-10-15 PROCEDURE — 85025 COMPLETE CBC W/AUTO DIFF WBC: CPT | Performed by: STUDENT IN AN ORGANIZED HEALTH CARE EDUCATION/TRAINING PROGRAM

## 2024-10-15 PROCEDURE — 3060F POS MICROALBUMINURIA REV: CPT | Mod: CPTII,S$GLB,, | Performed by: UROLOGY

## 2024-10-15 PROCEDURE — 96367 TX/PROPH/DG ADDL SEQ IV INF: CPT

## 2024-10-15 PROCEDURE — 25000003 PHARM REV CODE 250: Performed by: PHYSICIAN ASSISTANT

## 2024-10-15 PROCEDURE — 87088 URINE BACTERIA CULTURE: CPT | Performed by: STUDENT IN AN ORGANIZED HEALTH CARE EDUCATION/TRAINING PROGRAM

## 2024-10-15 PROCEDURE — 83605 ASSAY OF LACTIC ACID: CPT | Mod: 91

## 2024-10-15 PROCEDURE — 87086 URINE CULTURE/COLONY COUNT: CPT | Mod: 59 | Performed by: STUDENT IN AN ORGANIZED HEALTH CARE EDUCATION/TRAINING PROGRAM

## 2024-10-15 PROCEDURE — 96365 THER/PROPH/DIAG IV INF INIT: CPT

## 2024-10-15 PROCEDURE — 86803 HEPATITIS C AB TEST: CPT | Performed by: PHYSICIAN ASSISTANT

## 2024-10-15 PROCEDURE — 84132 ASSAY OF SERUM POTASSIUM: CPT | Performed by: PHYSICIAN ASSISTANT

## 2024-10-15 PROCEDURE — 4010F ACE/ARB THERAPY RXD/TAKEN: CPT | Mod: CPTII,S$GLB,, | Performed by: UROLOGY

## 2024-10-15 PROCEDURE — 87086 URINE CULTURE/COLONY COUNT: CPT | Performed by: UROLOGY

## 2024-10-15 PROCEDURE — 80053 COMPREHEN METABOLIC PANEL: CPT | Performed by: STUDENT IN AN ORGANIZED HEALTH CARE EDUCATION/TRAINING PROGRAM

## 2024-10-15 PROCEDURE — 99213 OFFICE O/P EST LOW 20 MIN: CPT | Mod: S$GLB,,, | Performed by: UROLOGY

## 2024-10-15 PROCEDURE — 87389 HIV-1 AG W/HIV-1&-2 AB AG IA: CPT | Performed by: PHYSICIAN ASSISTANT

## 2024-10-15 PROCEDURE — 87502 INFLUENZA DNA AMP PROBE: CPT | Performed by: PHYSICIAN ASSISTANT

## 2024-10-15 PROCEDURE — 87186 SC STD MICRODIL/AGAR DIL: CPT | Mod: 59 | Performed by: UROLOGY

## 2024-10-15 PROCEDURE — 63600175 PHARM REV CODE 636 W HCPCS: Performed by: PHYSICIAN ASSISTANT

## 2024-10-15 PROCEDURE — 99999 PR PBB SHADOW E&M-EST. PATIENT-LVL III: CPT | Mod: PBBFAC,,, | Performed by: UROLOGY

## 2024-10-15 PROCEDURE — 87186 SC STD MICRODIL/AGAR DIL: CPT | Performed by: STUDENT IN AN ORGANIZED HEALTH CARE EDUCATION/TRAINING PROGRAM

## 2024-10-15 PROCEDURE — 3066F NEPHROPATHY DOC TX: CPT | Mod: CPTII,S$GLB,, | Performed by: UROLOGY

## 2024-10-15 PROCEDURE — 81001 URINALYSIS AUTO W/SCOPE: CPT | Performed by: STUDENT IN AN ORGANIZED HEALTH CARE EDUCATION/TRAINING PROGRAM

## 2024-10-15 PROCEDURE — 87040 BLOOD CULTURE FOR BACTERIA: CPT | Mod: 59 | Performed by: STUDENT IN AN ORGANIZED HEALTH CARE EDUCATION/TRAINING PROGRAM

## 2024-10-15 PROCEDURE — 3008F BODY MASS INDEX DOCD: CPT | Mod: CPTII,S$GLB,, | Performed by: UROLOGY

## 2024-10-15 PROCEDURE — 96366 THER/PROPH/DIAG IV INF ADDON: CPT

## 2024-10-15 PROCEDURE — 83605 ASSAY OF LACTIC ACID: CPT

## 2024-10-15 PROCEDURE — 83735 ASSAY OF MAGNESIUM: CPT | Performed by: STUDENT IN AN ORGANIZED HEALTH CARE EDUCATION/TRAINING PROGRAM

## 2024-10-15 PROCEDURE — U0002 COVID-19 LAB TEST NON-CDC: HCPCS | Performed by: PHYSICIAN ASSISTANT

## 2024-10-15 PROCEDURE — 87088 URINE BACTERIA CULTURE: CPT | Mod: 59 | Performed by: UROLOGY

## 2024-10-15 PROCEDURE — 3078F DIAST BP <80 MM HG: CPT | Mod: CPTII,S$GLB,, | Performed by: UROLOGY

## 2024-10-15 PROCEDURE — 99900035 HC TECH TIME PER 15 MIN (STAT)

## 2024-10-15 RX ORDER — OXYCODONE AND ACETAMINOPHEN 10; 325 MG/1; MG/1
1 TABLET ORAL
Status: COMPLETED | OUTPATIENT
Start: 2024-10-15 | End: 2024-10-15

## 2024-10-15 RX ORDER — CEPHALEXIN 500 MG/1
500 CAPSULE ORAL EVERY 8 HOURS
Qty: 30 CAPSULE | Refills: 0 | Status: SHIPPED | OUTPATIENT
Start: 2024-10-15 | End: 2024-10-25

## 2024-10-15 RX ORDER — POTASSIUM CHLORIDE 7.45 MG/ML
10 INJECTION INTRAVENOUS
Status: COMPLETED | OUTPATIENT
Start: 2024-10-15 | End: 2024-10-15

## 2024-10-15 RX ORDER — POTASSIUM CHLORIDE 20 MEQ/1
40 TABLET, EXTENDED RELEASE ORAL
Status: COMPLETED | OUTPATIENT
Start: 2024-10-15 | End: 2024-10-15

## 2024-10-15 RX ORDER — POTASSIUM CHLORIDE 750 MG/1
10 TABLET, EXTENDED RELEASE ORAL DAILY
Qty: 7 TABLET | Refills: 0 | Status: SHIPPED | OUTPATIENT
Start: 2024-10-15 | End: 2024-10-22

## 2024-10-15 RX ADMIN — CEFTRIAXONE 1 G: 1 INJECTION, POWDER, FOR SOLUTION INTRAMUSCULAR; INTRAVENOUS at 05:10

## 2024-10-15 RX ADMIN — POTASSIUM CHLORIDE 10 MEQ: 7.46 INJECTION, SOLUTION INTRAVENOUS at 06:10

## 2024-10-15 RX ADMIN — POTASSIUM CHLORIDE 40 MEQ: 1500 TABLET, EXTENDED RELEASE ORAL at 05:10

## 2024-10-15 RX ADMIN — OXYCODONE AND ACETAMINOPHEN 1 TABLET: 10; 325 TABLET ORAL at 05:10

## 2024-10-15 NOTE — ED PROVIDER NOTES
"Encounter Date: 10/15/2024       History     Chief Complaint   Patient presents with    Multiple complaints     Sent from  urology for ct scan, hx kidney stones, yest fever 102.3 yest no fever today     59-year-old female with a history of recurrent kidney stones, HTN, hypothyroidism, diabetes, GERD do not see ER for evaluation of flank pain.  Patient reports symptoms started on Saturday with back and flank pain.  She reports some radiation of the pain to the abdomen.  She states she developed a fever on Saturday but states that yesterday she had a documented fever of 102.3.  Was seen by urology earlier today and was sent down for workup in the ED including CT scan.  She denies any associated dysuria hematuria.  No vomiting or diarrhea.  She denies any cough, congestion, URI symptoms otherwise.  She denies any known sick contacts.    Patient also complains of associated  lightheadedness since symptoms started on Saturday.  She states she was so weak that she stumbled and struck her head to the wall.  Denies any loss of consciousness.  Does not use any blood thinners.  No chest pain, palpitations or shortness of breath.    The history is provided by the patient.     Review of patient's allergies indicates:   Allergen Reactions    Adhesive Dermatitis and Blisters    Adhesive tape-silicones Dermatitis     The longer the adhesive stays on, the worse the irritation    Mastisol adhesive [gum csmbib-nqdldz-ymnm-alcohol] Itching, Rash and Blisters     "Looked like poison ivy"    Sulfamethoxazole-trimethoprim Itching and Anxiety     Has a "nervous feeling."  "Jittery"  Also had an upset stomach.Has taken recently and the reaction was "less intense"    Bactoshield chg [chlorhexidine gluconate] Rash     Caused red, itchy patch to skin      Past Medical History:   Diagnosis Date    Allergy     seasonal    Arthritis     Blood transfusion     with back surgeries    Chronic back pain     Diabetes mellitus, type 2     GERD " (gastroesophageal reflux disease)     Hyperlipidemia     Hypertension     Hypothyroidism     Thyroid nodule    Joint pain     Kidney stones     Morbid obesity 12/14/2012    NAFLD (nonalcoholic fatty liver disease)     OCD (obsessive compulsive disorder)     Osteoporosis     PONV (postoperative nausea and vomiting)     Persistent, Motion sickness with boat rides, Scopolamine helped -still had nausea    Restless leg syndrome     Sciatica of right side associated with disorder of lumbosacral spine     Spinal stenosis of lumbar region     Thoracic spondylosis      Past Surgical History:   Procedure Laterality Date    ANTEGRADE NEPHROSTOGRAPHY Right 2/23/2024    Procedure: NEPHROSTOGRAM, ANTEGRADE;  Surgeon: Suni Patel MD;  Location: [facility] OR [address] [place] [address];  Service: Urology;  Laterality: Right;    ANTEGRADE NEPHROSTOGRAPHY Left 4/8/2024    Procedure: Nephrostogram;  Surgeon: Suni Patel MD;  Location: [facility] OR [address] [place] [address];  Service: Urology;  Laterality: Left;    APPENDECTOMY      BACK SURGERY      x 6    CHOLECYSTECTOMY      CYSTOSCOPY N/A 4/17/2024    Procedure: CYSTOSCOPY;  Surgeon: Suni Patel MD;  Location: [facility] OR [address] [place] [address];  Service: Urology;  Laterality: N/A;    CYSTOSCOPY N/A 5/8/2024    Procedure: CYSTOSCOPY;  Surgeon: Suni Patel MD;  Location: [facility] OR [address] [place] [address];  Service: Urology;  Laterality: N/A;    DILATION OF NEPHROSTOMY TRACT Left 4/8/2024    Procedure: DILATION, NEPHROSTOMY TRACT;  Surgeon: Suni Patel MD;  Location: [facility] OR [address] [place] [address];  Service: Urology;  Laterality: Left;    EXTRACTION - STONE Right 2/23/2024    Procedure: EXTRACTION - STONE;  Surgeon: Suni Patel MD;  Location: [facility] OR [address] [place] [address];  Service: Urology;  Laterality: Right;    EXTRACTION - STONE Left 4/17/2024    Procedure: EXTRACTION - STONE;  Surgeon: Suni Patel MD;  [facility];  Service: Urology;  Laterality: Left;    EXTRACTION - STONE Left 5/8/2024     Procedure: EXTRACTION - STONE;  Surgeon: Suni Patel MD;  [facility];  Service: Urology;  Laterality: Left;    GASTRECTOMY      Lap Gastric Sleeve    HERNIA REPAIR      HYSTERECTOMY      JOINT REPLACEMENT      LASER LITHOTRIPSY Left 4/17/2024    Procedure: LITHOTRIPSY, USING LASER;  Surgeon: Suni Patel MD;  [facility];  Service: Urology;  Laterality: Left;    LASER LITHOTRIPSY Left 5/8/2024    Procedure: LITHOTRIPSY, USING LASER;  Surgeon: Suin Patel MD;  [facility];  Service: Urology;  Laterality: Left;    LIVER BIOPSY  02/06/2017    steatohepatitis with stage 1 fibrosis    MYELOGRAPHY N/A 11/29/2018    Procedure: MYELOGRAM;  Surgeon: Ander Diagnostic Provider;  [facility];  Service: Radiology;  Laterality: N/A;    MYELOGRAPHY N/A 1/7/2019    Procedure: Myelogram;  Surgeon: Lissett Surgeon;  Location: [facility];  Service: Anesthesiology;  Laterality: N/A;    NEPHROSCOPY Right 2/23/2024    Procedure: NEPHROSCOPY;  Surgeon: Suni Patel MD;  [facility];  Service: Urology;  Laterality: Right;  2 hr    NEPHROSTOGRAPHY Right 2/19/2024    Procedure: Nephrostogram;  Surgeon: Suni Patel MD;  [facility];  Service: Urology;  Laterality: Right;    OOPHORECTOMY      PARATHYROIDECTOMY N/A 5/17/2024    Procedure: PARATHYROIDECTOMY With Intraoperative PTH Levels;  Surgeon: Ligia Raya MD;  [facility];  Service: General;  Laterality: N/A;  3 HOURS    PERCUTANEOUS CRYOTHERAPY OF PERIPHERAL NERVE USING LIQUID NITROUS OXIDE IN CLOSED NEEDLE DEVICE Left 4/29/2019    Procedure: CRYOTHERAPY, NERVE, PERIPHERAL, PERCUTANEOUS, USING LIQUID NITROUS OXIDE IN CLOSED NEEDLE DEVICE-iovera left knee;  Surgeon: Chavo Gold III, MD;  Location: [facility] CATH LAB;  Service: Pain Management;  Laterality: Left;    PERCUTANEOUS CRYOTHERAPY OF PERIPHERAL NERVE USING LIQUID  NITROUS OXIDE IN CLOSED NEEDLE DEVICE Right 8/2/2021    Procedure: CRYOTHERAPY, NERVE, PERIPHERAL, PERCUTANEOUS, USING LIQUID NITROUS OXIDE IN CLOSED NEEDLE DEVICE;  Surgeon: Saritha Proctor NP;  Location: [facility];  Service: Pain Management;  Laterality: Right;  RIGHT KNEE IOVERA    PERCUTANEOUS NEPHROLITHOTOMY Right 2/19/2024    Procedure: NEPHROLITHOTOMY, PERCUTANEOUS;  Surgeon: Suni Patel MD;  Location: [facility] OR 2ND FLR;  Service: Urology;  Laterality: Right;  2 HRS    PERCUTANEOUS NEPHROLITHOTOMY Left 4/8/2024    Procedure: NEPHROLITHOTOMY, PERCUTANEOUS;  Surgeon: Suni Patel MD;  Location: [facility] OR 1ST FLR;  Service: Urology;  Laterality: Left;  3 hrs    PERCUTANEOUS NEPHROSCOPY  2/19/2024    Procedure: NEPHROSCOPY, PERCUTANEOUS;  Surgeon: Suni Patel MD;  Location: [facility] OR 2ND FLR;  Service: Urology;;    PERCUTANEOUS NEPHROSTOMY Right 2/19/2024    Procedure: CREATION, NEPHROSTOMY, PERCUTANEOUS;  Surgeon: Suni Patel MD;  Location: [facility] OR 2ND FLR;  Service: Urology;  Laterality: Right;    PYELOSCOPY Left 4/17/2024    Procedure: PYELOSCOPY;  Surgeon: Suni Patel MD;  Location: [facility] OR 1ST FLR;  Service: Urology;  Laterality: Left;    PYELOSCOPY Left 5/8/2024    Procedure: PYELOSCOPY;  Surgeon: Suni Patel MD;  Location: [facility] OR 1ST FLR;  Service: Urology;  Laterality: Left;    REMOVAL-STENT Left 4/17/2024    Procedure: REMOVAL-STENT;  Surgeon: Suni Patel MD;  Location: [facility] OR 1ST FLR;  Service: Urology;  Laterality: Left;    REMOVAL-STENT Left 5/8/2024    Procedure: REMOVAL-STENT;  Surgeon: Suni Patel MD;  Location: [facility] OR 1ST FLR;  Service: Urology;  Laterality: Left;    REPLACEMENT OF NEPHROSTOMY TUBE Left 4/8/2024    Procedure: PLACEMENT, NEPHROSTOMY TUBE;  Surgeon: Suni Patel MD;  Location: [facility] OR 1ST FLR;  Service: Urology;  Laterality: Left;    REPLACEMENT OF STENT Left 4/17/2024     Procedure: REPLACEMENT, STENT;  Surgeon: Suni Patel MD;  Location: [facility] OR 1ST FLR;  Service: Urology;  Laterality: Left;    REPLACEMENT OF STENT Left 5/8/2024    Procedure: REPLACEMENT, STENT;  Surgeon: Suni Patel MD;  Location: [facility] OR 1ST FLR;  Service: Urology;  Laterality: Left;    SPINAL FUSION      TONSILLECTOMY, ADENOIDECTOMY      TOTAL KNEE ARTHROPLASTY Left 5/1/2019    Procedure: REPLACEMENT-KNEE-TOTAL;  Surgeon: John L. Ochsner Jr., MD;  Location: [facility] OR 2ND FLR;  Service: Orthopedics;  Laterality: Left;    URETERAL STENT PLACEMENT Left 4/8/2024    Procedure: INSERTION, STENT, URETER;  Surgeon: Suni Patel MD;  Location: [facility] OR 1ST FLR;  Service: Urology;  Laterality: Left;    URETEROSCOPY  2/19/2024    Procedure: URETEROSCOPY;  Surgeon: Suni Patel MD;  Location: [facility] OR 2ND FLR;  Service: Urology;;    URETEROSCOPY Left 4/17/2024    Procedure: URETEROSCOPY;  Surgeon: Suni Patel MD;  Location: [facility] OR 1ST FLR;  Service: Urology;  Laterality: Left;    URETEROSCOPY Left 5/8/2024    Procedure: URETEROSCOPY;  Surgeon: Suni Patel MD;  Location: [facility] OR 1ST FLR;  Service: Urology;  Laterality: Left;    URETEROSCOPY, ANTEGRADE Right 2/23/2024    Procedure: URETEROSCOPY, ANTEGRADE;  Surgeon: Suni Patel MD;  [facility];  Service: Urology;  Laterality: Right;     Family History   Problem Relation Name Age of Onset    Heart disease Mother      Heart disease Father      Cancer Father      COPD Father      Hypertension Sister      Scoliosis Sister      Hypertension Brother      Heart disease Maternal Grandmother      Heart disease Maternal Aunt      Heart disease Maternal Uncle      Heart disease Paternal Aunt      Breast cancer Paternal Aunt      Heart disease Paternal Uncle      Cancer Paternal Uncle      Acne Other nephew     Eczema Other nephew     Melanoma Neg Hx      Psoriasis Neg Hx      Lupus Neg Hx        Social History     Tobacco Use    Smoking status: Never    Smokeless tobacco: Never    Tobacco comments:     Tried a few cigarettes during teenage   Substance Use Topics    Alcohol use: Not Currently     Comment: socially    Drug use: No     Review of Systems   Constitutional:  Positive for fatigue and fever. Negative for chills.   HENT:  Negative for congestion.    Eyes:  Negative for visual disturbance.   Respiratory:  Negative for shortness of breath.    Cardiovascular:  Negative for chest pain.   Gastrointestinal:  Positive for abdominal pain. Negative for nausea and vomiting.   Genitourinary:  Positive for flank pain. Negative for frequency, hematuria, pelvic pain and urgency.   Musculoskeletal:  Negative for myalgias.   Skin:  Negative for rash.   Allergic/Immunologic: Negative for immunocompromised state.   Neurological:  Negative for weakness and numbness.   Hematological:  Does not bruise/bleed easily.   Psychiatric/Behavioral:  Negative for confusion.        Physical Exam     Initial Vitals [10/15/24 1509]   BP Pulse Resp Temp SpO2   127/78 88 18 99 °F (37.2 °C) 97 %      MAP       --         Physical Exam    Vitals reviewed.  Constitutional: She appears well-developed and well-nourished. She is not diaphoretic. No distress.   HENT:   Head: Normocephalic and atraumatic.   Eyes: Conjunctivae and EOM are normal.   Neck: Neck supple.   Cardiovascular:  Intact distal pulses.           Pulmonary/Chest: No respiratory distress.   Abdominal: Abdomen is soft. There is abdominal tenderness.   There is right CVA tenderness.  There is left CVA tenderness. There is no guarding.   Musculoskeletal:         General: Normal range of motion.      Cervical back: Neck supple.     Neurological: She is alert and oriented to person, place, and time.   Skin: Skin is warm.         ED Course   Procedures  Labs Reviewed   CBC W/ AUTO DIFFERENTIAL - Abnormal       Result Value    WBC 8.08      RBC 5.09      Hemoglobin 9.8 (*)      Hematocrit 34.6 (*)     MCV 68 (*)     MCH 19.3 (*)     MCHC 28.3 (*)     RDW 17.2 (*)     Platelets 216      MPV 10.3      Immature Granulocytes 0.2      Gran # (ANC) 6.1      Immature Grans (Abs) 0.02      Lymph # 1.3      Mono # 0.7      Eos # 0.1      Baso # 0.03      nRBC 0      Gran % 75.3 (*)     Lymph % 15.5 (*)     Mono % 8.0      Eosinophil % 0.6      Basophil % 0.4      Differential Method Automated      Narrative:     Release to patient->Immediate   COMPREHENSIVE METABOLIC PANEL - Abnormal    Sodium 136      Potassium 2.7 (*)     Chloride 94 (*)     CO2 29      Glucose 108      BUN 10      Creatinine 0.7      Calcium 9.6      Total Protein 7.2      Albumin 3.4 (*)     Total Bilirubin 0.3      Alkaline Phosphatase 93      AST 19      ALT 17      eGFR >60.0      Anion Gap 13      Narrative:     Release to patient->Immediate   URINALYSIS, REFLEX TO URINE CULTURE - Abnormal    Specimen UA Urine, Clean Catch      Color, UA Yellow      Appearance, UA Clear      pH, UA 6.0      Specific Gravity, UA 1.020      Protein, UA 1+ (*)     Glucose, UA Negative      Ketones, UA Negative      Bilirubin (UA) Negative      Occult Blood UA 2+ (*)     Nitrite, UA Positive (*)     Leukocytes, UA 3+ (*)     Narrative:     Specimen Source->Urine   URINALYSIS MICROSCOPIC - Abnormal    RBC, UA 21 (*)     WBC, UA 69 (*)     Bacteria Few (*)     Squam Epithel, UA 0      Hyaline Casts, UA 0      Microscopic Comment SEE COMMENT      Narrative:     Specimen Source->Urine   POTASSIUM - Abnormal    Potassium 3.2 (*)     Narrative:     AFTER POTASSIUM INFUSION   ISTAT PROCEDURE - Abnormal    POC PH 7.382      POC PCO2 55.7 (*)     POC PO2 23 (*)     POC HCO3 33.1 (*)     POC BE 8 (*)     POC SATURATED O2 37      POC TCO2 35 (*)     Sample VENOUS      Site Paul/UAC      Allens Test N/A     INFLUENZA A & B BY MOLECULAR    Influenza A, Molecular Negative      Influenza B, Molecular Negative      Flu A & B Source NP     CULTURE, BLOOD    CULTURE, BLOOD   CULTURE, URINE   HIV 1 / 2 ANTIBODY    HIV 1/2 Ag/Ab Non-reactive      Narrative:     Release to patient->Immediate   HEPATITIS C ANTIBODY    Hepatitis C Ab Non-reactive      Narrative:     Release to patient->Immediate   MAGNESIUM    Magnesium 1.7      Narrative:     Release to patient->Immediate   SARS-COV-2 RNA AMPLIFICATION, QUAL    SARS-CoV-2 RNA, Amplification, Qual Negative     ISTAT LACTATE    POC Lactate 2.20      Sample VENOUS      Site [place]      Allens Test N/A     ISTAT LACTATE    POC Lactate 0.89      Sample VENOUS      Site Other      Allens Test N/A            Imaging Results              X-Ray Chest AP Portable (Final result)  Result time 10/15/24 19:15:20      Final result by Armand Nielsen MD (10/15/24 19:15:20)                   Impression:      No radiographic evidence of pneumonia or other source of fever, noting that early/mild viral pneumonia or nonspecific bronchitis may be radiographically occult.      Electronically signed by: Armand Nielsen MD  Date:    10/15/2024  Time:    19:15               Narrative:    EXAMINATION:  XR CHEST AP PORTABLE    CLINICAL HISTORY:  Fever, unspecified    TECHNIQUE:  Single frontal view of the chest was performed.    COMPARISON:  Chest radiograph 11/20/2023, CT renal stone study 10/15/2024    FINDINGS:  Monitoring leads overlie the chest.    Cardiomediastinal silhouette is midline and stable without evidence of failure.  Pulmonary vasculature and hilar contours are within normal limits.  Similar slight nonspecific elevation of the right hemidiaphragm.  Bibasilar mild platelike scarring versus atelectasis similar to prior.  The lungs are otherwise well expanded without large consolidation, pleural effusion or pneumothorax.  Grossly similar partially imaged extensive thoracolumbar spinal fixation hardware.  No acute osseous process seen.  PA and lateral views can be obtained.                                       CT Renal Stone Study ABD  Pelvis WO (Final result)  Result time 10/15/24 19:12:11      Final result by Armand Nielsen MD (10/15/24 19:12:11)                   Impression:      1. Bilateral nonobstructing nephrolithiasis.  2. Previous left-sided double-J ureteral stent has been removed.  No significant hydronephrosis at either kidney.  3. Large colonic and rectal stool burden suggesting constipation.  No evidence of bowel obstruction or acute bowel inflammation.  4. Small umbilical hernia containing small portion of transverse colon, similar to prior.  5. Prior gastric sleeve surgery, cholecystectomy and hysterectomy.  6. Grossly stable other incidental/nonemergent chronic findings in the body of the report.      Electronically signed by: Armand Nielsen MD  Date:    10/15/2024  Time:    19:12               Narrative:    EXAMINATION:  CT RENAL STONE STUDY ABD PELVIS WO    CLINICAL HISTORY:  Flank pain, kidney stone suspected;Nephrolithiasis, symptomatic/complicated;    TECHNIQUE:  Low dose axial images, sagittal and coronal reformations were obtained from the lung bases to the pubic symphysis.  Contrast was not administered.    COMPARISON:  Right upper quadrant ultrasound and renal ultrasound 09/13/2024, CT renal stone study 04/24/2024    FINDINGS:  Lack of IV contrast limits evaluation of soft tissue and vascular structures.  Beam hardening with streak artifact from thoracolumbar spinal and pelvic fixation hardware somewhat limits evaluation.    Base of the heart is upper limits of normal in size without significant pericardial fluid.    Left basilar minimal platelike scarring versus atelectasis without consolidation or pleural effusion    Remote cholecystectomy.  Remote operative change of previous gastric sleeve surgery without acute complication.  Stomach and duodenum are otherwise within normal limits.  No significant biliary ductal dilatation.    Noncontrast appearance of the liver, pancreas and bilateral adrenal glands are within normal  limits.  Spleen is upper limits of normal in size without focal process.    Bilateral kidneys are stable in overall size, shape and location.  Previous left-sided double-J ureteral stent has been removed.  No hydronephrosis.  Grossly similar bilateral minimal nonspecific perinephric stranding.  Bilateral nonobstructing renal calculi increased in burden on the right but decreased in overall burden on the left.  Ureters are normal in course and caliber.  Unchanged small parenchymal calcification at the left kidney interpolar region.  Urinary bladder is within normal limits.  Uterus not identified and likely surgically absent.  No adnexal mass or significant volume free pelvic fluid.  Pelvic phleboliths noted.    Appendix not identified; however, no pericecal inflammatory change.  Terminal ileum is within normal limits.  Large amount of scattered fecal material throughout the colon and rectum.  No evidence of bowel obstruction or acute bowel inflammation.  No bowel pneumatosis or portal venous gas.  No ascites or free air.  Grossly stable nonspecific stranding of the central mesentery within the left abdomen.  No lymphadenopathy by CT criteria.  In a minimal scattered calcific atherosclerosis of the abdominal aorta extending into its iliac branches.  Aorta tapers normally.    Grossly stable small umbilical hernia containing very small portion of nonobstructed and non of the flamed transverse colon.  Osseous structures appear stable without acute or destructive process seen.                                       Medications   cefTRIAXone (Rocephin) 1 g in D5W 100 mL IVPB (MB+) (0 g Intravenous Stopped 10/15/24 1838)   oxyCODONE-acetaminophen  mg per tablet 1 tablet (1 tablet Oral Given 10/15/24 1746)   potassium chloride 10 mEq in 100 mL IVPB (0 mEq Intravenous Stopped 10/15/24 2046)   potassium chloride SA CR tablet 40 mEq (40 mEq Oral Given 10/15/24 1746)     Medical Decision Making  Differential  diagnosis:    UTI, pyelonephritis, sepsis, septic shock, infected kidney stone, nephrolithiasis, FARZANEH, electrolyte derangement    Amount and/or Complexity of Data Reviewed  Radiology: ordered.         APC / Resident Notes:   Patient seen in the ER promptly upon arrival.  She is afebrile, no acute distress.  Physical examination reveals mild bilateral CVA tenderness.  Abdomen is otherwise soft, nondistended.  Mild diffuse tenderness on palpation throughout the abdomen.  IV access established, labs ordered.  Patient given Percocet for pain control.        ED Course as of 10/15/24 2107   Tue Oct 15, 2024   1714 WBC: 8.08 [AJ]   1714 Hemoglobin(!): 9.8  Stable and similar to baseline [AJ]   1714 POC Lactate: 2.20 [AJ]   1714 Bacteria, UA(!): Few [AJ]   1714 WBC, UA(!): 69 [AJ]   1714 NITRITE UA(!): Positive [AJ]   1714 Will cover with Rocephin.    Concerning for infected stone.  Pending CT abdomen [AJ]   1920 CT Renal Stone Study ABD Pelvis WO  Impression:     1. Bilateral nonobstructing nephrolithiasis.  2. Previous left-sided double-J ureteral stent has been removed.  No significant hydronephrosis at either kidney.  3. Large colonic and rectal stool burden suggesting constipation.  No evidence of bowel obstruction or acute bowel inflammation.  4. Small umbilical hernia containing small portion of transverse colon, similar to prior.  5. Prior gastric sleeve surgery, cholecystectomy and hysterectomy.  6. Grossly stable other incidental/nonemergent chronic findings in the body of the report.   [AJ]   1920 X-Ray Chest AP Portable  Impression:     No radiographic evidence of pneumonia or other source of fever, noting that early/mild viral pneumonia or nonspecific bronchitis may be radiographically occult.      [AJ]   2048 ISTAT Lactate  Repeat lactic acid improved to 0.8 from 2.2 [AJ]   2052 Chart review reveals pansensitive urine cultures in the past. [AJ]   2103 Potassium(!): 3.2  Improved from 2.7 [AJ]   2103 Had an  extensive conversation with patient regarding patient's ED workup.  There is concern for developing pyelonephritis given flank pain, UTI findings.  Patient offered observation however requested to be discharged home.    Given that she has had previous pansensitive cultures and has been afebrile without evidence of sepsis or septic shock while in the ED, she is stable for outpatient follow up.  Patient agreeable to close follow-up with Urology.    Will prescribed home with potassium tablets to try.  She states that potassium citrate gave her blisters to the mouth therefore stopped the medication.  Will advise for potassium rich diet as well.    Patient given strict return precautions ED including but not limited to worsening pain, persistent fever, inability to tolerate oral antibiotics or any other concerning findings.    Patient is otherwise stable for discharge at this time. [AJ]      ED Course User Index  [AJ] Dianne Gottlieb PA-C                             Clinical Impression:  Final diagnoses:  [R50.9] Fever  [N30.01] Acute cystitis with hematuria (Primary)  [E87.6] Hypokalemia          ED Disposition Condition    Discharge Stable          ED Prescriptions       Medication Sig Dispense Start Date End Date Auth. Provider    cephALEXin (KEFLEX) 500 MG capsule Take 1 capsule (500 mg total) by mouth every 8 (eight) hours. for 10 days 30 capsule 10/15/2024 10/25/2024 Dianne Gottlieb PA-C    potassium chloride (KLOR-CON) 10 MEQ TbSR Take 1 tablet (10 mEq total) by mouth once daily. for 7 days 7 tablet 10/15/2024 10/22/2024 Dianne Gottlieb PA-C          Follow-up Information       Follow up With Specialties Details Why Contact Info Additional Information    Rangel Swenson MD Internal Medicine   [address] [place], [place] [address]  [address]  [facility] [address] [phone]       [facility] - Urology Atrium 4th Fl Urology   [address] [facility] [address] [phone]  Main Building, 4th Floor Please park in [facility] and take Atrium elevator             Dianne Gottlieb PA-C  10/15/24 2104

## 2024-10-15 NOTE — ED TRIAGE NOTES
"Angie Mccarthy, a 59 y.o. female presents to the ED w/ complaint of sent from urology, US done 1-2 weeks ago, bad flank pain with freq urination    Triage note:  Chief Complaint   Patient presents with    Multiple complaints     Sent from  urology for ct scan, hx kidney stones, yest fever 102.3 yest no fever today     Review of patient's allergies indicates:   Allergen Reactions    Adhesive Dermatitis and Blisters    Adhesive tape-silicones Dermatitis     The longer the adhesive stays on, the worse the irritation    Mastisol adhesive [gum skjzco-rxaheq-wbmi-alcohol] Itching, Rash and Blisters     "Looked like poison ivy"    Sulfamethoxazole-trimethoprim Itching and Anxiety     Has a "nervous feeling."  "Jittery"  Also had an upset stomach.Has taken recently and the reaction was "less intense"    Bactoshield chg [chlorhexidine gluconate] Rash     Caused red, itchy patch to skin      Past Medical History:   Diagnosis Date    Allergy     seasonal    Arthritis     Blood transfusion     with back surgeries    Chronic back pain     Diabetes mellitus, type 2     GERD (gastroesophageal reflux disease)     Hyperlipidemia     Hypertension     Hypothyroidism     Thyroid nodule    Joint pain     Kidney stones     Morbid obesity 12/14/2012    NAFLD (nonalcoholic fatty liver disease)     OCD (obsessive compulsive disorder)     Osteoporosis     PONV (postoperative nausea and vomiting)     Persistent, Motion sickness with boat rides, Scopolamine helped -still had nausea    Restless leg syndrome     Sciatica of right side associated with disorder of lumbosacral spine     Spinal stenosis of lumbar region     Thoracic spondylosis          APPEARANCE: awake and alert in NAD. PAIN  10/10  SKIN: warm, dry and intact. No breakdown or bruising.  MUSCULOSKELETAL: Patient moving all extremities spontaneously, no obvious swelling or deformities noted. Ambulates independently.  RESPIRATORY: Denies shortness of breath.Respirations " unlabored.   CARDIAC: Denies CP, 2+ distal pulses; no peripheral edema  ABDOMEN: S/ND/NT, endorses nausea  : voids spontaneously, denies difficulty  Neurologic: AAO x 4; follows commands equal strength in all extremities; denies numbness/tingling. Denies dizziness

## 2024-10-15 NOTE — PROGRESS NOTES
CHIEF COMPLAINT:  Angie Mccarthy is a 59 y.o. female here for a follow up.         HISTORY OF PRESENTING ILLINESS:  Angie Mccarthy is a 59 y.o. female who is an established patient at our clinic with a long standing history of kidney stones. She has a history of Diabetes. She states on 10/12/2024 she started with bilateral flank pain, frequency urination, and fever. She states she is also having left sided spasms. Last night her fever reached 102.3 and broke over night she woke up in sweats. She has been taking AZO with no relief. She states she felt so weak and dizzy that she hit her head on the wall at home. Denies unconsciousness.         REVIEW OF SYSTEMS:  Review of Systems   Constitutional:  Positive for chills and fever.   Respiratory: Negative.     Cardiovascular: Negative.    Genitourinary:  Positive for flank pain and frequency.   Skin: Negative.    Neurological: Negative.    Psychiatric/Behavioral: Negative.           PATIENT HISTORY:    Past Medical History:   Diagnosis Date    Allergy     seasonal    Arthritis     Blood transfusion     with back surgeries    Chronic back pain     Diabetes mellitus, type 2     GERD (gastroesophageal reflux disease)     Hyperlipidemia     Hypertension     Hypothyroidism     Thyroid nodule    Joint pain     Kidney stones     Morbid obesity 12/14/2012    NAFLD (nonalcoholic fatty liver disease)     OCD (obsessive compulsive disorder)     Osteoporosis     PONV (postoperative nausea and vomiting)     Persistent, Motion sickness with boat rides, Scopolamine helped -still had nausea    Restless leg syndrome     Sciatica of right side associated with disorder of lumbosacral spine     Spinal stenosis of lumbar region     Thoracic spondylosis        Past Surgical History:   Procedure Laterality Date    ANTEGRADE NEPHROSTOGRAPHY Right 2/23/2024    Procedure: NEPHROSTOGRAM, ANTEGRADE;  Surgeon: Suni Patel MD;  Location: [facility] OR [address] [place] [address];  Service:  Urology;  Laterality: Right;    ANTEGRADE NEPHROSTOGRAPHY Left 4/8/2024    Procedure: Nephrostogram;  Surgeon: Suni Patel MD;  Location: [facility] OR 1ST FLR;  Service: Urology;  Laterality: Left;    APPENDECTOMY      BACK SURGERY      x 6    CHOLECYSTECTOMY      CYSTOSCOPY N/A 4/17/2024    Procedure: CYSTOSCOPY;  Surgeon: Suni Patel MD;  Location: [facility] OR [facility] FLR;  Service: Urology;  Laterality: N/A;    CYSTOSCOPY N/A 5/8/2024    Procedure: CYSTOSCOPY;  Surgeon: Suni Patel MD;  Location: [facility] OR 1ST FLR;  Service: Urology;  Laterality: N/A;    DILATION OF NEPHROSTOMY TRACT Left 4/8/2024    Procedure: DILATION, NEPHROSTOMY TRACT;  Surgeon: Suni Patel MD;  [facility];  Service: Urology;  Laterality: Left;    EXTRACTION - STONE Right 2/23/2024    Procedure: EXTRACTION - STONE;  Surgeon: Suni Patel MD;  Location: [facility] OR 1ST FLR;  Service: Urology;  Laterality: Right;    EXTRACTION - STONE Left 4/17/2024    Procedure: EXTRACTION - STONE;  Surgeon: Suni Patel MD;  Location: [facility] OR 1ST FLR;  Service: Urology;  Laterality: Left;    EXTRACTION - STONE Left 5/8/2024    Procedure: EXTRACTION - STONE;  Surgeon: Suni Patel MD;  [facility];  Service: Urology;  Laterality: Left;    GASTRECTOMY      Lap Gastric Sleeve    HERNIA REPAIR      HYSTERECTOMY      JOINT REPLACEMENT      LASER LITHOTRIPSY Left 4/17/2024    Procedure: LITHOTRIPSY, USING LASER;  Surgeon: Suni Patel MD;  [facility];  Service: Urology;  Laterality: Left;    LASER LITHOTRIPSY Left 5/8/2024    Procedure: LITHOTRIPSY, USING LASER;  Surgeon: Suni Patel MD;  Location: [facility] OR 1ST FLR;  Service: Urology;  Laterality: Left;    LIVER BIOPSY  02/06/2017    steatohepatitis with stage 1 fibrosis    MYELOGRAPHY N/A 11/29/2018    Procedure: MYELOGRAM;  Surgeon: [facility] Diagnostic Provider;  Location: [facility] OR 2ND FLR;   Service: Radiology;  Laterality: N/A;    MYELOGRAPHY N/A 1/7/2019    Procedure: Myelogram;  Surgeon: Lissett Surgeon;  Location: [facility];  Service: Anesthesiology;  Laterality: N/A;    NEPHROSCOPY Right 2/23/2024    Procedure: NEPHROSCOPY;  Surgeon: Suni Patel MD;  Location: [facility] OR [facility] FLR;  Service: Urology;  Laterality: Right;  2 hr    NEPHROSTOGRAPHY Right 2/19/2024    Procedure: Nephrostogram;  Surgeon: Suni Patel MD;  Location: [facility] OR 2ND FLR;  Service: Urology;  Laterality: Right;    OOPHORECTOMY      PARATHYROIDECTOMY N/A 5/17/2024    Procedure: PARATHYROIDECTOMY With Intraoperative PTH Levels;  Surgeon: Ligia Raya MD;  [facility];  Service: General;  Laterality: N/A;  3 HOURS    PERCUTANEOUS CRYOTHERAPY OF PERIPHERAL NERVE USING LIQUID NITROUS OXIDE IN CLOSED NEEDLE DEVICE Left 4/29/2019    Procedure: CRYOTHERAPY, NERVE, PERIPHERAL, PERCUTANEOUS, USING LIQUID NITROUS OXIDE IN CLOSED NEEDLE DEVICE-iovera left knee;  Surgeon: Chavo Gold III, MD;  Location: [facility] CATH LAB;  Service: Pain Management;  Laterality: Left;    PERCUTANEOUS CRYOTHERAPY OF PERIPHERAL NERVE USING LIQUID NITROUS OXIDE IN CLOSED NEEDLE DEVICE Right 8/2/2021    Procedure: CRYOTHERAPY, NERVE, PERIPHERAL, PERCUTANEOUS, USING LIQUID NITROUS OXIDE IN CLOSED NEEDLE DEVICE;  Surgeon: Saritha Proctor NP;  Location: [facility];  Service: Pain Management;  Laterality: Right;  RIGHT KNEE IOVERA    PERCUTANEOUS NEPHROLITHOTOMY Right 2/19/2024    Procedure: NEPHROLITHOTOMY, PERCUTANEOUS;  Surgeon: Suni Patel MD;  Location: [facility] OR [facility] FLR;  Service: Urology;  Laterality: Right;  2 HRS    PERCUTANEOUS NEPHROLITHOTOMY Left 4/8/2024    Procedure: NEPHROLITHOTOMY, PERCUTANEOUS;  Surgeon: Suni Patel MD;  [facility];  Service: Urology;  Laterality: Left;  3 hrs    PERCUTANEOUS NEPHROSCOPY  2/19/2024    Procedure: NEPHROSCOPY, PERCUTANEOUS;  Surgeon:  Suni Patel MD;  Location: [facility] OR 2ND FLR;  Service: Urology;;    PERCUTANEOUS NEPHROSTOMY Right 2/19/2024    Procedure: CREATION, NEPHROSTOMY, PERCUTANEOUS;  Surgeon: Suni Patel MD;  Location: [facility] OR 2ND FLR;  Service: Urology;  Laterality: Right;    PYELOSCOPY Left 4/17/2024    Procedure: PYELOSCOPY;  Surgeon: Suni Patel MD;  Location: [facility] OR 1ST FLR;  Service: Urology;  Laterality: Left;    PYELOSCOPY Left 5/8/2024    Procedure: PYELOSCOPY;  Surgeon: Suni Patel MD;  Location: [facility] OR 1ST FLR;  Service: Urology;  Laterality: Left;    REMOVAL-STENT Left 4/17/2024    Procedure: REMOVAL-STENT;  Surgeon: Suni Patel MD;  Location: [facility] OR 1ST FLR;  Service: Urology;  Laterality: Left;    REMOVAL-STENT Left 5/8/2024    Procedure: REMOVAL-STENT;  Surgeon: Suni Patel MD;  Location: [facility] OR 1ST FLR;  Service: Urology;  Laterality: Left;    REPLACEMENT OF NEPHROSTOMY TUBE Left 4/8/2024    Procedure: PLACEMENT, NEPHROSTOMY TUBE;  Surgeon: Suni Patel MD;  Location: [facility] OR 1ST FLR;  Service: Urology;  Laterality: Left;    REPLACEMENT OF STENT Left 4/17/2024    Procedure: REPLACEMENT, STENT;  Surgeon: Suni Patel MD;  Location: [facility] OR 1ST FLR;  Service: Urology;  Laterality: Left;    REPLACEMENT OF STENT Left 5/8/2024    Procedure: REPLACEMENT, STENT;  Surgeon: Suni Patel MD;  Location: [facility] OR 1ST FLR;  Service: Urology;  Laterality: Left;    SPINAL FUSION      TONSILLECTOMY, ADENOIDECTOMY      TOTAL KNEE ARTHROPLASTY Left 5/1/2019    Procedure: REPLACEMENT-KNEE-TOTAL;  Surgeon: John L. Ochsner Jr., MD;  Location: [facility] OR 2ND FLR;  Service: Orthopedics;  Laterality: Left;    URETERAL STENT PLACEMENT Left 4/8/2024    Procedure: INSERTION, STENT, URETER;  Surgeon: Suni Patel MD;  Location: [facility] OR [address] [place] [address];  Service: Urology;  Laterality: Left;    URETEROSCOPY  2/19/2024    Procedure: URETEROSCOPY;   Surgeon: Suni Patel MD;  Location: [facility] OR 2ND FLR;  Service: Urology;;    URETEROSCOPY Left 4/17/2024    Procedure: URETEROSCOPY;  Surgeon: Suni Patel MD;  Location: [facility] OR 1ST FLR;  Service: Urology;  Laterality: Left;    URETEROSCOPY Left 5/8/2024    Procedure: URETEROSCOPY;  Surgeon: Suni Patel MD;  Location: [facility] OR 1ST FLR;  Service: Urology;  Laterality: Left;    URETEROSCOPY, ANTEGRADE Right 2/23/2024    Procedure: URETEROSCOPY, ANTEGRADE;  Surgeon: Suni Patel MD;  Location: [facility] OR 1ST FLR;  Service: Urology;  Laterality: Right;       Family History   Problem Relation Name Age of Onset    Heart disease Mother      Heart disease Father      Cancer Father      COPD Father      Hypertension Sister      Scoliosis Sister      Hypertension Brother      Heart disease Maternal Grandmother      Heart disease Maternal Aunt      Heart disease Maternal Uncle      Heart disease Paternal Aunt      Breast cancer Paternal Aunt      Heart disease Paternal Uncle      Cancer Paternal Uncle      Acne Other nephew     Eczema Other nephew     Melanoma Neg Hx      Psoriasis Neg Hx      Lupus Neg Hx         Social History     Socioeconomic History    Marital status:    Occupational History    Occupation: disable   Tobacco Use    Smoking status: Never    Smokeless tobacco: Never    Tobacco comments:     Tried a few cigarettes during teenage   Substance and Sexual Activity    Alcohol use: Not Currently     Comment: socially    Drug use: No    Sexual activity: Not Currently     Partners: Male   Other Topics Concern    Are you pregnant or think you may be? No    Breast-feeding No     Social Drivers of Health     Financial Resource Strain: High Risk (11/15/2023)    Overall Financial Resource Strain (CARDIA)     Difficulty of Paying Living Expenses: Hard   Food Insecurity: No Food Insecurity (11/15/2023)    Hunger Vital Sign     Worried About Running Out of Food in the Last  Year: Never true     Ran Out of Food in the Last Year: Never true   Transportation Needs: Unmet Transportation Needs (11/15/2023)    PRAPARE - Transportation     Lack of Transportation (Medical): Yes     Lack of Transportation (Non-Medical): No   Physical Activity: Inactive (11/15/2023)    Exercise Vital Sign     Days of Exercise per Week: 0 days     Minutes of Exercise per Session: 0 min   Stress: Stress Concern Present (11/15/2023)    [employer] - Occupational Stress Questionnaire     Feeling of Stress : To some extent   Housing Stability: Low Risk  (11/15/2023)    Housing Stability Vital Sign     Unable to Pay for Housing in the Last Year: No     Number of Places Lived in the Last Year: 1     Unstable Housing in the Last Year: No       Allergies:  Adhesive, Adhesive tape-silicones, Mastisol adhesive [gum lupnem-whlyqt-zlsj-alcohol], Sulfamethoxazole-trimethoprim, and Bactoshield chg [chlorhexidine gluconate]    Medications:    Current Outpatient Medications:     aspirin (ECOTRIN) 81 MG EC tablet, Take 81 mg by mouth nightly., Disp: , Rfl:     BIOTIN ORAL, Take 10,000 mg by mouth every morning., Disp: , Rfl:     blood glucose control, low (TRUE METRIX LEVEL 1) Soln, USE AS DIRECTED WITH GLUCOSE METER, Disp: 1 each, Rfl: 0    blood sugar diagnostic (TRUE METRIX GLUCOSE TEST STRIP) Strp, 1 each by NOT APPLICABLE route once daily. Test Blood Sugar, Disp: 100 strip, Rfl: 3    chlorthalidone (HYGROTEN) 25 MG Tab, Take 1 tablet (25 mg total) by mouth once daily., Disp: 30 tablet, Rfl: 11    cyclobenzaprine (FLEXERIL) 10 MG tablet, TK 1 T PO QD- as needed for muscle spasms, Disp: , Rfl: 0    DULoxetine (CYMBALTA) 60 MG capsule, TAKE 1 CAPSULE EVERY DAY (Patient taking differently: Take by mouth every evening.), Disp: 90 capsule, Rfl: 3    fish oil-omega-3 fatty acids 300-1,000 mg capsule, Take 1,200 mg by mouth 2 (two) times daily., Disp: , Rfl:     fluticasone propionate (FLONASE) 50  mcg/actuation nasal spray, USE 2 SPRAYS NASALLY ONE TIME DAILY, Disp: 48 g, Rfl: 3    gabapentin (NEURONTIN) 800 MG tablet, Take 800 mg by mouth 3 (three) times daily. Takes 2-3 times per day, Disp: , Rfl: 1    lancets (TRUEPLUS LANCETS) 33 gauge Misc, 1 lancet  by NOT APPLICABLE route once daily. Test Blood Sugar, Disp: 100 each, Rfl: 3    levothyroxine (SYNTHROID) 137 MCG Tab tablet, Take 1 tablet (137 mcg total) by mouth before breakfast., Disp: 90 tablet, Rfl: 3    lisinopriL (PRINIVIL,ZESTRIL) 2.5 MG tablet, TAKE 1 TABLET EVERY DAY, Disp: 90 tablet, Rfl: 3    metFORMIN (GLUCOPHAGE) 500 MG tablet, Take 1 tablet (500 mg total) by mouth 2 (two) times daily with meals., Disp: 180 tablet, Rfl: 3    multivitamin capsule, Take 1 capsule by mouth every morning., Disp: , Rfl:     omeprazole (PRILOSEC) 40 MG capsule, Take 1 capsule (40 mg total) by mouth once daily., Disp: 90 capsule, Rfl: 3    oxyCODONE-acetaminophen (PERCOCET)  mg per tablet, Take 1 tablet by mouth 4 (four) times daily as needed for Pain., Disp: 10 tablet, Rfl: 0    rosuvastatin (CRESTOR) 10 MG tablet, TAKE 1 TABLET EVERY NIGHT, Disp: 90 tablet, Rfl: 3    zinc sulfate (ZINCATE) 50 mg zinc (220 mg) capsule, , Disp: , Rfl:     zolpidem (AMBIEN) 10 mg Tab, TAKE 1 TABLET BY MOUTH EVERY NIGHT AT BEDTIME AS NEEDED FOR INSOMNIA, Disp: 90 tablet, Rfl: 0    alcohol swabs PadM, Apply 1 each topically once daily., Disp: 100 each, Rfl: 3    potassium citrate (UROCIT-K) 5 mEq (540 mg) TbSR, Take 2 tablets (10 mEq total) by mouth 3 (three) times daily with meals. (Patient not taking: Reported on 10/15/2024), Disp: 180 tablet, Rfl: 11    tirzepatide (MOUNJARO) 15 mg/0.5 mL PnIj, Inject 15 mg into the skin every 7 days. (Patient not taking: Reported on 10/15/2024), Disp: 4 Pen, Rfl: 11    Current Facility-Administered Medications:     triamcinolone acetonide injection 40 mg, 40 mg, Intradermal, 1 time in Clinic/HOD, Teri Manuel MD    PHYSICAL  EXAMINATION:  Physical Exam      LABS:      In office UA showed 3+ blood and nitrites.         Lab Results   Component Value Date    CREATININE 0.8 10/09/2024    EGFRNORACEVR >60.0 10/09/2024               IMPRESSION:    Encounter Diagnoses   Name Primary?    Calculus of urinary tract in diseases classified elsewhere Yes         Assessment:       1. Calculus of urinary tract in diseases classified elsewhere        Plan:       Sent patient to the ER for IV antibiotics and fluids. CT scan.

## 2024-10-15 NOTE — FIRST PROVIDER EVALUATION
"Medical screening examination initiated.  I have conducted a focused provider triage encounter, findings are as follows:    Brief history of present illness:     Hx of kidney stones  Nausea and flank pain, febrile  From urology clinic today      Vitals:    10/15/24 1509   BP: 127/78   Pulse: 88   Resp: 18   Temp: 99 °F (37.2 °C)   TempSrc: Oral   SpO2: 97%   Weight: 90.7 kg (200 lb)   Height: 5' 5" (1.651 m)       Pertinent physical exam:  A&Ox3    Brief workup plan:  Sepsis labs, CT abd        Preliminary workup initiated; this workup will be continued and followed by the physician or advanced practice provider that is assigned to the patient when roomed.  "

## 2024-10-16 ENCOUNTER — PATIENT MESSAGE (OUTPATIENT)
Dept: NEPHROLOGY | Facility: CLINIC | Age: 59
End: 2024-10-16
Payer: MEDICARE

## 2024-10-16 ENCOUNTER — PATIENT MESSAGE (OUTPATIENT)
Dept: UROLOGY | Facility: CLINIC | Age: 59
End: 2024-10-16
Payer: MEDICARE

## 2024-10-16 NOTE — DISCHARGE INSTRUCTIONS
Finish full course of antibiotics.  Return the ER if you are not able to tolerate your antibiotics or have persistent fever, or severe pain.    Take the potassium tablets as prescribed.  Increase potassium rich diet in the meantime.  Follow up with your urologist and primary care physician.

## 2024-10-17 LAB — BACTERIA UR CULT: ABNORMAL

## 2024-10-18 DIAGNOSIS — E87.6 HYPOKALEMIA: Primary | ICD-10-CM

## 2024-10-18 LAB — BACTERIA UR CULT: ABNORMAL

## 2024-10-20 LAB
BACTERIA BLD CULT: NORMAL
BACTERIA BLD CULT: NORMAL

## 2024-10-24 ENCOUNTER — PATIENT MESSAGE (OUTPATIENT)
Dept: INTERNAL MEDICINE | Facility: CLINIC | Age: 59
End: 2024-10-24
Payer: MEDICARE

## 2024-10-24 DIAGNOSIS — I10 HYPERTENSION, UNSPECIFIED TYPE: Primary | ICD-10-CM

## 2024-10-31 ENCOUNTER — PATIENT MESSAGE (OUTPATIENT)
Dept: UROLOGY | Facility: CLINIC | Age: 59
End: 2024-10-31
Payer: MEDICARE

## 2024-11-11 DIAGNOSIS — I10 HYPERTENSION, UNSPECIFIED TYPE: Primary | ICD-10-CM

## 2024-11-14 ENCOUNTER — TELEPHONE (OUTPATIENT)
Facility: CLINIC | Age: 59
End: 2024-11-14
Payer: MEDICARE

## 2024-11-14 DIAGNOSIS — K76.0 FATTY LIVER: ICD-10-CM

## 2024-11-14 DIAGNOSIS — K74.02 HEPATIC FIBROSIS, STAGE 3: Primary | ICD-10-CM

## 2024-11-14 NOTE — TELEPHONE ENCOUNTER
Patient was called to inform her of providers instructions. Pt did not answer the phone voice message was left asking her to return the call.    Ena   --- Message from Mami Chino NP sent at 11/14/2024  1:12 PM CST -----  Please call patient- I see that she is scheduled for another ultrasound next week though she does not need to repeat this just yet and it can be cancelled. She is overdue for follow-up in our clinic; please schedule next available with myself or any GILMAR. Recommend Fibroscan prior to visit. Thanks.

## 2024-11-19 ENCOUNTER — TELEPHONE (OUTPATIENT)
Dept: HEPATOLOGY | Facility: CLINIC | Age: 59
End: 2024-11-19
Payer: MEDICARE

## 2024-11-19 ENCOUNTER — TELEPHONE (OUTPATIENT)
Dept: NEUROLOGY | Facility: CLINIC | Age: 59
End: 2024-11-19
Payer: MEDICARE

## 2024-11-19 NOTE — TELEPHONE ENCOUNTER
----- Message from Yisel Aguilar PA-C sent at 11/19/2024  9:39 AM CST -----  Can you reschedule this patient for tremors and RLS? New patient.

## 2024-11-19 NOTE — TELEPHONE ENCOUNTER
"----- Message from Brad sent at 11/19/2024 10:26 AM CST -----  Consult/Advisory    Name Of Caller: Self    Contact Preference?: [phone]     What is the nature of the call?: Returning call to Ena    Additional Notes:  "Thank you for all that you do for our patients"  "

## 2024-11-19 NOTE — TELEPHONE ENCOUNTER
Returned call to patient. Cancelled US appointment per provider. Scheduled follow up visit with Sole and scheduled fibroscan as well.

## 2024-11-20 ENCOUNTER — TELEPHONE (OUTPATIENT)
Dept: OPTOMETRY | Facility: CLINIC | Age: 59
End: 2024-11-20
Payer: MEDICARE

## 2024-11-20 NOTE — TELEPHONE ENCOUNTER
----- Message from Luly sent at 11/19/2024  5:27 PM CST -----  Type: General Call Back     Name of Caller:ROHIT SEGOVIA [[id number]]  Symptoms:  Would the patient rather a call back or a response via [facility]? call  Best Call Back Number:[phone]   Additional Information: The patient is wanting to know if their insurance is accepted by the provider. Please give the patient a call.

## 2024-12-02 DIAGNOSIS — E03.9 PRIMARY HYPOTHYROIDISM: ICD-10-CM

## 2024-12-04 ENCOUNTER — PATIENT MESSAGE (OUTPATIENT)
Dept: NEPHROLOGY | Facility: CLINIC | Age: 59
End: 2024-12-04
Payer: MEDICARE

## 2024-12-04 DIAGNOSIS — D50.0 IRON DEFICIENCY ANEMIA DUE TO CHRONIC BLOOD LOSS: Primary | ICD-10-CM

## 2024-12-04 DIAGNOSIS — I10 HYPERTENSION, UNSPECIFIED TYPE: ICD-10-CM

## 2024-12-04 RX ORDER — LEVOTHYROXINE SODIUM 137 UG/1
137 TABLET ORAL
Qty: 90 TABLET | Refills: 3 | Status: SHIPPED | OUTPATIENT
Start: 2024-12-04

## 2024-12-05 ENCOUNTER — LAB VISIT (OUTPATIENT)
Dept: LAB | Facility: HOSPITAL | Age: 59
End: 2024-12-05
Payer: MEDICARE

## 2024-12-05 DIAGNOSIS — N20.0 NEPHROLITHIASIS: ICD-10-CM

## 2024-12-05 DIAGNOSIS — E87.6 HYPOKALEMIA: ICD-10-CM

## 2024-12-05 DIAGNOSIS — N22 CALCULUS OF URINARY TRACT IN DISEASES CLASSIFIED ELSEWHERE: ICD-10-CM

## 2024-12-05 DIAGNOSIS — D50.0 IRON DEFICIENCY ANEMIA DUE TO CHRONIC BLOOD LOSS: ICD-10-CM

## 2024-12-05 PROCEDURE — 83970 ASSAY OF PARATHORMONE: CPT | Performed by: INTERNAL MEDICINE

## 2024-12-05 PROCEDURE — 80069 RENAL FUNCTION PANEL: CPT | Performed by: INTERNAL MEDICINE

## 2024-12-05 PROCEDURE — 83735 ASSAY OF MAGNESIUM: CPT | Performed by: INTERNAL MEDICINE

## 2024-12-05 PROCEDURE — 85025 COMPLETE CBC W/AUTO DIFF WBC: CPT | Performed by: INTERNAL MEDICINE

## 2024-12-05 PROCEDURE — 84550 ASSAY OF BLOOD/URIC ACID: CPT | Performed by: INTERNAL MEDICINE

## 2024-12-05 PROCEDURE — 36415 COLL VENOUS BLD VENIPUNCTURE: CPT | Performed by: INTERNAL MEDICINE

## 2024-12-06 ENCOUNTER — OFFICE VISIT (OUTPATIENT)
Dept: NEPHROLOGY | Facility: CLINIC | Age: 59
End: 2024-12-06
Payer: MEDICARE

## 2024-12-06 ENCOUNTER — LAB VISIT (OUTPATIENT)
Dept: LAB | Facility: HOSPITAL | Age: 59
End: 2024-12-06
Payer: MEDICARE

## 2024-12-06 DIAGNOSIS — N39.0 UTI (URINARY TRACT INFECTION), UNCOMPLICATED: ICD-10-CM

## 2024-12-06 DIAGNOSIS — E21.3 HYPERPARATHYROIDISM, UNSPECIFIED: Primary | ICD-10-CM

## 2024-12-06 DIAGNOSIS — E21.3 HYPERPARATHYROIDISM, UNSPECIFIED: ICD-10-CM

## 2024-12-06 DIAGNOSIS — N20.0 NEPHROLITHIASIS: Primary | ICD-10-CM

## 2024-12-06 DIAGNOSIS — N20.0 NEPHROLITHIASIS: ICD-10-CM

## 2024-12-06 DIAGNOSIS — I10 HYPERTENSION, UNSPECIFIED TYPE: ICD-10-CM

## 2024-12-06 LAB
ALBUMIN SERPL BCP-MCNC: 3.8 G/DL (ref 3.5–5.2)
ANION GAP SERPL CALC-SCNC: 12 MMOL/L (ref 8–16)
BACTERIA #/AREA URNS AUTO: ABNORMAL /HPF
BASOPHILS # BLD AUTO: 0.07 K/UL (ref 0–0.2)
BASOPHILS NFR BLD: 0.9 % (ref 0–1.9)
BILIRUB UR QL STRIP: NEGATIVE
BUN SERPL-MCNC: 12 MG/DL (ref 6–20)
CALCIUM SERPL-MCNC: 9.4 MG/DL (ref 8.7–10.5)
CAOX CRY UR QL COMP ASSIST: ABNORMAL
CHLORIDE SERPL-SCNC: 102 MMOL/L (ref 95–110)
CLARITY UR REFRACT.AUTO: ABNORMAL
CO2 SERPL-SCNC: 23 MMOL/L (ref 23–29)
COLOR UR AUTO: YELLOW
CREAT SERPL-MCNC: 0.9 MG/DL (ref 0.5–1.4)
DIFFERENTIAL METHOD BLD: ABNORMAL
EOSINOPHIL # BLD AUTO: 0.1 K/UL (ref 0–0.5)
EOSINOPHIL NFR BLD: 1.9 % (ref 0–8)
ERYTHROCYTE [DISTWIDTH] IN BLOOD BY AUTOMATED COUNT: 17.4 % (ref 11.5–14.5)
ERYTHROCYTE [DISTWIDTH] IN BLOOD BY AUTOMATED COUNT: 17.4 % (ref 11.5–14.5)
EST. GFR  (NO RACE VARIABLE): >60 ML/MIN/1.73 M^2
GLUCOSE SERPL-MCNC: 131 MG/DL (ref 70–110)
GLUCOSE UR QL STRIP: NEGATIVE
HCT VFR BLD AUTO: 36.2 % (ref 37–48.5)
HCT VFR BLD AUTO: 36.2 % (ref 37–48.5)
HGB BLD-MCNC: 10.7 G/DL (ref 12–16)
HGB BLD-MCNC: 10.7 G/DL (ref 12–16)
HGB UR QL STRIP: ABNORMAL
IMM GRANULOCYTES # BLD AUTO: 0.02 K/UL (ref 0–0.04)
IMM GRANULOCYTES NFR BLD AUTO: 0.3 % (ref 0–0.5)
KETONES UR QL STRIP: NEGATIVE
LEUKOCYTE ESTERASE UR QL STRIP: ABNORMAL
LYMPHOCYTES # BLD AUTO: 3 K/UL (ref 1–4.8)
LYMPHOCYTES NFR BLD: 39.9 % (ref 18–48)
MAGNESIUM SERPL-MCNC: 1.7 MG/DL (ref 1.6–2.6)
MCH RBC QN AUTO: 20.4 PG (ref 27–31)
MCH RBC QN AUTO: 20.4 PG (ref 27–31)
MCHC RBC AUTO-ENTMCNC: 29.6 G/DL (ref 32–36)
MCHC RBC AUTO-ENTMCNC: 29.6 G/DL (ref 32–36)
MCV RBC AUTO: 69 FL (ref 82–98)
MCV RBC AUTO: 69 FL (ref 82–98)
MICROSCOPIC COMMENT: ABNORMAL
MONOCYTES # BLD AUTO: 0.5 K/UL (ref 0.3–1)
MONOCYTES NFR BLD: 7 % (ref 4–15)
NEUTROPHILS # BLD AUTO: 3.7 K/UL (ref 1.8–7.7)
NEUTROPHILS NFR BLD: 50 % (ref 38–73)
NITRITE UR QL STRIP: NEGATIVE
NRBC BLD-RTO: 0 /100 WBC
PH UR STRIP: 6 [PH] (ref 5–8)
PHOSPHATE SERPL-MCNC: 3.4 MG/DL (ref 2.7–4.5)
PLATELET # BLD AUTO: 274 K/UL (ref 150–450)
PLATELET # BLD AUTO: 274 K/UL (ref 150–450)
PMV BLD AUTO: ABNORMAL FL (ref 9.2–12.9)
PMV BLD AUTO: ABNORMAL FL (ref 9.2–12.9)
POTASSIUM SERPL-SCNC: 3.8 MMOL/L (ref 3.5–5.1)
PROT UR QL STRIP: ABNORMAL
PTH-INTACT SERPL-MCNC: 46.8 PG/ML (ref 9–77)
RBC # BLD AUTO: 5.25 M/UL (ref 4–5.4)
RBC # BLD AUTO: 5.25 M/UL (ref 4–5.4)
RBC #/AREA URNS AUTO: 8 /HPF (ref 0–4)
SODIUM SERPL-SCNC: 137 MMOL/L (ref 136–145)
SP GR UR STRIP: 1.02 (ref 1–1.03)
SQUAMOUS #/AREA URNS AUTO: 1 /HPF
URATE SERPL-MCNC: 4.2 MG/DL (ref 2.4–5.7)
URN SPEC COLLECT METH UR: ABNORMAL
WBC # BLD AUTO: 7.42 K/UL (ref 3.9–12.7)
WBC # BLD AUTO: 7.42 K/UL (ref 3.9–12.7)
WBC #/AREA URNS AUTO: >100 /HPF (ref 0–5)
WBC CLUMPS UR QL AUTO: ABNORMAL

## 2024-12-06 PROCEDURE — 87088 URINE BACTERIA CULTURE: CPT | Performed by: INTERNAL MEDICINE

## 2024-12-06 PROCEDURE — 81001 URINALYSIS AUTO W/SCOPE: CPT | Performed by: INTERNAL MEDICINE

## 2024-12-06 PROCEDURE — 87086 URINE CULTURE/COLONY COUNT: CPT | Performed by: INTERNAL MEDICINE

## 2024-12-06 PROCEDURE — 87186 SC STD MICRODIL/AGAR DIL: CPT | Performed by: INTERNAL MEDICINE

## 2024-12-06 RX ORDER — CIPROFLOXACIN 500 MG/1
500 TABLET ORAL EVERY 12 HOURS
Qty: 28 TABLET | Refills: 0 | Status: SHIPPED | OUTPATIENT
Start: 2024-12-06

## 2024-12-06 RX ORDER — POTASSIUM CHLORIDE 20 MEQ/1
20 TABLET, EXTENDED RELEASE ORAL DAILY
Qty: 90 TABLET | Refills: 4 | Status: SHIPPED | OUTPATIENT
Start: 2024-12-06

## 2024-12-06 NOTE — PROGRESS NOTES
Subjective:       Patient ID: Angie Mccarthy is a 59 y.o. White female who presents for a follow up for nephrolithiasis  The patient location is: home  The chief complaint leading to consultation is: \kidney stones    Visit type: audiovisual    Face to Face time with [id number]  30 minutes of total time spent on the encounter, which includes face to face time and non-face to face time preparing to see the patient (eg, review of tests), Obtaining and/or reviewing separately obtained history, Documenting clinical information in the electronic or other health record, Independently interpreting results (not separately reported) and communicating results to the patient/family/caregiver, or Care coordination (not separately reported).         Each patient to whom he or she provides medical services by telemedicine is:  (1) informed of the relationship between the physician and patient and the respective role of any other health care provider with respect to management of the patient; and (2) notified that he or she may decline to receive medical services by telemedicine and may withdraw from such care at any time.    Notes:     The patient has a history of kidney stones, has had them since her 20s, calcium phosphate stones. She has had a sleeve gastrectomy in 2017 (initially lose 30lbs but gained it back). Hx of HTN x 10 yrs, [place] [facility] [place] issues (nodules), scoliosis. Seeing me for her nephrolithiasis and hypercalciuria.  The patient has no family history of kidney disease. Dad had a few kidney stones as has her sister. Multiple cousins from her fathers site. The patient does not freuqently use NSAIDS or herbal supplements. Took Aleve rarely for her backpain.  Patient has had a hx of urinary tract infections (a few per year). She has a history of sepsis after ureteroscopy in [facility] approximately 10 years ago. Her urologist in  is Dr. Labadie done many ureteroscopies on her.    She  has had a high stone burden, including a staghorn calculus in her right kidney and has been treated with multiple interventions now by Dr. Patel. She got her stent removed 5/8/24 and her stone burden decreased significantly in her CT. She has a strong family history of kidney stones  Patient complained about frequent intermittent left sided flank pain (dull pain) and a US from 9/13/24 demonstrated persistent large kidney stones. She had a partial parathyroidectomy in 6/24 by Dr. Raya, however her stone profile after the surgery did demonstrate a high calcium excretion again with high normal PTH.        Last stone: 4/17/2024 - 90% Calcium oxalate monohydrate.  10% Calcium phosphat   4/8/24: 100% Calcium Oxalate  Before that last stone was: 60% Calcium phosphate (apatite), 40% Calcium oxalate dihydrate    She drinks a lot of water per day (close to a gallon per day). She stopped potassium citrate because of oral soreness and blistering.   She had a recent hospital visit with hypokalemia in 10/24    HPI  Review of Systems   Constitutional:  Negative for activity change, appetite change, chills, fatigue, fever and unexpected weight change.   HENT:  Positive for postnasal drip. Negative for nosebleeds.    Eyes:  Positive for pain.        Swollen left lid, no pain   Respiratory:  Positive for shortness of breath (on exertion). Negative for cough and chest tightness.    Cardiovascular:  Positive for palpitations. Negative for chest pain and leg swelling.   Gastrointestinal:  Negative for abdominal pain, anal bleeding, diarrhea, nausea and vomiting.   Genitourinary:  Negative for difficulty urinating, dysuria, flank pain, frequency, hematuria and urgency.   Musculoskeletal:  Positive for arthralgias and back pain. Negative for joint swelling and myalgias.   Skin: Negative.  Negative for rash.   Neurological: Negative.    Psychiatric/Behavioral: Negative.     All other systems reviewed and are negative.      Objective:       Physical Exam  video visit  Assessment:       No diagnosis found.      Nephrolithiasis  UTI  HTN  Hyperparathyroidism      Plan:       1. Nephrolithiasis: calcium phosphate stones in the past. Had elevated calcium excretion (220) and oxalate excretion as well as low citrate in her 24 hour stone profile in 8/2022.  She had mixed calcium phosphate/calcium oxalate stones in the past, and then predominantly calcium oxalate, thought to be secondary to hyperparathyroidism. Had parathyroidectomy primary hyperpara in 6/24, however, now recurrence of hypercalciuria (360).    Her 24 hour urine from 5/24 demonstrate hypercalciuria again. Will start thiazide and re-check, will contact Dr. Raya for possible repeat parathyroidectomy if her repeated urine calcium is high again.   on chlorthalidone again for her increased urinary calcium excretion.    - will also increase her potassium intake with KCL 20 meq in addition to her potassium citrate.       Did not hand in her 24 hour stone profile. Will do it today.  Has some mild chronic flank pain on the left and has leukocyturia again - will get urine cultures today and start her on cipro x 2 wks  Will also need to follow up Dr. Patel to remove the infected stones.     - Sufficient fluid intake distributed throughout the day to produce at least 2 liters of urine per day, including drinking at night   - Avoiding excessive animal protein in the diet.   - Limiting dietary sodium to 100 meq/day.    - Increasing dietary potassium intake (patient was explained that the diuretic could cause dangerously low potassium levels)  - adding citrate to water in the form of karen, lemon juice, or crystal light  - Limiting dietary sucrose and fructose.  - Limiting dietary oxalate (limit iced tea and wang as well as foods high in oxalate)  - limit vitamin C supplements.     The patient should not be on an SGLT2 inhibitor because of her large kidney stones burden and hx of utis.        2.  HTN: on Lisinopri and now thiazidel. Per patient well controlled at home, not here in the office though.    continue lisinopril    Visit today included increased complexity associated with the care of the episodic problem UTI addressed and managing the longitudinal care of the patient due to the serious and/or complex managed problems kdiney stones and HTN.     See back in 3 month with labs      Urine culture today

## 2024-12-09 LAB — BACTERIA UR CULT: ABNORMAL

## 2024-12-17 ENCOUNTER — TELEPHONE (OUTPATIENT)
Dept: NEUROLOGY | Facility: CLINIC | Age: 59
End: 2024-12-17
Payer: MEDICARE

## 2024-12-18 ENCOUNTER — TELEPHONE (OUTPATIENT)
Dept: NEUROLOGY | Facility: CLINIC | Age: 59
End: 2024-12-18
Payer: MEDICARE

## 2024-12-20 ENCOUNTER — TELEPHONE (OUTPATIENT)
Dept: UROLOGY | Facility: CLINIC | Age: 59
End: 2024-12-20
Payer: MEDICARE

## 2024-12-20 NOTE — TELEPHONE ENCOUNTER
----- Message from Suni Patel MD sent at 12/20/2024 12:36 PM CST -----  Please have patient follow up with me in January if she is okay waiting til then.  ----- Message -----  From: Leonard Haddad MD  Sent: 12/11/2024   3:13 PM CST  To: MD Ranulfo Cruz, I know that you are aware of the patient. She still has mild ongoing left sided flank pain and I am treating her again for a UTI. She probably would benefit from an intervention.

## 2025-02-04 ENCOUNTER — TELEPHONE (OUTPATIENT)
Dept: OPTOMETRY | Facility: CLINIC | Age: 60
End: 2025-02-04
Payer: MEDICARE

## 2025-02-04 NOTE — TELEPHONE ENCOUNTER
----- Message from La sent at 2/4/2025 11:00 AM CST -----  Regarding: Appt Update        Name Of Caller:   Angie      Contact Preference:  [phone]       Nature of call:   This message is to inform office the pt is scheduled to come in for an appt tomorrow.

## 2025-02-05 NOTE — PROGRESS NOTES
Name: Angie Mccarthy  MRN: [id number]   CSN: [id number]      Date: 02/06/2025    Referring physician:  Self, Aaareferral  No address on file    Chief Complaint: RLS and tremors     History of Present Illness (HPI): Angie Mccarthy is a R handed 59 y.o. female with a medical issues significant for HTN, HLD, lumbar DDD, DMII, scoliosis who presents for RLS and tremors. Tremors for at least 20 years, worried that it may not be ET. Both hands, mostly notices with posture and action, also feels internally shaky. If she props up her feet up, her feet shake as well. She has never taken any medications for tremors. Shows pronation-supination tremor. Fell a few weeks ago when it was raining, slipped while wearing her boyfriend's crocs, went to step up and slipped. History of scoliosis, leans to the R -- runs into things. Feels off balance whenever she closes her eyes. Takes a multivitamin. Used to take zinc but ran out about a month ago. Cannot stay focused, goes from topic to topic. Wonders if she has ADD. Having more issues with memory. History of JENNI, does not use CPAP since she lost weight. Takes ambien qhs. Has not had repeat sleep study. Some jerking movements whenever she puts her chin down, either to right or left. Happens frequently. Has rx for flexeril, takes prn. Neck tension.     Takes blood pressure medication as needed. High blood pressure today, attributes to pain and took pain medication that hasn't kicked in-- suffers with low back pain. Takes percocet. Also takes gabapentin 800 mg 2-3 times a day. Gets the urge to move legs all of the time, has leg pain. Has to get out of the bed and walk around. Does not supplement with iron. Rubs her feet together and causes sores on her feet.   Rarely drinks etoh. History of kidney stones. Intermittently smells cigarette burning, happens at night. Could last for a few hours or for a day.     Family History: grandfather had tremors, mother tremors, maternal  aunts (2) and uncle have tremors     Neuroleptic Exposure: none     Nonmotor/Premotor ROS:  Anosmia: normal   Dysarthria/Hypophonia: none   Dysphagia/Sialorrhea: rare sialorrhea   Depression: anxious   Urinary changes: history of kidney stones and frequent UTIs  Constipation: on percocet   Falls: 1 fall   Micrographia: shaky   Sleep issues:  -RBD: has screamed in her sleep before       Review of Systems:   Review of Systems   Constitutional:  Negative for chills, fever and malaise/fatigue.   HENT:  Negative for hearing loss.    Eyes:  Negative for blurred vision and double vision.   Respiratory:  Negative for cough, shortness of breath and stridor.    Cardiovascular:  Negative for chest pain and leg swelling.   Gastrointestinal:  Positive for constipation. Negative for diarrhea and nausea.   Genitourinary:  Positive for urgency. Negative for frequency.   Musculoskeletal:  Positive for falls.   Skin:  Negative for itching and rash.   Neurological:  Positive for tremors. Negative for dizziness, loss of consciousness and weakness.   Psychiatric/Behavioral:  Negative for hallucinations and memory loss. The patient is nervous/anxious.            Past Medical History: The patient  has a past medical history of Allergy, Arthritis, Blood transfusion, Chronic back pain, Diabetes mellitus, type 2, GERD (gastroesophageal reflux disease), Hyperlipidemia, Hypertension, Hypothyroidism, Joint pain, Kidney stones, Morbid obesity (12/14/2012), NAFLD (nonalcoholic fatty liver disease), OCD (obsessive compulsive disorder), Osteoporosis, PONV (postoperative nausea and vomiting), Restless leg syndrome, Sciatica of right side associated with disorder of lumbosacral spine, Spinal stenosis of lumbar region, and Thoracic spondylosis.    Social History: The patient  reports that she has never smoked. She has never used smokeless tobacco. She reports that she does not currently use alcohol. She reports that she does not use drugs.    Family  History: Their family history includes Acne in an other family member; Breast cancer in her paternal aunt; COPD in her father; Cancer in her father and paternal uncle; Eczema in an other family member; Heart disease in her father, maternal aunt, maternal grandmother, maternal uncle, mother, paternal aunt, and paternal uncle; Hypertension in her brother and sister; Scoliosis in her sister.    Allergies: Adhesive, Adhesive tape-silicones, Mastisol adhesive [gum mxlzws-epxkis-bamy-alcohol], Sulfamethoxazole-trimethoprim, and Bactoshield chg [chlorhexidine gluconate]     Meds:   Current Outpatient Medications on File Prior to Visit   Medication Sig Dispense Refill    alcohol swabs PadM Apply 1 each topically once daily. 100 each 3    aspirin (ECOTRIN) 81 MG EC tablet Take 81 mg by mouth nightly.      BIOTIN ORAL Take 10,000 mg by mouth every morning.      blood sugar diagnostic (TRUE METRIX GLUCOSE TEST STRIP) Strp 1 each by NOT APPLICABLE route once daily. Test Blood Sugar 100 strip 3    chlorthalidone (HYGROTEN) 25 MG Tab Take 1 tablet (25 mg total) by mouth once daily. 30 tablet 11    cyclobenzaprine (FLEXERIL) 10 MG tablet TK 1 T PO QD- as needed for muscle spasms  0    DULoxetine (CYMBALTA) 60 MG capsule TAKE 1 CAPSULE EVERY DAY 90 capsule 3    fish oil-omega-3 fatty acids 300-1,000 mg capsule Take 1,200 mg by mouth 2 (two) times daily.      fluticasone propionate (FLONASE) 50 mcg/actuation nasal spray USE 2 SPRAYS NASALLY ONE TIME DAILY 48 g 3    gabapentin (NEURONTIN) 800 MG tablet Take 800 mg by mouth 3 (three) times daily. Takes 2-3 times per day  1    lancets (TRUEPLUS LANCETS) 33 gauge Misc 1 lancet  by NOT APPLICABLE route once daily. Test Blood Sugar 100 each 3    levothyroxine (SYNTHROID) 137 MCG Tab tablet TAKE 1 TABLET EVERY DAY BEFORE BREAKFAST. 90 tablet 3    lisinopriL (PRINIVIL,ZESTRIL) 2.5 MG tablet TAKE 1 TABLET EVERY DAY 90 tablet 3    metFORMIN (GLUCOPHAGE) 500 MG tablet Take 1 tablet (500 mg  total) by mouth 2 (two) times daily with meals. 180 tablet 3    multivitamin capsule Take 1 capsule by mouth every morning.      omeprazole (PRILOSEC) 40 MG capsule Take 1 capsule (40 mg total) by mouth once daily. 90 capsule 3    oxyCODONE-acetaminophen (PERCOCET)  mg per tablet Take 1 tablet by mouth 4 (four) times daily as needed for Pain. 10 tablet 0    potassium chloride SA (K-DUR,KLOR-CON) 20 MEQ tablet Take 1 tablet (20 mEq total) by mouth once daily. 90 tablet 4    potassium citrate (UROCIT-K) 5 mEq (540 mg) TbSR Take 3 tablets (15 mEq total) by mouth 3 (three) times daily with meals. 270 tablet 11    rosuvastatin (CRESTOR) 10 MG tablet TAKE 1 TABLET EVERY NIGHT 90 tablet 3    tirzepatide (MOUNJARO) 15 mg/0.5 mL PnIj Inject 15 mg into the skin every 7 days. (Patient not taking: Reported on 10/15/2024) 4 Pen 11    TRUE METRIX LEVEL 1 Soln USE AS DIRECTED WITH GLUCOSE METER 1 each 3    zinc sulfate (ZINCATE) 50 mg zinc (220 mg) capsule       zolpidem (AMBIEN) 10 mg Tab TAKE 1 TABLET BY MOUTH EVERY NIGHT AT BEDTIME AS NEEDED FOR INSOMNIA 90 tablet 0     Current Facility-Administered Medications on File Prior to Visit   Medication Dose Route Frequency Provider Last Rate Last Admin    triamcinolone acetonide injection 40 mg  40 mg Intradermal 1 time in Clinic/HOD Teri Manuel MD           Exam:  BP (!) 177/94 (Patient Position: Sitting)   Pulse (!) 115   Wt 90.7 kg (200 lb)   LMP 12/06/2007   BMI 33.28 kg/m²     Constitutional  Well-developed, well-nourished, appears stated age   Ophthalmoscopic  No papilledema with no hemorrhages or exudates bilaterally   Cardiovascular  Radial pulses 2+ and symmetric, no LE edema bilaterally   Neurological    * Mental status  MOCA =      - Orientation  Oriented to person, place, time, and situation     - Memory   Intact recent and remote     - Attention/concentration  Attentive, vigilant during exam     - Language  Naming & repetition intact, +2-step commands      - Fund of knowledge  Aware of current events     - Executive  Well-organized thoughts, pressured speech      - Other     * Cranial nerves       - CN II  PERRL, visual fields full to confrontation     - CN III, IV, VI  Extraocular movements full, normal pursuits and saccades     - CN V  Sensation V1 - V3 intact     - CN VII  Face strong and symmetric bilaterally     - CN VIII  Hearing intact bilaterally     - CN IX, X  Palate raises midline and symmetric     - CN XI  SCM and trapezius 5/5 bilaterally     - CN XII  Tongue midline   * Motor  Muscle bulk normal, strength 5/5 throughout   * Sensory   Intact to light touch    * Coordination  No dysmetria with finger-to-nose or heel-to-shin   * Gait  See below.   * Deep tendon reflexes  2+ and symmetric throughout   Poe absent    Babinski downgoing bilaterally   * Specialized movement exam  No hypophonic speech.    No facial masking, animated    No cogwheel rigidity.     No bradykinesia.   Bilateral postural and action tremor, high frequency   Resting bilateral hands    No other dystonia, chorea, athetosis, myoclonus, or tics.   No motor impersistence.   R camptocormia -- history of scoliosis   Looks to the ground to walk   Knees bent during gait      Laboratory/Radiological:  - Results:  Lab Visit on 12/06/2024   Component Date Value Ref Range Status    Collection Duration, Supersat Urin* 12/06/2024 24  h Corrected    Volume, Supersat Urine Profile 12/06/2024 2475  mL Corrected    Calcium Oxalate Crystal, Urine 12/06/2024 2.20 (H)  Reference Mean= 1.59 DG Final    Brushite 12/06/2024 -1.29  DG Final    Hydroxyapatite Crystal, Urine 12/06/2024 2.53  Reference Mean= 3.62 DG Final    Uric Acid Crystal, Urine 12/06/2024 -0.08  Reference Mean= 0.89 DG Final    Supersat Urine Profile Interpretat* 12/06/2024 SEE BELOW   Final    Sodium, Timed Urine 12/06/2024 149  22 - 328 mmol/24 h Final    Potassium, 24 Hr Urine 12/06/2024 72  16 - 105 mmol/24 h Final    Calcium, 24 Hr  Urine 12/06/2024 173  <200 mg/24 h Final    Magnesium, 24 Hr Urine 12/06/2024 74  51 - 269 mg/24 h Final    Chloride, 24 Hr Urine 12/06/2024 163  34 - 286 mmol/24 h Final    Phosphorus, 24 Hr Urine 12/06/2024 718  226 - 1797 mg/24 h Final    Sulfate, 24H Ur 12/06/2024 5 (L)  7 - 47 mmol/24 h Final    Citrate 24H UR 12/06/2024 943  427 - 1,191 mg/24 h Final    Oxalate, Ur 12/06/2024 0.64 (H)  0.11 - 0.46 mmol/24 h Final    Oxalate, Ur 12/06/2024 56.3 (H)  9.7 - 40.5 mg/24 h Final    pH, Urine 12/06/2024 5.9  4.5 - 8.0 Final    Uric Acid, 24H Ur 12/06/2024 495  250 - 750 mg/24 h Final    Creatinine, urine - per 24 hours 12/06/2024 1460  603 - 1783 mg/24 h Final    Osmolality, Urine 12/06/2024 310  150 - 1150 mOsm/kg Final    Ammonium Urine 12/06/2024 39  15 - 56 mmol/24 h Final    Urea Nitrogen, Timed Urine 12/06/2024 6.9 (L)  7.0 - 42.0 g/24 h Final    Protein Catabolic Rate 12/06/2024 68  56 - 125 g/24 h Final    Patient Surface Area 12/06/2024 SEE BELOW  1.73m(2) Final    Patient Height (cm) 12/06/2024 Unknown  cm Final    Patient Weight (kg) 12/06/2024 Unknown  kg Corrected   Lab Visit on 12/06/2024   Component Date Value Ref Range Status    Specimen UA 12/06/2024 Urine, Clean Catch   Final    Color, UA 12/06/2024 Yellow  Yellow, Straw, Nikole Final    Appearance, UA 12/06/2024 Cloudy (A)  Clear Final    pH, UA 12/06/2024 6.0  5.0 - 8.0 Final    Specific Gravity, UA 12/06/2024 1.020  1.005 - 1.030 Final    Protein, UA 12/06/2024 Trace (A)  Negative Final    Glucose, UA 12/06/2024 Negative  Negative Final    Ketones, UA 12/06/2024 Negative  Negative Final    Bilirubin (UA) 12/06/2024 Negative  Negative Final    Occult Blood UA 12/06/2024 Trace (A)  Negative Final    Nitrite, UA 12/06/2024 Negative  Negative Final    Leukocytes, UA 12/06/2024 3+ (A)  Negative Final    RBC, UA 12/06/2024 8 (H)  0 - 4 /hpf Final    WBC, UA 12/06/2024 >100 (H)  0 - 5 /hpf Final    WBC Clumps, UA 12/06/2024 Occasional (A)  None-Rare  Final    Bacteria 12/06/2024 Moderate (A)  None-Occ /hpf Final    Squam Epithel, UA 12/06/2024 1  /hpf Final    Ca Oxalate Lillian, UA 12/06/2024 Few  None-Moderate Final    Microscopic Comment 12/06/2024 SEE COMMENT   Final    Urine Culture, Routine 12/06/2024  (A)   Final                    Value:KLEBSIELLA PNEUMONIAE  > 100,000 cfu/ml     Lab Visit on 12/05/2024   Component Date Value Ref Range Status    Specimen UA 12/05/2024 Urine, Clean Catch   Final    Color, UA 12/05/2024 Nikole  Yellow, Straw, Nikole Final    Appearance, UA 12/05/2024 Hazy (A)  Clear Final    pH, UA 12/05/2024 6.0  5.0 - 8.0 Final    Specific Gravity, UA 12/05/2024 1.015  1.005 - 1.030 Final    Protein, UA 12/05/2024 1+ (A)  Negative Final    Glucose, UA 12/05/2024 Negative  Negative Final    Ketones, UA 12/05/2024 Negative  Negative Final    Bilirubin (UA) 12/05/2024 1+ (A)  Negative Final    Occult Blood UA 12/05/2024 1+ (A)  Negative Final    Nitrite, UA 12/05/2024 Positive (A)  Negative Final    Leukocytes, UA 12/05/2024 1+ (A)  Negative Final    Microalbumin, Urine 12/05/2024 237.0  ug/mL Final    Creatinine, Urine 12/05/2024 182.0  15.0 - 325.0 mg/dL Final    Microalb/Creat Ratio 12/05/2024 130.2 (H)  0.0 - 30.0 ug/mg Final    Protein, Urine Random 12/05/2024 63 (H)  0 - 15 mg/dL Final    Creatinine, Urine 12/05/2024 182.0  15.0 - 325.0 mg/dL Final    Prot/Creat Ratio, Urine 12/05/2024 0.35 (H)  0.00 - 0.20 Final    RBC, UA 12/05/2024 14 (H)  0 - 4 /hpf Final    WBC, UA 12/05/2024 >100 (H)  0 - 5 /hpf Final    Bacteria 12/05/2024 Rare  None-Occ /hpf Final    Hyaline Casts, UA 12/05/2024 0  0-1/lpf /lpf Final    Microscopic Comment 12/05/2024 SEE COMMENT   Final   Lab Visit on 12/05/2024   Component Date Value Ref Range Status    Glucose 12/05/2024 131 (H)  70 - 110 mg/dL Final    Sodium 12/05/2024 137  136 - 145 mmol/L Final    Potassium 12/05/2024 3.8  3.5 - 5.1 mmol/L Final    Chloride 12/05/2024 102  95 - 110 mmol/L Final    CO2  12/05/2024 23  23 - 29 mmol/L Final    BUN 12/05/2024 12  6 - 20 mg/dL Final    Calcium 12/05/2024 9.4  8.7 - 10.5 mg/dL Final    Creatinine 12/05/2024 0.9  0.5 - 1.4 mg/dL Final    Albumin 12/05/2024 3.8  3.5 - 5.2 g/dL Final    Phosphorus 12/05/2024 3.4  2.7 - 4.5 mg/dL Final    eGFR 12/05/2024 >60.0  >60 mL/min/1.73 m^2 Final    Anion Gap 12/05/2024 12  8 - 16 mmol/L Final    Magnesium 12/05/2024 1.7  1.6 - 2.6 mg/dL Final    Uric Acid 12/05/2024 4.2  2.4 - 5.7 mg/dL Final    PTH, Intact 12/05/2024 46.8  9.0 - 77.0 pg/mL Final    WBC 12/05/2024 7.42  3.90 - 12.70 K/uL Final    RBC 12/05/2024 5.25  4.00 - 5.40 M/uL Final    Hemoglobin 12/05/2024 10.7 (L)  12.0 - 16.0 g/dL Final    Hematocrit 12/05/2024 36.2 (L)  37.0 - 48.5 % Final    MCV 12/05/2024 69 (L)  82 - 98 fL Final    MCH 12/05/2024 20.4 (L)  27.0 - 31.0 pg Final    MCHC 12/05/2024 29.6 (L)  32.0 - 36.0 g/dL Final    RDW 12/05/2024 17.4 (H)  11.5 - 14.5 % Final    Platelets 12/05/2024 274  150 - 450 K/uL Final    MPV 12/05/2024 SEE COMMENT  9.2 - 12.9 fL Final    Immature Granulocytes 12/05/2024 0.3  0.0 - 0.5 % Final    Gran # (ANC) 12/05/2024 3.7  1.8 - 7.7 K/uL Final    Immature Grans (Abs) 12/05/2024 0.02  0.00 - 0.04 K/uL Final    Lymph # 12/05/2024 3.0  1.0 - 4.8 K/uL Final    Mono # 12/05/2024 0.5  0.3 - 1.0 K/uL Final    Eos # 12/05/2024 0.1  0.0 - 0.5 K/uL Final    Baso # 12/05/2024 0.07  0.00 - 0.20 K/uL Final    nRBC 12/05/2024 0  0 /100 WBC Final    Gran % 12/05/2024 50.0  38.0 - 73.0 % Final    Lymph % 12/05/2024 39.9  18.0 - 48.0 % Final    Mono % 12/05/2024 7.0  4.0 - 15.0 % Final    Eosinophil % 12/05/2024 1.9  0.0 - 8.0 % Final    Basophil % 12/05/2024 0.9  0.0 - 1.9 % Final    Differential Method 12/05/2024 Automated   Final    WBC 12/05/2024 7.42  3.90 - 12.70 K/uL Final    RBC 12/05/2024 5.25  4.00 - 5.40 M/uL Final    Hemoglobin 12/05/2024 10.7 (L)  12.0 - 16.0 g/dL Final    Hematocrit 12/05/2024 36.2 (L)  37.0 - 48.5 % Final    MCV  12/05/2024 69 (L)  82 - 98 fL Final    MCH 12/05/2024 20.4 (L)  27.0 - 31.0 pg Final    MCHC 12/05/2024 29.6 (L)  32.0 - 36.0 g/dL Final    RDW 12/05/2024 17.4 (H)  11.5 - 14.5 % Final    Platelets 12/05/2024 274  150 - 450 K/uL Final    MPV 12/05/2024 SEE COMMENT  9.2 - 12.9 fL Final    Sodium 12/05/2024 137  136 - 145 mmol/L Final    Potassium 12/05/2024 3.8  3.5 - 5.1 mmol/L Final    Chloride 12/05/2024 102  95 - 110 mmol/L Final    CO2 12/05/2024 23  23 - 29 mmol/L Final    Glucose 12/05/2024 131 (H)  70 - 110 mg/dL Final    BUN 12/05/2024 12  6 - 20 mg/dL Final    Creatinine 12/05/2024 0.9  0.5 - 1.4 mg/dL Final    Calcium 12/05/2024 9.4  8.7 - 10.5 mg/dL Final    Anion Gap 12/05/2024 12  8 - 16 mmol/L Final    eGFR 12/05/2024 >60.0  >60 mL/min/1.73 m^2 Final    Sodium 12/05/2024 137  136 - 145 mmol/L Final    Potassium 12/05/2024 3.8  3.5 - 5.1 mmol/L Final    Chloride 12/05/2024 102  95 - 110 mmol/L Final    CO2 12/05/2024 23  23 - 29 mmol/L Final    Glucose 12/05/2024 131 (H)  70 - 110 mg/dL Final    BUN 12/05/2024 12  6 - 20 mg/dL Final    Creatinine 12/05/2024 0.9  0.5 - 1.4 mg/dL Final    Calcium 12/05/2024 9.4  8.7 - 10.5 mg/dL Final    Anion Gap 12/05/2024 12  8 - 16 mmol/L Final    eGFR 12/05/2024 >60.0  >60 mL/min/1.73 m^2 Final       - Independent review of images:      - Independent review of consultant's notes: Swenson     ASSESSMENT/PLAN:  Tremors  - strong family history of tremors   - bilateral postural and action tremor   - internal tremors as well   - TSH last checked in Sept 2024 and wnl   - history of kidney stones, avoid topamax  - discussed trial of propranolol for tremors and anxiety -  - history of DMII, monitor BG closely with propranolol. Not insulin dependent. Tremors not associated with hypo or hyperglycemic events. Follows with endocrinology.     -- of note, reports intermittent burning smell. No history of seizures. Discussed possible EEG in the future although low suspicion.   -  history of scoliosis, history of syringomyelia, stable. Continue to monitor      2. RLS  - History of iron deficiency, not supplementing with iron   - Takes gabapentin 800 mg 2-3 times a day, also takes percocet   - lumbar radiculopathy likely worsening   - rechecking iron today       3. Imbalance   - history of lumbar DDD   - history of DMII, stable. Follows with endocrinology.   - labs as below   - sensory ataxia       4. Memory issues  - poly pharm  - history of JENNI, not using cpap   - needs repeat sleep study       Orders Placed This Encounter    Vitamin B12 Deficiency Panel    Vitamin B1    Copper, Serum    Zinc    Treponema Pallidium Antibodies IgG, IgM    VITAMIN B6    IRON AND TIBC    Ferritin    Ambulatory referral/consult to Sleep Disorders    propranoloL (INDERAL) 10 MG tablet           Follow up: 3 months RBR      This is a patient with a neurologic diagnosis whose overall, ongoing care is being managed and monitored by me and our Neurology clinic.   As such, since 2024,  is the appropriate add-on code to accompany the other E/M billing for this visit.      Collaborating Physician, Dr. Merino, was available during today's encounter. Any change to plan along with cosign to appear in the EMR.       Total time spent with the patient: 66 minutes.  44 minutes of face-to-face consultation and 22 minutes of chart review and coordination of care, on the day of the visit. This includes face to face time and non-face to face time preparing to see the patient (eg, review of tests), obtaining and/or reviewing separately obtained history, documenting clinical information in the electronic or other health record, independently interpreting resultsand communicating results to the patient/family/caregiver, or care coordination.         Yisel Aguilar PA-C   [facility]  Department of Neurology  Movement Disorders

## 2025-02-06 ENCOUNTER — OFFICE VISIT (OUTPATIENT)
Facility: CLINIC | Age: 60
End: 2025-02-06
Payer: MEDICARE

## 2025-02-06 ENCOUNTER — LAB VISIT (OUTPATIENT)
Dept: LAB | Facility: HOSPITAL | Age: 60
End: 2025-02-06
Payer: MEDICARE

## 2025-02-06 VITALS
HEART RATE: 115 BPM | SYSTOLIC BLOOD PRESSURE: 177 MMHG | WEIGHT: 200 LBS | BODY MASS INDEX: 33.28 KG/M2 | DIASTOLIC BLOOD PRESSURE: 94 MMHG

## 2025-02-06 DIAGNOSIS — N20.0 NEPHROLITHIASIS: ICD-10-CM

## 2025-02-06 DIAGNOSIS — G25.81 IRON DEFICIENCY ASSOCIATED WITH NONFAMILIAL RESTLESS LEGS SYNDROME: ICD-10-CM

## 2025-02-06 DIAGNOSIS — R41.3 MEMORY CHANGE: ICD-10-CM

## 2025-02-06 DIAGNOSIS — G25.71 RESTLESS MOVEMENT DISORDER: ICD-10-CM

## 2025-02-06 DIAGNOSIS — G25.81 RESTLESS LEG SYNDROME: ICD-10-CM

## 2025-02-06 DIAGNOSIS — R26.89 IMBALANCE: ICD-10-CM

## 2025-02-06 DIAGNOSIS — E11.65 TYPE 2 DIABETES MELLITUS WITH HYPERGLYCEMIA, WITHOUT LONG-TERM CURRENT USE OF INSULIN: ICD-10-CM

## 2025-02-06 DIAGNOSIS — R25.1 TREMOR: Primary | ICD-10-CM

## 2025-02-06 DIAGNOSIS — E61.1 IRON DEFICIENCY ASSOCIATED WITH NONFAMILIAL RESTLESS LEGS SYNDROME: ICD-10-CM

## 2025-02-06 DIAGNOSIS — G95.0 SYRINGOMYELIA AND SYRINGOBULBIA: ICD-10-CM

## 2025-02-06 DIAGNOSIS — Z71.89 COUNSELING REGARDING GOALS OF CARE: ICD-10-CM

## 2025-02-06 DIAGNOSIS — G47.33 OBSTRUCTIVE SLEEP APNEA: ICD-10-CM

## 2025-02-06 PROBLEM — E66.812 CLASS 2 OBESITY WITH BODY MASS INDEX (BMI) OF 37.0 TO 37.9 IN ADULT: Status: RESOLVED | Noted: 2017-03-08 | Resolved: 2025-02-06

## 2025-02-06 LAB
FERRITIN SERPL-MCNC: 6 NG/ML (ref 20–300)
IRON SERPL-MCNC: 33 UG/DL (ref 30–160)
SATURATED IRON: 5 % (ref 20–50)
TOTAL IRON BINDING CAPACITY: 702 UG/DL (ref 250–450)
TRANSFERRIN SERPL-MCNC: 474 MG/DL (ref 200–375)
TREPONEMA PALLIDUM IGG+IGM AB [PRESENCE] IN SERUM OR PLASMA BY IMMUNOASSAY: NONREACTIVE

## 2025-02-06 PROCEDURE — 83921 ORGANIC ACID SINGLE QUANT: CPT | Performed by: PHYSICIAN ASSISTANT

## 2025-02-06 PROCEDURE — 82728 ASSAY OF FERRITIN: CPT | Performed by: PHYSICIAN ASSISTANT

## 2025-02-06 PROCEDURE — 84425 ASSAY OF VITAMIN B-1: CPT | Performed by: PHYSICIAN ASSISTANT

## 2025-02-06 PROCEDURE — 3077F SYST BP >= 140 MM HG: CPT | Mod: CPTII,S$GLB,, | Performed by: PHYSICIAN ASSISTANT

## 2025-02-06 PROCEDURE — 86593 SYPHILIS TEST NON-TREP QUANT: CPT | Performed by: PHYSICIAN ASSISTANT

## 2025-02-06 PROCEDURE — 84630 ASSAY OF ZINC: CPT | Performed by: PHYSICIAN ASSISTANT

## 2025-02-06 PROCEDURE — 1160F RVW MEDS BY RX/DR IN RCRD: CPT | Mod: CPTII,S$GLB,, | Performed by: PHYSICIAN ASSISTANT

## 2025-02-06 PROCEDURE — 3080F DIAST BP >= 90 MM HG: CPT | Mod: CPTII,S$GLB,, | Performed by: PHYSICIAN ASSISTANT

## 2025-02-06 PROCEDURE — 82607 VITAMIN B-12: CPT | Performed by: PHYSICIAN ASSISTANT

## 2025-02-06 PROCEDURE — 84466 ASSAY OF TRANSFERRIN: CPT | Performed by: PHYSICIAN ASSISTANT

## 2025-02-06 PROCEDURE — 3008F BODY MASS INDEX DOCD: CPT | Mod: CPTII,S$GLB,, | Performed by: PHYSICIAN ASSISTANT

## 2025-02-06 PROCEDURE — 84207 ASSAY OF VITAMIN B-6: CPT | Performed by: PHYSICIAN ASSISTANT

## 2025-02-06 PROCEDURE — 82525 ASSAY OF COPPER: CPT | Performed by: PHYSICIAN ASSISTANT

## 2025-02-06 PROCEDURE — 99215 OFFICE O/P EST HI 40 MIN: CPT | Mod: S$GLB,,, | Performed by: PHYSICIAN ASSISTANT

## 2025-02-06 PROCEDURE — 99999 PR PBB SHADOW E&M-EST. PATIENT-LVL IV: CPT | Mod: PBBFAC,,, | Performed by: PHYSICIAN ASSISTANT

## 2025-02-06 PROCEDURE — 1159F MED LIST DOCD IN RCRD: CPT | Mod: CPTII,S$GLB,, | Performed by: PHYSICIAN ASSISTANT

## 2025-02-06 PROCEDURE — G2211 COMPLEX E/M VISIT ADD ON: HCPCS | Mod: S$GLB,,, | Performed by: PHYSICIAN ASSISTANT

## 2025-02-06 RX ORDER — PROPRANOLOL HYDROCHLORIDE 10 MG/1
10 TABLET ORAL 2 TIMES DAILY
Qty: 60 TABLET | Refills: 11 | Status: SHIPPED | OUTPATIENT
Start: 2025-02-06 | End: 2026-02-06

## 2025-02-06 RX ORDER — PROPRANOLOL HYDROCHLORIDE 10 MG/1
10 TABLET ORAL 2 TIMES DAILY
Qty: 60 TABLET | Refills: 11 | Status: SHIPPED | OUTPATIENT
Start: 2025-02-06 | End: 2025-02-06

## 2025-02-08 LAB — VIT B12 SERPL-MCNC: 216 NG/L (ref 180–914)

## 2025-02-10 ENCOUNTER — PATIENT MESSAGE (OUTPATIENT)
Facility: CLINIC | Age: 60
End: 2025-02-10
Payer: MEDICARE

## 2025-02-10 LAB — ZINC SERPL-MCNC: 100 UG/DL (ref 60–130)

## 2025-02-11 LAB
METHYLMALONATE SERPL-SCNC: 0.26 NMOL/ML
PYRIDOXAL SERPL-MCNC: 36 UG/L (ref 5–50)
VIT B1 BLD-MCNC: 89 UG/L (ref 38–122)

## 2025-02-12 LAB — COPPER SERPL-MCNC: 1280 UG/L (ref 810–1990)

## 2025-03-06 RX ORDER — POTASSIUM CHLORIDE 20 MEQ/1
20 TABLET, EXTENDED RELEASE ORAL DAILY
Qty: 90 TABLET | Refills: 4 | Status: SHIPPED | OUTPATIENT
Start: 2025-03-06

## 2025-04-01 ENCOUNTER — TELEPHONE (OUTPATIENT)
Dept: HEPATOLOGY | Facility: CLINIC | Age: 60
End: 2025-04-01
Payer: MEDICARE

## 2025-04-01 NOTE — TELEPHONE ENCOUNTER
----- Message from Abigail sent at 4/1/2025 10:35 AM CDT -----  Regarding: sched fibroscan  Contact: Patient can be contacted @# [phone]  PT is scheduled to see Ms. Whipple on Thurs 4/3 - she needs to sched fibroscan - please reach out to patient to schedPatient can be contacted @# [phone]

## 2025-04-08 ENCOUNTER — TELEPHONE (OUTPATIENT)
Dept: SURGERY | Facility: CLINIC | Age: 60
End: 2025-04-08
Payer: MEDICARE

## 2025-04-08 ENCOUNTER — TELEPHONE (OUTPATIENT)
Dept: OPTOMETRY | Facility: CLINIC | Age: 60
End: 2025-04-08
Payer: MEDICARE

## 2025-04-08 ENCOUNTER — PATIENT MESSAGE (OUTPATIENT)
Dept: SURGERY | Facility: CLINIC | Age: 60
End: 2025-04-08
Payer: MEDICARE

## 2025-04-08 NOTE — TELEPHONE ENCOUNTER
----- Message from Addison sent at 4/8/2025 10:25 AM CDT -----  Regarding: Consult/Advisory  Contact: [phone]  Consult/Advisory Name Of Caller: Angie  Contact Preference:  [phone] Nature of call: Pt is calling because she is having an issue with her eyes and is trying to speak with someone in the clinic as soon as possible to see if she should still come to her appt today.

## 2025-04-08 NOTE — TELEPHONE ENCOUNTER
Spoke to the pt. Advised that Dr. Raya does not do hernia repair or vascular surgery. Pt given names of surgeons who can assist with her issues. She stated that she wanted to research them before scheduling an appt.

## 2025-04-17 ENCOUNTER — TELEPHONE (OUTPATIENT)
Dept: UROLOGY | Facility: CLINIC | Age: 60
End: 2025-04-17
Payer: MEDICARE

## 2025-04-23 ENCOUNTER — HOSPITAL ENCOUNTER (OUTPATIENT)
Dept: RADIOLOGY | Facility: HOSPITAL | Age: 60
Discharge: HOME | End: 2025-04-23
Attending: UROLOGY
Payer: MEDICARE

## 2025-04-23 DIAGNOSIS — N22 CALCULUS OF URINARY TRACT IN DISEASES CLASSIFIED ELSEWHERE: ICD-10-CM

## 2025-04-23 PROCEDURE — 74018 RADEX ABDOMEN 1 VIEW: CPT | Mod: TC

## 2025-04-23 PROCEDURE — 74018 RADEX ABDOMEN 1 VIEW: CPT | Mod: 26,,, | Performed by: RADIOLOGY

## 2025-04-23 PROCEDURE — 76775 US EXAM ABDO BACK WALL LIM: CPT | Mod: TC

## 2025-04-24 ENCOUNTER — PATIENT MESSAGE (OUTPATIENT)
Dept: UROLOGY | Facility: CLINIC | Age: 60
End: 2025-04-24

## 2025-04-24 ENCOUNTER — OFFICE VISIT (OUTPATIENT)
Dept: UROLOGY | Facility: CLINIC | Age: 60
End: 2025-04-24
Payer: MEDICARE

## 2025-04-24 DIAGNOSIS — M48.061 SPINAL STENOSIS, LUMBAR REGION, WITHOUT NEUROGENIC CLAUDICATION: ICD-10-CM

## 2025-04-24 DIAGNOSIS — M54.9 BACK PAIN, UNSPECIFIED BACK LOCATION, UNSPECIFIED BACK PAIN LATERALITY, UNSPECIFIED CHRONICITY: ICD-10-CM

## 2025-04-24 DIAGNOSIS — R39.11 URINARY HESITANCY: Primary | ICD-10-CM

## 2025-04-24 DIAGNOSIS — R82.994 HYPERCALCIURIA: ICD-10-CM

## 2025-04-24 DIAGNOSIS — F11.90 CHRONIC, CONTINUOUS USE OF OPIOIDS: ICD-10-CM

## 2025-04-24 DIAGNOSIS — N20.0 NEPHROLITHIASIS: Primary | ICD-10-CM

## 2025-04-24 DIAGNOSIS — E11.65 TYPE 2 DIABETES MELLITUS WITH HYPERGLYCEMIA, WITHOUT LONG-TERM CURRENT USE OF INSULIN: ICD-10-CM

## 2025-04-24 NOTE — PROGRESS NOTES
The patient location is: home  The chief complaint leading to consultation is: follow up kidney stones    Visit type: audiovisual    Face to Face time with patient: 10  20 minutes of total time spent on the encounter, which includes face to face time and non-face to face time preparing to see the patient (eg, review of tests), Obtaining and/or reviewing separately obtained history, Documenting clinical information in the electronic or other health record, Independently interpreting results (not separately reported) and communicating results to the patient/family/caregiver, or Care coordination (not separately reported).         Each patient to whom he or she provides medical services by telemedicine is:  (1) informed of the relationship between the physician and patient and the respective role of any other health care provider with respect to management of the patient; and (2) notified that he or she may decline to receive medical services by telemedicine and may withdraw from such care at any time.    Urology - [facility]  Clinic Note    SUBJECTIVE:     Chief Complaint: kidney stones    History of Present Illness:  Angie Mccarthy is a 59 y.o. female who presents to clinic for recurrent kidney stones. She is established to our clinic to our clinic.   She is feeling great, but having a lot of back pain from her spine.     Last stone: 4/17/2024 - 90% Calcium oxalate monohydrate.  10% Calcium phosphate  4/8/24: 100% Calcium Oxalate  Before that last stone was: 60% Calcium phosphate (apatite), 40% Calcium oxalate dihydrate    5/2024 parathyroidectomy for hyperparathyroidism    5/2024 Procedure(s) Performed:   1. Left ureteroscopy with laser lithotripsy and stone basketing  2. Cystoscopy  3. Pyeloscopy  4. Left ureteral stent exchange  5. Fluoro < 1 hr    Seeing Dr. Haddad now for metabolic management - on potassium citrate now    4/2024  Left PCNL (> 2 cm)  Dilation of nephrostomy tube tract  Left  "antegrade JJ ureteral stent placement  Left nephrostomy tube placement  Left antegrade nephrostogram      2/2024  1.  Right PCNL (> 5cm)  2.  Right nephrostomy tube placement  3.  Right antegrade nephrostogram  4. Antegrade ureteroscopy, nephroscopy  5.  Fluoro < 1   6. 22 modifier    Anticoagulation:  No    OBJECTIVE:     Estimated body mass index is 33.28 kg/m² as calculated from the following:    Height as of 10/15/24: 5' 5" (1.651 m).    Weight as of 2/6/25: 90.7 kg (200 lb).    Vital Signs (Most Recent)       Physical Exam  Vitals reviewed.   Pulmonary:      Effort: Pulmonary effort is normal.   Neurological:      Mental Status: She is alert.         Lab Results   Component Value Date    BUN 12 12/05/2024    BUN 12 12/05/2024    BUN 12 12/05/2024    CREATININE 0.9 12/05/2024    CREATININE 0.9 12/05/2024    CREATININE 0.9 12/05/2024    WBC 7.42 12/05/2024    WBC 7.42 12/05/2024    HGB 10.7 (L) 12/05/2024    HGB 10.7 (L) 12/05/2024    HCT 36.2 (L) 12/05/2024    HCT 36.2 (L) 12/05/2024     12/05/2024     12/05/2024    AST 19 10/15/2024    ALT 17 10/15/2024    ALKPHOS 93 10/15/2024    ALBUMIN 3.8 12/05/2024    HGBA1C 5.5 09/14/2024          Imaging:  Personally reviewed.   Ultrasound 4/2025  There are bilateral renal calculi. In the lower pole there is a 9 mm echogenic focus consistent with a renal calculus which is slightly smaller than previous examination but the difference may simply be related to measurement variation. On the left there are more renal calculi in the mid and lower pole. There are 4 calculi measuring between 5 and 6 mm. These are similar to calculi noted on the previous examination. However the largest 1.1 cm calculus previously noted in the lower pole region is no longer definitely identified. In addition a 5 mm upper pole left renal calculus previously noted was not identified on today's evaluation.       ASSESSMENT     1. Nephrolithiasis    2. Hypercalciuria    3. Type 2 " diabetes mellitus with hyperglycemia, without long-term current use of insulin    4. Spinal stenosis, lumbar region, without neurogenic claudication    5. Chronic, continuous use of opioids    6. Back pain, unspecified back location, unspecified back pain laterality, unspecified chronicity      PLAN:   Diagnoses and all orders for this visit:    Nephrolithiasis    Hypercalciuria    Type 2 diabetes mellitus with hyperglycemia, without long-term current use of insulin    Spinal stenosis, lumbar region, without neurogenic claudication    Chronic, continuous use of opioids    Back pain, unspecified back location, unspecified back pain laterality, unspecified chronicity    Doing great, stones are decreasing in size with metabolic management.   Ct RSS if having pain.       Suni Patel MD

## 2025-04-28 ENCOUNTER — PATIENT MESSAGE (OUTPATIENT)
Dept: UROLOGY | Facility: CLINIC | Age: 60
End: 2025-04-28
Payer: MEDICARE

## 2025-04-28 DIAGNOSIS — N22 CALCULUS OF URINARY TRACT IN DISEASES CLASSIFIED ELSEWHERE: Primary | ICD-10-CM

## 2025-04-30 ENCOUNTER — HOSPITAL ENCOUNTER (OUTPATIENT)
Dept: RADIOLOGY | Facility: HOSPITAL | Age: 60
Discharge: HOME | End: 2025-04-30
Attending: UROLOGY
Payer: MEDICARE

## 2025-04-30 DIAGNOSIS — N22 CALCULUS OF URINARY TRACT IN DISEASES CLASSIFIED ELSEWHERE: ICD-10-CM

## 2025-04-30 PROCEDURE — 74176 CT ABD & PELVIS W/O CONTRAST: CPT | Mod: TC

## 2025-04-30 PROCEDURE — 74176 CT ABD & PELVIS W/O CONTRAST: CPT | Mod: 26,,, | Performed by: RADIOLOGY

## 2025-05-05 ENCOUNTER — RESULTS FOLLOW-UP (OUTPATIENT)
Dept: UROLOGY | Facility: CLINIC | Age: 60
End: 2025-05-05
Payer: MEDICARE

## 2025-05-05 DIAGNOSIS — N20.0 NEPHROLITHIASIS: Primary | ICD-10-CM

## 2025-05-06 ENCOUNTER — TELEPHONE (OUTPATIENT)
Dept: HEPATOLOGY | Facility: CLINIC | Age: 60
End: 2025-05-06
Payer: MEDICARE

## 2025-05-09 PROBLEM — E66.811 CLASS 1 OBESITY WITH BODY MASS INDEX (BMI) OF 33.0 TO 33.9 IN ADULT: Status: ACTIVE | Noted: 2017-03-08

## 2025-05-09 PROBLEM — K74.01 EARLY HEPATIC FIBROSIS: Status: ACTIVE | Noted: 2021-06-09

## 2025-05-14 ENCOUNTER — PATIENT MESSAGE (OUTPATIENT)
Dept: ENDOCRINOLOGY | Facility: CLINIC | Age: 60
End: 2025-05-14
Payer: MEDICARE

## 2025-05-15 ENCOUNTER — PATIENT MESSAGE (OUTPATIENT)
Dept: ENDOCRINOLOGY | Facility: CLINIC | Age: 60
End: 2025-05-15
Payer: MEDICARE

## 2025-05-15 DIAGNOSIS — E11.65 TYPE 2 DIABETES MELLITUS WITH HYPERGLYCEMIA, WITHOUT LONG-TERM CURRENT USE OF INSULIN: Primary | ICD-10-CM

## 2025-05-19 ENCOUNTER — PATIENT MESSAGE (OUTPATIENT)
Facility: CLINIC | Age: 60
End: 2025-05-19
Payer: MEDICARE

## 2025-06-06 ENCOUNTER — TELEPHONE (OUTPATIENT)
Dept: ENDOCRINOLOGY | Facility: CLINIC | Age: 60
End: 2025-06-06
Payer: MEDICARE

## 2025-06-09 ENCOUNTER — TELEPHONE (OUTPATIENT)
Dept: ENDOCRINOLOGY | Facility: CLINIC | Age: 60
End: 2025-06-09
Payer: MEDICARE

## 2025-06-09 NOTE — TELEPHONE ENCOUNTER
Spoke to pt to inform her that [facility] keeps reaching out for rx update but she needs an appt/labs. Pt states she will get labs done tomorrow. I let pt know ill have  send her a new link to click on to schedule a visit.

## 2025-06-16 DIAGNOSIS — E11.65 TYPE 2 DIABETES MELLITUS WITH HYPERGLYCEMIA, WITHOUT LONG-TERM CURRENT USE OF INSULIN: ICD-10-CM

## 2025-06-17 ENCOUNTER — PATIENT MESSAGE (OUTPATIENT)
Dept: ENDOCRINOLOGY | Facility: CLINIC | Age: 60
End: 2025-06-17
Payer: MEDICARE

## 2025-06-17 DIAGNOSIS — E11.65 TYPE 2 DIABETES MELLITUS WITH HYPERGLYCEMIA, WITHOUT LONG-TERM CURRENT USE OF INSULIN: Primary | ICD-10-CM

## 2025-06-17 RX ORDER — METFORMIN HYDROCHLORIDE 500 MG/1
500 TABLET ORAL 2 TIMES DAILY WITH MEALS
Qty: 180 TABLET | Refills: 3 | OUTPATIENT
Start: 2025-06-17

## 2025-06-18 ENCOUNTER — OFFICE VISIT (OUTPATIENT)
Dept: HEMATOLOGY/ONCOLOGY | Facility: CLINIC | Age: 60
End: 2025-06-18
Payer: MEDICARE

## 2025-06-18 ENCOUNTER — LAB VISIT (OUTPATIENT)
Dept: LAB | Facility: HOSPITAL | Age: 60
End: 2025-06-18
Attending: NURSE PRACTITIONER
Payer: MEDICARE

## 2025-06-18 ENCOUNTER — RESULTS FOLLOW-UP (OUTPATIENT)
Dept: HEMATOLOGY/ONCOLOGY | Facility: CLINIC | Age: 60
End: 2025-06-18

## 2025-06-18 VITALS
SYSTOLIC BLOOD PRESSURE: 111 MMHG | HEART RATE: 69 BPM | WEIGHT: 192.38 LBS | BODY MASS INDEX: 32.05 KG/M2 | OXYGEN SATURATION: 98 % | DIASTOLIC BLOOD PRESSURE: 75 MMHG | HEIGHT: 65 IN

## 2025-06-18 DIAGNOSIS — K21.9 GASTROESOPHAGEAL REFLUX DISEASE, UNSPECIFIED WHETHER ESOPHAGITIS PRESENT: ICD-10-CM

## 2025-06-18 DIAGNOSIS — N20.0 RECURRENT KIDNEY STONES: ICD-10-CM

## 2025-06-18 DIAGNOSIS — K59.00 CONSTIPATION, UNSPECIFIED CONSTIPATION TYPE: ICD-10-CM

## 2025-06-18 DIAGNOSIS — D51.9 ANEMIA DUE TO VITAMIN B12 DEFICIENCY, UNSPECIFIED B12 DEFICIENCY TYPE: ICD-10-CM

## 2025-06-18 DIAGNOSIS — K62.5 BRBPR (BRIGHT RED BLOOD PER RECTUM): ICD-10-CM

## 2025-06-18 DIAGNOSIS — D50.9 IRON DEFICIENCY ANEMIA, UNSPECIFIED IRON DEFICIENCY ANEMIA TYPE: Primary | ICD-10-CM

## 2025-06-18 DIAGNOSIS — R31.9 HEMATURIA, UNSPECIFIED TYPE: ICD-10-CM

## 2025-06-18 DIAGNOSIS — R61 EXCESSIVE SWEATING: ICD-10-CM

## 2025-06-18 DIAGNOSIS — D50.9 IRON DEFICIENCY ANEMIA, UNSPECIFIED IRON DEFICIENCY ANEMIA TYPE: ICD-10-CM

## 2025-06-18 DIAGNOSIS — E11.65 TYPE 2 DIABETES MELLITUS WITH HYPERGLYCEMIA, WITHOUT LONG-TERM CURRENT USE OF INSULIN: ICD-10-CM

## 2025-06-18 DIAGNOSIS — D64.9 ANEMIA, UNSPECIFIED TYPE: ICD-10-CM

## 2025-06-18 LAB
ABSOLUTE EOSINOPHIL (OHS): 0.11 K/UL
ABSOLUTE MONOCYTE (OHS): 0.43 K/UL (ref 0.3–1)
ABSOLUTE NEUTROPHIL COUNT (OHS): 4.47 K/UL (ref 1.8–7.7)
ALBUMIN SERPL BCP-MCNC: 4.1 G/DL (ref 3.5–5.2)
ALBUMIN/CREAT UR: 9.4 UG/MG
ALP SERPL-CCNC: 61 UNIT/L (ref 40–150)
ALT SERPL W/O P-5'-P-CCNC: 14 UNIT/L (ref 10–44)
ANION GAP (OHS): 11 MMOL/L (ref 8–16)
AST SERPL-CCNC: 18 UNIT/L (ref 11–45)
BASOPHILS # BLD AUTO: 0.05 K/UL
BASOPHILS NFR BLD AUTO: 0.7 %
BILIRUB SERPL-MCNC: 0.6 MG/DL (ref 0.1–1)
BILIRUB UR QL STRIP.AUTO: NEGATIVE
BUN SERPL-MCNC: 14 MG/DL (ref 6–20)
CALCIUM SERPL-MCNC: 9.2 MG/DL (ref 8.7–10.5)
CHLORIDE SERPL-SCNC: 101 MMOL/L (ref 95–110)
CLARITY UR: CLEAR
CO2 SERPL-SCNC: 28 MMOL/L (ref 23–29)
COLOR UR AUTO: YELLOW
CREAT SERPL-MCNC: 0.7 MG/DL (ref 0.5–1.4)
CREAT UR-MCNC: 244 MG/DL (ref 15–325)
EAG (OHS): 111 MG/DL (ref 68–131)
ERYTHROCYTE [DISTWIDTH] IN BLOOD BY AUTOMATED COUNT: 13.9 % (ref 11.5–14.5)
FERRITIN SERPL-MCNC: 26 NG/ML (ref 20–300)
FOLATE SERPL-MCNC: 12.2 NG/ML (ref 4–24)
GFR SERPLBLD CREATININE-BSD FMLA CKD-EPI: >60 ML/MIN/1.73/M2
GLUCOSE SERPL-MCNC: 89 MG/DL (ref 70–110)
GLUCOSE UR QL STRIP: NEGATIVE
HBA1C MFR BLD: 5.5 % (ref 4–5.6)
HCT VFR BLD AUTO: 46.4 % (ref 37–48.5)
HGB BLD-MCNC: 15.3 GM/DL (ref 12–16)
HGB UR QL STRIP: NEGATIVE
HOLD SPECIMEN: NORMAL
IMM GRANULOCYTES # BLD AUTO: 0.03 K/UL (ref 0–0.04)
IMM GRANULOCYTES NFR BLD AUTO: 0.4 % (ref 0–0.5)
IRON SATN MFR SERPL: 18 % (ref 20–50)
IRON SERPL-MCNC: 85 UG/DL (ref 30–160)
KETONES UR QL STRIP: NEGATIVE
LDH SERPL-CCNC: 162 U/L (ref 110–260)
LEUKOCYTE ESTERASE UR QL STRIP: NEGATIVE
LYMPHOCYTES # BLD AUTO: 2.05 K/UL (ref 1–4.8)
MCH RBC QN AUTO: 25.8 PG (ref 27–31)
MCHC RBC AUTO-ENTMCNC: 33 G/DL (ref 32–36)
MCV RBC AUTO: 78 FL (ref 82–98)
MICROALBUMIN UR-MCNC: 23 UG/ML (ref ?–5000)
NITRITE UR QL STRIP: NEGATIVE
NUCLEATED RBC (/100WBC) (OHS): 0 /100 WBC
PH UR STRIP: 5 [PH]
PLATELET # BLD AUTO: 229 K/UL (ref 150–450)
PMV BLD AUTO: 10.4 FL (ref 9.2–12.9)
POTASSIUM SERPL-SCNC: 4.1 MMOL/L (ref 3.5–5.1)
PROT SERPL-MCNC: 6.8 GM/DL (ref 6–8.4)
PROT UR QL STRIP: NEGATIVE
RBC # BLD AUTO: 5.93 M/UL (ref 4–5.4)
RELATIVE EOSINOPHIL (OHS): 1.5 %
RELATIVE LYMPHOCYTE (OHS): 28.7 % (ref 18–48)
RELATIVE MONOCYTE (OHS): 6 % (ref 4–15)
RELATIVE NEUTROPHIL (OHS): 62.7 % (ref 38–73)
RETICS/RBC NFR AUTO: 1.4 % (ref 0.5–2.5)
SODIUM SERPL-SCNC: 140 MMOL/L (ref 136–145)
SP GR UR STRIP: 1.02
T4 FREE SERPL-MCNC: 1.43 NG/DL (ref 0.71–1.51)
TIBC SERPL-MCNC: 466 UG/DL (ref 250–450)
TRANSFERRIN SERPL-MCNC: 315 MG/DL (ref 200–375)
TSH SERPL-ACNC: 0.05 UIU/ML (ref 0.4–4)
VIT B12 SERPL-MCNC: 1661 PG/ML (ref 210–950)
WBC # BLD AUTO: 7.14 K/UL (ref 3.9–12.7)

## 2025-06-18 PROCEDURE — 84165 PROTEIN E-PHORESIS SERUM: CPT | Mod: 26,,, | Performed by: PATHOLOGY

## 2025-06-18 PROCEDURE — 84165 PROTEIN E-PHORESIS SERUM: CPT

## 2025-06-18 PROCEDURE — 3074F SYST BP LT 130 MM HG: CPT | Mod: CPTII,S$GLB,, | Performed by: NURSE PRACTITIONER

## 2025-06-18 PROCEDURE — 82607 VITAMIN B-12: CPT

## 2025-06-18 PROCEDURE — G2211 COMPLEX E/M VISIT ADD ON: HCPCS | Mod: S$GLB,,, | Performed by: NURSE PRACTITIONER

## 2025-06-18 PROCEDURE — 99999 PR PBB SHADOW E&M-EST. PATIENT-LVL IV: CPT | Mod: PBBFAC,,, | Performed by: NURSE PRACTITIONER

## 2025-06-18 PROCEDURE — 3061F NEG MICROALBUMINURIA REV: CPT | Mod: CPTII,S$GLB,, | Performed by: NURSE PRACTITIONER

## 2025-06-18 PROCEDURE — 83540 ASSAY OF IRON: CPT

## 2025-06-18 PROCEDURE — 86334 IMMUNOFIX E-PHORESIS SERUM: CPT | Mod: 26,,, | Performed by: PATHOLOGY

## 2025-06-18 PROCEDURE — 85025 COMPLETE CBC W/AUTO DIFF WBC: CPT | Mod: PO

## 2025-06-18 PROCEDURE — 1160F RVW MEDS BY RX/DR IN RCRD: CPT | Mod: CPTII,S$GLB,, | Performed by: NURSE PRACTITIONER

## 2025-06-18 PROCEDURE — 82570 ASSAY OF URINE CREATININE: CPT

## 2025-06-18 PROCEDURE — 83521 IG LIGHT CHAINS FREE EACH: CPT

## 2025-06-18 PROCEDURE — 1159F MED LIST DOCD IN RCRD: CPT | Mod: CPTII,S$GLB,, | Performed by: NURSE PRACTITIONER

## 2025-06-18 PROCEDURE — 36415 COLL VENOUS BLD VENIPUNCTURE: CPT | Mod: PO

## 2025-06-18 PROCEDURE — 80053 COMPREHEN METABOLIC PANEL: CPT

## 2025-06-18 PROCEDURE — 83615 LACTATE (LD) (LDH) ENZYME: CPT

## 2025-06-18 PROCEDURE — 3066F NEPHROPATHY DOC TX: CPT | Mod: CPTII,S$GLB,, | Performed by: NURSE PRACTITIONER

## 2025-06-18 PROCEDURE — 86334 IMMUNOFIX E-PHORESIS SERUM: CPT

## 2025-06-18 PROCEDURE — 3044F HG A1C LEVEL LT 7.0%: CPT | Mod: CPTII,S$GLB,, | Performed by: NURSE PRACTITIONER

## 2025-06-18 PROCEDURE — 84439 ASSAY OF FREE THYROXINE: CPT

## 2025-06-18 PROCEDURE — 99205 OFFICE O/P NEW HI 60 MIN: CPT | Mod: S$GLB,,, | Performed by: NURSE PRACTITIONER

## 2025-06-18 PROCEDURE — 83036 HEMOGLOBIN GLYCOSYLATED A1C: CPT

## 2025-06-18 PROCEDURE — 83010 ASSAY OF HAPTOGLOBIN QUANT: CPT

## 2025-06-18 PROCEDURE — 81002 URINALYSIS NONAUTO W/O SCOPE: CPT | Mod: PO

## 2025-06-18 PROCEDURE — 85045 AUTOMATED RETICULOCYTE COUNT: CPT

## 2025-06-18 PROCEDURE — 3078F DIAST BP <80 MM HG: CPT | Mod: CPTII,S$GLB,, | Performed by: NURSE PRACTITIONER

## 2025-06-18 PROCEDURE — 82746 ASSAY OF FOLIC ACID SERUM: CPT

## 2025-06-18 PROCEDURE — 82728 ASSAY OF FERRITIN: CPT

## 2025-06-18 PROCEDURE — 3008F BODY MASS INDEX DOCD: CPT | Mod: CPTII,S$GLB,, | Performed by: NURSE PRACTITIONER

## 2025-06-18 RX ORDER — TIRZEPATIDE 15 MG/.5ML
15 INJECTION, SOLUTION SUBCUTANEOUS
Qty: 2 ML | Refills: 1 | Status: SHIPPED | OUTPATIENT
Start: 2025-06-18

## 2025-06-18 NOTE — TELEPHONE ENCOUNTER
Pt requesting refills on Mounjaro  Has appt scheduled 6/30    JAMES Sands 6/18/2024  Controlled T2DM managed with max dose Mounjaro and metformin 500 mg BID    Lab Results   Component Value Date    HGBA1C 5.5 09/14/2024

## 2025-06-18 NOTE — PROGRESS NOTES
Subjective:      Patient ID: Angie Mccarthy is a 59 y.o. female.    Chief Complaint: exhaustion    HPI:  59-year-old female presents to the clinic as a new patient further evaluation recommendations/treatment with anemia. She has been found to have low normal B12 and has started taking B12 1000 mcg po daily.  She also has been found with low ferritin/iron deficiency in the past.  She is taking ferrous sulfate 65 mg po 5 daily for about 3-4 months but has been out of them for a few weeks.  She states that it was causing her serious constipation and continues to be constipation even though she has not taken them in a couple of week.    States that she does get blood in stool with bowel movements - has seen blood in toilet and on toilet paper.  On/off over the past year.      States that she always checks because she has a h/o kidney stones so she checks to make sure not passing kidney stones.    She also takes prilosec 40 mg PO daily for acid reflux.      States that she has had gastric sleeve years ago.    Family hx of cancer:  Paternal aunt: breast cancer, lymphoma  Paternal cousin: breast cancer x 3  Paternal aunt: breast cancer  Paternal aunt: liver or pancreatic cancer?  Maternal uncle: lymphoma  Father: basal cell skin cancer  Mother: skin cancer    She is scheduled for mammogram on Friday.      She complains of extreme excessive sweating.     Denies f/c/ns that are drenching.  Denies any unintentional weight loss.  Denies any known abnormal lymphadenopathy.     Denies cigarette, alcohol, or illicit drug use.     I have reviewed all of the patient's relevant lab work available in the medical record and have utilized this in my evaluation and management recommendations today.    Social History[1]    Family History   Problem Relation Name Age of Onset    Heart disease Mother      Heart disease Father      Cancer Father      COPD Father      Hypertension Sister      Scoliosis Sister      Hypertension  Brother      Heart disease Maternal Grandmother      Heart disease Maternal Aunt      Heart disease Maternal Uncle      Heart disease Paternal Aunt      Breast cancer Paternal Aunt      Heart disease Paternal Uncle      Cancer Paternal Uncle      Acne Other nephew     Eczema Other nephew     Melanoma Neg Hx      Psoriasis Neg Hx      Lupus Neg Hx         Past Surgical History:   Procedure Laterality Date    ANTEGRADE NEPHROSTOGRAPHY Right 2/23/2024    Procedure: NEPHROSTOGRAM, ANTEGRADE;  Surgeon: Suni Patel MD;  [facility];  Service: Urology;  Laterality: Right;    ANTEGRADE NEPHROSTOGRAPHY Left 4/8/2024    Procedure: Nephrostogram;  Surgeon: Suni Patel MD;  [facility];  Service: Urology;  Laterality: Left;    APPENDECTOMY      BACK SURGERY      x 6    CHOLECYSTECTOMY      CYSTOSCOPY N/A 4/17/2024    Procedure: CYSTOSCOPY;  Surgeon: Suni Patel MD;  Location: [facility] OR [address] [place] [address];  Service: Urology;  Laterality: N/A;    CYSTOSCOPY N/A 5/8/2024    Procedure: CYSTOSCOPY;  Surgeon: Suni Patel MD;  [facility];  Service: Urology;  Laterality: N/A;    DILATION OF NEPHROSTOMY TRACT Left 4/8/2024    Procedure: DILATION, NEPHROSTOMY TRACT;  Surgeon: Suni Patel MD;  [facility];  Service: Urology;  Laterality: Left;    EXTRACTION - STONE Right 2/23/2024    Procedure: EXTRACTION - STONE;  Surgeon: Suni Patel MD;  [facility];  Service: Urology;  Laterality: Right;    EXTRACTION - STONE Left 4/17/2024    Procedure: EXTRACTION - STONE;  Surgeon: Suni Patel MD;  [facility];  Service: Urology;  Laterality: Left;    EXTRACTION - STONE Left 5/8/2024    Procedure: EXTRACTION - STONE;  Surgeon: Suni Patel MD;  [facility];  Service: Urology;  Laterality: Left;    GASTRECTOMY      Lap Gastric Sleeve    HERNIA REPAIR      HYSTERECTOMY       JOINT REPLACEMENT      LASER LITHOTRIPSY Left 4/17/2024    Procedure: LITHOTRIPSY, USING LASER;  Surgeon: Suni Patel MD;  Location: [facility] OR 1ST FLR;  Service: Urology;  Laterality: Left;    LASER LITHOTRIPSY Left 5/8/2024    Procedure: LITHOTRIPSY, USING LASER;  Surgeon: Suni Patel MD;  Location: [facility] OR 1ST FLR;  Service: Urology;  Laterality: Left;    LIVER BIOPSY  02/06/2017    steatohepatitis with stage 1 fibrosis    MYELOGRAPHY N/A 11/29/2018    Procedure: MYELOGRAM;  Surgeon: [facility] Provider;  Location: [facility] OR 2ND FLR;  Service: Radiology;  Laterality: N/A;    MYELOGRAPHY N/A 1/7/2019    Procedure: Myelogram;  Surgeon: Lissett Surgeon;  Location: [facility];  Service: Anesthesiology;  Laterality: N/A;    NEPHROSCOPY Right 2/23/2024    Procedure: NEPHROSCOPY;  Surgeon: Suni Patel MD;  Location: [facility] OR 1ST FLR;  Service: Urology;  Laterality: Right;  2 hr    NEPHROSTOGRAPHY Right 2/19/2024    Procedure: Nephrostogram;  Surgeon: Suni Patel MD;  Location: [facility] OR 2ND FLR;  Service: Urology;  Laterality: Right;    OOPHORECTOMY      PARATHYROIDECTOMY N/A 5/17/2024    Procedure: PARATHYROIDECTOMY With Intraoperative PTH Levels;  Surgeon: Ligia Raya MD;  Location: [facility] OR [facility] FLR;  Service: General;  Laterality: N/A;  3 HOURS    PERCUTANEOUS CRYOTHERAPY OF PERIPHERAL NERVE USING LIQUID NITROUS OXIDE IN CLOSED NEEDLE DEVICE Left 4/29/2019    Procedure: CRYOTHERAPY, NERVE, PERIPHERAL, PERCUTANEOUS, USING LIQUID NITROUS OXIDE IN CLOSED NEEDLE DEVICE-iovera left knee;  Surgeon: Chavo Gold III, MD;  Location: [facility] CATH LAB;  Service: Pain Management;  Laterality: Left;    PERCUTANEOUS CRYOTHERAPY OF PERIPHERAL NERVE USING LIQUID NITROUS OXIDE IN CLOSED NEEDLE DEVICE Right 8/2/2021    Procedure: CRYOTHERAPY, NERVE, PERIPHERAL, PERCUTANEOUS, USING LIQUID NITROUS OXIDE IN CLOSED NEEDLE DEVICE;  Surgeon: Saritha Proctor NP;  Location: [facility];   Service: Pain Management;  Laterality: Right;  RIGHT KNEE IOVERA    PERCUTANEOUS NEPHROLITHOTOMY Right 2/19/2024    Procedure: NEPHROLITHOTOMY, PERCUTANEOUS;  Surgeon: Suni Patel MD;  Location: [facility] OR 2ND FLR;  Service: Urology;  Laterality: Right;  2 HRS    PERCUTANEOUS NEPHROLITHOTOMY Left 4/8/2024    Procedure: NEPHROLITHOTOMY, PERCUTANEOUS;  Surgeon: Suni Patel MD;  Location: [facility] OR 1ST FLR;  Service: Urology;  Laterality: Left;  3 hrs    PERCUTANEOUS NEPHROSCOPY  2/19/2024    Procedure: NEPHROSCOPY, PERCUTANEOUS;  Surgeon: Suni Patel MD;  Location: [facility] OR 2ND FLR;  Service: Urology;;    PERCUTANEOUS NEPHROSTOMY Right 2/19/2024    Procedure: CREATION, NEPHROSTOMY, PERCUTANEOUS;  Surgeon: Suni Patel MD;  Location: [facility] OR 2ND FLR;  Service: Urology;  Laterality: Right;    PYELOSCOPY Left 4/17/2024    Procedure: PYELOSCOPY;  Surgeon: Suni Patel MD;  Location: [facility] OR 1ST FLR;  Service: Urology;  Laterality: Left;    PYELOSCOPY Left 5/8/2024    Procedure: PYELOSCOPY;  Surgeon: Suni Patel MD;  Location: [facility] OR 1ST FLR;  Service: Urology;  Laterality: Left;    REMOVAL-STENT Left 4/17/2024    Procedure: REMOVAL-STENT;  Surgeon: Suni Patel MD;  Location: [facility] OR 1ST FLR;  Service: Urology;  Laterality: Left;    REMOVAL-STENT Left 5/8/2024    Procedure: REMOVAL-STENT;  Surgeon: Suni Patel MD;  Location: [facility] OR 1ST FLR;  Service: Urology;  Laterality: Left;    REPLACEMENT OF NEPHROSTOMY TUBE Left 4/8/2024    Procedure: PLACEMENT, NEPHROSTOMY TUBE;  Surgeon: Suni Patel MD;  Location: [facility] OR 1ST FLR;  Service: Urology;  Laterality: Left;    REPLACEMENT OF STENT Left 4/17/2024    Procedure: REPLACEMENT, STENT;  Surgeon: Suni Patel MD;  Location: [facility] OR 1ST FLR;  Service: Urology;  Laterality: Left;    REPLACEMENT OF STENT Left 5/8/2024    Procedure: REPLACEMENT, STENT;  Surgeon:  Suni Patel MD;  Location: [facility] OR 1ST FLR;  Service: Urology;  Laterality: Left;    SPINAL FUSION      TONSILLECTOMY, ADENOIDECTOMY      TOTAL KNEE ARTHROPLASTY Left 5/1/2019    Procedure: REPLACEMENT-KNEE-TOTAL;  Surgeon: [facility]., MD;  Location: [facility] OR 2ND FLR;  Service: Orthopedics;  Laterality: Left;    URETERAL STENT PLACEMENT Left 4/8/2024    Procedure: INSERTION, STENT, URETER;  Surgeon: Suni Patel MD;  Location: [facility] OR 1ST FLR;  Service: Urology;  Laterality: Left;    URETEROSCOPY  2/19/2024    Procedure: URETEROSCOPY;  Surgeon: Suni Patel MD;  Location: [facility] OR 2ND FLR;  Service: Urology;;    URETEROSCOPY Left 4/17/2024    Procedure: URETEROSCOPY;  Surgeon: Suni Patel MD;  Location: [facility] OR 1ST FLR;  Service: Urology;  Laterality: Left;    URETEROSCOPY Left 5/8/2024    Procedure: URETEROSCOPY;  Surgeon: Suni Patel MD;  Location: [facility] OR 1ST FLR;  Service: Urology;  Laterality: Left;    URETEROSCOPY, ANTEGRADE Right 2/23/2024    Procedure: URETEROSCOPY, ANTEGRADE;  Surgeon: Suni Patel MD;  Location: [facility] OR 1ST FLR;  Service: Urology;  Laterality: Right;       Past Medical History:   Diagnosis Date    Allergy     seasonal    Arthritis     Blood transfusion     with back surgeries    Chronic back pain     Diabetes mellitus, type 2     GERD (gastroesophageal reflux disease)     Hyperlipidemia     Hypertension     Hypothyroidism     Thyroid nodule    Joint pain     Kidney stones     Morbid obesity 12/14/2012    NAFLD (nonalcoholic fatty liver disease)     OCD (obsessive compulsive disorder)     Osteoporosis     PONV (postoperative nausea and vomiting)     Persistent, Motion sickness with boat rides, Scopolamine helped -still had nausea    Restless leg syndrome     Sciatica of right side associated with disorder of lumbosacral spine     Spinal stenosis of lumbar region     Thoracic spondylosis        Review of Systems    Constitutional:  Positive for appetite change (decreased) and fatigue.   HENT:  Negative for nosebleeds.    Eyes: Negative.    Respiratory:  Positive for shortness of breath.    Cardiovascular:  Positive for palpitations (improved). Negative for chest pain.   Gastrointestinal:  Positive for blood in stool and constipation. Negative for diarrhea, nausea and vomiting.        Acid reflux   Endocrine: Negative.    Genitourinary:  Positive for hematuria (dark urine). Negative for menstrual problem.   Musculoskeletal:  Positive for arthralgias and myalgias.   Skin: Negative.    Allergic/Immunologic: Negative.    Neurological:  Positive for headaches.   Psychiatric/Behavioral: Negative.            Medication List with Changes/Refills   Current Medications    ASPIRIN (ECOTRIN) 81 MG EC TABLET    Take 81 mg by mouth nightly.    BIOTIN ORAL    Take 10,000 mg by mouth every morning.    BLOOD SUGAR DIAGNOSTIC (TRUE METRIX GLUCOSE TEST STRIP) STRP    1 each by NOT APPLICABLE route once daily. Test Blood Sugar    CHLORTHALIDONE (HYGROTEN) 25 MG TAB    Take 1 tablet (25 mg total) by mouth once daily.    CYCLOBENZAPRINE (FLEXERIL) 10 MG TABLET    TK 1 T PO QD- as needed for muscle spasms    DULOXETINE (CYMBALTA) 60 MG CAPSULE    TAKE 1 CAPSULE EVERY DAY    FISH OIL-OMEGA-3 FATTY ACIDS 300-1,000 MG CAPSULE    Take 1,200 mg by mouth 2 (two) times daily.    FLUTICASONE PROPIONATE (FLONASE) 50 MCG/ACTUATION NASAL SPRAY    USE 2 SPRAYS NASALLY ONE TIME DAILY    GABAPENTIN (NEURONTIN) 800 MG TABLET    Take 800 mg by mouth 3 (three) times daily. Takes 2-3 times per day    LANCETS (TRUEPLUS LANCETS) 33 GAUGE MISC    1 lancet  by NOT APPLICABLE route once daily. Test Blood Sugar    LEVOTHYROXINE (SYNTHROID) 137 MCG TAB TABLET    TAKE 1 TABLET EVERY DAY BEFORE BREAKFAST.    METFORMIN (GLUCOPHAGE) 500 MG TABLET    Take 1 tablet (500 mg total) by mouth 2 (two) times daily with meals.    MULTIVITAMIN CAPSULE    Take 1 capsule by mouth every  morning.    OMEPRAZOLE (PRILOSEC) 40 MG CAPSULE    Take 1 capsule (40 mg total) by mouth once daily.    OXYCODONE-ACETAMINOPHEN (PERCOCET)  MG PER TABLET    Take 1 tablet by mouth 4 (four) times daily as needed for Pain.    POTASSIUM CHLORIDE SA (K-DUR,KLOR-CON) 20 MEQ TABLET    Take 1 tablet (20 mEq total) by mouth once daily.    PROPRANOLOL (INDERAL) 10 MG TABLET    Take 1 tablet (10 mg total) by mouth 2 (two) times daily. (Morning and afternoon)    ROSUVASTATIN (CRESTOR) 10 MG TABLET    TAKE 1 TABLET EVERY NIGHT    TRUE METRIX LEVEL 1 SOLN    USE AS DIRECTED WITH GLUCOSE METER    ZINC SULFATE (ZINCATE) 50 MG ZINC (220 MG) CAPSULE        ZOLPIDEM (AMBIEN) 10 MG TAB    Take 1 tablet (10 mg total) by mouth nightly as needed.   Changed and/or Refilled Medications    Modified Medication Previous Medication    POTASSIUM CITRATE (UROCIT-K) 5 MEQ (540 MG) TBSR potassium citrate (UROCIT-K) 5 mEq (540 mg) TbSR       Take 3 tablets (15 mEq total) by mouth 3 (three) times daily with meals.    Take 3 tablets (15 mEq total) by mouth 3 (three) times daily with meals.    TIRZEPATIDE (MOUNJARO) 15 MG/0.5 ML PNIJ tirzepatide (MOUNJARO) 15 mg/0.5 mL PnIj       Inject 15 mg into the skin every 7 days.    Inject 15 mg into the skin every 7 days.        Objective:     Vitals:    06/18/25 1303   BP: 111/75   Pulse: 69       Physical Exam  Vitals reviewed.   Constitutional:       Appearance: Normal appearance.   HENT:      Head: Normocephalic and atraumatic.      Right Ear: External ear normal.      Left Ear: External ear normal.   Cardiovascular:      Rate and Rhythm: Normal rate and regular rhythm.      Heart sounds: Normal heart sounds, S1 normal and S2 normal.   Pulmonary:      Effort: Pulmonary effort is normal.      Breath sounds: Normal breath sounds.   Abdominal:      General: There is no distension.   Musculoskeletal:         General: Normal range of motion.      Cervical back: Normal range of motion.   Lymphadenopathy:       Head:      Right side of head: No submental, submandibular, tonsillar, preauricular, posterior auricular or occipital adenopathy.      Left side of head: No submental, submandibular, tonsillar, preauricular, posterior auricular or occipital adenopathy.      Cervical: No cervical adenopathy.      Upper Body:      Right upper body: No supraclavicular adenopathy.      Left upper body: No supraclavicular adenopathy.   Skin:     General: Skin is warm and dry.   Neurological:      General: No focal deficit present.      Mental Status: She is alert and oriented to person, place, and time.   Psychiatric:         Attention and Perception: Attention and perception normal.         Mood and Affect: Mood and affect normal.         Speech: Speech normal.         Behavior: Behavior normal. Behavior is cooperative.         Thought Content: Thought content normal.         Cognition and Memory: Cognition and memory normal.         Judgment: Judgment normal.         Assessment:     Problem List Items Addressed This Visit       GERD (gastroesophageal reflux disease)    Relevant Orders    CBC Auto Differential (Completed)    Ferritin (Completed)    Iron and TIBC (Completed)    CBC Auto Differential    Ferritin    Iron and TIBC     Other Visit Diagnoses         Iron deficiency anemia, unspecified iron deficiency anemia type    -  Primary    Relevant Orders    Ambulatory referral/consult to Endo Procedure     CBC Auto Differential (Completed)    Ferritin (Completed)    Iron and TIBC (Completed)    CBC Auto Differential    Ferritin    Iron and TIBC      Anemia, unspecified type        Relevant Orders    CBC Auto Differential (Completed)    Ferritin (Completed)    Iron and TIBC (Completed)    Protein Electrophoresis, Serum (Completed)    Immunofixation, Serum    Immunoglobulin Free LT Chains Blood (Completed)    Haptoglobin (Completed)    TSH (Completed)    Reticulocytes (Completed)    Folate (Completed)    Lactate  Dehydrogenase (Completed)    Vitamin B12 (Completed)    CBC Auto Differential    Ferritin    Iron and TIBC      BRBPR (bright red blood per rectum)        Relevant Orders    CBC Auto Differential (Completed)    Ferritin (Completed)    Iron and TIBC (Completed)    CBC Auto Differential    Ferritin    Iron and TIBC      Constipation, unspecified constipation type          Recurrent kidney stones        Relevant Orders    CBC Auto Differential (Completed)    Ferritin (Completed)    Iron and TIBC (Completed)    Urinalysis, Reflex to Urine Culture Urine, Clean Catch (Completed)    CBC Auto Differential    Ferritin    Iron and TIBC      Anemia due to vitamin B12 deficiency, unspecified B12 deficiency type        Relevant Orders    Folate (Completed)    Vitamin B12 (Completed)    CBC Auto Differential      Hematuria, unspecified type        Relevant Orders    Urinalysis, Reflex to Urine Culture Urine, Clean Catch (Completed)    CBC Auto Differential    Ferritin    Iron and TIBC      Excessive sweating        Relevant Orders    5 HIAA, quantitative, Urine [facility]          Lab Results   Component Value Date    WBC 7.14 06/18/2025    RBC 5.93 (H) 06/18/2025    HGB 15.3 06/18/2025    HCT 46.4 06/18/2025    MCV 78 (L) 06/18/2025    MCH 25.8 (L) 06/18/2025    MCHC 33.0 06/18/2025    RDW 13.9 06/18/2025     06/18/2025    MPV 10.4 06/18/2025    GRAN 3.7 12/05/2024    GRAN 50.0 12/05/2024    LYMPH 28.7 06/18/2025    LYMPH 2.05 06/18/2025    MONO 6.0 06/18/2025    MONO 0.43 06/18/2025    EOS 1.5 06/18/2025    EOS 0.11 06/18/2025    BASO 0.07 12/05/2024    EOSINOPHIL 1.9 12/05/2024    BASOPHIL 0.7 06/18/2025    BASOPHIL 0.05 06/18/2025    BMP  Lab Results   Component Value Date     06/18/2025    K 4.1 06/18/2025     06/18/2025    CO2 28 06/18/2025    BUN 14 06/18/2025    CREATININE 0.7 06/18/2025    CALCIUM 9.2 06/18/2025    ANIONGAP 11 06/18/2025    EGFRNORACEVR >60 06/18/2025     Lab Results   Component Value  Date    ALT 14 06/18/2025    AST 18 06/18/2025    ALKPHOS 61 06/18/2025    BILITOT 0.6 06/18/2025     Lab Results   Component Value Date    UIBC 220 10/22/2011    IRON 85 06/18/2025    TRANSFERRIN 315 06/18/2025    TIBC 466 (H) 06/18/2025    LABIRON 18 (L) 06/18/2025    FERRITIN 26.0 06/18/2025            Lab Results   Component Value Date    JKTONAUR71 1,661 (H) 06/18/2025     Lab Results   Component Value Date    FOLATE 12.2 06/18/2025     Lab Results   Component Value Date    TSH 0.051 (L) 06/18/2025             Plan:   Iron deficiency anemia, unspecified iron deficiency anemia type  -     Ambulatory referral/consult to Endo Procedure ; Future; Expected date: 06/19/2025  -     CBC Auto Differential; Future; Expected date: 06/18/2025  -     Ferritin; Future; Expected date: 06/18/2025  -     Iron and TIBC; Future; Expected date: 06/18/2025  -     CBC Auto Differential; Future; Expected date: 06/20/2025  -     Ferritin; Future; Expected date: 06/20/2025  -     Iron and TIBC; Future; Expected date: 06/20/2025    Anemia, unspecified type  -     CBC Auto Differential; Future; Expected date: 06/18/2025  -     Ferritin; Future; Expected date: 06/18/2025  -     Iron and TIBC; Future; Expected date: 06/18/2025  -     Protein Electrophoresis, Serum; Future; Expected date: 06/18/2025  -     Immunofixation, Serum; Future; Expected date: 06/18/2025  -     Immunoglobulin Free LT Chains Blood; Future; Expected date: 06/18/2025  -     Haptoglobin; Future; Expected date: 06/18/2025  -     TSH; Future; Expected date: 06/18/2025  -     Reticulocytes; Future; Expected date: 06/18/2025  -     Folate; Future; Expected date: 06/18/2025  -     Lactate Dehydrogenase; Future; Expected date: 06/18/2025  -     Vitamin B12; Future; Expected date: 06/18/2025  -     CBC Auto Differential; Future; Expected date: 06/20/2025  -     Ferritin; Future; Expected date: 06/20/2025  -     Iron and TIBC; Future; Expected date: 06/20/2025    BRBPR  (bright red blood per rectum)  -     CBC Auto Differential; Future; Expected date: 06/18/2025  -     Ferritin; Future; Expected date: 06/18/2025  -     Iron and TIBC; Future; Expected date: 06/18/2025  -     CBC Auto Differential; Future; Expected date: 06/20/2025  -     Ferritin; Future; Expected date: 06/20/2025  -     Iron and TIBC; Future; Expected date: 06/20/2025    Constipation, unspecified constipation type    Gastroesophageal reflux disease, unspecified whether esophagitis present  -     CBC Auto Differential; Future; Expected date: 06/18/2025  -     Ferritin; Future; Expected date: 06/18/2025  -     Iron and TIBC; Future; Expected date: 06/18/2025  -     CBC Auto Differential; Future; Expected date: 06/20/2025  -     Ferritin; Future; Expected date: 06/20/2025  -     Iron and TIBC; Future; Expected date: 06/20/2025    Recurrent kidney stones  -     CBC Auto Differential; Future; Expected date: 06/18/2025  -     Ferritin; Future; Expected date: 06/18/2025  -     Iron and TIBC; Future; Expected date: 06/18/2025  -     Urinalysis, Reflex to Urine Culture Urine, Clean Catch; Future; Expected date: 06/18/2025  -     CBC Auto Differential; Future; Expected date: 06/20/2025  -     Ferritin; Future; Expected date: 06/20/2025  -     Iron and TIBC; Future; Expected date: 06/20/2025    Anemia due to vitamin B12 deficiency, unspecified B12 deficiency type  -     Folate; Future; Expected date: 06/18/2025  -     Vitamin B12; Future; Expected date: 06/18/2025  -     CBC Auto Differential; Future; Expected date: 06/20/2025    Hematuria, unspecified type  -     Urinalysis, Reflex to Urine Culture Urine, Clean Catch; Future; Expected date: 06/18/2025  -     CBC Auto Differential; Future; Expected date: 06/20/2025  -     Ferritin; Future; Expected date: 06/20/2025  -     Iron and TIBC; Future; Expected date: 06/20/2025    Excessive sweating  -     5 HIAA, quantitative, Urine [facility]; Future      Med Onc Chart  Routing      Follow up with physician    Follow up with GILMAR 3 months. labs prior - in person visit in [place] or [facility] if ok with patient (she lives in [place])   Infusion scheduling note   n/a   Injection scheduling note n/a   Labs   Scheduling:  Preferred lab: [facility]  Lab interval:  cbc, iron studies prior to next visit   Imaging   N/a   Pharmacy appointment No pharmacy appointment needed      Other referrals No nutrition appointment needed -        No additional referrals needed  n/a     Has been taking 5 times the recommended dose of oral iron due to not realizing not 65 mg of iron is the same as ferrous sulfate 325 over-the-counter.  She ran out so currently is not taking iron at this time.  We need current labs therefore patient will complete labs today and we will expand anemia workup with other anemia labs today.  Due to complaints of frequent UTIs with changes in the way her urine looks currently being dark in color we will assess with a urinalysis reflex culture.  Also with complaints of excessive sweating we assess for possible neuroendocrine tumor with 5 HIAA 24 hour urine testing.  If patient comes back iron-deficiency we will replete with IV iron due to constipation and also with a history of gastric sleeve may have difficult time absorbing oral iron.  She also we will get assessed with upper and lower endoscopies with acid reflux currently being treated with Prilosec in bright red stool per rectum on the toilet tissue and in the stools and in toilet.    Update: She is currently not anemic - slightly low sat iron of 18% and elevated TIBC currently. Eval with upper and lower endoscopy.  Continue ferrous sulfate 325 mg by mouth 1 tab daily.  F/u in 3 months with labs prior - ok for VV - hold iron tablet morning of repeat labs.       Total time spent on encounter: 60 minutes     Collaborating Provider:  Dr. Giselle Mcdermott MD    Thank You,  Ellen Tello NP  Benign  Hematology         [1]   Social History  Socioeconomic History    Marital status:    Occupational History    Occupation: disable   Tobacco Use    Smoking status: Never     Passive exposure: Never    Smokeless tobacco: Never    Tobacco comments:     Tried a few cigarettes during teenage   Substance and Sexual Activity    Alcohol use: Not Currently     Comment: socially    Drug use: No    Sexual activity: Not Currently     Partners: Male   Other Topics Concern    Are you pregnant or think you may be? No    Breast-feeding No     Social Drivers of Health     Financial Resource Strain: Patient Declined (12/2/2024)    Overall Financial Resource Strain (CARDIA)     Difficulty of Paying Living Expenses: Patient declined   Food Insecurity: Patient Declined (12/2/2024)    Hunger Vital Sign     Worried About Running Out of Food in the Last Year: Patient declined     Ran Out of Food in the Last Year: Patient declined   Transportation Needs: Unmet Transportation Needs (11/15/2023)    PRAPARE - Transportation     Lack of Transportation (Medical): Yes     Lack of Transportation (Non-Medical): No   Physical Activity: Inactive (12/2/2024)    Exercise Vital Sign     Days of Exercise per Week: 0 days     Minutes of Exercise per Session: 0 min   Stress: Stress Concern Present (12/2/2024)    [employer] - Occupational Stress Questionnaire     Feeling of Stress : Rather much   Housing Stability: Unknown (12/2/2024)    Housing Stability Vital Sign     Unable to Pay for Housing in the Last Year: Patient declined

## 2025-06-19 LAB
ALBUMIN, SPE (OHS): 4.02 G/DL (ref 3.35–5.55)
ALPHA 1 GLOB (OHS): 0.25 GM/DL (ref 0.17–0.41)
ALPHA 2 GLOB (OHS): 0.67 GM/DL (ref 0.43–0.99)
BETA GLOB (OHS): 0.7 GM/DL (ref 0.5–1.1)
GAMMA GLOBULIN (OHS): 0.76 GM/DL (ref 0.67–1.58)
HAPTOGLOB SERPL-MCNC: 156 MG/DL (ref 30–250)
KAPPA LC FREE SER-MCNC: 1.38 MG/L (ref 0.26–1.65)
KAPPA LC FREE/LAMBDA FREE SER: 1.6 MG/DL (ref 0.33–1.94)
LAMBDA LC FREE SERPL-MCNC: 1.16 MG/DL (ref 0.57–2.63)
PROT SERPL-MCNC: 6.4 GM/DL (ref 6–8.4)

## 2025-06-20 RX ORDER — POTASSIUM CITRATE 540 MG/1
15 TABLET, EXTENDED RELEASE ORAL
Qty: 270 TABLET | Refills: 11 | Status: SHIPPED | OUTPATIENT
Start: 2025-06-20 | End: 2026-06-20

## 2025-06-23 DIAGNOSIS — N20.0 NEPHROLITHIASIS: Primary | ICD-10-CM

## 2025-06-24 ENCOUNTER — RESULTS FOLLOW-UP (OUTPATIENT)
Dept: ENDOCRINOLOGY | Facility: CLINIC | Age: 60
End: 2025-06-24

## 2025-06-24 LAB
PATHOLOGIST INTERPRETATION - IFE SERUM (OHS): NORMAL
PATHOLOGIST REVIEW - SPE (OHS): NORMAL

## 2025-06-25 ENCOUNTER — OFFICE VISIT (OUTPATIENT)
Dept: ENDOCRINOLOGY | Facility: CLINIC | Age: 60
End: 2025-06-25
Payer: MEDICARE

## 2025-06-25 DIAGNOSIS — E03.9 PRIMARY HYPOTHYROIDISM: Primary | ICD-10-CM

## 2025-06-25 DIAGNOSIS — Z78.0 ASYMPTOMATIC POSTMENOPAUSAL ESTROGEN DEFICIENCY: ICD-10-CM

## 2025-06-25 DIAGNOSIS — M81.8 OSTEOPOROSIS, IDIOPATHIC: ICD-10-CM

## 2025-06-25 DIAGNOSIS — E11.65 TYPE 2 DIABETES MELLITUS WITH HYPERGLYCEMIA, WITHOUT LONG-TERM CURRENT USE OF INSULIN: ICD-10-CM

## 2025-06-25 PROCEDURE — G2211 COMPLEX E/M VISIT ADD ON: HCPCS | Mod: 95,,, | Performed by: INTERNAL MEDICINE

## 2025-06-25 PROCEDURE — 3044F HG A1C LEVEL LT 7.0%: CPT | Mod: CPTII,95,, | Performed by: INTERNAL MEDICINE

## 2025-06-25 PROCEDURE — 99212 OFFICE O/P EST SF 10 MIN: CPT | Performed by: INTERNAL MEDICINE

## 2025-06-25 PROCEDURE — 98006 SYNCH AUDIO-VIDEO EST MOD 30: CPT | Mod: 95,,, | Performed by: INTERNAL MEDICINE

## 2025-06-25 PROCEDURE — 3061F NEG MICROALBUMINURIA REV: CPT | Mod: CPTII,95,, | Performed by: INTERNAL MEDICINE

## 2025-06-25 PROCEDURE — 3066F NEPHROPATHY DOC TX: CPT | Mod: CPTII,95,, | Performed by: INTERNAL MEDICINE

## 2025-06-25 RX ORDER — LEVOTHYROXINE SODIUM 125 UG/1
125 TABLET ORAL
Qty: 90 TABLET | Refills: 3 | Status: SHIPPED | OUTPATIENT
Start: 2025-06-25

## 2025-06-25 RX ORDER — METFORMIN HYDROCHLORIDE 500 MG/1
500 TABLET ORAL 2 TIMES DAILY WITH MEALS
Qty: 180 TABLET | Refills: 3 | Status: SHIPPED | OUTPATIENT
Start: 2025-06-25 | End: 2026-06-25

## 2025-06-25 RX ORDER — TIRZEPATIDE 15 MG/.5ML
15 INJECTION, SOLUTION SUBCUTANEOUS
Qty: 2 ML | Refills: 11 | Status: SHIPPED | OUTPATIENT
Start: 2025-06-25

## 2025-06-25 NOTE — ASSESSMENT & PLAN NOTE
Doing well   Reports numbness of feet and hands that is new, add B12.     Repeat A1c and microalb with next labs   Needs ophthalmology appointment soon     Current regimen:   metformin 500 mg one tablet twice a day   Mounjaro 15 mg weekly     Regained some weight however back on 15 mg weekly

## 2025-06-25 NOTE — PROGRESS NOTES
Subjective:      Patient ID: Angie Mccarthy is a 59 y.o. female.    Chief Complaint:  No chief complaint on file.      History of Present Illness  Ms. Mccarthy is a 58 year old woman who is here for evaluation of s/p parathryoidectomy X 2 for primary hyperparathyroidism, T2DM, hypothyroidism and osteoporosis. Also has a personal and family history of kidney stones.    For her PHPTH,   Serum calcium in 6/2025 was normal  Encouraged vitamin D (currently on MVI)  Had resection of two hyperplastic parathyroid glands 5/17/2024  Intra op PTH 46 pg/ml    6. Parathyroid, left superior, adenoma, excision:  - Enlarged and hypercellular parathyroid gland  - Weight: 279.5 mg  7. Parathyroid, right superior, adenoma, excision:  - Enlarged and hypercellular parathyroid gland  - Weight: 120.5 mg    Complicated by kidney stones    Stopped TUMS  Dietary calcium   MVI and vitamin D     Hypothyroidism on replacement, levothyroxine 137 mcg daily.   Has used for the past one year   Denies palpitations  Lab Results   Component Value Date    TSH 0.051 (L) 06/18/2025        Osteoporosis Risk Factors:  Needs updated DXA scan in 10/2025  Recent DXA scan demonstrates osteoporosis at the FN, decline at the hip (4%) and 1/3 distal radius (8%). TAHBSO 16 years ago  Hormone therapy for 1- 2 years   Denies fractures.      Last DXA five years ago, FN -1.4     Supplements:  Calcium:  no  MVI yes   D3 1000 IUs daily   Last vitamin D 47     Dietary calcium: cheese, yogurt, occ broccoli, regular salads but no spinach     Type 2 diabetes, most recent   Lab Results   Component Value Date    HGBA1C 5.5 06/18/2025   Current weight 192 lbs     Current regimen:   Mounjaro 15 mg weekly   Metformin 500 mg one tablet twice a day    Has not started exercising yet.     Review of Systems  Denies recent illness     Objective:   Physical Exam  There were no vitals filed for this visit.    BP Readings from Last 3 Encounters:   06/18/25 111/75   05/09/25  103/65   02/06/25 (!) 177/94     Wt Readings from Last 1 Encounters:   06/18/25 1303 87.3 kg (192 lb 6.4 oz)         There is no height or weight on file to calculate BMI.    Lab Review:   Lab Results   Component Value Date    HGBA1C 5.5 06/18/2025     Lab Results   Component Value Date    CHOL 124 09/20/2023    HDL 44 09/20/2023    LDLCALC 52.8 (L) 09/20/2023    TRIG 136 09/20/2023    CHOLHDL 35.5 09/20/2023     Lab Results   Component Value Date     06/18/2025    K 4.1 06/18/2025     06/18/2025    CO2 28 06/18/2025    GLU 89 06/18/2025    BUN 14 06/18/2025    CREATININE 0.7 06/18/2025    CALCIUM 9.2 06/18/2025    PROT 6.8 06/18/2025    PROT 6.4 06/18/2025    ALBUMIN 4.1 06/18/2025    BILITOT 0.6 06/18/2025    ALKPHOS 61 06/18/2025    AST 18 06/18/2025    ALT 14 06/18/2025    ANIONGAP 11 06/18/2025    ESTGFRAFRICA >60.0 04/25/2022    EGFRNONAA >60.0 04/25/2022    TSH 0.051 (L) 06/18/2025         Assessment and Plan     Primary hypothyroidism  Iatrogenic thyrotoxicosis   Weight loss?    Plan:   Reduce to 125 mcg daily   Repeat TSH in three months    Osteoporosis, idiopathic  Risk factors: postsurgical menopausal status, , family history (mother) and history of vitamin D deficiency  Primary hyperparathyroidism -- s/p resection of hyperplastic parathyroid gland 5/2024   Iatrogenic thyrotoxicosis - dose of LT4 reduced     No previous fragility fractures   FRAX not elevated     Due for DXA scan in 2025, discussed importance of scheduling in the same location to allow for comparison      Continue calcium and vitamin D  Continue to stay active, exercise and focus on strength and core    Treatment options and potential side effects discussed for PO bisphosphonates, Reclast, Prolia.   Parathyroidectomy may improve bone density   Can consider BP therapy  DXA scan 10/2025    Type 2 diabetes mellitus with hyperglycemia  Doing well   Reports numbness of feet and hands that is new, add B12.     Repeat A1c  and microalb with next labs   Needs ophthalmology appointment soon     Current regimen:   metformin 500 mg one tablet twice a day   Mounjaro 15 mg weekly     Regained some weight however back on 15 mg weekly       Physical exam was not performed and vitals were not obtained due to this being a telemedicine (virtual) visit.    The patient location is: [place]  The chief complaint leading to consultation is:   Visit type: Virtual visit with synchronous audio and video  Total time spent with patient: 22 min    RTC in 4 months      Felisha Sands MD

## 2025-06-25 NOTE — ASSESSMENT & PLAN NOTE
Iatrogenic thyrotoxicosis   Weight loss?    Plan:   Reduce to 125 mcg daily   Repeat TSH in three months

## 2025-06-25 NOTE — ASSESSMENT & PLAN NOTE
Risk factors: postsurgical menopausal status, , family history (mother) and history of vitamin D deficiency  Primary hyperparathyroidism -- s/p resection of hyperplastic parathyroid gland 5/2024   Iatrogenic thyrotoxicosis - dose of LT4 reduced     No previous fragility fractures   FRAX not elevated     Due for DXA scan in 2025, discussed importance of scheduling in the same location to allow for comparison      Continue calcium and vitamin D  Continue to stay active, exercise and focus on strength and core    Treatment options and potential side effects discussed for PO bisphosphonates, Reclast, Prolia.   Parathyroidectomy may improve bone density   Can consider BP therapy  DXA scan 10/2025

## 2025-06-26 ENCOUNTER — TELEPHONE (OUTPATIENT)
Facility: CLINIC | Age: 60
End: 2025-06-26

## 2025-06-26 NOTE — LETTER
June 26, 2025    Angie Mccarthy  [address] [facility] [address]             [facility] [address]th [address]  [address]  [facility] [address]  Phone: [phone]  Fax: [phone] Dear  Angie Mccarthy:    We are sorry that you have be unable to attend your recent appointments with DANY Gallegos. It has been brought to our attention that you have cancelled several times. This prevents other patients from scheduling. It is advised that you keep your appt that is scheduled for September 19th,2025. We are not able to accommodate any further cancelling and rescheduling in our office.     Please reach out if you have any questions or concerns.           Sincerely,     Yisel SAENZ

## 2025-06-30 ENCOUNTER — HOSPITAL ENCOUNTER (OUTPATIENT)
Dept: PREADMISSION TESTING | Facility: HOSPITAL | Age: 60
Discharge: HOME | End: 2025-06-30
Attending: FAMILY MEDICINE
Payer: MEDICARE

## 2025-06-30 DIAGNOSIS — D50.9 IRON DEFICIENCY ANEMIA, UNSPECIFIED IRON DEFICIENCY ANEMIA TYPE: ICD-10-CM

## 2025-07-03 ENCOUNTER — TELEPHONE (OUTPATIENT)
Dept: HEPATOLOGY | Facility: CLINIC | Age: 60
End: 2025-07-03

## 2025-07-03 ENCOUNTER — PATIENT MESSAGE (OUTPATIENT)
Dept: NEPHROLOGY | Facility: CLINIC | Age: 60
End: 2025-07-03

## 2025-07-03 DIAGNOSIS — K74.02 HEPATIC FIBROSIS, STAGE 3: Primary | ICD-10-CM

## 2025-07-03 DIAGNOSIS — K76.0 FATTY LIVER: ICD-10-CM

## 2025-07-03 NOTE — TELEPHONE ENCOUNTER
Patient rescheduled 7/3 appointments. Contacted patient. No answer. Left  a voicemail stating that I will schedule her fibroscan on the same day as her appointment in December. Asking to call as back for any questions or concerns.

## 2025-07-03 NOTE — TELEPHONE ENCOUNTER
Copied from CRM #[id number]. Topic: Appointments - Appointment Access  >> Jul 1, 2025  3:56 PM Irene wrote:  Type:  Needs Medical Advice    Who Called: Angie Mccarthy   Needing orders for a fibroscan to be done on 7/3 before scheduled appt at 3:30p with KETURAH Whatley  Would the patient rather a call back or a response via [facility]? Call back   Best Call Back Number: [phone]    Additional Information:

## 2025-07-11 ENCOUNTER — PATIENT MESSAGE (OUTPATIENT)
Dept: CARDIOLOGY | Facility: CLINIC | Age: 60
End: 2025-07-11
Payer: MEDICARE

## 2025-07-11 DIAGNOSIS — R07.9 CHEST PAIN, UNSPECIFIED TYPE: ICD-10-CM

## 2025-07-11 DIAGNOSIS — I10 ESSENTIAL HYPERTENSION: ICD-10-CM

## 2025-07-11 DIAGNOSIS — E11.65 TYPE 2 DIABETES MELLITUS WITH HYPERGLYCEMIA, WITHOUT LONG-TERM CURRENT USE OF INSULIN: ICD-10-CM

## 2025-07-11 DIAGNOSIS — E78.5 HYPERLIPIDEMIA, UNSPECIFIED HYPERLIPIDEMIA TYPE: Primary | ICD-10-CM

## 2025-07-11 NOTE — TELEPHONE ENCOUNTER
Pt called back. She has been seeing varicose veins, mostly under her lt? buttock. Also some leg pains possibly related to her back issue. I scheduled her next week w. dr Buckner and she agreed to date/time of appointment(s). Told pt to arrive 30 mn early and she verbalized understanding.  As for her CP, she is worried due to CAD issue in her family. During the first episode she was having an argument (rare for her ) with her boyfriend and was very angry/ yelling?. The pain went across her chest and lasted a while. At the time of the second episode she was irritated and having a lot of stress. Lv in 2023. I scheduled her for f/u and eval w. Ms Avina on 7/14 w. EKG and she agreed to date/time of appointment(s).

## 2025-07-14 PROBLEM — R07.89 OTHER CHEST PAIN: Status: ACTIVE | Noted: 2025-07-14

## 2025-07-17 ENCOUNTER — TELEPHONE (OUTPATIENT)
Dept: CARDIOLOGY | Facility: CLINIC | Age: 60
End: 2025-07-17
Payer: MEDICARE

## 2025-07-17 ENCOUNTER — PATIENT MESSAGE (OUTPATIENT)
Facility: CLINIC | Age: 60
End: 2025-07-17

## 2025-07-23 ENCOUNTER — TELEPHONE (OUTPATIENT)
Dept: CARDIOLOGY | Facility: CLINIC | Age: 60
End: 2025-07-23
Payer: MEDICARE

## 2025-07-30 ENCOUNTER — TELEPHONE (OUTPATIENT)
Dept: ENDOSCOPY | Facility: HOSPITAL | Age: 60
End: 2025-07-30
Payer: MEDICARE

## 2025-07-30 VITALS — HEIGHT: 65 IN | BODY MASS INDEX: 29.99 KG/M2 | WEIGHT: 180 LBS

## 2025-07-30 DIAGNOSIS — Z12.11 SPECIAL SCREENING FOR MALIGNANT NEOPLASMS, COLON: Primary | ICD-10-CM

## 2025-07-30 DIAGNOSIS — D50.9 IRON DEFICIENCY ANEMIA, UNSPECIFIED IRON DEFICIENCY ANEMIA TYPE: ICD-10-CM

## 2025-07-30 RX ORDER — SODIUM, POTASSIUM,MAG SULFATES 17.5-3.13G
1 SOLUTION, RECONSTITUTED, ORAL ORAL DAILY
Qty: 1 KIT | Refills: 0 | Status: SHIPPED | OUTPATIENT
Start: 2025-07-30 | End: 2025-08-01

## 2025-07-30 NOTE — TELEPHONE ENCOUNTER
Patient is scheduled for a Colonoscopy/EGD on 8/13/25 with Dr. POOJA Tam  [facility] tab[facility]

## 2025-08-06 ENCOUNTER — TELEPHONE (OUTPATIENT)
Dept: HEPATOLOGY | Facility: CLINIC | Age: 60
End: 2025-08-06
Payer: MEDICARE

## 2025-08-06 NOTE — TELEPHONE ENCOUNTER
"Copied from CRM #[id number]. Topic: Appointments - Appointment Access  >> Aug 6, 2025  4:48 PM Brad wrote:  Scheduling Request      Patient Status: Est    Scheduling Appt: Fibroscan    Time/Date Preference:  8/7 (tomorrow) before 3 pm visit    Contact Preference?:  [phone]    Treating Provider: Arlyn      Additional Notes:  "Thank you for all that you do for our patients"  "

## 2025-08-08 ENCOUNTER — OFFICE VISIT (OUTPATIENT)
Dept: CARDIOLOGY | Facility: CLINIC | Age: 60
End: 2025-08-08
Payer: MEDICARE

## 2025-08-08 VITALS
SYSTOLIC BLOOD PRESSURE: 107 MMHG | WEIGHT: 178.81 LBS | HEART RATE: 83 BPM | HEIGHT: 65 IN | BODY MASS INDEX: 29.79 KG/M2 | DIASTOLIC BLOOD PRESSURE: 80 MMHG

## 2025-08-08 DIAGNOSIS — I83.812 VARICOSE VEINS OF LEFT LOWER EXTREMITY WITH PAIN: Primary | ICD-10-CM

## 2025-08-08 DIAGNOSIS — E78.2 MIXED HYPERLIPIDEMIA: Chronic | ICD-10-CM

## 2025-08-08 DIAGNOSIS — E11.65 TYPE 2 DIABETES MELLITUS WITH HYPERGLYCEMIA, WITHOUT LONG-TERM CURRENT USE OF INSULIN: ICD-10-CM

## 2025-08-08 PROCEDURE — 99999 PR PBB SHADOW E&M-EST. PATIENT-LVL V: CPT | Mod: PBBFAC,,, | Performed by: INTERNAL MEDICINE

## 2025-08-08 NOTE — PROGRESS NOTES
[facility]      Chief Complaint   Patient presents with    Varicose Veins    Leg Pain       Patient ID: Angie Mccarthy is a 59 y.o. female with DM2, HTN, HLD, hypothyroidism, NAFLD, overweight, chronic back pain, who presents for initial appointment.  Pertinent history/events are as follows:     -Pt presents for evaluation of varicose veins    HPI:  Mrs. Mccarthy reports varicose veins at left buttock areas which started 2 years ago.  She experiences pain and discomfort in this area. Also has varicose veins at lower legs. She has no claudication or LE wound/ulcer. No smoking history.     Past Medical History:   Diagnosis Date    Allergy     seasonal    Arthritis     Blood transfusion     with back surgeries    Chronic back pain     Diabetes mellitus, type 2     GERD (gastroesophageal reflux disease)     Hyperlipidemia     Hypertension     Hypothyroidism     Thyroid nodule    Joint pain     Kidney stones     Morbid obesity 12/14/2012    NAFLD (nonalcoholic fatty liver disease)     OCD (obsessive compulsive disorder)     Osteoporosis     PONV (postoperative nausea and vomiting)     Persistent, Motion sickness with boat rides, Scopolamine helped -still had nausea    Restless leg syndrome     Sciatica of right side associated with disorder of lumbosacral spine     Spinal stenosis of lumbar region     Thoracic spondylosis      Past Surgical History:   Procedure Laterality Date    ANTEGRADE NEPHROSTOGRAPHY Right 2/23/2024    Procedure: NEPHROSTOGRAM, ANTEGRADE;  Surgeon: Suni Patel MD;  Location: [facility] OR [address] [place] [address];  Service: Urology;  Laterality: Right;    ANTEGRADE NEPHROSTOGRAPHY Left 4/8/2024    Procedure: Nephrostogram;  Surgeon: Suni Patel MD;  Location: [facility] OR [address] [place] [address];  Service: Urology;  Laterality: Left;    APPENDECTOMY      BACK SURGERY      x 6    CHOLECYSTECTOMY      CYSTOSCOPY N/A 4/17/2024    Procedure: CYSTOSCOPY;  Surgeon: Suni Patel MD;   Location: [facility] OR 1ST FLR;  Service: Urology;  Laterality: N/A;    CYSTOSCOPY N/A 5/8/2024    Procedure: CYSTOSCOPY;  Surgeon: Suni Patel MD;  Location: [facility] OR 1ST FLR;  Service: Urology;  Laterality: N/A;    DILATION OF NEPHROSTOMY TRACT Left 4/8/2024    Procedure: DILATION, NEPHROSTOMY TRACT;  Surgeon: Suni Patel MD;  Location: [facility] OR 1ST FLR;  Service: Urology;  Laterality: Left;    EXTRACTION - STONE Right 2/23/2024    Procedure: EXTRACTION - STONE;  Surgeon: Suni Patel MD;  Location: [facility] OR 1ST FLR;  Service: Urology;  Laterality: Right;    EXTRACTION - STONE Left 4/17/2024    Procedure: EXTRACTION - STONE;  Surgeon: Suni Patel MD;  [facility];  Service: Urology;  Laterality: Left;    EXTRACTION - STONE Left 5/8/2024    Procedure: EXTRACTION - STONE;  Surgeon: Suni Patel MD;  Location: [facility] OR 1ST FLR;  Service: Urology;  Laterality: Left;    GASTRECTOMY      Lap Gastric Sleeve    HERNIA REPAIR      HYSTERECTOMY      JOINT REPLACEMENT      LASER LITHOTRIPSY Left 4/17/2024    Procedure: LITHOTRIPSY, USING LASER;  Surgeon: Suni Patel MD;  [facility];  Service: Urology;  Laterality: Left;    LASER LITHOTRIPSY Left 5/8/2024    Procedure: LITHOTRIPSY, USING LASER;  Surgeon: Suni Patel MD;  [facility];  Service: Urology;  Laterality: Left;    LIVER BIOPSY  02/06/2017    steatohepatitis with stage 1 fibrosis    MYELOGRAPHY N/A 11/29/2018    Procedure: MYELOGRAM;  Surgeon: Ander Diagnostic Provider;  Location: [facility] OR 2ND FLR;  Service: Radiology;  Laterality: N/A;    MYELOGRAPHY N/A 1/7/2019    Procedure: Myelogram;  Surgeon: Aric Surgeon;  Location: [facility];  Service: Anesthesiology;  Laterality: N/A;    NEPHROSCOPY Right 2/23/2024    Procedure: NEPHROSCOPY;  Surgeon: Suni Patel MD;  Location: [facility] OR [address] [place] [address];  Service: Urology;  Laterality: Right;  2 hr     NEPHROSTOGRAPHY Right 2/19/2024    Procedure: Nephrostogram;  Surgeon: Suni Patel MD;  Location: [facility] OR 2ND FLR;  Service: Urology;  Laterality: Right;    OOPHORECTOMY      PARATHYROIDECTOMY N/A 5/17/2024    Procedure: PARATHYROIDECTOMY With Intraoperative PTH Levels;  Surgeon: Ligia Raya MD;  Location: [facility] OR 2ND FLR;  Service: General;  Laterality: N/A;  3 HOURS    PERCUTANEOUS CRYOTHERAPY OF PERIPHERAL NERVE USING LIQUID NITROUS OXIDE IN CLOSED NEEDLE DEVICE Left 4/29/2019    Procedure: CRYOTHERAPY, NERVE, PERIPHERAL, PERCUTANEOUS, USING LIQUID NITROUS OXIDE IN CLOSED NEEDLE DEVICE-iovera left knee;  Surgeon: Chavo Gold III, MD;  Location: [facility] CATH LAB;  Service: Pain Management;  Laterality: Left;    PERCUTANEOUS CRYOTHERAPY OF PERIPHERAL NERVE USING LIQUID NITROUS OXIDE IN CLOSED NEEDLE DEVICE Right 8/2/2021    Procedure: CRYOTHERAPY, NERVE, PERIPHERAL, PERCUTANEOUS, USING LIQUID NITROUS OXIDE IN CLOSED NEEDLE DEVICE;  Surgeon: Saritha Proctor NP;  Location: [facility];  Service: Pain Management;  Laterality: Right;  RIGHT KNEE IOVERA    PERCUTANEOUS NEPHROLITHOTOMY Right 2/19/2024    Procedure: NEPHROLITHOTOMY, PERCUTANEOUS;  Surgeon: Suni Patel MD;  Location: [facility] OR 2ND FLR;  Service: Urology;  Laterality: Right;  2 HRS    PERCUTANEOUS NEPHROLITHOTOMY Left 4/8/2024    Procedure: NEPHROLITHOTOMY, PERCUTANEOUS;  Surgeon: Suni Patel MD;  Location: [facility] OR 1ST FLR;  Service: Urology;  Laterality: Left;  3 hrs    PERCUTANEOUS NEPHROSCOPY  2/19/2024    Procedure: NEPHROSCOPY, PERCUTANEOUS;  Surgeon: Suni Patel MD;  Location: [facility] OR 2ND FLR;  Service: Urology;;    PERCUTANEOUS NEPHROSTOMY Right 2/19/2024    Procedure: CREATION, NEPHROSTOMY, PERCUTANEOUS;  Surgeon: Suni Patel MD;  Location: [facility] OR 2ND FLR;  Service: Urology;  Laterality: Right;    PYELOSCOPY Left 4/17/2024    Procedure: PYELOSCOPY;  Surgeon: Suni Patel  MD GALDINO;  Location: [facility] OR 1ST FLR;  Service: Urology;  Laterality: Left;    PYELOSCOPY Left 5/8/2024    Procedure: PYELOSCOPY;  Surgeon: Suni Patel MD;  Location: [facility] OR 1ST FLR;  Service: Urology;  Laterality: Left;    REMOVAL-STENT Left 4/17/2024    Procedure: REMOVAL-STENT;  Surgeon: Suni Patel MD;  Location: [facility] OR 1ST FLR;  Service: Urology;  Laterality: Left;    REMOVAL-STENT Left 5/8/2024    Procedure: REMOVAL-STENT;  Surgeon: Suni Patel MD;  Location: [facility] OR 1ST FLR;  Service: Urology;  Laterality: Left;    REPLACEMENT OF NEPHROSTOMY TUBE Left 4/8/2024    Procedure: PLACEMENT, NEPHROSTOMY TUBE;  Surgeon: Suni Patel MD;  Location: [facility] OR 1ST FLR;  Service: Urology;  Laterality: Left;    REPLACEMENT OF STENT Left 4/17/2024    Procedure: REPLACEMENT, STENT;  Surgeon: Suni Patel MD;  Location: [facility] OR 1ST FLR;  Service: Urology;  Laterality: Left;    REPLACEMENT OF STENT Left 5/8/2024    Procedure: REPLACEMENT, STENT;  Surgeon: Suni Patel MD;  Location: [facility] OR 1ST FLR;  Service: Urology;  Laterality: Left;    SPINAL FUSION      TONSILLECTOMY, ADENOIDECTOMY      TOTAL KNEE ARTHROPLASTY Left 5/1/2019    Procedure: REPLACEMENT-KNEE-TOTAL;  Surgeon: John L. [facility] Jr., MD;  Location: [facility] OR 2ND FLR;  Service: Orthopedics;  Laterality: Left;    URETERAL STENT PLACEMENT Left 4/8/2024    Procedure: INSERTION, STENT, URETER;  Surgeon: Suni Patel MD;  Location: [facility] OR 1ST FLR;  Service: Urology;  Laterality: Left;    URETEROSCOPY  2/19/2024    Procedure: URETEROSCOPY;  Surgeon: Suni Patel MD;  Location: [facility] OR 2ND FLR;  Service: Urology;;    URETEROSCOPY Left 4/17/2024    Procedure: URETEROSCOPY;  Surgeon: Suni Patel MD;  Location: [facility] OR 1ST FLR;  Service: Urology;  Laterality: Left;    URETEROSCOPY Left 5/8/2024    Procedure: URETEROSCOPY;  Surgeon: Suni Patel MD;  Location: [facility]  "OR 1ST FLR;  Service: Urology;  Laterality: Left;    URETEROSCOPY, ANTEGRADE Right 2/23/2024    Procedure: URETEROSCOPY, ANTEGRADE;  Surgeon: Suni Patel MD;  Location: [facility] OR 1ST FLR;  Service: Urology;  Laterality: Right;     Social History[1]  Family History   Problem Relation Name Age of Onset    Heart disease Mother      Heart disease Father      Cancer Father      COPD Father      Hypertension Sister      Scoliosis Sister      Hypertension Brother      Heart disease Maternal Grandmother      Heart disease Maternal Aunt      Heart disease Maternal Uncle      Heart disease Paternal Aunt      Breast cancer Paternal Aunt      Heart disease Paternal Uncle      Cancer Paternal Uncle      Acne Other nephew     Eczema Other nephew     Melanoma Neg Hx      Psoriasis Neg Hx      Lupus Neg Hx         Review of patient's allergies indicates:   Allergen Reactions    Adhesive Dermatitis and Blisters    Adhesive tape-silicones Dermatitis     The longer the adhesive stays on, the worse the irritation    Mastisol adhesive [gum lihlbv-iyibxh-ovme-alcohol] Itching, Rash and Blisters     "Looked like poison ivy"    Sulfamethoxazole-trimethoprim Itching and Anxiety     Has a "nervous feeling."  "Jittery"  Also had an upset stomach.Has taken recently and the reaction was "less intense"    Bactoshield chg [chlorhexidine gluconate] Rash     Caused red, itchy patch to skin        Medication List with Changes/Refills   Current Medications    ASPIRIN (ECOTRIN) 81 MG EC TABLET    Take 81 mg by mouth nightly.    BIOTIN ORAL    Take 10,000 mg by mouth every morning.    BLOOD SUGAR DIAGNOSTIC (TRUE METRIX GLUCOSE TEST STRIP) STRP    1 each by NOT APPLICABLE route once daily. Test Blood Sugar    CHLORTHALIDONE (HYGROTEN) 25 MG TAB    Take 1 tablet (25 mg total) by mouth once daily.    CYCLOBENZAPRINE (FLEXERIL) 10 MG TABLET    TK 1 T PO QD- as needed for muscle spasms    DULOXETINE (CYMBALTA) 60 MG CAPSULE    TAKE 1 CAPSULE " EVERY DAY    FISH OIL-OMEGA-3 FATTY ACIDS 300-1,000 MG CAPSULE    Take 1,200 mg by mouth 2 (two) times daily.    FLUTICASONE PROPIONATE (FLONASE) 50 MCG/ACTUATION NASAL SPRAY    USE 2 SPRAYS NASALLY ONE TIME DAILY    GABAPENTIN (NEURONTIN) 800 MG TABLET    Take 800 mg by mouth 3 (three) times daily. Takes 2-3 times per day    LANCETS (TRUEPLUS LANCETS) 33 GAUGE MISC    1 lancet  by NOT APPLICABLE route once daily. Test Blood Sugar    LEVOTHYROXINE (SYNTHROID) 125 MCG TABLET    Take 1 tablet (125 mcg total) by mouth before breakfast.    METFORMIN (GLUCOPHAGE) 500 MG TABLET    Take 1 tablet (500 mg total) by mouth 2 (two) times daily with meals.    MULTIVITAMIN CAPSULE    Take 1 capsule by mouth every morning.    OMEPRAZOLE (PRILOSEC) 40 MG CAPSULE    Take 1 capsule (40 mg total) by mouth once daily.    OXYCODONE-ACETAMINOPHEN (PERCOCET)  MG PER TABLET    Take 1 tablet by mouth 4 (four) times daily as needed for Pain.    POTASSIUM CHLORIDE SA (K-DUR,KLOR-CON) 20 MEQ TABLET    Take 1 tablet (20 mEq total) by mouth once daily.    POTASSIUM CITRATE (UROCIT-K) 5 MEQ (540 MG) TBSR    Take 3 tablets (15 mEq total) by mouth 3 (three) times daily with meals.    PROPRANOLOL (INDERAL) 10 MG TABLET    Take 1 tablet (10 mg total) by mouth 2 (two) times daily. (Morning and afternoon)    ROSUVASTATIN (CRESTOR) 10 MG TABLET    TAKE 1 TABLET EVERY NIGHT    TIRZEPATIDE (MOUNJARO) 15 MG/0.5 ML PNIJ    Inject 15 mg into the skin every 7 days.    TRUE METRIX LEVEL 1 SOLN    USE AS DIRECTED WITH GLUCOSE METER    ZINC SULFATE (ZINCATE) 50 MG ZINC (220 MG) CAPSULE        ZOLPIDEM (AMBIEN) 10 MG TAB    Take 1 tablet (10 mg total) by mouth nightly as needed.       Review of Systems  Constitution: Denies chills, fever, and sweats.  HENT: Denies headaches or blurry vision.  Cardiovascular: Denies chest pain or irregular heart beat.  Respiratory: Denies cough or shortness of breath.  Gastrointestinal: Denies abdominal pain, nausea, or  "vomiting.  Musculoskeletal: Denies muscle cramps.  Neurological: Denies dizziness or focal weakness.  Psychiatric/Behavioral: Normal mental status.  Hematologic/Lymphatic: Denies bleeding problem or easy bruising/bleeding.  Skin: Denies rash or suspicious lesions    Physical Examination  /80 (BP Location: Left arm, Patient Position: Sitting)   Pulse 83   Ht 5' 5" (1.651 m)   Wt 81.1 kg (178 lb 12.7 oz)   LMP 12/06/2007   BMI 29.75 kg/m²     Constitutional: No acute distress, conversant  HEENT: Sclera anicteric, Pupils equal, round and reactive to light, extraocular motions intact, Oropharynx clear  Neck: No JVD, no carotid bruits  Cardiovascular: regular rate and rhythm, no murmur, rubs or gallops, normal S1/S2  Pulmonary: Clear to auscultation bilaterally  Abdominal: Abdomen soft, nontender, nondistended, positive bowel sounds  Extremities: No lower extremity edema  Cluster of varicose veins noted at left buttock area and lower legs  Pulses:  Carotid pulses are 2+ on the right side, and 2+ on the left side.  Radial pulses are 2+ on the right side, and 2+ on the left side.   Femoral pulses are 2+ on the right side, and 2+ on the left side.  Popliteal pulses are 2+ on the right side, and 2+ on the left side.   Dorsalis pedis pulses are 2+ on the right side, and 2+ on the left side.   Posterior tibial pulses are 2+ on the right side, and 2+ on the left side.    Skin: No ecchymosis, erythema, or ulcers  Psych: Alert and oriented x 3, appropriate affect  Neuro: CNII-XII intact, no focal deficits    Labs:  Most Recent Data  CBC:   Lab Results   Component Value Date    WBC 7.14 06/18/2025    HGB 15.3 06/18/2025    HCT 46.4 06/18/2025     06/18/2025    MCV 78 (L) 06/18/2025    RDW 13.9 06/18/2025     BMP:   Lab Results   Component Value Date     06/18/2025    K 4.1 06/18/2025     06/18/2025    CO2 28 06/18/2025    BUN 14 06/18/2025    CREATININE 0.7 06/18/2025    GLU 89 06/18/2025    CALCIUM " 9.2 06/18/2025    MG 1.7 12/05/2024    PHOS 3.4 12/05/2024     LFTS;   Lab Results   Component Value Date    PROT 6.8 06/18/2025    PROT 6.4 06/18/2025    ALBUMIN 4.1 06/18/2025    BILITOT 0.6 06/18/2025    AST 18 06/18/2025    ALKPHOS 61 06/18/2025    ALT 14 06/18/2025     COAGS:   Lab Results   Component Value Date    INR 1.0 04/03/2024     FLP:   Lab Results   Component Value Date    CHOL 124 09/20/2023    HDL 44 09/20/2023    LDLCALC 52.8 (L) 09/20/2023    TRIG 136 09/20/2023    CHOLHDL 35.5 09/20/2023     CARDIAC:   Lab Results   Component Value Date    TROPONINI 0.018 12/21/2019       Imaging:    Assessment/Plan:  Angie Mccarthy is a 59 y.o. female with DM2, HTN, HLD, hypothyroidism, NAFLD, overweight, chronic back pain, who presents for initial appointment.      Varicose Veins with Pain- Check BLE venous reflux study and MARI study.  Recommend wearing graduated compression hose.  Limit sodium intake to 2000 mg daily.  Limit volume intake to 1.5 L daily.  Elevate legs when resting.    2. HTN- Controlled. Continue current medications.     3. HLD- Continue rosuvastatin 10 mg daily.     4. Overweight- Encourage diet, exercise, and weight loss.    Follow up in 3 months    Total duration of face to face visit time 30 minutes.  Total time spent counseling greater than fifty percent of total visit time.  Counseling included discussion regarding imaging findings, diagnosis, possibilities, treatment options, risks and benefits.  The patient had many questions regarding the options and long-term effects.    Deon Buckner MD, PhD  Interventional Cardiology         [1]   Social History  Socioeconomic History    Marital status:    Occupational History    Occupation: disable   Tobacco Use    Smoking status: Never     Passive exposure: Never    Smokeless tobacco: Never    Tobacco comments:     Tried a few cigarettes during teenage   Substance and Sexual Activity    Alcohol use: Not Currently      Comment: socially    Drug use: No    Sexual activity: Not Currently     Partners: Male   Other Topics Concern    Are you pregnant or think you may be? No    Breast-feeding No     Social Drivers of Health     Financial Resource Strain: Patient Declined (12/2/2024)    Overall Financial Resource Strain (CARDIA)     Difficulty of Paying Living Expenses: Patient declined   Food Insecurity: Patient Declined (12/2/2024)    Hunger Vital Sign     Worried About Running Out of Food in the Last Year: Patient declined     Ran Out of Food in the Last Year: Patient declined   Transportation Needs: Unmet Transportation Needs (11/15/2023)    PRAPARE - Transportation     Lack of Transportation (Medical): Yes     Lack of Transportation (Non-Medical): No   Physical Activity: Inactive (12/2/2024)    Exercise Vital Sign     Days of Exercise per Week: 0 days     Minutes of Exercise per Session: 0 min   Stress: Stress Concern Present (12/2/2024)    [employer] - Occupational Stress Questionnaire     Feeling of Stress : Rather much   Housing Stability: Unknown (12/2/2024)    Housing Stability Vital Sign     Unable to Pay for Housing in the Last Year: Patient declined

## 2025-08-08 NOTE — PATIENT INSTRUCTIONS
Assessment/Plan:  Angie Mccarthy is a 59 y.o. female with DM2, HTN, HLD, hypothyroidism, NAFLD, overweight, chronic back pain, who presents for initial appointment.      Varicose Veins with Pain- Check BLE venous reflux study and MARI study.  Recommend wearing graduated compression hose.  Limit sodium intake to 2000 mg daily.  Limit volume intake to 1.5 L daily.  Elevate legs when resting.    2. HTN- Controlled. Continue current medications.     3. HLD- Continue rosuvastatin 10 mg daily.     4. Overweight- Encourage diet, exercise, and weight loss.    Follow up in 3 months

## 2025-08-11 ENCOUNTER — PATIENT MESSAGE (OUTPATIENT)
Facility: CLINIC | Age: 60
End: 2025-08-11
Payer: MEDICARE

## 2025-08-11 PROBLEM — E66.3 OVERWEIGHT WITH BODY MASS INDEX (BMI) 25.0-29.9: Status: ACTIVE | Noted: 2017-03-08

## 2025-08-12 ENCOUNTER — TELEPHONE (OUTPATIENT)
Dept: ENDOSCOPY | Facility: HOSPITAL | Age: 60
End: 2025-08-12
Payer: MEDICARE

## 2025-08-19 ENCOUNTER — TELEPHONE (OUTPATIENT)
Dept: ENDOSCOPY | Facility: HOSPITAL | Age: 60
End: 2025-08-19
Payer: MEDICARE

## 2025-08-19 ENCOUNTER — OFFICE VISIT (OUTPATIENT)
Dept: CARDIOLOGY | Facility: CLINIC | Age: 60
End: 2025-08-19
Payer: MEDICARE

## 2025-08-19 VITALS
DIASTOLIC BLOOD PRESSURE: 82 MMHG | OXYGEN SATURATION: 95 % | HEIGHT: 65 IN | HEART RATE: 83 BPM | BODY MASS INDEX: 29.2 KG/M2 | SYSTOLIC BLOOD PRESSURE: 121 MMHG | WEIGHT: 175.25 LBS

## 2025-08-19 DIAGNOSIS — R07.9 CHEST PAIN, UNSPECIFIED TYPE: ICD-10-CM

## 2025-08-19 DIAGNOSIS — I10 ESSENTIAL HYPERTENSION: Primary | Chronic | ICD-10-CM

## 2025-08-19 DIAGNOSIS — R07.89 OTHER CHEST PAIN: ICD-10-CM

## 2025-08-19 DIAGNOSIS — R29.818 SUSPECTED SLEEP APNEA: ICD-10-CM

## 2025-08-19 DIAGNOSIS — E11.65 TYPE 2 DIABETES MELLITUS WITH HYPERGLYCEMIA, WITHOUT LONG-TERM CURRENT USE OF INSULIN: ICD-10-CM

## 2025-08-19 DIAGNOSIS — R06.02 SHORTNESS OF BREATH: ICD-10-CM

## 2025-08-19 DIAGNOSIS — I83.812 VARICOSE VEINS OF LEFT LOWER EXTREMITY WITH PAIN: ICD-10-CM

## 2025-08-19 DIAGNOSIS — E78.5 HYPERLIPIDEMIA, UNSPECIFIED HYPERLIPIDEMIA TYPE: Chronic | ICD-10-CM

## 2025-08-19 DIAGNOSIS — R00.2 PALPITATIONS: ICD-10-CM

## 2025-08-19 PROBLEM — G47.33 OBSTRUCTIVE SLEEP APNEA: Status: RESOLVED | Noted: 2025-02-06 | Resolved: 2025-08-19

## 2025-08-19 PROCEDURE — 99214 OFFICE O/P EST MOD 30 MIN: CPT | Mod: S$GLB,,,

## 2025-08-19 PROCEDURE — 93010 ELECTROCARDIOGRAM REPORT: CPT | Mod: S$GLB,,, | Performed by: INTERNAL MEDICINE

## 2025-08-19 PROCEDURE — 3060F POS MICROALBUMINURIA REV: CPT | Mod: CPTII,S$GLB,,

## 2025-08-19 PROCEDURE — 4010F ACE/ARB THERAPY RXD/TAKEN: CPT | Mod: CPTII,S$GLB,,

## 2025-08-19 PROCEDURE — 1159F MED LIST DOCD IN RCRD: CPT | Mod: CPTII,S$GLB,,

## 2025-08-19 PROCEDURE — 3008F BODY MASS INDEX DOCD: CPT | Mod: CPTII,S$GLB,,

## 2025-08-19 PROCEDURE — 99999 PR PBB SHADOW E&M-EST. PATIENT-LVL V: CPT | Mod: PBBFAC,,,

## 2025-08-19 PROCEDURE — 3079F DIAST BP 80-89 MM HG: CPT | Mod: CPTII,S$GLB,,

## 2025-08-19 PROCEDURE — 3074F SYST BP LT 130 MM HG: CPT | Mod: CPTII,S$GLB,,

## 2025-08-19 PROCEDURE — 3044F HG A1C LEVEL LT 7.0%: CPT | Mod: CPTII,S$GLB,,

## 2025-08-19 PROCEDURE — 3066F NEPHROPATHY DOC TX: CPT | Mod: CPTII,S$GLB,,

## 2025-08-19 PROCEDURE — 1160F RVW MEDS BY RX/DR IN RCRD: CPT | Mod: CPTII,S$GLB,,

## 2025-08-20 LAB
OHS QRS DURATION: 94 MS
OHS QTC CALCULATION: 420 MS

## 2025-08-25 PROBLEM — R07.89 OTHER CHEST PAIN: Status: RESOLVED | Noted: 2025-07-14 | Resolved: 2025-08-25

## (undated) DEVICE — ADAPTER HOSE 10FT 8MM

## (undated) DEVICE — KIT IRR SUCTION HND PIECE

## (undated) DEVICE — BANDAGE ACE ELASTIC 6"

## (undated) DEVICE — BOWL CEMENT

## (undated) DEVICE — DRAPE INSTR MAGNETIC 10X16IN

## (undated) DEVICE — SUT VICRYL 3-0 27 SH

## (undated) DEVICE — DRESSING TRANS 4X4 TEGADERM

## (undated) DEVICE — SOL NACL IRR 3000ML

## (undated) DEVICE — TRAY CYSTO BASIN OMC

## (undated) DEVICE — TUBE EMG NIM 7.0MM TRIVANTAGE

## (undated) DEVICE — SUT VICRYL BR 1 GEN 27 CT-1

## (undated) DEVICE — DRESSING TEGADERM 4.4X5IN

## (undated) DEVICE — SYR 10CC LUER LOCK

## (undated) DEVICE — SUT 4-0 12-18IN SILK BLACK

## (undated) DEVICE — SCREW HEX HEAD
Type: IMPLANTABLE DEVICE | Site: KNEE | Status: NON-FUNCTIONAL
Removed: 2019-05-01

## (undated) DEVICE — SEE MEDLINE ITEM 146298

## (undated) DEVICE — GUIDEWIRE STR TIP HIWIRE 150CM

## (undated) DEVICE — SET CEMENT (SCULP)

## (undated) DEVICE — PAD COLD THERAPY KNEE WRAP ON

## (undated) DEVICE — DRAPE NEPHRSCPY SURG 72X118IN

## (undated) DEVICE — EXTRACTOR TIPLESS 2.4FRX1115CM

## (undated) DEVICE — KIT LITHOCLAST TRILOGY 3.9X440

## (undated) DEVICE — SET CYSTO IRRIGATION UNIV SPIK

## (undated) DEVICE — SET UROLOGY TBNG UP TO 300MMHG

## (undated) DEVICE — SYR 50CC LL

## (undated) DEVICE — HOOD T-5 TEAR AWAY STERILE

## (undated) DEVICE — APPLICATOR CHLORAPREP ORN 26ML

## (undated) DEVICE — UNDERGLOVE BIOGEL PI SZ 6.5 LF

## (undated) DEVICE — SUT VICRYL 6-0 P1 18IN UD

## (undated) DEVICE — Device

## (undated) DEVICE — SPONGE KITTNER 1/4X 5/8 L STRL

## (undated) DEVICE — SPONGE GAUZE 16PLY 4X4

## (undated) DEVICE — SYR 30CC LUER LOCK

## (undated) DEVICE — SHEATH FLEXOR URET 10.7FRX35CM

## (undated) DEVICE — SEE MEDLINE ITEM 152529

## (undated) DEVICE — UNDERGLOVES BIOGEL PI SZ 6 LF

## (undated) DEVICE — DRAPE TOP 53X102IN

## (undated) DEVICE — PACK CYSTOSCOPY III SIRUS

## (undated) DEVICE — PACK ECLIPSE SET-UP W/O DRAPE

## (undated) DEVICE — BOWL STERILE LARGE 32OZ

## (undated) DEVICE — TAPE SURG DURAPORE 2 X10YD

## (undated) DEVICE — CATH URETERAL DL 10FR 24CM

## (undated) DEVICE — PROBE PRASS SLIM

## (undated) DEVICE — TOURNIQUET SB QC DP 34X4IN

## (undated) DEVICE — SOL IRRI STRL WATER 1000ML

## (undated) DEVICE — DRAPE C-ARM ELAS CLIP 42X120IN

## (undated) DEVICE — SYR ONLY LUER LOCK 20CC

## (undated) DEVICE — DRESSING TELFA N ADH 3X8

## (undated) DEVICE — FIBER QUANTA OPT SDT 272UM

## (undated) DEVICE — GLOVE SENSICARE PI ALOE 7

## (undated) DEVICE — CATH POLLACK OPEN-END FLEXI-TI

## (undated) DEVICE — CANISTER OMNI-JUG SUCTION 16LT

## (undated) DEVICE — SCREW HEX HEAD 3.5X38MM
Type: IMPLANTABLE DEVICE | Site: KNEE | Status: NON-FUNCTIONAL
Removed: 2019-05-01

## (undated) DEVICE — DRAPE CORETEMP FLD WRM 56X62IN

## (undated) DEVICE — GUIDE WIRE MOTION .035 X 150CM

## (undated) DEVICE — HEMOSTAT SURGICEL FIBRLR 1X2IN

## (undated) DEVICE — BLLN ULTRAXX NEPHSTMY 6MM 15CM

## (undated) DEVICE — GOWN POLY REINF BRTH SLV XL

## (undated) DEVICE — GUIDEWIRE PTFE .038INX145CM

## (undated) DEVICE — BLADE SURG CARBON STEEL SZ11

## (undated) DEVICE — BAG URINARY DRN 2000ML

## (undated) DEVICE — DISSECTOR LIGASURE EXACT 21CM

## (undated) DEVICE — SEE MEDLINE ITEM 152487

## (undated) DEVICE — VALVE OLYMPUS SCOPE SUCTION

## (undated) DEVICE — BLADE SAG 13.0 X1.27X90

## (undated) DEVICE — SOL IRR NACL .9% 3000ML

## (undated) DEVICE — PACK CYSTO

## (undated) DEVICE — NDL 18GA X1 1/2 REG BEVEL

## (undated) DEVICE — BRUSH SURGICAL SCRUB STERILE

## (undated) DEVICE — DRAPE HALF SURGICAL 40X58IN

## (undated) DEVICE — SEE MEDLINE ITEM 157117

## (undated) DEVICE — SUT 2/0 30IN SILK BLK BRAI

## (undated) DEVICE — SHEATH FLEXOR ACCESS 12X35

## (undated) DEVICE — SYR 50ML CATH TIP

## (undated) DEVICE — TUBING SUC UNIV W/CONN 12FT

## (undated) DEVICE — CLIP LIGATING MEDIUM

## (undated) DEVICE — MASK FLYTE HOOD PEEL AWAY

## (undated) DEVICE — ELECTRODE BLD EXT INSUL 1

## (undated) DEVICE — PLUG CATHETER STERILE FOLEY

## (undated) DEVICE — CLIP LIGATING HEMOCLP SMALL

## (undated) DEVICE — SET BLLN ULTRAXX NEPHROSTOMY

## (undated) DEVICE — FIBER LASER HOLMIUM 200MH

## (undated) DEVICE — TRAY CATH FOL SIL URIMTR 16FR

## (undated) DEVICE — UNDERGLOVES BIOGEL PI SIZE 7.5

## (undated) DEVICE — SUT ETHILON 2-0 PSLX 30IN

## (undated) DEVICE — ADHESIVE DERMABOND ADVANCED

## (undated) DEVICE — SPONGE COTTON TRAY 4X4IN

## (undated) DEVICE — SUT VICRYL CTD 2-0 GI 27 SH

## (undated) DEVICE — BASKET PERC N CIRCLE

## (undated) DEVICE — DRAPE ORTH SPLIT 77X108IN

## (undated) DEVICE — DRAPE STERI INSTRUMENT 1018

## (undated) DEVICE — JELLY LUBRICANT STERILE 4 OZ

## (undated) DEVICE — CONTAINER SPEC OR STRL 4.5OZ

## (undated) DEVICE — DRESSING AQUACEL AG ADV 3.5X12

## (undated) DEVICE — KIT TOTAL KNEE TKOFG

## (undated) DEVICE — SET IRR URLGY 2LINE UNIV SPIKE

## (undated) DEVICE — BLADE SURG STAINLESS STEEL #15

## (undated) DEVICE — SOL IRR WATER STRL 3000 ML

## (undated) DEVICE — TRAY FOLEY 16FR INFECTION CONT

## (undated) DEVICE — SET EXTENSION STERILE 30IN

## (undated) DEVICE — SEE MEDLINE ITEM 154981

## (undated) DEVICE — SUT 3-0 12-18IN SILK

## (undated) DEVICE — ELECTRODE REM PLYHSV RETURN 9

## (undated) DEVICE — SUT MONOCRYL 3-0 SH U/D

## (undated) DEVICE — BIT DRILL PIN TROCAR 3.2MM
Type: IMPLANTABLE DEVICE | Site: KNEE | Status: NON-FUNCTIONAL
Removed: 2019-05-01

## (undated) DEVICE — ADHESIVE MASTISOL VIAL 48/BX

## (undated) DEVICE — CATCHER STONE W/TUBING ADAPTER

## (undated) DEVICE — TOWEL OR DISP STRL BLUE 4/PK

## (undated) DEVICE — SEE MEDLINE ITEM 157131

## (undated) DEVICE — UNDERGLOVES BIOGEL PI SZ 7 LF

## (undated) DEVICE — TRAY CATH 1-LYR URIMTR 16FR

## (undated) DEVICE — TIP SMART 309 3X8.5MM

## (undated) DEVICE — NDL HYPO REG 25G X 1 1/2

## (undated) DEVICE — PUMP COLD THERAPY

## (undated) DEVICE — WIRE ANGLED TIP SENSOR

## (undated) DEVICE — BLADE RECIP RIBBED

## (undated) DEVICE — SEE MEDLINE ITEM 152515

## (undated) DEVICE — DRESSING TRANS 6X8 TEGADERM

## (undated) DEVICE — SUT CTD VICRYL 0 UND BR CPX

## (undated) DEVICE — BASKET STONE RETRV 2.4X12X120

## (undated) DEVICE — BLADE SAG 18.0X1.27X100

## (undated) DEVICE — PAD CAST SPECIALIST STRL 6

## (undated) DEVICE — BANDAGE ESMARK 6X12

## (undated) DEVICE — GOWN SMARTGOWN LVL4 X-LONG XL

## (undated) DEVICE — BLADE SURG CARBON STEEL #10